# Patient Record
Sex: FEMALE | Race: WHITE | NOT HISPANIC OR LATINO | Employment: OTHER | URBAN - METROPOLITAN AREA
[De-identification: names, ages, dates, MRNs, and addresses within clinical notes are randomized per-mention and may not be internally consistent; named-entity substitution may affect disease eponyms.]

---

## 2017-01-04 ENCOUNTER — HOSPITAL ENCOUNTER (OUTPATIENT)
Dept: RADIOLOGY | Facility: MEDICAL CENTER | Age: 77
End: 2017-01-04
Attending: FAMILY MEDICINE
Payer: MEDICARE

## 2017-01-04 DIAGNOSIS — R06.2 EXPIRATORY WHEEZING: ICD-10-CM

## 2017-01-04 PROCEDURE — 71020 DX-CHEST-2 VIEWS: CPT

## 2017-01-12 ENCOUNTER — PATIENT MESSAGE (OUTPATIENT)
Dept: MEDICAL GROUP | Facility: CLINIC | Age: 77
End: 2017-01-12

## 2017-01-12 DIAGNOSIS — R06.02 EXERTIONAL SHORTNESS OF BREATH: ICD-10-CM

## 2017-02-05 ENCOUNTER — PATIENT MESSAGE (OUTPATIENT)
Dept: MEDICAL GROUP | Facility: CLINIC | Age: 77
End: 2017-02-05

## 2017-02-05 DIAGNOSIS — K21.00 GASTROESOPHAGEAL REFLUX DISEASE WITH ESOPHAGITIS: ICD-10-CM

## 2017-02-05 RX ORDER — OMEPRAZOLE 20 MG/1
20 CAPSULE, DELAYED RELEASE ORAL
Qty: 90 CAP | Refills: 3 | Status: SHIPPED | OUTPATIENT
Start: 2017-02-05 | End: 2018-12-10 | Stop reason: SDUPTHER

## 2017-02-06 ENCOUNTER — PATIENT MESSAGE (OUTPATIENT)
Dept: MEDICAL GROUP | Facility: CLINIC | Age: 77
End: 2017-02-06

## 2017-02-06 RX ORDER — FLUOXETINE HYDROCHLORIDE 20 MG/1
CAPSULE ORAL
Qty: 30 CAP | Refills: 6 | Status: SHIPPED | OUTPATIENT
Start: 2017-02-06 | End: 2017-03-24 | Stop reason: SDUPTHER

## 2017-02-06 NOTE — TELEPHONE ENCOUNTER
From: Maddy Hodge  To: Shirlene Quinones M.D.  Sent: 2/5/2017 9:46 AM PST  Subject: Prescription Question    Hi,  I am out of refills for omeprezole 20mg.  Please send a new prescription to [facility], [address].  Thank you, Maddy Hodge

## 2017-03-06 ENCOUNTER — PATIENT MESSAGE (OUTPATIENT)
Dept: MEDICAL GROUP | Facility: CLINIC | Age: 77
End: 2017-03-06

## 2017-03-06 DIAGNOSIS — R10.84 GENERALIZED ABDOMINAL PAIN: ICD-10-CM

## 2017-03-06 DIAGNOSIS — R14.0 ABDOMINAL BLOATING: ICD-10-CM

## 2017-03-10 ENCOUNTER — HOSPITAL ENCOUNTER (OUTPATIENT)
Dept: LAB | Facility: MEDICAL CENTER | Age: 77
End: 2017-03-10
Attending: FAMILY MEDICINE
Payer: MEDICARE

## 2017-03-10 DIAGNOSIS — R10.84 GENERALIZED ABDOMINAL PAIN: ICD-10-CM

## 2017-03-10 DIAGNOSIS — R14.0 ABDOMINAL BLOATING: ICD-10-CM

## 2017-03-10 LAB
ALBUMIN SERPL BCP-MCNC: 4 G/DL (ref 3.2–4.9)
ALBUMIN/GLOB SERPL: 1.5 G/DL
ALP SERPL-CCNC: 100 U/L (ref 30–99)
ALT SERPL-CCNC: 25 U/L (ref 2–50)
ANION GAP SERPL CALC-SCNC: 6 MMOL/L (ref 0–11.9)
AST SERPL-CCNC: 24 U/L (ref 12–45)
BASOPHILS # BLD AUTO: 0.05 K/UL (ref 0–0.12)
BASOPHILS NFR BLD AUTO: 0.9 % (ref 0–1.8)
BILIRUB SERPL-MCNC: 0.5 MG/DL (ref 0.1–1.5)
BUN SERPL-MCNC: 12 MG/DL (ref 8–22)
CALCIUM SERPL-MCNC: 9.1 MG/DL (ref 8.5–10.5)
CHLORIDE SERPL-SCNC: 98 MMOL/L (ref 96–112)
CO2 SERPL-SCNC: 27 MMOL/L (ref 20–33)
CREAT SERPL-MCNC: 0.86 MG/DL (ref 0.5–1.4)
EOSINOPHIL # BLD: 0.2 K/UL (ref 0–0.51)
EOSINOPHIL NFR BLD AUTO: 3.7 % (ref 0–6.9)
ERYTHROCYTE [DISTWIDTH] IN BLOOD BY AUTOMATED COUNT: 46.4 FL (ref 35.9–50)
GLOBULIN SER CALC-MCNC: 2.7 G/DL (ref 1.9–3.5)
GLUCOSE SERPL-MCNC: 98 MG/DL (ref 65–99)
HCT VFR BLD AUTO: 38.4 % (ref 37–47)
HGB BLD-MCNC: 12.7 G/DL (ref 12–16)
IMM GRANULOCYTES # BLD AUTO: 0.01 K/UL (ref 0–0.11)
IMM GRANULOCYTES NFR BLD AUTO: 0.2 % (ref 0–0.9)
LYMPHOCYTES # BLD: 1.38 K/UL (ref 1–4.8)
LYMPHOCYTES NFR BLD AUTO: 25.5 % (ref 22–41)
MCH RBC QN AUTO: 28.6 PG (ref 27–33)
MCHC RBC AUTO-ENTMCNC: 33.1 G/DL (ref 33.6–35)
MCV RBC AUTO: 86.5 FL (ref 81.4–97.8)
MONOCYTES # BLD: 0.51 K/UL (ref 0–0.85)
MONOCYTES NFR BLD AUTO: 9.4 % (ref 0–13.4)
NEUTROPHILS # BLD: 3.27 K/UL (ref 2–7.15)
NEUTROPHILS NFR BLD AUTO: 60.3 % (ref 44–72)
NRBC # BLD AUTO: 0 K/UL
NRBC BLD-RTO: 0 /100 WBC
PLATELET # BLD AUTO: 288 K/UL (ref 164–446)
PMV BLD AUTO: 10.5 FL (ref 9–12.9)
POTASSIUM SERPL-SCNC: 4.4 MMOL/L (ref 3.6–5.5)
PROT SERPL-MCNC: 6.7 G/DL (ref 6–8.2)
RBC # BLD AUTO: 4.44 M/UL (ref 4.2–5.4)
SODIUM SERPL-SCNC: 131 MMOL/L (ref 135–145)
WBC # BLD AUTO: 5.4 K/UL (ref 4.8–10.8)

## 2017-03-10 PROCEDURE — 83516 IMMUNOASSAY NONANTIBODY: CPT

## 2017-03-10 PROCEDURE — 85025 COMPLETE CBC W/AUTO DIFF WBC: CPT

## 2017-03-10 PROCEDURE — 36415 COLL VENOUS BLD VENIPUNCTURE: CPT

## 2017-03-10 PROCEDURE — 80053 COMPREHEN METABOLIC PANEL: CPT

## 2017-03-13 LAB — TTG IGA SER IA-ACNC: 0 U/ML (ref 0–3)

## 2017-03-20 ENCOUNTER — TELEPHONE (OUTPATIENT)
Dept: PULMONOLOGY | Facility: HOSPICE | Age: 77
End: 2017-03-20

## 2017-03-20 DIAGNOSIS — R06.00 DYSPNEA, UNSPECIFIED TYPE: ICD-10-CM

## 2017-03-24 ENCOUNTER — OFFICE VISIT (OUTPATIENT)
Dept: MEDICAL GROUP | Facility: CLINIC | Age: 77
End: 2017-03-24
Payer: MEDICARE

## 2017-03-24 VITALS
TEMPERATURE: 97.2 F | SYSTOLIC BLOOD PRESSURE: 126 MMHG | BODY MASS INDEX: 33.57 KG/M2 | HEIGHT: 60 IN | DIASTOLIC BLOOD PRESSURE: 80 MMHG | HEART RATE: 71 BPM | OXYGEN SATURATION: 94 % | WEIGHT: 171 LBS

## 2017-03-24 DIAGNOSIS — I10 ESSENTIAL HYPERTENSION: ICD-10-CM

## 2017-03-24 DIAGNOSIS — E66.9 OBESITY (BMI 30.0-34.9): ICD-10-CM

## 2017-03-24 DIAGNOSIS — F43.22 ADJUSTMENT DISORDER WITH ANXIETY: ICD-10-CM

## 2017-03-24 DIAGNOSIS — E03.4 IDIOPATHIC ATROPHIC HYPOTHYROIDISM: ICD-10-CM

## 2017-03-24 DIAGNOSIS — K21.9 GASTROESOPHAGEAL REFLUX DISEASE WITHOUT ESOPHAGITIS: ICD-10-CM

## 2017-03-24 DIAGNOSIS — R14.1 ABDOMINAL GAS PAIN: ICD-10-CM

## 2017-03-24 PROCEDURE — 4040F PNEUMOC VAC/ADMIN/RCVD: CPT | Performed by: FAMILY MEDICINE

## 2017-03-24 PROCEDURE — 1036F TOBACCO NON-USER: CPT | Performed by: FAMILY MEDICINE

## 2017-03-24 PROCEDURE — G8432 DEP SCR NOT DOC, RNG: HCPCS | Performed by: FAMILY MEDICINE

## 2017-03-24 PROCEDURE — 1101F PT FALLS ASSESS-DOCD LE1/YR: CPT | Performed by: FAMILY MEDICINE

## 2017-03-24 PROCEDURE — 99214 OFFICE O/P EST MOD 30 MIN: CPT | Performed by: FAMILY MEDICINE

## 2017-03-24 PROCEDURE — G8419 CALC BMI OUT NRM PARAM NOF/U: HCPCS | Performed by: FAMILY MEDICINE

## 2017-03-24 PROCEDURE — G8482 FLU IMMUNIZE ORDER/ADMIN: HCPCS | Performed by: FAMILY MEDICINE

## 2017-03-24 RX ORDER — LEVOTHYROXINE SODIUM 0.05 MG/1
50 TABLET ORAL
Qty: 90 TAB | Refills: 3 | Status: SHIPPED | OUTPATIENT
Start: 2017-03-24 | End: 2018-04-07 | Stop reason: SDUPTHER

## 2017-03-24 RX ORDER — DIAZEPAM 5 MG/1
5 TABLET ORAL EVERY 6 HOURS PRN
Qty: 90 TAB | Refills: 2 | Status: SHIPPED | OUTPATIENT
Start: 2017-03-24 | End: 2018-02-21 | Stop reason: SDUPTHER

## 2017-03-24 RX ORDER — FLUOXETINE HYDROCHLORIDE 20 MG/1
20 CAPSULE ORAL DAILY
Qty: 90 CAP | Refills: 3 | Status: SHIPPED | OUTPATIENT
Start: 2017-03-24 | End: 2017-10-26

## 2017-03-24 NOTE — PROGRESS NOTES
CC: Hypertension, reflux, hypothyroidism, bloating and gas, obesity, anxiety    HPI:   Maddy presents today with the following.    1. Essential hypertension  HTN - Chronic condition stable. Currently taking all meds as directed.   She is taking baby aspirin daily.   She is monitoring BP at home. She has excellent results of 120s to 130s over 70s.  Denies symptoms low BP: light-headed, tunnel-vision, unusual fatigue.   Denies symptoms high BP:pounding headache, visual changes, palpitations, flushed face.   Denies medicine side effects: unusual fatigue, slow heartbeat, foot/leg swelling, cough.    2. Gastroesophageal reflux disease without esophagitis  She feels the current medication regimen is controlling the gastroesophageal reflux symptoms well. Denies dysphagia, reflux symptoms, acidity, abdominal pain or visible blood or mucus in the stool. Denies vomiting or hematemesis. Denies burping or abdominal bloating. Patient avoids nonsteroidal anti-inflammatory drugs. Avoids heavy meals or eating within 2 hours of bedtime.    3. Idiopathic atrophic hypothyroidism  Patient reports good energy level on the medication. Patient denies insomnia, tremor or change in appetite.  Patient is taking the medication on an empty stomach in the morning and waiting at least 30 minutes before eating.  Last TSH in November 2016 was at target.    4. Abdominal gas pain  She has been increasing Metamucil but is getting increased gas and cramping. She feels she is not having adequate evacuation either. Discussed using MiraLAX instead. She has been considering this as she believes her GI doctor recommended this in the past. Denies blood or mucus in the stool.    5. Obesity (BMI 30.0-34.9)  Discussed her weight gain. She has stopped hiking as she is no longer steady enough. She is getting increasing shortness of breath and is very frustrated by this situation. She has a pulmonary test scheduled for next Friday.    6. Adjustment disorder  with anxiety  She does have chronic anxiety. She denies increased other than worry over her shortness of breath and relative inability to exercise. The diazepam continues to be helpful. She takes one maybe 2 in a day. Typically she takes half a tablet. Denies increased depression or rebound anxiety or insomnia.      Patient Active Problem List    Diagnosis Date Noted   • CKD (chronic kidney disease) stage 3, GFR 30-59 ml/min 05/23/2016     Priority: Medium   • Esophageal dysphagia 05/19/2016     Priority: Medium   • Idiopathic atrophic hypothyroidism      Priority: Medium   • GERD (gastroesophageal reflux disease) 09/20/2011     Priority: Medium   • Essential hypertension 07/06/2009     Priority: Medium   • Major depressive disorder, recurrent episode, mild (CMS-HCC) 05/02/2016     Priority: Low   • Neuropathy (CMS-HCC) 10/17/2013     Priority: Low   • Family history of polycystic kidney disease      Priority: Low   • Facet arthritis of lumbar region (CMS-HCC) 03/26/2012     Priority: Low   • History of vertebral fracture 03/26/2012     Priority: Low   • History of gastritis 03/26/2012     Priority: Low   • Spinal stenosis, lumbar region, without neurogenic claudication      Priority: Low   • Menopausal symptoms 09/13/2016   • Spinal cord stimulator status 09/06/2016   • Essential tremor 09/06/2016   • Hyponatremia 04/06/2016   • Postmenopausal osteoporosis    • Arachnoiditis        Current Outpatient Prescriptions   Medication Sig Dispense Refill   • diazepam (VALIUM) 5 MG Tab Take 1 Tab by mouth every 6 hours as needed for Anxiety or Sleep. 90 Tab 2   • fluoxetine (PROZAC) 20 MG Cap Take 1 Cap by mouth every day. 90 Cap 3   • levothyroxine (SYNTHROID) 50 MCG Tab Take 1 Tab by mouth Every morning on an empty stomach. 90 Tab 3   • omeprazole (PRILOSEC) 20 MG delayed-release capsule Take 1 Cap by mouth every day. 90 Cap 3   • duloxetine (CYMBALTA) 60 MG Cap DR Particles delayed-release capsule Take 1 Cap by mouth  every day. 90 Cap 2   • estradiol (ESTRACE) 0.5 MG tablet TAKE ONE TABLET BY MOUTH ONCE DAILY 90 Tab 3   • furosemide (LASIX) 20 MG Tab TAKE ONE TABLET BY MOUTH ONCE DAILY WITH POTASSIUM 30 Tab 3   • KLOR-CON M20 20 MEQ Tab CR TAKE ONE TABLET BY MOUTH ONCE DAILY. TAKE  WITH  LASIX 30 Tab 3   • valsartan (DIOVAN) 160 MG Tab Take 1 Tab by mouth every day. 90 Tab 3   • VENTOLIN  (90 BASE) MCG/ACT Aero Soln inhalation aerosol Inhale 2 Puffs by mouth every 6 hours as needed for Shortness of Breath. 1 Inhaler 5   • aspirin EC (ECOTRIN) 81 MG Tablet Delayed Response Take 81 mg by mouth every day.     • Diclofenac Sodium (VOLTAREN) 1 % GEL Apply  to skin as directed.     • pregabalin (LYRICA) 100 MG CAPS Take 100 mg by mouth 4 times a day.     • Oxymorphone HCl ER (OPANA ER) 20 MG T12A Take 1 Tab by mouth 2 Times a Day.     • oxycodone-acetaminophen (PERCOCET) 7.5-325 MG per tablet Take 1-2 Tabs by mouth every four hours as needed (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 She is seen 2/14/14, do not fill until 4/11/14 180 Each 0   • docusate sodium (COLACE) 250 MG capsule Take 250 mg by mouth every day.     • Probiotic Product ([employer]) CAPS Take 1 Cap by mouth 2 Times a Day.     • Cholecalciferol (VITAMIN D) 1000 UNIT CAPS Take 1,000 Units by mouth every day.     • MAGNESIUM 100 MG PO CAPS Take  by mouth.       No current facility-administered medications for this visit.         Allergies as of 03/24/2017 - Lukas as Reviewed 03/24/2017   Allergen Reaction Noted   • Pcn [penicillins]  07/06/2009   • Sulfa drugs  07/06/2009   • Tape  05/12/2016   • Zoloft  05/23/2016        ROS: As per HPI.    /80 mmHg  Pulse 71  Temp(Src) 36.2 °C (97.2 °F)  Ht 1.524 m (5')  Wt 77.565 kg (171 lb)  BMI 33.40 kg/m2  SpO2 94%    Physical Exam:  Gen:         Alert and oriented, No apparent distress. Voice is weak.  HEENT:   EOMI, PERRLA.   Oropharynx is well hydrated with normal soft  palate motion. No exudate or ulcerations noted.   Neck:       Full range of motion, mild  posterior cervical spasm. No JVD or carotid bruits appreciated. No cervical adenopathy appreciated. No thyromegaly or neck masses appreciated.  No retractions appreciated.  Lungs:      Clear to auscultation A&P with good air movement.   Heart:       Regular rate and rhythm normal S1 and S2 without murmur appreciated.  Strong and symmetric radial and DP pulses.  Abd:          Soft, bowel sounds positive, moderately tender. Marked bloating noted. No hepatosplenomegaly or mass appreciated. No pulsatile mass appreciated.  Ext:           Extremities show symmetric and full range of motion with normal strength. No cyanosis or clubbing appreciated. No peripheral edema appreciated.  Neuro:      Gait is normal. Patient is lucid, fluent and appropriate. No significant tremor appreciated.  Skin:         Shows no rashes, pigmented lesions or ulcerations.        Assessment and Plan.   76 y.o. female with the following issues.    1. Essential hypertension  She continues to be well controlled on valsartan. It is unlikely that the valsartan could be causing the shortness of breath over the pulmonary fibrosis. She will continue her current regimen.    2. Gastroesophageal reflux disease without esophagitis  She is stable on the current regimen. She will continue the omeprazole.    3. Idiopathic atrophic hypothyroidism  She is stable on her current regimen. This is rewritten for 90 days as she requests.  - levothyroxine (SYNTHROID) 50 MCG Tab; Take 1 Tab by mouth Every morning on an empty stomach.  Dispense: 90 Tab; Refill: 3    4. Abdominal gas pain  Her abdominal exam does not show any masses but there is definite bloating. Her ovaries were left in when she had her hysterectomy. Ultrasound is discussed. Discussed strategies for improved diet and weight loss  - US-ABDOMEN COMPLETE SURVEY; Future    5. Obesity (BMI 30.0-34.9)  Discussed  strategies for improved diet and weight loss. She cannot exercise at this time so a lot will depend on the results of the pulmonary testing.  - Patient identified as having weight management issue.  Appropriate orders and counseling given.    6. Adjustment disorder with anxiety  The diazepam continues to be helpful and well tolerated and is renewed. She uses this somewhat sparingly, typically one or maybe 2 per day. No rebound insomnia or rebound anxiety has been noted. Denies depression.  - diazepam (VALIUM) 5 MG Tab; Take 1 Tab by mouth every 6 hours as needed for Anxiety or Sleep.  Dispense: 90 Tab; Refill: 2

## 2017-03-31 ENCOUNTER — NON-PROVIDER VISIT (OUTPATIENT)
Dept: PULMONOLOGY | Facility: HOSPICE | Age: 77
End: 2017-03-31
Payer: MEDICARE

## 2017-03-31 ENCOUNTER — OFFICE VISIT (OUTPATIENT)
Dept: PULMONOLOGY | Facility: HOSPICE | Age: 77
End: 2017-03-31
Payer: MEDICARE

## 2017-03-31 VITALS
WEIGHT: 170 LBS | HEIGHT: 60 IN | SYSTOLIC BLOOD PRESSURE: 126 MMHG | HEART RATE: 68 BPM | DIASTOLIC BLOOD PRESSURE: 80 MMHG | BODY MASS INDEX: 33.38 KG/M2 | TEMPERATURE: 97.9 F | OXYGEN SATURATION: 95 % | RESPIRATION RATE: 14 BRPM

## 2017-03-31 DIAGNOSIS — J45.909 UNCOMPLICATED ASTHMA, UNSPECIFIED ASTHMA SEVERITY: ICD-10-CM

## 2017-03-31 DIAGNOSIS — R06.00 DYSPNEA, UNSPECIFIED TYPE: ICD-10-CM

## 2017-03-31 DIAGNOSIS — I27.20 PULMONARY HTN (HCC): ICD-10-CM

## 2017-03-31 PROCEDURE — 1101F PT FALLS ASSESS-DOCD LE1/YR: CPT | Performed by: INTERNAL MEDICINE

## 2017-03-31 PROCEDURE — 4040F PNEUMOC VAC/ADMIN/RCVD: CPT | Performed by: INTERNAL MEDICINE

## 2017-03-31 PROCEDURE — 99204 OFFICE O/P NEW MOD 45 MIN: CPT | Performed by: INTERNAL MEDICINE

## 2017-03-31 PROCEDURE — G8419 CALC BMI OUT NRM PARAM NOF/U: HCPCS | Performed by: INTERNAL MEDICINE

## 2017-03-31 PROCEDURE — 94729 DIFFUSING CAPACITY: CPT | Performed by: INTERNAL MEDICINE

## 2017-03-31 PROCEDURE — G8432 DEP SCR NOT DOC, RNG: HCPCS | Performed by: INTERNAL MEDICINE

## 2017-03-31 PROCEDURE — 1036F TOBACCO NON-USER: CPT | Performed by: INTERNAL MEDICINE

## 2017-03-31 PROCEDURE — 94060 EVALUATION OF WHEEZING: CPT | Performed by: INTERNAL MEDICINE

## 2017-03-31 PROCEDURE — 94726 PLETHYSMOGRAPHY LUNG VOLUMES: CPT | Performed by: INTERNAL MEDICINE

## 2017-03-31 PROCEDURE — G8482 FLU IMMUNIZE ORDER/ADMIN: HCPCS | Performed by: INTERNAL MEDICINE

## 2017-03-31 RX ORDER — BUDESONIDE AND FORMOTEROL FUMARATE DIHYDRATE 80; 4.5 UG/1; UG/1
2 AEROSOL RESPIRATORY (INHALATION) 2 TIMES DAILY
Qty: 1 INHALER | Refills: 0 | Status: SHIPPED | OUTPATIENT
Start: 2017-03-31 | End: 2017-07-12 | Stop reason: SDUPTHER

## 2017-03-31 ASSESSMENT — PULMONARY FUNCTION TESTS
FVC: 1.89
FEV1: 1.28
FVC_PERCENT_PREDICTED: 80
FEV1/FVC_PERCENT_CHANGE: 200
FVC_PREDICTED: 2.36
FEV1/FVC_PERCENT_PREDICTED: 74
FEV1_PERCENT_PREDICTED: 77
FEV1/FVC: 69.74
FEV1/FVC_PERCENT_PREDICTED: 94
FEV1/FVC_PERCENT_PREDICTED: 90
FVC: 1.95
FEV1/FVC: 68
FVC_PERCENT_PREDICTED: 82
FEV1: 1.36
FEV1_PREDICTED: 1.75
FEV1_PERCENT_CHANGE: 6
FEV1_PERCENT_PREDICTED: 72
FEV1_PERCENT_CHANGE: 3

## 2017-03-31 NOTE — PROCEDURES
Technician: Kim Reyes, RRT/CPFT  Technician Comments:  Good patient effort & cooperation.  The results of this test meet the ATS/ERS standards for acceptability and repeatability.  Test was performed on the "[employer] Body Plethysmograph- Elite DX system.  Predicted equations for Spirometry are NHanes, DLCO- [facility].  The DLCO was uncorrected for Hgb.  A bronchodilator of Ventolin HFA- 2puffs via spacer were administered.  SPIROMETRY:  1. FVC was 1.89 L, 80 % of predicted  2. FEV1 was 1.28 L, 72 % of predicted   3. FEV1/FVC ratio was 70 %  4. There was no significant response to bronchodilators   5. Flow volume loop scooped consistent with airway obstruction    LUNG VOLUMES:  1. TLC was 89 % of predicted   2. RV was  100 % of predicted     DIFFUSION CAPACITY:  1.Defusion capacity was  79 % of predicted       IMPRESSION:  The patient has low FVC and FEV1 with borderline low ratio. The patient also has low normal total lung capacity of 89% with low normal diffusion capacity of 83%. These test findings don't meet the Gold criteria for COPD and the physician criteria for restrictive ventilatory defect. 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 Clinical correlation is required.

## 2017-03-31 NOTE — MR AVS SNAPSHOT
"        Maddy Ewign Naveen   3/31/2017 9:30 AM   Appointment   MRN: [id number]    Department:  [facility]   Dept Phone:  [phone]    Description:  Female : 1940   Provider:  PFT-RMLizzie           Allergies as of 3/31/2017     Allergen Noted Reactions    Pcn [Penicillins] 2009       Sulfa Drugs 2009       Tape 2016       Zoloft 2016       Worse depression      Vital Signs     Smoking Status                   Never Smoker            Basic Information     Date Of Birth Sex Race Ethnicity Preferred Language    1940 Female White Non- English      Your appointments     Mar 31, 2017 10:40 AM   New Patient Pulmonary with Negar Estrella M.D.   [facility] (--)    [address]  [address]  [place] [address]   [phone]            2017 10:00 AM   US ABDOMEN FASTING (30 MINUTES) with [facility] US 1   [facility] - ULTRASOUND - [facility] ([facility])    [facility]  [address]  [place] [id number] [phone]           NPO 8 hours.  For  Abdomen, NPO 2 hours, schedule Abdomen Complete and include \" Abdomen\" in Appt Notes.              Problem List              ICD-10-CM Priority Class Noted - Resolved    Essential hypertension I10 Medium  2009 - Present    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   Unknown - Present    Spinal stenosis, lumbar region, without neurogenic claudication M48.06 Low  Unknown - Present    Postmenopausal osteoporosis M81.0   Unknown - Present    GERD (gastroesophageal reflux disease) K21.9 Medium  2011 - Present    Facet arthritis of lumbar region (CMS-HCC) M46.96 Low  3/26/2012 - Present    History of vertebral fracture Z87.81 Low  3/26/2012 - Present    History of gastritis Z87.19 Low  3/26/2012 - Present    Family history of polycystic kidney disease Z82.71 Low  Unknown - Present    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 Medium  Unknown - Present    Neuropathy " (CMS-HCC) G62.9 Low  10/17/2013 - Present    Hyponatremia E87.1   4/6/2016 - Present    Major depressive disorder, recurrent episode, mild (CMS-HCC) F33.0 Low  5/2/2016 - Present    Esophageal dysphagia R13.14 Medium  5/19/2016 - Present    CKD (chronic kidney disease) stage 3, GFR 30-59 ml/min N18.3 Medium  5/23/2016 - Present    Spinal cord stimulator status Z96.89   9/6/2016 - Present    Essential tremor G25.0   9/6/2016 - Present    Menopausal symptoms N95.1   9/13/2016 - Present      Health Maintenance        Date Due Completion Dates    IMM PNEUMOCOCCAL 65+ (ADULT) LOW/MEDIUM RISK SERIES (2 of 2 - PPSV23) 9/1/2020 (Originally 10/6/2016) 10/6/2015    MAMMOGRAM 12/22/2017 12/22/2015    BONE DENSITY 8/29/2018 8/29/2013, 10/19/2010, 2/13/2007    COLONOSCOPY 5/27/2019 5/27/2009 (Prv Comp)    Override on 5/27/2009: Previously completed    IMM DTaP/Tdap/Td Vaccine (2 - Td) 9/21/2023 9/21/2013            Current Immunizations     13-VALENT PCV PREVNAR 10/6/2015    Influenza TIV (IM) 11/20/2013, 10/23/2012, 10/15/2011    Influenza Vaccine Adult HD 10/6/2015, 9/23/2014    Influenza Vaccine Pediatric 9/1/2010, 10/8/2009    Influenza Vaccine Quad Inj (Pf) 10/4/2016    Pneumococcal Vaccine (UF)Historical Data 9/23/2010    SHINGLES VACCINE 3/2/2010    Tdap Vaccine 9/21/2013      Below and/or attached are the medications your provider expects you to take. Review all of your home medications and newly ordered medications with your provider and/or pharmacist. Follow medication instructions as directed by your provider and/or pharmacist. Please keep your medication list with you and share with your provider. Update the information when medications are discontinued, doses are changed, or new medications (including over-the-counter products) are added; and carry medication information at all times in the event of emergency situations     Allergies:  PCN - (reactions not documented)     SULFA DRUGS - (reactions not documented)      TAPE - (reactions not documented)     ZOLOFT - (reactions not documented)               Medications  Valid as of: March 31, 2017 - 10:38 AM    Generic Name Brand Name Tablet Size Instructions for use    Albuterol Sulfate (Aero Soln) VENTOLIN  (90 BASE) MCG/ACT Inhale 2 Puffs by mouth every 6 hours as needed for Shortness of Breath.        Aspirin (Tablet Delayed Response) ECOTRIN 81 MG Take 81 mg by mouth every day.        Cholecalciferol (Cap) Vitamin D 1000 UNIT Take 1,000 Units by mouth every day.        DiazePAM (Tab) VALIUM 5 MG Take 1 Tab by mouth every 6 hours as needed for Anxiety or Sleep.        Diclofenac Sodium (Gel) Diclofenac Sodium 1 % Apply  to skin as directed.        Docusate Sodium (Cap) COLACE 250 MG Take 250 mg by mouth every day.        DULoxetine HCl (Cap DR Particles) CYMBALTA 60 MG Take 1 Cap by mouth every day.        Estradiol (Tab) ESTRACE 0.5 MG TAKE ONE TABLET BY MOUTH ONCE DAILY        FLUoxetine HCl (Cap) PROZAC 20 MG Take 1 Cap by mouth every day.        Furosemide (Tab) LASIX 20 MG TAKE ONE TABLET BY MOUTH ONCE DAILY WITH POTASSIUM        Levothyroxine Sodium (Tab) SYNTHROID 50 MCG Take 1 Tab by mouth Every morning on an empty stomach.        Magnesium (Cap) Magnesium 100 MG Take  by mouth.        Omeprazole (CAPSULE DELAYED RELEASE) PRILOSEC 20 MG Take 1 Cap by mouth every day.        Oxycodone-Acetaminophen (Tab) PERCOCET 7.5-325 MG Take 1-2 Tabs by mouth every four hours as needed (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 She is seen 2/14/14, do not fill until 4/11/14        OxyMORphone HCl (Tablet Extended Release 12 hour Abuse-Deterrent) OxyMORphone HCl ER 20 MG Take 1 Tab by mouth 2 Times a Day.        Potassium Chloride Sona CR (Tab CR) KLOR-CON M20 20 MEQ TAKE ONE TABLET BY MOUTH ONCE DAILY. TAKE  WITH  LASIX        Pregabalin (Cap) LYRICA 100 MG Take 100 mg by mouth 4 times a day.        Probiotic Product (Cap) XunLight  Take  1 Cap by mouth 2 Times a Day.        Valsartan (Tab) DIOVAN 160 MG Take 1 Tab by mouth every day.        .                 Medicines prescribed today were sent to:     [id number] - [place], [place] [address]    [address] [place] [address]    Phone: [phone] Fax: [phone]    Open 24 Hours?: No    [facility] - [place], [place] - [address] AT PORTAL TO REGISTERED [facility]    [address] [facility] [address]    Phone: [phone] Fax: [phone]    Open 24 Hours?: No      Medication refill instructions:       If your prescription bottle indicates you have medication refills left, it is not necessary to call your provider’s office. Please contact your pharmacy and they will refill your medication.    If your prescription bottle indicates you do not have any refills left, you may request refills at any time through one of the following ways: The online [employer] system (except Urgent Care), by calling your provider’s office, or by asking your pharmacy to contact your provider’s office with a refill request. Medication refills are processed only during regular business hours and may not be available until the next business day. Your provider may request additional information or to have a follow-up visit with you prior to refilling your medication.   *Please Note: Medication refills are assigned a new Rx number when refilled electronically. Your pharmacy may indicate that no refills were authorized even though a new prescription for the same medication is available at the pharmacy. Please request the medicine by name with the pharmacy before contacting your provider for a refill.           [employer] Access Code: Activation code not generated  Current [employer] Status: Active

## 2017-03-31 NOTE — PROGRESS NOTES
Chief Complaint:  Chief Complaint   Patient presents with   • New Patient     Shortness of breath       HPI:   The patient is a 76 y.o. female came today until her clinic physician was referred from primary care office for shortness of breath. The patient has not been very active lately due to back pain, and spinal stenosis. However since 6 months ago the patient noticed worsening shortness of breath with minimal activities. She is able to walk on level ground but she gets winded very quickly if she walks up hill. She used to hike before but for the last 6 months he has been not able to do it partially because of her shortness of breath. She also has some coughing and wheezing on and off. The patient is lifelong smoker. She worked as a  before with no history of industrial exposure. She has peripheral edema, and her lower extremities especially in the end of the day. She was prescribed Lasix by her primary care physician but she is not taking it. She does not have orthopnea or nocturnal paroxysmal dyspnea. She does not snore, she doesn't smoke she has episodes of sleep apnea when she sleeps.     she had pulmonary function test today which showed mild obstructive defect with no response to bronchodilators. Echocardiogram done recently showed normal systolic function with estimated right ventricular systolic pressure of 45 mmHg.     The patient told me she gets dizzy when she stands up. Orthostatic blood pressure was done in our clinic her blood pressure dropped from 118/72 when laying down to 98/62 when she stood up, she takes Diovan 160 mg daily.      ROS:   Constitutional: Denies fevers, chills, night sweats  Eyes: Denies vision loss, pain, drainage, double vision  Ears, Nose, Throat: Denies earache, difficulty hearing, tinnitus, nasal congestion, hoarseness  Cardiovascular: Denies chest pain, tightness, palpitations, orthopnea or edema  Respiratory: Please see history of present illness  Sleep: Please see  "history of present illness  GI: Denies heartburn, dysphagia, nausea, abdominal pain, diarrhea or constipation  : Denies frequent urination, hematuria, discharge or painful urination  Musculoskeletal: Denies back pain, painful joints, sore muscles  Neurological: Denies weakness or headaches  Skin: No rashes  All other ROS were negative except what mentioned in the HPI     Past Medical History:  Past Medical History   Diagnosis Date   • Spinal stenosis, lumbar region, without neurogenic claudication    • Neuropathy (CMS-HCC)    • Chronic constipation    • Erosive gastritis 5/09     antral   • Lumbar vertebral fracture (CMS-HCC) 5/2011   • Family history of polycystic kidney disease    • Allergy      seasonal   • Anxiety      due to loss of . 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 Albrecht, attaches to plastic, prulent   • Breath shortness    • Anesthesia      \"hard to wake up\"   • Bowel habit changes      constipation   • Depression      and anxiety   • Renal disorder    • Pain 5/12/16     back and legs    • Back pain    • Bronchitis    • Chickenpox    • Bengali measles    • Influenza    • Mumps    • Tonsillitis                Social History:  Social History     Social History   • Marital Status:      Spouse Name: N/A   • Number of Children: N/A   • Years of Education: N/A     Occupational History   • Not on file.     Social History Main Topics   • Smoking status: Never Smoker    • Smokeless tobacco: Never Used   • Alcohol Use: 0.0 oz/week     0 Standard drinks or equivalent per week   • Drug Use: No   • Sexual Activity: No     Other Topics Concern   • " Stress Concern No      cancer     Social History Narrative       Occupational History   .  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 90 Tab 2   • fluoxetine (PROZAC) 20 MG Cap Take 1 Cap by mouth every day. 90 Cap 3   • levothyroxine (SYNTHROID) 50 MCG Tab Take 1 Tab by mouth Every morning on an empty stomach. 90 Tab 3   • omeprazole (PRILOSEC) 20 MG delayed-release capsule Take 1 Cap by mouth every day. 90 Cap 3   • duloxetine (CYMBALTA) 60 MG Cap DR Particles delayed-release capsule Take 1 Cap by mouth every day. 90 Cap 2   • estradiol (ESTRACE) 0.5 MG tablet TAKE ONE TABLET BY MOUTH ONCE DAILY 90 Tab 3   • furosemide (LASIX) 20 MG Tab TAKE ONE TABLET BY MOUTH ONCE DAILY WITH POTASSIUM 30 Tab 3   • KLOR-CON M20 20 MEQ Tab CR TAKE ONE TABLET BY MOUTH ONCE DAILY. TAKE  WITH  LASIX 30 Tab 3   • valsartan (DIOVAN) 160 MG Tab Take 1 Tab by mouth every day. 90 Tab 3   • VENTOLIN  (90 BASE) MCG/ACT Aero Soln inhalation aerosol Inhale 2 Puffs by mouth every 6 hours as needed for Shortness of Breath. 1 Inhaler 5   • aspirin EC (ECOTRIN) 81 MG Tablet Delayed Response Take 81 mg by mouth every day.     • Diclofenac Sodium (VOLTAREN) 1 % GEL Apply  to skin as directed.     • pregabalin (LYRICA) 100 MG CAPS Take 100 mg by mouth 4 times a day.     • Oxymorphone HCl ER (OPANA ER) 20 MG T12A Take 1 Tab by mouth 2 Times a Day.     • oxycodone-acetaminophen (PERCOCET) 7.5-325 MG per tablet Take  1-2 Tabs by mouth every four hours as needed (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 She is seen 2/14/14, do not fill until 4/11/14 180 Each 0   • docusate sodium (COLACE) 250 MG capsule Take 250 mg by mouth every day.     • Probiotic Product (MakieLab) CAPS Take 1 Cap by mouth 2 Times a Day.     • Cholecalciferol (VITAMIN D) 1000 UNIT CAPS Take 1,000 Units by mouth every day.     • MAGNESIUM 100 MG PO CAPS Take  by mouth.       No current facility-administered medications on file prior to visit.       Allergies:   Pcn; Sulfa drugs; Tape; and Zoloft        Filed Vitals:    03/31/17 1035   Height: 1.524 m (5')   Weight: 77.111 kg (170 lb)   Weight % change since last entry.: 0 %   BP: 126/80   Pulse: 68   BMI (Calculated): 33.2   Resp: 14   Temp: 36.6 °C (97.9 °F)           Physical Exam:  Appearance: Well-nourished, well-developed, in no acute distress  HEENT: Normocephalic, atraumatic, white sclera, PERRLA, oropharynx clear  Neck: No adenopathy or masses  Respiratory: no intercostal retractions or accessory muscle use  Lungs auscultation: Clear to auscultation bilaterally  Cardiovascular: Regular rate rhythm. No murmurs, rubs or gallops.  No LE edema  Abdomen: soft, nondistended  Gait: Normal  Digits: No clubbing, cyanosis  Motor: No focal deficits  Orientation: Oriented to time, person and place      DATA:    Labs:  Lab Results   Component Value Date/Time    WBC 5.4 03/10/2017 07:57 AM    RBC 4.44 03/10/2017 07:57 AM    HEMOGLOBIN 12.7 03/10/2017 07:57 AM    HEMATOCRIT 38.4 03/10/2017 07:57 AM    MCV 86.5 03/10/2017 07:57 AM    MCH 28.6 03/10/2017 07:57 AM    MCHC 33.1* 03/10/2017 07:57 AM    MPV 10.5 03/10/2017 07:57 AM    NEUTROPHILS-POLYS 60.30 03/10/2017 07:57 AM    LYMPHOCYTES 25.50 03/10/2017 07:57 AM    MONOCYTES 9.40 03/10/2017 07:57 AM    EOSINOPHILS 3.70 03/10/2017 07:57 AM    BASOPHILS 0.90 03/10/2017 07:57 AM      Lab Results   Component Value Date/Time     SODIUM 131* 03/10/2017 07:57 AM    POTASSIUM 4.4 03/10/2017 07:57 AM    CHLORIDE 98 03/10/2017 07:57 AM    CO2 27 03/10/2017 07:57 AM    GLUCOSE 98 03/10/2017 07:57 AM    BUN 12 03/10/2017 07:57 AM    CREATININE 0.86 03/10/2017 07:57 AM    BUN-CREATININE RATIO 20 02/11/2016 11:19 AM    GLOM FILT RATE, EST >59 04/15/2010 09:37 AM        Imaging:    ECHOCARDIOGRAM:      PULMONARY FUNCTION TEST:  SPIROMETRY:  1. FVC was 1.89 L, 80%  of predicted  2. FEV1 was 1.22 L, 72 % of predicted   3. FEV1/FVC ratio was 70 %  4. There was no significant response to bronchodilators   5. Flow volume loop scoped up consistent with obstruction    LUNG VOLUMES:  1. TLC was 87 % of predicted   2. RV was  96 % of predicted     DIFFUSION CAPACITY:  1.Defusion capacity was  83 % of predicted       IMPRESSION:  The patient has mild obstructive defect with no significant response to bronchodilators. The patient also has low normal total lung capacity 87% this is suggestive of possible mild restriction accompanying her obstructive defect. Clinical correlation is required.      Diagnosis:  1. Pulmonary HTN (CMS-HCC)  POLYSOMNOGRAPHY TITRATION    BASIC METABOLIC PANEL    AMB PULMONARY STRESS TESTING (6 MIN WALK)   2. Uncomplicated asthma, unspecified asthma severity  budesonide-formoterol (SYMBICORT) 80-4.5 MCG/ACT Aerosol        Assessment and Plan   1. Shortness of breath   especially on exertion probably multifactorial including mild pulmonary hypertension, obstructive ventilatory defect (asthma versus COPD versus both)  The patient's symptoms improve on albuterol inhaler.    1. Pulmonary HTN (CMS-HCC)  Probably non group one. Will check sleep study to rule out sleep apnea.  The patient has peripheral edema. She was prescribed Lasix by her primary care physician she is not using it on regular basis.  I asked the patient to start using Lasix with potassium supplements which was also prescribed by his primary care physician. We will check basic  metabolic panel in a week. The patient has some dizziness with mild orthostatic drop in his blood pressure done in our clinic today. I advised the patient to quit using Lasix and check with her PCP if this dizziness gets worse.      2. Uncomplicated asthma, unspecified asthma severity  The patient has obstructive ventilatory defect on her pulmonary function test. She is lifelong nonsmoker, however she has history of secondhand smoking. Symptoms improved with albuterol.  We'll start trial of Symbicort 80/4.5.                    Return in about 4 weeks (around 4/28/2017).        This note was created using voice recognition software. I apologize for any overlooked obvious grammar or  vocabulary mistake

## 2017-03-31 NOTE — MR AVS SNAPSHOT
"Maddy Ewing Naveen   3/31/2017 10:40 AM   Office Visit   MRN: [id number]    Department:  [facility]   Dept Phone:  [phone]    Description:  Female : 1940   Provider:  Negar Estrella M.D.           Reason for Visit     New Patient Shortness of breath      Allergies as of 3/31/2017     Allergen Noted Reactions    Pcn [Penicillins] 2009       Sulfa Drugs 2009       Tape 2016       Zoloft 2016       Worse depression      You were diagnosed with     Pulmonary HTN (CMS-HCC)   [514663]       Uncomplicated asthma, unspecified asthma severity   [6125769]         Vital Signs     Blood Pressure Pulse Temperature Respirations Height Weight    126/80 mmHg 68 36.6 °C (97.9 °F) 14 1.524 m (5') 77.111 kg (170 lb)    Body Mass Index Oxygen Saturation Smoking Status             33.20 kg/m2 95% Never Smoker          Basic Information     Date Of Birth Sex Race Ethnicity Preferred Language    1940 Female White Non- English      Your appointments     2017 10:00 AM   US ABDOMEN FASTING (30 MINUTES) with [facility] US 1   [facility] IMAGING - ULTRASOUND - [facility] ([facility])    [facility]  [address]  [place] [address] [phone]           NPO 8 hours.  For  Abdomen, NPO 2 hours, schedule Abdomen Complete and include \" Abdomen\" in Appt Notes.              Problem List              ICD-10-CM Priority Class Noted - Resolved    Essential hypertension I10 Medium  2009 - Present    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   Unknown - Present    Spinal stenosis, lumbar region, without neurogenic claudication M48.06 Low  Unknown - Present    Postmenopausal osteoporosis M81.0   Unknown - Present    GERD (gastroesophageal reflux disease) K21.9 Medium  2011 - Present    Facet arthritis of lumbar region (CMS-HCC) M46.96 Low  3/26/2012 - Present    History of vertebral fracture Z87.81 Low  3/26/2012 - Present    History of gastritis " Z87.19 Low  3/26/2012 - Present    Family history of polycystic kidney disease Z82.71 Low  Unknown - Present    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 Medium  Unknown - Present    Neuropathy (CMS-HCC) G62.9 Low  10/17/2013 - Present    Hyponatremia E87.1   4/6/2016 - Present    Major depressive disorder, recurrent episode, mild (CMS-[facility]) F33.0 Low  5/2/2016 - Present    Esophageal dysphagia R13.14 Medium  5/19/2016 - Present    CKD (chronic kidney disease) stage 3, GFR 30-59 ml/min N18.3 Medium  5/23/2016 - Present    Spinal cord stimulator status Z96.89   9/6/2016 - Present    Essential tremor G25.0   9/6/2016 - Present    Menopausal symptoms N95.1   9/13/2016 - Present      Health Maintenance        Date Due Completion Dates    IMM PNEUMOCOCCAL 65+ (ADULT) LOW/MEDIUM RISK SERIES (2 of 2 - PPSV23) 9/1/2020 (Originally 10/6/2016) 10/6/2015    MAMMOGRAM 12/22/2017 12/22/2015    BONE DENSITY 8/29/2018 8/29/2013, 10/19/2010, 2/13/2007    COLONOSCOPY 5/27/2019 5/27/2009 (Prv Comp)    Override on 5/27/2009: Previously completed    IMM DTaP/Tdap/Td Vaccine (2 - Td) 9/21/2023 9/21/2013            Current Immunizations     13-VALENT PCV PREVNAR 10/6/2015    Influenza TIV (IM) 11/20/2013, 10/23/2012, 10/15/2011    Influenza Vaccine Adult HD 10/6/2015, 9/23/2014    Influenza Vaccine Pediatric 9/1/2010, 10/8/2009    Influenza Vaccine Quad Inj (Pf) 10/4/2016    Pneumococcal Vaccine (UF)Historical Data 9/23/2010    SHINGLES VACCINE 3/2/2010    Tdap Vaccine 9/21/2013      Below and/or attached are the medications your provider expects you to take. Review all of your home medications and newly ordered medications with your provider and/or pharmacist. Follow medication instructions as directed by your provider and/or pharmacist. Please keep your medication list with you and share with your provider. Update the information when medications are discontinued, doses are changed, or new medications (including over-the-counter  products) are added; and carry medication information at all times in the event of emergency situations     Allergies:  PCN - (reactions not documented)     SULFA DRUGS - (reactions not documented)     TAPE - (reactions not documented)     ZOLOFT - (reactions not documented)               Medications  Valid as of: March 31, 2017 - 11:36 AM    Generic Name Brand Name Tablet Size Instructions for use    Albuterol Sulfate (Aero Soln) VENTOLIN  (90 BASE) MCG/ACT Inhale 2 Puffs by mouth every 6 hours as needed for Shortness of Breath.        Aspirin (Tablet Delayed Response) ECOTRIN 81 MG Take 81 mg by mouth every day.        Budesonide-Formoterol Fumarate (Aerosol) SYMBICORT 80-4.5 MCG/ACT Inhale 2 Puffs by mouth 2 Times a Day. Use spacer. Rinse mouth after each use.        Cholecalciferol (Cap) Vitamin D 1000 UNIT Take 1,000 Units by mouth every day.        DiazePAM (Tab) VALIUM 5 MG Take 1 Tab by mouth every 6 hours as needed for Anxiety or Sleep.        Diclofenac Sodium (Gel) Diclofenac Sodium 1 % Apply  to skin as directed.        Docusate Sodium (Cap) COLACE 250 MG Take 250 mg by mouth every day.        DULoxetine HCl (Cap DR Particles) CYMBALTA 60 MG Take 1 Cap by mouth every day.        Estradiol (Tab) ESTRACE 0.5 MG TAKE ONE TABLET BY MOUTH ONCE DAILY        FLUoxetine HCl (Cap) PROZAC 20 MG Take 1 Cap by mouth every day.        Furosemide (Tab) LASIX 20 MG TAKE ONE TABLET BY MOUTH ONCE DAILY WITH POTASSIUM        Levothyroxine Sodium (Tab) SYNTHROID 50 MCG Take 1 Tab by mouth Every morning on an empty stomach.        Magnesium (Cap) Magnesium 100 MG Take  by mouth.        Omeprazole (CAPSULE DELAYED RELEASE) PRILOSEC 20 MG Take 1 Cap by mouth every day.        Oxycodone-Acetaminophen (Tab) PERCOCET 7.5-325 MG Take 1-2 Tabs by mouth every four hours as needed (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 She is seen 2/14/14, do not fill until 4/11/14        OxyMORphone HCl (Tablet  Extended Release 12 hour Abuse-Deterrent) OxyMORphone HCl ER 20 MG Take 1 Tab by mouth 2 Times a Day.        OxyMORphone HCl (Tablet Extended Release 12 hour Abuse-Deterrent) OxyMORphone HCl ER 20 MG Take  by mouth.        Potassium Chloride Sona CR (Tab CR) KLOR-CON M20 20 MEQ TAKE ONE TABLET BY MOUTH ONCE DAILY. TAKE  WITH  LASIX        Pregabalin (Cap) LYRICA 100 MG Take 100 mg by mouth 4 times a day.        Probiotic Product (Cap) World Blender  Take 1 Cap by mouth 2 Times a Day.        Valsartan (Tab) DIOVAN 160 MG Take 1 Tab by mouth every day.        .                 Medicines prescribed today were sent to:     [facility] - [place], [place] - [address]    [address] [place] [address]    Phone: [phone] Fax: [phone]    Open 24 Hours?: No    [facility] - [place], [place] - [address] AT [address] TO [facility] [address]    [address] [facility] [address]    Phone: [phone] Fax: [phone]    Open 24 Hours?: No      Medication refill instructions:       If your prescription bottle indicates you have medication refills left, it is not necessary to call your provider’s office. Please contact your pharmacy and they will refill your medication.    If your prescription bottle indicates you do not have any refills left, you may request refills at any time through one of the following ways: The online [employer] system (except Urgent Care), by calling your provider’s office, or by asking your pharmacy to contact your provider’s office with a refill request. Medication refills are processed only during regular business hours and may not be available until the next business day. Your provider may request additional information or to have a follow-up visit with you prior to refilling your medication.   *Please Note: Medication refills are assigned a new Rx number when refilled electronically. Your pharmacy may indicate that no refills were  authorized even though a new prescription for the same medication is available at the pharmacy. Please request the medicine by name with the pharmacy before contacting your provider for a refill.        Your To Do List     Future Labs/Procedures Complete By Expires    AMB PULMONARY STRESS TESTING (6 MIN WALK)  As directed 3/31/2018    BASIC METABOLIC PANEL  As directed 3/31/2018    POLYSOMNOGRAPHY TITRATION  As directed 3/31/2018         MyChart Access Code: Activation code not generated  Current MyChart Status: Active

## 2017-04-06 ENCOUNTER — PATIENT MESSAGE (OUTPATIENT)
Dept: MEDICAL GROUP | Facility: CLINIC | Age: 77
End: 2017-04-06

## 2017-04-06 DIAGNOSIS — Z96.89 SPINAL CORD STIMULATOR STATUS: ICD-10-CM

## 2017-04-06 DIAGNOSIS — L98.492 SKIN ULCER, WITH FAT LAYER EXPOSED (HCC): ICD-10-CM

## 2017-04-06 DIAGNOSIS — T85.733A INFECTION OF SPINAL CORD STIMULATOR, INITIAL ENCOUNTER (HCC): ICD-10-CM

## 2017-04-06 NOTE — TELEPHONE ENCOUNTER
I think it is very important that you see Dr. Albrecht. While it might be a pressure sore you are quite active and that does not fully make sense to me. I will make a referral to Dr. Albrecht.

## 2017-04-11 ENCOUNTER — NON-PROVIDER VISIT (OUTPATIENT)
Dept: MEDICAL GROUP | Facility: CLINIC | Age: 77
End: 2017-04-11
Payer: MEDICARE

## 2017-04-11 ENCOUNTER — HOSPITAL ENCOUNTER (OUTPATIENT)
Facility: MEDICAL CENTER | Age: 77
End: 2017-04-11
Attending: FAMILY MEDICINE
Payer: MEDICARE

## 2017-04-11 ENCOUNTER — HOSPITAL ENCOUNTER (OUTPATIENT)
Dept: LAB | Facility: MEDICAL CENTER | Age: 77
End: 2017-04-11
Attending: INTERNAL MEDICINE
Payer: MEDICARE

## 2017-04-11 DIAGNOSIS — L98.412 NON-PRESSURE CHRONIC ULCER OF BUTTOCK WITH FAT LAYER EXPOSED (HCC): ICD-10-CM

## 2017-04-11 DIAGNOSIS — Z96.89 SPINAL CORD STIMULATOR STATUS: ICD-10-CM

## 2017-04-11 DIAGNOSIS — I27.20 PULMONARY HTN (HCC): ICD-10-CM

## 2017-04-11 LAB
ANION GAP SERPL CALC-SCNC: 6 MMOL/L (ref 0–11.9)
BUN SERPL-MCNC: 14 MG/DL (ref 8–22)
CALCIUM SERPL-MCNC: 9.4 MG/DL (ref 8.5–10.5)
CHLORIDE SERPL-SCNC: 97 MMOL/L (ref 96–112)
CO2 SERPL-SCNC: 25 MMOL/L (ref 20–33)
CREAT SERPL-MCNC: 0.87 MG/DL (ref 0.5–1.4)
GFR SERPL CREATININE-BSD FRML MDRD: >60 ML/MIN/1.73 M 2
GLUCOSE SERPL-MCNC: 89 MG/DL (ref 65–99)
GRAM STN SPEC: NORMAL
POTASSIUM SERPL-SCNC: 4.1 MMOL/L (ref 3.6–5.5)
SIGNIFICANT IND 70042: NORMAL
SITE SITE: NORMAL
SODIUM SERPL-SCNC: 128 MMOL/L (ref 135–145)
SOURCE SOURCE: NORMAL

## 2017-04-11 PROCEDURE — 80048 BASIC METABOLIC PNL TOTAL CA: CPT

## 2017-04-11 PROCEDURE — 36415 COLL VENOUS BLD VENIPUNCTURE: CPT

## 2017-04-11 NOTE — MR AVS SNAPSHOT
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  For  Abdomen, NPO 2 hours, schedule Abdomen Complete and include \" Abdomen\" in Appt Notes.            2017  8:00 PM   Sleep Study with SLEEP TECH   [facility] Sleep Medicine (--)    [address]  [address]  [place] [id number] [phone]            2017 10:00 AM   Pulmonary Function Test with PFT-RM2   [facility] Pulmonary Medicine (--)    [address]  [address]  [place] [id number] [phone]            2017 10:40 AM   Established Patient Pul with A Rotation   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   Unknown - Present    Spinal stenosis, lumbar region, without neurogenic claudication M48.06 Low "  Unknown - Present    Postmenopausal osteoporosis M81.0   Unknown - Present    GERD (gastroesophageal reflux disease) K21.9 Medium  9/20/2011 - Present    Facet arthritis of lumbar region (CMS-HCC) M46.96 Low  3/26/2012 - Present    History of vertebral fracture Z87.81 Low  3/26/2012 - Present    History of gastritis Z87.19 Low  3/26/2012 - Present    Family history of polycystic kidney disease Z82.71 Low  Unknown - Present    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 Medium  Unknown - Present    Neuropathy (CMS-HCC) G62.9 Low  10/17/2013 - Present    Hyponatremia E87.1   4/6/2016 - Present    Major depressive disorder, recurrent episode, mild (CMS-HCC) F33.0 Low  5/2/2016 - Present    Esophageal dysphagia R13.14 Medium  5/19/2016 - Present    CKD (chronic kidney disease) stage 3, GFR 30-59 ml/min N18.3 Medium  5/23/2016 - Present    Spinal cord stimulator status Z96.89   9/6/2016 - Present    Essential tremor G25.0   9/6/2016 - Present    Menopausal symptoms N95.1   9/13/2016 - Present      Health Maintenance        Date Due Completion Dates    IMM PNEUMOCOCCAL 65+ (ADULT) LOW/MEDIUM RISK SERIES (2 of 2 - PPSV23) 9/1/2020 (Originally 10/6/2016) 10/6/2015    MAMMOGRAM 12/22/2017 12/22/2015    BONE DENSITY 8/29/2018 8/29/2013, 10/19/2010, 2/13/2007    COLONOSCOPY 5/27/2019 5/27/2009 (Prv Comp)    Override on 5/27/2009: Previously completed    IMM DTaP/Tdap/Td Vaccine (2 - Td) 9/21/2023 9/21/2013            Current Immunizations     13-VALENT PCV PREVNAR 10/6/2015    Influenza TIV (IM) 11/20/2013, 10/23/2012, 10/15/2011    Influenza Vaccine Adult HD 10/6/2015, 9/23/2014    Influenza Vaccine Pediatric 9/1/2010, 10/8/2009    Influenza Vaccine Quad Inj (Pf) 10/4/2016    Pneumococcal Vaccine (UF)Historical Data 9/23/2010    SHINGLES VACCINE 3/2/2010    Tdap Vaccine 9/21/2013      Below and/or attached are the medications your provider expects you to take. Review all of your home medications and newly ordered medications  with your provider and/or pharmacist. Follow medication instructions as directed by your provider and/or pharmacist. Please keep your medication list with you and share with your provider. Update the information when medications are discontinued, doses are changed, or new medications (including over-the-counter products) are added; and carry medication information at all times in the event of emergency situations     Allergies:  PCN - (reactions not documented)     SULFA DRUGS - (reactions not documented)     TAPE - (reactions not documented)     ZOLOFT - (reactions not documented)               Medications  Valid as of: April 11, 2017 -  2:45 PM    Generic Name Brand Name Tablet Size Instructions for use    Albuterol Sulfate (Aero Soln) VENTOLIN  (90 BASE) MCG/ACT Inhale 2 Puffs by mouth every 6 hours as needed for Shortness of Breath.        Aspirin (Tablet Delayed Response) ECOTRIN 81 MG Take 81 mg by mouth every day.        Budesonide-Formoterol Fumarate (Aerosol) SYMBICORT 80-4.5 MCG/ACT Inhale 2 Puffs by mouth 2 Times a Day. Use spacer. Rinse mouth after each use.        Cholecalciferol (Cap) Vitamin D 1000 UNIT Take 1,000 Units by mouth every day.        DiazePAM (Tab) VALIUM 5 MG Take 1 Tab by mouth every 6 hours as needed for Anxiety or Sleep.        Diclofenac Sodium (Gel) Diclofenac Sodium 1 % Apply  to skin as directed.        Docusate Sodium (Cap) COLACE 250 MG Take 250 mg by mouth every day.        DULoxetine HCl (Cap DR Particles) CYMBALTA 60 MG Take 1 Cap by mouth every day.        Estradiol (Tab) ESTRACE 0.5 MG TAKE ONE TABLET BY MOUTH ONCE DAILY        FLUoxetine HCl (Cap) PROZAC 20 MG Take 1 Cap by mouth every day.        Furosemide (Tab) LASIX 20 MG TAKE ONE TABLET BY MOUTH ONCE DAILY WITH POTASSIUM        Levothyroxine Sodium (Tab) SYNTHROID 50 MCG Take 1 Tab by mouth Every morning on an empty stomach.        Magnesium (Cap) Magnesium 100 MG Take  by mouth.        Omeprazole (CAPSULE  DELAYED RELEASE) PRILOSEC 20 MG Take 1 Cap by mouth every day.        Oxycodone-Acetaminophen (Tab) PERCOCET 7.5-325 MG Take 1-2 Tabs by mouth every four hours as needed (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 She is seen 2/14/14, do not fill until 4/11/14        OxyMORphone HCl (Tablet Extended Release 12 hour Abuse-Deterrent) OxyMORphone HCl ER 20 MG Take 1 Tab by mouth 2 Times a Day.        OxyMORphone HCl (Tablet Extended Release 12 hour Abuse-Deterrent) OxyMORphone HCl ER 20 MG Take  by mouth.        Potassium Chloride Sona CR (Tab CR) KLOR-CON M20 20 MEQ TAKE ONE TABLET BY MOUTH ONCE DAILY. TAKE  WITH  LASIX        Pregabalin (Cap) LYRICA 100 MG Take 100 mg by mouth 4 times a day.        Probiotic Product (Cap) [employer]  Take 1 Cap by mouth 2 Times a Day.        Valsartan (Tab) DIOVAN 160 MG Take 1 Tab by mouth every day.        .                 Medicines prescribed today were sent to:     [facility] [address] [place] [address], [place] - [address]    [address] [facility] [address]    Phone: [phone] Fax: [phone]    Open 24 Hours?: No    [facility] - [place], [place] - [address] AT [address] TO [facility]    [address] [facility] [address]    Phone: [phone] Fax: [phone]    Open 24 Hours?: No      Medication refill instructions:       If your prescription bottle indicates you have medication refills left, it is not necessary to call your provider’s office. Please contact your pharmacy and they will refill your medication.    If your prescription bottle indicates you do not have any refills left, you may request refills at any time through one of the following ways: The online [employer] system (except Urgent Care), by calling your provider’s office, or by asking your pharmacy to contact your provider’s office with a refill request. Medication refills are processed only during regular business hours and  may not be available until the next business day. Your provider may request additional information or to have a follow-up visit with you prior to refilling your medication.   *Please Note: Medication refills are assigned a new Rx number when refilled electronically. Your pharmacy may indicate that no refills were authorized even though a new prescription for the same medication is available at the pharmacy. Please request the medicine by name with the pharmacy before contacting your provider for a refill.        Your To Do List     Future Labs/Procedures Complete By Expires    CULTURE WOUND W/ GRAM STAIN  As directed 4/11/2018         MyChart Access Code: Activation code not generated  Current i2we Status: Active

## 2017-04-11 NOTE — PROGRESS NOTES
This is Dr. Remington Quinones; history; infected spinal stimulator pocket over a year ago. Please see notes from infectious disease. This was treated with long-term antibiotics and did finally resolve. She has had new ulceration and drainage develop from the left buttock area directly over the spinal stimulator battery these past 2 months. Patient is mobile throughout the home and walks on a daily basis. She makes an effort to exercise regularly. Her maximum time in bed is 8 hours daily.   Examination; Ulcerated region left buttock over spinal stimulator battery by palpation.  Thick white drainage present, ring of erythema.  Culture of drainage taken and sent.  Assessment:   Unlikely to be bedsore or decubitus ulcer as patient is multiple throughout the home and walks a great deal. She is in bed nightly the regular amount of time.  She does not lie down during the day. Plan:  She has a referral to Dr. Albrecht, infectious disease.  I obtained a culture from the buttock of the drainage material myself today and that is sent for culture and Gram stain.

## 2017-04-11 NOTE — NON-PROVIDER
Maddy Hodge is a 76 y.o. female here for a non-provider visit for wound culture    If abnormal was an in office provider notified today (if so, indicate provider)? Yes  Routed to PCP? Yes    Pt came into office to collect a wound culture

## 2017-04-13 DIAGNOSIS — L89.323: ICD-10-CM

## 2017-04-13 LAB
BACTERIA WND AEROBE CULT: NORMAL
GRAM STN SPEC: NORMAL
SIGNIFICANT IND 70042: NORMAL
SITE SITE: NORMAL
SOURCE SOURCE: NORMAL

## 2017-04-19 ENCOUNTER — PATIENT MESSAGE (OUTPATIENT)
Dept: MEDICAL GROUP | Facility: CLINIC | Age: 77
End: 2017-04-19

## 2017-04-19 ENCOUNTER — NON-PROVIDER VISIT (OUTPATIENT)
Dept: WOUND CARE | Facility: MEDICAL CENTER | Age: 77
End: 2017-04-19
Attending: FAMILY MEDICINE
Payer: MEDICARE

## 2017-04-19 ENCOUNTER — HOSPITAL ENCOUNTER (OUTPATIENT)
Facility: MEDICAL CENTER | Age: 77
End: 2017-04-19
Attending: NURSE PRACTITIONER
Payer: MEDICARE

## 2017-04-19 DIAGNOSIS — L08.9 WOUND INFECTION: ICD-10-CM

## 2017-04-19 DIAGNOSIS — T14.8XXA WOUND INFECTION: ICD-10-CM

## 2017-04-19 LAB
GRAM STN SPEC: NORMAL
SIGNIFICANT IND 70042: NORMAL
SITE SITE: NORMAL
SOURCE SOURCE: NORMAL

## 2017-04-19 PROCEDURE — 302804 HCHG HYDROFIBER SILVER 6X6

## 2017-04-19 PROCEDURE — A6402 STERILE GAUZE <= 16 SQ IN: HCPCS

## 2017-04-19 PROCEDURE — A6212 FOAM DRG <=16 SQ IN W/BORDER: HCPCS

## 2017-04-19 PROCEDURE — 303972 HCHG HYPAFIX RET DRST 18SQ PC"

## 2017-04-19 PROCEDURE — G8990 OTHER PT/OT CURRENT STATUS: HCPCS | Mod: CH

## 2017-04-19 PROCEDURE — 97597 DBRDMT OPN WND 1ST 20 CM/<: CPT

## 2017-04-19 PROCEDURE — 97161 PT EVAL LOW COMPLEX 20 MIN: CPT

## 2017-04-19 PROCEDURE — G8991 OTHER PT/OT GOAL STATUS: HCPCS | Mod: CH

## 2017-04-19 NOTE — CERTIFICATION
"Advanced [facility]  [facility]  [address]  [address]  [place] [address]  [phone] Fax: [phone]      Initial Evaluation  For Certification Period:4/19/17  To 5/19/17  visit  G-code  C-code  kx modifier     #1  4/19/17  3490/4871 ch no           Referring Physician: Stacie Garber MD  Primary Physician:        Consulting Physicians:     MORENA Burks    Wound(s): L buttock pressure ulcer  Start of Care: 4/19/17       Subjective:        HPI:     Pt is 75 y/o female who developed a pressure sore over area of spinal stimulator on 3/27/17. Pt has had issues with this stimulator since placement back in early 2015 when she developed an infection and was treated with abx for one year per Dr. Albrecht.  Pt has seen pain MD regarding removal of stimulator but wanted to try conservative care to preserve stimulator.  Pt sees this MD or his PA-C monthly and was last seen 3 weeks ago and they are aware of pressure sore in this area.     Pain:       Wincing with probing only     Past Medical History:  Past Medical History   Diagnosis Date   • Spinal stenosis, lumbar region, without neurogenic claudication    • Neuropathy (CMS-HCC)    • Chronic constipation    • Erosive gastritis 5/09     antral   • Lumbar vertebral fracture (CMS-HCC) 5/2011   • Family history of polycystic kidney disease    • Allergy      seasonal   • Anxiety      due to loss of . 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 Albrecht, attaches to plastic, prulent   • Breath shortness    • Anesthesia      \"hard to wake up\"   • Bowel habit changes      constipation   • Depression  "     and anxiety   • Renal disorder    • Pain 5/12/16     back and legs    • Back pain    • Bronchitis    • Chickenpox    • Guinean measles    • Influenza    • Mumps    • Tonsillitis      Current Medications:  Current outpatient prescriptions:   •  OxyMORphone HCl ER (OPANA ER) 20 MG Tablet Extended Release 12 hour Abuse-Deterrent, Take  by mouth., Disp: , Rfl:   •  budesonide-formoterol (SYMBICORT) 80-4.5 MCG/ACT Aerosol, Inhale 2 Puffs by mouth 2 Times a Day. Use spacer. Rinse mouth after each use., Disp: 1 Inhaler, Rfl: 0  •  diazepam (VALIUM) 5 MG Tab, Take 1 Tab by mouth every 6 hours as needed for Anxiety or Sleep., Disp: 90 Tab, Rfl: 2  •  fluoxetine (PROZAC) 20 MG Cap, Take 1 Cap by mouth every day., Disp: 90 Cap, Rfl: 3  •  levothyroxine (SYNTHROID) 50 MCG Tab, Take 1 Tab by mouth Every morning on an empty stomach., Disp: 90 Tab, Rfl: 3  •  omeprazole (PRILOSEC) 20 MG delayed-release capsule, Take 1 Cap by mouth every day., Disp: 90 Cap, Rfl: 3  •  duloxetine (CYMBALTA) 60 MG Cap DR Particles delayed-release capsule, Take 1 Cap by mouth every day., Disp: 90 Cap, Rfl: 2  •  estradiol (ESTRACE) 0.5 MG tablet, TAKE ONE TABLET BY MOUTH ONCE DAILY, Disp: 90 Tab, Rfl: 3  •  furosemide (LASIX) 20 MG Tab, TAKE ONE TABLET BY MOUTH ONCE DAILY WITH POTASSIUM, Disp: 30 Tab, Rfl: 3  •  KLOR-CON M20 20 MEQ Tab CR, TAKE ONE TABLET BY MOUTH ONCE DAILY. TAKE  WITH  LASIX, Disp: 30 Tab, Rfl: 3  •  valsartan (DIOVAN) 160 MG Tab, Take 1 Tab by mouth every day., Disp: 90 Tab, Rfl: 3  •  VENTOLIN  (90 BASE) MCG/ACT Aero Soln inhalation aerosol, Inhale 2 Puffs by mouth every 6 hours as needed for Shortness of Breath., Disp: 1 Inhaler, Rfl: 5  •  aspirin EC (ECOTRIN) 81 MG Tablet Delayed Response, Take 81 mg by mouth every day., Disp: , Rfl:   •  Diclofenac Sodium (VOLTAREN) 1 % GEL, Apply  to skin as directed., Disp: , Rfl:   •  pregabalin (LYRICA) 100 MG CAPS, Take 100 mg by mouth 4 times a day., Disp: , Rfl:   •   Oxymorphone HCl ER (OPANA ER) 20 MG T12A, Take 1 Tab by mouth 2 Times a Day., Disp: , Rfl:   •  oxycodone-acetaminophen (PERCOCET) 7.5-325 MG per tablet, Take 1-2 Tabs by mouth every four hours as needed (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 She is seen 2/14/14, do not fill until 4/11/14, Disp: 180 Each, Rfl: 0  •  docusate sodium (COLACE) 250 MG capsule, Take 250 mg by mouth every day., Disp: , Rfl:   •  Probiotic Product (Ai2 UK) CAPS, Take 1 Cap by mouth 2 Times a Day., Disp: , Rfl:   •  Cholecalciferol (VITAMIN D) 1000 UNIT CAPS, Take 1,000 Units by mouth every day., Disp: , Rfl:   •  MAGNESIUM 100 MG PO CAPS, Take  by mouth., Disp: , Rfl:   Allergies:   Allergies   Allergen Reactions   • Pcn [Penicillins]    • Sulfa Drugs    • Tape    • Zoloft      Worse depression     Past Surgical History:   Past Surgical History   Procedure Laterality Date   • Lumbar laminectomy diskectomy     • Hysterectomy, total abdominal  1976   • Colonoscopy  2000,5/27/09     normal   • Egd with asp/bx  5/27/09     erosive gastritis   • Appendectomy  1976   • Spinal cord stimulator  9/2/14   • Cataract extraction with iol Bilateral    • Gastroscopy with balloon dilatation N/A 5/19/2016     Procedure: GASTROSCOPY WITH DILATATION;  Surgeon: Tony Monae M.D.;  Location: SURGERY [facility];  Service:    • Hysterectomy laparoscopy     • Tonsillectomy       Social History:    Social History     Social History   • Marital Status:      Spouse Name: N/A   • Number of Children: N/A   • Years of Education: N/A     Occupational History   • Not on file.     Social History Main Topics   • Smoking status: Never Smoker    • Smokeless tobacco: Never Used   • Alcohol Use: 0.0 oz/week     0 Standard drinks or equivalent per week   • Drug Use: No   • Sexual Activity: No     Other Topics Concern   • Stress Concern No      cancer     Social History Narrative           Objective:       Tests and Measures:4/19/17:  C&S taken from deep within tract    Orthotic, protective, supportive devices: none    Fall Risk Assessment (rogelio all that apply with an X): Pt is not a fall risk              X 65 years or older                Fall within the last 2 years, uses   Ambulatory devices   Loss of protective sensation in feet,    Use of prostethic/orthotic, years               Presence of lower extremity/foot/toe amputation              Taking medication that increases risk (per facility policy)       Wound Characteristics                                                    Location:  Left lateral buttock wound- stage 3 Initial Evaluation  Date: 4/19/17   Tissue Type and %: Open portal with yellow borders   Periwound: Purple ring surrounding portal   Drainage: Mod to heavy dark yellow/tan serous fluid   Exposed structures Stimulator visible and palpable    Wound Edges:   frayed   Odor: absent   S&S of Infection:   Large pocket with heavy drainage opened   Edema: u/a   Sensation: intact               Measurements: Initial Evaluation  Date:4/19/17   Length (cm) 1.3   Width (cm) 0.7   Depth (cm) 1.3   Area (cm2) 0.91cm2   Tract/undermine 11:00=5cm.   9:00=1.7cm        Procedures:     Debridement :  Sharp with scalpel to remove biofilm which exposed opening of portal- no bleeding, expressed extensive dark serous fluid.     Cleansed with:      NS irrigation  , Qtip                                                                   Periwound protected with: moisture barrier, skin prep   Primary dressing:aquacel ag strip packing.    Secondary Dressing:superfoam   Other: hypafix     Patient Education: 4/19/17:  Educated on redressing and issue of stimulator being exposed.  Would like to have dressing changed 2-3 x per week but they can change exterior dressing. Pt needs to follow up with pain MD regarding stimulator and possible removal , also discuss with ID MD      Professional Collaboration: 4/19/17:  Chery BERG,   In to view and discuss issue regarding stimulator.        Assessment:      Wound etiology: pressure, implant, questionable deeper infecion     Wound Progress:  Worsening with stimulator exposed. Pt need to    Rationale for Treatment:aquacel ag strip for antimicrobial and longer wear time, biocompatible    Patient tolerance/compliance: son and pt     Complicating factors:stimulator exposed and large cavern surrounding    Need for ongoing Advanced Wound Care services: sharp debridement, pt education, dressing selection      Plan:      Treatment Plan and Recommendations:  Diagnosis/ICD10: L89.323 Left buttock pressure ulcer stage 3    Procedures/CPT: sharp debridement, non selective     Frequency: 3x/week       Treatment Goals: STG 2 Weeks  LTG 4 Weeks   Granulation Tissue: % 100%   Decrease Necrotic Tissue to: % zero   Wound Phase:      Decrease Size by: % %   Periwound:      Decrease tracts/undermining by: % 10%   Pt to be addressed by pain MD regarding stimulator           At the time of each visit a thorough assessment of the patient is completed to assure the  appropriateness of our plan of care.  The dressings or modalities may need to be adapted   from the original plan to address any significant changes in the wound environment.          Clinician Signature:_______________________________Date__________________      Physician Signature:______________________________Date:__________________

## 2017-04-21 ENCOUNTER — NON-PROVIDER VISIT (OUTPATIENT)
Dept: WOUND CARE | Facility: MEDICAL CENTER | Age: 77
End: 2017-04-21
Attending: FAMILY MEDICINE
Payer: MEDICARE

## 2017-04-21 ENCOUNTER — HOSPITAL ENCOUNTER (OUTPATIENT)
Dept: RADIOLOGY | Facility: MEDICAL CENTER | Age: 77
End: 2017-04-21
Attending: FAMILY MEDICINE
Payer: MEDICARE

## 2017-04-21 ENCOUNTER — TELEPHONE (OUTPATIENT)
Dept: MEDICAL GROUP | Facility: CLINIC | Age: 77
End: 2017-04-21

## 2017-04-21 DIAGNOSIS — R33.9 URINARY RETENTION: ICD-10-CM

## 2017-04-21 DIAGNOSIS — R14.1 ABDOMINAL GAS PAIN: ICD-10-CM

## 2017-04-21 DIAGNOSIS — N32.89 BLADDER DISTENSION: ICD-10-CM

## 2017-04-21 PROCEDURE — A6402 STERILE GAUZE <= 16 SQ IN: HCPCS

## 2017-04-21 PROCEDURE — 97602 WOUND(S) CARE NON-SELECTIVE: CPT

## 2017-04-21 PROCEDURE — 76700 US EXAM ABDOM COMPLETE: CPT

## 2017-04-21 PROCEDURE — A6213 FOAM DRG >16<=48 SQ IN W/BDR: HCPCS

## 2017-04-21 PROCEDURE — 97597 DBRDMT OPN WND 1ST 20 CM/<: CPT

## 2017-04-21 NOTE — WOUND TEAM
"Advanced Wound Care  [facility]  [address]  [address]  [place] [address]  [phone] Fax: [phone]      Encounter Note  For Certification Period:4/19/17  To 5/19/17  visit  G-code  C-code  kx modifier     #2 4/21/17  1090/4704 ch no           Referring Physician: Stacie Garber MD  Primary Physician:        Consulting Physicians:     MORENA Burks    Wound(s): L buttock pressure ulcer  Start of Care: 4/19/17       Subjective:        HPI:     Pt is 77 y/o female who developed a pressure sore over area of spinal stimulator on 3/27/17. Pt has had issues with this stimulator since placement back in early 2015 when she developed an infection and was treated with abx for one year per Dr. Albrecht.  Pt has seen pain MD regarding removal of stimulator but wanted to try conservative care to preserve stimulator.  Pt sees this MD or his PA-C monthly and was last seen 3 weeks ago and they are aware of pressure sore in this area.     Pain:       Very sensitive to wound contact     Past Medical History:  Past Medical History   Diagnosis Date   • Spinal stenosis, lumbar region, without neurogenic claudication    • Neuropathy (CMS-HCC)    • Chronic constipation    • Erosive gastritis 5/09     antral   • Lumbar vertebral fracture (CMS-HCC) 5/2011   • Family history of polycystic kidney disease    • Allergy      seasonal   • Anxiety      due to loss of . 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 Albrecht, attaches to plastic, prulent   • Breath shortness    • Anesthesia      \"hard to wake up\"   • Bowel habit changes      constipation   • Depression  "     and anxiety   • Renal disorder    • Pain 5/12/16     back and legs    • Back pain    • Bronchitis    • Chickenpox    • Somal[place] measles    • Influenza    • Mumps    • Tonsillitis      Current Medications:  Current outpatient prescriptions:   •  OxyMORphone HCl ER (OPANA ER) 20 MG Tablet Extended Release 12 hour Abuse-Deterrent, Take  by mouth., Disp: , Rfl:   •  budesonide-formoterol (SYMBICORT) 80-4.5 MCG/ACT Aerosol, Inhale 2 Puffs by mouth 2 Times a Day. Use spacer. Rinse mouth after each use., Disp: 1 Inhaler, Rfl: 0  •  diazepam (VALIUM) 5 MG Tab, Take 1 Tab by mouth every 6 hours as needed for Anxiety or Sleep., Disp: 90 Tab, Rfl: 2  •  fluoxetine (PROZAC) 20 MG Cap, Take 1 Cap by mouth every day., Disp: 90 Cap, Rfl: 3  •  levothyroxine (SYNTHROID) 50 MCG Tab, Take 1 Tab by mouth Every morning on an empty stomach., Disp: 90 Tab, Rfl: 3  •  omeprazole (PRILOSEC) 20 MG delayed-release capsule, Take 1 Cap by mouth every day., Disp: 90 Cap, Rfl: 3  •  duloxetine (CYMBALTA) 60 MG Cap DR Particles delayed-release capsule, Take 1 Cap by mouth every day., Disp: 90 Cap, Rfl: 2  •  estradiol (ESTRACE) 0.5 MG tablet, TAKE ONE TABLET BY MOUTH ONCE DAILY, Disp: 90 Tab, Rfl: 3  •  furosemide (LASIX) 20 MG Tab, TAKE ONE TABLET BY MOUTH ONCE DAILY WITH POTASSIUM, Disp: 30 Tab, Rfl: 3  •  KLOR-CON M20 20 MEQ Tab CR, TAKE ONE TABLET BY MOUTH ONCE DAILY. TAKE  WITH  LASIX, Disp: 30 Tab, Rfl: 3  •  valsartan (DIOVAN) 160 MG Tab, Take 1 Tab by mouth every day., Disp: 90 Tab, Rfl: 3  •  VENTOLIN  (90 BASE) MCG/ACT Aero Soln inhalation aerosol, Inhale 2 Puffs by mouth every 6 hours as needed for Shortness of Breath., Disp: 1 Inhaler, Rfl: 5  •  aspirin EC (ECOTRIN) 81 MG Tablet Delayed Response, Take 81 mg by mouth every day., Disp: , Rfl:   •  Diclofenac Sodium (VOLTAREN) 1 % GEL, Apply  to skin as directed., Disp: , Rfl:   •  pregabalin (LYRICA) 100 MG CAPS, Take 100 mg by mouth 4 times a day., Disp: , Rfl:   •   Oxymorphone HCl ER (OPANA ER) 20 MG T12A, Take 1 Tab by mouth 2 Times a Day., Disp: , Rfl:   •  oxycodone-acetaminophen (PERCOCET) 7.5-325 MG per tablet, Take 1-2 Tabs by mouth every four hours as needed (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 She is seen 2/14/14, do not fill until 4/11/14, Disp: 180 Each, Rfl: 0  •  docusate sodium (COLACE) 250 MG capsule, Take 250 mg by mouth every day., Disp: , Rfl:   •  Probiotic Product ([employer]) CAPS, Take 1 Cap by mouth 2 Times a Day., Disp: , Rfl:   •  Cholecalciferol (VITAMIN D) 1000 UNIT CAPS, Take 1,000 Units by mouth every day., Disp: , Rfl:   •  MAGNESIUM 100 MG PO CAPS, Take  by mouth., Disp: , Rfl:   Allergies:   Allergies   Allergen Reactions   • Pcn [Penicillins]    • Sulfa Drugs    • Tape    • Zoloft      Worse depression     Past Surgical History:   Past Surgical History   Procedure Laterality Date   • Lumbar laminectomy diskectomy     • Hysterectomy, total abdominal  1976   • Colonoscopy  2000,5/27/09     normal   • Egd with asp/bx  5/27/09     erosive gastritis   • Appendectomy  1976   • Spinal cord stimulator  9/2/14   • Cataract extraction with iol Bilateral    • Gastroscopy with balloon dilatation N/A 5/19/2016     Procedure: GASTROSCOPY WITH DILATATION;  Surgeon: Tony Monae M.D.;  Location: SURGERY [facility];  Service:    • Hysterectomy laparoscopy     • Tonsillectomy       Social History:    Social History     Social History   • Marital Status:      Spouse Name: N/A   • Number of Children: N/A   • Years of Education: N/A     Occupational History   • Not on file.     Social History Main Topics   • Smoking status: Never Smoker    • Smokeless tobacco: Never Used   • Alcohol Use: 0.0 oz/week     0 Standard drinks or equivalent per week   • Drug Use: No   • Sexual Activity: No     Other Topics Concern   • Stress Concern No      cancer     Social History Narrative           Objective:       Tests and Measures:4/19/17:  C&S taken from deep within tract 4/21/17:  (-) but not final    Orthotic, protective, supportive devices: none    Fall Risk Assessment (rogelio all that apply with an X): Pt is not a fall risk              X 65 years or older                Fall within the last 2 years, uses   Ambulatory devices   Loss of protective sensation in feet,    Use of prostethic/orthotic, years               Presence of lower extremity/foot/toe amputation            x  Taking medication that increases risk (per facility policy)       Wound Characteristics                                                    Location:  Left lateral buttock wound- stage 3 Initial Evaluation  Date: 4/19/17 4/21/17   Tissue Type and %: Open portal with yellow borders Open portal with yellow borders   Periwound: Purple ring surrounding portal Dark purple ring approx 1cm from wound edges   Drainage: Mod to heavy dark yellow/tan serous fluid Mod to heavy dark yellow/tan serous fluid   Exposed structures Stimulator visible and palpable  Stimulator visible and palpable    Wound Edges:   frayed open   Odor: absent none   S&S of Infection:   Large pocket with heavy drainage opened Exposed hardware   Edema: u/a none   Sensation: intact Intact: Sensitive to contact               Measurements: Initial Evaluation  Date:4/19/17   Length (cm) 1.3   Width (cm) 0.7   Depth (cm) 1.3   Area (cm2) 0.91cm2   Tract/undermine 11:00=5cm.   9:00=1.7cm        Procedures:     Debridement :  Sharp with scalpel to remove biofilm which exposed opening of portal- no bleeding, expressed extensive dark serous fluid.     Cleansed with:      NS irrigation  , Qtip                                                                   Periwound protected with: moisture barrier, skin prep   Primary dressing:aquacel ag strip packing.    Secondary Dressing:superfoam   Other: hypafix     Patient Education: 4/19/17:  Educated on redressing and issue of stimulator being  exposed.  Would like to have dressing changed 2-3 x per week but they can change exterior dressing. Pt needs to follow up with pain MD regarding stimulator and possible removal , also discuss with ID MD      Professional Collaboration: 4/19/17:  Kavya Guerra to view and discuss issue regarding stimulator.        Assessment:      Wound etiology: pressure, implant, questionable deeper infecion     Wound Progress: Stimulator exposed. Pt to consider removal    Rationale for Treatment:aquacel ag strip for antimicrobial and longer wear time, biocompatible    Patient tolerance/compliance: son and pt     Complicating factors:stimulator exposed and large cavern surrounding    Need for ongoing Advanced Wound Care services: sharp debridement, pt education, dressing selection      Plan:      Treatment Plan and Recommendations:  Diagnosis/ICD10: L89.323 Left buttock pressure ulcer stage 3    Procedures/CPT: sharp debridement, non selective     Frequency: 3x/week       Treatment Goals: STG 2 Weeks  LTG 4 Weeks   Granulation Tissue: % 100%   Decrease Necrotic Tissue to: % zero   Wound Phase:      Decrease Size by: % %   Periwound:      Decrease tracts/undermining by: % 10%   Pt to be addressed by pain MD regarding stimulator           At the time of each visit a thorough assessment of the patient is completed to assure the  appropriateness of our plan of care.  The dressings or modalities may need to be adapted   from the original plan to address any significant changes in the wound environment.          Clinician Signature:_______________________________Date__________________      Physician Signature:______________________________Date:__________________

## 2017-04-22 NOTE — TELEPHONE ENCOUNTER
Severe urinary retention and bladder enlargement, urgent urology referral placed.  Discussed with patient and her son.

## 2017-04-24 ENCOUNTER — PATIENT MESSAGE (OUTPATIENT)
Dept: MEDICAL GROUP | Facility: CLINIC | Age: 77
End: 2017-04-24

## 2017-04-24 ENCOUNTER — TELEPHONE (OUTPATIENT)
Dept: INTERNAL MEDICINE | Facility: MEDICAL CENTER | Age: 77
End: 2017-04-24

## 2017-04-24 ENCOUNTER — NON-PROVIDER VISIT (OUTPATIENT)
Dept: WOUND CARE | Facility: MEDICAL CENTER | Age: 77
End: 2017-04-24
Attending: FAMILY MEDICINE
Payer: MEDICARE

## 2017-04-24 PROCEDURE — 97602 WOUND(S) CARE NON-SELECTIVE: CPT

## 2017-04-24 PROCEDURE — A6212 FOAM DRG <=16 SQ IN W/BORDER: HCPCS

## 2017-04-24 PROCEDURE — 97597 DBRDMT OPN WND 1ST 20 CM/<: CPT

## 2017-04-24 PROCEDURE — 303972 HCHG HYPAFIX RET DRST 18SQ PC"

## 2017-04-24 NOTE — TELEPHONE ENCOUNTER
1. Caller Name: [facility]                      Call Back Number: [phone]    2. Message: Louise called and l/m. Pt saw Dr Albrecht in the past for this. Pt has a Spinal Stimulator-had a problem in the past. Now has a pressure ulcer over it.  Now has a Mirza(?) has opened up with the stimulator being exposed. They cultured it and it was all negative. But wants to let Dr Albrecht know and if he wants to f/u with pt.        3. Patient approves office to leave a detailed voicemail/MyChart message: N\A    Spoke with Dr Albrecht. Per Dr Ablrecht okay for pt to f/u.     Called Louise and l/m what Dr Albrecht had said.

## 2017-04-24 NOTE — WOUND TEAM
"Advanced Wound Care  [facility]  [address]  [address]  [place] [address]  [phone] Fax: [phone]      Encounter Note  For Certification Period:4/19/17  To 5/19/17  visit  G-code  C-code  kx modifier     #2 4/21/17  2490/3688 ch no           Referring Physician: Stacie Garber MD  Primary Physician:        Consulting Physicians:     MORENA Burks    Wound(s): L buttock pressure ulcer  Start of Care: 4/19/17       Subjective:        HPI:     Pt is 77 y/o female who developed a pressure sore over area of spinal stimulator on 3/27/17. Pt has had issues with this stimulator since placement back in early 2015 when she developed an infection and was treated with abx for one year per Dr. Albrecht.  Pt has seen pain MD regarding removal of stimulator but wanted to try conservative care to preserve stimulator.  Pt sees this MD or his PA-C monthly and was last seen 3 weeks ago and they are aware of pressure sore in this area.     Pain:       Very sensitive to wound contact     Past Medical History:  Past Medical History   Diagnosis Date   • Spinal stenosis, lumbar region, without neurogenic claudication    • Neuropathy (CMS-HCC)    • Chronic constipation    • Erosive gastritis 5/09     antral   • Lumbar vertebral fracture (CMS-HCC) 5/2011   • Family history of polycystic kidney disease    • Allergy      seasonal   • Anxiety      due to loss of . 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 Albrecht, attaches to plastic, prulent   • Breath shortness    • Anesthesia      \"hard to wake up\"   • Bowel habit changes      constipation   • Depression  "     and anxiety   • Renal disorder    • Pain 5/12/16     back and legs    • Back pain    • Bronchitis    • Chickenpox    • [place] measles    • Influenza    • Mumps    • Tonsillitis      Current Medications:  Current outpatient prescriptions:   •  OxyMORphone HCl ER (OPANA ER) 20 MG Tablet Extended Release 12 hour Abuse-Deterrent, Take  by mouth., Disp: , Rfl:   •  budesonide-formoterol (SYMBICORT) 80-4.5 MCG/ACT Aerosol, Inhale 2 Puffs by mouth 2 Times a Day. Use spacer. Rinse mouth after each use., Disp: 1 Inhaler, Rfl: 0  •  diazepam (VALIUM) 5 MG Tab, Take 1 Tab by mouth every 6 hours as needed for Anxiety or Sleep., Disp: 90 Tab, Rfl: 2  •  fluoxetine (PROZAC) 20 MG Cap, Take 1 Cap by mouth every day., Disp: 90 Cap, Rfl: 3  •  levothyroxine (SYNTHROID) 50 MCG Tab, Take 1 Tab by mouth Every morning on an empty stomach., Disp: 90 Tab, Rfl: 3  •  omeprazole (PRILOSEC) 20 MG delayed-release capsule, Take 1 Cap by mouth every day., Disp: 90 Cap, Rfl: 3  •  duloxetine (CYMBALTA) 60 MG Cap DR Particles delayed-release capsule, Take 1 Cap by mouth every day., Disp: 90 Cap, Rfl: 2  •  estradiol (ESTRACE) 0.5 MG tablet, TAKE ONE TABLET BY MOUTH ONCE DAILY, Disp: 90 Tab, Rfl: 3  •  valsartan (DIOVAN) 160 MG Tab, Take 1 Tab by mouth every day., Disp: 90 Tab, Rfl: 3  •  VENTOLIN  (90 BASE) MCG/ACT Aero Soln inhalation aerosol, Inhale 2 Puffs by mouth every 6 hours as needed for Shortness of Breath., Disp: 1 Inhaler, Rfl: 5  •  aspirin EC (ECOTRIN) 81 MG Tablet Delayed Response, Take 81 mg by mouth every day., Disp: , Rfl:   •  Diclofenac Sodium (VOLTAREN) 1 % GEL, Apply  to skin as directed., Disp: , Rfl:   •  pregabalin (LYRICA) 100 MG CAPS, Take 100 mg by mouth 4 times a day., Disp: , Rfl:   •  Oxymorphone HCl ER (OPANA ER) 20 MG T12A, Take 1 Tab by mouth 2 Times a Day., Disp: , Rfl:   •  oxycodone-acetaminophen (PERCOCET) 7.5-325 MG per tablet, Take 1-2 Tabs by mouth every four hours as needed (for back pain due to  disc disease and arachnoiditis, max 6 per day). She is seen 2/14/14, do not fill until 4/11/14, Disp: 180 Each, Rfl: 0  •  docusate sodium (COLACE) 250 MG capsule, Take 250 mg by mouth every day., Disp: , Rfl:   •  Probiotic Product ([employer]) CAPS, Take 1 Cap by mouth 2 Times a Day., Disp: , Rfl:   •  Cholecalciferol (VITAMIN D) 1000 UNIT CAPS, Take 1,000 Units by mouth every day., Disp: , Rfl:   •  MAGNESIUM 100 MG PO CAPS, Take  by mouth., Disp: , Rfl:   Allergies:   Allergies   Allergen Reactions   • Pcn [Penicillins]    • Sulfa Drugs    • Tape    • Zoloft      Worse depression     Past Surgical History:   Past Surgical History   Procedure Laterality Date   • Lumbar laminectomy diskectomy     • Hysterectomy, total abdominal  1976   • Colonoscopy  2000,5/27/09     normal   • Egd with asp/bx  5/27/09     erosive gastritis   • Appendectomy  1976   • Spinal cord stimulator  9/2/14   • Cataract extraction with iol Bilateral    • Gastroscopy with balloon dilatation N/A 5/19/2016     Procedure: GASTROSCOPY WITH DILATATION;  Surgeon: Tony Monae M.D.;  Location: SURGERY [facility];  Service:    • Hysterectomy laparoscopy     • Tonsillectomy       Social History:    Social History     Social History   • Marital Status:      Spouse Name: N/A   • Number of Children: N/A   • Years of Education: N/A     Occupational History   • Not on file.     Social History Main Topics   • Smoking status: Never Smoker    • Smokeless tobacco: Never Used   • Alcohol Use: 0.0 oz/week     0 Standard drinks or equivalent per week   • Drug Use: No   • Sexual Activity: No     Other Topics Concern   • Stress Concern No      cancer     Social History Narrative           Objective:      Tests and Measures:4/19/17:  C&S taken from deep within tract - negative 4-24-17    Orthotic, protective, supportive devices: none    Fall Risk Assessment (rogelio all that apply with an X): Pt is not a fall risk               X 65 years or older                Fall within the last 2 years, uses   Ambulatory devices   Loss of protective sensation in feet,    Use of prostethic/orthotic, years               Presence of lower extremity/foot/toe amputation            x  Taking medication that increases risk (per facility policy)       Wound Characteristics                                                    Location:  Left lateral buttock wound- stage 3 Initial Evaluation  Date: 4/19/17 4/24/17   Tissue Type and %: Open portal with yellow borders Open portal with yellow borders   Periwound: Purple ring surrounding portal Dark purple ring approx 1cm from wound edges   Drainage: Mod to heavy dark yellow/tan serous fluid Mod to heavy dark yellow/tan serous fluid   Exposed structures Stimulator visible and palpable  Stimulator visible and palpable    Wound Edges:   frayed open   Odor: absent none   S&S of Infection:   Large pocket with heavy drainage opened Exposed hardware   Edema: u/a none   Sensation: intact Intact: Sensitive to contact               Measurements: Initial Evaluation  Date:4/19/17   Length (cm) 1.3   Width (cm) 0.7   Depth (cm) 1.3   Area (cm2) 0.91cm2   Tract/undermine 11:00=5cm.   9:00=1.7cm        Procedures:     Debridement :  Sharp with scalpel to remove biofilm which exposed opening of portal- no bleeding, expressed extensive dark serous fluid.     Cleansed with:      NS irrigation  , Qtip                                                                   Periwound protected with: moisture barrier, skin prep   Primary dressing:aquacel ag strip packing.    Secondary Dressing:superfoam   Other: hypafix     Patient Education: 4/24/17: please have Laura from Dr. Nuñez's office visually inspect wound when pt see's PA-C next Monday.  Continue with current POC. Informed pt that a message would be left with Dr. Albrecht regarding new wound/stimulator issue.     Professional Collaboration:4/24/17:  Called   Trena's office and left message informing of new occurrence with stimulator and negative culture      Assessment:      Wound etiology: pressure, implant, questionable deeper infecion     Wound Progress: Stimulator exposed. Pt to consider removal    Rationale for Treatment:aquacel ag strip for antimicrobial and longer wear time, biocompatible    Patient tolerance/compliance: son and pt active in care and would just like to do whatever is best to expedite healing    Complicating factors:stimulator exposed and large cavern surrounding    Need for ongoing Advanced Wound Care services: sharp debridement, pt education, dressing selection      Plan:      Treatment Plan and Recommendations:  Diagnosis/ICD10: L89.323 Left buttock pressure ulcer stage 3    Procedures/CPT: sharp debridement, non selective     Frequency: 3x/week       Treatment Goals: STG 2 Weeks  LTG 4 Weeks   Granulation Tissue: % 100%   Decrease Necrotic Tissue to: % zero   Wound Phase:      Decrease Size by: % %   Periwound:      Decrease tracts/undermining by: % 10%   Pt to be addressed by pain MD regarding stimulator           At the time of each visit a thorough assessment of the patient is completed to assure the  appropriateness of our plan of care.  The dressings or modalities may need to be adapted   from the original plan to address any significant changes in the wound environment.          Clinician Signature:_______________________________Date__________________      Physician Signature:______________________________Date:__________________

## 2017-04-25 ENCOUNTER — PATIENT MESSAGE (OUTPATIENT)
Dept: MEDICAL GROUP | Facility: CLINIC | Age: 77
End: 2017-04-25

## 2017-04-25 NOTE — TELEPHONE ENCOUNTER
From: Maddy Hodge  To: Shirlene Quinones M.D.  Sent: 4/25/2017 1:03 PM PDT  Subject: RE:letter for jury duty    Can Johnathan pick it up at the ?  Thank you, Maddy  ----- Message -----  From: Shirlene Quinones M.D.  Sent: 4/25/2017 12:15 PM PDT  To: Maddy Hodge  Subject: letter for jury duty    There is no longer a standard exemption for jury duty in [facility]. This is at the discretion of the  and the letter needs to be addressed to him/her, though not necessarily by name. I have written a letter. I anticipate that it will be a deferment, I am asking for a year.

## 2017-04-27 ENCOUNTER — NON-PROVIDER VISIT (OUTPATIENT)
Dept: WOUND CARE | Facility: MEDICAL CENTER | Age: 77
End: 2017-04-27
Attending: FAMILY MEDICINE
Payer: MEDICARE

## 2017-04-27 PROCEDURE — A6213 FOAM DRG >16<=48 SQ IN W/BDR: HCPCS

## 2017-04-27 PROCEDURE — 303972 HCHG HYPAFIX RET DRST 18SQ PC"

## 2017-04-27 PROCEDURE — A6402 STERILE GAUZE <= 16 SQ IN: HCPCS

## 2017-04-27 PROCEDURE — 97602 WOUND(S) CARE NON-SELECTIVE: CPT

## 2017-04-28 NOTE — TELEPHONE ENCOUNTER
Called and spoke with pt. Offered an appt on Monday 05/01/17. Pt is unable to come that day. Scheduled pt for 05/08/17 at 11am.

## 2017-04-29 ENCOUNTER — NON-PROVIDER VISIT (OUTPATIENT)
Dept: WOUND CARE | Facility: MEDICAL CENTER | Age: 77
End: 2017-04-29
Attending: FAMILY MEDICINE
Payer: MEDICARE

## 2017-04-29 PROCEDURE — 97602 WOUND(S) CARE NON-SELECTIVE: CPT

## 2017-04-29 PROCEDURE — 99212 OFFICE O/P EST SF 10 MIN: CPT

## 2017-04-29 PROCEDURE — A6212 FOAM DRG <=16 SQ IN W/BORDER: HCPCS

## 2017-04-29 NOTE — WOUND TEAM
"Advanced Wound Care  [facility]  [address]  [address]  [place] [address]  [phone] Fax: [phone]      Encounter Note  For Certification Period:4/19/17  To 5/19/17  visit  G-code  C-code  kx modifier     #2 4/21/17  [id number] ch no           Referring Physician: Stacie Garber MD  Primary Physician:        Consulting Physicians:     MORENA Burks    Wound(s): L buttock pressure ulcer  Start of Care: 4/19/17       Subjective:        HPI:     Pt is 77 y/o female who developed a pressure sore over area of spinal stimulator on 3/27/17. Pt has had issues with this stimulator since placement back in early 2015 when she developed an infection and was treated with abx for one year per Dr. Albrecht.  Pt has seen pain MD regarding removal of stimulator but wanted to try conservative care to preserve stimulator.  Pt sees this MD or his PA-C monthly and was last seen 3 weeks ago and they are aware of pressure sore in this area.     Pain:  Denies today    Past Medical History:  Past Medical History   Diagnosis Date   • Spinal stenosis, lumbar region, without neurogenic claudication    • Neuropathy (CMS-HCC)    • Chronic constipation    • Erosive gastritis 5/09     antral   • Lumbar vertebral fracture (CMS-HCC) 5/2011   • Family history of polycystic kidney disease    • Allergy      seasonal   • Anxiety      due to loss of . 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 Albrecht, attaches to plastic, prulent   • Breath shortness    • Anesthesia      \"hard to wake up\"   • Bowel habit changes      constipation   • Depression      and anxiety   • " Renal disorder    • Pain 5/12/16     back and legs    • Back pain    • Bronchitis    • Chickenpox    • [place] measles    • Influenza    • Mumps    • Tonsillitis      Current Medications: no changes per pt    Allergies:   PCN, Tape, Zoloft, Sulfa    Past Surgical History:   Past Surgical History   Procedure Laterality Date   • Lumbar laminectomy diskectomy     • Hysterectomy, total abdominal  1976   • Colonoscopy  2000,5/27/09     normal   • Egd with asp/bx  5/27/09     erosive gastritis   • Appendectomy  1976   • Spinal cord stimulator  9/2/14   • Cataract extraction with iol Bilateral    • Gastroscopy with balloon dilatation N/A 5/19/2016     Procedure: GASTROSCOPY WITH DILATATION;  Surgeon: Tony Monae M.D.;  Location: SURGERY [facility];  Service:    • Hysterectomy laparoscopy     • Tonsillectomy         Objective:      Tests and Measures:4/19/17:  C&S taken from deep within tract - negative 4-24-17    Orthotic, protective, supportive devices: none    Fall Risk Assessment (rogelio all that apply with an X): Pt is not a fall risk              X 65 years or older                Fall within the last 2 years, uses   Ambulatory devices   Loss of protective sensation in feet,    Use of prostethic/orthotic, years               Presence of lower extremity/foot/toe amputation            x  Taking medication that increases risk (per facility policy)       Wound Characteristics                                                    Location:  Left lateral buttock wound- stage 3 Initial Evaluation  Date: 4/19/17 Encounter note  Date: 4/29/17   Tissue Type and %: Open portal with yellow borders Open portal with yellow borders   Periwound: Purple ring surrounding portal Dark red ring approx 1cm from wound edges   Drainage: Mod to heavy dark yellow/tan serous fluid Mod yellow/tan serous fluid   Exposed structures Stimulator visible and palpable  Stimulator visible and palpable    Wound Edges:   frayed open   Odor: absent  none   S&S of Infection:   Large pocket with heavy drainage opened Exposed hardware, depth   Edema: u/a none   Sensation: intact Intact: Sensitive to contact               Measurements: Initial Evaluation  Date:4/19/17 Encounter note  Date: 04/27/2017   Length (cm) 1.3 0.8   Width (cm) 0.7 1.2   Depth (cm) 1.3 0.9   Area (cm2) 0.91cm2 0.96 cm2   Tract/undermine 11:00=5cm.   9:00=1.7cm UM from 3-9, 0.7 cm        Procedures:  1 piece aquacell removed by tech, no remaining strips visualized in wound    Debridement :  Non selective with gauze and cotton tip applicator   Cleansed with:  NS to wound, no rinse foam cleanser to buttock                                                                   Periwound protected with: skin prep, moisture barrier   Primary dressing: aquacel ag strip packing, 1 piece with long tail left outside of wound.    Secondary Dressing: non adhesive superfoam   Other: hypafix     Patient Education: Pt is to see Laura from Dr. Nuñez's office on Monday May 1st, and have her visually inspect wound when pt see's PA-C. Omar FOX is contacting PA-C to inform them of pt's status regarding stimulator.  Discussed POC, wound care rationale, dressing selection and to return to [facility] twice weekly for appts. Pt instructed to keep dressing CDI and to return to ER for any s/s infection, n/v, fever or chills. Pt verbalized understanding to all.        Professional Collaboration: Faxed progress note with wound measurements including depth of tract to Dr. Gonzalez's office at patient's request.       Assessment:      Wound etiology: pressure, implant, questionable deeper infecion     Wound Progress: Stimulator exposed. Wound slightly larger per measurements    Rationale for Treatment: AqAg to manage bioburden, absorb exudate, and maintain moist wound environment without laterally wicking exudate therefore reducing meghann-wound maceration    Patient tolerance/compliance: son and pt active in care and  would just like to do whatever is best to expedite healing    Complicating factors:stimulator exposed and large cavern surrounding    Need for ongoing Advanced W[facility] Care services: sharp debridement, pt education, dressing selection      Plan:      Treatment Plan and Recommendations:  Diagnosis/ICD10: L89.323 Left buttock pressure ulcer stage 3    Procedures/CPT: sharp debridement, non selective     Frequency: 3x/week       Treatment Goals: STG 2 Weeks  LTG 4 Weeks   Granulation Tissue: % 100%   Decrease Necrotic Tissue to: % zero   Wound Phase:      Decrease Size by: % %   Periwound:      Decrease tracts/undermining by: % 10%   Pt to be addressed by pain MD regarding stimulator           At the time of each visit a thorough assessment of the patient is completed to assure the  appropriateness of our plan of care.  The dressings or modalities may need to be adapted   from the original plan to address any significant changes in the wound environment.          Clinician Signature:_______________________________Date__________________      Physician Signature:______________________________Date:__________________

## 2017-05-03 ENCOUNTER — NON-PROVIDER VISIT (OUTPATIENT)
Dept: WOUND CARE | Facility: MEDICAL CENTER | Age: 77
End: 2017-05-03
Attending: FAMILY MEDICINE
Payer: MEDICARE

## 2017-05-03 PROCEDURE — G8991 OTHER PT/OT GOAL STATUS: HCPCS | Mod: CH

## 2017-05-03 PROCEDURE — A6212 FOAM DRG <=16 SQ IN W/BORDER: HCPCS

## 2017-05-03 PROCEDURE — A6402 STERILE GAUZE <= 16 SQ IN: HCPCS

## 2017-05-03 PROCEDURE — 97164 PT RE-EVAL EST PLAN CARE: CPT

## 2017-05-03 PROCEDURE — G8990 OTHER PT/OT CURRENT STATUS: HCPCS | Mod: CH

## 2017-05-03 NOTE — CERTIFICATION
Advanced Wound Care  [facility]  [address]  [address]  [place] [address]  [phone] Fax: [phone]      Re-Evaluation  For Certification Period:5/3/17  To 6/3/17  visit  G-code  C-code  kx modifier     #3 5/3/17  [id number] ch no           Referring Physician: Stacie Garber MD  Primary Physician:        Consulting Physicians:     MORENA Burks, Amandeep Gonzalez MD, Dr. Albrecht    Wound(s): L buttock pressure ulcer  Start of Care: 4/19/17  Post-op stimulator removal re-eval 5/3/17       Subjective:        HPI:     Pt is 75 y/o female who developed a pressure sore over area of spinal stimulator on 3/27/17. Pt has had issues with this stimulator since placement back in early 2015 when she developed an infection and was treated with abx for one year per Dr. Albrecht.  Pt has seen pain MD regarding removal of stimulator but wanted to try conservative care to preserve stimulator.  Pt sees this MD or his PA-C monthly and was last seen 3 weeks ago and they are aware of pressure sore in this area.     5/3/17:  Pt returns [facility] following removal spinal stimulator 5/2/17 with primary closure by Dr. Gonzalez.  Vancomycin applied to wound prior to closure. New orders received 5/3/17    Pain: tender to wound area contact    Past Medical History:  Past Medical History   Diagnosis Date   • Spinal stenosis, lumbar region, without neurogenic claudication    • Neuropathy (CMS-[facility])    • Chronic constipation    • Erosive gastritis 5/09     antral   • Lumbar vertebral fracture (CMS-HCC) 5/2011   • Family history of polycystic kidney disease    • Allergy      seasonal   • Anxiety      due to loss of . 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 Albrecht, attaches to plastic, prulent   • Breath shortness    • Anesthesia      \"hard to wake up\"   • Bowel habit changes      constipation   • Depression      and anxiety   • Renal disorder    • Pain 5/12/16     back and legs    • Back pain    • Bronchitis    • Chickenpox    • [place] measles    • Influenza    • Mumps    • Tonsillitis      Current Medications:  Current outpatient prescriptions:   •  OxyMORphone HCl ER (OPANA ER) 20 MG Tablet Extended Release 12 hour Abuse-Deterrent, Take  by mouth., Disp: , Rfl:   •  budesonide-formoterol (SYMBICORT) 80-4.5 MCG/ACT Aerosol, Inhale 2 Puffs by mouth 2 Times a Day. Use spacer. Rinse mouth after each use., Disp: 1 Inhaler, Rfl: 0  •  diazepam (VALIUM) 5 MG Tab, Take 1 Tab by mouth every 6 hours as needed for Anxiety or Sleep., Disp: 90 Tab, Rfl: 2  •  fluoxetine (PROZAC) 20 MG Cap, Take 1 Cap by mouth every day., Disp: 90 Cap, Rfl: 3  •  levothyroxine (SYNTHROID) 50 MCG Tab, Take 1 Tab by mouth Every morning on an empty stomach., Disp: 90 Tab, Rfl: 3  •  omeprazole (PRILOSEC) 20 MG delayed-release capsule, Take 1 Cap by mouth every day., Disp: 90 Cap, Rfl: 3  •  duloxetine (CYMBALTA) 60 MG Cap DR Particles delayed-release capsule, Take 1 Cap by mouth every day., Disp: 90 Cap, Rfl: 2  •  estradiol (ESTRACE) 0.5 MG tablet, TAKE ONE TABLET BY MOUTH ONCE DAILY, Disp: 90 Tab, Rfl: 3  •  valsartan (DIOVAN) 160 MG Tab, Take 1 Tab by mouth every day., Disp: 90 Tab, Rfl: 3  •  VENTOLIN  (90 BASE) MCG/ACT Aero Soln inhalation aerosol, Inhale 2 Puffs by mouth every 6 hours as needed for Shortness of Breath., Disp: 1 Inhaler, Rfl: 5  •  aspirin EC (ECOTRIN) 81 MG Tablet Delayed Response, Take 81 mg by mouth every day., Disp: , Rfl:   •  Diclofenac Sodium (VOLTAREN) 1 % GEL, Apply  to skin as directed., Disp: , Rfl:   •  pregabalin (LYRICA) 100 MG CAPS, Take 100 mg by mouth 4 times " a day., Disp: , Rfl:   •  Oxymorphone HCl ER (OPANA ER) 20 MG T12A, Take 1 Tab by mouth 2 Times a Day., Disp: , Rfl:   •  oxycodone-acetaminophen (PERCOCET) 7.5-325 MG per tablet, Take 1-2 Tabs by mouth every four hours as needed (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 She is seen 2/14/14, do not fill until 4/11/14, Disp: 180 Each, Rfl: 0  •  docusate sodium (COLACE) 250 MG capsule, Take 250 mg by mouth every day., Disp: , Rfl:   •  Probiotic Product ([employer]) CAPS, Take 1 Cap by mouth 2 Times a Day., Disp: , Rfl:   •  Cholecalciferol (VITAMIN D) 1000 UNIT CAPS, Take 1,000 Units by mouth every day., Disp: , Rfl:   •  MAGNESIUM 100 MG PO CAPS, Take  by mouth., Disp: , Rfl:       Allergies:   PCN, Tape, Zoloft, Sulfa    Past Surgical History:   Past Surgical History   Procedure Laterality Date   • Lumbar laminectomy diskectomy     • Hysterectomy, total abdominal  1976   • Colonoscopy  2000,5/27/09     normal   • Egd with asp/bx  5/27/09     erosive gastritis   • Appendectomy  1976   • Spinal cord stimulator  9/2/14   • Cataract extraction with iol Bilateral    • Gastroscopy with balloon dilatation N/A 5/19/2016     Procedure: GASTROSCOPY WITH DILATATION;  Surgeon: Tony Monae M.D.;  Location[facility]ERY [facility];  Service:    • Hysterectomy laparoscopy     • Tonsillectomy         Objective:      Tests and Measures:4/19/17:  C&S taken from deep within tract - negative 4-24-17    Orthotic, protective, supportive devices: none  Fall Risk Assessment (rogelio all that apply with an X):5/3/17 (+) fall risk   65 years or older     Fall within the last 2 years, uses   Ambulatory devices  Loss of protective sensation in feet,   Use of prostethic/orthotic, years    Presence of lower extremity/foot/toe amputation   Taking medication that increases risk (per facility policy)    Interventions Recommended (if any of the above are selected):   Use of Assistive  Device:________________________   Supervision with ambulation:  Caregiver   xAssistance with ambulation:  Caregiver   xHome safety education:  Educational material provided           Wound Characteristics                                                    Location:  Left lateral buttock  Initial Evaluation  Date: 4/19/17 Re-eval  Date: 5/3/17   Tissue Type and %: Open portal with yellow borders Approximated incision   Periwound: Purple ring surrounding portal intact   Drainage: Mod to heavy dark yellow/tan serous fluid none   Exposed structures Stimulator visible and palpable  Staples    Wound Edges:   frayed approximated   Odor: absent none   S&S of Infection:   Large pocket with heavy drainage opened none   Edema: u/a none   Sensation: intact Intact               Measurements: Initial Evaluation  Date:4/19/17 Re-eval  Date: 05/3/2017   Length (cm) 1.3 approximated   Width (cm) 0.7 5.5   Depth (cm) 1.3 ua   Area (cm2) 0.91cm2    Tract/undermine 11:00=5cm.   9:00=1.7cm         Procedures:     Debridement :  None   Cleansed with:  NS                                                                 Periwound protected with: benzoin   Primary dressing: aquacel ag strip placed to cover incision.    Secondary Dressing: silicone adhesive foam   Other: hypafix     Patient Education: Pt is to see Laura from Dr. Nuñez's office on Monday May 1st, and have her visually inspect wound when pt see's [facility]. Omar FOX is contacting [facility] to inform them of pt's status regarding stimulator.  Discussed POC, wound care rationale, dressing selection and to return to [facility] twice weekly for appts. Pt instructed to keep dressing CDI and to return to ER for any s/s infection, n/v, fever or chills. Pt verbalized understanding to all.        Professional Collaboration: New order for wound care received    Assessment:      Wound etiology: pressure, implant, questionable deeper infecion     Wound Progress: stimulator removed  surgically -incision approximated; sutures intact    Rationale for Treatment: AqAg to manage bioburden, absorb exudate, and maintain moist wound environment without laterally wicking exudate therefore reducing meghann-wound maceration    Patient tolerance/compliance: son and pt active in care and would just like to do whatever is best to expedite healing    Complicating factors:stimulator exposure(removed 5/2/17)    Need for ongoing Advanced Wound Care services: sharp debridement, pt education, dressing selection      Plan:      Treatment Plan and Recommendations:  Diagnosis/ICD10: L89.323 Left buttock pressure ulcer stage 3  5/3/17: Post-op incision    Procedures/CPT: sharp debridement, non selective     Frequency: 2x/week       Treatment Goals: STG 2 Weeks  LTG 4 Weeks   Granulation Tissue: Free from s/s infection resolution   Decrease Necrotic Tissue to:     Wound Phase:      Decrease Size by:  %   Periwound:      Decrease tracts/undermining by:  10%             At the time of each visit a thorough assessment of the patient is completed to assure the  appropriateness of our plan of care.  The dressings or modalities may need to be adapted   from the original plan to address any significant changes in the wound environment.          Clinician Signature:_____Gabriela Aguilar PMariuszT.__________Date__5/3/17__________    Physician Signature:______________________________Date:__________________

## 2017-05-06 ENCOUNTER — NON-PROVIDER VISIT (OUTPATIENT)
Dept: WOUND CARE | Facility: MEDICAL CENTER | Age: 77
End: 2017-05-06
Attending: FAMILY MEDICINE
Payer: MEDICARE

## 2017-05-06 PROCEDURE — A6212 FOAM DRG <=16 SQ IN W/BORDER: HCPCS

## 2017-05-06 PROCEDURE — A6402 STERILE GAUZE <= 16 SQ IN: HCPCS

## 2017-05-06 PROCEDURE — 99212 OFFICE O/P EST SF 10 MIN: CPT

## 2017-05-06 NOTE — WOUND TEAM
Advanced Wound Care  [facility]  [address]  [address]  [place] [address]  [phone] Fax: [phone]      Encounter  For Certification Period:5/3/17  To 6/3/17  visit  G-code  C-code  kx modifier     #3 5/3/17  8990/2165 ch no           Referring Physician: Stacie Garber MD  Primary Physician:        Consulting Physicians:     MORENA Burks, Amandeep Gonzalez MD, Dr. Albrecht    Wound(s): L buttock pressure ulcer  Start of Care: 4/19/17  Post-op stimulator removal re-eval 5/3/17       Subjective:        HPI:     Pt is 75 y/o female who developed a pressure sore over area of spinal stimulator on 3/27/17. Pt has had issues with this stimulator since placement back in early 2015 when she developed an infection and was treated with abx for one year per Dr. Albrecht.  Pt has seen pain MD regarding removal of stimulator but wanted to try conservative care to preserve stimulator.  Pt sees this MD or his PA-C monthly and was last seen 3 weeks ago and they are aware of pressure sore in this area.     5/3/17:  Pt returns to [facility] following removal spinal stimulator 5/2/17 with primary closure by Dr. Gonzalez.  Vancomycin applied to wound prior to closure. New orders received 5/3/17    Pain: tender to wound area contact    Past Medical History:  Past Medical History   Diagnosis Date   • Spinal stenosis, lumbar region, without neurogenic claudication    • Neuropathy (CMS-[facility])    • Chronic constipation    • Erosive gastritis 5/09     antral   • Lumbar vertebral fracture (CMS-[facility]) 5/2011   • Family history of polycystic kidney disease    • Allergy      seasonal   • Anxiety      due to loss of . 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 Albrecht, attaches to plastic, prulent   • Breath shortness    • Anesthesia      \"hard to wake up\"   • Bowel habit changes      constipation   • Depression      and anxiety   • Renal disorder    • Pain 5/12/16     back and legs    • Back pain    • Bronchitis    • Chickenpox    • Alban[place] measles    • Influenza    • Mumps    • Tonsillitis      Current Medications:  Current outpatient prescriptions:   •  OxyMORphone HCl ER (OPANA ER) 20 MG Tablet Extended Release 12 hour Abuse-Deterrent, Take  by mouth., Disp: , Rfl:   •  budesonide-formoterol (SYMBICORT) 80-4.5 MCG/ACT Aerosol, Inhale 2 Puffs by mouth 2 Times a Day. Use spacer. Rinse mouth after each use., Disp: 1 Inhaler, Rfl: 0  •  diazepam (VALIUM) 5 MG Tab, Take 1 Tab by mouth every 6 hours as needed for Anxiety or Sleep., Disp: 90 Tab, Rfl: 2  •  fluoxetine (PROZAC) 20 MG Cap, Take 1 Cap by mouth every day., Disp: 90 Cap, Rfl: 3  •  levothyroxine (SYNTHROID) 50 MCG Tab, Take 1 Tab by mouth Every morning on an empty stomach., Disp: 90 Tab, Rfl: 3  •  omeprazole (PRILOSEC) 20 MG delayed-release capsule, Take 1 Cap by mouth every day., Disp: 90 Cap, Rfl: 3  •  duloxetine (CYMBALTA) 60 MG Cap DR Particles delayed-release capsule, Take 1 Cap by mouth every day., Disp: 90 Cap, Rfl: 2  •  estradiol (ESTRACE) 0.5 MG tablet, TAKE ONE TABLET BY MOUTH ONCE DAILY, Disp: 90 Tab, Rfl: 3  •  valsartan (DIOVAN) 160 MG Tab, Take 1 Tab by mouth every day., Disp: 90 Tab, Rfl: 3  •  VENTOLIN  (90 BASE) MCG/ACT Aero Soln inhalation aerosol, Inhale 2 Puffs by mouth every 6 hours as needed for Shortness of Breath., Disp: 1 Inhaler, Rfl: 5  •  aspirin EC (ECOTRIN) 81 MG Tablet Delayed Response, Take 81 mg by mouth every day., Disp: , Rfl:   •  Diclofenac Sodium (VOLTAREN) 1 % GEL, Apply  to skin as directed., Disp: , Rfl:   •  pregabalin (LYRICA) 100 MG CAPS, Take 100 mg by mouth 4 times a " day., Disp: , Rfl:   •  Oxymorphone HCl ER (OPANA ER) 20 MG T12A, Take 1 Tab by mouth 2 Times a Day., Disp: , Rfl:   •  oxycodone-acetaminophen (PERCOCET) 7.5-325 MG per tablet, Take 1-2 Tabs by mouth every four hours as needed (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 She is seen 2/14/14, do not fill until 4/11/14, Disp: 180 Each, Rfl: 0  •  docusate sodium (COLACE) 250 MG capsule, Take 250 mg by mouth every day., Disp: , Rfl:   •  Probiotic Product (6renyou.com) CAPS, Take 1 Cap by mouth 2 Times a Day., Disp: , Rfl:   •  Cholecalciferol (VITAMIN D) 1000 UNIT CAPS, Take 1,000 Units by mouth every day., Disp: , Rfl:   •  MAGNESIUM 100 MG PO CAPS, Take  by mouth., Disp: , Rfl:       Allergies:   PCN, Tape, Zoloft, Sulfa    Past Surgical History:   Past Surgical History   Procedure Laterality Date   • Lumbar laminectomy diskectomy     • Hysterectomy, total abdominal  1976   • Colonoscopy  2000,5/27/09     normal   • Egd with asp/bx  5/27/09     erosive gastritis   • Appendectomy  1976   • Spinal cord stimulator  9/2/14   • Cataract extraction with iol Bilateral    • Gastroscopy with balloon dilatation N/A 5/19/2016     Procedure: GASTROSCOPY WITH DILATATION;  Surgeon: Tony Monae M.D.;  Location: SURGERY [facility];  Service:    • Hysterectomy laparoscopy     • Tonsillectomy         Objective:      Tests and Measures:4/19/17:  C&S taken from deep within tract - negative 4-24-17    Orthotic, protective, supportive devices: none  Fall Risk Assessment (rogelio all that apply with an X):5/3/17 (+) fall risk   65 years or older     Fall within the last 2 years, uses   Ambulatory devices  Loss of protective sensation in feet,   Use of prostethic/orthotic, years    Presence of lower extremity/foot/toe amputation   Taking medication that increases risk (per facility policy)    Interventions Recommended (if any of the above are selected):   Use of Assistive  Device:________________________   Supervision with ambulation:  Caregiver   xAssistance with ambulation:  Caregiver   xHome safety education:  Educational material provided           Wound Characteristics                                                    Location:  Left lateral buttock  Initial Evaluation  Date: 4/19/17 Re-eval  Date: 5/3/17 Encounter  5/6/17   Tissue Type and %: Open portal with yellow borders Approximated incision Approximated incision   Periwound: Purple ring surrounding portal intact intact   Drainage: Mod to heavy dark yellow/tan serous fluid none none   Exposed structures Stimulator visible and palpable  Staples  staples   Wound Edges:   frayed approximated approximated   Odor: absent none none   S&S of Infection:   Large pocket with heavy drainage opened none none   Edema: u/a none none   Sensation: intact Intact intact               Measurements: Initial Evaluation  Date:4/19/17 Re-eval  Date: 05/3/2017   Length (cm) 1.3 approximated   Width (cm) 0.7 5.5   Depth (cm) 1.3 ua   Area (cm2) 0.91cm2    Tract/undermine 11:00=5cm.   9:00=1.7cm         Procedures:     Debridement :  None   Cleansed with:  NS                                                                 Periwound protected with:No-sting skin prep   Primary dressing: aquacel ag strip placed to cover incision.    Secondary Dressing: silicone adhesive foam   Other: hypafix     Patient Education:Pt. Verbalized signs and symptoms of infection, including erythema, induration, increased pain, increased drainage and fever and to go to ER if any of these occur.       Pt is to see Laura from Dr. Nuñez's office on Monday May 1st, and have her visually inspect wound when pt see's [facility]. Omar FOX is contacting [facility] to inform them of pt's status regarding stimulator.  Discussed POC, wound care rationale, dressing selection and to return to [facility] twice weekly for appts. Pt instructed to keep dressing CDI and to return to ER for any  s/s infection, n/v, fever or chills. Pt verbalized understanding to all.        Professional Collaboration: None today   Assessment:      Wound etiology: pressure, implant, questionable deeper infecion     Wound Progress: Stimulator exposed. Wound slightly larger per measurements    Rationale for Treatment: AqAg to manage bioburden, absorb exudate, and maintain moist wound environment without laterally wicking exudate therefore reducing meghann-wound maceration    Patient tolerance/compliance: son and pt active in care and would just like to do whatever is best to expedite healing    Complicating factors:stimulator exposed and large cavern surrounding    Need for ongoing [facility] services: sharp debridement, pt education, dressing selection      Plan:      Treatment Plan and Recommendations:  Diagnosis/ICD10: L89.323 Left buttock pressure ulcer stage 3  5/3/17: Post-op incision    Procedures/CPT: sharp debridement, non selective     Frequency: 2x/week       Treatment Goals: STG 2 Weeks  LTG 4 Weeks   Granulation Tissue: Free from s/s infection resolution   Decrease Necrotic Tissue to:     Wound Phase:      Decrease Size by:  %   Periwound:      Decrease tracts/undermining by:  10%             At the time of each visit a thorough assessment of the patient is completed to assure the  appropriateness of our plan of care.  The dressings or modalities may need to be adapted   from the original plan to address any significant changes in the wound environment.          Clinician Signature:_____Rosa Whitfield R.N.__________Date__5/3/17__________    Physician Signature:______________________________Date:__________________

## 2017-05-08 ENCOUNTER — OFFICE VISIT (OUTPATIENT)
Dept: INTERNAL MEDICINE | Facility: MEDICAL CENTER | Age: 77
End: 2017-05-08
Payer: MEDICARE

## 2017-05-08 VITALS
SYSTOLIC BLOOD PRESSURE: 138 MMHG | OXYGEN SATURATION: 95 % | BODY MASS INDEX: 33.61 KG/M2 | HEIGHT: 60 IN | TEMPERATURE: 97.5 F | HEART RATE: 77 BPM | WEIGHT: 171.2 LBS | DIASTOLIC BLOOD PRESSURE: 82 MMHG

## 2017-05-08 DIAGNOSIS — L89.323 DECUBITUS ULCER OF LEFT BUTTOCK, STAGE 3 (HCC): ICD-10-CM

## 2017-05-08 PROCEDURE — 4040F PNEUMOC VAC/ADMIN/RCVD: CPT | Performed by: INTERNAL MEDICINE

## 2017-05-08 PROCEDURE — 99212 OFFICE O/P EST SF 10 MIN: CPT | Performed by: INTERNAL MEDICINE

## 2017-05-08 PROCEDURE — 1101F PT FALLS ASSESS-DOCD LE1/YR: CPT | Performed by: INTERNAL MEDICINE

## 2017-05-08 PROCEDURE — G8419 CALC BMI OUT NRM PARAM NOF/U: HCPCS | Performed by: INTERNAL MEDICINE

## 2017-05-08 PROCEDURE — 1036F TOBACCO NON-USER: CPT | Performed by: INTERNAL MEDICINE

## 2017-05-08 PROCEDURE — G8432 DEP SCR NOT DOC, RNG: HCPCS | Performed by: INTERNAL MEDICINE

## 2017-05-08 RX ORDER — FUROSEMIDE 20 MG/1
20 TABLET ORAL 2 TIMES DAILY
COMMUNITY
End: 2017-06-21

## 2017-05-08 RX ORDER — POTASSIUM CHLORIDE 20 MEQ/1
20 TABLET, EXTENDED RELEASE ORAL 2 TIMES DAILY
COMMUNITY
End: 2017-06-21

## 2017-05-08 ASSESSMENT — ENCOUNTER SYMPTOMS
CONSTITUTIONAL NEGATIVE: 1
FEVER: 0
ABDOMINAL PAIN: 1
BACK PAIN: 1
HEMOPTYSIS: 0
COUGH: 0

## 2017-05-08 NOTE — PROGRESS NOTES
Subjective:      Maddy Hodge is a 76 y.o. female who presents for evaluation of possible battery pocket infection/   I previously saw her  In 2015 with a non-healing neurostimulator pocket wound, did respond and finally close over  After nearly a year of rifampin/doxy followed by doxycycline. Did well till about 5 weeks ago, developed a pressure ulcer  Under the pocket- became red and necrotic, eventually required removal of the battery. Cultures of the drainage were negative- no evidence the site   Was infected. Now has staples over the site- had some drainage and wanted me to look at it.   No feverr, chills, etc  Has had a Garnett just placed as developed urinary retention. Will be getting an antibiotic from her urologist once culture back          Other  Associated symptoms include abdominal pain. Pertinent negatives include no coughing, fever or rash.       Review of Systems   Constitutional: Negative.  Negative for fever.   Respiratory: Negative for cough and hemoptysis.    Gastrointestinal: Positive for abdominal pain.        The pain was from her distended bladder, now resolved    Musculoskeletal: Positive for back pain.   Skin: Negative for rash.          Objective:     /82 mmHg  Pulse 77  Temp(Src) 36.4 °C (97.5 °F)  Ht 1.524 m (5')  Wt 77.656 kg (171 lb 3.2 oz)  BMI 33.44 kg/m2  SpO2 95%     Physical Exam   Constitutional: She appears well-developed and well-nourished.   Abdominal: Soft.   Genitourinary:   Has Garnett    Musculoskeletal:   The left buttock is the site of the removed battery- it is completely dry and no redness, nothing can be expressed., staples are present looks good, not infected    Vitals reviewed.              Assessment/Plan:     Buttock pocket site- no evidence for infection, likely had a pressure ulcer that caused this skin breakdown. Wound is clean and dry.  Will not Rx any antibiotics, if she considers  Re-implantation can use this site again, unless scarring  makes the procedure more difficult.  Will f/u prn

## 2017-05-08 NOTE — MR AVS SNAPSHOT
Maddy Ewing Naveen   2017 11:00 AM   Office Visit   MRN: [id number]    Department:  [facility]   Dept Phone:  [phone]    Description:  Female : 1940   Provider:  Kobi Albrecht M.D.           Reason for Visit     Other Pressure ulcers buttock       Allergies as of 2017     Allergen Noted Reactions    Pcn [Penicillins] 2009       Sulfa Drugs 2009       Tape 2016       Zoloft 2016       Worse depression      Vital Signs     Blood Pressure Pulse Temperature Height Weight Body Mass Index    138/82 mmHg 77 36.4 °C (97.5 °F) 1.524 m (5') 77.656 kg (171 lb 3.2 oz) 33.44 kg/m2    Oxygen Saturation Smoking Status                95% Never Smoker           Basic Information     Date Of Birth Sex Race Ethnicity Preferred Language    1940 Female White Non- English      Your appointments     May 10, 2017  9:00 AM   Wound 30 Minute Procedure with Dottie Goode R.N.   [facility] ([address] [place] [address])    [address]  [place] [address]   [phone]            May 13, 2017  9:30 AM   Wound 30 Minute Procedure with Rosa Whitfield R.N.   [facility] ([address] [place] [address])    [address]  [place] [address]   [phone]            May 15, 2017  9:30 AM   Wound 30 Minute Procedure with Rosa Whitfield R.N.   [facility] ([address] [place] [address])    [address]  [place] [address]   [phone]            May 17, 2017  9:30 AM   Wound 30 Minute Procedure with NNAMDI Arnett, Louise Guallpa, P.TMariusz   [facility] ([address] [place] [address])    [address]  [place] [address] [phone]            May 19, 2017  9:30 AM   Wound 30 Minute Procedure with Rachel Neil R.N.   [facility] ([address] [place] [address])    [address]  [place] [address]   [phone]            2017  8:00 PM   Sleep Study with Panorama9   [facility] (--)    [address]  [facility] [place] [address]      [phone]            Jun 22, 2017 10:00 AM   Pulmonary Function Test with PFT-RM2   [facility] (--)    [address]  [address]  [place] [id number] [phone]            Jun 22, 2017 10:40 AM   Established Patient Pul with A Rotation   [facility] (--)    [address]  [address]  [place] [id number] [phone]              Problem List              ICD-10-CM Priority Class Noted - Resolved    Essential hypertension I10 Medium  7/6/2009 - Present    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   Unknown - Present    Spinal stenosis, lumbar region, without neurogenic claudication M48.06 Low  Unknown - Present    Postmenopausal osteoporosis M81.0   Unknown - Present    GERD (gastroesophageal reflux disease) K21.9 Medium  9/20/2011 - Present    Facet arthritis of lumbar region M46.96 Low  3/26/2012 - Present    History of vertebral fracture Z87.81 Low  3/26/2012 - Present    History of gastritis Z87.19 Low  3/26/2012 - Present    Family history of polycystic kidney disease Z82.71 Low  Unknown - Present    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 Medium  Unknown - Present    Neuropathy (CMS-[facility]) G62.9 Low  10/17/2013 - Present    Hyponatremia E87.1   4/6/2016 - Present    Major depressive disorder, recurrent episode, mild (CMS-[facility]) F33.0 Low  5/2/2016 - Present    Esophageal dysphagia R13.14 Medium  5/19/2016 - Present    CKD (chronic kidney disease) stage 3, GFR 30-59 ml/min N18.3 Medium  5/23/2016 - Present    Spinal cord stimulator status Z96.89   9/6/2016 - Present    Essential tremor G25.0   9/6/2016 - Present    Menopausal symptoms N95.1   9/13/2016 - Present      Health Maintenance        Date Due Completion Dates    IMM PNEUMOCOCCAL 65+ (ADULT) LOW/MEDIUM RISK SERIES (2 of 2 - PPSV23) 9/1/2020 (Originally 10/6/2016) 10/6/2015    MAMMOGRAM 12/22/2017 12/22/2015    BONE DENSITY 8/29/2018 8/29/2013, 10/19/2010, 2/13/2007    COLONOSCOPY 5/27/2019 5/27/2009 (Prv Comp)    Override  on 5/27/2009: Previously completed    IMM DTaP/Tdap/Td Vaccine (2 - Td) 9/21/2023 9/21/2013            Current Immunizations     13-VALENT PCV PREVNAR 10/6/2015    Influenza TIV (IM) 11/20/2013, 10/23/2012, 10/15/2011    Influenza Vaccine Adult HD 10/6/2015, 9/23/2014    Influenza Vaccine Pediatric 9/1/2010, 10/8/2009    Influenza Vaccine Quad Inj (Pf) 10/4/2016    Pneumococcal Vaccine (UF)Historical Data 9/23/2010    SHINGLES VACCINE 3/2/2010    Tdap Vaccine 9/21/2013      Below and/or attached are the medications your provider expects you to take. Review all of your home medications and newly ordered medications with your provider and/or pharmacist. Follow medication instructions as directed by your provider and/or pharmacist. Please keep your medication list with you and share with your provider. Update the information when medications are discontinued, doses are changed, or new medications (including over-the-counter products) are added; and carry medication information at all times in the event of emergency situations     Allergies:  PCN - (reactions not documented)     SULFA DRUGS - (reactions not documented)     TAPE - (reactions not documented)     ZOLOFT - (reactions not documented)               Medications  Valid as of: May 08, 2017 - 11:27 AM    Generic Name Brand Name Tablet Size Instructions for use    Albuterol Sulfate (Aero Soln) VENTOLIN  (90 BASE) MCG/ACT Inhale 2 Puffs by mouth every 6 hours as needed for Shortness of Breath.        Aspirin (Tablet Delayed Response) ECOTRIN 81 MG Take 81 mg by mouth every day.        Budesonide-Formoterol Fumarate (Aerosol) SYMBICORT 80-4.5 MCG/ACT Inhale 2 Puffs by mouth 2 Times a Day. Use spacer. Rinse mouth after each use.        Cholecalciferol (Cap) Vitamin D 1000 UNIT Take 1,000 Units by mouth every day.        DiazePAM (Tab) VALIUM 5 MG Take 1 Tab by mouth every 6 hours as needed for Anxiety or Sleep.        Diclofenac Sodium (Gel) Diclofenac Sodium  1 % Apply  to skin as directed.        Docusate Sodium (Cap) COLACE 250 MG Take 250 mg by mouth every day.        DULoxetine HCl (Cap DR Particles) CYMBALTA 60 MG Take 1 Cap by mouth every day.        Estradiol (Tab) ESTRACE 0.5 MG TAKE ONE TABLET BY MOUTH ONCE DAILY        FLUoxetine HCl (Cap) PROZAC 20 MG Take 1 Cap by mouth every day.        Furosemide (Tab) LASIX 20 MG Take 20 mg by mouth 2 times a day.        Levothyroxine Sodium (Tab) SYNTHROID 50 MCG Take 1 Tab by mouth Every morning on an empty stomach.        Magnesium (Cap) Magnesium 100 MG Take  by mouth.        Omeprazole (CAPSULE DELAYED RELEASE) PRILOSEC 20 MG Take 1 Cap by mouth every day.        Oxycodone-Acetaminophen (Tab) PERCOCET 7.5-325 MG Take 1-2 Tabs by mouth every four hours as needed (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 She is seen 2/14/14, do not fill until 4/11/14        OxyMORphone HCl (Tablet Extended Release 12 hour Abuse-Deterrent) OxyMORphone HCl ER 20 MG Take 1 Tab by mouth 2 Times a Day.        OxyMORphone HCl (Tablet Extended Release 12 hour Abuse-Deterrent) OxyMORphone HCl ER 20 MG Take  by mouth.        Potassium Chloride Sona CR (Tab CR) Kdur 20 MEQ Take 20 mEq by mouth 2 times a day.        Pregabalin (Cap) LYRICA 100 MG Take 100 mg by mouth 4 times a day.        Probiotic Product (Cap) MOON Wearables  Take 1 Cap by mouth 2 Times a Day.        Valsartan (Tab) DIOVAN 160 MG Take 1 Tab by mouth every day.        .                 Medicines prescribed today were sent to:     [facility] - [place] - [address]    [address] [place] [address]    Phone: [phone] Fax: [phone]    Open 24 Hours?: No    [facility] [place] - [address] AT PORTAL TO REGISTERED [facility] S[address]    [address] [facility] [address]    Phone: [phone] Fax: [phone]    Open 24 Hours?: No      Medication refill instructions:       If  your prescription bottle indicates you have medication refills left, it is not necessary to call your provider’s office. Please contact your pharmacy and they will refill your medication.    If your prescription bottle indicates you do not have any refills left, you may request refills at any time through one of the following ways: The online [employer] system (except Urgent Care), by calling your provider’s office, or by asking your pharmacy to contact your provider’s office with a refill request. Medication refills are processed only during regular business hours and may not be available until the next business day. Your provider may request additional information or to have a follow-up visit with you prior to refilling your medication.   *Please Note: Medication refills are assigned a new Rx number when refilled electronically. Your pharmacy may indicate that no refills were authorized even though a new prescription for the same medication is available at the pharmacy. Please request the medicine by name with the pharmacy before contacting your provider for a refill.           [employer] Access Code: Activation code not generated  Current [employer] Status: Active

## 2017-05-10 ENCOUNTER — APPOINTMENT (OUTPATIENT)
Dept: WOUND CARE | Facility: MEDICAL CENTER | Age: 77
End: 2017-05-10
Attending: FAMILY MEDICINE
Payer: MEDICARE

## 2017-05-13 ENCOUNTER — NON-PROVIDER VISIT (OUTPATIENT)
Dept: WOUND CARE | Facility: MEDICAL CENTER | Age: 77
End: 2017-05-13
Attending: FAMILY MEDICINE
Payer: MEDICARE

## 2017-05-13 PROCEDURE — A6402 STERILE GAUZE <= 16 SQ IN: HCPCS

## 2017-05-13 PROCEDURE — 97597 DBRDMT OPN WND 1ST 20 CM/<: CPT

## 2017-05-13 PROCEDURE — A6212 FOAM DRG <=16 SQ IN W/BORDER: HCPCS

## 2017-05-13 NOTE — WOUND TEAM
Advanced Wound Care  [facility]  [address]  [address]  [place] [address]  [phone] Fax: [phone]      Encounter  For Certification Period:5/3/17  To 6/3/17  visit  G-code  C-code  kx modifier     #4 5/13/17  [id number] ch no           Referring Physician: Stacie Garber MD  Primary Physician:        Consulting Physicians:     MORENA Burks, Amandeep Gonzalez MD, Dr. Albrecht    Wound(s): L buttock pressure ulcer  Start of Care: 4/19/17  Post-op stimulator removal re-eval 5/3/17       Subjective:        HPI:     Pt is 77 y/o female who developed a pressure sore over area of spinal stimulator on 3/27/17. Pt has had issues with this stimulator since placement back in early 2015 when she developed an infection and was treated with abx for one year per Dr. Albrecht.  Pt has seen pain MD regarding removal of stimulator but wanted to try conservative care to preserve stimulator.  Pt sees this MD or his PA-C monthly and was last seen 3 weeks ago and they are aware of pressure sore in this area.     5/3/17:  Pt returns to [facility] following removal spinal stimulator 5/2/17 with primary closure by Dr. Gonzalez.  Vancomycin applied to wound prior to closure. New orders received 5/3/17    Pain: tender to wound area contact    Current Medications:no new/changed meds    Allergies:   PCN, Tape, Zoloft, Sulfa    Past Surgical History:       Objective:      Tests and Measures:4/19/17:  C&S taken from deep within tract - negative 4-24-17    Orthotic, protective, supportive devices: none  Fall Risk Assessment (rgoelio all that apply with an X):5/3/17 (+) fall risk     Wound Characteristics                                                    Location:  Left lateral buttock  Initial Evaluation  Date: 4/19/17 Re-eval  Date: 5/3/17 Encounter  5/13/17   Tissue Type and %: Open portal with yellow borders Approximated incision Approximated incision   Periwound: Purple ring surrounding portal intact intact    Drainage: Mod to heavy dark yellow/tan serous fluid none none   Exposed structures Stimulator visible and palpable  Staples  staples   Wound Edges:   frayed approximated approximated   Odor: absent none none   S&S of Infection:   Large pocket with heavy drainage opened none none   Edema: u/a none none   Sensation: intact Intact intact               Measurements: Initial Evaluation  Date:4/19/17 Re-eval  Date: 05/3/2017 Encounter 5/13/17   Length (cm) 1.3 approximated approximated   Width (cm) 0.7 5.5 5.5   Depth (cm) 1.3 ua ua   Area (cm2) 0.91cm2     Tract/undermine 11:00=5cm.   9:00=1.7cm          Procedures:     Debridement :  Selective using forceps to remove 1cm2 dried sanguinous exudate   Cleansed with:  NS                                                                 Periwound protected with:No-sting skin prep   Primary dressing: aquacel ag strip placed to cover incision.    Secondary Dressing: silicone adhesive foam   Other: tegaderm for waterproofing     Patient Education:  Pt is to see  Dr. Nuñez Monday May 15 for staple removal. Pt to return to [facility] for wound assessment 5/17/17. Pt instructed to keep dressing CDI and to return to ER for any s/s infection, n/v, fever or chills. Pt/son verbalized understanding to all.    Professional Collaboration: None today   Assessment:      Wound etiology: pressure, implant, questionable deeper infecion     Wound Progress: Incision approximated though draining; staples intact    Rationale for Treatment: AqAg to manage bioburden, absorb exudate, and maintain moist wound environment without laterally wicking exudate therefore reducing meghann-wound maceration    Patient tolerance/compliance: son and pt active in care and would just like to do whatever is best to expedite healing    Complicating factors:stimulator exposure(removed 5/2/17)    Need for ongoing Advanced Wound Care services: sharp debridement, pt education, dressing selection      Plan:      Treatment Plan  and Recommendations:  Diagnosis/ICD10: L89.323 Left buttock pressure ulcer stage 3  5/3/17: Post-op incision    Procedures/CPT: sharp debridement, non selective     Frequency: 2x/week       Treatment Goals: STG 2 Weeks  LTG 4 Weeks   Granulation Tissue: Free from s/s infection resolution   Decrease Necrotic Tissue to:     Wound Phase:      Decrease Size by:  %   Periwound:      Decrease tracts/undermining by:  10%             At the time of each visit a thorough assessment of the patient is completed to assure the  appropriateness of our plan of care.  The dressings or modalities may need to be adapted   from the original plan to address any significant changes in the wound environment.

## 2017-05-15 ENCOUNTER — APPOINTMENT (OUTPATIENT)
Dept: WOUND CARE | Facility: MEDICAL CENTER | Age: 77
End: 2017-05-15
Attending: FAMILY MEDICINE
Payer: MEDICARE

## 2017-05-17 ENCOUNTER — OFFICE VISIT (OUTPATIENT)
Dept: WOUND CARE | Facility: MEDICAL CENTER | Age: 77
End: 2017-05-17
Attending: FAMILY MEDICINE
Payer: MEDICARE

## 2017-05-17 ENCOUNTER — PATIENT MESSAGE (OUTPATIENT)
Dept: MEDICAL GROUP | Facility: CLINIC | Age: 77
End: 2017-05-17

## 2017-05-17 DIAGNOSIS — I10 ESSENTIAL HYPERTENSION: ICD-10-CM

## 2017-05-17 DIAGNOSIS — M48.061 SPINAL STENOSIS, LUMBAR REGION, WITHOUT NEUROGENIC CLAUDICATION: ICD-10-CM

## 2017-05-17 DIAGNOSIS — Z96.89 SPINAL CORD STIMULATOR STATUS: ICD-10-CM

## 2017-05-17 DIAGNOSIS — S21.202S: Primary | ICD-10-CM

## 2017-05-17 PROBLEM — S21.209A OPEN WOUND OF BACK: Status: ACTIVE | Noted: 2017-05-17

## 2017-05-17 PROCEDURE — 99212 OFFICE O/P EST SF 10 MIN: CPT | Performed by: NURSE PRACTITIONER

## 2017-05-17 PROCEDURE — G8419 CALC BMI OUT NRM PARAM NOF/U: HCPCS | Performed by: NURSE PRACTITIONER

## 2017-05-17 PROCEDURE — 1036F TOBACCO NON-USER: CPT | Performed by: NURSE PRACTITIONER

## 2017-05-17 PROCEDURE — G8432 DEP SCR NOT DOC, RNG: HCPCS | Performed by: NURSE PRACTITIONER

## 2017-05-17 PROCEDURE — A6212 FOAM DRG <=16 SQ IN W/BORDER: HCPCS

## 2017-05-17 PROCEDURE — 4040F PNEUMOC VAC/ADMIN/RCVD: CPT | Performed by: NURSE PRACTITIONER

## 2017-05-17 PROCEDURE — A6402 STERILE GAUZE <= 16 SQ IN: HCPCS

## 2017-05-17 PROCEDURE — 97602 WOUND(S) CARE NON-SELECTIVE: CPT

## 2017-05-17 PROCEDURE — 1101F PT FALLS ASSESS-DOCD LE1/YR: CPT | Performed by: NURSE PRACTITIONER

## 2017-05-17 RX ORDER — VALSARTAN 160 MG/1
160 TABLET ORAL DAILY
Qty: 90 TAB | Refills: 3 | Status: SHIPPED | OUTPATIENT
Start: 2017-05-17 | End: 2017-06-21

## 2017-05-17 RX ORDER — VALSARTAN 160 MG/1
TABLET ORAL
Refills: 0 | OUTPATIENT
Start: 2017-05-17

## 2017-05-17 NOTE — TELEPHONE ENCOUNTER
Was the patient seen in the last year in this department? Yes     Does patient have an active prescription for medications requested? Yes     Received Request Via: Patient

## 2017-05-17 NOTE — WOUND TEAM
Advanced Wound Care  [facility]  [address]  [address]  [place] [address]  [phone] Fax: [phone]      Encounter  For Certification Period:5/3/17  To 6/3/17  visit  G-code  C-code  kx modifier     #5 5/17/17  [id number] ch no           Referring Physician: Stacie Garber MD  Primary Physician:        Consulting Physicians:     MORENA Burks, Amandeep Gonzalez MD, Dr. Albrecht    Wound(s): L buttock pressure ulcer  Start of Care: 4/19/17  Post-op stimulator removal re-eval 5/3/17       Subjective:        HPI:     Pt is 77 y/o female who developed a pressure sore over area of spinal stimulator on 3/27/17. Pt has had issues with this stimulator since placement back in early 2015 when she developed an infection and was treated with abx for one year per Dr. Albrecht.  Pt has seen pain MD regarding removal of stimulator but wanted to try conservative care to preserve stimulator.  Pt sees this MD or his PA-C monthly and was last seen 3 weeks ago and they are aware of pressure sore in this area.     5/3/17:  Pt returns to [facility] following removal spinal stimulator 5/2/17 with primary closure by Dr. Gonzalez.  Vancomycin applied to wound prior to closure. New orders received 5/3/17    Pain: tender to wound area contact    Current Medications:no new/changed meds    Allergies:   PCN, Tape, Zoloft, Sulfa    Past Surgical History:       Objective:      Tests and Measures:4/19/17:  C&S taken from deep within tract - negative 4-24-17    Orthotic, protective, supportive devices: none  Fall Risk Assessment (rogelio all that apply with an X):5/3/17 (+) fall risk     Wound Characteristics                                                    Location:  Left lateral buttock  Initial Evaluation  Date: 4/19/17 Re-eval  Date: 5/3/17 Encounter  5/17/17   Tissue Type and %: Open portal with yellow borders Approximated incision Approximated incision   Periwound: Purple ring surrounding portal intact intact    Drainage: Mod to heavy dark yellow/tan serous fluid none Min ss   Exposed structures Stimulator visible and palpable  Staples  Steri strips   Wound Edges:   frayed approximated approximated   Odor: absent none none   S&S of Infection:   Large pocket with heavy drainage opened none none   Edema: u/a none none   Sensation: intact Intact intact               Measurements: Initial Evaluation  Date:4/19/17 Re-eval  Date: 05/3/2017 Encounter 5/17/17   Length (cm) 1.3 approximated approximated   Width (cm) 0.7 5.5 5.5   Depth (cm) 1.3 ua ua   Area (cm2) 0.91cm2     Tract/undermine 11:00=5cm.   9:00=1.7cm          Procedures:     Debridement :  Non selective with dsd to bluntly remove dried drainage while keeping steri strips intact   Cleansed with:  NS                                                                 Periwound protected with:No-sting skin prep   Primary dressing: none   Secondary Dressing: silicone adhesive foam   Other:     Patient Education:  Pt has ad foam which is semipermeable and breathable.  She can leave area open to air if drainage ceases.  A replaceable ad foam provided. If she uses this as well she can then use dsd and tape to cover. Pt to be aware of increased drainage, induration , redness, pain.  We will see weekly for 2 weeks to determine that cavity remains closed. Pt just to return for skin check.      Professional Collaboration: Chery BERG in to review medical status, agrees with POC  Assessment:      Wound etiology: pressure, implant, questionable deeper infecion     Wound Progress: Incision approximated though draining; steri strips-- drainage decreasing.     Rationale for Treatment: ad foam for drainage management to be left SIERRA if no longer draining  Patient tolerance/compliance: son and pt active in care and would just like to do whatever is best to expedite healing    Complicating factors:stimulator exposure(removed 5/2/17)-- pocket sutured closed, optimistic pocket does not  reopen    Need for ongoing Advanced Wound Care services: sharp debridement, pt education, dressing selection      Plan:      Treatment Plan and Recommendations:  Diagnosis/ICD10: L89.323 Left buttock pressure ulcer stage 3  5/3/17: Post-op incision    Procedures/CPT: sharp debridement, non selective     Frequency: 2x/week       Treatment Goals: STG 2 Weeks  LTG 4 Weeks   Granulation Tissue: Free from s/s infection resolution   Decrease Necrotic Tissue to:     Wound Phase:      Decrease Size by:  %   Periwound:      Decrease tracts/undermining by:  10%             At the time of each visit a thorough assessment of the patient is completed to assure the  appropriateness of our plan of care.  The dressings or modalities may need to be adapted   from the original plan to address any significant changes in the wound environment.

## 2017-05-17 NOTE — PROGRESS NOTES
Patient seen in collaboration with wound care clinician,Louise Delgadillo, PT    75 y/o female who developed a pressure ulcer over a spinal stimulator to her left lower back on 3/27/17. Pt has had issues with this stimulator since placement back in early 2015 when she developed an infection and was treated with abx for one year by Dr. Albrecht. Pt was seen by her pain MD regarding removal of stimulator but wanted to try conservative care to preserve stimulator.  She was referred to the wound clinic for treatment of the wound.   Unfortunately the wound worsened and the stimulator was removed on 5/2/17, with primary closure by Dr. Gonzalez.   There are currently no plans to place another stimulator.  She is managing her pain with 2 Opana, 20mg per day, and percocet 7.5/325 4 times per day.  He incision is well approximated, steristrips are in place.  There is a small amount of ss drainage from the lateral aspect of the incision.    Wound care completed by Louise

## 2017-05-17 NOTE — TELEPHONE ENCOUNTER
----- Message from Your Healthcare Team sent at 5/17/2017 12:12 PM PDT -----  Regarding: Prescription Question  Contact: [phone] hi I'm not sure if you were notified but my stimulator was removed on May 2 by Dr. Gonzalez. Wound care is overseeing the wound healing process. So far so good.     Please send [facility] a new prescription for a refill of Valsartan 160mg.    Thank you, Maddy Hodge

## 2017-05-17 NOTE — TELEPHONE ENCOUNTER
----- Message from Your Healthcare Team sent at 5/17/2017 12:12 PM PDT -----  Regarding: Prescription Question  Contact: [phone] hi I'm not sure if you were notified but my stimulator was removed on May 2 by Dr. Gonzalez. Wound care is overseeing the wound healing process. So far so good.     Please send [facility] a new prescription for a refill of Valsartan 160mg.    Thank you, Maddy Hodge

## 2017-05-24 ENCOUNTER — NON-PROVIDER VISIT (OUTPATIENT)
Dept: PULMONOLOGY | Facility: HOSPICE | Age: 77
End: 2017-05-24
Attending: INTERNAL MEDICINE
Payer: MEDICARE

## 2017-05-24 ENCOUNTER — NON-PROVIDER VISIT (OUTPATIENT)
Dept: WOUND CARE | Facility: MEDICAL CENTER | Age: 77
End: 2017-05-24
Attending: FAMILY MEDICINE
Payer: MEDICARE

## 2017-05-24 VITALS — SYSTOLIC BLOOD PRESSURE: 130 MMHG | BODY MASS INDEX: 32.22 KG/M2 | WEIGHT: 165 LBS | DIASTOLIC BLOOD PRESSURE: 62 MMHG

## 2017-05-24 DIAGNOSIS — I27.20 PULMONARY HTN (HCC): ICD-10-CM

## 2017-05-24 PROCEDURE — 99212 OFFICE O/P EST SF 10 MIN: CPT

## 2017-05-24 PROCEDURE — A6212 FOAM DRG <=16 SQ IN W/BORDER: HCPCS

## 2017-05-24 PROCEDURE — 94620 AMB PULMONARY STRESS TESTING (6 MIN WALK): CPT | Performed by: INTERNAL MEDICINE

## 2017-05-24 ASSESSMENT — 6 MINUTE WALK TEST (6MWT)
PERCEIVED BREATHLESSNESS AT 2 MIN: 2
SAO2 AT 4 MIN: 93
HEART RATE AT 2 MIN: 105
HEART RATE AT 6 MIN: 102
BLOOD PRESSURE: LEFT ARM
SAO2 AT 2 MIN: 92
HEART RATE AT 5 MIN: 100
AMBULATES WITH O2: WITHOUT O2
SAO2 AT 6 MIN: 92
HEART RATE AT 1 MIN: 97
PERCEIVED FATIGUE AT 1 MIN: 0
BLOOD PRESSURE AT 1 MIN: 138/66
PERCEIVED BREATHLESSNESS AT 4 MIN: 3
PERCEIVED FATIGUE AT 6 MIN: 2
SAO2 AT 2 MIN: 95
PERCEIVED BREATHLESSNESS AT 1 MIN: 2
SAO2 AT 1 MIN: 93
NUMBER OF RESTS: 1
PERCEIVED FATIGUE AT 3 MIN: 1
PERCEIVED BREATHLESSNESS AT 6 MIN: 3
PERCEIVED BREATHLESSNESS AT 2 MIN: 1
BLOOD PRESSURE AT 2 MIN: 130/66
SAO2 AT 3 MIN: 91
SITTING BLOOD PRESSURE: 130/62
PERCEIVED FATIGUE AT 4 MIN: 2
O2 SAT PERCENT ROOM AIR: 94
HEART RATE AT 4 MIN: 96
SAO2 AT 1 MIN: 94
PERCEIVED FATIGUE AT 5 MIN: 2
SAO2 AT 5 MIN: 94
HEART RATE AT 3 MIN: 107
PERCEIVED BREATHLESSNESS AT 3 MIN: 3
PERCEIVED FATIGUE AT 2 MIN: 0
PERCEIVED BREATHLESSNESS AT 1 MIN: 0
PERCEIVED BREATHLESSNESS AT 5 MIN: 3
PERCEIVED FATIGUE AT 2 MIN: 0
PERCEIVED FATIGUE AT 1 MIN: 0
HEART RATE: 77
HEART RATE AT 2 MIN: 85
PERCENT OF NORMAL WALKED: 49
HEART RATE AT 1 MIN: 91

## 2017-05-24 NOTE — MR AVS SNAPSHOT
Maddy Ewing Naveen   2017 1:00 PM   Appointment   MRN: [id number]    Department:  [facility]   Dept Phone:  [phone]    Description:  Female : 1940   Provider:  ERNA           Allergies as of 2017     Allergen Noted Reactions    Pcn [Penicillins] 2009       Sulfa Drugs 2009       Tape 2016       Zoloft 2016       Worse depression      You were diagnosed with     Pulmonary HTN (CMS-[facility])   [095998]         Vital Signs     Smoking Status                   Never Smoker            Basic Information     Date Of Birth Sex Race Ethnicity Preferred Language    1940 Female White Non- English      Your appointments     May 31, 2017 10:00 AM   Wound 30 Minute Procedure with Paulina Adler R.N.   [facility] [place]    [address]  [place] [id number] [phone]            2017  8:00 PM   Sleep Study with SLEEP TECH   [facility] (--)    [address]  [address]  [place] [id number] [phone]            2017  2:00 PM   Wound 30 Minute Procedure with Gabriela Aguilar P.T.   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   Unknown - Present    Spinal stenosis, lumbar region, without neurogenic claudication M48.06 Low  Unknown - Present    Postmenopausal osteoporosis M81.0   Unknown - Present    GERD (gastroesophageal reflux disease) K21.9 Medium  2011 - Present    Facet arthritis of lumbar region M46.96 Low  3/26/2012 - Present    History of vertebral fracture Z87.81 Low  3/26/2012 - Present    History of gastritis Z87.19 Low  3/26/2012 - Present    Family history of polycystic kidney disease Z82.71 Low  Unknown - Present    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 Medium  Unknown - Present    Neuropathy (CMS-HCC) G62.9 Low  10/17/2013 - Present    Hyponatremia E87.1   4/6/2016 - Present    Major depressive disorder, recurrent episode, mild (CMS-HCC) F33.0 Low  5/2/2016 - Present    Esophageal dysphagia R13.14 Medium  5/19/2016 - Present    CKD (chronic kidney disease) stage 3, GFR 30-59 ml/min N18.3 Medium  5/23/2016 - Present    Spinal cord stimulator status Z96.89   9/6/2016 - Present    Essential tremor G25.0   9/6/2016 - Present    Menopausal symptoms N95.1   9/13/2016 - Present    Open wound of back S21.209A   5/17/2017 - Present      Health Maintenance        Date Due Completion Dates    IMM PNEUMOCOCCAL 65+ (ADULT) LOW/MEDIUM RISK SERIES (2 of 2 - PPSV23) 9/1/2020 (Originally 10/6/2016) 10/6/2015    MAMMOGRAM 12/22/2017 12/22/2015    BONE DENSITY 8/29/2018 8/29/2013, 10/19/2010, 2/13/2007    COLONOSCOPY 5/27/2019 5/27/2009 (Prv Comp)    Override on 5/27/2009: Previously completed    IMM DTaP/Tdap/Td Vaccine (2 - Td) 9/21/2023 9/21/2013            Current Immunizations     13-VALENT PCV PREVNAR 10/6/2015    Influenza TIV (IM) 11/20/2013, 10/23/2012, 10/15/2011    Influenza Vaccine Adult HD 10/6/2015, 9/23/2014    Influenza Vaccine Pediatric 9/1/2010, 10/8/2009    Influenza Vaccine Quad Inj (Pf) 10/4/2016    Pneumococcal Vaccine (UF)Historical Data 9/23/2010    SHINGLES VACCINE 3/2/2010    Tdap Vaccine 9/21/2013      Below and/or attached are the medications your provider expects you to take. Review all of your home medications and newly ordered medications with your provider and/or pharmacist. Follow medication instructions as directed by your provider and/or pharmacist. Please keep your medication list with you and share with your provider. Update the information when medications are discontinued,  doses are changed, or new medications (including over-the-counter products) are added; and carry medication information at all times in the event of emergency situations     Allergies:  PCN - (reactions not documented)     SULFA DRUGS - (reactions not documented)     TAPE - (reactions not documented)     ZOLOFT - (reactions not documented)               Medications  Valid as of: May 24, 2017 -  2:00 PM    Generic Name Brand Name Tablet Size Instructions for use    Albuterol Sulfate (Aero Soln) VENTOLIN  (90 BASE) MCG/ACT Inhale 2 Puffs by mouth every 6 hours as needed for Shortness of Breath.        Aspirin (Tablet Delayed Response) ECOTRIN 81 MG Take 81 mg by mouth every day.        Budesonide-Formoterol Fumarate (Aerosol) SYMBICORT 80-4.5 MCG/ACT Inhale 2 Puffs by mouth 2 Times a Day. Use spacer. Rinse mouth after each use.        Cholecalciferol (Cap) Vitamin D 1000 UNIT Take 1,000 Units by mouth every day.        DiazePAM (Tab) VALIUM 5 MG Take 1 Tab by mouth every 6 hours as needed for Anxiety or Sleep.        Diclofenac Sodium (Gel) Diclofenac Sodium 1 % Apply  to skin as directed.        Docusate Sodium (Cap) COLACE 250 MG Take 250 mg by mouth every day.        DULoxetine HCl (Cap DR Particles) CYMBALTA 60 MG Take 1 Cap by mouth every day.        Estradiol (Tab) ESTRACE 0.5 MG TAKE ONE TABLET BY MOUTH ONCE DAILY        FLUoxetine HCl (Cap) PROZAC 20 MG Take 1 Cap by mouth every day.        Furosemide (Tab) LASIX 20 MG Take 20 mg by mouth 2 times a day.        Levothyroxine Sodium (Tab) SYNTHROID 50 MCG Take 1 Tab by mouth Every morning on an empty stomach.        Magnesium (Cap) Magnesium 100 MG Take  by mouth.        Omeprazole (CAPSULE DELAYED RELEASE) PRILOSEC 20 MG Take 1 Cap by mouth every day.        Oxycodone-Acetaminophen (Tab) PERCOCET 7.5-325 MG Take 1-2 Tabs by mouth every four hours as needed (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 She is seen 2/14/14, do not  fill until 4/11/14        OxyMORphone HCl (Tablet Extended Release 12 hour Abuse-Deterrent) OxyMORphone HCl ER 20 MG Take 1 Tab by mouth 2 Times a Day.        OxyMORphone HCl (Tablet Extended Release 12 hour Abuse-Deterrent) OxyMORphone HCl ER 20 MG Take  by mouth.        OxyMORphone HCl (Tablet Extended Release 12 hour Abuse-Deterrent) OxyMORphone HCl ER 20 MG Take  by mouth.        Potassium Chloride Sona CR (Tab CR) Kdur 20 MEQ Take 20 mEq by mouth 2 times a day.        Pregabalin (Cap) LYRICA 100 MG Take 100 mg by mouth 4 times a day.        Probiotic Product (Cap) UXFLIP  Take 1 Cap by mouth 2 Times a Day.        Valsartan (Tab) DIOVAN 160 MG Take 1 Tab by mouth every day.        .                 Medicines prescribed today were sent to:     [id number] - [place], [place] - [address]    [address] [place] [address]    Phone: [phone] Fax: [phone]    Open 24 Hours?: No    [facility] - [place], [place] - [address] AT PORTAL TO [facility] S[facility]    [address] [facility] [address]    Phone: [phone] Fax: [phone]    Open 24 Hours?: No      Medication refill instructions:       If your prescription bottle indicates you have medication refills left, it is not necessary to call your provider’s office. Please contact your pharmacy and they will refill your medication.    If your prescription bottle indicates you do not have any refills left, you may request refills at any time through one of the following ways: The online [employer] system (except Urgent Care), by calling your provider’s office, or by asking your pharmacy to contact your provider’s office with a refill request. Medication refills are processed only during regular business hours and may not be available until the next business day. Your provider may request additional information or to have a follow-up visit with you prior to refilling your medication.      *Please Note: Medication refills are assigned a new Rx number when refilled electronically. Your pharmacy may indicate that no refills were authorized even though a new prescription for the same medication is available at the pharmacy. Please request the medicine by name with the pharmacy before contacting your provider for a refill.           [employer] Access Code: Activation code not generated  Current Kii Status: Active

## 2017-05-24 NOTE — WOUND TEAM
Advanced Wound Care  [facility]  [address]  [address]  [place] [address]  [phone] Fax: [phone]      Encounter  For Certification Period:5/3/17  To 6/3/17  visit  G-code  C-code  kx modifier     #5 5/17/17  [id number] ch no           Referring Physician: Stacie Garber MD  Primary Physician:        Consulting Physicians:     MORENA Burks, Amandeep Gonzalez MD, Dr. Albrecht    Wound(s): L buttock pressure ulcer  Start of Care: 4/19/17  Post-op stimulator removal re-eval 5/3/17       Subjective:        HPI:     Pt is 77 y/o female who developed a pressure sore over area of spinal stimulator on 3/27/17. Pt has had issues with this stimulator since placement back in early 2015 when she developed an infection and was treated with abx for one year per Dr. Albrecht.  Pt has seen pain MD regarding removal of stimulator but wanted to try conservative care to preserve stimulator.  Pt sees this MD or his PA-C monthly and was last seen 3 weeks ago and they are aware of pressure sore in this area.     5/3/17:  Pt returns to [facility] following removal spinal stimulator 5/2/17 with primary closure by Dr. Gonzalez.  Vancomycin applied to wound prior to closure. New orders received 5/3/17    Pain: denies    Current Medications:no new/changed meds    Allergies:   PCN, Tape, Zoloft, Sulfa    Past Surgical History:       Objective:      Tests and Measures:4/19/17:  C&S taken from deep within tract - negative 4-24-17    Orthotic, protective, supportive devices: none  Fall Risk Assessment (rogelio all that apply with an X):5/3/17 (+) fall risk     Wound Characteristics                                                    Location:  Left lateral buttock  Initial Evaluation  Date: 4/19/17 Re-eval  Date: 5/3/17 Encounter  5/24/17   Tissue Type and %: Open portal with yellow borders Approximated incision Approximated incision   Periwound: Purple ring surrounding portal intact intact   Drainage: Mod to heavy dark  yellow/tan serous fluid none Scant serous   Exposed structures Stimulator visible and palpable  Staples  none   Wound Edges:   frayed approximated approximated   Odor: absent none none   S&S of Infection:   Large pocket with heavy drainage opened none none   Edema: u/a none none   Sensation: intact Intact intact               Measurements: Initial Evaluation  Date:4/19/17 Re-eval  Date: 05/3/2017 Encounter  5/24/17   Length (cm) 1.3 approximated approximated   Width (cm) 0.7 5.5 3.5   Depth (cm) 1.3 ua ua   Area (cm2) 0.91cm2     Tract/undermine 11:00=5cm.   9:00=1.7cm          Procedures:     Debridement :  none   Cleansed with:  NS                                                                 Periwound protected with:  none   Primary dressing: none   Secondary Dressing: silicone adhesive foam   Other:     Patient Education:  Pt has ad foam which is semipermeable and breathable.  She can leave area open to air if drainage ceases.  A replaceable ad foam provided. If she uses this as well she can then use dsd and tape to cover. Pt to be aware of increased drainage, induration , redness, pain.  We will see weekly for 2 weeks to determine that cavity remains closed. Pt just to return for skin check.      Professional Collaboration: none   Assessment:      Wound etiology: pressure, implant, questionable deeper infecion     Wound Progress: Incision approximated though draining scant    Rationale for Treatment: ad foam for drainage management to be left SIERRA if no longer draining    Patient tolerance/compliance: son and pt active in care and would just like to do whatever is best to expedite healing    Complicating factors:stimulator exposure(removed 5/2/17)-- pocket sutured closed, optimistic pocket does not reopen    Need for ongoing Advanced Wound Care services: sharp debridement, pt education, dressing selection      Plan:      Treatment Plan and Recommendations:  Diagnosis/ICD10: L89.323 Left buttock pressure ulcer  stage 3  5/3/17: Post-op incision    Procedures/CPT: sharp debridement, non selective     Frequency: once every two weeks      Treatment Goals: STG 2 Weeks  LTG 4 Weeks   Granulation Tissue: Free from s/s infection resolution   Decrease Necrotic Tissue to:     Wound Phase:      Decrease Size by:  %   Periwound:      Decrease tracts/undermining by:  10%             At the time of each visit a thorough assessment of the patient is completed to assure the  appropriateness of our plan of care.  The dressings or modalities may need to be adapted   from the original plan to address any significant changes in the wound environment.

## 2017-05-31 ENCOUNTER — NON-PROVIDER VISIT (OUTPATIENT)
Dept: WOUND CARE | Facility: MEDICAL CENTER | Age: 77
End: 2017-05-31
Attending: FAMILY MEDICINE
Payer: MEDICARE

## 2017-05-31 PROCEDURE — 97602 WOUND(S) CARE NON-SELECTIVE: CPT

## 2017-05-31 PROCEDURE — A6402 STERILE GAUZE <= 16 SQ IN: HCPCS

## 2017-05-31 PROCEDURE — A6212 FOAM DRG <=16 SQ IN W/BORDER: HCPCS

## 2017-05-31 NOTE — CERTIFICATION
Advanced Wound Care  [facility]  [address]  [address]  [place] [address]  [phone] Fax: [phone]    30 Day Summary  For Certification Period: 5/3/17 - 6/3/17  visit  G-code  C-code  kx modifier     #5 5/17/17  [id number] ch no           Referring Physician: Stacie Garber MD  Primary Physician:        Consulting Physicians: MORENA Burks, Amandeep Gonzalez MD, Dr. Albrecht    Wound(s): L buttock pressure ulcer  Start of Care: 4/19/17  Post-op stimulator removal re-eval 5/3/17       Subjective:        HPI: Pt is 77 y/o female who developed a pressure sore over area of spinal stimulator on 3/27/17. Pt has had issues with this stimulator since placement back in early 2015 when she developed an infection and was treated with abx for one year per Dr. Albrecht.  Pt has seen pain MD regarding removal of stimulator but wanted to try conservative care to preserve stimulator. Pt sees this MD or his PA-C monthly and was last seen 3 weeks ago and they are aware of pressure sore in this area.     5/3/17:  Pt returns to [facility] following removal spinal stimulator 5/2/17 with primary closure by Dr. Gonzalez. Vancomycin applied to wound prior to closure. New orders received 5/3/17    Pain: Patient denies pain today    Current Medications: no new/changed meds    Allergies:   PCN, Tape, Zoloft, Sulfa    Past Surgical History:       Objective:      Tests and Measures:4/19/17:  C&S taken from deep within tract - negative 4-24-17    Orthotic, protective, supportive devices: none  Fall Risk Assessment (rogelio all that apply with an X): 5/3/17 (+) fall risk     Wound Characteristics                                                    Location:  Left lateral buttock  Initial Evaluation  Date: 4/19/17 Encounter Date:  5/31/17 30 Day Summary:  5/31/17   Tissue Type and %: Open portal with yellow borders Approximated incision Approximated incision   Periwound: Purple ring surrounding portal intact Intact    Drainage: Mod to heavy dark yellow/tan serous fluid Scant serous Minimal ss   Exposed structures Stimulator visible and palpable  None None   Wound Edges:   frayed Approximated Approximated   Odor: absent None None   S&S of Infection:   Large pocket with heavy drainage opened None None   Edema: U/a None None   Sensation: intact intact Intact               Measurements:  Left lateral buttock Initial Evaluation  Date:4/19/17 Re-eval  Date: 05/3/2017 30 Day Summary:  5/31/17   Length (cm) 1.3 approximated Approximated   Width (cm) 0.7 5.5 3.0   Depth (cm) 1.3 ua juvenal   Area (cm2) 0.91cm2     Tract/undermine 11:00=5cm.   9:00=1.7cm          Procedures:     Debridement: Non selective with NS and gauze to remove dried drainage from wound   Cleansed with:  NS                                                                 Periwound protected with: Skin prep   Primary dressing: none   Secondary Dressing: silicone adhesive foam   Other:     Patient Education: Pt has ad foam which is semipermeable and breathable.  She can leave area open to air if drainage ceases. A replaceable ad foam provided. Pt to be aware of increased drainage, induration , redness, pain.  We will have patient return in two weeks for skin check. Patient and son agree.     Professional Collaboration: 30 day certification summary sent to referring provider via EPIC.    Assessment:      Wound etiology: pressure, implant, questionable deeper infecion     Wound Progress: Incision approximated though draining scant    Rationale for Treatment: ad foam for drainage management to be left SIERRA if no longer draining    Patient tolerance/compliance: son and pt active in care and would just like to do whatever is best to expedite healing    Complicating factors:stimulator exposure(removed 5/2/17)-- pocket sutured closed, optimistic pocket does not reopen    Need for ongoing Advanced Wound Care services: sharp debridement, pt education, dressing selection      Plan:       Treatment Plan and Recommendations:  Diagnosis/ICD10: L89.323 Left buttock pressure ulcer stage 3  5/3/17: Post-op incision    Procedures/CPT: sharp debridement, non selective     Frequency: once every two weeks      Treatment Goals: STG 2 Weeks  LTG 4 Weeks   Granulation Tissue: Free from s/s infection resolution   Decrease Necrotic Tissue to:     Wound Phase:      Decrease Size by:  %   Periwound:      Decrease tracts/undermining by:  10%             At the time of each visit a thorough assessment of the patient is completed to assure the  appropriateness of our plan of care.  The dressings or modalities may need to be adapted   from the original plan to address any significant changes in the wound environment.

## 2017-06-06 ENCOUNTER — APPOINTMENT (OUTPATIENT)
Dept: SLEEP MEDICINE | Facility: MEDICAL CENTER | Age: 77
End: 2017-06-06
Payer: MEDICARE

## 2017-06-06 ENCOUNTER — TELEPHONE (OUTPATIENT)
Dept: SLEEP MEDICINE | Facility: MEDICAL CENTER | Age: 77
End: 2017-06-06

## 2017-06-06 DIAGNOSIS — G47.33 OSA (OBSTRUCTIVE SLEEP APNEA): Primary | ICD-10-CM

## 2017-06-06 NOTE — TELEPHONE ENCOUNTER
Patient is scheduled for an in-lab sleep study tonight, however Dr. Estrella ordered a titration study.  We need an order for a diagnostic study.  Please sign order.

## 2017-06-08 ENCOUNTER — APPOINTMENT (OUTPATIENT)
Dept: WOUND CARE | Facility: MEDICAL CENTER | Age: 77
End: 2017-06-08
Attending: FAMILY MEDICINE
Payer: MEDICARE

## 2017-06-13 ENCOUNTER — SLEEP STUDY (OUTPATIENT)
Dept: SLEEP MEDICINE | Facility: MEDICAL CENTER | Age: 77
End: 2017-06-13
Attending: NURSE PRACTITIONER
Payer: MEDICARE

## 2017-06-13 DIAGNOSIS — G47.33 OSA (OBSTRUCTIVE SLEEP APNEA): ICD-10-CM

## 2017-06-13 PROCEDURE — 95810 POLYSOM 6/> YRS 4/> PARAM: CPT | Performed by: INTERNAL MEDICINE

## 2017-06-14 ENCOUNTER — NON-PROVIDER VISIT (OUTPATIENT)
Dept: WOUND CARE | Facility: MEDICAL CENTER | Age: 77
End: 2017-06-14
Attending: FAMILY MEDICINE
Payer: MEDICARE

## 2017-06-14 PROCEDURE — 97597 DBRDMT OPN WND 1ST 20 CM/<: CPT

## 2017-06-14 PROCEDURE — A6212 FOAM DRG <=16 SQ IN W/BORDER: HCPCS

## 2017-06-14 PROCEDURE — 302804 HCHG HYDROFIBER SILVER 6X6

## 2017-06-14 PROCEDURE — A6402 STERILE GAUZE <= 16 SQ IN: HCPCS

## 2017-06-14 NOTE — WOUND TEAM
Advanced Wound Care  [facility]  [address]  [address]  [place] [address]  [phone] Fax: [phone]    Encounter Note  For Certification Period: 5/31/2017-6/31/2017   visit  G-code  C-code  kx modifier     #5 5/17/17  [id number] ch no           Referring Physician: Stacie Garber MD  Primary Physician:        Consulting Physicians: MORENA Burks, Amandeep Gonzalez MD, Dr. Albrecht    Wound(s): L buttock pressure ulcer  Start of Care: 4/19/17  Post-op stimulator removal re-eval 5/3/17       Subjective:        HPI: Pt is 77 y/o female who developed a pressure sore over area of spinal stimulator on 3/27/17. Pt has had issues with this stimulator since placement back in early 2015 when she developed an infection and was treated with abx for one year per Dr. Albrecht.  Pt has seen pain MD regarding removal of stimulator but wanted to try conservative care to preserve stimulator.    5/3/17:  Pt returns to [facility] following removal spinal stimulator 5/2/17 with primary closure by Dr. Gonzalez. Vancomycin applied to wound prior to closure. New orders received 5/3/17    6/14/17: Patient returns to wound clinic for follow up and possible dc. Patients wound has reopened with undermining and exposed stimulator wires. Per patients son the stimulator was removed in May but the wires were left in place. Requesting patient be seen by provider at next visit.     Pain: Patient denies pain today    Current Medications: no new/changed meds    Allergies:   PCN, Tape, Zoloft, Sulfa    Past Surgical History:       Objective:      Tests and Measures:4/19/17:  C&S taken from deep within tract - negative 4-24-17    Orthotic, protective, supportive devices: none  Fall Risk Assessment (rogelio all that apply with an X): 5/3/17 (+) fall risk     Wound Characteristics                                                    Location:  Left lateral buttock  Initial Evaluation  Date: 4/19/17 30 Day Summary:  5/31/17 6/14/2017    Tissue Type and %: Open portal with yellow borders Approximated incision Open, 100% pink of that visible.    Periwound: Purple ring surrounding portal intact Intact   Drainage: Mod to heavy dark yellow/tan serous fluid Scant serous Minimal ss   Exposed structures Stimulator visible and palpable  None STIMULATOR WIRE   Wound Edges:   frayed Approximated Approximated   Odor: absent None None   S&S of Infection:   Large pocket with heavy drainage opened None None   Edema: U/a None None   Sensation: intact intact Intact               Measurements:  Left lateral buttock Initial Evaluation  Date:4/19/17 Re-eval  Date: 05/3/2017 30 Day Summary:  5/31/17 6/14/2017   Length (cm) 1.3 approximated Approximated 0.5   Width (cm) 0.7 5.5 3.0 1.0   Depth (cm) 1.3 ua juvenal 0.3   Area (cm2) 0.91cm2      Tract/undermine 11:00=5cm.   9:00=1.7cm   0.7 Circumferentialy        Procedures:     Debridement: Selective debridement preformed to remove 1.5cm 2 and nonviable tissue from wound opening.     Cleansed with:  NS                                                                 Periwound protected with: Skin prep   Primary dressing:Aquacel ag strip   Secondary Dressing: silicone adhesive foam   Other:     Patient Education: Review ss of infection. Patient verbalized understanding and denies and of these symptoms. Pt will be seen by Omar Funez at next visit for evaluation of wound secondary to decline and exposed stimulator wire.       Assessment:      Wound etiology: pressure, implant, questionable deeper infecion     Wound Progress: Ryan longer approximated, reopened with exposed stimulator wire.     Rationale for Treatment: Aquacel ag to wick drainage from undermined are and promote moist wound healing. ad foam for drainage management to be left SIERRA if no longer draining    Patient tolerance/compliance: son and pt active in care and would just like to do whatever is best to expedite healing    Complicating factors:stimulator  exposure(removed 5/2/17)-- pocket sutured closed, optimistic pocket does not reopen    Need for ongoing Advanced Wound Care services: sharp debridement, pt education, dressing selection      Plan:      Treatment Plan and Recommendations:  Diagnosis/ICD10: L89.323 Left buttock pressure ulcer stage 3  5/3/17: Post-op incision    Procedures/CPT: sharp debridement, non selective     Frequency: once every two weeks      Treatment Goals: STG 2 Weeks  LTG 4 Weeks   Granulation Tissue: Free from s/s infection resolution   Decrease Necrotic Tissue to:     Wound Phase:      Decrease Size by:  %   Periwound:      Decrease tracts/undermining by:  10%             At the time of each visit a thorough assessment of the patient is completed to assure the  appropriateness of our plan of care.  The dressings or modalities may need to be adapted   from the original plan to address any significant changes in the wound environment.

## 2017-06-14 NOTE — PROCEDURES
Clinical Comments:  The patient underwent a comprehensive polysomnogram using the standard montage for measurement of parameters of sleep, respiratory events, movement abnormalities, heart rate and rhythm. A microphone was used to monitor snoring.      INTERPRETATION:  The total recording time was 481.7 minutes with a sleep period of 444.1 minutes and the total sleep time was 373.0 minutes with a sleep efficiency of 77.4%.  The sleep latency was 37.6 minutes, and REM latency was N/A minutes.  The patient experienced 94 arousals in total, for an arousal index of 15.1    RESPIRATORY: The patient had 15 apneas in total.  Of these, 1 were obstructive apneas, and 14 were central apneas.  This resulted in an apnea index (AI) of 2.4.  The patient had 19 hypopneas, for a hypopnea index of 3.1.  The overall AHI was 5.5, while the AHI during Stage R sleep was N/A.  AHI while supine was 5.5.    OXIMETRY: Oxygen saturation monitoring showed a mean SpO2 of 88.0%, with a minimum oxygen saturation of 81.0%.  Oxygen saturations were less than or = 89% for 308.6 minutes of sleep time.    CARDIAC: The highest heart rate during the recording was 321.0 beats per minute.  The average heart rate during sleep was 73.6 bpm.    LIMB MOVEMENTS: There were a total of 3 PLMs during sleep, of which 0 were PLMs arousals.  This resulted in a PLMS index of 0.5.    Sleep efficiency was reduced at 77%. Sleep architecture showed a complete lack of stage III and REM sleep. The sleep latency was prolonged at 38 minutes. No periodic limb movements were appreciated. Occasional PVCs were noted.    Upon falling asleep no significant snoring was noted however obstructive hypopneas were noted, associated with desaturation to a charlene of 81% SPO2. Overall the patient spent 92% of the night below SPO2 of 90%. There were central apneas noted for 41% of the time.    Split-night criteria was not met.    Impression:  Mild obstructive sleep apnea syndrome,  associated with persistent hypoxemia.    Recommendations:  Treatment options include airway pressurization (CPAP versus AutoPap), a dental appliance or UPPP. Clinical correlation required.  Cardiopulmonary workup to exclude comorbidities which may be contributing to hypoxia.  Weight loss, avoidance of alcohol, sedatives, and muscle relaxants, additionally advised.

## 2017-06-20 ENCOUNTER — OFFICE VISIT (OUTPATIENT)
Dept: WOUND CARE | Facility: MEDICAL CENTER | Age: 77
End: 2017-06-20
Attending: FAMILY MEDICINE
Payer: MEDICARE

## 2017-06-20 ENCOUNTER — PATIENT MESSAGE (OUTPATIENT)
Dept: MEDICAL GROUP | Facility: CLINIC | Age: 77
End: 2017-06-20

## 2017-06-20 ENCOUNTER — HOSPITAL ENCOUNTER (OUTPATIENT)
Facility: MEDICAL CENTER | Age: 77
DRG: 689 | End: 2017-06-20
Attending: NURSE PRACTITIONER
Payer: MEDICARE

## 2017-06-20 DIAGNOSIS — M48.061 SPINAL STENOSIS, LUMBAR REGION, WITHOUT NEUROGENIC CLAUDICATION: ICD-10-CM

## 2017-06-20 DIAGNOSIS — Z96.89 SPINAL CORD STIMULATOR STATUS: ICD-10-CM

## 2017-06-20 DIAGNOSIS — S31.829D OPEN WOUND OF BUTTOCK, LEFT, SUBSEQUENT ENCOUNTER: ICD-10-CM

## 2017-06-20 PROBLEM — S31.809A OPEN WOUND OF BUTTOCK: Status: ACTIVE | Noted: 2017-06-20

## 2017-06-20 LAB
GRAM STN SPEC: NORMAL
SIGNIFICANT IND 70042: NORMAL
SITE SITE: NORMAL
SOURCE SOURCE: NORMAL

## 2017-06-20 PROCEDURE — 97602 WOUND(S) CARE NON-SELECTIVE: CPT | Performed by: NURSE PRACTITIONER

## 2017-06-20 PROCEDURE — 99214 OFFICE O/P EST MOD 30 MIN: CPT | Performed by: NURSE PRACTITIONER

## 2017-06-20 RX ORDER — BETHANECHOL CHLORIDE 25 MG/1
25 TABLET ORAL DAILY
COMMUNITY
Start: 2017-06-16 | End: 2017-09-15

## 2017-06-20 RX ORDER — NITROFURANTOIN 25; 75 MG/1; MG/1
100 CAPSULE ORAL 2 TIMES DAILY
COMMUNITY
End: 2017-06-30

## 2017-06-20 ASSESSMENT — ENCOUNTER SYMPTOMS
NAUSEA: 0
NERVOUS/ANXIOUS: 0
FALLS: 0
CLAUDICATION: 0
CONSTIPATION: 0
VOMITING: 0
DIARRHEA: 0
WEIGHT LOSS: 0
COUGH: 0
MYALGIAS: 1
FEVER: 0
CHILLS: 0
DEPRESSION: 0
SHORTNESS OF BREATH: 0

## 2017-06-20 NOTE — PROGRESS NOTES
Advanced Wound Care  [facility]  [address]  [address]  [place] [address]  [phone] Fax: [phone]    Encounter Note              Referring Physician: Stacie Garber MD  Primary Physician:        Consulting Physicians:  Amandeep Gonzalez MD, Dr. Albrecht    Wound(s): L buttock pressure ulcer  Start of Care: 4/19/17  Post-op stimulator removal re-eval 5/3/17          Subjective:      Maddy Hodge is a 76 y.o. female who presents with No chief complaint on file.            HPI  75 y/o female who developed a pressure ulcer over a spinal stimulator to her left lateral buttock on 3/27/17. Pt has had issues with this stimulator since placement back in early 2015 when she developed an infection and was treated with abx for one year by Dr. Albrecht. Pt was seen by her pain MD regarding removal of stimulator but wanted to try conservative care to preserve stimulator.  She was referred to the wound clinic for treatment of the wound.   Unfortunately the wound worsened and the stimulator was removed on 5/2/17, with primary closure by Dr. Gonzalez.  She returned to the clinic for treatment of the open wound on 5/3 and has been seen 1-2 times per week.   There are currently no plans to place another stimulator.  She is managing her pain with 2 Opana, 20mg per day, and percocet 7.5/325 3-4 times per day, Lyrica 3 times per day.       Patient is being seen by me today because her wound has deteriorated.  There are wires visible in the wound bed, and she has a 3.8 cm tract from the wound.   She is going to see her pain specialist again next Tuesday, 6/27.    Past Medical History   Diagnosis Date   • Spinal stenosis, lumbar region, without neurogenic claudication    • Neuropathy (CMS-HCC)    • Chronic constipation    • Erosive gastritis 5/09     antral   • Lumbar vertebral fracture (CMS-HCC) 5/2011   • Family history of polycystic kidney disease    • Allergy      seasonal   • Anxiety      due to  "loss of . 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 Albrecht, attaches to plastic, prulent   • Breath shortness    • Anesthesia      \"hard to wake up\"   • Bowel habit changes      constipation   • Depression      and anxiety   • Renal disorder    • Pain 5/12/16     back and legs    • Back pain    • Bronchitis    • Chickenpox    • French measles    • Influenza    • Mumps    • Tonsillitis      Past Surgical History   Procedure Laterality Date   • Lumbar laminectomy diskectomy     • Hysterectomy, total abdominal  1976   • Colonoscopy  2000,5/27/09     normal   • Egd with asp/bx  5/27/09     erosive gastritis   • Appendectomy  1976   • Spinal cord stimulator  9/2/14   • Cataract extraction with iol Bilateral    • Gastroscopy with balloon dilatation N/A 5/19/2016     Procedure: GASTROSCOPY WITH DILATATION;  Surgeon: Tony Monae M.D.;  Location: SURGERY [facility];  Service:    • Hysterectomy laparoscopy     • Tonsillectomy       Current outpatient prescriptions:   •  valsartan (DIOVAN) 160 MG Tab, Take 1 Tab by mouth every day., Disp: 90 Tab, Rfl: 3  •  furosemide (LASIX) 20 MG Tab, Take 20 mg by mouth 2 times a day., Disp: , Rfl:   •  budesonide-formoterol (SYMBICORT) 80-4.5 MCG/ACT Aerosol, Inhale 2 Puffs by mouth 2 Times a Day. Use spacer. Rinse mouth after each use., Disp: 1 Inhaler, Rfl: 0  •  diazepam (VALIUM) 5 MG Tab, Take 1 Tab by mouth every 6 hours as needed for Anxiety or Sleep., Disp: 90 Tab, Rfl: 2  •  fluoxetine (PROZAC) 20 MG Cap, Take 1 Cap by mouth every day., Disp: 90 Cap, Rfl: 3  •  levothyroxine (SYNTHROID) 50 MCG Tab, Take 1 Tab by " mouth Every morning on an empty stomach., Disp: 90 Tab, Rfl: 3  •  omeprazole (PRILOSEC) 20 MG delayed-release capsule, Take 1 Cap by mouth every day., Disp: 90 Cap, Rfl: 3  •  duloxetine (CYMBALTA) 60 MG Cap DR Particles delayed-release capsule, Take 1 Cap by mouth every day., Disp: 90 Cap, Rfl: 2  •  estradiol (ESTRACE) 0.5 MG tablet, TAKE ONE TABLET BY MOUTH ONCE DAILY, Disp: 90 Tab, Rfl: 3  •  VENTOLIN  (90 BASE) MCG/ACT Aero Soln inhalation aerosol, Inhale 2 Puffs by mouth every 6 hours as needed for Shortness of Breath., Disp: 1 Inhaler, Rfl: 5  •  aspirin EC (ECOTRIN) 81 MG Tablet Delayed Response, Take 81 mg by mouth every day., Disp: , Rfl:   •  Diclofenac Sodium (VOLTAREN) 1 % GEL, Apply  to skin as directed., Disp: , Rfl:   •  pregabalin (LYRICA) 100 MG CAPS, Take 100 mg by mouth 4 times a day., Disp: , Rfl:   •  Oxymorphone HCl ER (OPANA ER) 20 MG T12A, Take 1 Tab by mouth 2 Times a Day., Disp: , Rfl:   •  oxycodone-acetaminophen (PERCOCET) 7.5-325 MG per tablet, Take 1-2 Tabs by mouth every four hours as needed (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 She is seen 2/14/14, do not fill until 4/11/14, Disp: 180 Each, Rfl: 0  •  Probiotic Product (H-care) CAPS, Take 1 Cap by mouth 2 Times a Day., Disp: , Rfl:   •  Cholecalciferol (VITAMIN D) 1000 UNIT CAPS, Take 1,000 Units by mouth every day., Disp: , Rfl:   •  MAGNESIUM 100 MG PO CAPS, Take  by mouth., Disp: , Rfl:   •  OxyMORphone HCl ER (OPANA ER) 20 MG Tablet Extended Release 12 hour Abuse-Deterrent, Take  by mouth., Disp: , Rfl:   •  potassium chloride SA (KLOR-CON M20) 20 MEQ Tab CR, Take 20 mEq by mouth 2 times a day., Disp: , Rfl:   •  OxyMORphone HCl ER (OPANA ER) 20 MG Tablet Extended Release 12 hour Abuse-Deterrent, Take  by mouth., Disp: , Rfl:   •  docusate sodium (COLACE) 250 MG capsule, Take 250 mg by mouth every day., Disp: , Rfl:    Allergies   Allergen Reactions   • Pcn [Penicillins]    •  "Sulfa Drugs    • Tape    • Zoloft      Worse depression     Social History     Social History   • Marital Status:      Spouse Name: N/A   • Number of Children: N/A   • Years of Education: N/A     Occupational History   • Not on file.     Social History Main Topics   • Smoking status: Never Smoker    • Smokeless tobacco: Never Used   • Alcohol Use: No   • Drug Use: No   • Sexual Activity: No     Other Topics Concern   • Stress Concern No      cancer     Social History Narrative       Review of Systems   Constitutional: Positive for malaise/fatigue. Negative for fever, chills and weight loss.        \"always tired\"   Respiratory: Negative for cough and shortness of breath.    Cardiovascular: Negative for chest pain, claudication and leg swelling.   Gastrointestinal: Negative for nausea, vomiting, diarrhea and constipation.   Genitourinary: Negative for dysuria.   Musculoskeletal: Positive for myalgias and joint pain. Negative for falls.   Skin: Negative for itching and rash.   Psychiatric/Behavioral: Negative for depression. The patient is not nervous/anxious.           Objective:         Physical Exam   Constitutional: She is oriented to person, place, and time. She appears well-developed.   overweight   HENT:   Head: Normocephalic.   Pulmonary/Chest: Effort normal.   Musculoskeletal: She exhibits no edema or tenderness.   Slow, careful gait  She holds onto her  for support when ambulating   Neurological: She is alert and oriented to person, place, and time.   Skin: Skin is warm.        Psychiatric: She has a normal mood and affect.           Wound Characteristics                                                     Location:  Left lateral buttock   6/20/2017    Tissue Type and %:  visible tissue is100% pink.     Periwound:  Intact, scar tissue   Drainage:  Moderate tan    Exposed structures  stimulator wires x 2    Wound Edges:    regular   Odor:  None    S&S of Infection:    Tan drainage "   Edema:  None    Sensation:  Intact                 Measurements:  Left lateral buttock  6/14/2017    Length (cm)  0.9   Width (cm)  0.5   Depth (cm)  0.3    Area (cm2)      Tract/undermine  Tract @ 11:00, 3.8 cm deep           Procedures:                 Debridement: blunt debridement of wound and tract using saline and cotton tipped applicator     Wound culture collected              Cleansed with:  NS                                                                             Periwound protected with: Skin prep              Primary dressing:Aquacel ag strip              Secondary Dressing: silicone adhesive foam              Other       Assessment/Plan:           ICD-10-CM   1. Open wound of buttock, left, subsequent encounter- s/p removal of nerve stimulator on 5/3.  Open wound increasing in size, with wound tract, and wires visible in the wound bed.  Wound culture sent.  Silver hydrofiber to manage exudate and bioburden.  Pt to return to clinic 2x/week for evaluation and treatment.  Will inform pain specialist of wound characteristics. S31.829D   2. Spinal cord stimulator status- removed d/t chronic open wound Z96.89   3. Spinal stenosis, lumbar region, without neurogenic claudication- Pain management by pain specialist M48.06

## 2017-06-21 ENCOUNTER — HOSPITAL ENCOUNTER (INPATIENT)
Facility: MEDICAL CENTER | Age: 77
LOS: 3 days | DRG: 689 | End: 2017-06-24
Attending: EMERGENCY MEDICINE | Admitting: HOSPITALIST
Payer: MEDICARE

## 2017-06-21 ENCOUNTER — RESOLUTE PROFESSIONAL BILLING HOSPITAL PROF FEE (OUTPATIENT)
Dept: HOSPITALIST | Facility: MEDICAL CENTER | Age: 77
End: 2017-06-21
Payer: MEDICARE

## 2017-06-21 ENCOUNTER — APPOINTMENT (OUTPATIENT)
Dept: RADIOLOGY | Facility: MEDICAL CENTER | Age: 77
DRG: 689 | End: 2017-06-21
Attending: EMERGENCY MEDICINE
Payer: MEDICARE

## 2017-06-21 DIAGNOSIS — N30.01 ACUTE CYSTITIS WITH HEMATURIA: ICD-10-CM

## 2017-06-21 PROBLEM — N39.0 UTI (URINARY TRACT INFECTION): Status: ACTIVE | Noted: 2017-06-21

## 2017-06-21 LAB
ALBUMIN SERPL BCP-MCNC: 3.8 G/DL (ref 3.2–4.9)
ALBUMIN/GLOB SERPL: 1.3 G/DL
ALP SERPL-CCNC: 107 U/L (ref 30–99)
ALT SERPL-CCNC: 50 U/L (ref 2–50)
ANION GAP SERPL CALC-SCNC: 10 MMOL/L (ref 0–11.9)
APPEARANCE UR: CLEAR
AST SERPL-CCNC: 57 U/L (ref 12–45)
BACTERIA #/AREA URNS HPF: ABNORMAL /HPF
BASOPHILS # BLD AUTO: 0.9 % (ref 0–1.8)
BASOPHILS # BLD: 0.07 K/UL (ref 0–0.12)
BILIRUB SERPL-MCNC: 0.6 MG/DL (ref 0.1–1.5)
BILIRUB UR QL STRIP.AUTO: NEGATIVE
BLOOD CULTURE HOLD CXBCH: NORMAL
BNP SERPL-MCNC: 29 PG/ML (ref 0–100)
BUN SERPL-MCNC: 16 MG/DL (ref 8–22)
CALCIUM SERPL-MCNC: 9.3 MG/DL (ref 8.5–10.5)
CHLORIDE SERPL-SCNC: 103 MMOL/L (ref 96–112)
CO2 SERPL-SCNC: 24 MMOL/L (ref 20–33)
COLOR UR: ABNORMAL
CREAT SERPL-MCNC: 0.92 MG/DL (ref 0.5–1.4)
EKG IMPRESSION: NORMAL
EOSINOPHIL # BLD AUTO: 0.12 K/UL (ref 0–0.51)
EOSINOPHIL NFR BLD: 1.6 % (ref 0–6.9)
ERYTHROCYTE [DISTWIDTH] IN BLOOD BY AUTOMATED COUNT: 42.5 FL (ref 35.9–50)
GFR SERPL CREATININE-BSD FRML MDRD: 59 ML/MIN/1.73 M 2
GLOBULIN SER CALC-MCNC: 3 G/DL (ref 1.9–3.5)
GLUCOSE SERPL-MCNC: 107 MG/DL (ref 65–99)
GLUCOSE UR STRIP.AUTO-MCNC: NEGATIVE MG/DL
HCT VFR BLD AUTO: 35.8 % (ref 37–47)
HGB BLD-MCNC: 11.9 G/DL (ref 12–16)
IMM GRANULOCYTES # BLD AUTO: 0.03 K/UL (ref 0–0.11)
IMM GRANULOCYTES NFR BLD AUTO: 0.4 % (ref 0–0.9)
KETONES UR STRIP.AUTO-MCNC: ABNORMAL MG/DL
LACTATE BLD-SCNC: 1.1 MMOL/L (ref 0.5–2)
LEUKOCYTE ESTERASE UR QL STRIP.AUTO: ABNORMAL
LYMPHOCYTES # BLD AUTO: 1.12 K/UL (ref 1–4.8)
LYMPHOCYTES NFR BLD: 14.5 % (ref 22–41)
MAGNESIUM SERPL-MCNC: 2 MG/DL (ref 1.5–2.5)
MCH RBC QN AUTO: 27.9 PG (ref 27–33)
MCHC RBC AUTO-ENTMCNC: 33.2 G/DL (ref 33.6–35)
MCV RBC AUTO: 83.8 FL (ref 81.4–97.8)
MICRO URNS: ABNORMAL
MONOCYTES # BLD AUTO: 0.52 K/UL (ref 0–0.85)
MONOCYTES NFR BLD AUTO: 6.7 % (ref 0–13.4)
NEUTROPHILS # BLD AUTO: 5.88 K/UL (ref 2–7.15)
NEUTROPHILS NFR BLD: 75.9 % (ref 44–72)
NITRITE UR QL STRIP.AUTO: NEGATIVE
NRBC # BLD AUTO: 0 K/UL
NRBC BLD AUTO-RTO: 0 /100 WBC
PH UR STRIP.AUTO: 7 [PH]
PLATELET # BLD AUTO: 303 K/UL (ref 164–446)
PMV BLD AUTO: 9.7 FL (ref 9–12.9)
POTASSIUM SERPL-SCNC: 4 MMOL/L (ref 3.6–5.5)
PROT SERPL-MCNC: 6.8 G/DL (ref 6–8.2)
PROT UR QL STRIP: NEGATIVE MG/DL
RBC # BLD AUTO: 4.27 M/UL (ref 4.2–5.4)
RBC UR QL AUTO: ABNORMAL
SODIUM SERPL-SCNC: 137 MMOL/L (ref 135–145)
SP GR UR STRIP.AUTO: 1.01
TROPONIN I SERPL-MCNC: <0.01 NG/ML (ref 0–0.04)
TSH SERPL DL<=0.005 MIU/L-ACNC: 0.7 UIU/ML (ref 0.3–3.7)
WBC # BLD AUTO: 7.7 K/UL (ref 4.8–10.8)
WBC #/AREA URNS HPF: ABNORMAL /HPF

## 2017-06-21 PROCEDURE — 36415 COLL VENOUS BLD VENIPUNCTURE: CPT

## 2017-06-21 PROCEDURE — 87186 SC STD MICRODIL/AGAR DIL: CPT

## 2017-06-21 PROCEDURE — 96365 THER/PROPH/DIAG IV INF INIT: CPT

## 2017-06-21 PROCEDURE — 700105 HCHG RX REV CODE 258: Performed by: HOSPITALIST

## 2017-06-21 PROCEDURE — 87077 CULTURE AEROBIC IDENTIFY: CPT

## 2017-06-21 PROCEDURE — 99223 1ST HOSP IP/OBS HIGH 75: CPT | Mod: AI | Performed by: HOSPITALIST

## 2017-06-21 PROCEDURE — 83880 ASSAY OF NATRIURETIC PEPTIDE: CPT

## 2017-06-21 PROCEDURE — 700111 HCHG RX REV CODE 636 W/ 250 OVERRIDE (IP): Performed by: EMERGENCY MEDICINE

## 2017-06-21 PROCEDURE — 83735 ASSAY OF MAGNESIUM: CPT

## 2017-06-21 PROCEDURE — 99285 EMERGENCY DEPT VISIT HI MDM: CPT

## 2017-06-21 PROCEDURE — A9270 NON-COVERED ITEM OR SERVICE: HCPCS | Performed by: HOSPITALIST

## 2017-06-21 PROCEDURE — 93970 EXTREMITY STUDY: CPT

## 2017-06-21 PROCEDURE — 93005 ELECTROCARDIOGRAM TRACING: CPT | Performed by: EMERGENCY MEDICINE

## 2017-06-21 PROCEDURE — 83605 ASSAY OF LACTIC ACID: CPT

## 2017-06-21 PROCEDURE — 700117 HCHG RX CONTRAST REV CODE 255: Performed by: EMERGENCY MEDICINE

## 2017-06-21 PROCEDURE — 700102 HCHG RX REV CODE 250 W/ 637 OVERRIDE(OP): Performed by: HOSPITALIST

## 2017-06-21 PROCEDURE — 70450 CT HEAD/BRAIN W/O DYE: CPT

## 2017-06-21 PROCEDURE — 700111 HCHG RX REV CODE 636 W/ 250 OVERRIDE (IP): Performed by: PHARMACIST

## 2017-06-21 PROCEDURE — 93970 EXTREMITY STUDY: CPT | Mod: 26 | Performed by: SURGERY

## 2017-06-21 PROCEDURE — 700105 HCHG RX REV CODE 258: Performed by: PHARMACIST

## 2017-06-21 PROCEDURE — 51702 INSERT TEMP BLADDER CATH: CPT

## 2017-06-21 PROCEDURE — 74177 CT ABD & PELVIS W/CONTRAST: CPT

## 2017-06-21 PROCEDURE — 84443 ASSAY THYROID STIM HORMONE: CPT

## 2017-06-21 PROCEDURE — 80053 COMPREHEN METABOLIC PANEL: CPT

## 2017-06-21 PROCEDURE — 71010 DX-CHEST-PORTABLE (1 VIEW): CPT

## 2017-06-21 PROCEDURE — 700111 HCHG RX REV CODE 636 W/ 250 OVERRIDE (IP): Performed by: HOSPITALIST

## 2017-06-21 PROCEDURE — 81001 URINALYSIS AUTO W/SCOPE: CPT

## 2017-06-21 PROCEDURE — 85025 COMPLETE CBC W/AUTO DIFF WBC: CPT

## 2017-06-21 PROCEDURE — 303105 HCHG CATHETER EXTRA

## 2017-06-21 PROCEDURE — 84484 ASSAY OF TROPONIN QUANT: CPT

## 2017-06-21 PROCEDURE — 87040 BLOOD CULTURE FOR BACTERIA: CPT | Mod: 91

## 2017-06-21 PROCEDURE — 87086 URINE CULTURE/COLONY COUNT: CPT

## 2017-06-21 PROCEDURE — 770006 HCHG ROOM/CARE - MED/SURG/GYN SEMI*

## 2017-06-21 RX ORDER — AMOXICILLIN 250 MG
2 CAPSULE ORAL 2 TIMES DAILY
Status: DISCONTINUED | OUTPATIENT
Start: 2017-06-21 | End: 2017-06-24 | Stop reason: HOSPADM

## 2017-06-21 RX ORDER — FLUOXETINE HYDROCHLORIDE 20 MG/1
20 CAPSULE ORAL DAILY
Status: DISCONTINUED | OUTPATIENT
Start: 2017-06-22 | End: 2017-06-24 | Stop reason: HOSPADM

## 2017-06-21 RX ORDER — ALBUTEROL SULFATE 90 UG/1
2 AEROSOL, METERED RESPIRATORY (INHALATION)
Status: DISCONTINUED | OUTPATIENT
Start: 2017-06-21 | End: 2017-06-24 | Stop reason: HOSPADM

## 2017-06-21 RX ORDER — OMEPRAZOLE 20 MG/1
20 CAPSULE, DELAYED RELEASE ORAL
Status: DISCONTINUED | OUTPATIENT
Start: 2017-06-22 | End: 2017-06-24 | Stop reason: HOSPADM

## 2017-06-21 RX ORDER — BISACODYL 10 MG
10 SUPPOSITORY, RECTAL RECTAL
Status: DISCONTINUED | OUTPATIENT
Start: 2017-06-21 | End: 2017-06-24 | Stop reason: HOSPADM

## 2017-06-21 RX ORDER — ESTRADIOL 1 MG/1
0.5 TABLET ORAL DAILY
Status: DISCONTINUED | OUTPATIENT
Start: 2017-06-22 | End: 2017-06-24 | Stop reason: HOSPADM

## 2017-06-21 RX ORDER — ACETAMINOPHEN 325 MG/1
650 TABLET ORAL EVERY 6 HOURS PRN
Status: DISCONTINUED | OUTPATIENT
Start: 2017-06-21 | End: 2017-06-24 | Stop reason: HOSPADM

## 2017-06-21 RX ORDER — SODIUM CHLORIDE 9 MG/ML
INJECTION, SOLUTION INTRAVENOUS CONTINUOUS
Status: DISCONTINUED | OUTPATIENT
Start: 2017-06-21 | End: 2017-06-24 | Stop reason: HOSPADM

## 2017-06-21 RX ORDER — OXYCODONE AND ACETAMINOPHEN 7.5; 325 MG/1; MG/1
1-2 TABLET ORAL EVERY 4 HOURS PRN
Status: DISCONTINUED | OUTPATIENT
Start: 2017-06-21 | End: 2017-06-24 | Stop reason: HOSPADM

## 2017-06-21 RX ORDER — VALSARTAN 160 MG/1
160 TABLET ORAL
COMMUNITY
End: 2018-02-21 | Stop reason: SDUPTHER

## 2017-06-21 RX ORDER — VALSARTAN 80 MG/1
160 TABLET ORAL
Status: DISCONTINUED | OUTPATIENT
Start: 2017-06-22 | End: 2017-06-24 | Stop reason: HOSPADM

## 2017-06-21 RX ORDER — POLYETHYLENE GLYCOL 3350 17 G/17G
1 POWDER, FOR SOLUTION ORAL
Status: DISCONTINUED | OUTPATIENT
Start: 2017-06-21 | End: 2017-06-24 | Stop reason: HOSPADM

## 2017-06-21 RX ORDER — HEPARIN SODIUM 5000 [USP'U]/ML
5000 INJECTION, SOLUTION INTRAVENOUS; SUBCUTANEOUS EVERY 8 HOURS
Status: DISCONTINUED | OUTPATIENT
Start: 2017-06-21 | End: 2017-06-24 | Stop reason: HOSPADM

## 2017-06-21 RX ORDER — LEVOTHYROXINE SODIUM 0.05 MG/1
50 TABLET ORAL
Status: DISCONTINUED | OUTPATIENT
Start: 2017-06-22 | End: 2017-06-24 | Stop reason: HOSPADM

## 2017-06-21 RX ORDER — LABETALOL HYDROCHLORIDE 5 MG/ML
10 INJECTION, SOLUTION INTRAVENOUS EVERY 4 HOURS PRN
Status: DISCONTINUED | OUTPATIENT
Start: 2017-06-21 | End: 2017-06-24 | Stop reason: HOSPADM

## 2017-06-21 RX ORDER — DOCUSATE SODIUM 100 MG/1
200 CAPSULE, LIQUID FILLED ORAL DAILY
Status: DISCONTINUED | OUTPATIENT
Start: 2017-06-22 | End: 2017-06-24 | Stop reason: HOSPADM

## 2017-06-21 RX ORDER — FUROSEMIDE 20 MG/1
20 TABLET ORAL DAILY
COMMUNITY
End: 2018-08-15 | Stop reason: SDUPTHER

## 2017-06-21 RX ORDER — CEFTRIAXONE 2 G/1
2 INJECTION, POWDER, FOR SOLUTION INTRAMUSCULAR; INTRAVENOUS ONCE
Status: COMPLETED | OUTPATIENT
Start: 2017-06-21 | End: 2017-06-21

## 2017-06-21 RX ORDER — BUDESONIDE AND FORMOTEROL FUMARATE DIHYDRATE 80; 4.5 UG/1; UG/1
2 AEROSOL RESPIRATORY (INHALATION)
Status: DISCONTINUED | OUTPATIENT
Start: 2017-06-21 | End: 2017-06-24 | Stop reason: HOSPADM

## 2017-06-21 RX ORDER — DULOXETIN HYDROCHLORIDE 30 MG/1
60 CAPSULE, DELAYED RELEASE ORAL DAILY
Status: DISCONTINUED | OUTPATIENT
Start: 2017-06-22 | End: 2017-06-24 | Stop reason: HOSPADM

## 2017-06-21 RX ORDER — POTASSIUM CHLORIDE 1.5 G/1.58G
20 POWDER, FOR SOLUTION ORAL DAILY
COMMUNITY
End: 2018-08-15

## 2017-06-21 RX ORDER — L.RHAMNOSUS/B.ANIMALIS(LACTIS) 3B CELL
1 CAPSULE ORAL 2 TIMES DAILY
Status: DISCONTINUED | OUTPATIENT
Start: 2017-06-21 | End: 2017-06-21

## 2017-06-21 RX ORDER — BETHANECHOL CHLORIDE 25 MG/1
12.5 TABLET ORAL DAILY
Status: DISCONTINUED | OUTPATIENT
Start: 2017-06-22 | End: 2017-06-24 | Stop reason: HOSPADM

## 2017-06-21 RX ORDER — PREGABALIN 100 MG/1
100 CAPSULE ORAL 4 TIMES DAILY
Status: DISCONTINUED | OUTPATIENT
Start: 2017-06-21 | End: 2017-06-22

## 2017-06-21 RX ORDER — ZOLPIDEM TARTRATE 5 MG/1
5 TABLET ORAL
Status: DISCONTINUED | OUTPATIENT
Start: 2017-06-21 | End: 2017-06-21

## 2017-06-21 RX ORDER — DIAZEPAM 5 MG/1
5 TABLET ORAL EVERY 6 HOURS PRN
Status: DISCONTINUED | OUTPATIENT
Start: 2017-06-21 | End: 2017-06-24 | Stop reason: HOSPADM

## 2017-06-21 RX ORDER — CEFTRIAXONE 1 G/1
1 INJECTION, POWDER, FOR SOLUTION INTRAMUSCULAR; INTRAVENOUS
Status: DISCONTINUED | OUTPATIENT
Start: 2017-06-22 | End: 2017-06-22

## 2017-06-21 RX ADMIN — VANCOMYCIN HYDROCHLORIDE 1900 MG: 100 INJECTION, POWDER, LYOPHILIZED, FOR SOLUTION INTRAVENOUS at 20:30

## 2017-06-21 RX ADMIN — HEPARIN SODIUM 5000 UNITS: 5000 INJECTION, SOLUTION INTRAVENOUS; SUBCUTANEOUS at 22:31

## 2017-06-21 RX ADMIN — OXYCODONE AND ACETAMINOPHEN 1 TABLET: 7.5; 325 TABLET ORAL at 17:49

## 2017-06-21 RX ADMIN — CEFTRIAXONE SODIUM 2 G: 2 INJECTION, POWDER, FOR SOLUTION INTRAMUSCULAR; INTRAVENOUS at 14:47

## 2017-06-21 RX ADMIN — IOHEXOL 100 ML: 350 INJECTION, SOLUTION INTRAVENOUS at 13:17

## 2017-06-21 RX ADMIN — SODIUM CHLORIDE: 9 INJECTION, SOLUTION INTRAVENOUS at 20:29

## 2017-06-21 RX ADMIN — PREGABALIN 100 MG: 100 CAPSULE ORAL at 19:26

## 2017-06-21 ASSESSMENT — PAIN SCALES - GENERAL: PAINLEVEL_OUTOF10: 0

## 2017-06-21 ASSESSMENT — LIFESTYLE VARIABLES
EVER_SMOKED: NEVER
ALCOHOL_USE: NO

## 2017-06-21 ASSESSMENT — PATIENT HEALTH QUESTIONNAIRE - PHQ9
SUM OF ALL RESPONSES TO PHQ QUESTIONS 1-9: 0
1. LITTLE INTEREST OR PLEASURE IN DOING THINGS: NOT AT ALL
2. FEELING DOWN, DEPRESSED, IRRITABLE, OR HOPELESS: NOT AT ALL
SUM OF ALL RESPONSES TO PHQ9 QUESTIONS 1 AND 2: 0

## 2017-06-21 NOTE — IP AVS SNAPSHOT
Excelimmune Access Code: Activation code not generated  Current Excelimmune Status: Active    [employer]  A secure, online tool to manage your health information     [employer]’s Excelimmune® is a secure, online tool that connects you to your personalized health information from the privacy of your home -- day or night - making it very easy for you to manage your healthcare. Once the activation process is completed, you can even access your medical information using the Excelimmune allyn, which is available for free in the Apple Allyn store or Google Play store.     Excelimmune provides the following levels of access (as shown below):   My Chart Features   [facility] Primary Care Doctor [facility]  Specialists [facility]  Urgent  Care Non-[facility]  Primary Care  Doctor   Email your healthcare team securely and privately 24/7 X X X X   Manage appointments: schedule your next appointment; view details of past/upcoming appointments X      Request prescription refills. X      View recent personal medical records, including lab and immunizations X X X X   View health record, including health history, allergies, medications X X X X   Read reports about your outpatient visits, procedures, consult and ER notes X X X X   See your discharge summary, which is a recap of your hospital and/or ER visit that includes your diagnosis, lab results, and care plan. X X       How to register for Excelimmune:  1. Go to  [email or web address].  2. Click on the Sign Up Now box, which takes you to the New Member Sign Up page. You will need to provide the following information:  a. Enter your Excelimmune Access Code exactly as it appears at the top of this page. (You will not need to use this code after you’ve completed the sign-up process. If you do not sign up before the expiration date, you must request a new code.)   b. Enter your date of birth.   c. Enter your home email address.   d. Click Submit, and follow the next screen’s instructions.  3. Create a Excelimmune ID. This will  be your Notch login ID and cannot be changed, so think of one that is secure and easy to remember.  4. Create a Notch password. You can change your password at any time.  5. Enter your Password Reset Question and Answer. This can be used at a later time if you forget your password.   6. Enter your e-mail address. This allows you to receive e-mail notifications when new information is available in Notch.  7. Click Sign Up. You can now view your health information.    For assistance activating your Notch account, call [phone]

## 2017-06-21 NOTE — IP AVS SNAPSHOT
" <p align=\"LEFT\"><IMG SRC=\"//[email or web address]\" alt=\"Image\" WIDTH=\"50%\" HEIGHT=\"200\" BORDER=\"\"></p>                   Name:Maddy Hodge  Medical Record Number:[id number]  CSN: [id number]    YOB: 1940   Age: 76 y.o.  Sex: female  HT:1.524 m (5') WT: 79.9 kg (176 lb 2.4 oz)          Admit Date: 6/21/2017     Discharge Date:   Today's Date: 6/24/2017  Attending Doctor:  Magda Morales M.D.                  Allergies:  Pcn; Sulfa drugs; Tape; and Zoloft          Your appointments     Jun 26, 2017  8:30 AM   Wound 30 Minute Procedure with Rachel Neil R.N.   [facility] [address])    [facility] [address] [place] [address] [address]  [place] [address]   [phone]            Jun 26, 2017  1:00 PM   CARE MANAGER TELEPHONE VISIT with CARE MANAGER   [address]  [place] [address]   [phone]           ***IMPORTANT**** This is a phone visit only. Do not come into the office. The Care Manager will call you at the scheduled time for Care Manager Telephone Visit.            Jun 27, 2017  9:20 AM   Established Patient with KAIN Vazquez   [address]  [place] [address]   [phone]           You will be receiving a confirmation call a few days before your appointment from our automated call confirmation system.            Jul 21, 2017  1:00 PM   Established Patient with Shirlene Quinones M.D.   [address]  [place] [address]   [phone]           You will be receiving a confirmation call a few days before your appointment from our automated call confirmation system.              Follow-up Information     1. Follow up with Shirlene Quinones M.D..    Specialty:  Family Medicine    Contact information    [address] [place] [address] [address]  [address]  [place] [address] [phone]          2. Follow up In 2 days.         Medication List      Take these Medications    "    Instructions    aspirin EC 81 MG Tbec   Commonly known as:  ECOTRIN    Take 81 mg by mouth every day.   Dose:  81 mg       bethanechol 25 MG Tabs   Commonly known as:  URECHOLINE    Take 12.5 mg by mouth every day. To increase by half a tab QD starting 6/22/17   Dose:  12.5 mg       budesonide-formoterol 80-4.5 MCG/ACT Aero   Commonly known as:  SYMBICORT    Inhale 2 Puffs by mouth 2 Times a Day. Use spacer. Rinse mouth after each use.   Dose:  2 Puff       Cyanocobalamin 2500 MCG Chew    Take 1 Tab by mouth every day.   Dose:  1 Tab       diazepam 5 MG Tabs   Commonly known as:  VALIUM    Doctor's comments:  Prescription has been cleared with Laura at Dr. Gonzalez's office.  Last seen in my office on 3/24/17   Take 1 Tab by mouth every 6 hours as needed for Anxiety or Sleep.   Dose:  5 mg       docusate sodium 250 MG capsule   Commonly known as:  COLACE    Take 250 mg by mouth every day.   Dose:  250 mg       duloxetine 60 MG Cpep delayed-release capsule   Commonly known as:  CYMBALTA    Take 1 Cap by mouth every day.   Dose:  60 mg       estradiol 0.5 MG tablet   Commonly known as:  ESTRACE    TAKE ONE TABLET BY MOUTH ONCE DAILY       fluoxetine 20 MG Caps   Commonly known as:  PROZAC    Take 1 Cap by mouth every day.   Dose:  20 mg       furosemide 20 MG Tabs   Commonly known as:  LASIX    Take 20 mg by mouth every day. As needed for edema   Dose:  20 mg       levothyroxine 50 MCG Tabs   Commonly known as:  SYNTHROID    Take 1 Tab by mouth Every morning on an empty stomach.   Dose:  50 mcg       Magnesium 100 MG Caps    Take 1 Cap by mouth every day.   Dose:  1 Cap       nitrofurantoin monohydr macro 100 MG Caps   Commonly known as:  MACROBID    Take 100 mg by mouth 2 times a day. First 7 day course, finished around 5/12/17, second 7 day course finished in the beginning on 6/2017   Dose:  100 mg       omeprazole 20 MG delayed-release capsule   Commonly known as:  PRILOSEC    Take 1 Cap by mouth every day.    Dose:  20 mg       OPANA ER 20 MG T12a   Generic drug:  OxyMORphone HCl ER    Take 1 Tab by mouth 2 Times a Day.   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 She is seen 2/14/14, do not fill until 4/11/14   Dose:  1-2 Tab       Adtrade Caps    Take 1 Cap by mouth 2 Times a Day.   Dose:  1 Cap       potassium chloride 20 MEQ Pack   Commonly known as:  KLOR-CON    Take 20 mEq by mouth every day. As needed with furosemide   Dose:  20 mEq       pregabalin 100 MG Caps   Commonly known as:  LYRICA    Take 100 mg by mouth 4 times a day.   Dose:  100 mg       valsartan 160 MG Tabs   Commonly known as:  DIOVAN    Take 160 mg by mouth every 48 hours.   Dose:  160 mg       VENTOLIN  (90 BASE) MCG/ACT Aers inhalation aerosol   Generic drug:  albuterol    Inhale 2 Puffs by mouth every 6 hours as needed for Shortness of Breath.   Dose:  2 Puff       Vitamin D 1000 UNIT Caps    Take 1,000 Units by mouth every day.   Dose:  1000 Units         Ask your Physician about these medications        Instructions    * cefdinir 300 MG Caps   What changed:  Another medication with the same name was added. Make sure you understand how and when to take each.   Commonly known as:  OMNICEF   Ask about: Which instructions should I use?    Take 300 mg by mouth 2 times a day.   Dose:  300 mg       * cefdinir 300 MG Caps   What changed:  You were already taking a medication with the same name, and this prescription was added. Make sure you understand how and when to take each.   Commonly known as:  OMNICEF   Ask about: Which instructions should I use?    Take 1 Cap by mouth 2 times a day for 4 days.   Dose:  300 mg       * Notice:  This list has 2 medication(s) that are the same as other medications prescribed for you. Read the directions carefully, and  ask your doctor or other care provider to review them with you.

## 2017-06-21 NOTE — ED NOTES
Chief Complaint   Patient presents with   • Urinary Catheter Problem     Pt one week ago had a catheter taken out and has been self cathing. Pt states with her bad back she has been struggling cathing herself. Pt's Urologist is Dr. Chand. Recently with chronic UTI's. Garnett catheter initially placed for difficulty urination.   • Open Wound     pt had a neuro stimulater removed and has an open wound where it was taken out from. Seen by wound care for an it was cultured.    • Leg Swelling     noted a week ago; worse today.    • Fatigue     progressively getting more and more tired and fatigue.      /68 mmHg  Pulse 75  Temp(Src) 36.1 °C (97 °F)  Resp 15  SpO2 94%  Pt placed back in lobby, educated on triage process, and told to inform staff of any change in condition.

## 2017-06-21 NOTE — IP AVS SNAPSHOT
6/24/2017    Maddy Hodge  [address] [place] [address]    Dear Maddy:    [facility] wants to ensure your discharge home is safe and you or your loved ones have had all of your questions answered regarding your care after you leave the hospital.    Below is a list of resources and contact information should you have any questions regarding your hospital stay, follow-up instructions, or active medical symptoms.    Questions or Concerns Regarding… Contact   Medical Questions Related to Your Discharge  (7 days a week, 8am-5pm) Contact a Nurse Care Coordinator   [phone]   Medical Questions Not Related to Your Discharge  (24 hours a day / 7 days a week)  Contact the Nurse Health Line   [phone]    Medications or Discharge Instructions Refer to your discharge packet   or contact your [facility] Primary Care Provider   [phone]   Follow-up Appointment(s) Schedule your appointment via Zenoss   or contact Scheduling [phone]   Billing Review your statement via Zenoss  or contact Billing [phone]   Medical Records Review your records via Zenoss   or contact Medical Records [phone]     You may receive a telephone call within two days of discharge. This call is to make certain you understand your discharge instructions and have the opportunity to have any questions answered. You can also easily access your medical information, test results and upcoming appointments via the Zenoss free online health management tool. You can learn more and sign up at [employer]/Zenoss. For assistance setting up your Zenoss account, please call [phone].    Once again, we want to ensure your discharge home is safe and that you have a clear understanding of any next steps in your care. If you have any questions or concerns, please do not hesitate to contact us, we are here for you. Thank you for choosing [facility] for your healthcare needs.    Sincerely,    Your [facility] Healthcare Team

## 2017-06-21 NOTE — TELEPHONE ENCOUNTER
From: Maddy oHdge  To: Shirlene Quinones M.D.  Sent: 6/20/2017 6:12 PM PDT  Subject: RE:bethanechol    We saw wound care today. It is draining into the bandage. The wound has reopened and you can see the wires that went to the stimulator. They say it doesn't look infected but they took a sample for a culture. The results should be in two to three days. What are your thoughts on a replacement for opana.  We see Dr. Gonzalez's PA on the 27th.  Thank you, Sarah  ----- Message -----  From: Shirlene Quinones M.D.  Sent: 6/20/2017 5:37 PM PDT  To: Maddy Hodge  Subject: bethanechol    That medication is unlikely to be causing sedation. I am worried that her depression may be getting worse with this very frustrating problem. I am also worried that the reason that this problem is occurring maybe in her spine. However, the Opana may very well be a culprit. Urinary retention is listed as a side effect. Also, drowsiness and weakness and increased depression are also listed as side effects of opana. I would really love to see her off that drug if at all possible.

## 2017-06-21 NOTE — IP AVS SNAPSHOT
Home Care Instructions                                                                                                                  Name:Maddy Hodge  Medical Record Number:[id number]  CSN: [id number]    YOB: 1940   Age: 76 y.o.  Sex: female  HT:1.524 m (5') WT: 79.9 kg (176 lb 2.4 oz)          Admit Date: 6/21/2017     Discharge Date:   Today's Date: 6/24/2017  Attending Doctor:  Magda Morales M.D.                  Allergies:  Pcn; Sulfa drugs; Tape; and Zoloft            Discharge Instructions       Discharge Instructions    Discharged to home by car with relative. Discharged via wheelchair, hospital escort: Yes.  Special equipment needed: Walker    Be sure to schedule a follow-up appointment with your primary care doctor or any specialists as instructed.     Discharge Plan:   Diet Plan: Discussed  Activity Level: Discussed  Confirmed Follow up Appointment: Patient to Call and Schedule Appointment  Confirmed Symptoms Management: Discussed  Medication Reconciliation Updated: Yes  Influenza Vaccine Indication:  (Not indicated)    I understand that a diet low in cholesterol, fat, and sodium is recommended for good health. Unless I have been given specific instructions below for another diet, I accept this instruction as my diet prescription.   Other diet: Regular    Special Instructions: Instructions for chronic mendez catheter care    · Is patient discharged on Warfarin / Coumadin?   No     · Is patient Post Blood Transfusion?  No    Depression / Suicide Risk    As you are discharged from this [facility] facility, it is important to learn how to keep safe from harming yourself.    Recognize the warning signs:  · Abrupt changes in personality, positive or negative- including increase in energy   · Giving away possessions  · Change in eating patterns- significant weight changes-  positive or negative  · Change in sleeping patterns- unable to sleep or sleeping all the time   · Unwillingness or  inability to communicate  · Depression  · Unusual sadness, discouragement and loneliness  · Talk of wanting to die  · Neglect of personal appearance   · Rebelliousness- reckless behavior  · Withdrawal from people/activities they love  · Confusion- inability to concentrate     If you or a loved one observes any of these behaviors or has concerns about self-harm, here's what you can do:  · Talk about it- your feelings and reasons for harming yourself  · Remove any means that you might use to hurt yourself (examples: pills, rope, extension cords, firearm)  · Get professional help from the community (Mental Health, Substance Abuse, psychological counseling)  · Do not be alone:Call your Safe Contact- someone whom you trust who will be there for you.  · Call your local CRISIS HOTLINE 312-6097 or 164-270-6328  · Call your local Children's Mobile Crisis Response Team [place] (089) 672-5032 or www.[employer]iMetrics  · Call the toll free National Suicide Prevention Hotlines   · National Suicide Prevention Lifeline 005-295-KCQZ (4475)  · [place] Line Network 800-SUICIDE (118-2192)        Your appointments     Jun 26, 2017  8:30 AM   Wound 30 Minute Procedure with Rachel Neil R.N.   [facility]    [address] [place] [address] [address]  [facility] [id number] [phone]            Jun 26, 2017  1:00 PM   CARE MANAGER TELEPHONE VISIT with CARE MANAGER   [address] [address] [phone]           ***IMPORTANT**** This is a phone visit only. Do not come into the office. The Care Manager will call you at the scheduled time for Care Manager Telephone Visit.            Jun 27, 2017  9:20 AM   Established Patient with KAIN Vazquez   [address] [id number] [phone]           You will be receiving a confirmation call a few days before your appointment from our automated call  confirmation system.            Jul 21, 2017  1:00 PM   Established Patient with Shirlene Quinones M.D.   [facility] ([facility])    [address] [facility] [address]  [place] [id number] [phone]           You will be receiving a confirmation call a few days before your appointment from our automated call confirmation system.              Follow-up Information     1. Follow up with Shirlene Quinones M.D..    Specialty:  Family Medicine    Contact information    [address] [place] [address] [address]  [address]  [place] [id number] [phone]          2. Follow up In 2 days.         Discharge Medication Instructions:    Below are the medications your physician expects you to take upon discharge:    Review all your home medications and newly ordered medications with your doctor and/or pharmacist. Follow medication instructions as directed by your doctor and/or pharmacist.    Please keep your medication list with you and share with your physician.               Medication List      CONTINUE taking these medications        Instructions    Morning Afternoon Evening Bedtime    aspirin EC 81 MG Tbec   Last time this was given:  81 mg on 6/24/2017  9:41 AM   Commonly known as:  ECOTRIN        Take 81 mg by mouth every day.   Dose:  81 mg                        bethanechol 25 MG Tabs   Last time this was given:  12.5 mg on 6/23/2017  9:16 AM   Commonly known as:  URECHOLINE        Take 12.5 mg by mouth every day. To increase by half a tab QD starting 6/22/17   Dose:  12.5 mg                        budesonide-formoterol 80-4.5 MCG/ACT Aero   Last time this was given:  2 Puffs on 6/23/2017  8:59 PM   Commonly known as:  SYMBICORT        Inhale 2 Puffs by mouth 2 Times a Day. Use spacer. Rinse mouth after each use.   Dose:  2 Puff                        Cyanocobalamin 2500 MCG Chew        Take 1 Tab by mouth every day.   Dose:  1 Tab                        diazepam 5 MG Tabs   Last time this was given:  5 mg on 6/23/2017  8:57 PM   Commonly known  as:  VALIUM        Doctor's comments:  Prescription has been cleared with Laura at Dr. Gonzalez's office.  Last seen in my office on 3/24/17   Take 1 Tab by mouth every 6 hours as needed for Anxiety or Sleep.   Dose:  5 mg                        docusate sodium 250 MG capsule   Last time this was given:  200 mg on 6/23/2017  9:12 AM   Commonly known as:  COLACE        Take 250 mg by mouth every day.   Dose:  250 mg                        duloxetine 60 MG Cpep delayed-release capsule   Last time this was given:  60 mg on 6/24/2017  9:43 AM   Commonly known as:  CYMBALTA        Take 1 Cap by mouth every day.   Dose:  60 mg                        estradiol 0.5 MG tablet   Last time this was given:  0.5 mg on 6/24/2017  9:42 AM   Commonly known as:  ESTRACE        TAKE ONE TABLET BY MOUTH ONCE DAILY                        fluoxetine 20 MG Caps   Last time this was given:  20 mg on 6/24/2017  9:46 AM   Commonly known as:  PROZAC        Take 1 Cap by mouth every day.   Dose:  20 mg                        furosemide 20 MG Tabs   Commonly known as:  LASIX        Take 20 mg by mouth every day. As needed for edema   Dose:  20 mg                        levothyroxine 50 MCG Tabs   Last time this was given:  50 mcg on 6/24/2017  6:01 AM   Commonly known as:  SYNTHROID        Take 1 Tab by mouth Every morning on an empty stomach.   Dose:  50 mcg                        Magnesium 100 MG Caps        Take 1 Cap by mouth every day.   Dose:  1 Cap                        nitrofurantoin monohydr macro 100 MG Caps   Commonly known as:  MACROBID        Take 100 mg by mouth 2 times a day. First 7 day course, finished around 5/12/17, second 7 day course finished in the beginning on 6/2017   Dose:  100 mg                        omeprazole 20 MG delayed-release capsule   Last time this was given:  20 mg on 6/24/2017  9:42 AM   Commonly known as:  PRILOSEC        Take 1 Cap by mouth every day.   Dose:  20 mg                        OPANA ER  20 MG T12a   Generic drug:  OxyMORphone HCl ER        Take 1 Tab by mouth 2 Times a Day.   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 She is seen 2/14/14, do not fill until 4/11/14   Dose:  1-2 Tab                        The Roundtable Caps        Take 1 Cap by mouth 2 Times a Day.   Dose:  1 Cap                        potassium chloride 20 MEQ Pack   Commonly known as:  KLOR-CON        Take 20 mEq by mouth every day. As needed with furosemide   Dose:  20 mEq                        pregabalin 100 MG Caps   Last time this was given:  100 mg on 6/24/2017  9:42 AM   Commonly known as:  LYRICA        Take 100 mg by mouth 4 times a day.   Dose:  100 mg                        valsartan 160 MG Tabs   Last time this was given:  160 mg on 6/24/2017  9:42 AM   Commonly known as:  DIOVAN        Take 160 mg by mouth every 48 hours.   Dose:  160 mg                        VENTOLIN  (90 BASE) MCG/ACT Aers inhalation aerosol   Generic drug:  albuterol        Inhale 2 Puffs by mouth every 6 hours as needed for Shortness of Breath.   Dose:  2 Puff                        Vitamin D 1000 UNIT Caps        Take 1,000 Units by mouth every day.   Dose:  1000 Units                          ASK your doctor about these medications        Instructions    Morning Afternoon Evening Bedtime    * cefdinir 300 MG Caps   What changed:  Another medication with the same name was added. Make sure you understand how and when to take each.   Commonly known as:  OMNICEF   Ask about: Which instructions should I use?        Take 300 mg by mouth 2 times a day.   Dose:  300 mg                        * cefdinir 300 MG Caps   What changed:  You were already taking a medication with the same name,  and this prescription was added. Make sure you understand how and when to take each.   Commonly known as:  OMNICEF   Ask about: Which instructions should I use?        Take 1 Cap by mouth 2 times a day for 4 days.   Dose:  300 mg                        * Notice:  This list has 2 medication(s) that are the same as other medications prescribed for you. Read the directions carefully, and ask your doctor or other care provider to review them with you.         Where to Get Your Medications      These medications were sent to [facility] [place], [place] - [address] [facility] [address]  [address] [facility] [place] [address]     Phone:  [phone]    - cefdinir 300 MG Caps            Orders for after discharge     REFERRAL TO HOME HEALTH    Complete by:  As directed    Home health will create and establish a plan of care             Instructions           Diet / Nutrition:    Follow any diet instructions given to you by your doctor or the dietician, including how much salt (sodium) you are allowed each day.    If you are overweight, talk to your doctor about a weight reduction plan.    Activity:    Remain physically active following your doctor's instructions about exercise and activity.    Rest often.     Any time you become even a little tired or short of breath, SIT DOWN and rest.    Worsening Symptoms:    Report any of the following signs and symptoms to the doctor's office immediately:    *Pain of jaw, arm, or neck  *Chest pain not relieved by medication                               *Dizziness or loss of consciousness  *Difficulty breathing even when at rest   *More tired than usual                                       *Bleeding drainage or swelling of surgical site  *Swelling of feet, ankles, legs or stomach                 *Fever (>100ºF)  *Pink or blood tinged sputum  *Weight gain (3lbs/day or 5lbs /week)           *Shock from internal defibrillator (if applicable)  *Palpitations or irregular heartbeats                 *Cool and/or numb extremities    Stroke Awareness    Common Risk Factors for Stroke include:    Age  Atrial Fibrillation  Carotid Artery Stenosis  Diabetes Mellitus  Excessive alcohol consumption  High blood pressure  Overweight   Physical inactivity  Smoking    Warning signs and symptoms of a stroke include:    *Sudden numbness or weakness of the face, arm or leg (especially on one side of the body).  *Sudden confusion, trouble speaking or understanding.  *Sudden trouble seeing in one or both eyes.  *Sudden trouble walking, dizziness, loss of balance or coordination.Sudden severe headache with no known cause.    It is very important to get treatment quickly when a stroke occurs. If you experience any of the above warning signs, call 911 immediately.                   Disclaimer         Quit Smoking / Tobacco Use:    I understand the use of any tobacco products increases my chance of suffering from future heart disease or stroke and could cause other illnesses which may shorten my life. Quitting the use of tobacco products is the single most important thing I can do to improve my health. For further information on smoking / tobacco cessation call a Toll Free Quit Line at 1-353.726.8975 (*National [employer] [place]) or 1-181.801.5692 (American Lung Association) or you can access the web based program at www.lungExpenseBot.org.    [place] Tobacco Users Help Line:  (658) 328-6911       Toll Free: 1-245.347.6355  Quit Tobacco Program [facility] (946)217-8742    Crisis Hotline:    [place] Crisis Hotline:  3-200-JSVLDMM or 1-203.903.2221    [place] Crisis Hotline:    1-691.507.4255 or 616-682-8484    Discharge Survey:   Thank you for choosing [facility]. We hope we did everything we could to make your hospital stay a pleasant one. You may be receiving a phone survey and we would appreciate your time and participation in answering the questions. Your input is very valuable to us in our efforts to  improve our service to our patients and their families.        My signature on this form indicates that:    1. I have reviewed and understand the above information.  2. My questions regarding this information have been answered to my satisfaction.  3. I have formulated a plan with my discharge nurse to obtain my prescribed medications for home.                  Disclaimer         __________________________________                     __________       ________                       Patient Signature                                                 Date                    Time

## 2017-06-21 NOTE — ED NOTES
Med rec complete per pt and family at bedside with medication list  List copied and returned to family  Allergies reviewed  Pt finished her first 7 day course of Macrobid around 5/12/17, and a second  7 day course around the beginning of June. Unsure of exact dates. Both   Courses were prescribed for a bladder infection

## 2017-06-21 NOTE — ED PROVIDER NOTES
"ED Provider Note    CHIEF COMPLAINT  Weakness    HPI  Maddy Hodge is a 76 y.o. female who presents to the ER with generalized weakness. She has multiple complaints. Patient has had a chronic indwelling Garnett catheter for chronic urinary retention. It was removed about a week ago and the patient has been self catheterizing. Over this time interval she been feeling weaker and weaker. She is having a hard time eating catheterizing herself now. She is having a hard time ambulating or walking around. She feels extreme fatigue. She's had nausea. No vomiting. She has not had a fever that she is aware of. Additionally she recently had a nerve stimulator removed. She's had drainage from this and an infection. It is being cared for at wound care. The wires from the stimulator was left in place, but the unit was removed. She has had bilateral leg swelling which seems to be worse today. It slightly worse on the right than on the left. No skin rash.    REVIEW OF SYSTEMS  As per HPI, otherwise a 10 point review of systems is negative    PAST MEDICAL HISTORY  Past Medical History   Diagnosis Date   • Spinal stenosis, lumbar region, without neurogenic claudication    • Neuropathy (CMS-[facility])    • Chronic constipation    • Erosive gastritis 5/09     antral   • Lumbar vertebral fracture (CMS-HCC) 5/2011   • Family history of polycystic kidney disease    • Allergy      seasonal   • Anxiety      due to loss of . 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 Albrecht, attaches to plastic, prulent   • Breath shortness    • Anesthesia      \"hard to wake up\"   • Bowel habit changes      " constipation   • Depression      and anxiety   • Renal disorder    • Pain 5/12/16     back and legs    • Back pain    • Bronchitis    • Chickenpox    • Guamanian measles    • Influenza    • Mumps    • Tonsillitis        SOCIAL HISTORY  Social History   Substance Use Topics   • Smoking status: Never Smoker    • Smokeless tobacco: Never Used   • Alcohol Use: No       SURGICAL HISTORY  Past Surgical History   Procedure Laterality Date   • Lumbar laminectomy diskectomy     • Hysterectomy, total abdominal  1976   • Colonoscopy  2000,5/27/09     normal   • Egd with asp/bx  5/27/09     erosive gastritis   • Appendectomy  1976   • Spinal cord stimulator  9/2/14   • Cataract extraction with iol Bilateral    • Gastroscopy with balloon dilatation N/A 5/19/2016     Procedure: GASTROSCOPY WITH DILATATION;  Surgeon: Tony Monae M.D.;  Location: SURGERY [facility];  Service:    • Hysterectomy laparoscopy     • Tonsillectomy         CURRENT MEDICATIONS  Home Medications     Reviewed by Nela Ward (Pharmacy Tech) on 06/21/17 at 1441  Med List Status: Complete    Medication Last Dose Status    aspirin EC (ECOTRIN) 81 MG Tablet Delayed Response 6/20/2017 Active    bethanechol (URECHOLINE) 25 MG Tab 6/21/2017 Active    budesonide-formoterol (SYMBICORT) 80-4.5 MCG/ACT Aerosol 6/20/2017 Active    Cholecalciferol (VITAMIN D) 1000 UNIT CAPS 6/20/2017 Active    Cyanocobalamin 2500 MCG Chew Tab 6/20/2017 Active    diazepam (VALIUM) 5 MG Tab >2 days Active    docusate sodium (COLACE) 250 MG capsule 6/20/2017 Active    duloxetine (CYMBALTA) 60 MG Cap DR Particles delayed-release capsule 6/20/2017 Active    estradiol (ESTRACE) 0.5 MG tablet 6/20/2017 Active    fluoxetine (PROZAC) 20 MG Cap 6/20/2017 Active    levothyroxine (SYNTHROID) 50 MCG Tab 6/20/2017 Active    Magnesium 100 MG Cap 6/20/2017 Active    nitrofurantoin monohydr macro (MACROBID) 100 MG Cap >2 weeks Active    omeprazole (PRILOSEC) 20 MG delayed-release  capsule 6/21/2017 Active    oxycodone-acetaminophen (PERCOCET) 7.5-325 MG per tablet 6/21/2017 Active    Oxymorphone HCl ER (OPANA ER) 20 MG T12A 6/20/2017 Active    pregabalin (LYRICA) 100 MG CAPS 6/20/2017 Active    Probiotic Product (itzbig) CAPS 6/20/2017 Active    valsartan (DIOVAN) 160 MG Tab 6/20/2017 Active    VENTOLIN  (90 BASE) MCG/ACT Aero Soln inhalation aerosol >2 days Active                ALLERGIES  Allergies   Allergen Reactions   • Pcn [Penicillins]    • Sulfa Drugs    • Tape    • Zoloft Unspecified     Worse depression       PHYSICAL EXAM  VITAL SIGNS: /68 mmHg  Pulse 72  Temp(Src) 36.1 °C (97 °F)  Resp 15  Ht 1.524 m (5')  Wt 74.844 kg (165 lb)  BMI 32.22 kg/m2  SpO2 96%   Constitutional: Awake and alert, pale, fatigued, slow to answer questions elderly female HENT:  Atraumatic, Normocephalic.Oropharynx moist mucus membranes, Nose normal inspection.   Eyes: Normal inspection  Neck: Supple  Cardiovascular: Normal heart rate, Normal rhythm.  Symmetric peripheral pulses.   Thorax & Lungs: No respiratory distress, No wheezing, No rales, No rhonchi, No chest tenderness.   Abdomen: Bowel sounds normal, soft, non-distended, nontender, no mass  Skin: Wound left buttock region draining purulent material packed with gauze  Back: No tenderness, No CVA tenderness.   Extremities: Lower extremity edema worse on the right than the left. No Homans or cords  Neurologic: Alert answer questions. Fatigue. No focal findings Psychiatric: Anxious appearing    RADIOLOGY/PROCEDURES  LE VENOUS DUPLEX (Specify in Comments Left, Right Or Bilateral)   Preliminary Result      CT-ABDOMEN-PELVIS WITH   Final Result         1. Diffuse thickening of the urinary bladder wall, infection or inflammation. Garnett catheter in place.      2. Small amount of gas in the previous cavity of the spinal stimulator battery pack in the subcutaneous tissue left lower posterior back.      3. No bowel wall  thickening or bowel dilatation.      CT-HEAD W/O   Final Result         1. No acute intracranial findings. No evidence of acute intracranial hemorrhage or mass lesion.      2. Cerebral atrophy. White matter lucencies most consistent with chronic small vessel ischemic change (versus demyelination or gliosis).               DX-CHEST-PORTABLE (1 VIEW)   Final Result         1. No acute cardiopulmonary abnormalities are identified.         Imaging is interpreted by radiologist    Labs:  Results for orders placed or performed during the hospital encounter of 06/21/17   LACTIC ACID   Result Value Ref Range    Lactic Acid 1.1 0.5 - 2.0 mmol/L   CBC WITH DIFFERENTIAL   Result Value Ref Range    WBC 7.7 4.8 - 10.8 K/uL    RBC 4.27 4.20 - 5.40 M/uL    Hemoglobin 11.9 (L) 12.0 - 16.0 g/dL    Hematocrit 35.8 (L) 37.0 - 47.0 %    MCV 83.8 81.4 - 97.8 fL    MCH 27.9 27.0 - 33.0 pg    MCHC 33.2 (L) 33.6 - 35.0 g/dL    RDW 42.5 35.9 - 50.0 fL    Platelet Count 303 164 - 446 K/uL    MPV 9.7 9.0 - 12.9 fL    Neutrophils-Polys 75.90 (H) 44.00 - 72.00 %    Lymphocytes 14.50 (L) 22.00 - 41.00 %    Monocytes 6.70 0.00 - 13.40 %    Eosinophils 1.60 0.00 - 6.90 %    Basophils 0.90 0.00 - 1.80 %    Immature Granulocytes 0.40 0.00 - 0.90 %    Nucleated RBC 0.00 /100 WBC    Neutrophils (Absolute) 5.88 2.00 - 7.15 K/uL    Lymphs (Absolute) 1.12 1.00 - 4.80 K/uL    Monos (Absolute) 0.52 0.00 - 0.85 K/uL    Eos (Absolute) 0.12 0.00 - 0.51 K/uL    Baso (Absolute) 0.07 0.00 - 0.12 K/uL    Immature Granulocytes (abs) 0.03 0.00 - 0.11 K/uL    NRBC (Absolute) 0.00 K/uL   COMP METABOLIC PANEL   Result Value Ref Range    Sodium 137 135 - 145 mmol/L    Potassium 4.0 3.6 - 5.5 mmol/L    Chloride 103 96 - 112 mmol/L    Co2 24 20 - 33 mmol/L    Anion Gap 10.0 0.0 - 11.9    Glucose 107 (H) 65 - 99 mg/dL    Bun 16 8 - 22 mg/dL    Creatinine 0.92 0.50 - 1.40 mg/dL    Calcium 9.3 8.5 - 10.5 mg/dL    AST(SGOT) 57 (H) 12 - 45 U/L    ALT(SGPT) 50 2 - 50 U/L     Alkaline Phosphatase 107 (H) 30 - 99 U/L    Total Bilirubin 0.6 0.1 - 1.5 mg/dL    Albumin 3.8 3.2 - 4.9 g/dL    Total Protein 6.8 6.0 - 8.2 g/dL    Globulin 3.0 1.9 - 3.5 g/dL    A-G Ratio 1.3 g/dL   URINALYSIS   Result Value Ref Range    Micro Urine Req Microscopic     Color Lt. Yellow     Character Clear     Specific Gravity 1.009 <1.035    Ph 7.0 5.0-8.0    Glucose Negative Negative mg/dL    Ketones Trace (A) Negative mg/dL    Protein Negative Negative mg/dL    Bilirubin Negative Negative    Nitrite Negative Negative    Leukocyte Esterase Large (A) Negative    Occult Blood Trace (A) Negative   BTYPE NATRIURETIC PEPTIDE   Result Value Ref Range    B Natriuretic Peptide 29 0 - 100 pg/mL   TROPONIN   Result Value Ref Range    Troponin I <0.01 0.00 - 0.04 ng/mL   ESTIMATED GFR   Result Value Ref Range    GFR If African American >60 >60 mL/min/1.73 m 2    GFR If Non African American 59 (A) >60 mL/min/1.73 m 2   URINE MICROSCOPIC (W/UA)   Result Value Ref Range    -150 (A) /hpf    Bacteria Few (A) None /hpf   BLOOD CULTURE,HOLD   Result Value Ref Range    Blood Culture Hold Collected    EKG   Result Value Ref Range    Report       [facility] Emergency Dept.    Test Date:  2017  Pt Name:    ROBERTO ARMSTRONG              Department: ER  MRN:        [id number]                      Room:       [facility]  Gender:     F                            Technician: [id number]  :        [id number]                   Requested By:ABIEL CABRERA  Order #:    [id number]                    Reading MD: ABIEL CABRERA MD    Measurements  Intervals                                Axis  Rate:       86                           P:          58  ME:         220                          QRS:        6  QRSD:       82                           T:          54  QT:         388  QTc:        464    Interpretive Statements  SINUS RHYTHM  FIRST DEGREE AV BLOCK  Compared to ECG 2016 12:29:32  No significant  changes    Electronically Signed On 6- 16:13:59 PDT by ABIEL CABRERA MD               COURSE & MEDICAL DECISION MAKING  Patient presents with generalized weakness and fatigue. She has appears to be an infected nerve stimulator site over the left buttock. She also has been having urinary symptoms. Difficulty with self catheterization. Her vital signs are acceptable. She was evaluated extensively. She has identified a urinary tract infection. She does not have evidence of sepsis. She is given Rocephin intravenously. Given her generalized weakness associated with this she will be admitted to the hospital. Dr. Olivo was consulted for admission.    FINAL IMPRESSION  1. Urinary tract infection  2. Generalized weakness      This dictation was created using voice recognition software. The accuracy of the dictation is limited to the abilities of the software.  The nursing notes were reviewed and certain aspects of this information were incorporated into this note.      Electronically signed by: Abiel Cabrera, 6/21/2017 4:10 PM

## 2017-06-22 ENCOUNTER — APPOINTMENT (OUTPATIENT)
Dept: PULMONOLOGY | Facility: HOSPICE | Age: 77
End: 2017-06-22
Payer: MEDICARE

## 2017-06-22 PROBLEM — J44.9 COPD (CHRONIC OBSTRUCTIVE PULMONARY DISEASE) (HCC): Status: ACTIVE | Noted: 2017-06-22

## 2017-06-22 PROBLEM — G93.40 ENCEPHALOPATHY ACUTE: Status: ACTIVE | Noted: 2017-06-22

## 2017-06-22 PROBLEM — E03.9 HYPOTHYROIDISM: Status: ACTIVE | Noted: 2017-06-22

## 2017-06-22 LAB
ANION GAP SERPL CALC-SCNC: 9 MMOL/L (ref 0–11.9)
BACTERIA WND AEROBE CULT: NORMAL
BUN SERPL-MCNC: 13 MG/DL (ref 8–22)
CALCIUM SERPL-MCNC: 9.3 MG/DL (ref 8.5–10.5)
CHLORIDE SERPL-SCNC: 105 MMOL/L (ref 96–112)
CO2 SERPL-SCNC: 23 MMOL/L (ref 20–33)
CREAT SERPL-MCNC: 0.9 MG/DL (ref 0.5–1.4)
ERYTHROCYTE [DISTWIDTH] IN BLOOD BY AUTOMATED COUNT: 43.3 FL (ref 35.9–50)
GFR SERPL CREATININE-BSD FRML MDRD: >60 ML/MIN/1.73 M 2
GLUCOSE SERPL-MCNC: 108 MG/DL (ref 65–99)
GRAM STN SPEC: NORMAL
HCT VFR BLD AUTO: 40.3 % (ref 37–47)
HGB BLD-MCNC: 13.4 G/DL (ref 12–16)
MCH RBC QN AUTO: 27.9 PG (ref 27–33)
MCHC RBC AUTO-ENTMCNC: 33.3 G/DL (ref 33.6–35)
MCV RBC AUTO: 84 FL (ref 81.4–97.8)
PLATELET # BLD AUTO: 303 K/UL (ref 164–446)
PMV BLD AUTO: 10.5 FL (ref 9–12.9)
POTASSIUM SERPL-SCNC: 3.8 MMOL/L (ref 3.6–5.5)
RBC # BLD AUTO: 4.8 M/UL (ref 4.2–5.4)
SIGNIFICANT IND 70042: NORMAL
SITE SITE: NORMAL
SODIUM SERPL-SCNC: 137 MMOL/L (ref 135–145)
SOURCE SOURCE: NORMAL
WBC # BLD AUTO: 7.8 K/UL (ref 4.8–10.8)

## 2017-06-22 PROCEDURE — 700111 HCHG RX REV CODE 636 W/ 250 OVERRIDE (IP)

## 2017-06-22 PROCEDURE — 97161 PT EVAL LOW COMPLEX 20 MIN: CPT

## 2017-06-22 PROCEDURE — 85027 COMPLETE CBC AUTOMATED: CPT

## 2017-06-22 PROCEDURE — G8979 MOBILITY GOAL STATUS: HCPCS | Mod: CI

## 2017-06-22 PROCEDURE — G8988 SELF CARE GOAL STATUS: HCPCS | Mod: CI

## 2017-06-22 PROCEDURE — 700111 HCHG RX REV CODE 636 W/ 250 OVERRIDE (IP): Performed by: HOSPITALIST

## 2017-06-22 PROCEDURE — 770006 HCHG ROOM/CARE - MED/SURG/GYN SEMI*

## 2017-06-22 PROCEDURE — 700105 HCHG RX REV CODE 258: Performed by: HOSPITALIST

## 2017-06-22 PROCEDURE — G8978 MOBILITY CURRENT STATUS: HCPCS | Mod: CJ

## 2017-06-22 PROCEDURE — G8991 OTHER PT/OT GOAL STATUS: HCPCS | Mod: CH

## 2017-06-22 PROCEDURE — 700105 HCHG RX REV CODE 258

## 2017-06-22 PROCEDURE — 97166 OT EVAL MOD COMPLEX 45 MIN: CPT

## 2017-06-22 PROCEDURE — 99232 SBSQ HOSP IP/OBS MODERATE 35: CPT | Performed by: INTERNAL MEDICINE

## 2017-06-22 PROCEDURE — 97162 PT EVAL MOD COMPLEX 30 MIN: CPT

## 2017-06-22 PROCEDURE — A9270 NON-COVERED ITEM OR SERVICE: HCPCS | Performed by: HOSPITALIST

## 2017-06-22 PROCEDURE — 80048 BASIC METABOLIC PNL TOTAL CA: CPT

## 2017-06-22 PROCEDURE — 36415 COLL VENOUS BLD VENIPUNCTURE: CPT

## 2017-06-22 PROCEDURE — G8987 SELF CARE CURRENT STATUS: HCPCS | Mod: CJ

## 2017-06-22 PROCEDURE — 700102 HCHG RX REV CODE 250 W/ 637 OVERRIDE(OP): Performed by: HOSPITALIST

## 2017-06-22 PROCEDURE — G8990 OTHER PT/OT CURRENT STATUS: HCPCS | Mod: CH

## 2017-06-22 RX ORDER — PREGABALIN 100 MG/1
100 CAPSULE ORAL 3 TIMES DAILY
Status: DISCONTINUED | OUTPATIENT
Start: 2017-06-22 | End: 2017-06-24 | Stop reason: HOSPADM

## 2017-06-22 RX ADMIN — PREGABALIN 100 MG: 100 CAPSULE ORAL at 08:02

## 2017-06-22 RX ADMIN — OXYCODONE AND ACETAMINOPHEN 1 TABLET: 7.5; 325 TABLET ORAL at 07:57

## 2017-06-22 RX ADMIN — VITAMIN D, TAB 1000IU (100/BT) 1000 UNITS: 25 TAB at 08:02

## 2017-06-22 RX ADMIN — LEVOTHYROXINE SODIUM 50 MCG: 50 TABLET ORAL at 05:33

## 2017-06-22 RX ADMIN — MAGNESIUM GLUCONATE 500 MG ORAL TABLET 400 MG: 500 TABLET ORAL at 08:01

## 2017-06-22 RX ADMIN — VALSARTAN 160 MG: 80 TABLET ORAL at 08:02

## 2017-06-22 RX ADMIN — OMEPRAZOLE 20 MG: 20 CAPSULE, DELAYED RELEASE ORAL at 00:00

## 2017-06-22 RX ADMIN — HEPARIN SODIUM 5000 UNITS: 5000 INJECTION, SOLUTION INTRAVENOUS; SUBCUTANEOUS at 14:18

## 2017-06-22 RX ADMIN — DULOXETINE HYDROCHLORIDE 60 MG: 30 CAPSULE, DELAYED RELEASE ORAL at 08:01

## 2017-06-22 RX ADMIN — CEFTRIAXONE SODIUM 1 G: 1 INJECTION, POWDER, FOR SOLUTION INTRAMUSCULAR; INTRAVENOUS at 08:42

## 2017-06-22 RX ADMIN — OXYCODONE AND ACETAMINOPHEN 1 TABLET: 7.5; 325 TABLET ORAL at 20:22

## 2017-06-22 RX ADMIN — PREGABALIN 100 MG: 100 CAPSULE ORAL at 14:17

## 2017-06-22 RX ADMIN — HEPARIN SODIUM 5000 UNITS: 5000 INJECTION, SOLUTION INTRAVENOUS; SUBCUTANEOUS at 20:22

## 2017-06-22 RX ADMIN — HEPARIN SODIUM 5000 UNITS: 5000 INJECTION, SOLUTION INTRAVENOUS; SUBCUTANEOUS at 05:32

## 2017-06-22 RX ADMIN — SODIUM CHLORIDE: 9 INJECTION, SOLUTION INTRAVENOUS at 14:24

## 2017-06-22 RX ADMIN — FLUOXETINE HYDROCHLORIDE 20 MG: 20 CAPSULE ORAL at 08:03

## 2017-06-22 RX ADMIN — ASPIRIN 81 MG: 81 TABLET ORAL at 08:02

## 2017-06-22 RX ADMIN — ESTRADIOL 0.5 MG: 1 TABLET ORAL at 08:02

## 2017-06-22 RX ADMIN — BETHANECHOL CHLORIDE 12.5 MG: 25 TABLET ORAL at 08:03

## 2017-06-22 RX ADMIN — VANCOMYCIN HYDROCHLORIDE 1000 MG: 100 INJECTION, POWDER, LYOPHILIZED, FOR SOLUTION INTRAVENOUS at 20:23

## 2017-06-22 RX ADMIN — PREGABALIN 100 MG: 100 CAPSULE ORAL at 20:22

## 2017-06-22 RX ADMIN — OXYCODONE AND ACETAMINOPHEN 1 TABLET: 7.5; 325 TABLET ORAL at 01:10

## 2017-06-22 ASSESSMENT — COGNITIVE AND FUNCTIONAL STATUS - GENERAL
MOBILITY SCORE: 17
TOILETING: A LITTLE
PERSONAL GROOMING: A LITTLE
DRESSING REGULAR LOWER BODY CLOTHING: A LITTLE
TURNING FROM BACK TO SIDE WHILE IN FLAT BAD: A LITTLE
CLIMB 3 TO 5 STEPS WITH RAILING: A LOT
WALKING IN HOSPITAL ROOM: A LITTLE
MOVING FROM LYING ON BACK TO SITTING ON SIDE OF FLAT BED: A LITTLE
SUGGESTED CMS G CODE MODIFIER MOBILITY: CK
HELP NEEDED FOR BATHING: A LITTLE
SUGGESTED CMS G CODE MODIFIER DAILY ACTIVITY: CK
MOVING TO AND FROM BED TO CHAIR: A LITTLE
DAILY ACTIVITIY SCORE: 19
DRESSING REGULAR UPPER BODY CLOTHING: A LITTLE
STANDING UP FROM CHAIR USING ARMS: A LITTLE

## 2017-06-22 ASSESSMENT — ENCOUNTER SYMPTOMS
NAUSEA: 0
VOMITING: 0
WEAKNESS: 1
FEVER: 0
DEPRESSION: 0
SHORTNESS OF BREATH: 0
COUGH: 0
CHILLS: 0
HEADACHES: 0
BLOOD IN STOOL: 0
ABDOMINAL PAIN: 0

## 2017-06-22 ASSESSMENT — GAIT ASSESSMENTS
ASSISTIVE DEVICE: FRONT WHEEL WALKER
DEVIATION: BRADYKINETIC;SHUFFLED GAIT
GAIT LEVEL OF ASSIST: STAND BY ASSIST
DISTANCE (FEET): 400

## 2017-06-22 ASSESSMENT — PAIN SCALES - GENERAL
PAINLEVEL_OUTOF10: 0
PAINLEVEL_OUTOF10: 3
PAINLEVEL_OUTOF10: 7
PAINLEVEL_OUTOF10: 5

## 2017-06-22 ASSESSMENT — ACTIVITIES OF DAILY LIVING (ADL): TOILETING: INDEPENDENT

## 2017-06-22 NOTE — CARE PLAN
Problem: Safety  Goal: Will remain free from injury  Outcome: PROGRESSING AS EXPECTED  Educated pt about reporting if tempeture increases or feeling fatigued.    Problem: Pain Management  Goal: Pain level will decrease to patient’s comfort goal  Outcome: PROGRESSING AS EXPECTED  Pt educated about pain management if needed. Pt denies any pain at this time.

## 2017-06-22 NOTE — THERAPY
"Occupational Therapy Evaluation completed.   Functional Status:  CG with ADLs and SBA with mobility with FWW.  Plan of Care: Will benefit from Occupational Therapy 3 times per week  Discharge Recommendations:  Equipment: No Equipment Needed. Post-acute therapy Discharge to home with outpatient or home health for additional skilled therapy services.    Patient presents with UTI with recent history of self catherization and fall. Patient and dtr confirm I with ADLs and mobility with no AE PTA. Son is home all day and assists with IADLs. Patient, upon eval, presents with decreased cognition from reported baseline, decreased endurance, speed of tasks, and poor confidence in abilities necessitating CG with ADLs and SBA with mobility with FWW. Patient would benefit from skilled OT in this setting prior to DC home to max gains and promote safe DC. Dtr confirm will be with patient and son upon DC to assist with safe DC. x3 week    See \"Rehab Therapy-Acute\" Patient Summary Report for complete documentation.    "

## 2017-06-22 NOTE — PROGRESS NOTES
Wound with infection where spinal stimulator was removed with wires at base and drain sticking out, left flank, at time of admit.

## 2017-06-22 NOTE — PROGRESS NOTES
Pharmacy Kinetics 76 y.o. female on vancomycin day # 2 2017    Currently on Vancomycin 1000 mg iv q24hr (2100)    Indication for Treatment: UTI, buttock wound    Pertinent history per medical record: Admitted on 2017 for generalized weakness after self catheterization for a week.  Chronic mendez catheter removed one week ago.  Also recently with nerve stimulator removed with drainage and infection being cared for by wound care.  The wires from the stimulator were left in place and are exposed at the site of wound.  Wound care sent for cultures  with gram positive cocci growing.     Other antibiotics: rocephin 1 g IV q24h    Allergies: Pcn; Sulfa drugs; Tape; and Zoloft     List concerns for renal function: BMI 32, advanced age, contrast     Pertinent cultures to date:   17: urine culture - in process  17: wound (buttock) - few WBCs, few gram positive cocci.    Recent Labs      17   1152  17   0207   WBC  7.7  7.8   NEUTSPOLYS  75.90*   --      Recent Labs      17   1152  17   0207   BUN  16  13   CREATININE  0.92  0.90   ALBUMIN  3.8   --      Intake/Output Summary (Last 24 hours) at 17 0953  Last data filed at 17 0500   Gross per 24 hour   Intake      0 ml   Output   1600 ml   Net  -1600 ml      Blood pressure 143/78, pulse 82, temperature 36.7 °C (98.1 °F), resp. rate 14, height 1.524 m (5'), weight 74.844 kg (165 lb), SpO2 96 %, not currently breastfeeding. Temp (24hrs), Av.3 °C (97.3 °F), Min:36.1 °C (97 °F), Max:36.7 °C (98.1 °F)      A/P   1. Vancomycin dose change: not indicated  2. Next vancomycin level:  @   3. Goal trough: 12-16 mcg/ml  4. Comments: minimal concern for accumulation.  Will continue current dosing at 1000 mg (13 mg/kg) q24h with a level prior to 4th total dose.  Pharmacy will monitor and adjust dosing if needed.      Gertrudis Gamez, PharmD

## 2017-06-22 NOTE — WOUND TEAM
"[facility]  Inpatient Services  Initial Wound & Skin Care Evaluation    Admission Date:   6/21/17        HPI, PMH, SH: Reviewed  Unit where seen by Wound Team:  S [id number]    WOUND CONSULT RELATED TO:  left buttock wound    SUBJECTIVE:  \"I go to the outpatient wound clinic to get this changed.\"    Self Report / Pain Level: 3-4/10 with cleansing and packing     OBJECTIVE:  Pt supine in bed, dressing in place from last wound clinic appointment on 6/20/17     WOUND TYPE, LOCATION, CHARACTERISTICS (Pressure ulcers: location, stage, POA or date identified)    Wound Type/Location:  Post surgical wound, left buttock     Periwound: pink, scarred     Drainage:  Min-mod tan     Tissue Type and %:   100% pink/red - what is visible    Wound Edges:  open    Odor:  none     Exposed structure(s):  None visible    S&S of Infection:  none    Measurements: taken 6/22/17    Length:    0.9cm   Width:     0.9cm   Depth:    0.5cm   Tracts/undermining:   3.0cm @ 11:00      INTERVENTIONS BY WOUND TEAM:  Previous dressing removed, cleansed wound with NS flush and patted dry.  Measured site.  Placed silver strip packing into wound bed followed by a silicone ad foam over the top.  Dressing maintenance orders written for NRSG to follow.     Dressing types:  Silver strip packing, ad foam     Interdisciplinary Collaboration: RN, Pt    EVALUATION:  Pt presents with a post surgical wound to the left buttock following removal of a pain pump device.  Pt is seen regularly at the [facility] for dressing changes.  Wound appears to have improved when comparing to previous photos in chart.  Continue with silver strip packing to address the tract @ 11:00 and for topical antimicrobial effect.  Pt to resume at [facility] once discharged from acute setting.       Factors affecting wound healing:  Chronicity, scar tissue, tract     Goals:  Wound to decrease in size by 1%/week     NURSING PLAN OF CARE: (X)  Dressing changes: See Dressing Maintenance orders: " X  Skin care: See Skin Care orders:   Rectal tube care: See Rectal Tube Care orders:   Other orders:    RSKIN: CURRENT (X) ORDERED (O)  Q shift Kaveh:  X  Q shift pressure point assessments:  X  Atmosair  X      NBA      Bariatric NBA      Bariatric foam        Heel float boots       Heels floated on pillows    X  Barrier wipes      Barrier Cream      Barrier paste      Sacral silicone dressing      Silicone O2 tubing      Anchorfast      Trach with Optifoam split foam       Waffle cushion      Rectal tube or BMS      Antifungal tx    Turn q 2 hours X - Pt independent with bed mobility   Up to chair  X  Ambulate X  PT/OT     Dietician      PO  X   TF   TPN     PVN    NPO   # days   Other       WOUND TEAM PLAN OF CARE (X):   NPWT change 3 x week:        Dressing changes by wound team:       Follow up as needed: X      Other (explain):    Anticipated discharge plans (X):  SNF:           Home Care:           Outpatient Wound Center:  X - Pt is established at [facility]           Self Care:            Other:

## 2017-06-22 NOTE — H&P
DATE OF ADMISSION:  06/21/2017    CHIEF COMPLAINT:  Generalized weakness, tiredness, altered mental status.    PRIMARY CARE PHYSICIAN:  Shirlene Quinones MD    ADMITTED TO:  [facility] hospitalist, Dr. South.    DIAGNOSES:  1.  Acute cystitis without hematuria.  2.  Left flank wound secondary to recent nerve stimulator removal.    HISTORY OF CURRENT ILLNESS:  The patient is a 76-year-old female who since the   beginning of May has been having problems urinating.  They put initially in a   Garnett catheter with a leg bag.  The patient continued this for about a month   and then they tried to switch her out to self catheterizations with bladder   training.  The patient over the past week has been doing the bladder training.    She has been getting up 3-4 times a night, but she has been unsuccessful and   she is getting into a vicious [place] cervical where she is becoming more and   more tired, sleeping less and she has gotten to the point where the son has   noticed that she is at this point severely tired.  She has lost all strength.    She has also lost the ability to concentrate and she is at this point with   her balance is off and she has falling.  Today she actually fell and landed on   the right side of her forehead and there is a large bruise there at this   point.  The patient does come in and is evaluated.  She has a urinary tract   infection.  She will be admitted to the medical floor for management of the   urinary tract infection as this is most likely self inoculated with a self   catheterization.    PAST MEDICAL HISTORY:  Includes spinal stenosis, peripheral neuropathy,   chronic constipation, erosive gastritis, lumbar vertebral fractures, anxiety,   degenerative joint disease, basal cell cancer, cataracts, gastroesophageal   reflux disease, hypertension, osteoporosis, hypothyroidism, history of staph   infection with MSSA, history of depression, history of renal insufficiency,   history of chickenpox, [place]  measles, influenza, mumps, and tonsillitis.    PAST SURGICAL HISTORY:  Includes lumbar laminectomy, lumbar diskectomy,   hysterectomy, colonoscopy, EGD with biopsies, appendectomy, spinal cord   stimulator implantation, cataracts bilaterally, spinal cord stimulator   removal, tonsillectomy, hysterectomy.    ALLERGIES:  SHE IS ALLERGIC TO PENICILLIN, SULFA, TAPE, ZOLOFT.    MEDICATIONS:  At the time of admission include aspirin 81 mg p.o. daily,   Urecholine 25 mg p.o. daily, Symbicort 80/4.5 1-2 puffs twice daily, vitamin D   1000 units p.o. daily, vitamin B12 2500 units p.o. daily, Valium 5 mg every 6   hours p.r.n. for anxiety, sodium docusate 250 mg daily, Cymbalta 60 mg daily,   Estrace 0.5 mg daily, Prozac 20 mg p.o. daily, Synthroid 50 mcg p.o. daily,   magnesium 100 mg p.o. daily, Macrobid recently started 1 tablet twice daily,   omeprazole 20 mg daily, Percocet 7.5/325 one to two tablets every 4 hours   p.r.n. for pain, oxymorphone hydrochloride 20 mg every 12 hours, Lyrica 100 mg   daily, probiotic 1 tablet daily, valsartan 160 mg daily, Ventolin 2 puffs   inhaled every 6 hours p.r.n. for shortness of breath.    SOCIAL HISTORY:  Never smoked.  No drugs.  No alcohol.  She has an advanced   directive.  She wishes to be DNR code status.  I have discussed this with her   and her son in detail.    FAMILY HISTORY:  Mother  in her 70s.  She was a smoker and had COPD.  Her   father  in his 70s of Parkinson's disease.    REVIEW OF SYSTEMS:  She complains of generalized fatigue, loss of strength,   loss of balance, inability to concentrate.  Continue self catheterization, but   unsuccessfully.  She has to get up 3-4 times per night.  She developed a   headache.  She is also at this point with leg swelling bilaterally and a right   frontal hematoma that is painful.  All other review of systems were reviewed   and are negative.    PHYSICAL EXAMINATION:  VITAL SIGNS:  Temperature is 36.1 with a pulse 75,  respirations 15, blood   pressure 154/68.  She is 74 kilograms in weight, 152 cm tall.  GENERAL:  She is alert, awake, oriented x3, pleasant, cooperative female,   appears her stated age.  HEENT:  Head is with trauma from a recent fall.  She has a bruise on the right   frontal area right around the temple.  Eyes follow in normal range of gaze.    Pupils equal, round, reactive bilaterally.  Nose is midline without discharge.    Ears bilaterally intact without discharge.  Oral cavity, moist mucous   membranes.  No apparent focus infection in the oral cavity.  NECK:  Soft and supple.  No JVD, carotid bruit, thyromegaly or lymphadenopathy   appreciated.  CHEST WALL:  Moves equal with inspiration and expiration.  No paradoxical   motion.  No reproducible chest wall tenderness.  HEART:  S1, S2.  Subtle systolic murmur, 2/6 in intensity, best appreciated at   the left sternal border.  LUNGS:  Diminished breath sounds bilaterally at the bases with atelectasis.    No rales or rhonchi detected.  ABDOMEN:  Soft.  Bowel sounds present in all 4 quadrants.  No hepatomegaly,   splenomegaly, rebound, no tenderness elicited.  GENITAL:  Exam deferred.  RECTAL:  Deferred.  EXTREMITIES:  Upper and lower extremities, positive pulses.  There is +2 edema   of both lower extremities.  At this point, good muscle strength, good muscle   tone.  Positive deep tendon reflexes.  NEUROLOGIC:  Cranial nerves II-XII grossly within normal limits without any   focal deficits appreciated.  SKIN:  At this point, she does have an ulcerative bruise where the spinal cord   stimulator was removed on the left flank.  Pictures attached in the chart.    The patient does have at this point a bruise on the right temple.    LABORATORY EVALUATION:  WBC count 7.7, hemoglobin 11.9, hematocrit 35.8,   platelets are 303.  Sodium is 137, potassium 4, chloride 103, CO2 is 24, BUN   16, creatinine 0.92, calcium 9.3, glucose 107.  Liver function tests, AST is   57,  alkaline phosphatase 107.  GFR is 59.  At this point, INR is pending.    Troponin less than 0.01.  Magnesium is 2.  Urinalysis shows a urine with   ketones are trace, large leukocyte esterase with wbc's 100-150, bacteria   present.    IMAGING STUDIES:  CT of the abdomen and pelvis was done, which shows at this   point diffuse thickening of the urinary bladder indicative of inflammation.    Small amount of gas in the spinal cord stimulator area subcutaneously.  The   patient also had a CT of the head because of the fall, which shows no acute   intracranial findings and then she has had a portable chest x-ray, which I   reviewed myself, which shows no acute cardiopulmonary process identified.    IMPRESSION AND PLAN:  1.  Urinary tract infection secondary to self catheterization.  The patient   will be admitted for IV antibiotics with Rocephin.  She will continue at this   point with blood cultures, urine cultures.  She will continue with pain   management.  We will follow cultures, which at this point will be back within   the next 48 hours.  2.  For her wound with spinal cord stimulator removal, she will be on   vancomycin and Rocephin.  We will have wound care look at it, and at this   point, she will get daily dressings.  3.  For chronic obstructive pulmonary disease history, she is in no   exacerbation, she will continue at this point with Symbicort, RT protocol,   nebulizers as well as Ventolin.  4.  For her anxiety, continue with Valium.  5.  For her urinary incontinence, continue with Urecholine.  6.  For her Vitamin D deficiency, continue with vitamin D supplementation.  7.  For her depression, continue with Cymbalta and Prozac.  8.  For hormone replacement, continue with Estrace.  9.  For hypothyroidism, check a TSH.  Continue with Synthroid.  10.  For her hypomagnesemia, check magnesium level and continue with magnesium   supplementation.  11.  For gastroesophageal reflux disease, continue with  omeprazole.  12.  For peripheral neuropathy, continue with Lyrica.  13.  For hypertension, continue with Diovan.     The patient will be full admission to the medical floor.  She will require   more than 2 midnights of inpatient stay due to the fact that she will need   antibiotics, urine cultures and then potential placement.       ____________________________________     MD HUSSAIN MARTINEZ / MARK    DD:  06/21/2017 17:05:17  DT:  06/21/2017 18:29:23    D#:  [id number]  Job#:  [id number]    cc: DEANDRE SUE MD

## 2017-06-22 NOTE — PROGRESS NOTES
2 RN. Skin assessment complete. Skin intact with exception of wound on left low back. Wound infection where spinal stimulator wires were left in. Dressing over wound, picture in chart.

## 2017-06-22 NOTE — CARE PLAN
Problem: Safety  Goal: Will remain free from injury  Outcome: PROGRESSING AS EXPECTED  Bed alarm in use. Bed low and locked. Call light in reach    Problem: Infection  Goal: Will remain free from infection  Outcome: PROGRESSING AS EXPECTED  Administer antibiotics as ordered. Monitor labs. Monitor urine output. Monitor VS

## 2017-06-22 NOTE — PROGRESS NOTES
Received pt. To room S612/2. Pleasant, alert and oriented. Son at bedside. Oriented to room and plan of care.

## 2017-06-22 NOTE — DISCHARGE PLANNING
Care Transition Team Assessment  IHD met with patient at bedside . Patient lives in a 1 story home with her son . Her son and daughter are her support system.Patient uses a walker and cane , but she does not use O2 or any other services . Patient use [facility] on [facility] . Son is planning to come and  patient once she gets discharge. Patient is not sure what other services  she is going to need after the discharge from the  hospital.    Information Source  Orientation : Oriented x 4  Information Given By: Patient  Informant's Name: Maddy  Who is responsible for making decisions for patient? : Patient    Readmission Evaluation  Is this a readmission?: No    Elopement Risk  Legal Hold: No  Ambulatory or Self Mobile in Wheelchair: Yes  Disoriented: No  Psychiatric Symptoms: None  History of Wandering: No  Elopement this Admit: No  Vocalizing Wanting to Leave: No  Displays Behaviors, Body Language Wanting to Leave: No-Not at Risk for Elopement  Elopement Risk: Not at Risk for Elopement    Interdisciplinary Discharge Planning  Does Admitting Nurse Feel This Could be a Complex Discharge?: No  Primary Care Physician: Shirlene Mitchell  Lives with - Patient's Self Care Capacity: Adult Children  Support Systems: Children, Family Member(s)  Housing / Facility: 1 Story House  Do You Take your Prescribed Medications Regularly: Yes  Able to Return to Previous ADL's: Yes  Mobility Issues: Yes  Prior Services: None  Patient Expects to be Discharged to:: home  Assistance Needed: No  Durable Medical Equipment: Walker, Other - Specify (cane)    Discharge Preparedness  What is your plan after discharge?: Uncertain - pending medical team collaboration  What are your discharge supports?: Child  Prior Functional Level: Ambulatory, Independent with Activities of Daily Living, Independent with Medication Management, Uses Cane, Uses Walker  Difficulity with ADLs: Walking  Difficulty with ADLs Comment: patiet uses  a cane and walker  Difficulity with IADLs: Driving    Functional Assesment  Prior Functional Level: Ambulatory, Independent with Activities of Daily Living, Independent with Medication Management, Uses Cane, Uses Walker    Finances  Financial Barriers to Discharge: No  Prescription Coverage: Yes ([facility] [place])    Vision / Hearing Impairment  Vision Impairment : No  Hearing Impairment : Yes  Hearing Impairment: Both Ears, Hearing Device Not Available    Values / Beliefs / Concerns  Values / Beliefs Concerns : No         Domestic Abuse  Have you ever been the victim of abuse or violence?: No  Physical Abuse or Sexual Abuse: No  Verbal Abuse or Emotional Abuse: No  Possible Abuse Reported to:: Not Applicable    Psychological Assessment  History of Substance Abuse: None  History of Psychiatric Problems: No    Discharge Risks or Barriers  Discharge risks or barriers?: No    Anticipated Discharge Information  Anticipated discharge disposition: Discharge needs currently unknown  Discharge Address: [address] [place] [address]  Sunil ALEXANDER [id number]  Discharge Contact Phone Number: [phone]

## 2017-06-22 NOTE — THERAPY
"Physical Therapy Evaluation completed.   Bed Mobility:  Supine to Sit: Supervised  Transfers: Sit to Stand: Supervised  Gait: Level Of Assist: Stand by Assist with Front-Wheel Walker       Plan of Care: Will benefit from Physical Therapy 3 times per week  Discharge Recommendations: Equipment: No Equipment Needed. Post-acute therapy Discharge to home with outpatient or home health for additional skilled therapy services.    See \"Rehab Therapy-Acute\" Patient Summary Report for complete documentation.   Ms. Hodge presents with decreased functional mobility compared to baseline which apparently is due to her recent UTI; her daughter states that even 3 days ago, she Ms. Hodge was walking much faster and with decreased assistance. There will be 24-7 caregiving at home for any assistance she needs and as such I do not feel she needs post-acute rehab before returning home. She would benefit from acute skilled physical therapy while in house to improve lower extremity strength, gait kinematics, and balance in order to decrease her fall risk. She would also benefit from home health services or outpatient physical therapy to further improve upon these impairments.   "

## 2017-06-22 NOTE — PROGRESS NOTES
[facility] Progress Note    Date of Service: 2017    Chief Complaint  76 y.o. female admitted 2017 with acute encephalopathy secondary to UTI and wound around spinal cord site removal    Interval Problem Update  Pt AAOx3 this am, pt states she feels better than when she presented to the hospital. Pt denies any fever/chills, abdominal pain. She complains of urgency, denies hematuria.     Consultants/Specialty  None    Disposition  Likely home with HH and wound care once medically improved.         Review of Systems   Constitutional: Positive for malaise/fatigue. Negative for fever and chills.   Respiratory: Negative for cough and shortness of breath.    Cardiovascular: Negative for chest pain and leg swelling.   Gastrointestinal: Negative for nausea, vomiting, abdominal pain, blood in stool and melena.   Genitourinary: Positive for urgency. Negative for frequency and hematuria.   Neurological: Positive for weakness. Negative for headaches.   Psychiatric/Behavioral: Negative for depression.      Physical Exam  Laboratory/Imaging   Hemodynamics  Temp (24hrs), Av.3 °C (97.3 °F), Min:36.1 °C (97 °F), Max:36.7 °C (98.1 °F)   Temperature: 36.7 °C (98.1 °F)  Pulse  Av.3  Min: 60  Max: 82 Heart Rate (Monitored): 78  Blood Pressure : 143/78 mmHg, NIBP: (!) 169/51 mmHg      Respiratory      Respiration: 14, Pulse Oximetry: 96 %             Fluids    Intake/Output Summary (Last 24 hours) at 17 1019  Last data filed at 17 0500   Gross per 24 hour   Intake      0 ml   Output   1600 ml   Net  -1600 ml       Nutrition  Orders Placed This Encounter   Procedures   • Diet Order     Standing Status: Standing      Number of Occurrences: 1      Standing Expiration Date:      Order Specific Question:  Diet:     Answer:  Regular [1]     Physical Exam   Constitutional: She is oriented to person, place, and time. She appears well-developed and well-nourished. No distress.   HENT:   Head: Normocephalic  and atraumatic.   Eyes: Conjunctivae and EOM are normal.   Neck: Normal range of motion. Neck supple.   Cardiovascular: Normal rate, regular rhythm and normal heart sounds.  Exam reveals no gallop and no friction rub.    No murmur heard.  Pulmonary/Chest: Effort normal and breath sounds normal. No respiratory distress. She has no wheezes. She has no rales.   Abdominal: Soft. Bowel sounds are normal. She exhibits no distension. There is no tenderness. There is no rebound.   Musculoskeletal: She exhibits edema (B/L LE ). She exhibits no tenderness.   Neurological: She is alert and oriented to person, place, and time.   Skin: Skin is warm and dry. She is not diaphoretic.   Dressing in place above left buttocks, packing noted. C/D/I.    Psychiatric: She has a normal mood and affect. Her behavior is normal. Thought content normal.   Nursing note and vitals reviewed.      Recent Labs      06/21/17   1152  06/22/17   0207   WBC  7.7  7.8   RBC  4.27  4.80   HEMOGLOBIN  11.9*  13.4   HEMATOCRIT  35.8*  40.3   MCV  83.8  84.0   MCH  27.9  27.9   MCHC  33.2*  33.3*   RDW  42.5  43.3   PLATELETCT  303  303   MPV  9.7  10.5     Recent Labs      06/21/17   1152  06/22/17   0207   SODIUM  137  137   POTASSIUM  4.0  3.8   CHLORIDE  103  105   CO2  24  23   GLUCOSE  107*  108*   BUN  16  13   CREATININE  0.92  0.90   CALCIUM  9.3  9.3         Recent Labs      06/21/17   1152   BNPBTYPENAT  29              Assessment/Plan     Essential hypertension (present on admission)  Assessment & Plan  - cont diovan     Spinal cord stimulator status (present on admission)  Assessment & Plan  - s/p removal  - wound care following     UTI (urinary tract infection) (present on admission)  Assessment & Plan  - pt on ceftriaxone  - urine cx pending     Encephalopathy acute (present on admission)  Assessment & Plan  - 2/2 to UTI     Hypothyroidism (present on admission)  Assessment & Plan  - cont synthroid     COPD (chronic obstructive pulmonary  disease) (CMS-HCC) (present on admission)  Assessment & Plan  - no acute exac  - cont symbicort, albuterol inhaler PRN, RT protocol    Neuropathy (CMS-HCC) (present on admission)  Assessment & Plan  - cont lyrica     Major depressive disorder, recurrent episode, mild (CMS-HCC) (present on admission)  Assessment & Plan  - cont prozac and cymbalta       Labs reviewed and Medications reviewed  Garnett catheter: Urinary Tract Retention or Urinary Tract Obstruction      DVT Prophylaxis: Heparin    Ulcer prophylaxis: Yes  Antibiotics: Treating active infection/contamination beyond 24 hours perioperative coverage

## 2017-06-22 NOTE — PROGRESS NOTES
Assumed care of pt at 0745. A/Ox4, discussed plan of care. Pt ambulating with 1 person assist with walker. Pt states son coming today to visit. Wound clinic changed pt wound this am. All needs met at this time. Bed in lowest position, treaded socks on, personal belongings and call light within reach, instructed to call for any assistance.

## 2017-06-22 NOTE — PROGRESS NOTES
Pharmacy Kinetics 76 y.o. female on vancomycin day # 14 2017    Currently on Vancomycin 1900 mg IV once, then 1000 mg iv q24hr (1900)    Indication for Treatment: Buttock wound/UTI    Pertinent history per medical record: Admitted on 2017 for generalized weakness after self catheterization for a week. Chronic mendez catheter removed one week ago. Also recently with nerve stimulator removed with drainage and infection being cared for by wound care. The wires from the stimulator were left in place and are exposed at the site of wound. Wound care sent for cultures  with gram positive cocci growing.     Other antibiotics: Rocephin 1 g IV q24 hours    .Results for ROBERTO ARMSTRONG (MRN [id number]) as of 2017 18:17   2017 11:45   Color Lt. Yellow   Character Clear   Specific Gravity 1.009   Ph 7.0   Glucose Negative   Ketones Trace (A)   Bilirubin Negative   Occult Blood Trace (A)   Protein Negative   Nitrite Negative   Leukocyte Esterase Large (A)   Micro Urine Req Microscopic   -150 (A)       Allergies: Pcn; Sulfa drugs; Tape; and Zoloft     List concerns for renal function: 77 yo, SCr 0.92    Pertinent cultures to date:   17 wound left buttock - gram positive cocci    Picture of wound on 17, now with increased drainage    Recent Labs      17   1152   WBC  7.7   NEUTSPOLYS  75.90*     Recent Labs      17   1152   BUN  16   CREATININE  0.92   ALBUMIN  3.8     No results for input(s): VANCOTROUGH, VANCOPEAK, VANCORANDOM in the last 72 hours.  Intake/Output Summary (Last 24 hours) at 17 1814  Last data filed at 17 1752   Gross per 24 hour   Intake      0 ml   Output   1000 ml   Net  -1000 ml      Blood pressure 154/68, pulse 82, temperature 36.1 °C (97 °F), resp. rate 16, height 1.524 m (5'), weight 74.844 kg (165 lb), SpO2 96 %. Temp (24hrs), Av.1 °C (97 °F), Min:36.1 °C (97 °F), Max:36.1 °C (97 °F)      A/P   1. Vancomycin dose change: Will start  vancomycin 1900 mg IV once, then 1000 mg IV q24 hours at 2100.  2. Next vancomycin level: 6/23/17 at 2030  3. Goal trough: 12-16 mcg/mL  4. Comments: F/u on finalized cultures and wound appearance. Monitor renal function.     Jaylin Street, PharmD, CGP, BCPS

## 2017-06-23 LAB
BACTERIA UR CULT: ABNORMAL
BACTERIA UR CULT: ABNORMAL
SIGNIFICANT IND 70042: ABNORMAL
SITE SITE: ABNORMAL
SOURCE SOURCE: ABNORMAL

## 2017-06-23 PROCEDURE — 700112 HCHG RX REV CODE 229: Performed by: HOSPITALIST

## 2017-06-23 PROCEDURE — 700102 HCHG RX REV CODE 250 W/ 637 OVERRIDE(OP): Performed by: HOSPITALIST

## 2017-06-23 PROCEDURE — A9270 NON-COVERED ITEM OR SERVICE: HCPCS | Performed by: HOSPITALIST

## 2017-06-23 PROCEDURE — 700111 HCHG RX REV CODE 636 W/ 250 OVERRIDE (IP): Performed by: HOSPITALIST

## 2017-06-23 PROCEDURE — 770006 HCHG ROOM/CARE - MED/SURG/GYN SEMI*

## 2017-06-23 PROCEDURE — 99232 SBSQ HOSP IP/OBS MODERATE 35: CPT | Performed by: INTERNAL MEDICINE

## 2017-06-23 PROCEDURE — 700105 HCHG RX REV CODE 258: Performed by: HOSPITALIST

## 2017-06-23 RX ADMIN — VITAMIN D, TAB 1000IU (100/BT) 1000 UNITS: 25 TAB at 09:12

## 2017-06-23 RX ADMIN — BETHANECHOL CHLORIDE 12.5 MG: 25 TABLET ORAL at 09:16

## 2017-06-23 RX ADMIN — PREGABALIN 100 MG: 100 CAPSULE ORAL at 09:14

## 2017-06-23 RX ADMIN — OXYCODONE AND ACETAMINOPHEN 1 TABLET: 7.5; 325 TABLET ORAL at 03:37

## 2017-06-23 RX ADMIN — BUDESONIDE AND FORMOTEROL FUMARATE DIHYDRATE 2 PUFF: 80; 4.5 AEROSOL RESPIRATORY (INHALATION) at 09:18

## 2017-06-23 RX ADMIN — HEPARIN SODIUM 5000 UNITS: 5000 INJECTION, SOLUTION INTRAVENOUS; SUBCUTANEOUS at 05:53

## 2017-06-23 RX ADMIN — OXYCODONE AND ACETAMINOPHEN 1 TABLET: 7.5; 325 TABLET ORAL at 18:43

## 2017-06-23 RX ADMIN — STANDARDIZED SENNA CONCENTRATE AND DOCUSATE SODIUM 2 TABLET: 8.6; 5 TABLET, FILM COATED ORAL at 20:57

## 2017-06-23 RX ADMIN — OXYCODONE AND ACETAMINOPHEN 1 TABLET: 7.5; 325 TABLET ORAL at 09:12

## 2017-06-23 RX ADMIN — OMEPRAZOLE 20 MG: 20 CAPSULE, DELAYED RELEASE ORAL at 09:12

## 2017-06-23 RX ADMIN — HEPARIN SODIUM 5000 UNITS: 5000 INJECTION, SOLUTION INTRAVENOUS; SUBCUTANEOUS at 14:17

## 2017-06-23 RX ADMIN — ESTRADIOL 0.5 MG: 1 TABLET ORAL at 09:13

## 2017-06-23 RX ADMIN — ASPIRIN 81 MG: 81 TABLET ORAL at 09:13

## 2017-06-23 RX ADMIN — DULOXETINE HYDROCHLORIDE 60 MG: 30 CAPSULE, DELAYED RELEASE ORAL at 09:12

## 2017-06-23 RX ADMIN — HEPARIN SODIUM 5000 UNITS: 5000 INJECTION, SOLUTION INTRAVENOUS; SUBCUTANEOUS at 21:02

## 2017-06-23 RX ADMIN — CEFTRIAXONE SODIUM 1 G: 1 INJECTION, POWDER, FOR SOLUTION INTRAMUSCULAR; INTRAVENOUS at 09:11

## 2017-06-23 RX ADMIN — OXYCODONE AND ACETAMINOPHEN 1 TABLET: 7.5; 325 TABLET ORAL at 14:18

## 2017-06-23 RX ADMIN — DOCUSATE SODIUM 200 MG: 100 CAPSULE ORAL at 09:12

## 2017-06-23 RX ADMIN — PREGABALIN 100 MG: 100 CAPSULE ORAL at 20:58

## 2017-06-23 RX ADMIN — PREGABALIN 100 MG: 100 CAPSULE ORAL at 14:18

## 2017-06-23 RX ADMIN — FLUOXETINE HYDROCHLORIDE 20 MG: 20 CAPSULE ORAL at 09:14

## 2017-06-23 RX ADMIN — DIAZEPAM 5 MG: 5 TABLET ORAL at 20:57

## 2017-06-23 RX ADMIN — SODIUM CHLORIDE: 9 INJECTION, SOLUTION INTRAVENOUS at 06:06

## 2017-06-23 RX ADMIN — LEVOTHYROXINE SODIUM 50 MCG: 50 TABLET ORAL at 05:54

## 2017-06-23 RX ADMIN — BUDESONIDE AND FORMOTEROL FUMARATE DIHYDRATE 2 PUFF: 80; 4.5 AEROSOL RESPIRATORY (INHALATION) at 20:59

## 2017-06-23 RX ADMIN — MAGNESIUM GLUCONATE 500 MG ORAL TABLET 400 MG: 500 TABLET ORAL at 09:13

## 2017-06-23 ASSESSMENT — ENCOUNTER SYMPTOMS
ABDOMINAL PAIN: 0
DEPRESSION: 0
HEADACHES: 0
VOMITING: 0
BLOOD IN STOOL: 0
WEAKNESS: 1
NAUSEA: 0
COUGH: 0
CHILLS: 0
SHORTNESS OF BREATH: 0
FEVER: 0

## 2017-06-23 ASSESSMENT — PAIN SCALES - GENERAL
PAINLEVEL_OUTOF10: 3
PAINLEVEL_OUTOF10: 6
PAINLEVEL_OUTOF10: 3
PAINLEVEL_OUTOF10: 8
PAINLEVEL_OUTOF10: 3
PAINLEVEL_OUTOF10: 3
PAINLEVEL_OUTOF10: 6
PAINLEVEL_OUTOF10: 6
PAINLEVEL_OUTOF10: 3
PAINLEVEL_OUTOF10: 3

## 2017-06-23 NOTE — CARE PLAN
Problem: Discharge Barriers/Planning  Goal: Patient’s continuum of care needs will be met  Outcome: PROGRESSING AS EXPECTED  Pt currently in care of wound nurse. Out pt wound called to reschedule apt.     Problem: Pain Management  Goal: Pain level will decrease to patient’s comfort goal  Outcome: PROGRESSING AS EXPECTED  Pt stated pain, RN and pt discussed pain management plan.

## 2017-06-23 NOTE — CARE PLAN
Problem: Infection  Goal: Will remain free from infection  Outcome: PROGRESSING AS EXPECTED  Scheduled IV abx given per MAR. Provided mendez cath care by CNA    Problem: Pain Management  Goal: Pain level will decrease to patient’s comfort goal  Outcome: PROGRESSING AS EXPECTED  Pain assessment hourly rounding, prn pain med given per MAR

## 2017-06-23 NOTE — FACE TO FACE
Face to Face Supporting Documentation - Home Health    The encounter with this patient was in whole or in part the primary reason for home health admission.    Date of encounter:   Patient:                    MRN:                       YOB: 2019  Maddy Hodge  [id number]  1940     Home health to see patient for:  Skilled Nursing care for assessment, interventions & education, Wound Care, Physical Therapy evaluation and treatment and Occupational therapy evaluation and treatment        Skilled nursing interventions to include:  Comment: Medication Management     Homebound status evidenced by:  Need the aid of supportive devices such as crutches, canes, wheelchairs or walkers. Leaving home requires a considerable and taxing effort. There is a normal inability to leave the home.    Community Physician to provide follow up care: Shirlene Quinones M.D.     Optional Interventions? No      I certify the face to face encounter for this home health care referral meets the CMS requirements and the encounter/clinical assessment with the patient was, in whole, or in part, for the medical condition(s) listed above, which is the primary reason for home health care. Based on my clinical findings: the service(s) are medically necessary, support the need for home health care, and the homebound criteria are met.  I certify that this patient has had a face to face encounter by myself.  Magda Morales M.D. - NPI: [id number]

## 2017-06-23 NOTE — PROGRESS NOTES
Assumed care of pt at shift change, discussed POC. Pt A&Ox4. Pain at buttock and leg rated 7/10, pain medication given per MaR, denied numbness or tingling. IV in place. Pt pleasant and cooperative. Bed alarm in place, 1 assist, treaded socks on, call light within reach, bed in low position.

## 2017-06-23 NOTE — PROGRESS NOTES
[facility] Hospitalist Progress Note    Date of Service: 2017    Chief Complaint  76 y.o. female admitted 2017 with acute encephalopathy secondary to UTI and wound around spinal cord site removal    Interval Problem Update  Pt AAOx3 this am, pt states she feels well. She is sitting in a chair, doing [place] puzzle. Pt denies any acute complaints today. She is eager to go home, she understands that we have to wait urine cx results before discharge.     Consultants/Specialty  None    Disposition  Likely home with HH and wound care once medically improved.         Review of Systems   Constitutional: Positive for malaise/fatigue. Negative for fever and chills.   Respiratory: Negative for cough and shortness of breath.    Cardiovascular: Negative for chest pain and leg swelling.   Gastrointestinal: Negative for nausea, vomiting, abdominal pain, blood in stool and melena.   Genitourinary: Positive for urgency. Negative for frequency and hematuria.   Neurological: Positive for weakness. Negative for headaches.   Psychiatric/Behavioral: Negative for depression.      Physical Exam  Laboratory/Imaging   Hemodynamics  Temp (24hrs), Av.6 °C (97.8 °F), Min:36.3 °C (97.4 °F), Max:36.7 °C (98.1 °F)   Temperature: 36.7 °C (98 °F)  Pulse  Av.2  Min: 60  Max: 82    Blood Pressure : 155/76 mmHg      Respiratory      Respiration: 18, Pulse Oximetry: 95 %             Fluids    Intake/Output Summary (Last 24 hours) at 17 1029  Last data filed at 17 0904   Gross per 24 hour   Intake   2610 ml   Output   1400 ml   Net   1210 ml       Nutrition  Orders Placed This Encounter   Procedures   • Diet Order     Standing Status: Standing      Number of Occurrences: 1      Standing Expiration Date:      Order Specific Question:  Diet:     Answer:  Regular [1]     Physical Exam   Constitutional: She is oriented to person, place, and time. She appears well-developed and well-nourished. No distress.   HENT:   Head:  Normocephalic and atraumatic.   Eyes: Conjunctivae and EOM are normal.   Neck: Normal range of motion. Neck supple.   Cardiovascular: Normal rate, regular rhythm and normal heart sounds.  Exam reveals no gallop and no friction rub.    No murmur heard.  Pulmonary/Chest: Effort normal and breath sounds normal. No respiratory distress. She has no wheezes. She has no rales.   Abdominal: Soft. Bowel sounds are normal. She exhibits no distension. There is no tenderness. There is no rebound.   Musculoskeletal: She exhibits edema (B/L LE ). She exhibits no tenderness.   Neurological: She is alert and oriented to person, place, and time.   Skin: Skin is warm and dry. She is not diaphoretic.   Dressing in place above left buttocks, packing noted. C/D/I.    Psychiatric: She has a normal mood and affect. Her behavior is normal. Thought content normal.   Nursing note and vitals reviewed.      Recent Labs      06/21/17   1152  06/22/17   0207   WBC  7.7  7.8   RBC  4.27  4.80   HEMOGLOBIN  11.9*  13.4   HEMATOCRIT  35.8*  40.3   MCV  83.8  84.0   MCH  27.9  27.9   MCHC  33.2*  33.3*   RDW  42.5  43.3   PLATELETCT  303  303   MPV  9.7  10.5     Recent Labs      06/21/17   1152  06/22/17   0207   SODIUM  137  137   POTASSIUM  4.0  3.8   CHLORIDE  103  105   CO2  24  23   GLUCOSE  107*  108*   BUN  16  13   CREATININE  0.92  0.90   CALCIUM  9.3  9.3         Recent Labs      06/21/17   1152   BNPBTYPENAT  29              Assessment/Plan     Essential hypertension (present on admission)  Assessment & Plan  - cont diovan     Spinal cord stimulator status (present on admission)  Assessment & Plan  - s/p removal  - wound care following     UTI (urinary tract infection) (present on admission)  Assessment & Plan  - pt on ceftriaxone  - urine cx + gram neg rods, ID and sensitivities pending     Encephalopathy acute (present on admission)  Assessment & Plan  - 2/2 to UTI     Hypothyroidism (present on admission)  Assessment & Plan  - cont  synthroid     COPD (chronic obstructive pulmonary disease) (CMS-HCC) (present on admission)  Assessment & Plan  - no acute exac  - cont symbicort, albuterol inhaler PRN, RT protocol    Neuropathy (CMS-HCC) (present on admission)  Assessment & Plan  - cont lyrica     Major depressive disorder, recurrent episode, mild (CMS-HCC) (present on admission)  Assessment & Plan  - cont prozac and cymbalta       Labs reviewed and Medications reviewed  Garnett catheter: Urinary Tract Retention or Urinary Tract Obstruction      DVT Prophylaxis: Heparin    Ulcer prophylaxis: Yes  Antibiotics: Treating active infection/contamination beyond 24 hours perioperative coverage

## 2017-06-24 ENCOUNTER — APPOINTMENT (OUTPATIENT)
Dept: WOUND CARE | Facility: MEDICAL CENTER | Age: 77
End: 2017-06-24
Attending: FAMILY MEDICINE
Payer: MEDICARE

## 2017-06-24 VITALS
WEIGHT: 176.15 LBS | BODY MASS INDEX: 34.58 KG/M2 | HEIGHT: 60 IN | RESPIRATION RATE: 18 BRPM | OXYGEN SATURATION: 95 % | HEART RATE: 79 BPM | SYSTOLIC BLOOD PRESSURE: 170 MMHG | TEMPERATURE: 98.2 F | DIASTOLIC BLOOD PRESSURE: 77 MMHG

## 2017-06-24 PROBLEM — N39.0 UTI (URINARY TRACT INFECTION): Status: RESOLVED | Noted: 2017-06-21 | Resolved: 2017-06-24

## 2017-06-24 PROBLEM — G93.40 ENCEPHALOPATHY ACUTE: Status: RESOLVED | Noted: 2017-06-22 | Resolved: 2017-06-24

## 2017-06-24 PROCEDURE — 700105 HCHG RX REV CODE 258: Performed by: HOSPITALIST

## 2017-06-24 PROCEDURE — A9270 NON-COVERED ITEM OR SERVICE: HCPCS | Performed by: HOSPITALIST

## 2017-06-24 PROCEDURE — 700102 HCHG RX REV CODE 250 W/ 637 OVERRIDE(OP): Performed by: HOSPITALIST

## 2017-06-24 PROCEDURE — 99239 HOSP IP/OBS DSCHRG MGMT >30: CPT | Performed by: HOSPITALIST

## 2017-06-24 PROCEDURE — 700111 HCHG RX REV CODE 636 W/ 250 OVERRIDE (IP): Performed by: HOSPITALIST

## 2017-06-24 RX ORDER — CEFDINIR 300 MG/1
300 CAPSULE ORAL 2 TIMES DAILY
COMMUNITY
End: 2017-06-28

## 2017-06-24 RX ORDER — CEFDINIR 300 MG/1
300 CAPSULE ORAL 2 TIMES DAILY
Qty: 8 CAP | Refills: 0 | Status: SHIPPED | OUTPATIENT
Start: 2017-06-24 | End: 2017-06-28

## 2017-06-24 RX ADMIN — HEPARIN SODIUM 5000 UNITS: 5000 INJECTION, SOLUTION INTRAVENOUS; SUBCUTANEOUS at 06:01

## 2017-06-24 RX ADMIN — PREGABALIN 100 MG: 100 CAPSULE ORAL at 09:42

## 2017-06-24 RX ADMIN — VITAMIN D, TAB 1000IU (100/BT) 1000 UNITS: 25 TAB at 09:41

## 2017-06-24 RX ADMIN — MAGNESIUM GLUCONATE 500 MG ORAL TABLET 400 MG: 500 TABLET ORAL at 09:42

## 2017-06-24 RX ADMIN — FLUOXETINE HYDROCHLORIDE 20 MG: 20 CAPSULE ORAL at 09:46

## 2017-06-24 RX ADMIN — OXYCODONE AND ACETAMINOPHEN 1 TABLET: 7.5; 325 TABLET ORAL at 08:16

## 2017-06-24 RX ADMIN — ASPIRIN 81 MG: 81 TABLET ORAL at 09:41

## 2017-06-24 RX ADMIN — OMEPRAZOLE 20 MG: 20 CAPSULE, DELAYED RELEASE ORAL at 09:42

## 2017-06-24 RX ADMIN — DULOXETINE HYDROCHLORIDE 60 MG: 30 CAPSULE, DELAYED RELEASE ORAL at 09:43

## 2017-06-24 RX ADMIN — ESTRADIOL 0.5 MG: 1 TABLET ORAL at 09:42

## 2017-06-24 RX ADMIN — VALSARTAN 160 MG: 80 TABLET ORAL at 09:42

## 2017-06-24 RX ADMIN — LEVOTHYROXINE SODIUM 50 MCG: 50 TABLET ORAL at 06:01

## 2017-06-24 RX ADMIN — OXYCODONE AND ACETAMINOPHEN 1 TABLET: 7.5; 325 TABLET ORAL at 03:31

## 2017-06-24 RX ADMIN — SODIUM CHLORIDE: 9 INJECTION, SOLUTION INTRAVENOUS at 06:34

## 2017-06-24 RX ADMIN — CEFTRIAXONE SODIUM 1 G: 1 INJECTION, POWDER, FOR SOLUTION INTRAMUSCULAR; INTRAVENOUS at 09:44

## 2017-06-24 ASSESSMENT — PAIN SCALES - GENERAL
PAINLEVEL_OUTOF10: 7
PAINLEVEL_OUTOF10: 0

## 2017-06-24 ASSESSMENT — LIFESTYLE VARIABLES: EVER_SMOKED: NEVER

## 2017-06-24 NOTE — DISCHARGE INSTRUCTIONS
Discharge Instructions    Discharged to home by car with relative. Discharged via wheelchair, hospital escort: Yes.  Special equipment needed: Walker    Be sure to schedule a follow-up appointment with your primary care doctor or any specialists as instructed.     Discharge Plan:   Diet Plan: Discussed  Activity Level: Discussed  Confirmed Follow up Appointment: Patient to Call and Schedule Appointment  Confirmed Symptoms Management: Discussed  Medication Reconciliation Updated: Yes  Influenza Vaccine Indication:  (Not indicated)    I understand that a diet low in cholesterol, fat, and sodium is recommended for good health. Unless I have been given specific instructions below for another diet, I accept this instruction as my diet prescription.   Other diet: Regular    Special Instructions: Instructions for chronic mendez catheter care    · Is patient discharged on Warfarin / Coumadin?   No     · Is patient Post Blood Transfusion?  No    Depression / Suicide Risk    As you are discharged from this [facility] facility, it is important to learn how to keep safe from harming yourself.    Recognize the warning signs:  · Abrupt changes in personality, positive or negative- including increase in energy   · Giving away possessions  · Change in eating patterns- significant weight changes-  positive or negative  · Change in sleeping patterns- unable to sleep or sleeping all the time   · Unwillingness or inability to communicate  · Depression  · Unusual sadness, discouragement and loneliness  · Talk of wanting to die  · Neglect of personal appearance   · Rebelliousness- reckless behavior  · Withdrawal from people/activities they love  · Confusion- inability to concentrate     If you or a loved one observes any of these behaviors or has concerns about self-harm, here's what you can do:  · Talk about it- your feelings and reasons for harming yourself  · Remove any means that you might use to hurt yourself (examples: pills,  rope, extension cords, firearm)  · Get professional help from the community (Mental Health, Substance Abuse, psychological counseling)  · Do not be alone:Call your Safe Contact- someone whom you trust who will be there for you.  · Call your local CRISIS HOTLINE 423-2008 or 760-241-5226  · Call your local Children's Mobile Crisis Response Team [place] (704) 989-0628 or www.[facility]  · Call the toll free National Suicide Prevention Hotlines   · National Suicide Prevention Lifeline 741-689-BPJS (1914)  · National Hope Line Network 800-SUICIDE (626-6177)

## 2017-06-24 NOTE — CARE PLAN
Problem: Safety  Goal: Will remain free from injury  Outcome: PROGRESSING AS EXPECTED  Pt remains injury free this shift. Bed in lowest position. Call light within reach. Room free of clutter.

## 2017-06-24 NOTE — PROGRESS NOTES
Checked on pt - pt resting with eyes closed. No s/s of pain or distress. Will continue to monitor.

## 2017-06-24 NOTE — DISCHARGE SUMMARY
CHIEF COMPLAINT ON ADMISSION  Chief Complaint   Patient presents with   • Urinary Catheter Problem     Pt one week ago had a catheter taken out and has been self cathing. Pt states with her bad back she has been struggling cathing herself. Pt's Urologist is Dr. Chand. Recently with chronic UTI's. Garnett catheter initially placed for difficulty urination.   • Open Wound     pt had a neuro stimulater removed and has an open wound where it was taken out from. Seen by wound care for an it was cultured.    • Leg Swelling     noted a week ago; worse today.    • Fatigue     progressively getting more and more tired and fatigue.        CODE STATUS  DNR    HPI & HOSPITAL COURSE  Please see H&P dictated by Dr. South. This is a 76 y.o. female here with acute encephalopathy secondary to E.Coli urinary tract infection. Patient was treated with IV ceftriaxone and patient's urine culture was positive for E. Coli and was discharged home on Omnicef for total of 4 days to complete her antibiotic course. Her blood cultures are negative to date. Patient's encephalopathy resolved and she was AAOX3 at time of discharge.  Patient had a wound around her spinal cord stimulator removal site. Patient was provided wound care during her hospital stay. Patient was evaluated by physical and occupational therapy and she is discharged home with home health service including physical/ occupational therapy, wound care.     Therefore, she is discharged in good and stable condition with close outpatient follow-up.    SPECIFIC OUTPATIENT FOLLOW-UP  PCP in 1-2 weeks     DISCHARGE PROBLEM LIST  Active Problems:    Essential hypertension POA: Yes    Neuropathy (CMS-[facility]) POA: Yes    Major depressive disorder, recurrent episode, mild (CMS-HCC) POA: Yes    Spinal cord stimulator status POA: Yes    Hypothyroidism POA: Yes    COPD (chronic obstructive pulmonary disease) (CMS-[facility]) POA: Yes  Resolved Problems:    UTI (urinary tract infection) POA: Yes     Encephalopathy acute POA: Yes      FOLLOW UP  Future Appointments  Date Time Provider Department Center   6/26/2017 8:30 AM Rachel Neil R.N. [facility] [address]   6/26/2017 1:00 PM CARE MANAGER [facility]   6/27/2017 9:20 AM KAIN Vazquez [facility]   7/21/2017 1:00 PM Shirlene Quinones M.D. [facility]         MEDICATIONS ON DISCHARGE   Maddy Hodge   Home Medication Instructions ANGELA:[id number]    Printed on:06/24/17 1054   Medication Information                      aspirin EC (ECOTRIN) 81 MG Tablet Delayed Response  Take 81 mg by mouth every day.             bethanechol (URECHOLINE) 25 MG Tab  Take 12.5 mg by mouth every day. To increase by half a tab QD starting 6/22/17             budesonide-formoterol (SYMBICORT) 80-4.5 MCG/ACT Aerosol  Inhale 2 Puffs by mouth 2 Times a Day. Use spacer. Rinse mouth after each use.             cefdinir (OMNICEF) 300 MG Cap  Take 1 Cap by mouth 2 times a day for 4 days.             Cholecalciferol (VITAMIN D) 1000 UNIT CAPS  Take 1,000 Units by mouth every day.             Cyanocobalamin 2500 MCG Chew Tab  Take 1 Tab by mouth every day.             diazepam (VALIUM) 5 MG Tab  Take 1 Tab by mouth every 6 hours as needed for Anxiety or Sleep.             docusate sodium (COLACE) 250 MG capsule  Take 250 mg by mouth every day.             duloxetine (CYMBALTA) 60 MG Cap DR Particles delayed-release capsule  Take 1 Cap by mouth every day.             estradiol (ESTRACE) 0.5 MG tablet  TAKE ONE TABLET BY MOUTH ONCE DAILY             fluoxetine (PROZAC) 20 MG Cap  Take 1 Cap by mouth every day.             furosemide (LASIX) 20 MG Tab  Take 20 mg by mouth every day. As needed for edema             levothyroxine (SYNTHROID) 50 MCG Tab  Take 1 Tab by mouth Every morning on an empty stomach.             Magnesium 100 MG Cap  Take 1 Cap by mouth every day.             nitrofurantoin monohydr macro (MACROBID) 100 MG Cap  Take 100 mg by mouth 2 times a day. First 7 day  course, finished around 5/12/17, second 7 day course finished in the beginning on 6/2017             omeprazole (PRILOSEC) 20 MG delayed-release capsule  Take 1 Cap by mouth every day.             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 She is seen 2/14/14, do not fill until 4/11/14             Oxymorphone HCl ER (OPANA ER) 20 MG T12A  Take 1 Tab by mouth 2 Times a Day.             potassium chloride (KLOR-CON) 20 MEQ Pack  Take 20 mEq by mouth every day. As needed with furosemide             pregabalin (LYRICA) 100 MG CAPS  Take 100 mg by mouth 4 times a day.             Probiotic Product (Qwaya) CAPS  Take 1 Cap by mouth 2 Times a Day.             valsartan (DIOVAN) 160 MG Tab  Take 160 mg by mouth every 48 hours.             VENTOLIN  (90 BASE) MCG/ACT Aero Soln inhalation aerosol  Inhale 2 Puffs by mouth every 6 hours as needed for Shortness of Breath.                 DIET  Orders Placed This Encounter   Procedures   • Diet Order     Standing Status: Standing      Number of Occurrences: 1      Standing Expiration Date:      Order Specific Question:  Diet:     Answer:  Regular [1]       ACTIVITY  As tolerated.        CONSULTATIONS  None    PROCEDURES  None     LABORATORY  Lab Results   Component Value Date/Time    SODIUM 137 06/22/2017 02:07 AM    POTASSIUM 3.8 06/22/2017 02:07 AM    CHLORIDE 105 06/22/2017 02:07 AM    CO2 23 06/22/2017 02:07 AM    GLUCOSE 108* 06/22/2017 02:07 AM    BUN 13 06/22/2017 02:07 AM    CREATININE 0.90 06/22/2017 02:07 AM    GLOM FILT RATE, EST >59 04/15/2010 09:37 AM        Lab Results   Component Value Date/Time    WBC 7.8 06/22/2017 02:07 AM    HEMOGLOBIN 13.4 06/22/2017 02:07 AM    HEMATOCRIT 40.3 06/22/2017 02:07 AM    PLATELET COUNT 303 06/22/2017 02:07 AM        Total time of the discharge process exceeds 38 minutes

## 2017-06-24 NOTE — PROGRESS NOTES
Pt resting with eyes closed. No s/s of pain or distress. Bed in lowest position. Call light within reach. Room free of clutter. Will continue to monitor and care for pt.

## 2017-06-24 NOTE — PROGRESS NOTES
Pt AAOx4, ate 100% of breakfast. Medicated for pain in back and leg. Bowel movement at 0700. Last dose of IV antibiotic give at 0900. Garnett emptied prior to discharge 700 mL. Discharge instructions give to pt and family members at bedside. Prescription called in to pharmacy

## 2017-06-25 ENCOUNTER — PATIENT OUTREACH (OUTPATIENT)
Dept: HEALTH INFORMATION MANAGEMENT | Facility: OTHER | Age: 77
End: 2017-06-25

## 2017-06-26 ENCOUNTER — NON-PROVIDER VISIT (OUTPATIENT)
Dept: WOUND CARE | Facility: MEDICAL CENTER | Age: 77
End: 2017-06-26
Attending: FAMILY MEDICINE
Payer: MEDICARE

## 2017-06-26 ENCOUNTER — PATIENT OUTREACH (OUTPATIENT)
Dept: HEALTH INFORMATION MANAGEMENT | Facility: OTHER | Age: 77
End: 2017-06-26

## 2017-06-26 LAB
BACTERIA BLD CULT: NORMAL
BACTERIA BLD CULT: NORMAL
SIGNIFICANT IND 70042: NORMAL
SIGNIFICANT IND 70042: NORMAL
SITE SITE: NORMAL
SITE SITE: NORMAL
SOURCE SOURCE: NORMAL
SOURCE SOURCE: NORMAL

## 2017-06-26 PROCEDURE — A6402 STERILE GAUZE <= 16 SQ IN: HCPCS

## 2017-06-26 PROCEDURE — A6212 FOAM DRG <=16 SQ IN W/BORDER: HCPCS

## 2017-06-26 PROCEDURE — 97602 WOUND(S) CARE NON-SELECTIVE: CPT

## 2017-06-26 NOTE — WOUND TEAM
Advanced Wound Care  [facility]  [address]  [address]  [place] [address]  [phone] Fax: [phone]    Encounter Note  For Certification Period: 5/31/2017-6/31/2017   visit  G-code  C-code  kx modifier     #6 6/26/17  8990/3202 ch no           Referring Physician: Stacie Garber MD  Primary Physician:        Consulting Physicians: MORENA Burks, Amandeep Gonzalez MD, Dr. Albrecht    Wound(s): L buttock pressure ulcer  Start of Care: 4/19/17  Post-op stimulator removal re-eval 5/3/17       Subjective:        HPI: Pt is 77 y/o female who developed a pressure sore over area of spinal stimulator on 3/27/17. Pt has had issues with this stimulator since placement back in early 2015 when she developed an infection and was treated with abx for one year per Dr. Albrecht.  Pt has seen pain MD regarding removal of stimulator but wanted to try conservative care to preserve stimulator.    5/3/17:  Pt returns to [facility] following removal spinal stimulator 5/2/17 with primary closure by Dr. Gonzalez. Vancomycin applied to wound prior to closure. New orders received 5/3/17    6/14/17: Patient returns to wound clinic for follow up and possible dc. Patients wound has reopened with undermining and exposed stimulator wires. Per patients son the stimulator was removed in May but the wires were left in place. Requesting patient be seen by provider at next visit.     6/21/17 pt admitted to the hospital with a UTI - e.coli with acute encephalopathy.  Pt received iv abx and was d/bill 6/24/17 on omnicef for 4 days.  Pt received wound care while in the hospital.  Strip packing was used.    Pain: Patient denies pain today    Current Medications: omnicef    Allergies:   PCN, Tape, Zoloft, Sulfa    Past Surgical History:       Objective:      Tests and Measures:6/26/17 culture taken 6/20/17 and was negative    Orthotic, protective, supportive devices: none  Fall Risk Assessment (rogelio all that apply with an X):  5/3/17 (+) fall risk     Wound Characteristics                                                    Location:  Left lateral buttock  Initial Evaluation  Date: 4/19/17 30 Day Summary:  5/31/17 6/26/2017   Tissue Type and %: Open portal with yellow borders Approximated incision  100% red   Periwound: Purple ring surrounding portal intact Intact   Drainage: Mod to heavy dark yellow/tan serous fluid Scant serous Minimal ss   Exposed structures Stimulator visible and palpable  None STIMULATOR WIRE   Wound Edges:   frayed Approximated flat   Odor: absent None None   S&S of Infection:   Large pocket with heavy drainage opened None Exposed hardware   Edema: U/a None None   Sensation: intact intact Intact               Measurements:  Left lateral buttock Initial Evaluation  Date:4/19/17 Re-eval  Date: 05/3/2017 30 Day Summary:  5/31/17 6/26/2017   Length (cm) 1.3 approximated Approximated 1.0   Width (cm) 0.7 5.5 3.0 0.8   Depth (cm) 1.3 ua juvenal 0.3   Area (cm2) 0.91cm2      Tract/undermine 11:00=5cm.   9:00=1.7cm   3.8 at 12        Procedures:     Debridement: non-Selective with gauze     Cleansed with:  NS                                                                 Periwound protected with: Skin prep   Primary dressing:Aquacel ag strip   Secondary Dressing: silicone adhesive foam   Other:     Patient Education:     Collaboration: 6/26/17 pt has appt with Dr Gonzalez tomorrow regarding removal of the stimulator leads.  Called and left message with MA regarding current appearance of wound    Assessment:      Wound etiology: pressure, implant, questionable deeper infecion     Wound Progress: Pt with a dehiscence of an approximated site on the left buttock.  Depth is less then his evaluation date on 4/19/17 but more then the last measurement taken with last hospital admission.     Rationale for Treatment: Aquacel ag to wick drainage from undermined are and promote moist wound healing. ad foam for drainage management to be  left SIERRA if no longer draining    Patient tolerance/compliance: son and pt active in care and would just like to do whatever is best to expedite healing    Complicating factors:stimulator exposure(removed 5/2/17)-- pocket sutured closed, optimistic pocket does not reopen    Need for ongoing Advanced Wound Care services: sharp debridement, pt education, dressing selection      Plan:      Treatment Plan and Recommendations:  Diagnosis/ICD10: L89.323 Left buttock pressure ulcer stage 3  5/3/17: Post-op incision    Procedures/CPT: sharp debridement, non selective     Frequency: once every two weeks      Treatment Goals: STG 2 Weeks  LTG 4 Weeks   Granulation Tissue: Free from s/s infection resolution   Decrease Necrotic Tissue to:     Wound Phase:      Decrease Size by:  %   Periwound:      Decrease tracts/undermining by:  10%             At the time of each visit a thorough assessment of the patient is completed to assure the  appropriateness of our plan of care.  The dressings or modalities may need to be adapted   from the original plan to address any significant changes in the wound environment.

## 2017-06-28 ENCOUNTER — OFFICE VISIT (OUTPATIENT)
Dept: MEDICAL GROUP | Facility: CLINIC | Age: 77
End: 2017-06-28
Payer: MEDICARE

## 2017-06-28 VITALS
WEIGHT: 172 LBS | TEMPERATURE: 97.8 F | OXYGEN SATURATION: 95 % | SYSTOLIC BLOOD PRESSURE: 136 MMHG | HEIGHT: 60 IN | BODY MASS INDEX: 33.77 KG/M2 | DIASTOLIC BLOOD PRESSURE: 78 MMHG | RESPIRATION RATE: 16 BRPM | HEART RATE: 70 BPM

## 2017-06-28 DIAGNOSIS — S31.829D OPEN WOUND OF BUTTOCK, LEFT, SUBSEQUENT ENCOUNTER: ICD-10-CM

## 2017-06-28 DIAGNOSIS — Z96.89 SPINAL CORD STIMULATOR STATUS: ICD-10-CM

## 2017-06-28 DIAGNOSIS — Z09 HOSPITAL DISCHARGE FOLLOW-UP: ICD-10-CM

## 2017-06-28 DIAGNOSIS — N39.0 RECURRENT UTI: ICD-10-CM

## 2017-06-28 PROCEDURE — 99496 TRANSJ CARE MGMT HIGH F2F 7D: CPT | Performed by: NURSE PRACTITIONER

## 2017-06-28 RX ORDER — TETRAHYDROZOLINE HCL 0.05 %
1 DROPS OPHTHALMIC (EYE) 4 TIMES DAILY PRN
COMMUNITY
End: 2017-06-30

## 2017-06-28 ASSESSMENT — ENCOUNTER SYMPTOMS
HEADACHES: 0
ABDOMINAL PAIN: 0
FALLS: 0
HEARTBURN: 0
WHEEZING: 0
WEAKNESS: 0
FEVER: 0
VOMITING: 0
COUGH: 0
NERVOUS/ANXIOUS: 0
DIZZINESS: 1
DEPRESSION: 0
BLOOD IN STOOL: 0
EYE PAIN: 0
BLURRED VISION: 0
CHILLS: 0
SPUTUM PRODUCTION: 0
SHORTNESS OF BREATH: 0
MYALGIAS: 0
DIARRHEA: 0
FOCAL WEAKNESS: 0
CONSTIPATION: 0
NAUSEA: 0
PALPITATIONS: 0

## 2017-06-28 NOTE — ASSESSMENT & PLAN NOTE
Hx of spinal cord stimulator status, device was removed on 5/2. Scheduled to remove wire by Dr. Gonzalez on Monday morning. Wound care clinic appointment as scheduled, next visit tomorrow. Pain management by oral pain medication only at this time.

## 2017-06-28 NOTE — PROGRESS NOTES
Subjective:     Maddy Hodge is a 76 y.o. female who presents for Hospital Follow-up.  Chart reviewed. Discharge summary available for review: Yes   Date of discharge 6/24/2017.  48- hour post discharge RN call completed on 6/26/2017 and documented in the medical record by Yohannes Ferguson.    HPI: Recently hospitalized for acute encephalopathy secondary to E.coli UTI. Underlying spinal cord wound from spinal cord stimulator removal. Hospitalized from 6/21 to 6/24.     Since returning home, patient reports feeling better. No question regarding hospitalization. No new symptoms after discharge. The patient does feel a little bit weakness but is back to the baseline before having UTI per son, has no difficulty taking care of self at home at this time. Son is the primary caretaker and declined home health since pt has been feeling better. HH was discussed in the hospital but they did not get contacted. They do not feel they need it at this time.   Patient reports taking medications as instructed, has finished omnicef this am.   Future PCP appointment: 7/21    Open wound of buttock  Hx of spinal cord stimulator status, device was removed on 5/2. Scheduled to remove wire by Dr. Gonzalez on Monday morning. Wound care clinic appointment as scheduled, next visit tomorrow. Pain management by oral pain medication only at this time.     Recurrent UTI  6/21 CT Diffuse thickening of the urinary bladder wall, infection or inflammation. Mendez catheter in place.  Pt has been seeing urologist for bladder wall thickening and also recurrent UTI, probably 3 times per year. She was on macrobid and has mendez for urine retention which was removed about a week ago. After mendez removal, she has been doing self-cath for a week and found out having recurrent UTI. Mendez was placed back in the hospital. Pt has finished omnicef and she is going to see Urologist tomorrow.     Hx of Spinal cord stimulator status  Device removed on  5/2/2017. Now pain managed by oral medication: ipana 20 mg bid, percocet 7.5/325 mg 1-2 tab q4hprn, lyrica.       Patient Active Problem List    Diagnosis Date Noted   • CKD (chronic kidney disease) stage 3, GFR 30-59 ml/min 05/23/2016     Priority: Medium   • Esophageal dysphagia 05/19/2016     Priority: Medium   • Idiopathic atrophic hypothyroidism      Priority: Medium   • GERD (gastroesophageal reflux disease) 09/20/2011     Priority: Medium   • Essential hypertension 07/06/2009     Priority: Medium   • Major depressive disorder, recurrent episode, mild (CMS-HCC) 05/02/2016     Priority: Low   • Neuropathy (CMS-HCC) 10/17/2013     Priority: Low   • Family history of polycystic kidney disease      Priority: Low   • Facet arthritis of lumbar region 03/26/2012     Priority: Low   • History of vertebral fracture 03/26/2012     Priority: Low   • History of gastritis 03/26/2012     Priority: Low   • Spinal stenosis, lumbar region, without neurogenic claudication      Priority: Low   • Recurrent UTI 06/28/2017   • Hypothyroidism 06/22/2017   • COPD (chronic obstructive pulmonary disease) (CMS-HCC) 06/22/2017   • Open wound of buttock 06/20/2017   • Open wound of back 05/17/2017   • Menopausal symptoms 09/13/2016   • Hx of Spinal cord stimulator status 09/06/2016   • Essential tremor 09/06/2016   • Postmenopausal osteoporosis    • Arachnoiditis          Allergies:   Pcn; Sulfa drugs; Tape; and Zoloft    Social History:  Social History   Substance Use Topics   • Smoking status: Never Smoker    • Smokeless tobacco: Never Used   • Alcohol Use: No        ROS:  Review of Systems   Constitutional: Negative for fever, chills and malaise/fatigue.   HENT: Negative for hearing loss and tinnitus.    Eyes: Negative for blurred vision and pain.   Respiratory: Negative for cough, sputum production, shortness of breath and wheezing.    Cardiovascular: Negative for chest pain, palpitations and leg swelling.   Gastrointestinal: Negative  for heartburn, nausea, vomiting, abdominal pain, diarrhea, constipation ( last BM this morning, soft, form) and blood in stool.   Genitourinary:        Garnett in place   Musculoskeletal: Negative for myalgias and falls.   Skin: Negative for rash.   Neurological: Positive for dizziness (when getting up). Negative for focal weakness, weakness and headaches.   Psychiatric/Behavioral: Negative for depression. The patient is not nervous/anxious.         Objective:     Blood pressure 136/78, pulse 70, temperature 36.6 °C (97.8 °F), resp. rate 16, height 1.524 m (5'), weight 78.019 kg (172 lb), SpO2 95 %.     Physical Exam:  Physical Exam   Constitutional: She is oriented to person, place, and time and well-developed, well-nourished, and in no distress.   HENT:   Head: Normocephalic and atraumatic.   Eyes: Conjunctivae are normal.   Neck: Neck supple. No JVD present. No thyromegaly present.   Cardiovascular: Normal rate and regular rhythm.    No murmur heard.  Pulmonary/Chest: Effort normal and breath sounds normal. No respiratory distress. She has no wheezes.   Abdominal: Soft. Bowel sounds are normal. She exhibits no distension. There is tenderness (mild to pelvic, improving after discharge per pt).   Musculoskeletal: Normal range of motion. She exhibits no edema.   Neurological: She is alert and oriented to person, place, and time.   Gait slow but steady. Pt would feel dizzy while getting up but she has been dealing with that by slowing down when changing position.    Skin: Skin is warm. No erythema.   Left buttock wound covered.    Nursing note and vitals reviewed.        Assessment and Plan:     1. Hospital discharge follow-up  Hospitalization and results reviewed with patient. High risk conditions requiring teaching or care coordination were identified and addressed.The patient demonstrate understanding of admission and underlying conditions. The patient understands discharge instructions and when to seek medical  attention. Medications reviewed including instructions regarding high risk medications, dosing and side effects.    The patient is able to safely adhere to ADL/IADL, treatment and medication regimen, self-manage of high-risk conditions? Yes   The patient requires physical therapy/home health/DME referral? No family declined and pt is doing well today.  The patient requires referral to care coordination/behavioral health/social work?  No   Patient requires referral for pharmacy consult? No   Advance directive/POLST on file?  Yes   Required counseled on advance directive?  No     2. Recurrent UTI  - completed the course of omnicef. Seeing urologist tomorrow. Garnett in place, urine clear and no sediment. No fever or chills. No new symptoms. Still having mild tenderness to pelvic but it is getting better.   - lab and test results printed and provided to pt per request to bring to urologist tomorrow.     3. Open wound of buttock, left, subsequent encounter  - scheduled wire removal next Monday by Dr. Gonzalez. Device was removed on 5/2.   - wound care as scheduled. Next visit tomorrow.       Medication Reconciliation  Medication list at end of encounter:   Current Outpatient Prescriptions   Medication Sig Dispense Refill   • tetrahydrozoline (VISINE) 0.05 % Solution Place 1 Drop in both eyes 4 times a day as needed.     • valsartan (DIOVAN) 160 MG Tab Take 160 mg by mouth every 48 hours.     • Magnesium 100 MG Cap Take 1 Cap by mouth every day.     • Cyanocobalamin 2500 MCG Chew Tab Take 1 Tab by mouth every day.     • furosemide (LASIX) 20 MG Tab Take 20 mg by mouth every day. As needed for edema     • potassium chloride (KLOR-CON) 20 MEQ Pack Take 20 mEq by mouth every day. As needed with furosemide     • bethanechol (URECHOLINE) 25 MG Tab Take 12.5 mg by mouth every day. To increase by half a tab QD starting 6/22/17     • budesonide-formoterol (SYMBICORT) 80-4.5 MCG/ACT Aerosol Inhale 2 Puffs by mouth 2 Times a Day.  Use spacer. Rinse mouth after each use. 1 Inhaler 0   • diazepam (VALIUM) 5 MG Tab Take 1 Tab by mouth every 6 hours as needed for Anxiety or Sleep. 90 Tab 2   • fluoxetine (PROZAC) 20 MG Cap Take 1 Cap by mouth every day. 90 Cap 3   • levothyroxine (SYNTHROID) 50 MCG Tab Take 1 Tab by mouth Every morning on an empty stomach. 90 Tab 3   • omeprazole (PRILOSEC) 20 MG delayed-release capsule Take 1 Cap by mouth every day. 90 Cap 3   • duloxetine (CYMBALTA) 60 MG Cap DR Particles delayed-release capsule Take 1 Cap by mouth every day. 90 Cap 2   • estradiol (ESTRACE) 0.5 MG tablet TAKE ONE TABLET BY MOUTH ONCE DAILY 90 Tab 3   • VENTOLIN  (90 BASE) MCG/ACT Aero Soln inhalation aerosol Inhale 2 Puffs by mouth every 6 hours as needed for Shortness of Breath. 1 Inhaler 5   • aspirin EC (ECOTRIN) 81 MG Tablet Delayed Response Take 81 mg by mouth every day.     • pregabalin (LYRICA) 100 MG CAPS Take 100 mg by mouth 4 times a day.     • Oxymorphone HCl ER (OPANA ER) 20 MG T12A Take 1 Tab by mouth 2 Times a Day.     • oxycodone-acetaminophen (PERCOCET) 7.5-325 MG per tablet Take 1-2 Tabs by mouth every four hours as needed (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 She is seen 2/14/14, do not fill until 4/11/14 180 Each 0   • docusate sodium (COLACE) 250 MG capsule Take 250 mg by mouth every day.     • Probiotic Product (DTT) CAPS Take 1 Cap by mouth 2 Times a Day.     • Cholecalciferol (VITAMIN D) 1000 UNIT CAPS Take 1,000 Units by mouth every day.     • nitrofurantoin monohydr macro (MACROBID) 100 MG Cap Take 100 mg by mouth 2 times a day. First 7 day course, finished around 5/12/17, second 7 day course finished in the beginning on 6/2017       No current facility-administered medications for this visit.       Primary care follow-up:  New health conditions identified during hospitalization? Yes   Labs/pathology/imaging requires future PCP follow-up?  No   Changes to  medications during hospitalization or today? No     Recommended followup: No Follow-up on file. with Shirlene Quinones M.D.   [facility] Appointments       [facility]    6/29/2017 11:00 AM Gabriela Aguilar P.T. [facility] [address] [place] [address]    7/6/2017 11:00 AM Mohini Reaves R.N. [facility] [address] [place] [address]    7/11/2017 9:30 AM Paulina Adler R.N. W[facility] [address]    7/14/2017 9:30 AM [facility] [address] [place] [address]    7/18/2017 9:30 AM Paulina Adler R.N. [facility] [address] [place] [address]    7/21/2017 1:00 PM Shirlene Quinones M.D. [facility]    7/22/2017 11:00 AM Gabriela Aguilar P.T. [facility] [address]    7/25/2017 11:00 AM Paulina Adler R.N. Wound [facility] [address]    7/28/2017 11:00 AM Gabriela [facility]ilar P[facility] [address] [place] [address]          Patient Instruction  Patient offered educational material on discharge diagnosis and management of symptoms/red flags. Patient instructed to keep follow-up appointments and to bring written questions and and actual medications to each office visit. Patient instructed to call PCP/specialist with any problems/questions/concerns. Patient verbalizes understanding and has no further questions at this time.    Face-to-face transitional care management services with high complexity medical decision making.

## 2017-06-28 NOTE — ASSESSMENT & PLAN NOTE
Device removed on 5/2/2017. Now pain managed by oral medication: ipana 20 mg bid, percocet 7.5/325 mg 1-2 tab q4hprn, lyrica.

## 2017-06-28 NOTE — MR AVS SNAPSHOT
Maddy Ewing Naveen   2017 10:20 AM   Office Visit   MRN: [id number]    Department:  [facility]   Dept Phone:  [phone]    Description:  Female : 1940   Provider:  KAIN Vazquez           Reason for Visit     Hospital Follow-up           Allergies as of 2017     Allergen Noted Reactions    Pcn [Penicillins] 2017       Sulfa Drugs 2017       Tape 2017       Macrobid [Nitrofurantoin] 2017   Rash    rash    Zoloft 2017   Unspecified    Worse depression      You were diagnosed with     Hospital discharge follow-up   [730306]       Open wound of buttock, left, subsequent encounter   [394384]         Vital Signs     Blood Pressure Pulse Temperature Respirations Height Weight    136/78 mmHg 70 36.6 °C (97.8 °F) 16 1.524 m (5') 78.019 kg (172 lb)    Body Mass Index Oxygen Saturation Smoking Status             33.59 kg/m2 95% Never Smoker          Basic Information     Date Of Birth Sex Race Ethnicity Preferred Language    1940 Female White Non- English      Your appointments     2017 11:00 AM   Wound 30 Minute Procedure with Gabriela Aguilar P.T.   [facility] Center ([address] [place] [address])    [address]  [place] [address]   [phone]            2017 11:00 AM   Wound 30 Minute Procedure with Mohini Reaves R.N.   [facility] ([address] [place] [address])    [address]  [place] [address]   [phone]            2017  9:30 AM   Wound 30 Minute Procedure with Paulina Adler R.N.   [facility] ([address] [place] [address])    [address]  [place] [address]   [phone]            2017  9:30 AM   Wound 30 Minute Procedure with Gabriela Aguilar P.T.   [facility] ([address] [place] [address])    [address]  [place] [address]   [phone]            2017  9:30 AM   Wound 30 Minute Procedure with Paulina Adler R.N.   [facility] ([address] [place] [address])    [address]  [facility]  [address]   [phone]            Jul 21, 2017  1:00 PM   Established Patient with Shirlene Quinones M.D.   [facility] (Formerly named [facility])    [address] [facility] [address]  [place] [address]   [phone]           You will be receiving a confirmation call a few days before your appointment from our automated call confirmation system.            Jul 22, 2017 11:00 AM   Wound 30 Minute Procedure with Gabriela Aguilar P.T.   [facility] ([address] [place] [address])    [address]  [place] [address]   [phone]            Jul 25, 2017 11:00 AM   Wound 30 Minute Procedure with Paulina Adler R.N.   [facility] Center ([address])    [address]  [place] [address]   [phone]            Jul 28, 2017 11:00 AM   Wound 30 Minute Procedure with Gabriela Aguilar P.T.   [facility] ([address] [place] [address])    [address]  [place] [address]   [phone]              Problem List              ICD-10-CM Priority Class Noted - Resolved    Essential hypertension I10 Medium  7/6/2009 - Present    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   Unknown - Present    Spinal stenosis, lumbar region, without neurogenic claudication M48.06 Low  Unknown - Present    Postmenopausal osteoporosis M81.0   Unknown - Present    GERD (gastroesophageal reflux disease) K21.9 Medium  9/20/2011 - Present    Facet arthritis of lumbar region M46.96 Low  3/26/2012 - Present    History of vertebral fracture Z87.81 Low  3/26/2012 - Present    History of gastritis Z87.19 Low  3/26/2012 - Present    Family history of polycystic kidney disease Z82.71 Low  Unknown - Present    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 Medium  Unknown - Present    Neuropathy (CMS-[facility]) G62.9 Low  10/17/2013 - Present    Hyponatremia E87.1   4/6/2016 - Present    Major depressive disorder, recurrent episode, mild (CMS-[facility]) F33.0 Low  5/2/2016 - Present    Esophageal dysphagia R13.14 Medium  5/19/2016 - Present    CKD (chronic kidney disease) stage 3, GFR 30-59 ml/min N18.3 Medium   5/23/2016 - Present    Hx of Spinal cord stimulator status Z96.89   9/6/2016 - Present    Essential tremor G25.0   9/6/2016 - Present    Menopausal symptoms N95.1   9/13/2016 - Present    Open wound of back S21.209A   5/17/2017 - Present    Open wound of buttock S31.809A   6/20/2017 - Present    Hypothyroidism E03.9   6/22/2017 - Present    COPD (chronic obstructive pulmonary disease) (CMS-[facility]) J44.9   6/22/2017 - Present      Health Maintenance        Date Due Completion Dates    IMM PNEUMOCOCCAL 65+ (ADULT) LOW/MEDIUM RISK SERIES (2 of 2 - PPSV23) 9/1/2020 (Originally 10/6/2016) 10/6/2015    MAMMOGRAM 12/22/2017 12/22/2015    BONE DENSITY 8/29/2018 8/29/2013, 10/19/2010, 2/13/2007    COLONOSCOPY 5/27/2019 5/27/2009 (Prv Comp)    Override on 5/27/2009: Previously completed    IMM DTaP/Tdap/Td Vaccine (2 - Td) 9/21/2023 9/21/2013            Current Immunizations     13-VALENT PCV PREVNAR 10/6/2015    Influenza TIV (IM) 11/20/2013, 10/23/2012, 10/15/2011    Influenza Vaccine Adult HD 10/6/2015, 9/23/2014    Influenza Vaccine Pediatric 9/1/2010, 10/8/2009    Influenza Vaccine Quad Inj (Pf) 10/4/2016    Pneumococcal Vaccine (UF)Historical Data 9/23/2010    SHINGLES VACCINE 3/2/2010    Tdap Vaccine 9/21/2013      Below and/or attached are the medications your provider expects you to take. Review all of your home medications and newly ordered medications with your provider and/or pharmacist. Follow medication instructions as directed by your provider and/or pharmacist. Please keep your medication list with you and share with your provider. Update the information when medications are discontinued, doses are changed, or new medications (including over-the-counter products) are added; and carry medication information at all times in the event of emergency situations     Allergies:  PCN - (reactions not documented)     SULFA DRUGS - (reactions not documented)     TAPE - (reactions not documented)     MACROBID - Rash      ZOLOFT - Unspecified               Medications  Valid as of: June 28, 2017 - 11:24 AM    Generic Name Brand Name Tablet Size Instructions for use    Albuterol Sulfate (Aero Soln) VENTOLIN  (90 BASE) MCG/ACT Inhale 2 Puffs by mouth every 6 hours as needed for Shortness of Breath.        Aspirin (Tablet Delayed Response) ECOTRIN 81 MG Take 81 mg by mouth every day.        Bethanechol Chloride (Tab) URECHOLINE 25 MG Take 12.5 mg by mouth every day. To increase by half a tab QD starting 6/22/17        Budesonide-Formoterol Fumarate (Aerosol) SYMBICORT 80-4.5 MCG/ACT Inhale 2 Puffs by mouth 2 Times a Day. Use spacer. Rinse mouth after each use.        Cholecalciferol (Cap) Vitamin D 1000 UNIT Take 1,000 Units by mouth every day.        Cyanocobalamin (Chew Tab) Cyanocobalamin 2500 MCG Take 1 Tab by mouth every day.        DiazePAM (Tab) VALIUM 5 MG Take 1 Tab by mouth every 6 hours as needed for Anxiety or Sleep.        Docusate Sodium (Cap) COLACE 250 MG Take 250 mg by mouth every day.        DULoxetine HCl (Cap DR Particles) CYMBALTA 60 MG Take 1 Cap by mouth every day.        Estradiol (Tab) ESTRACE 0.5 MG TAKE ONE TABLET BY MOUTH ONCE DAILY        FLUoxetine HCl (Cap) PROZAC 20 MG Take 1 Cap by mouth every day.        Furosemide (Tab) LASIX 20 MG Take 20 mg by mouth every day. As needed for edema        Levothyroxine Sodium (Tab) SYNTHROID 50 MCG Take 1 Tab by mouth Every morning on an empty stomach.        Magnesium (Cap) Magnesium 100 MG Take 1 Cap by mouth every day.        Nitrofurantoin Monohyd Macro (Cap) MACROBID 100 MG Take 100 mg by mouth 2 times a day. First 7 day course, finished around 5/12/17, second 7 day course finished in the beginning on 6/2017        Omeprazole (CAPSULE DELAYED RELEASE) PRILOSEC 20 MG Take 1 Cap by mouth every day.        Oxycodone-Acetaminophen (Tab) PERCOCET 7.5-325 MG Take 1-2 Tabs by mouth every four hours as needed (for back pain due to disc disease and arachnoiditis,  max 6 per day). She is seen 2/14/14, do not fill until 4/11/14        OxyMORphone HCl (Tablet Extended Release 12 hour Abuse-Deterrent) OxyMORphone HCl ER 20 MG Take 1 Tab by mouth 2 Times a Day.        Potassium Chloride (Pack) KLOR-CON 20 MEQ Take 20 mEq by mouth every day. As needed with furosemide        Pregabalin (Cap) LYRICA 100 MG Take 100 mg by mouth 4 times a day.        Probiotic Product (Cap) Ozsale  Take 1 Cap by mouth 2 Times a Day.        Tetrahydrozoline HCl (Solution) VISINE 0.05 % Place 1 Drop in both eyes 4 times a day as needed.        Valsartan (Tab) DIOVAN 160 MG Take 160 mg by mouth every 48 hours.        .                 Medicines prescribed today were sent to:     [id number] - [place], [place] - [address]    [address] [place] [address]    Phone: [phone] Fax: [phone]    Open 24 Hours?: No    [facility] - [place], [place] - [address] AT PORTAL TO REGISTERED [facility]    [address] [facility] [address]    Phone: [phone] Fax: [phone]    Open 24 Hours?: No      Medication refill instructions:       If your prescription bottle indicates you have medication refills left, it is not necessary to call your provider’s office. Please contact your pharmacy and they will refill your medication.    If your prescription bottle indicates you do not have any refills left, you may request refills at any time through one of the following ways: The online [employer] system (except Urgent Care), by calling your provider’s office, or by asking your pharmacy to contact your provider’s office with a refill request. Medication refills are processed only during regular business hours and may not be available until the next business day. Your provider may request additional information or to have a follow-up visit with you prior to refilling your medication.   *Please Note: Medication refills are assigned a new Rx number  when refilled electronically. Your pharmacy may indicate that no refills were authorized even though a new prescription for the same medication is available at the pharmacy. Please request the medicine by name with the pharmacy before contacting your provider for a refill.        Instructions    If you need further assistance, or have any questions; concerns or lingering symptoms before seeing your Primary Care Provider or specialist.     Do not hesitate to contact us.     Please contact us at the Post-Hospital Follow Up Program at [phone].   Our offices hours are Monday-Friday 8 am-5 pm.                [employer] Access Code: Activation code not generated  Current [employer] Status: Active

## 2017-06-28 NOTE — PATIENT INSTRUCTIONS
If you need further assistance, or have any questions; concerns or lingering symptoms before seeing your Primary Care Provider or specialist.     Do not hesitate to contact us.     Please contact us at the Post-Hospital Follow Up Program at [phone].   Our offices hours are Monday-Friday 8 am-5 pm.

## 2017-06-28 NOTE — ASSESSMENT & PLAN NOTE
6/21 CT Diffuse thickening of the urinary bladder wall, infection or inflammation. Mendez catheter in place.  Pt has been seeing urologist for bladder wall thickening and also recurrent UTI, probably 3 times per year. She was on macrobid and has mendez for urine retention which was removed about a week ago. After mendez removal, she has been doing self-cath for a week and found out having recurrent UTI. Mendez was placed back in the hospital. Pt has finished omnicef and she is going to see Urologist tomorrow.

## 2017-06-28 NOTE — PROGRESS NOTES
POST DISCHARGE CALL MADE BY Yohannes Ferguson  Discharge Date:6/24/2017   Date of Outreach Call: 6/26/2017  3:39 PM  Now that you're home, how are you doing? Fair  Do you have questions about your medications? No    Did you fill your medications? Yes    Do you have a follow-up appointment scheduled?Yes    Discharging Department: [place] [id number]    Number of Attempts: 1  Current or previous attempts completed within two business days of discharge? Yes  Provided education regarding treatment plan, medication, self-management, ADLs? No  Comment:Pt states she understands her d/c instructions  and medications and has no questions.  Has patient completed Advance Directive? If yes, advise them to bring to appointment. Yes  Comment:adv dir on file and scanned into Epic record  Care Manager phone number provided? Yes  Comment:Etta at [phone]  Is there anything else I can help you with? No

## 2017-06-29 ENCOUNTER — NON-PROVIDER VISIT (OUTPATIENT)
Dept: WOUND CARE | Facility: MEDICAL CENTER | Age: 77
End: 2017-06-29
Attending: FAMILY MEDICINE
Payer: MEDICARE

## 2017-06-29 PROCEDURE — 97602 WOUND(S) CARE NON-SELECTIVE: CPT

## 2017-06-29 PROCEDURE — A6402 STERILE GAUZE <= 16 SQ IN: HCPCS

## 2017-06-29 PROCEDURE — 302803 HCHG HYDROFIBER 4X4

## 2017-06-29 PROCEDURE — A6212 FOAM DRG <=16 SQ IN W/BORDER: HCPCS

## 2017-06-29 PROCEDURE — 302804 HCHG HYDROFIBER SILVER 6X6

## 2017-06-29 NOTE — WOUND TEAM
"Advanced Wound Care  [facility]  [address]  [address]  [place] [address]  [phone] Fax: [phone]    Encounter Note  For Certification Period: 5/31/2017-6/31/2017   visit  G-code  C-code  kx modifier     #7 6/29/17  [id number] ch no           Referring Physician: Stacie Garber MD  Primary Physician:        Consulting Physicians: MORENA Burks, Amandeep Gonzalez MD, Dr. Albrecht    Wound(s): L buttock pressure ulcer  Start of Care: 4/19/17  Post-op stimulator removal re-eval 5/3/17       Subjective: Pt \"scheduled for surgery to remove all the leads on Monday\"       HPI: Pt is 77 y/o female who developed a pressure sore over area of spinal stimulator on 3/27/17. Pt has had issues with this stimulator since placement back in early 2015 when she developed an infection and was treated with abx for one year per Dr. Albrecht.  Pt has seen pain MD regarding removal of stimulator but wanted to try conservative care to preserve stimulator.    5/3/17:  Pt returns to [facility] following removal spinal stimulator 5/2/17 with primary closure by Dr. Gonzalez. Vancomycin applied to wound prior to closure. New orders received 5/3/17    6/14/17: Patient returns to wound clinic for follow up and possible dc. Patients wound has reopened with undermining and exposed stimulator wires. Per patients son the stimulator was removed in May but the wires were left in place. Requesting patient be seen by provider at next visit.     6/21/17 pt admitted to the hospital with a UTI - e.coli with acute encephalopathy.  Pt received iv abx and was d/bill 6/24/17 on omnicef for 4 days.  Pt received wound care while in the hospital.  Strip packing was used.    Pain: Patient denies pain today    Current Medications:   Current outpatient prescriptions:   •  tetrahydrozoline (VISINE) 0.05 % Solution, Place 1 Drop in both eyes 4 times a day as needed., Disp: , Rfl:   •  valsartan (DIOVAN) 160 MG Tab, Take 160 mg by mouth every " 48 hours., Disp: , Rfl:   •  Magnesium 100 MG Cap, Take 1 Cap by mouth every day., Disp: , Rfl:   •  Cyanocobalamin 2500 MCG Chew Tab, Take 1 Tab by mouth every day., Disp: , Rfl:   •  furosemide (LASIX) 20 MG Tab, Take 20 mg by mouth every day. As needed for edema, Disp: , Rfl:   •  potassium chloride (KLOR-CON) 20 MEQ Pack, Take 20 mEq by mouth every day. As needed with furosemide, Disp: , Rfl:   •  nitrofurantoin monohydr macro (MACROBID) 100 MG Cap, Take 100 mg by mouth 2 times a day. First 7 day course, finished around 5/12/17, second 7 day course finished in the beginning on 6/2017, Disp: , Rfl:   •  bethanechol (URECHOLINE) 25 MG Tab, Take 12.5 mg by mouth every day. To increase by half a tab QD starting 6/22/17, Disp: , Rfl:   •  budesonide-formoterol (SYMBICORT) 80-4.5 MCG/ACT Aerosol, Inhale 2 Puffs by mouth 2 Times a Day. Use spacer. Rinse mouth after each use., Disp: 1 Inhaler, Rfl: 0  •  diazepam (VALIUM) 5 MG Tab, Take 1 Tab by mouth every 6 hours as needed for Anxiety or Sleep., Disp: 90 Tab, Rfl: 2  •  fluoxetine (PROZAC) 20 MG Cap, Take 1 Cap by mouth every day., Disp: 90 Cap, Rfl: 3  •  levothyroxine (SYNTHROID) 50 MCG Tab, Take 1 Tab by mouth Every morning on an empty stomach., Disp: 90 Tab, Rfl: 3  •  omeprazole (PRILOSEC) 20 MG delayed-release capsule, Take 1 Cap by mouth every day., Disp: 90 Cap, Rfl: 3  •  duloxetine (CYMBALTA) 60 MG Cap DR Particles delayed-release capsule, Take 1 Cap by mouth every day., Disp: 90 Cap, Rfl: 2  •  estradiol (ESTRACE) 0.5 MG tablet, TAKE ONE TABLET BY MOUTH ONCE DAILY, Disp: 90 Tab, Rfl: 3  •  VENTOLIN  (90 BASE) MCG/ACT Aero Soln inhalation aerosol, Inhale 2 Puffs by mouth every 6 hours as needed for Shortness of Breath., Disp: 1 Inhaler, Rfl: 5  •  aspirin EC (ECOTRIN) 81 MG Tablet Delayed Response, Take 81 mg by mouth every day., Disp: , Rfl:   •  pregabalin (LYRICA) 100 MG CAPS, Take 100 mg by mouth 4 times a day., Disp: , Rfl:   •  Oxymorphone HCl  ER (OPANA ER) 20 MG T12A, Take 1 Tab by mouth 2 Times a Day., Disp: , Rfl:   •  oxycodone-acetaminophen (PERCOCET) 7.5-325 MG per tablet, Take 1-2 Tabs by mouth every four hours as needed (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 She is seen 2/14/14, do not fill until 4/11/14, Disp: 180 Each, Rfl: 0  •  docusate sodium (COLACE) 250 MG capsule, Take 250 mg by mouth every day., Disp: , Rfl:   •  Probiotic Product ([employer]) CAPS, Take 1 Cap by mouth 2 Times a Day., Disp: , Rfl:   •  Cholecalciferol (VITAMIN D) 1000 UNIT CAPS, Take 1,000 Units by mouth every day., Disp: , Rfl:     Allergies:   PCN, Tape, Zoloft, Sulfa    Past Surgical History:   Past Surgical History   Procedure Laterality Date   • Lumbar laminectomy diskectomy     • Hysterectomy, total abdominal  1976   • Colonoscopy  2000,5/27/09     normal   • Egd with asp/bx  5/27/09     erosive gastritis   • Appendectomy  1976   • Spinal cord stimulator  9/2/14   • Cataract extraction with iol Bilateral    • Gastroscopy with balloon dilatation N/A 5/19/2016     Procedure: GASTROSCOPY WITH DILATATION;  Surgeon: Tony Monae M.D.;  Location: SURGERY [facility];  Service:    • Hysterectomy laparoscopy     • Tonsillectomy             Objective:      Tests and Measures:6/26/17 culture taken 6/20/17 and was negative    Orthotic, protective, supportive devices: none    Fall Risk Assessment (rogelio all that apply with an X): 6/29/17 (+) fall risk     Wound Characteristics                                                    Location:  Left lateral buttock  Initial Evaluation  Date: 4/19/17 6/29/2017   Tissue Type and %: Open portal with yellow borders  75%exposed wires; 25%pink   Periwound: Purple ring surrounding portal Intact   Drainage: Mod to heavy dark yellow/tan serous fluid Heavy tan   Exposed structures Stimulator visible and palpable  STIMULATOR WIRES/LEADS   Wound Edges:   frayed Open, detached   Odor: absent  None   S&S of Infection:   Large pocket with heavy drainage opened Exposed hardware, exudate, evolving wound   Edema: U/a None   Sensation: intact Intact               Measurements:  Left lateral buttock Initial Evaluation  Date:4/19/17 Re-eval  Date: 05/3/2017 Encounter 6/29/2017   Length (cm) 1.3 approximated 1.0   Width (cm) 0.7 5.5 1.5   Depth (cm) 1.3 ua 0.3   Area (cm2) 0.91cm2     Tract/undermine 11:00=5cm.   9:00=1.7cm  3.5 at 10:00-2:00        Procedures:     Debridement: non-Selective    Cleansed with:  NS                                                                 Periwound protected with: Skin prep   Primary dressing:Aquacel ag strip, hydrofiber topper for additional exudate management   Secondary Dressing: silicone adhesive foam   Other:     Patient Education: wound progress, dressing maintenance    Professional Collaboration:Pt seen by Dr Gonzalez's PA. Determined surgery to remove all components of stimulator indicated; to be completed 7/3/17 @ [facility] . Pt to resume wound care ost-op to monitor wound  Assessment:      Wound etiology: pressure, implant, questionable deeper infecion     Wound Progress: evolving    Rationale for Treatment:AqAg to manage bioburden, absorb exudate, and maintain moist wound environment without laterally wicking exudate therefore reducing meghann-wound maceration    Patient tolerance/compliance: son and pt active in care and would just like to do whatever is best to expedite healing    Complicating factors:stimulator leads exposed; infection    Need for ongoing Advanced Wound Care services: Pt requires continued skilled wound care for selective/non-selective debridement prn, dressing management and application, patient education, and clinical observation       Plan:      Treatment Plan and Recommendations:  Diagnosis/ICD10: L89.323 Left buttock pressure ulcer stage 3  5/3/17: Post-op incision    Procedures/CPT: sharp debridement, non selective     Frequency: once every two  weeks      Treatment Goals: STG 2 Weeks  LTG 4 Weeks   Granulation Tissue: Free from s/s infection resolution   Decrease Necrotic Tissue to:     Wound Phase:      Decrease Size by:  %   Periwound:      Decrease tracts/undermining by:  10%             At the time of each visit a thorough assessment of the patient is completed to assure the  appropriateness of our plan of care.  The dressings or modalities may need to be adapted   from the original plan to address any significant changes in the wound environment.

## 2017-06-30 ENCOUNTER — APPOINTMENT (OUTPATIENT)
Dept: ADMISSIONS | Facility: MEDICAL CENTER | Age: 77
End: 2017-06-30
Attending: PHYSICAL MEDICINE & REHABILITATION
Payer: MEDICARE

## 2017-06-30 RX ORDER — CEFDINIR 300 MG/1
300 CAPSULE ORAL 2 TIMES DAILY
COMMUNITY
End: 2017-07-21

## 2017-06-30 RX ORDER — DULOXETIN HYDROCHLORIDE 60 MG/1
60 CAPSULE, DELAYED RELEASE ORAL EVERY EVENING
COMMUNITY
End: 2017-10-26

## 2017-07-01 ENCOUNTER — NON-PROVIDER VISIT (OUTPATIENT)
Dept: WOUND CARE | Facility: MEDICAL CENTER | Age: 77
End: 2017-07-01
Attending: FAMILY MEDICINE
Payer: MEDICARE

## 2017-07-01 PROCEDURE — A6212 FOAM DRG <=16 SQ IN W/BORDER: HCPCS

## 2017-07-01 PROCEDURE — G8991 OTHER PT/OT GOAL STATUS: HCPCS | Mod: CH

## 2017-07-01 PROCEDURE — 97602 WOUND(S) CARE NON-SELECTIVE: CPT

## 2017-07-01 PROCEDURE — A6402 STERILE GAUZE <= 16 SQ IN: HCPCS

## 2017-07-01 PROCEDURE — G8990 OTHER PT/OT CURRENT STATUS: HCPCS | Mod: CH

## 2017-07-01 PROCEDURE — G8992 OTHER PT/OT  D/C STATUS: HCPCS | Mod: CH

## 2017-07-01 NOTE — CERTIFICATION
"Advanced Wound Care  [facility]  [address]  [address]  [place] [address]  [phone] Fax: [phone]    Discharge Encounter Note  For Certification Period: 5/31/2017-6/31/2017   visit  G-code  C-code  kx modifier     #8 7/1/17  1790/2140 ch no           Referring Physician: Stacie Garber MD  Primary Physician:        Consulting Physicians: MORENA Burks, Amandeep Gonzalez MD, Dr. Albrecht    Wound(s): L buttock pressure ulcer  Start of Care: 4/19/17  Post-op stimulator removal re-eval 5/3/17       Subjective: Pt \"scheduled for surgery to remove all the leads on Monday\"       HPI: Pt is 77 y/o female who developed a pressure sore over area of spinal stimulator on 3/27/17. Pt has had issues with this stimulator since placement back in early 2015 when she developed an infection and was treated with abx for one year per Dr. Albrecht.  Pt has seen pain MD regarding removal of stimulator but wanted to try conservative care to preserve stimulator.    5/3/17:  Pt returns to [facility] following removal spinal stimulator 5/2/17 with primary closure by Dr. Gonzalez. Vancomycin applied to wound prior to closure. New orders received 5/3/17    6/14/17: Patient returns to wound clinic for follow up and possible dc. Patients wound has reopened with undermining and exposed stimulator wires. Per patients son the stimulator was removed in May but the wires were left in place. Requesting patient be seen by provider at next visit.     6/21/17 pt admitted to the hospital with a UTI - e.coli with acute encephalopathy.  Pt received iv abx and was d/bill 6/24/17 on omnicef for 4 days.  Pt received wound care while in the hospital.  Strip packing was used.    Pain: Patient denies pain today    Current Medications:   Current outpatient prescriptions:   •  cefdinir (OMNICEF) 300 MG Cap, Take 300 mg by mouth 2 times a day., Disp: , Rfl:   •  duloxetine (CYMBALTA) 60 MG Cap DR Particles delayed-release capsule, Take 60 " mg by mouth every evening., Disp: , Rfl:   •  valsartan (DIOVAN) 160 MG Tab, Take 160 mg by mouth every 48 hours., Disp: , Rfl:   •  Magnesium 100 MG Cap, Take 1 Cap by mouth every day., Disp: , Rfl:   •  Cyanocobalamin 2500 MCG Chew Tab, Take 1 Tab by mouth every day., Disp: , Rfl:   •  furosemide (LASIX) 20 MG Tab, Take 20 mg by mouth every day. As needed for edema, Disp: , Rfl:   •  potassium chloride (KLOR-CON) 20 MEQ Pack, Take 20 mEq by mouth every day. As needed with furosemide, Disp: , Rfl:   •  bethanechol (URECHOLINE) 25 MG Tab, Take 25 mg by mouth every day. To increase by half a tab QD starting 6/22/17, Disp: , Rfl:   •  budesonide-formoterol (SYMBICORT) 80-4.5 MCG/ACT Aerosol, Inhale 2 Puffs by mouth 2 Times a Day. Use spacer. Rinse mouth after each use., Disp: 1 Inhaler, Rfl: 0  •  diazepam (VALIUM) 5 MG Tab, Take 1 Tab by mouth every 6 hours as needed for Anxiety or Sleep., Disp: 90 Tab, Rfl: 2  •  fluoxetine (PROZAC) 20 MG Cap, Take 1 Cap by mouth every day., Disp: 90 Cap, Rfl: 3  •  levothyroxine (SYNTHROID) 50 MCG Tab, Take 1 Tab by mouth Every morning on an empty stomach., Disp: 90 Tab, Rfl: 3  •  omeprazole (PRILOSEC) 20 MG delayed-release capsule, Take 1 Cap by mouth every day., Disp: 90 Cap, Rfl: 3  •  estradiol (ESTRACE) 0.5 MG tablet, TAKE ONE TABLET BY MOUTH ONCE DAILY, Disp: 90 Tab, Rfl: 3  •  VENTOLIN  (90 BASE) MCG/ACT Aero Soln inhalation aerosol, Inhale 2 Puffs by mouth every 6 hours as needed for Shortness of Breath., Disp: 1 Inhaler, Rfl: 5  •  aspirin EC (ECOTRIN) 81 MG Tablet Delayed Response, Take 81 mg by mouth every day., Disp: , Rfl:   •  pregabalin (LYRICA) 100 MG CAPS, Take 100 mg by mouth 4 times a day., Disp: , Rfl:   •  Oxymorphone HCl ER (OPANA ER) 20 MG T12A, Take 1 Tab by mouth 2 Times a Day., Disp: , Rfl:   •  oxycodone-acetaminophen (PERCOCET) 7.5-325 MG per tablet, Take 1-2 Tabs by mouth every four hours as needed (for back pain due to disc disease and  arachnoiditis, max 6 per day). She is seen 2/14/14, do not fill until 4/11/14, Disp: 180 Each, Rfl: 0  •  docusate sodium (COLACE) 250 MG capsule, Take 250 mg by mouth every day., Disp: , Rfl:   •  Probiotic Product ([employer]Volume) CAPS, Take 1 Cap by mouth 2 Times a Day., Disp: , Rfl:   •  Cholecalciferol (VITAMIN D) 1000 UNIT CAPS, Take 1,000 Units by mouth every day., Disp: , Rfl:     Allergies:   PCN, Tape, Zoloft, Sulfa    Past Surgical History:   Past Surgical History   Procedure Laterality Date   • Lumbar laminectomy diskectomy     • Hysterectomy, total abdominal  1976   • Colonoscopy  2000,5/27/09     normal   • Egd with asp/bx  5/27/09     erosive gastritis   • Appendectomy  1976   • Spinal cord stimulator  9/2/14   • Cataract extraction with iol Bilateral    • Gastroscopy with balloon dilatation N/A 5/19/2016     Procedure: GASTROSCOPY WITH DILATATION;  Surgeon: Tony Monae M.D.;  Location: SURGERY [facility];  Service:    • Hysterectomy laparoscopy     • Tonsillectomy             Objective:      Tests and Measures:6/26/17 culture taken 6/20/17 and was negative    Orthotic, protective, supportive devices: none    Fall Risk Assessment (rogelio all that apply with an X): 6/29/17 (+) fall risk     Wound Characteristics                                                    Location:  Left lateral buttock  Initial Evaluation  Date: 4/19/17 7/1/2017   Tissue Type and %: Open portal with yellow borders  75%exposed wires; 25%pink   Periwound: Purple ring surrounding portal Intact   Drainage: Mod to heavy dark yellow/tan serous fluid Mod Heavy perry   Exposed structures Stimulator visible and palpable  STIMULATOR WIRES/LEADS   Wound Edges:   frayed Open, detached   Odor: absent None   S&S of Infection:   Large pocket with heavy drainage opened Exposed hardware, exudate, evolving wound   Edema: U/a None   Sensation: intact Intact               Measurements:  Left lateral buttock Initial  Evaluation  Date:4/19/17 Re-eval  Date: 05/3/2017 Encounter 6/29/2017   Length (cm) 1.3 approximated 1.0   Width (cm) 0.7 5.5 1.5   Depth (cm) 1.3 ua 0.3   Area (cm2) 0.91cm2     Tract/undermine 11:00=5cm.   9:00=1.7cm  3.5 at 10:00-2:00        Procedures:     Debridement: non-Selective    Cleansed with:  NS                                                                 Periwound protected with: Skin prep   Primary dressing:Aquacel ag strip, hydrofiber topper for additional exudate management   Secondary Dressing: silicone adhesive foam   Other:     Patient Education: wound progress, dressing maintenance    Professional Collaboration:Pt seen by Dr Gonzalez's PA. Determined surgery to remove all components of stimulator indicated; to be completed 7/3/17 @ [facility] . Pt to resume wound care ost-op to monitor wound  Assessment:      Wound etiology: pressure, implant, questionable deeper infecion     Wound Progress: evolving    Rationale for Treatment:AqAg to manage bioburden, absorb exudate, and maintain moist wound environment without laterally wicking exudate therefore reducing meghann-wound maceration    Patient tolerance/compliance: son and pt active in care and would just like to do whatever is best to expedite healing    Complicating factors:stimulator leads exposed; infection    Need for ongoing Advanced Wound Care services: Pt requires continued skilled wound care for selective/non-selective debridement prn, dressing management and application, patient education, and clinical observation       Plan:      Treatment Plan and Recommendations:  Diagnosis/ICD10: L89.323 Left buttock pressure ulcer stage 3  5/3/17: Post-op incision    Procedures/CPT: sharp debridement, non selective     Frequency: DC for planned surg 7/3/17; resume care post-op as MD orders    Treatment Goals: STG 2 Weeks  LTG 4 Weeks   Granulation Tissue: Free from s/s infection resolution   Decrease Necrotic Tissue to:     Wound Phase:      Decrease  Size by:  %   Periwound:      Decrease tracts/undermining by:  10%           Gabriela Aguilar [email or web address].

## 2017-07-03 ENCOUNTER — PATIENT OUTREACH (OUTPATIENT)
Dept: HEALTH INFORMATION MANAGEMENT | Facility: OTHER | Age: 77
End: 2017-07-03

## 2017-07-03 ENCOUNTER — APPOINTMENT (OUTPATIENT)
Dept: RADIOLOGY | Facility: MEDICAL CENTER | Age: 77
End: 2017-07-03
Attending: PHYSICAL MEDICINE & REHABILITATION
Payer: MEDICARE

## 2017-07-03 ENCOUNTER — HOSPITAL ENCOUNTER (OUTPATIENT)
Facility: MEDICAL CENTER | Age: 77
End: 2017-07-03
Attending: PHYSICAL MEDICINE & REHABILITATION | Admitting: PHYSICAL MEDICINE & REHABILITATION
Payer: MEDICARE

## 2017-07-03 VITALS
OXYGEN SATURATION: 91 % | WEIGHT: 166.45 LBS | HEIGHT: 60 IN | TEMPERATURE: 97.1 F | HEART RATE: 66 BPM | RESPIRATION RATE: 12 BRPM | BODY MASS INDEX: 32.68 KG/M2

## 2017-07-03 PROBLEM — M96.1 POSTLAMINECTOMY SYNDROME, UNSPECIFIED REGION: Status: ACTIVE | Noted: 2017-07-03

## 2017-07-03 PROCEDURE — 160048 HCHG OR STATISTICAL LEVEL 1-5: Performed by: PHYSICAL MEDICINE & REHABILITATION

## 2017-07-03 PROCEDURE — A9270 NON-COVERED ITEM OR SERVICE: HCPCS

## 2017-07-03 PROCEDURE — 502240 HCHG MISC OR SUPPLY RC 0272: Performed by: PHYSICAL MEDICINE & REHABILITATION

## 2017-07-03 PROCEDURE — 160025 RECOVERY II MINUTES (STATS): Performed by: PHYSICAL MEDICINE & REHABILITATION

## 2017-07-03 PROCEDURE — 160029 HCHG SURGERY MINUTES - 1ST 30 MINS LEVEL 4: Performed by: PHYSICAL MEDICINE & REHABILITATION

## 2017-07-03 PROCEDURE — A6222 GAUZE <=16 IN NO W/SAL W/O B: HCPCS | Performed by: PHYSICAL MEDICINE & REHABILITATION

## 2017-07-03 PROCEDURE — 160047 HCHG PACU  - EA ADDL 30 MINS PHASE II: Performed by: PHYSICAL MEDICINE & REHABILITATION

## 2017-07-03 PROCEDURE — 501445 HCHG STAPLER, SKIN DISP: Performed by: PHYSICAL MEDICINE & REHABILITATION

## 2017-07-03 PROCEDURE — 700101 HCHG RX REV CODE 250

## 2017-07-03 PROCEDURE — 501838 HCHG SUTURE GENERAL: Performed by: PHYSICAL MEDICINE & REHABILITATION

## 2017-07-03 PROCEDURE — 160046 HCHG PACU - 1ST 60 MINS PHASE II: Performed by: PHYSICAL MEDICINE & REHABILITATION

## 2017-07-03 PROCEDURE — 700111 HCHG RX REV CODE 636 W/ 250 OVERRIDE (IP)

## 2017-07-03 PROCEDURE — 160002 HCHG RECOVERY MINUTES (STAT): Performed by: PHYSICAL MEDICINE & REHABILITATION

## 2017-07-03 PROCEDURE — A6402 STERILE GAUZE <= 16 SQ IN: HCPCS | Performed by: PHYSICAL MEDICINE & REHABILITATION

## 2017-07-03 PROCEDURE — 160009 HCHG ANES TIME/MIN: Performed by: PHYSICAL MEDICINE & REHABILITATION

## 2017-07-03 PROCEDURE — 700102 HCHG RX REV CODE 250 W/ 637 OVERRIDE(OP)

## 2017-07-03 PROCEDURE — 160035 HCHG PACU - 1ST 60 MINS PHASE I: Performed by: PHYSICAL MEDICINE & REHABILITATION

## 2017-07-03 PROCEDURE — 160041 HCHG SURGERY MINUTES - EA ADDL 1 MIN LEVEL 4: Performed by: PHYSICAL MEDICINE & REHABILITATION

## 2017-07-03 PROCEDURE — 500864 HCHG NEEDLE, SPINAL 18G: Performed by: PHYSICAL MEDICINE & REHABILITATION

## 2017-07-03 RX ORDER — BUPIVACAINE HYDROCHLORIDE AND EPINEPHRINE 5; 5 MG/ML; UG/ML
INJECTION, SOLUTION EPIDURAL; INTRACAUDAL; PERINEURAL
Status: DISCONTINUED | OUTPATIENT
Start: 2017-07-03 | End: 2017-07-03 | Stop reason: HOSPADM

## 2017-07-03 RX ORDER — LIDOCAINE HYDROCHLORIDE 10 MG/ML
INJECTION, SOLUTION INFILTRATION; PERINEURAL
Status: DISCONTINUED | OUTPATIENT
Start: 2017-07-03 | End: 2017-07-03 | Stop reason: HOSPADM

## 2017-07-03 RX ORDER — VANCOMYCIN HYDROCHLORIDE 500 MG/10ML
INJECTION, POWDER, LYOPHILIZED, FOR SOLUTION INTRAVENOUS
Status: DISCONTINUED | OUTPATIENT
Start: 2017-07-03 | End: 2017-07-03 | Stop reason: HOSPADM

## 2017-07-03 RX ORDER — LIDOCAINE HYDROCHLORIDE 10 MG/ML
INJECTION, SOLUTION INFILTRATION; PERINEURAL
Status: COMPLETED
Start: 2017-07-03 | End: 2017-07-03

## 2017-07-03 RX ORDER — OXYCODONE HYDROCHLORIDE AND ACETAMINOPHEN 5; 325 MG/1; MG/1
TABLET ORAL
Status: COMPLETED
Start: 2017-07-03 | End: 2017-07-03

## 2017-07-03 RX ORDER — SODIUM CHLORIDE, SODIUM LACTATE, POTASSIUM CHLORIDE, CALCIUM CHLORIDE 600; 310; 30; 20 MG/100ML; MG/100ML; MG/100ML; MG/100ML
INJECTION, SOLUTION INTRAVENOUS CONTINUOUS
Status: DISCONTINUED | OUTPATIENT
Start: 2017-07-03 | End: 2017-07-03 | Stop reason: HOSPADM

## 2017-07-03 RX ADMIN — LIDOCAINE HYDROCHLORIDE: 10 INJECTION, SOLUTION INFILTRATION; PERINEURAL at 08:15

## 2017-07-03 RX ADMIN — SODIUM CHLORIDE, SODIUM LACTATE, POTASSIUM CHLORIDE, CALCIUM CHLORIDE: 600; 310; 30; 20 INJECTION, SOLUTION INTRAVENOUS at 08:15

## 2017-07-03 RX ADMIN — OXYCODONE AND ACETAMINOPHEN 2 TABLET: 5; 325 TABLET ORAL at 10:00

## 2017-07-03 ASSESSMENT — PAIN SCALES - GENERAL
PAINLEVEL_OUTOF10: 6
PAINLEVEL_OUTOF10: 5
PAINLEVEL_OUTOF10: 5
PAINLEVEL_OUTOF10: 6
PAINLEVEL_OUTOF10: 5
PAINLEVEL_OUTOF10: 6
PAINLEVEL_OUTOF10: 5
PAINLEVEL_OUTOF10: 0

## 2017-07-03 NOTE — IP AVS SNAPSHOT
Home Care Instructions                                                                                                                Name:Maddy Hodge  Medical Record Number:[id number]  CSN: [id number]    YOB: 1940   Age: 76 y.o.  Sex: female  HT:1.524 m (5') WT: 75.5 kg (166 lb 7.2 oz)          Admit Date: 7/3/2017     Discharge Date:   Today's Date: 7/3/2017  Attending Doctor:  Amandeep Gonzalez D.O.                  Allergies:  Pcn; Sulfa drugs; Tape; Macrobid; and Zoloft                Discharge Instructions         ACTIVITY: Rest and take it easy for the first 24 hours.  A responsible adult is recommended to remain with you during that time.  It is normal to feel sleepy.  We encourage you to not do anything that requires balance, judgment or coordination.    MILD FLU-LIKE SYMPTOMS ARE NORMAL. YOU MAY EXPERIENCE GENERALIZED MUSCLE ACHES, THROAT IRRITATION, HEADACHE AND/OR SOME NAUSEA.    FOR 24 HOURS DO NOT:  Drive, operate machinery or run household appliances.  Drink beer or alcoholic beverages.   Make important decisions or sign legal documents.    SPECIAL INSTRUCTIONS:     Okay to use ice over incision.  Okay to changes bandages twice a day.   Okay to shower 72 hours after procedure, DO NOT SOAK INCISION SITE in pools, hot tubes, or baths until the staples are removed.  Activity as tolerated.  Regular diet.  Call [phone] for any questions/concerns.  Follow up as scheduled.      DIET: To avoid nausea, slowly advance diet as tolerated, avoiding spicy or greasy foods for the first day.  Add more substantial food to your diet according to your physician's instructions. INCREASE FLUIDS AND FIBER TO AVOID CONSTIPATION.    SURGICAL DRESSING/BATHING: Okay to remove your dressings in 72 hours (thursday 7/6) and shower. Then change your bandage and/or change your bandage as needed. Do not take baths, use hot tubs or go swimming until cleared by your MD.    FOLLOW-UP APPOINTMENT:  A follow-up  appointment should be arranged with your doctor in 1-2 weeks; call to schedule or confirm.    You should CALL YOUR PHYSICIAN if you develop:  Fever greater than 101 degrees F.  Pain not relieved by medication, or persistent nausea or vomiting.  Excessive bleeding (blood soaking through dressing) or unexpected drainage from the wound.  Extreme redness or swelling around the incision site, drainage of pus or foul smelling drainage.  Inability to urinate or empty your bladder within 8 hours.  Problems with breathing or chest pain.    You should call 911 if you develop problems with breathing or chest pain.  If you are unable to contact your doctor or surgical center, you should go to the nearest emergency room or urgent care center.  Physician's telephone #: [phone]    If any questions arise, call your doctor.  If your doctor is not available, please feel free to call the Surgical Center at [phone].  The Center is open Monday through Friday from 7AM to 7PM.  You can also call the [employer] HOTLINE open 24 hours/day, 7 days/week and speak to a nurse at [phone], or toll free at [phone].    A registered nurse may call you a few days after your surgery to see how you are doing after your procedure.    MEDICATIONS: Resume taking daily medication.  Take prescribed pain medication with food.  If no medication is prescribed, you may take non-aspirin pain medication if needed.  PAIN MEDICATION CAN BE VERY CONSTIPATING.  Take a stool softener or laxative such as senokot, pericolace, or milk of magnesia if needed.    Prescriptions already at home.  Last pain medication given at 10:00 am .    If your physician has prescribed pain medication that includes Acetaminophen (Tylenol), do not take additional Acetaminophen (Tylenol) while taking the prescribed medication.    Depression / Suicide Risk    As you are discharged from this [facility] facility, it is important to learn how to keep safe from harming  yourself.    Recognize the warning signs:  · Abrupt changes in personality, positive or negative- including increase in energy   · Giving away possessions  · Change in eating patterns- significant weight changes-  positive or negative  · Change in sleeping patterns- unable to sleep or sleeping all the time   · Unwillingness or inability to communicate  · Depression  · Unusual sadness, discouragement and loneliness  · Talk of wanting to die  · Neglect of personal appearance   · Rebelliousness- reckless behavior  · Withdrawal from people/activities they love  · Confusion- inability to concentrate     If you or a loved one observes any of these behaviors or has concerns about self-harm, here's what you can do:  · Talk about it- your feelings and reasons for harming yourself  · Remove any means that you might use to hurt yourself (examples: pills, rope, extension cords, firearm)  · Get professional help from the community (Mental Health, Substance Abuse, psychological counseling)  · Do not be alone:Call your Safe Contact- someone whom you trust who will be there for you.  · Call your local CRISIS HOTLINE 141-0861 or 043-926-3972  · Call your local Children's Mobile Crisis Response Team [place] (731) 265-8497 or www.[employer]  · Call the toll free National Suicide Prevention Hotlines   · National Suicide Prevention Lifeline 127-599-RLBI (5592)  · National Hope Line Network 800-SUICIDE (306-7841)       Medication List      ASK your doctor about these medications        Instructions    Morning Afternoon Evening Bedtime    aspirin EC 81 MG Tbec   Commonly known as:  ECOTRIN        Take 81 mg by mouth every day.   Dose:  81 mg                        bethanechol 25 MG Tabs   Commonly known as:  URECHOLINE        Take 25 mg by mouth every day. To increase by half a tab QD starting 6/22/17   Dose:  25 mg                        budesonide-formoterol 80-4.5 MCG/ACT Aero   Commonly known as:  SYMBICORT        Inhale 2  Puffs by mouth 2 Times a Day. Use spacer. Rinse mouth after each use.   Dose:  2 Puff                        cefdinir 300 MG Caps   Commonly known as:  OMNICEF        Take 300 mg by mouth 2 times a day.   Dose:  300 mg                        Cyanocobalamin 2500 MCG Chew        Take 1 Tab by mouth every day.   Dose:  1 Tab                        diazepam 5 MG Tabs   Commonly known as:  VALIUM        Doctor's comments:  Prescription has been cleared with Laura at Dr. Gonzalez's office.  Last seen in my office on 3/24/17   Take 1 Tab by mouth every 6 hours as needed for Anxiety or Sleep.   Dose:  5 mg                        docusate sodium 250 MG capsule   Commonly known as:  COLACE        Take 250 mg by mouth every day.   Dose:  250 mg                        duloxetine 60 MG Cpep delayed-release capsule   Commonly known as:  CYMBALTA        Take 60 mg by mouth every evening.   Dose:  60 mg                        estradiol 0.5 MG tablet   Commonly known as:  ESTRACE        TAKE ONE TABLET BY MOUTH ONCE DAILY                        fluoxetine 20 MG Caps   Commonly known as:  PROZAC        Take 1 Cap by mouth every day.   Dose:  20 mg                        furosemide 20 MG Tabs   Commonly known as:  LASIX        Take 20 mg by mouth every day. As needed for edema   Dose:  20 mg                        levothyroxine 50 MCG Tabs   Commonly known as:  SYNTHROID        Take 1 Tab by mouth Every morning on an empty stomach.   Dose:  50 mcg                        Magnesium 100 MG Caps        Take 1 Cap by mouth every day.   Dose:  1 Cap                        omeprazole 20 MG delayed-release capsule   Commonly known as:  PRILOSEC        Take 1 Cap by mouth every day.   Dose:  20 mg                        OPANA ER 20 MG T12a   Generic drug:  OxyMORphone HCl ER        Take 1 Tab by mouth 2 Times a Day.   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 She is seen 2/14/14, do not fill until 4/11/14   Dose:  1-2 Tab                        TAN [employer] Caps        Take 1 Cap by mouth 2 Times a Day.   Dose:  1 Cap                        potassium chloride 20 MEQ Pack   Commonly known as:  KLOR-CON        Take 20 mEq by mouth every day. As needed with furosemide   Dose:  20 mEq                        pregabalin 100 MG Caps   Commonly known as:  LYRICA        Take 100 mg by mouth 4 times a day.   Dose:  100 mg                        valsartan 160 MG Tabs   Commonly known as:  DIOVAN        Take 160 mg by mouth every 48 hours.   Dose:  160 mg                        VENTOLIN  (90 BASE) MCG/ACT Aers inhalation aerosol   Generic drug:  albuterol        Inhale 2 Puffs by mouth every 6 hours as needed for Shortness of Breath.   Dose:  2 Puff                        Vitamin D 1000 UNIT Caps        Take 1,000 Units by mouth every day.   Dose:  1000 Units                                Medication Information     Above and/or attached are the medications your physician expects you to take upon discharge. Review all of your home medications and newly ordered medications with your doctor and/or pharmacist. Follow medication instructions as directed by your doctor and/or pharmacist. Please keep your medication list with you and share with your physician. Update the information when medications are discontinued, doses are changed, or new medications (including over-the-counter products) are added; and carry medication information at all times in the event of emergency situations.        Resources     Quit Smoking / Tobacco Use:    I understand the use of any tobacco products increases my chance of suffering from future heart disease or stroke and could cause other illnesses which may shorten my life. Quitting the use of tobacco products is the single most  important thing I can do to improve my health. For further information on smoking / tobacco cessation call a Toll Free Quit Line at 1-584.875.4182 (*[employer]) or 1-124.616.1716 (American Lung Association) or you can access the web based program at www.lungusa.org.    [place] Tobacco Users Help Line:  (929) 912-9762       Toll Free: 1-156.518.9023  Quit Tobacco Program [facility] (185)311-2137    Crisis Hotline:    [place] Crisis Hotline:  9-617-JZYJRSI or 1-479.929.5173    [place] Crisis Hotline:    1-928.201.8128 or 111-816-6643    Discharge Survey:   Thank you for choosing [facility]. We hope we did everything we could to make your hospital stay a pleasant one. You may be receiving a survey and we would appreciate your time and participation in answering the questions. Your input is very valuable to us in our efforts to improve our service to our patients and their families.            Signatures     My signature on this form indicates that:    1. I acknowledge receipt and understanding of these Home Care Instruction.  2. My questions regarding this information have been answered to my satisfaction.  3. I have formulated a plan with my discharge nurse to obtain my prescribed medications for home.    __________________________________      __________________________________                   Patient Signature                                 Guardian/Responsible Adult Signature      __________________________________                 __________       ________                       Nurse Signature                                               Date                 Time

## 2017-07-03 NOTE — OR NURSING
Pt A+Ox4, VSS, pain tolerable, denies nausea, report called to discharge, and pt transported via gurney to discharge.

## 2017-07-03 NOTE — DISCHARGE INSTRUCTIONS
ACTIVITY: Rest and take it easy for the first 24 hours.  A responsible adult is recommended to remain with you during that time.  It is normal to feel sleepy.  We encourage you to not do anything that requires balance, judgment or coordination.    MILD FLU-LIKE SYMPTOMS ARE NORMAL. YOU MAY EXPERIENCE GENERALIZED MUSCLE ACHES, THROAT IRRITATION, HEADACHE AND/OR SOME NAUSEA.    FOR 24 HOURS DO NOT:  Drive, operate machinery or run household appliances.  Drink beer or alcoholic beverages.   Make important decisions or sign legal documents.    SPECIAL INSTRUCTIONS:     Okay to use ice over incision.  Okay to changes bandages twice a day.   Okay to shower 72 hours after procedure, DO NOT SOAK INCISION SITE in pools, hot tubes, or baths until the staples are removed.  Activity as tolerated.  Regular diet.  Call [phone] for any questions/concerns.  Follow up as scheduled.      DIET: To avoid nausea, slowly advance diet as tolerated, avoiding spicy or greasy foods for the first day.  Add more substantial food to your diet according to your physician's instructions. INCREASE FLUIDS AND FIBER TO AVOID CONSTIPATION.    SURGICAL DRESSING/BATHING: Okay to remove your dressings in 72 hours (thursday 7/6) and shower. Then change your bandage and/or change your bandage as needed. Do not take baths, use hot tubs or go swimming until cleared by your MD.    FOLLOW-UP APPOINTMENT:  A follow-up appointment should be arranged with your doctor in 1-2 weeks; call to schedule or confirm.    You should CALL YOUR PHYSICIAN if you develop:  Fever greater than 101 degrees F.  Pain not relieved by medication, or persistent nausea or vomiting.  Excessive bleeding (blood soaking through dressing) or unexpected drainage from the wound.  Extreme redness or swelling around the incision site, drainage of pus or foul smelling drainage.  Inability to urinate or empty your bladder within 8 hours.  Problems with breathing or chest pain.    You should  call [phone] if you develop problems with breathing or chest pain.  If you are unable to contact your doctor or surgical center, you should go to the nearest emergency room or urgent care center.  Physician's telephone #: [phone]    If any questions arise, call your doctor.  If your doctor is not available, please feel free to call the Surgical Center at [phone].  The Center is open Monday through Friday from 7AM to 7PM.  You can also call the HEALTH HOTLINE open 24 hours/day, 7 days/week and speak to a nurse at [phone], or toll free at [phone].    A registered nurse may call you a few days after your surgery to see how you are doing after your procedure.    MEDICATIONS: Resume taking daily medication.  Take prescribed pain medication with food.  If no medication is prescribed, you may take non-aspirin pain medication if needed.  PAIN MEDICATION CAN BE VERY CONSTIPATING.  Take a stool softener or laxative such as senokot, pericolace, or milk of magnesia if needed.    Prescriptions already at home.  Last pain medication given at 10:00 am .    If your physician has prescribed pain medication that includes Acetaminophen (Tylenol), do not take additional Acetaminophen (Tylenol) while taking the prescribed medication.    Depression / Suicide Risk    As you are discharged from this [facility] facility, it is important to learn how to keep safe from harming yourself.    Recognize the warning signs:  · Abrupt changes in personality, positive or negative- including increase in energy   · Giving away possessions  · Change in eating patterns- significant weight changes-  positive or negative  · Change in sleeping patterns- unable to sleep or sleeping all the time   · Unwillingness or inability to communicate  · Depression  · Unusual sadness, discouragement and loneliness  · Talk of wanting to die  · Neglect of personal appearance   · Rebelliousness- reckless behavior  · Withdrawal from people/activities  they love  · Confusion- inability to concentrate     If you or a loved one observes any of these behaviors or has concerns about self-harm, here's what you can do:  · Talk about it- your feelings and reasons for harming yourself  · Remove any means that you might use to hurt yourself (examples: pills, rope, extension cords, firearm)  · Get professional help from the community (Mental Health, Substance Abuse, psychological counseling)  · Do not be alone:Call your Safe Contact- someone whom you trust who will be there for you.  · Call your local CRISIS HOTLINE 888-1892 or 685-459-5942  · Call your local Children's Mobile Crisis Response Team [place] (072) 058-7711 or www.[employer]  · Call the toll free National Suicide Prevention Hotlines   · National Suicide Prevention Lifeline 940-811-ESZL (7614)  · National Hope Line Network 800-SUICIDE (174-7180)

## 2017-07-03 NOTE — PROGRESS NOTES
Outcome: Left Message    WebIZ Checked & Epic Updated:  yes    HealthConnect Verified: no    Attempt # 1st

## 2017-07-03 NOTE — IP AVS SNAPSHOT
7/3/2017    Maddy Hodge  [address] [place] [address]    Dear Maddy:    [facility] wants to ensure your discharge home is safe and you or your loved ones have had all of your questions answered regarding your care after you leave the hospital.    Below is a list of resources and contact information should you have any questions regarding your hospital stay, follow-up instructions, or active medical symptoms.    Questions or Concerns Regarding… Contact   Medical Questions Related to Your Discharge  (7 days a week, 8am-5pm) Contact a Nurse Care Coordinator   [phone]   Medical Questions Not Related to Your Discharge  (24 hours a day / 7 days a week)  Contact the Nurse Health Line   [phone]    Medications or Discharge Instructions Refer to your discharge packet   or contact your [facility] Primary Care Provider   [phone]   Follow-up Appointment(s) Schedule your appointment via [employer]   or contact Scheduling [phone]   Billing Review your statement via [employer]  or contact Billing [phone]   Medical Records Review your records via [employer]   or contact Medical Records [phone]     You may receive a telephone call within two days of discharge. This call is to make certain you understand your discharge instructions and have the opportunity to have any questions answered. You can also easily access your medical information, test results and upcoming appointments via the [employer] free online health management tool. You can learn more and sign up at [employer]. For assistance setting up your [employer] account, please call [phone].    Once again, we want to ensure your discharge home is safe and that you have a clear understanding of any next steps in your care. If you have any questions or concerns, please do not hesitate to contact us, we are here for you. Thank you for choosing [facility] for your healthcare needs.    Sincerely,    Your [facility] Healthcare Team

## 2017-07-03 NOTE — OP REPORT
Surgeon: Amandeep Gonzalez DO    Assistants:  None    Preoperative diagnosis:  Chronic pain syndrome    Post operative diagnosis: Chronic pain syndrome    Type of Sedation:  Local anesthesia and conscious sedation    Anesthesiologist:  Dr. Serrano    Site of Service:  [facility]    Procedure:  1.  Explantation of Spinal Cord Stimulator Leads      Method of Surgery:  After discussing risks, benefits, and alternatives to the procedure, the patient expressed understanding and wished to proceed.  An IV was started in the pre-op area and IV fluid administration was continued throughout the procedure.  The patient was brought into the fluoroscopy suite and placed in the prone position.  Procedural pause was conducted to verify: correct patient identity, procedure to be performed and as applicable, correct side and site, correct patient position, and availability of implants, special equipment or special instruments.  Intravenous sedation appropriate to the procedure was administered by the physician/nurse and is accurately reflected in the nurse's notes. Blood pressure, heart rate, pulse oximetry, and cardiac monitoring were monitored throughout the procedure. The patient's vital signs remained stable throughout the procedure.  EKG monitoring revealed normal sinus rhythm.  A dose of Ancef was administered intravenously prior to starting the procedure.     The skin was sterilely prepped and draped in the usual fashion using betadine times three in the region that the procedure was to be performed.      Explantation of Spinal Cord Stimulator Leads    The thoracic and lumbar spine were surveyed under fluoroscopy to visualize the spinal cord stimulator leads.  The skin and subcutaneous tissues overlying the previous midline surgical incision was anesthetized with 1% Lidocaine without epinephrine using a 25G 1.5 inch needle and a 25G 3.5 inch spinal needle for deeper tissues.  Then the 25 gauge 3.5  inch spinal needle was used to inject 0.5% bupivicaine with epinephrine for further local anesthetic control and hemostasis.  A 10-blade scalpel was used to make a 5 cm midline incision overlying the previous surgical incision.  Blunt dissection was carried out to expose the spinal cord stimulator leads and anchors.  After the anchors were released from the interlaminar ligaments, the leads were withdrawn from the posterior epidural space under direct fluoroscopic visualization.  After all hardware was removed, the midline incision was copiously irrigated with bacitracin solution.  A sponge count was performed and all sponges were accounted for.  2-0 Vicryl was used to close the subcutaneous tissues of the midline incision with interrupted sutures.  The skin was closed using a skin stapler.       The patient tolerated the procedure well and there were no apparent complications.  After an appropriate amount of observation, the patient was dismissed from the clinic in good condition under their own power.    Complications:  None    Disposition:   1.  The patient was discharged to the recovery area in good condition.  2.  Discharge instructions were provided and explained.  3.  Patient was instructed to call with any questions or problems.  4.  Apply ice to the procedure site and keep clean and dry for 24 hours.  5.  Medications were reviewed for efficacy and appropriateness.  6.  Continue current medications.  7.  Return in 1 to 2 weeks for follow up.

## 2017-07-05 ENCOUNTER — PATIENT MESSAGE (OUTPATIENT)
Dept: MEDICAL GROUP | Facility: CLINIC | Age: 77
End: 2017-07-05

## 2017-07-05 ENCOUNTER — TELEPHONE (OUTPATIENT)
Dept: MEDICAL GROUP | Facility: CLINIC | Age: 77
End: 2017-07-05

## 2017-07-05 DIAGNOSIS — M79.605 LUMBAR PAIN WITH RADIATION DOWN BOTH LEGS: Primary | ICD-10-CM

## 2017-07-05 DIAGNOSIS — M79.604 LUMBAR PAIN WITH RADIATION DOWN BOTH LEGS: Primary | ICD-10-CM

## 2017-07-05 DIAGNOSIS — M54.50 LUMBAR PAIN WITH RADIATION DOWN BOTH LEGS: Primary | ICD-10-CM

## 2017-07-05 DIAGNOSIS — T85.733S INFECTION OF SPINAL CORD STIMULATOR, SEQUELA: ICD-10-CM

## 2017-07-05 NOTE — TELEPHONE ENCOUNTER
Frank Quinones,    I spoke to Maddy's son, Johnathan, today who said that she had the leads removed from her spinal stimulator on 7/3/17 and has been very weak since. She fell on 7/4/17 and Johnathan said that she is having a difficult time getting around even with a walker. Currently they are helping her transfer and are using a wheelchair. Johnathan is requesting information on physical therapy due to her rapid decline in strength and inability to get around independently. Maddy has an appointment to see you on 7/21/17 as an established long appointment.    Thank you,  Luis PAUL,  Health  Medical Assistant  [facility]

## 2017-07-05 NOTE — PROGRESS NOTES
Outcome: Requested a Call Back -- see notes below    WebIZ Checked & Epic Updated:  yes    HealthConnect Verified: no    Attempt # 2nd        I spoke to patient's son, Johnathan, today who said that Maddy had a procedure done on 7/3/17 to remove wire leads from her spinal stimulator. He said that she has since gotten very weak and they are assisting her in transferring from bed to wheelchair and other daily mobility needs. Johnathan reported that she fell on 7/4/17. Due to the rapid onset of weakness Johnathan is requesting to speak to Dr. Quinones about a referral for physical therapy for her to re-gain strength. Message sent to PCP with above information.    Johnathan requested that we call back after 7/21/17 (PCP visit) to schedule her Annual Wellness Visit.

## 2017-07-06 ENCOUNTER — APPOINTMENT (OUTPATIENT)
Dept: WOUND CARE | Facility: MEDICAL CENTER | Age: 77
End: 2017-07-06
Attending: FAMILY MEDICINE
Payer: MEDICARE

## 2017-07-06 ENCOUNTER — TELEPHONE (OUTPATIENT)
Dept: MEDICAL GROUP | Facility: CLINIC | Age: 77
End: 2017-07-06

## 2017-07-06 DIAGNOSIS — R29.898 LEG WEAKNESS, BILATERAL: ICD-10-CM

## 2017-07-06 NOTE — TELEPHONE ENCOUNTER
VOICEMAIL  1. Caller Name: Johnathan calling for pt                      Call Back Number: [phone] (home)     2. Message: Pt is a Dr Quinones pt but they know Dr Quinones is on vacation and wanted to see if they could get in to see Ameena since they have seen her a couple times before. They would like referral to PT to evaluate & possibly treat leg weakness as it is difficult for Johnathan to move pt to and from chair-to-bed etc. Offered office visit with Dr Sawyer or Shannan but Johnathan declined. They will discuss leg weakness with Dr Quinones later this month and in the mean time, see PT.    3. Patient approves office to leave a detailed voicemail/MyChart message: yes

## 2017-07-07 ENCOUNTER — PATIENT MESSAGE (OUTPATIENT)
Dept: MEDICAL GROUP | Facility: CLINIC | Age: 77
End: 2017-07-07

## 2017-07-07 ENCOUNTER — APPOINTMENT (OUTPATIENT)
Dept: WOUND CARE | Facility: MEDICAL CENTER | Age: 77
End: 2017-07-07
Attending: FAMILY MEDICINE
Payer: MEDICARE

## 2017-07-07 ENCOUNTER — HOSPITAL ENCOUNTER (OUTPATIENT)
Dept: RADIOLOGY | Facility: MEDICAL CENTER | Age: 77
End: 2017-07-07
Attending: FAMILY MEDICINE
Payer: MEDICARE

## 2017-07-07 DIAGNOSIS — G03.9 ARACHNOIDITIS: ICD-10-CM

## 2017-07-07 DIAGNOSIS — M96.1 POSTLAMINECTOMY SYNDROME, UNSPECIFIED REGION: ICD-10-CM

## 2017-07-07 DIAGNOSIS — M54.50 LUMBAR PAIN WITH RADIATION DOWN BOTH LEGS: ICD-10-CM

## 2017-07-07 DIAGNOSIS — T85.733S INFECTION OF SPINAL CORD STIMULATOR, SEQUELA: ICD-10-CM

## 2017-07-07 DIAGNOSIS — M79.605 LUMBAR PAIN WITH RADIATION DOWN BOTH LEGS: ICD-10-CM

## 2017-07-07 DIAGNOSIS — M48.062 SPINAL STENOSIS OF LUMBAR REGION WITH NEUROGENIC CLAUDICATION: ICD-10-CM

## 2017-07-07 DIAGNOSIS — M79.604 LUMBAR PAIN WITH RADIATION DOWN BOTH LEGS: ICD-10-CM

## 2017-07-07 DIAGNOSIS — G62.9 NEUROPATHY: ICD-10-CM

## 2017-07-07 DIAGNOSIS — M47.816 FACET ARTHRITIS OF LUMBAR REGION: ICD-10-CM

## 2017-07-07 PROCEDURE — 72100 X-RAY EXAM L-S SPINE 2/3 VWS: CPT

## 2017-07-11 ENCOUNTER — APPOINTMENT (OUTPATIENT)
Dept: WOUND CARE | Facility: MEDICAL CENTER | Age: 77
End: 2017-07-11
Attending: FAMILY MEDICINE
Payer: MEDICARE

## 2017-07-14 ENCOUNTER — APPOINTMENT (OUTPATIENT)
Dept: WOUND CARE | Facility: MEDICAL CENTER | Age: 77
End: 2017-07-14
Attending: FAMILY MEDICINE
Payer: MEDICARE

## 2017-07-14 ENCOUNTER — TELEPHONE (OUTPATIENT)
Dept: MEDICAL GROUP | Facility: CLINIC | Age: 77
End: 2017-07-14

## 2017-07-14 DIAGNOSIS — T81.89XA DRAINING POSTOPERATIVE WOUND, INITIAL ENCOUNTER: ICD-10-CM

## 2017-07-14 NOTE — TELEPHONE ENCOUNTER
1. Caller Name: jack                      Call Back Number: [phone]     2. Message: patient called and states she had a recent surgery done. Pt had a spinal cord stimulator and lead removal and is having drainage on the site. Per pt this started today and she called  who has not contacted her yet. Pt would like to know what you advise, thank you.    3. Patient approves office to leave a detailed voicemail/MyChart message: yes

## 2017-07-14 NOTE — TELEPHONE ENCOUNTER
She is having persistent drainage from the pocket where the stimulator and leads were taken out. She had been quite dry postoperatively but this just began again and is actually soaking through the bandage in the clothing. She is having about a silver dollar size amount of drainage every hour and a half. She and her son deny any foul smell. They deny any visible redness or swelling. The leakage is translucent yellow red. They have not observed any clots. She denies pain in the area. I have placed a wound care referral. They have not received a call back from the surgeon who they called at 9:30 this morning.

## 2017-07-15 NOTE — WOUND TEAM
Contacted by pt's son; surgery completed 7/3/17. No post-op referral to [facility] until pt seen by MD for suture removal 7/17/17. Son reports that wound began draining excessive ss fluid  Today; changing dressing q2hr.  He called MD, but did not receive a call back.  Still has no [facility] referral. Instructed him to take pt to ED, reviewed s/s infection. Verbalized understanding, will monitor wound through the noght and go to ED unless improved in am

## 2017-07-18 ENCOUNTER — APPOINTMENT (OUTPATIENT)
Dept: WOUND CARE | Facility: MEDICAL CENTER | Age: 77
End: 2017-07-18
Attending: FAMILY MEDICINE
Payer: MEDICARE

## 2017-07-21 ENCOUNTER — OFFICE VISIT (OUTPATIENT)
Dept: MEDICAL GROUP | Facility: CLINIC | Age: 77
End: 2017-07-21
Payer: MEDICARE

## 2017-07-21 VITALS
BODY MASS INDEX: 32.79 KG/M2 | SYSTOLIC BLOOD PRESSURE: 132 MMHG | DIASTOLIC BLOOD PRESSURE: 80 MMHG | HEART RATE: 64 BPM | WEIGHT: 167 LBS | TEMPERATURE: 97.9 F | HEIGHT: 60 IN | OXYGEN SATURATION: 95 % | RESPIRATION RATE: 16 BRPM

## 2017-07-21 DIAGNOSIS — N39.0 RECURRENT UTI: ICD-10-CM

## 2017-07-21 DIAGNOSIS — J98.4 MIXED RESTRICTIVE AND OBSTRUCTIVE LUNG DISEASE (HCC): ICD-10-CM

## 2017-07-21 DIAGNOSIS — L24.A9 DRAINAGE FROM WOUND: ICD-10-CM

## 2017-07-21 DIAGNOSIS — F33.0 MAJOR DEPRESSIVE DISORDER, RECURRENT EPISODE, MILD (HCC): ICD-10-CM

## 2017-07-21 DIAGNOSIS — J43.9 MIXED RESTRICTIVE AND OBSTRUCTIVE LUNG DISEASE (HCC): ICD-10-CM

## 2017-07-21 DIAGNOSIS — M47.816 FACET ARTHRITIS OF LUMBAR REGION: ICD-10-CM

## 2017-07-21 DIAGNOSIS — F40.10 SHY BLADDER SYNDROME: ICD-10-CM

## 2017-07-21 DIAGNOSIS — R33.9 URINARY RETENTION: ICD-10-CM

## 2017-07-21 PROCEDURE — 99214 OFFICE O/P EST MOD 30 MIN: CPT | Performed by: FAMILY MEDICINE

## 2017-07-21 RX ORDER — OXYCODONE 18 MG/1
18 CAPSULE, EXTENDED RELEASE ORAL 2 TIMES DAILY
COMMUNITY
Start: 2017-07-05 | End: 2023-09-20

## 2017-07-21 NOTE — MR AVS SNAPSHOT
Maddy Ewing Naveen   2017 1:00 PM   Office Visit   MRN: [id number]    Department:  [facility]   Dept Phone:  [phone]    Description:  Female : 1940   Provider:  Shirlene Quinones M.D.           Reason for Visit     Hospital Follow-up           Allergies as of 2017     Allergen Noted Reactions    Pcn [Penicillins] 2017       Sulfa Drugs 2017       Tape 2017       Macrobid [Nitrofurantoin] 2017   Rash    rash    Zoloft 2017   Unspecified    Worse depression      You were diagnosed with     Drainage from wound   [664240]       Urinary retention   [385915]       Shy bladder syndrome   [733375]       Recurrent UTI   [610524]         Vital Signs     Blood Pressure Pulse Temperature Respirations Height Weight    132/80 mmHg 64 36.6 °C (97.9 °F) 16 1.524 m (5') 75.751 kg (167 lb)    Body Mass Index Oxygen Saturation Smoking Status             32.62 kg/m2 95% Never Smoker          Basic Information     Date Of Birth Sex Race Ethnicity Preferred Language    1940 Female White Non- English      Your appointments     2017 12:40 PM   Established Patient Pul with A Rotation   [facility] (--)    [address] [place] [address] [address]  [facility] [id number] [phone]              Problem List              ICD-10-CM Priority Class Noted - Resolved    Essential hypertension I10 Medium  2009 - Present    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   Unknown - Present    Spinal stenosis of lumbar region with neurogenic claudication M48.06 Low  Unknown - Present    Postmenopausal osteoporosis M81.0   Unknown - Present    GERD (gastroesophageal reflux disease) K21.9 Medium  2011 - Present    Facet arthritis of lumbar region M46.96 Low  3/26/2012 - Present    History of vertebral fracture Z87.81 Low  3/26/2012 - Present    History of gastritis Z87.19 Low  3/26/2012 - Present    Family history of polycystic kidney disease Z82.71 Low  Unknown - Present       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 Medium  Unknown - Present    Neuropathy (CMS-HCC) G62.9 Low  10/17/2013 - Present    Major depressive disorder, recurrent episode, mild (CMS-HCC) F33.0 Low  5/2/2016 - Present    Esophageal dysphagia R13.14 Medium  5/19/2016 - Present    CKD (chronic kidney disease) stage 3, GFR 30-59 ml/min N18.3 Medium  5/23/2016 - Present    Hx of Spinal cord stimulator status Z96.89   9/6/2016 - Present    Essential tremor G25.0   9/6/2016 - Present    Menopausal symptoms N95.1   9/13/2016 - Present    Open wound of back S21.209A   5/17/2017 - Present    Open wound of buttock S31.809A   6/20/2017 - Present    Hypothyroidism E03.9   6/22/2017 - Present    COPD (chronic obstructive pulmonary disease) (CMS-HCC) J44.9   6/22/2017 - Present    Recurrent UTI N39.0   6/28/2017 - Present    Postlaminectomy syndrome, unspecified region M96.1   7/3/2017 - Present      Health Maintenance        Date Due Completion Dates    IMM PNEUMOCOCCAL 65+ (ADULT) LOW/MEDIUM RISK SERIES (2 of 2 - PPSV23) 9/1/2020 (Originally 10/6/2016) 10/6/2015    IMM INFLUENZA (1) 9/1/2017 10/4/2016, 10/6/2015, 9/23/2014, 11/20/2013, 10/23/2012, 10/15/2011, 9/1/2010, 10/8/2009    MAMMOGRAM 12/22/2017 12/22/2015    BONE DENSITY 8/29/2018 8/29/2013, 10/19/2010, 2/13/2007    COLONOSCOPY 5/27/2019 5/27/2009 (Prv Comp)    Override on 5/27/2009: Previously completed    IMM DTaP/Tdap/Td Vaccine (2 - Td) 9/21/2023 9/21/2013            Current Immunizations     13-VALENT PCV PREVNAR 10/6/2015    Influenza TIV (IM) 11/20/2013, 10/23/2012, 10/15/2011    Influenza Vaccine Adult HD 10/6/2015, 9/23/2014    Influenza Vaccine Pediatric 9/1/2010, 10/8/2009    Influenza Vaccine Quad Inj (Pf) 10/4/2016    Pneumococcal Vaccine (UF)Historical Data 9/23/2010    SHINGLES VACCINE 3/2/2010    Tdap Vaccine 9/21/2013      Below and/or attached are the medications your provider expects you to take. Review all of your home medications and newly ordered  medications with your provider and/or pharmacist. Follow medication instructions as directed by your provider and/or pharmacist. Please keep your medication list with you and share with your provider. Update the information when medications are discontinued, doses are changed, or new medications (including over-the-counter products) are added; and carry medication information at all times in the event of emergency situations     Allergies:  PCN - (reactions not documented)     SULFA DRUGS - (reactions not documented)     TAPE - (reactions not documented)     MACROBID - Rash     ZOLOFT - Unspecified               Medications  Valid as of: July 21, 2017 -  2:53 PM    Generic Name Brand Name Tablet Size Instructions for use    Albuterol Sulfate (Aero Soln) VENTOLIN  (90 BASE) MCG/ACT Inhale 2 Puffs by mouth every 6 hours as needed for Shortness of Breath.        Aspirin (Tablet Delayed Response) ECOTRIN 81 MG Take 81 mg by mouth every day.        Bethanechol Chloride (Tab) URECHOLINE 25 MG Take 25 mg by mouth every day. To increase by half a tab QD starting 6/22/17        Budesonide-Formoterol Fumarate (Aerosol) SYMBICORT 80-4.5 MCG/ACT INHALE TWO PUFFS BY MOUTH TWICE DAILY RINSE  MOUTH  AFTER  EACH  USE*        Cholecalciferol (Cap) Vitamin D 1000 UNIT Take 1,000 Units by mouth every day.        Cyanocobalamin (Chew Tab) Cyanocobalamin 2500 MCG Take 1 Tab by mouth every day.        DiazePAM (Tab) VALIUM 5 MG Take 1 Tab by mouth every 6 hours as needed for Anxiety or Sleep.        Docusate Sodium (Cap) COLACE 250 MG Take 250 mg by mouth every day.        DULoxetine HCl (Cap DR Particles) CYMBALTA 60 MG Take 60 mg by mouth every evening.        Estradiol (Tab) ESTRACE 0.5 MG TAKE ONE TABLET BY MOUTH ONCE DAILY        FLUoxetine HCl (Cap) PROZAC 20 MG Take 1 Cap by mouth every day.        Furosemide (Tab) LASIX 20 MG Take 20 mg by mouth every day. As needed for edema        Levothyroxine Sodium (Tab) SYNTHROID 50  MCG Take 1 Tab by mouth Every morning on an empty stomach.        Magnesium (Cap) Magnesium 100 MG Take 1 Cap by mouth every day.        Omeprazole (CAPSULE DELAYED RELEASE) PRILOSEC 20 MG Take 1 Cap by mouth every day.        Oxycodone (Capsule Extended Release 12 hour Abuse-Deterrent) XTAMPZA ER 18 MG         Oxycodone-Acetaminophen (Tab) PERCOCET 7.5-325 MG Take 1-2 Tabs by mouth every four hours as needed (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 She is seen 2/14/14, do not fill until 4/11/14        Potassium Chloride (Pack) KLOR-CON 20 MEQ Take 20 mEq by mouth every day. As needed with furosemide        Pregabalin (Cap) LYRICA 100 MG Take 100 mg by mouth 4 times a day.        Probiotic Product (Cap) SHERPA assistant  Take 1 Cap by mouth 2 Times a Day.        Valsartan (Tab) DIOVAN 160 MG Take 160 mg by mouth every 48 hours.        .                 Medicines prescribed today were sent to:     [facility] [address] [place] [address], [place] - [address] [facility]    [address] [facility] [address]    Phone: [phone] Fax: [phone]    Open 24 Hours?: No    [facility] - [place], [place] - [address] AT PORTAL TO [facility] S[address]    [address] [facility] [address]    Phone: [phone] Fax: [phone]    Open 24 Hours?: No      Medication refill instructions:       If your prescription bottle indicates you have medication refills left, it is not necessary to call your provider’s office. Please contact your pharmacy and they will refill your medication.    If your prescription bottle indicates you do not have any refills left, you may request refills at any time through one of the following ways: The online [employer] system (except Urgent Care), by calling your provider’s office, or by asking your pharmacy to contact your provider’s office with a refill request. Medication refills are processed only during regular business hours and may not  be available until the next business day. Your provider may request additional information or to have a follow-up visit with you prior to refilling your medication.   *Please Note: Medication refills are assigned a new Rx number when refilled electronically. Your pharmacy may indicate that no refills were authorized even though a new prescription for the same medication is available at the pharmacy. Please request the medicine by name with the pharmacy before contacting your provider for a refill.           [facility] Access Code: Activation code not generated  Current [employer]Âº Status: Active

## 2017-07-21 NOTE — PROGRESS NOTES
CC: Hospital follow-up    HPI:   Maddy presents today with the following. She was admitted with weakness and instability and mental status changes. She was found to have a UTI. She responded very well to treatment. She and her son feels she is walking better and is stronger than she has been in several months. They attribute this to both treatment of the urinary tract infection and removal of the spinal stimulator and the wires which may also have been a source certainly of inflammation.    1. Drainage from wound  The wound drainage has greatly reduced. They state the upper wound is now fully closed and does not appear to be draining and the lower wound is basically just a pinpoint of serous drainage. They have followed up with wound care and feel this is resolving. She feels much less nervous and better overall with the spinal stimulator removed.    2. Urinary retention/shy bladder syndrome  This is been a recurring problem both with recurring urinary tract infections and difficulty voiding. She has great difficulty in particular voiding on command. She does many work around's for this. Denies dysuria, visible hematuria or flank pain. She has further evaluations with urology pending including cystoscopy.    3. Recurrent UTI  Had an episode with an E coli UTI.  This resulted in weakness and disorientation and was extremely debilitating.    4. Major depressive disorder, recurrent episode, mild (CMS-HCC)  She feels she states mentally much better, less anxious and less depressed since the stimulator was out in her medication was changed. She is actually on the lower dose of narcotic than she was. She states her pain control is about the same and might be slightly better. She is working with physical therapy and feels doing that is also helping her depression. She denies morbid thoughts, recurrent persistent negative thoughts, suicidal ideation or plans of self-harm.    5. 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 It is actually relatively mild overall. She continues to follow with pulmonology on an every 4-6 month basis but her lung problems seem to be relatively stable at this point.    6. Facet arthritis of lumbar region  This is a multiple levels and adds to her chronic back pain which she is quite mixed with a component of arachnoiditis. She has chronic severe pain and is seeing pain management for this.      Patient Active Problem List    Diagnosis Date Noted   • CKD (chronic kidney disease) stage 3, GFR 30-59 ml/min 05/23/2016     Priority: Medium   • Esophageal dysphagia 05/19/2016     Priority: Medium   • Idiopathic atrophic hypothyroidism      Priority: Medium   • GERD (gastroesophageal reflux disease) 09/20/2011     Priority: Medium   • Essential hypertension 07/06/2009     Priority: Medium   • Major depressive disorder, recurrent episode, mild (CMS-HCC) 05/02/2016     Priority: Low   • Neuropathy (CMS-HCC) 10/17/2013     Priority: Low   • Family history of polycystic kidney disease      Priority: Low   • Facet arthritis of lumbar region 03/26/2012     Priority: Low   • History of vertebral fracture 03/26/2012     Priority: Low   • History of gastritis 03/26/2012     Priority: Low   • Spinal stenosis of lumbar region with neurogenic claudication      Priority: Low   • Postlaminectomy syndrome, unspecified region 07/03/2017   • Recurrent UTI 06/28/2017   • Hypothyroidism 06/22/2017   • COPD (chronic obstructive pulmonary disease) (CMS-HCC) 06/22/2017   • Open wound of buttock 06/20/2017   • Open wound of back 05/17/2017   • Menopausal symptoms 09/13/2016   • Hx of Spinal cord stimulator status 09/06/2016   • Essential tremor 09/06/2016   • Postmenopausal osteoporosis    • Arachnoiditis        Current Outpatient Prescriptions   Medication Sig Dispense Refill   • XTAMPZA ER 18 MG Capsule Extended Release 12 hour Abuse-Deterrent      •  SYMBICORT 80-4.5 MCG/ACT Aerosol INHALE TWO PUFFS BY MOUTH TWICE DAILY RINSE  MOUTH  AFTER  EACH  USE* 1 Inhaler 5   • duloxetine (CYMBALTA) 60 MG Cap DR Particles delayed-release capsule Take 60 mg by mouth every evening.     • valsartan (DIOVAN) 160 MG Tab Take 160 mg by mouth every 48 hours.     • Magnesium 100 MG Cap Take 1 Cap by mouth every day.     • Cyanocobalamin 2500 MCG Chew Tab Take 1 Tab by mouth every day.     • furosemide (LASIX) 20 MG Tab Take 20 mg by mouth every day. As needed for edema     • potassium chloride (KLOR-CON) 20 MEQ Pack Take 20 mEq by mouth every day. As needed with furosemide     • bethanechol (URECHOLINE) 25 MG Tab Take 25 mg by mouth every day. To increase by half a tab QD starting 6/22/17     • diazepam (VALIUM) 5 MG Tab Take 1 Tab by mouth every 6 hours as needed for Anxiety or Sleep. 90 Tab 2   • fluoxetine (PROZAC) 20 MG Cap Take 1 Cap by mouth every day. 90 Cap 3   • levothyroxine (SYNTHROID) 50 MCG Tab Take 1 Tab by mouth Every morning on an empty stomach. 90 Tab 3   • omeprazole (PRILOSEC) 20 MG delayed-release capsule Take 1 Cap by mouth every day. 90 Cap 3   • estradiol (ESTRACE) 0.5 MG tablet TAKE ONE TABLET BY MOUTH ONCE DAILY 90 Tab 3   • VENTOLIN  (90 BASE) MCG/ACT Aero Soln inhalation aerosol Inhale 2 Puffs by mouth every 6 hours as needed for Shortness of Breath. 1 Inhaler 5   • aspirin EC (ECOTRIN) 81 MG Tablet Delayed Response Take 81 mg by mouth every day.     • pregabalin (LYRICA) 100 MG CAPS Take 100 mg by mouth 4 times a day.     • oxycodone-acetaminophen (PERCOCET) 7.5-325 MG per tablet Take 1-2 Tabs by mouth every four hours as needed (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 She is seen 2/14/14, do not fill until 4/11/14 180 Each 0   • docusate sodium (COLACE) 250 MG capsule Take 250 mg by mouth every day.     • Probiotic Product (Startcapps) CAPS Take 1 Cap by mouth 2 Times a Day.     • Cholecalciferol (VITAMIN  D) 1000 UNIT CAPS Take 1,000 Units by mouth every day.       No current facility-administered medications for this visit.         Allergies as of 07/21/2017 - Lukas as Reviewed 07/21/2017   Allergen Reaction Noted   • Pcn [penicillins]  06/21/2017   • Sulfa drugs  06/21/2017   • Tape  06/21/2017   • Macrobid [nitrofurantoin] Rash 06/28/2017   • Zoloft Unspecified 06/21/2017        ROS: As per HPI.    /80 mmHg  Pulse 64  Temp(Src) 36.6 °C (97.9 °F)  Resp 16  Ht 1.524 m (5')  Wt 75.751 kg (167 lb)  BMI 32.62 kg/m2  SpO2 95%    Physical Exam:  Gen:         Alert and oriented, No apparent distress.  Lucid and fluent.  Neck:       Full ROM.  No cervical adenopathy.   Lungs:     Clear to auscultation bilaterally, good air movement  CV:          Regular rate and rhythm. No murmurs, rubs or gallops.               Ext:          No clubbing, cyanosis, no peripheral edema.    Hospital notes, lab results and imaging have been discussed and reviewed with patient and her son.       Assessment and Plan.   77 y.o. female with the following issues.    1. Drainage from wound  This is greatly improving. She continues to follow with wound care and will do so until this is resolved. There is an expectation that this will resolve within the next month or so. She continues daily dressing changes as instructed.    2. Urinary retention/shy bladder syndrome  She continues her urology follow-up    3. Recurrent UTI  She is predisposed to this due to her urinary retention. She is working with urology on this problem. A cystoscopy is scheduled in the next week or 2 to further clarify the situation.    4. Major depressive disorder, recurrent episode, mild (CMS-HCC)  This has been quite stable and improved since the stimulator was removed, the urinary tract infection was treated and she is working with physical therapy.    5. Mixed restrictive and obstructive lung disease (CMS-HCC)  Pulmonology found this on testing last March. It is  not severe in another itself but combined with her other problems has probably been a factor in her exertional shortness of breath. She is working with physical therapy to try to improve her lung function and her strength.    6. Facet arthritis of lumbar region  This is part of her complex back picture for which she follows with pain management.  Unfortunately, facet injections have not been helpful.

## 2017-07-22 ENCOUNTER — APPOINTMENT (OUTPATIENT)
Dept: WOUND CARE | Facility: MEDICAL CENTER | Age: 77
End: 2017-07-22
Attending: FAMILY MEDICINE
Payer: MEDICARE

## 2017-07-22 PROBLEM — S21.209A OPEN WOUND OF BACK: Status: RESOLVED | Noted: 2017-05-17 | Resolved: 2017-07-22

## 2017-07-22 PROBLEM — J98.4 MIXED RESTRICTIVE AND OBSTRUCTIVE LUNG DISEASE (HCC): Status: ACTIVE | Noted: 2017-06-22

## 2017-07-22 PROBLEM — J43.9 MIXED RESTRICTIVE AND OBSTRUCTIVE LUNG DISEASE (HCC): Status: ACTIVE | Noted: 2017-06-22

## 2017-07-25 ENCOUNTER — APPOINTMENT (OUTPATIENT)
Dept: WOUND CARE | Facility: MEDICAL CENTER | Age: 77
End: 2017-07-25
Attending: FAMILY MEDICINE
Payer: MEDICARE

## 2017-07-27 ENCOUNTER — PATIENT OUTREACH (OUTPATIENT)
Dept: HEALTH INFORMATION MANAGEMENT | Facility: OTHER | Age: 77
End: 2017-07-27

## 2017-07-27 NOTE — PROGRESS NOTES
Patient Maddy Hodge discharged from [facility] on 6/24/17 with a LACE+ score of 74. The patient was instructed to follow up with her primary physician Dr. Shirlene Quinones in two days. The patient declined [facility] advocate's assistance with scheduling her a follow-up appointment. The patient did not see any other providers outside of discharge orders. On 7/14/17, the patient let me know she was having an increase of drainage from her wound site. [facility] advocate  contacted the medical assistant for her PCP to give the patient clinical advice. The patient had no barriers in picking up her medications. The patient discharged with a referral to [facility], as well as a referral for wound care. The patient began physical therapy on 7/17/17 and completed another visit on 7/21/17. The patient has a future appointment on 7/24 and no end date of therapy has been scheduled yet. The patient completed wound care visits on 6/29 and 7/1 and she has no future visits scheduled. The patient did not discharge with any new orders for durable medical equipment or referrals for home health services. The patient had no issues with transportation and was able to go to and from all of her medical appointments.   - Follow-up appointment with Shannan FOX scheduled 6/27/17 - Cancelled by Patient   - Follow-up appointment with Shannan FOX scheduled 6/28/17 - Kept   - Primary care follow-up with Dr. Shirlene Quinones scheduled 7/21/17 - Kept

## 2017-07-28 ENCOUNTER — APPOINTMENT (OUTPATIENT)
Dept: WOUND CARE | Facility: MEDICAL CENTER | Age: 77
End: 2017-07-28
Attending: FAMILY MEDICINE
Payer: MEDICARE

## 2017-07-28 NOTE — PROGRESS NOTES
Attempt #:4    WebIZ Checked & Epic Updated: yes  HealthConnect Verified: no  Verify PCP: yes    Communication Preference Obtained: yes     Review Care Team: yes    Annual Wellness Visit Scheduling  1. Scheduling Status:Scheduled     Care Gap Scheduling (Attempt to Schedule EACH Overdue Care Gap!)     Health Maintenance Due   Topic Date Due   • Annual Wellness Visit  scheduled         [employer] Activation: already active  [employer] Allyn: no  Virtual Visits: no  Opt In to Text Messages: no

## 2017-08-04 ENCOUNTER — PATIENT MESSAGE (OUTPATIENT)
Dept: MEDICAL GROUP | Facility: CLINIC | Age: 77
End: 2017-08-04

## 2017-08-14 ENCOUNTER — OFFICE VISIT (OUTPATIENT)
Dept: PULMONOLOGY | Facility: HOSPICE | Age: 77
End: 2017-08-14
Payer: MEDICARE

## 2017-08-14 VITALS
DIASTOLIC BLOOD PRESSURE: 72 MMHG | RESPIRATION RATE: 16 BRPM | SYSTOLIC BLOOD PRESSURE: 120 MMHG | OXYGEN SATURATION: 94 % | HEART RATE: 83 BPM | BODY MASS INDEX: 32.79 KG/M2 | HEIGHT: 60 IN | TEMPERATURE: 97 F | WEIGHT: 167 LBS

## 2017-08-14 DIAGNOSIS — J44.9 COPD, MILD (HCC): ICD-10-CM

## 2017-08-14 DIAGNOSIS — G47.33 OBSTRUCTIVE SLEEP APNEA HYPOPNEA, MILD: ICD-10-CM

## 2017-08-14 DIAGNOSIS — I27.20 PULMONARY HTN (HCC): ICD-10-CM

## 2017-08-14 DIAGNOSIS — R25.1 TREMOR: ICD-10-CM

## 2017-08-14 PROCEDURE — 99214 OFFICE O/P EST MOD 30 MIN: CPT | Performed by: INTERNAL MEDICINE

## 2017-08-14 NOTE — MR AVS SNAPSHOT
Maddy Ewing Naveen   2017 11:00 AM   Office Visit   MRN: [id number]    Department:  [facility]   Dept Phone:  [phone]    Description:  Female : 1940   Provider:  Arcenio Petersen M.D.           Reason for Visit     Results SS, PFT      Allergies as of 2017     Allergen Noted Reactions    Pcn [Penicillins] 2017       Sulfa Drugs 2017       Tape 2017       Macrobid [Nitrofurantoin] 2017   Rash    rash    Zoloft 2017   Unspecified    Worse depression      You were diagnosed with     Pulmonary HTN (CMS-[facility])   [414332]       Obstructive sleep apnea hypopnea, mild   [4003998]       Tremor   [037391]       COPD, mild (CMS-[facility])   [182693]         Vital Signs     Blood Pressure Pulse Temperature Respirations Height Weight    120/72 mmHg 83 36.1 °C (97 °F) 16 1.524 m (5') 75.751 kg (167 lb)    Body Mass Index Oxygen Saturation Smoking Status             32.62 kg/m2 94% Never Smoker          Basic Information     Date Of Birth Sex Race Ethnicity Preferred Language    1940 Female White Non- English      Your appointments     Sep 15, 2017 10:20 AM   ANNUAL EXAM PREVENTATIVE with MANUEL TaylorPMariuszRJUSTUS   [address]  [place] [id number] [phone]            Nov 10, 2017 11:20 AM   Established Patient Pul with KAIN Shukla   [facility] (--)    [address]  [address]  [place] [id number] [phone]              Problem List              ICD-10-CM Priority Class Noted - Resolved    Essential hypertension I10 Medium  2009 - Present    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   Unknown - Present    Spinal stenosis of lumbar region with neurogenic claudication M48.06 Low  Unknown - Present    Postmenopausal osteoporosis M81.0   Unknown - Present    GERD (gastroesophageal reflux disease) K21.9 Medium  2011 - Present    Facet arthritis of lumbar region M46.96 Low   3/26/2012 - Present    History of vertebral fracture Z87.81 Low  3/26/2012 - Present    History of gastritis Z87.19 Low  3/26/2012 - Present    Family history of polycystic kidney disease Z82.71 Low  Unknown - Present    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 Medium  Unknown - Present    Neuropathy (CMS-HCC) G62.9 Low  10/17/2013 - Present    Major depressive disorder, recurrent episode, mild (CMS-HCC) F33.0 Low  5/2/2016 - Present    Esophageal dysphagia R13.14 Medium  5/19/2016 - Present    CKD (chronic kidney disease) stage 3, GFR 30-59 ml/min N18.3 Medium  5/23/2016 - Present    Hx of Spinal cord stimulator status Z96.89   9/6/2016 - Present    Essential tremor G25.0   9/6/2016 - Present    Menopausal symptoms N95.1   9/13/2016 - Present    Open wound of buttock S31.809A   6/20/2017 - Present    Hypothyroidism E03.9   6/22/2017 - Present    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, 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   6/22/2017 - Present    Recurrent UTI N39.0   6/28/2017 - Present    Postlaminectomy syndrome, unspecified region M96.1   7/3/2017 - Present      Health Maintenance        Date Due Completion Dates    IMM PNEUMOCOCCAL 65+ (ADULT) LOW/MEDIUM RISK SERIES (2 of 2 - PPSV23) 9/1/2020 (Originally 10/6/2016) 10/6/2015    IMM INFLUENZA (1) 9/1/2017 10/4/2016, 10/6/2015, 9/23/2014, 11/20/2013, 10/23/2012, 10/15/2011, 9/1/2010, 10/8/2009    MAMMOGRAM 12/22/2017 12/22/2015    BONE DENSITY 8/29/2018 8/29/2013, 10/19/2010, 2/13/2007    COLONOSCOPY 5/27/2019 5/27/2009 (Prv Comp)    Override on 5/27/2009: Previously completed    IMM DTaP/Tdap/Td Vaccine (2 - Td) 9/21/2023 9/21/2013            Current Immunizations     13-VALENT PCV PREVNAR 10/6/2015    Influenza TIV (IM) 11/20/2013, 10/23/2012, 10/15/2011    Influenza Vaccine Adult HD 10/6/2015, 9/23/2014    Influenza Vaccine Pediatric 9/1/2010, 10/8/2009    Influenza Vaccine Quad Inj (Pf) 10/4/2016    Pneumococcal Vaccine (UF)Historical Data 9/23/2010    SHINGLES  VACCINE 3/2/2010    Tdap Vaccine 9/21/2013      Below and/or attached are the medications your provider expects you to take. Review all of your home medications and newly ordered medications with your provider and/or pharmacist. Follow medication instructions as directed by your provider and/or pharmacist. Please keep your medication list with you and share with your provider. Update the information when medications are discontinued, doses are changed, or new medications (including over-the-counter products) are added; and carry medication information at all times in the event of emergency situations     Allergies:  PCN - (reactions not documented)     SULFA DRUGS - (reactions not documented)     TAPE - (reactions not documented)     MACROBID - Rash     ZOLOFT - Unspecified               Medications  Valid as of: August 14, 2017 - 11:50 AM    Generic Name Brand Name Tablet Size Instructions for use    Albuterol Sulfate (Aero Soln) VENTOLIN  (90 BASE) MCG/ACT Inhale 2 Puffs by mouth every 6 hours as needed for Shortness of Breath.        Aspirin (Tablet Delayed Response) ECOTRIN 81 MG Take 81 mg by mouth every day.        Bethanechol Chloride (Tab) URECHOLINE 25 MG Take 25 mg by mouth every day. To increase by half a tab QD starting 6/22/17        Budesonide-Formoterol Fumarate (Aerosol) SYMBICORT 80-4.5 MCG/ACT INHALE TWO PUFFS BY MOUTH TWICE DAILY RINSE  MOUTH  AFTER  EACH  USE*        Cholecalciferol (Cap) Vitamin D 1000 UNIT Take 1,000 Units by mouth every day.        Cyanocobalamin (Chew Tab) Cyanocobalamin 2500 MCG Take 1 Tab by mouth every day.        DiazePAM (Tab) VALIUM 5 MG Take 1 Tab by mouth every 6 hours as needed for Anxiety or Sleep.        Docusate Sodium (Cap) COLACE 250 MG Take 250 mg by mouth every day.        DULoxetine HCl (Cap DR Particles) CYMBALTA 60 MG Take 60 mg by mouth every evening.        Estradiol (Tab) ESTRACE 0.5 MG TAKE ONE TABLET BY MOUTH ONCE DAILY        FLUoxetine HCl  (Cap) PROZAC 20 MG Take 1 Cap by mouth every day.        Furosemide (Tab) LASIX 20 MG Take 20 mg by mouth every day. As needed for edema        Levothyroxine Sodium (Tab) SYNTHROID 50 MCG Take 1 Tab by mouth Every morning on an empty stomach.        Magnesium (Cap) Magnesium 100 MG Take 1 Cap by mouth every day.        Omeprazole (CAPSULE DELAYED RELEASE) PRILOSEC 20 MG Take 1 Cap by mouth every day.        Oxycodone (Capsule Extended Release 12 hour Abuse-Deterrent) XTAMPZA ER 18 MG         Oxycodone-Acetaminophen (Tab) PERCOCET 7.5-325 MG Take 1-2 Tabs by mouth every four hours as needed (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 She is seen 2/14/14, do not fill until 4/11/14        Potassium Chloride (Pack) KLOR-CON 20 MEQ Take 20 mEq by mouth every day. As needed with furosemide        Pregabalin (Cap) LYRICA 100 MG Take 100 mg by mouth 4 times a day.        Probiotic Product (Cap) ManyWho  Take 1 Cap by mouth 2 Times a Day.        Valsartan (Tab) DIOVAN 160 MG Take 160 mg by mouth every 48 hours.        .                 Medicines prescribed today were sent to:     [facility] [place] - [address]    [address] [place] [address]    Phone: [phone] Fax: [phone]    Open 24 Hours?: No    [facility] - [place], [place] - [address] AT PORTAL TO REGISTERED [facility] SITES    [address] [facility] [address]    Phone: [phone] Fax: [phone]    Open 24 Hours?: No      Medication refill instructions:       If your prescription bottle indicates you have medication refills left, it is not necessary to call your provider’s office. Please contact your pharmacy and they will refill your medication.    If your prescription bottle indicates you do not have any refills left, you may request refills at any time through one of the following ways: The online [employer] system (except Urgent Care), by calling your  provider’s office, or by asking your pharmacy to contact your provider’s office with a refill request. Medication refills are processed only during regular business hours and may not be available until the next business day. Your provider may request additional information or to have a follow-up visit with you prior to refilling your medication.   *Please Note: Medication refills are assigned a new Rx number when refilled electronically. Your pharmacy may indicate that no refills were authorized even though a new prescription for the same medication is available at the pharmacy. Please request the medicine by name with the pharmacy before contacting your provider for a refill.        Your To Do List     Future Labs/Procedures Complete By Expires    CT-CTA CHEST PULMONARY ARTERY W/ RECONS  As directed 8/14/2018    ECHOCARDIOGRAM COMP W/O CONT  As directed 8/14/2018    Comments:    Dyspnea on exertion, eval for pulmonary hypertension      Referral     A referral request has been sent to our patient care coordination department. Please allow 3-5 business days for us to process this request and contact you either by phone or mail. If you do not hear from us by the 5th business day, please call us at [phone].           [employer] Access Code: Activation code not generated  Current [employer] Status: Active

## 2017-08-14 NOTE — PROGRESS NOTES
Maddy Hodge is a 77 y.o. female here for COPD and obstructive sleep apnea..    History of Present Illness:    The patient is a 77-year-old female who has mild COPD. She has mild pulmonary hypertension. She underwent a sleep study which showed an apnea hypopnea index of 5.5 per hour and a total respiratory disturbance index of 17 per hour. The low oxygen saturation was 87%. She had a previous echo which showed a right ventricular systolic pressure of 45 mmHg. A previous echo in 2012 showed a right ventricular systolic pressure of 35 mmHg. She is trying to adopt a low salt diet. She takes Lasix as needed. Previous pulmonary function testing showed mild airflow obstruction. She is using his Symbicort inhaler. She does have weakness and neuropathy and she is having a tremor. She has a family history of Parkinson's disease.    Constitutional:  Negative for fever, chills, sweats, and fatigue.  Eyes:  Negative for eye pain and visual changes.  HENT:  Negative for tinnitus and hoarse voice.  Cardiovascular:  Negative for chest pain, leg swelling, syncope and orthopnea.  Respiratory:  See HPI for pertinent negatives  Sleep:  Negative for somnolence, loud snoring, sleep disturbance due to breathing, insomnia.  Gastrointestinal:  Negative for dysphagia, nausea and abdominal pain.  Heme/lymph:  Denies easy bruising, blood clots.  Musculoskeletal:  Negative for arthralgias, sore muscles and back pain.  Skin:  Negative for rash and color change.  Neurological:  Negative for headaches, lightheadedness and weakness.  Psychiatric:  Denies depression.    Current Outpatient Prescriptions   Medication Sig Dispense Refill   • XTAMPZA ER 18 MG Capsule Extended Release 12 hour Abuse-Deterrent      • SYMBICORT 80-4.5 MCG/ACT Aerosol INHALE TWO PUFFS BY MOUTH TWICE DAILY RINSE  MOUTH  AFTER  EACH  USE* 1 Inhaler 5   • duloxetine (CYMBALTA) 60 MG Cap DR Particles delayed-release capsule Take 60 mg by mouth every evening.     •  valsartan (DIOVAN) 160 MG Tab Take 160 mg by mouth every 48 hours.     • Magnesium 100 MG Cap Take 1 Cap by mouth every day.     • Cyanocobalamin 2500 MCG Chew Tab Take 1 Tab by mouth every day.     • furosemide (LASIX) 20 MG Tab Take 20 mg by mouth every day. As needed for edema     • potassium chloride (KLOR-CON) 20 MEQ Pack Take 20 mEq by mouth every day. As needed with furosemide     • bethanechol (URECHOLINE) 25 MG Tab Take 25 mg by mouth every day. To increase by half a tab QD starting 6/22/17     • diazepam (VALIUM) 5 MG Tab Take 1 Tab by mouth every 6 hours as needed for Anxiety or Sleep. 90 Tab 2   • fluoxetine (PROZAC) 20 MG Cap Take 1 Cap by mouth every day. 90 Cap 3   • levothyroxine (SYNTHROID) 50 MCG Tab Take 1 Tab by mouth Every morning on an empty stomach. 90 Tab 3   • omeprazole (PRILOSEC) 20 MG delayed-release capsule Take 1 Cap by mouth every day. 90 Cap 3   • estradiol (ESTRACE) 0.5 MG tablet TAKE ONE TABLET BY MOUTH ONCE DAILY 90 Tab 3   • VENTOLIN  (90 BASE) MCG/ACT Aero Soln inhalation aerosol Inhale 2 Puffs by mouth every 6 hours as needed for Shortness of Breath. 1 Inhaler 5   • aspirin EC (ECOTRIN) 81 MG Tablet Delayed Response Take 81 mg by mouth every day.     • pregabalin (LYRICA) 100 MG CAPS Take 100 mg by mouth 4 times a day.     • oxycodone-acetaminophen (PERCOCET) 7.5-325 MG per tablet Take 1-2 Tabs by mouth every four hours as needed (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 She is seen 2/14/14, do not fill until 4/11/14 180 Each 0   • docusate sodium (COLACE) 250 MG capsule Take 250 mg by mouth every day.     • Probiotic Product ([employer]) CAPS Take 1 Cap by mouth 2 Times a Day.     • Cholecalciferol (VITAMIN D) 1000 UNIT CAPS Take 1,000 Units by mouth every day.       No current facility-administered medications for this visit.       Social History   Substance Use Topics   • Smoking status: Never Smoker    • Smokeless tobacco: Never  "Used   • Alcohol Use: No       Past Medical History   Diagnosis Date   • Spinal stenosis, lumbar region, without neurogenic claudication    • Neuropathy (CMS-[facility])    • Chronic constipation    • Erosive gastritis 5/09     antral   • Lumbar vertebral fracture (CMS-HCC) 5/2011   • Family history of polycystic kidney disease    • Allergy      seasonal   • Anxiety      due to loss of . 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 Albrecht, attaches to plastic, prulent   • Breath shortness    • Anesthesia      \"hard to wake up\"   • Bowel habit changes      constipation   • Depression      and anxiety   • Renal disorder    • Pain 5/12/16     back and legs    • Back pain    • Bronchitis    • Chickenpox    • [place] measles    • Influenza    • Mumps    • Tonsillitis    • Heart burn    • Asthma      Inhaler use daily.   • Urinary bladder disorder 6/30/2016     Currently has a catheter.   • Sleep apnea      Recently diagnosed with sleep apnea waiting to see pulmonary Dr.       Past Surgical History   Procedure Laterality Date   • Lumbar laminectomy diskectomy     • Hysterectomy, total abdominal  1976   • Colonoscopy  2000,5/27/09     normal   • Egd with asp/bx  5/27/09     erosive gastritis   • Appendectomy  1976   • Spinal cord stimulator  9/2/14   • Cataract extraction with iol Bilateral    • Gastroscopy with balloon dilatation N/A 5/19/2016     Procedure: GASTROSCOPY WITH DILATATION;  Surgeon: Tony Monae M.D.;  Location: SURGERY [facility];  Service:    • Hysterectomy laparoscopy     • Tonsillectomy     • Spinal cord stimulator N/A 7/3/2017     Procedure: SPINAL " CORD STIMULATOR FOR LEAD REMOVAL;  Surgeon: Amandeep Gonzalez D.O.;  Location: SURGERY [facility];  Service:        Allergies:  Pcn; Sulfa drugs; Tape; Macrobid; and Zoloft    Family History   Problem Relation Age of Onset   • Genitourinary () Brother    • Lung Disease Mother      COPD   • Heart Disease Mother    • Genetic Father      Parkinsons/ from complications   • Hypertension Father    • Cancer Maternal Aunt      Breast cancer   • Stroke Paternal Grandmother    • Cancer Maternal Aunt      Breast cancer       Physical Examination    Filed Vitals:    17 1104   Height: 1.524 m (5')   Weight: 75.751 kg (167 lb)   Weight % change since last entry.: 0 %   BP: 120/72   Pulse: 83   BMI (Calculated): 32.62   Resp: 16   Temp: 36.1 °C (97 °F)   O2 sat % room air: 94 %       Physical Exam:  Constitutional:  Well developed and well nourished.  Head:  Normocephalic and atraumatic.  Nose:  Nose normal.  Mouth/Throat:  Oropharynx is clear and moist, no lesions.    Neck:  Normal range of motion.  Supple.  No JVD.  Cardiovascular:  Normal rate, regular rhythm, normal heart sounds. No edema  Pulmonary/Chest: No wheezing, rales or rhonchi.  Respiratory effort non labored  Musculoskeletal.  No muscular atrophy.  Lymphadenopathy:  No cervical or supraclavicular adenopathy  Neurological:  Alert and oriented.  Cranial nerves intact.  No focal deficits  Skin:  No rashes or ulcers.  Psyciatric:  Normal mood and affect.    Assessment and Plan:  1. Pulmonary HTN (CMS-HCC)  The patient has evidence of some mild pulmonary hypertension. To complete her workup I would like to do a CT scan of the chest PE protocol. I suspect that the pulmonary hypertension is related to a combination of sleep apnea and COPD. however she only has mild apnea with mild desaturations and she only has mild COPD therefore alternative diagnosis is possible. For now we will check a CT scan of the chest. Would recommend clinical and  echocardiogram follow-up. If the pulmonary hypertension continues to slowly rise then further evaluation may be required such as a right heart catheterization.  - CT-CTA CHEST PULMONARY ARTERY W/ RECONS; Future  - ECHOCARDIOGRAM COMP W/O CONT; Future    2. Obstructive sleep apnea hypopnea, mild  The patient does complain of fatigue and she has pulmonary hypertension. I have recommended a trial of CPAP therapy. She is willing to give it a try. I prescribed an AutoPap between 5 and 15 cm of water. We'll plan to see her back within 3 months to assess her progress with CPAP therapy.  - DME CPAP    3. Tremor  On the patient has a tremor associated with weakness and neuropathy. She is a family history is of Parkinson's disease. The patient would like to have a neurology referral for further evaluation.  - REFERRAL TO OTHER    4. COPD, mild (CMS-HCC)  The patient will continue with the Symbicort 80/4.5 twice a day and if it exacerbates her tremor than we can switch to a long-acting muscarinic agonist or simply try Atrovent.      Followup Return in about 3 months (around 11/14/2017) for follow up with the pulmonary physician, follow up visit with DOMINIQUE.

## 2017-08-21 ENCOUNTER — TELEPHONE (OUTPATIENT)
Dept: PULMONOLOGY | Facility: HOSPICE | Age: 77
End: 2017-08-21

## 2017-08-21 DIAGNOSIS — I27.20 PULMONARY HTN (HCC): ICD-10-CM

## 2017-08-21 NOTE — TELEPHONE ENCOUNTER
Marilyn in Imaging called requesting an order for creatinine blood work before the pt's CT scan.    Last OV: 8/14/17  Next OV: 11/10/17  Pulmonary HTN

## 2017-08-29 ENCOUNTER — HOSPITAL ENCOUNTER (OUTPATIENT)
Dept: LAB | Facility: MEDICAL CENTER | Age: 77
End: 2017-08-29
Attending: NURSE PRACTITIONER
Payer: MEDICARE

## 2017-08-29 DIAGNOSIS — I27.20 PULMONARY HTN (HCC): ICD-10-CM

## 2017-08-29 LAB
CREAT SERPL-MCNC: 0.83 MG/DL (ref 0.5–1.4)
GFR SERPL CREATININE-BSD FRML MDRD: >60 ML/MIN/1.73 M 2

## 2017-08-29 PROCEDURE — 82565 ASSAY OF CREATININE: CPT

## 2017-08-29 PROCEDURE — 36415 COLL VENOUS BLD VENIPUNCTURE: CPT

## 2017-09-05 ENCOUNTER — HOSPITAL ENCOUNTER (OUTPATIENT)
Dept: RADIOLOGY | Facility: MEDICAL CENTER | Age: 77
End: 2017-09-05
Attending: INTERNAL MEDICINE
Payer: MEDICARE

## 2017-09-05 ENCOUNTER — HOSPITAL ENCOUNTER (OUTPATIENT)
Dept: CARDIOLOGY | Facility: MEDICAL CENTER | Age: 77
End: 2017-09-05
Attending: INTERNAL MEDICINE
Payer: MEDICARE

## 2017-09-05 DIAGNOSIS — I27.20 PULMONARY HTN (HCC): ICD-10-CM

## 2017-09-05 LAB
LV EJECT FRACT  99904: 65
LV EJECT FRACT MOD 2C 99903: 55.44
LV EJECT FRACT MOD 4C 99902: 64.38
LV EJECT FRACT MOD BP 99901: 64.36

## 2017-09-05 PROCEDURE — 93306 TTE W/DOPPLER COMPLETE: CPT

## 2017-09-05 PROCEDURE — 700117 HCHG RX CONTRAST REV CODE 255: Performed by: INTERNAL MEDICINE

## 2017-09-05 PROCEDURE — 71275 CT ANGIOGRAPHY CHEST: CPT

## 2017-09-05 RX ADMIN — IOHEXOL 100 ML: 350 INJECTION, SOLUTION INTRAVENOUS at 11:45

## 2017-09-12 ENCOUNTER — PATIENT MESSAGE (OUTPATIENT)
Dept: MEDICAL GROUP | Facility: CLINIC | Age: 77
End: 2017-09-12

## 2017-09-12 DIAGNOSIS — I31.39 PERICARDIAL EFFUSION: ICD-10-CM

## 2017-09-13 NOTE — TELEPHONE ENCOUNTER
This was the message from the patient; I am having a lot of fatigue. I don't have any energy to do anything that I want to do. I just got over another bladder infection last week. Urology says the second culture is clear. Do you want to see me or do you want to order any labwork, blood etc? I had an echocardiogram and a cat scan of my chest for pulmonaryI am really struggling and don't know why.    This is my reply;  The CT itself was basically not remarkable. Think good news needed did not have blood clots in the lungs. However, while there were no clots and the emphysema changes are stable what I did see was a small effusion around the heart. Now these are generally considered to be normal but I don't think there is anything normal about that. This can be seen in lupus and rheumatoid.  Despite all the lab testing that you have done and I do not see any tests for these. Therefore I have placed these orders. You do not need to be fasting.

## 2017-09-14 PROBLEM — E03.9 HYPOTHYROIDISM: Status: RESOLVED | Noted: 2017-06-22 | Resolved: 2017-09-14

## 2017-09-14 NOTE — ASSESSMENT & PLAN NOTE
Chronic condition managed with current medical regimen  8/29/2017   GFR If African American >60 >60 mL/min/1.73 m 2 Final   GFR If Non African American >60 >60 mL/min/1.73 m 2 Final     Bun 13 8 - 22 mg/dL Final   Creatinine 0.90 0.50 - 1.40 mg/dL Final    Continue with current meds  Followed by Shirlene Quinones M.D..

## 2017-09-14 NOTE — ASSESSMENT & PLAN NOTE
Chronic condition managed with current medical regimen  Stable per review   Continue with current meds  Followed by pulm

## 2017-09-14 NOTE — ASSESSMENT & PLAN NOTE
Chronic condition managed with current medical regimen  Stable per review   Continue with surveillance  Followed by Shirlene Quinones M.D..

## 2017-09-14 NOTE — ASSESSMENT & PLAN NOTE
Per pt since mendez inserted has had freq utis  Mendez in place due to urinary retention for the past 2 wks  Followed by urology

## 2017-09-14 NOTE — ASSESSMENT & PLAN NOTE
Chronic condition managed with current medical regimen  Next DEXA 2018   Continue with current OTC meds  Followed by Shirlene Quinones M.D..

## 2017-09-14 NOTE — ASSESSMENT & PLAN NOTE
Chronic condition managed with current medical regimen  Stable per review   Continue with current meds  Followed by specialty

## 2017-09-14 NOTE — ASSESSMENT & PLAN NOTE
Chronic condition managed with current medical regimen  Stable per review   Continue with current meds  Followed by Shirlene Quinones M.D..

## 2017-09-14 NOTE — ASSESSMENT & PLAN NOTE
Per pt and son has some swallowing difficulties with certain foods which is now avoided  No intervention needed at this time  Followed by GI

## 2017-09-14 NOTE — ASSESSMENT & PLAN NOTE
"GFR, BUN/Creat WNL  6/21/2017   abd/pelvis  \"The kidneys enhance symmetrically. Prominent right extrarenal pelvis. There are subcentimeter low attenuating lesions in the bilateral kidney that are too small to characterize but are likely cysts.\"  Followed by Shirlene Quinones M.D..   "

## 2017-09-14 NOTE — ASSESSMENT & PLAN NOTE
Chronic condition managed with current medical regimen  Stable per review   Continue with current meds  Followed by speciality

## 2017-09-15 ENCOUNTER — OFFICE VISIT (OUTPATIENT)
Dept: MEDICAL GROUP | Facility: CLINIC | Age: 77
End: 2017-09-15
Payer: MEDICARE

## 2017-09-15 ENCOUNTER — HOSPITAL ENCOUNTER (OUTPATIENT)
Dept: LAB | Facility: MEDICAL CENTER | Age: 77
End: 2017-09-15
Attending: FAMILY MEDICINE
Payer: MEDICARE

## 2017-09-15 VITALS
HEART RATE: 80 BPM | WEIGHT: 168 LBS | DIASTOLIC BLOOD PRESSURE: 74 MMHG | TEMPERATURE: 97.7 F | SYSTOLIC BLOOD PRESSURE: 130 MMHG | HEIGHT: 60 IN | BODY MASS INDEX: 32.98 KG/M2 | OXYGEN SATURATION: 93 %

## 2017-09-15 DIAGNOSIS — I10 ESSENTIAL HYPERTENSION: ICD-10-CM

## 2017-09-15 DIAGNOSIS — N95.1 MENOPAUSAL SYMPTOMS: ICD-10-CM

## 2017-09-15 DIAGNOSIS — F33.0 MAJOR DEPRESSIVE DISORDER, RECURRENT EPISODE, MILD (HCC): ICD-10-CM

## 2017-09-15 DIAGNOSIS — R13.19 ESOPHAGEAL DYSPHAGIA: ICD-10-CM

## 2017-09-15 DIAGNOSIS — M96.1 POSTLAMINECTOMY SYNDROME, UNSPECIFIED REGION: ICD-10-CM

## 2017-09-15 DIAGNOSIS — Z82.71 FAMILY HISTORY OF POLYCYSTIC KIDNEY DISEASE: ICD-10-CM

## 2017-09-15 DIAGNOSIS — N39.0 RECURRENT UTI: ICD-10-CM

## 2017-09-15 DIAGNOSIS — G03.9 ARACHNOIDITIS: ICD-10-CM

## 2017-09-15 DIAGNOSIS — Z87.19 HISTORY OF GASTRITIS: ICD-10-CM

## 2017-09-15 DIAGNOSIS — J98.4 MIXED RESTRICTIVE AND OBSTRUCTIVE LUNG DISEASE (HCC): ICD-10-CM

## 2017-09-15 DIAGNOSIS — Z87.81 HISTORY OF VERTEBRAL FRACTURE: ICD-10-CM

## 2017-09-15 DIAGNOSIS — N18.30 CKD (CHRONIC KIDNEY DISEASE) STAGE 3, GFR 30-59 ML/MIN (HCC): ICD-10-CM

## 2017-09-15 DIAGNOSIS — M48.062 SPINAL STENOSIS OF LUMBAR REGION WITH NEUROGENIC CLAUDICATION: ICD-10-CM

## 2017-09-15 DIAGNOSIS — G62.9 NEUROPATHY: ICD-10-CM

## 2017-09-15 DIAGNOSIS — Z96.89 SPINAL CORD STIMULATOR STATUS: ICD-10-CM

## 2017-09-15 DIAGNOSIS — I31.39 PERICARDIAL EFFUSION: ICD-10-CM

## 2017-09-15 DIAGNOSIS — K21.9 GASTROESOPHAGEAL REFLUX DISEASE WITHOUT ESOPHAGITIS: ICD-10-CM

## 2017-09-15 DIAGNOSIS — M81.0 POSTMENOPAUSAL OSTEOPOROSIS: ICD-10-CM

## 2017-09-15 DIAGNOSIS — Z00.00 MEDICARE ANNUAL WELLNESS VISIT, SUBSEQUENT: Primary | ICD-10-CM

## 2017-09-15 DIAGNOSIS — S31.829D OPEN WOUND OF BUTTOCK, LEFT, SUBSEQUENT ENCOUNTER: ICD-10-CM

## 2017-09-15 DIAGNOSIS — J43.9 MIXED RESTRICTIVE AND OBSTRUCTIVE LUNG DISEASE (HCC): ICD-10-CM

## 2017-09-15 DIAGNOSIS — M47.816 FACET ARTHRITIS OF LUMBAR REGION: ICD-10-CM

## 2017-09-15 DIAGNOSIS — G25.0 ESSENTIAL TREMOR: ICD-10-CM

## 2017-09-15 DIAGNOSIS — E03.4 IDIOPATHIC ATROPHIC HYPOTHYROIDISM: ICD-10-CM

## 2017-09-15 PROBLEM — S31.809A OPEN WOUND OF BUTTOCK: Status: RESOLVED | Noted: 2017-06-20 | Resolved: 2017-09-15

## 2017-09-15 LAB
ERYTHROCYTE [SEDIMENTATION RATE] IN BLOOD BY WESTERGREN METHOD: 9 MM/HOUR (ref 0–30)
RHEUMATOID FACT SER IA-ACNC: <10 IU/ML (ref 0–14)

## 2017-09-15 PROCEDURE — 86225 DNA ANTIBODY NATIVE: CPT

## 2017-09-15 PROCEDURE — 86038 ANTINUCLEAR ANTIBODIES: CPT

## 2017-09-15 PROCEDURE — 86431 RHEUMATOID FACTOR QUANT: CPT

## 2017-09-15 PROCEDURE — 86235 NUCLEAR ANTIGEN ANTIBODY: CPT | Mod: 91

## 2017-09-15 PROCEDURE — 36415 COLL VENOUS BLD VENIPUNCTURE: CPT

## 2017-09-15 PROCEDURE — 86200 CCP ANTIBODY: CPT

## 2017-09-15 PROCEDURE — 85652 RBC SED RATE AUTOMATED: CPT

## 2017-09-15 PROCEDURE — G0439 PPPS, SUBSEQ VISIT: HCPCS | Performed by: NURSE PRACTITIONER

## 2017-09-15 RX ORDER — POLYETHYLENE GLYCOL 3350 17 G/17G
17 POWDER, FOR SOLUTION ORAL DAILY
COMMUNITY
End: 2019-04-10

## 2017-09-15 ASSESSMENT — PATIENT HEALTH QUESTIONNAIRE - PHQ9
CLINICAL INTERPRETATION OF PHQ2 SCORE: 3
SUM OF ALL RESPONSES TO PHQ QUESTIONS 1-9: 7
5. POOR APPETITE OR OVEREATING: 0 - NOT AT ALL

## 2017-09-15 NOTE — PATIENT INSTRUCTIONS
Continue with care through Shirlene Quinones M.D..  Next Medicare Annual Wellness Visit is due in one year.    Continue care with specialists you are seeing for your chronic problems.    Attached is some preventative information for you to read.          Fall Prevention and Home Safety  Falls cause injuries and can affect all age groups. It is possible to prevent falls.   HOW TO PREVENT FALLS  · Wear shoes with rubber soles that do not have an opening for your toes.   · Keep the inside and outside of your house well lit.   · Use night lights throughout your home.   · Remove clutter from floors.   · Clean up floor spills.   · Remove throw rugs or fasten them to the floor with carpet tape.   · Do not place electrical cords across pathways.   · Put grab bars by your tub, shower, and toilet. Do not use towel bars as grab bars.   · Put handrails on both sides of the stairway. Fix loose handrails.   · Do not climb on stools or stepladders, if possible.   · Do not wax your floors.   · Repair uneven or unsafe sidewalks, walkways, or stairs.   · Keep items you use a lot within reach.   · Be aware of pets.   · Keep emergency numbers next to the telephone.   · Put smoke detectors in your home and near bedrooms.   Ask your doctor what other things you can do to prevent falls.  Document Released: 10/14/2010 Document Revised: 06/18/2013 Document Reviewed: 03/19/2013  ExitCare® Patient Information ©2013 [employer].    Exercise to Stay Healthy      Exercise helps you become and stay healthy.  EXERCISE IDEAS AND TIPS  Choose exercises that:  · You enjoy.   · Fit into your day.   You do not need to exercise really hard to be healthy. You can do exercises at a slow or medium level and stay healthy. You can:  · Stretch before and after working out.   · Try yoga, Pilates, or silvio chi.   · Lift weights.   · Walk fast, swim, jog, run, climb stairs, bicycle, dance, or rollerskate.   · Take aerobic classes.   Exercises that burn about 150  calories:  · Running 1 ½ miles in 15 minutes.   · Playing volleyball for 45 to 60 minutes.   · Washing and waxing a car for 45 to 60 minutes.   · Playing touch football for 45 minutes.   · Walking 1 ¾ miles in 35 minutes.   · Pushing a stroller 1 ½ miles in 30 minutes.   · Playing basketball for 30 minutes.   · Raking leaves for 30 minutes.   · Bicycling 5 miles in 30 minutes.   · Walking 2 miles in 30 minutes.   · Dancing for 30 minutes.   · Shoveling snow for 15 minutes.   · Swimming laps for 20 minutes.   · Walking up stairs for 15 minutes.   · Bicycling 4 miles in 15 minutes.   · Gardening for 30 to 45 minutes.   · Jumping rope for 15 minutes.   · Washing windows or floors for 45 to 60 minutes.     One suggestion is to start walking for 10 minutes after each meal.  This will help with digestion and help you to metabolize your meal.       For Weight Loss -   Recommend portion sizes with more fruits/vegetables/high fiber foods.  Stay away from white bread/pastas/white rice/white potatoes.  Increase Fluid intake to 6-8 glasses of water daily.   Eliminate soda's (diet and regular) from your fluid intake.      Document Released: 01/20/2012 Document Revised: 03/11/2013 Document Reviewed: 01/20/2012  ExitCare® Patient Information ©2013 [employer], 5 examples.    Fat and Cholesterol Control Diet  Cholesterol is a wax-like substance. It comes from your liver and is found in certain foods. There is good (HDL) and bad (LDL) cholesterol. Too much cholesterol in your blood can affect your heart. Certain foods can lower or raise your cholesterol. Eat foods that are low in cholesterol.  Saturated and trans fats are bad fats found in foods that will raise your cholesterol. Do not eat foods that are high in saturated and trans fats.  FOODS HIGHER IN SATURATED AND TRANS FATS  · Dairy products, such as whole milk, eggs, cheese, cream, and butter.   · Fatty meats, such as hot dogs, sausage, and salami.   · Fried foods.   · Trans fats which  are found in margarine and pre-made cookies, crackers, and baked goods.   · Tropical oils, such as coconut and palm oils.   Read package labels at the store. Do not buy products that use saturated or trans fats or hydrogenated oils. Find foods labeled:  · Low-fat.   · Low-saturated fat.   · Trans-fat-free.   · Low-cholesterol.   FOODS LOWER IN CHOLESTEROL  ·  Fruit.   · Vegetables.   · Beans, peas, and lentils.   · Fish.   · Lean meat, such as chicken (without skin) or ground turkey.   · Grains, such as barley, rice, couscous, bulgur wheat, and pasta.   · Heart-healthy tub margarine.   PREPARING YOUR FOOD  · Broil, bake, steam, or roast foods. Do not davis food.   · Use non-stick cooking sprays.   · Use lemon or herbs to flavor food instead of using butter or stick margarine.   · Use nonfat yogurt, salsa, or low-fat dressings for salads.   Document Released: 06/18/2013 Document Reviewed: 06/18/2013  ExitCare® Patient Information ©2013 [employer].    Recommend annual flu vaccine.  Next due in Fall, 2017.  If you decide not to have the flu vaccine, recommend good handwashing and staying out of crowds during flu season.

## 2017-09-15 NOTE — PROGRESS NOTES
CC:   Medicare Annual Wellness Visit    HPI:  Maddy is a 77 y.o. here for Medicare Annual Wellness Visit    Patient Active Problem List    Diagnosis Date Noted   • CKD (chronic kidney disease) stage 3, GFR 30-59 ml/min 05/23/2016     Priority: Medium   • Esophageal dysphagia 05/19/2016     Priority: Medium   • Idiopathic atrophic hypothyroidism      Priority: Medium   • GERD (gastroesophageal reflux disease) 09/20/2011     Priority: Medium   • Essential hypertension 07/06/2009     Priority: Medium   • Major depressive disorder, recurrent episode, mild (CMS-HCC) 05/02/2016     Priority: Low   • Neuropathy (CMS-HCC) 10/17/2013     Priority: Low   • Family history of polycystic kidney disease      Priority: Low   • Facet arthritis of lumbar region 03/26/2012     Priority: Low   • History of vertebral fracture 03/26/2012     Priority: Low   • Spinal stenosis of lumbar region with neurogenic claudication      Priority: Low   • Postlaminectomy syndrome, unspecified region 07/03/2017   • Recurrent UTI 06/28/2017   • 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     • polyethylene glycol/lytes (MIRALAX) Pack Take 17 g by mouth every day.     • XTAMPZA ER 18 MG Capsule Extended Release 12 hour Abuse-Deterrent      • SYMBICORT 80-4.5 MCG/ACT Aerosol INHALE TWO PUFFS BY MOUTH TWICE DAILY RINSE  MOUTH  AFTER  EACH  USE* 1 Inhaler 5   • duloxetine (CYMBALTA) 60 MG Cap DR Particles delayed-release capsule Take 60 mg by mouth every evening.     • valsartan (DIOVAN) 160 MG Tab Take 160 mg by mouth every 48 hours.     • Cyanocobalamin 2500 MCG Chew Tab Take 1 Tab by mouth every day.     • furosemide (LASIX) 20 MG Tab Take 20 mg by mouth every day. As  needed for edema     • potassium chloride (KLOR-CON) 20 MEQ Pack Take 20 mEq by mouth every day. As needed with furosemide     • diazepam (VALIUM) 5 MG Tab Take 1 Tab by mouth every 6 hours as needed for Anxiety or Sleep. 90 Tab 2   • fluoxetine (PROZAC) 20 MG Cap Take 1 Cap by mouth every day. 90 Cap 3   • levothyroxine (SYNTHROID) 50 MCG Tab Take 1 Tab by mouth Every morning on an empty stomach. 90 Tab 3   • omeprazole (PRILOSEC) 20 MG delayed-release capsule Take 1 Cap by mouth every day. 90 Cap 3   • estradiol (ESTRACE) 0.5 MG tablet TAKE ONE TABLET BY MOUTH ONCE DAILY 90 Tab 3   • VENTOLIN  (90 BASE) MCG/ACT Aero Soln inhalation aerosol Inhale 2 Puffs by mouth every 6 hours as needed for Shortness of Breath. 1 Inhaler 5   • aspirin EC (ECOTRIN) 81 MG Tablet Delayed Response Take 81 mg by mouth every day.     • pregabalin (LYRICA) 100 MG CAPS Take 100 mg by mouth 4 times a day.     • oxycodone-acetaminophen (PERCOCET) 7.5-325 MG per tablet Take 1-2 Tabs by mouth every four hours as needed (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 She is seen 2/14/14, do not fill until 4/11/14 180 Each 0   • docusate sodium (COLACE) 250 MG capsule Take 250 mg by mouth every day.     • Probiotic Product (Essential Medical) CAPS Take 1 Cap by mouth 2 Times a Day.     • Cholecalciferol (VITAMIN D) 1000 UNIT CAPS Take 1,000 Units by mouth every day.       No current facility-administered medications for this visit.       Current supplements: no  Chronic narcotic pain medicines: no  Allergies: Pcn [penicillins]; Sulfa drugs; Tape; Macrobid [nitrofurantoin]; and Zoloft  Exercise: yes as cesar  Current social contact/activities: Has support of family and friends      Screening:  Depression Screening    Little interest or pleasure in doing things?  1 - several days  Feeling down, depressed , or hopeless? 2 - more than half the days  Trouble falling or staying asleep, or sleeping too much?  0 - not at  all  Feeling tired or having little energy?  2 - more than half the days  Poor appetite or overeating?  0 - not at all  Feeling bad about yourself - or that you are a failure or have let yourself or your family down? 0 - not at all  Trouble concentrating on things, such as reading the newspaper or watching television? 0 - not at all  Moving or speaking so slowly that other people could have noticed.  Or the opposite - being so fidgety or restless that you have been moving around a lot more than usual?  1 - several days  Thoughts that you would be better off dead, or of hurting yourself?  1 - several days  Patient Health Questionnaire Score: 7    If depressive symptoms identified deferred to follow up visit unless specifically addressed in assessment and plan.    Interpretation of PHQ-9 Total Score   Score Severity   1-4 No Depression   5-9 Mild Depression   10-14 Moderate Depression   15-19 Moderately Severe Depression   20-27 Severe Depression      Screening for Cognitive Impairment    Three Minute Recall (apple, watch, fernandez)  2/3    Draw clock face with all 12 numbers set to the hand to show 10 minutes past 11 o'clock  1 5  Cognitive concerns identified deferred for follow up unless specifically addressed in assessment and plan.    Fall Risk Assessment    Has the patient had two or more falls in the last year or any fall with injury in the last year?  Yes    Safety Assessment    Throw rugs on floor.  Yes  Handrails on all stairs.  Yes  Good lighting in all hallways.  Yes  Difficulty hearing.  No  Patient counseled about all safety risks that were identified.    Functional Assessment ADLs    Are there any barriers preventing you from cooking for yourself or meeting nutritional needs?  No.    Are there any barriers preventing you from driving safely or obtaining transportation?  Yes. Son provides all transportation for the patient.   Are there any barriers preventing you from using a telephone or calling for help?   No.    Are there any barriers preventing you from shopping?  Yes. Son does all of the grocery shopping for the patient.   Are there any barriers preventing you from taking care of your own finances?  Yes. Son helps manage finances.   Are there any barriers preventing you from managing your medications?  Yes. Son helps manage medications.   Are currently engaging any exercise or physical activity?  Yes.       Health Maintenance Summary                Annual Wellness Visit Overdue 8/26/2016      Done 8/26/2015 Visit Dx: Encounter for Medicare annual wellness exam    IMM INFLUENZA Overdue 9/1/2017      Done 10/4/2016 Imm Admin: Influenza Vaccine Quad Inj (Pf)     Patient has more history with this topic...    IMM PNEUMOCOCCAL 65+ (ADULT) LOW/MEDIUM RISK SERIES Postponed 9/1/2020 Originally 10/6/2016. Physician Recommended     Done 10/6/2015 Imm Admin: Pneumococcal Conjugate Vaccine (Prevnar/PCV-13)    MAMMOGRAM Next Due 12/22/2017      Done 12/22/2015 [id number]    PFT SCREENING-FEV1 AND FEV/FVC RATIO / SPIROMETRY SHOULD BE PERFORMED ANNUALLY Next Due 4/14/2018      Done 4/14/2017 AMB PULMONARY FUNCTION TEST/LAB    BONE DENSITY Next Due 8/29/2018      Done 8/29/2013 DS-BONE DENSITY STUDY (DEXA)     Patient has more history with this topic...    COLONOSCOPY Next Due 5/27/2019      Previously completed 5/27/2009     IMM DTaP/Tdap/Td Vaccine Next Due 9/21/2023      Done 9/21/2013 Imm Admin: Tdap Vaccine          Patient Care Team:  Shirlene Quinones M.D. as PCP - General  Tony Monae M.D. as Consulting Physician (Gastroenterology)  Domingo Michelle M.D. as Consulting Physician (Dermatology)  Amandeep Gonzalez D.O. as Consulting Physician (Phys Med and Rehab)  Corinne L Cooper, O.D. as Consulting Physician (Optometry)  Yvon Edwards M.D. as Consulting Physician (Urology)        Social History   Substance Use Topics   • Smoking status: Never Smoker   • Smokeless tobacco: Never Used   •  Alcohol use No       Family History   Problem Relation Age of Onset   • Genitourinary () Brother    • Lung Disease Mother      COPD   • Heart Disease Mother    • Genetic Father      Parkinsons/ from complications   • Hypertension Father    • Cancer Maternal Aunt      Breast cancer   • Stroke Paternal Grandmother    • Cancer Maternal Aunt      Breast cancer     She  has a past medical history of Allergy; Anesthesia; Anxiety; Arachnoiditis; Arthritis; Asthma; Back pain; Basal cell carcinoma; Bowel habit changes; Breath shortness; Bronchitis; CATARACT; Chickenpox; Chronic constipation; 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 She also has no past medical history of Addisons disease (CMS-[facility]); Adrenal disorder (CMS-HCC); Anemia; Blood transfusion; Clotting disorder (CMS-HCC); COPD; Cushings syndrome (CMS-HCC); Diabetic neuropathy (CMS-[facility]); Goiter; HIV (human immunodeficiency virus infection); Hyperlipidemia; IBD (inflammatory bowel disease); Migraine; Parathyroid disorder (CMS-HCC); Pituitary disease (CMS-HCC); Sickle cell disease (CMS-HCC); Substance abuse; Ulcer (CMS-HCC); or Urinary tract infection, site not specified.   Past Surgical History:   Procedure Laterality Date   • SPINAL CORD STIMULATOR N/A 7/3/2017    Procedure: SPINAL CORD STIMULATOR FOR LEAD REMOVAL;  Surgeon: Amandeep Gonzalez D.O.;  Location: SURGERY [facility];  Service:    • GASTROSCOPY WITH BALLOON DILATATION N/A  5/19/2016    Procedure: GASTROSCOPY WITH DILATATION;  Surgeon: Tony Monae M.D.;  Location: SURGERY [facility];  Service:    • SPINAL CORD STIMULATOR  9/2/14   • EGD WITH ASP/BX  5/27/09    erosive gastritis   • HYSTERECTOMY, TOTAL ABDOMINAL  1976   • APPENDECTOMY  1976   • CATARACT EXTRACTION WITH IOL Bilateral    • COLONOSCOPY  2000,5/27/09    normal   • HYSTERECTOMY LAPAROSCOPY     • LUMBAR LAMINECTOMY DISKECTOMY     • TONSILLECTOMY         ROS:    Ostomy or other tubes or amputations: no  Chronic oxygen use no  Last eye exam 7/2017  : Denies incontinence.   Gait: Uses no assistive device   Hearing excellent.    Dentition good    Exam: Blood pressure 130/74, pulse 80, temperature 36.5 °C (97.7 °F), height 1.524 m (5'), weight 76.2 kg (168 lb), SpO2 93 %. Body mass index is 32.81 kg/m².  Alert, oriented in no acute distress.  Eye contact is good, speech goal directed, affect calm      Assessment and Plan. The following treatment and monitoring plan is recommended for all problems as listed below:   Essential hypertension  Chronic condition managed with current medical regimen  Stable per review   Continue with current meds  Followed by Shirlene Quinones M.D..        GERD (gastroesophageal reflux disease)  Chronic condition managed with current medical regimen  Stable per review   Continue with current meds  Followed by Shirlene Quinones M.D..        Idiopathic atrophic hypothyroidism  Chronic condition managed with current medical regimen  Stable per review   Continue with current meds  Followed by Shirlene Quinones M.D..        Esophageal dysphagia  Per pt and son has some swallowing difficulties with certain foods which is now avoided  No intervention needed at this time  Followed by GI    CKD (chronic kidney disease) stage 3, GFR 30-59 ml/min  Chronic condition managed with current medical regimen  8/29/2017   GFR If African American >60 >60 mL/min/1.73 m 2 Final   GFR If Non   ">60 >60 mL/min/1.73 m 2 Final     Bun 13 8 - 22 mg/dL Final   Creatinine 0.90 0.50 - 1.40 mg/dL Final    Continue with current meds  Followed by Shirlene Quinones M.D..        Arachnoiditis  Chronic condition managed with current medical regimen  Stable per review   Continue with current meds  Followed by speciality       Postmenopausal osteoporosis  Chronic condition managed with current medical regimen  Next DEXA 2018   Continue with current OTC meds  Followed by Shirlene Quinones M.D..        Hx of Spinal cord stimulator status  Historical data    Essential tremor  Chronic condition managed with current medical regimen  Stable per review   Continue with surveillance  Followed by Shirlene Quinones M.D..        Menopausal symptoms  Chronic condition managed with current medical regimen  Stable per review   Continue with current meds  Followed by Shirlene Quinones M.D..        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)  Chronic condition managed with current medical regimen  Stable per review   Continue with current meds  Followed by pulm     Recurrent UTI  Per pt since mendez inserted has had freq utis  Mendez in place due to urinary retention for the past 2 wks  Followed by urology    Postlaminectomy syndrome, unspecified region  Chronic condition managed with current medical regimen  Stable per review   Continue with current meds  Followed by specialty      History of vertebral fracture  Chronic condition managed with current medical regimen  Stable per review   Continue with current meds  Followed by specialty       History of gastritis  resolved    Family history of polycystic kidney disease  GFR, BUN/Creat WNL  6/21/2017   abd/pelvis  \"The kidneys enhance symmetrically. Prominent right extrarenal pelvis. There are subcentimeter low attenuating lesions in the bilateral kidney that are too small to characterize but are likely " "cysts.\"  Followed by Shirlene Quinones M.D..     Neuropathy (CMS-HCC)  Chronic condition managed with current medical regimen  Stable per review   Continue with current meds  Followed by Shirlene Quinones M.D..        Major depressive disorder, recurrent episode, mild (CMS-HCC)  Chronic condition managed with current medical regimen  Stable per review   Continue with current meds  Followed by Shirlene Quinones M.D..        Spinal stenosis of lumbar region with neurogenic claudication  Chronic condition managed with current medical regimen  Stable per review   Continue with current meds  Followed by specialty        Facet arthritis of lumbar region (CMS-HCC)  Chronic condition managed with current medical regimen  Stable per review   Continue with current meds  Followed by specialty          Health Care Screening recommendations reviewed with patient today: per Patient Instructions  DPA/Advanced directive: .Power of  for healthcare on file    Next office visit for recheck of chronic medical conditions is due with Shirlene Quinones M.D. in 6 months    Pt states prefers to speak with Shirlene Quinones M.D before taking flu shot        "

## 2017-09-17 LAB — CCP IGG SERPL-ACNC: 8 UNITS (ref 0–19)

## 2017-09-18 LAB — NUCLEAR IGG SER QL IA: NORMAL

## 2017-09-25 DIAGNOSIS — N95.1 MENOPAUSAL SYMPTOMS: ICD-10-CM

## 2017-09-26 RX ORDER — ESTRADIOL 0.5 MG/1
TABLET ORAL
Qty: 90 TAB | Refills: 3 | Status: SHIPPED | OUTPATIENT
Start: 2017-09-26 | End: 2018-10-01 | Stop reason: SDUPTHER

## 2017-10-25 DIAGNOSIS — M48.061 SPINAL STENOSIS OF LUMBAR REGION: ICD-10-CM

## 2017-10-26 RX ORDER — DULOXETIN HYDROCHLORIDE 60 MG/1
CAPSULE, DELAYED RELEASE ORAL
Qty: 90 CAP | Refills: 2 | Status: SHIPPED | OUTPATIENT
Start: 2017-10-26 | End: 2018-01-30 | Stop reason: SDUPTHER

## 2017-10-31 ENCOUNTER — PATIENT MESSAGE (OUTPATIENT)
Dept: MEDICAL GROUP | Facility: CLINIC | Age: 77
End: 2017-10-31

## 2017-10-31 DIAGNOSIS — F33.0 MAJOR DEPRESSIVE DISORDER, RECURRENT EPISODE, MILD (HCC): ICD-10-CM

## 2017-10-31 DIAGNOSIS — F43.21 SITUATIONAL DEPRESSION: ICD-10-CM

## 2017-10-31 RX ORDER — NALOXEGOL OXALATE 25 MG/1
TABLET, FILM COATED ORAL
COMMUNITY
Start: 2017-10-16 | End: 2017-12-21

## 2017-10-31 RX ORDER — IMIPRAMINE HYDROCHLORIDE 10 MG/1
10 TABLET, FILM COATED ORAL EVERY EVENING
Qty: 30 TAB | Refills: 2 | Status: SHIPPED | OUTPATIENT
Start: 2017-10-31 | End: 2018-01-23 | Stop reason: SDUPTHER

## 2017-11-10 ENCOUNTER — OFFICE VISIT (OUTPATIENT)
Dept: PULMONOLOGY | Facility: HOSPICE | Age: 77
End: 2017-11-10
Payer: MEDICARE

## 2017-11-10 VITALS
RESPIRATION RATE: 16 BRPM | TEMPERATURE: 97.2 F | HEIGHT: 60 IN | OXYGEN SATURATION: 94 % | BODY MASS INDEX: 33.18 KG/M2 | SYSTOLIC BLOOD PRESSURE: 120 MMHG | WEIGHT: 169 LBS | HEART RATE: 75 BPM | DIASTOLIC BLOOD PRESSURE: 82 MMHG

## 2017-11-10 DIAGNOSIS — J44.9 CHRONIC OBSTRUCTIVE PULMONARY DISEASE, UNSPECIFIED COPD TYPE (HCC): ICD-10-CM

## 2017-11-10 DIAGNOSIS — G47.33 OSA (OBSTRUCTIVE SLEEP APNEA): ICD-10-CM

## 2017-11-10 PROCEDURE — 99214 OFFICE O/P EST MOD 30 MIN: CPT | Performed by: NURSE PRACTITIONER

## 2017-11-10 NOTE — PATIENT INSTRUCTIONS
1) Continue autoCPAP at [id number]  2) Clean mask and supplies weekly and change them as insurance allows  3) Stop Symbicort and start using Incruse   4) Vaccines: Up to date with Prevnar 13, Pneumovax 23, flu  5) Return in about 2 months (around 1/10/2018) for Compliance, review of symptoms, if not sooner, follow up with DOMINIQUE Sims.   6) Increase activity - reviewed deconditioning

## 2017-11-10 NOTE — PROGRESS NOTES
CC:  Here for f/u sleep and pulmonary issues as listed below    HPI:   Maddy presents today for follow up for COPD with hx of pulmonary HTN.  PFTs from 3/2017 indicate a Fev1 of 1.28L or 72% predicted with a mild bronchodilator response, Fev1/FVC ratio of 67, DLCO 83%. For further workup of pulmonary HTN an echo was completed without significant change since 2016 study noting estimated EF of 65%, RVSP of 50mmHg. CTA study completed 9/2017 was negative for PE. A 6 minute walk completed in 2017 did not show desaturation with walking but did show increase of heart rate from 77 at rest up to 100 with exertion. Patient reports she is quite sedentary due to dyspnea with exertion. BMI is 33.  Last office visit she was placed on Symbicort with improvement of shortness of breath but developed hoarseness. She complains of cough due to irritants producing clear to yellow sputum production. She denies wheeze, hemoptysis, chest pain or chest tightness, fever or chills, unintentional weight loss, nasal congestion, acid reflux.    PSG from 6/2017 indicated an AHI of 5.5 and low oxygenation of 81%.  Currently she is being treated with autoCPAP @ [id number].  Compliance download from the dates 10/11/2017 - 11/9/2017 indicates she is wearing the device 100% for an avg of 7 hours and 54 minutes per night with a reduced AHI of 26.3. Average pressure is 8.3 with a high of 12. She does tolerate pressure and mask well.  She wakes up refreshed and is less tired throughout the day. She denies morning H/A and sleep better overall. She will continue to clean supplies weekly and change them as insurance allows.       Patient Active Problem List    Diagnosis Date Noted   • CKD (chronic kidney disease) stage 3, GFR 30-59 ml/min 05/23/2016     Priority: Medium   • Esophageal dysphagia 05/19/2016     Priority: Medium   • Idiopathic atrophic hypothyroidism      Priority: Medium   • GERD (gastroesophageal reflux disease) 09/20/2011     Priority:  "Medium   • Essential hypertension 07/06/2009     Priority: Medium   • Major depressive disorder, recurrent episode, mild (CMS-HCC) 05/02/2016     Priority: Low   • Neuropathy (CMS-HCC) 10/17/2013     Priority: Low   • Family history of polycystic kidney disease      Priority: Low   • Facet arthritis of lumbar region (CMS-HCC) 03/26/2012     Priority: Low   • History of vertebral fracture 03/26/2012     Priority: Low   • Spinal stenosis of lumbar region with neurogenic claudication      Priority: Low   • Chronic obstructive pulmonary disease (CMS-HCC) 11/10/2017   • ALISSA (obstructive sleep apnea) 11/10/2017   • Postlaminectomy syndrome, unspecified region 07/03/2017   • Recurrent UTI 06/28/2017   • 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    • Arthritis     facet arthritis of lumbar region   • Asthma     Inhaler use daily.   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 Albrecht, attaches to plastic, prulent   • Depression     and anxiety   • Encounter for long-term (current) use of other medications    • Erosive gastritis 5/09    antral   • Family history of polycystic kidney disease    • GERD (gastroesophageal reflux disease)    • [place] citizen measles    • Heart burn    • Hypertension    • Hypothyroidism    • Influenza    • Lumbar vertebral fracture (CMS-HCC) 5/2011   • Mumps    • Muscle disorder     Arachnoiditis   • Neuropathy (CMS-HCC)    • OSTEOPOROSIS  "   • Pain 16    back and legs    • Renal disorder    • Sleep apnea     Recently diagnosed with sleep apnea waiting to see pulmonary Dr.   • Spinal stenosis, lumbar region, without neurogenic claudication    • Tonsillitis    • Urinary bladder disorder 2016    Currently has a catheter.       Past Surgical History:   Procedure Laterality Date   • SPINAL CORD STIMULATOR N/A 7/3/2017    Procedure: SPINAL CORD STIMULATOR FOR LEAD REMOVAL;  Surgeon: Amandeep Gonzalez D.O.;  Location: SURGERY [facility];  Service:    • GASTROSCOPY WITH BALLOON DILATATION N/A 2016    Procedure: GASTROSCOPY WITH DILATATION;  Surgeon: Tony Monae M.D.;  Location: SURGERY [facility];  Service:    • SPINAL CORD STIMULATOR  14   • EGD WITH ASP/BX  09    erosive gastritis   • HYSTERECTOMY, TOTAL ABDOMINAL     • APPENDECTOMY     • CATARACT EXTRACTION WITH IOL Bilateral    • COLONOSCOPY  ,09    normal   • HYSTERECTOMY LAPAROSCOPY     • LUMBAR LAMINECTOMY DISKECTOMY     • TONSILLECTOMY         Family History   Problem Relation Age of Onset   • Genitourinary () Brother    • Lung Disease Mother      COPD   • Heart Disease Mother    • Genetic Father      Parkinsons/ from complications   • Hypertension Father    • Cancer Maternal Aunt      Breast cancer   • Stroke Paternal Grandmother    • Cancer Maternal Aunt      Breast cancer       Social History     Social History   • Marital status:      Spouse name: N/A   • Number of children: N/A   • Years of education: N/A     Occupational History   • Not on file.     Social History Main Topics   • Smoking status: Never Smoker   • Smokeless tobacco: Never Used   • Alcohol use No   • Drug use: No   • Sexual activity: No     Other Topics Concern   • Stress Concern No      cancer     Social History Narrative   • No narrative on file       Current Outpatient Prescriptions   Medication Sig Dispense Refill   • Umeclidinium Bromide  (INCRUSE ELLIPTA) 62.5 MCG/INH AEROSOL POWDER, BREATH ACTIVATED Inhale 1 Puff by mouth every day. 1 Each 11   • MOVANTIK 25 MG Tab      • imipramine (TOFRANIL) 10 MG Tab Take 1 Tab by mouth every evening. 30 Tab 2   • duloxetine (CYMBALTA) 60 MG Cap DR Particles delayed-release capsule TAKE ONE CAPSULE BY MOUTH ONCE DAILY 90 Cap 2   • estradiol (ESTRACE) 0.5 MG tablet TAKE ONE TABLET BY MOUTH ONCE DAILY 90 Tab 3   • Multiple Vitamins-Minerals (VISIVITES/LUTEIN) Tab Take  by mouth.     • polyethylene glycol/lytes (MIRALAX) Pack Take 17 g by mouth every day.     • XTAMPZA ER 18 MG Capsule Extended Release 12 hour Abuse-Deterrent      • valsartan (DIOVAN) 160 MG Tab Take 160 mg by mouth every 48 hours.     • Cyanocobalamin 2500 MCG Chew Tab Take 1 Tab by mouth every day.     • furosemide (LASIX) 20 MG Tab Take 20 mg by mouth every day. As needed for edema     • potassium chloride (KLOR-CON) 20 MEQ Pack Take 20 mEq by mouth every day. As needed with furosemide     • diazepam (VALIUM) 5 MG Tab Take 1 Tab by mouth every 6 hours as needed for Anxiety or Sleep. 90 Tab 2   • levothyroxine (SYNTHROID) 50 MCG Tab Take 1 Tab by mouth Every morning on an empty stomach. 90 Tab 3   • omeprazole (PRILOSEC) 20 MG delayed-release capsule Take 1 Cap by mouth every day. 90 Cap 3   • VENTOLIN  (90 BASE) MCG/ACT Aero Soln inhalation aerosol Inhale 2 Puffs by mouth every 6 hours as needed for Shortness of Breath. 1 Inhaler 5   • aspirin EC (ECOTRIN) 81 MG Tablet Delayed Response Take 81 mg by mouth every day.     • pregabalin (LYRICA) 100 MG CAPS Take 100 mg by mouth 4 times a day.     • oxycodone-acetaminophen (PERCOCET) 7.5-325 MG per tablet Take 1-2 Tabs by mouth every four hours as needed (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 She is seen 2/14/14, do not fill until 4/11/14 180 Each 0   • docusate sodium (COLACE) 250 MG capsule Take 250 mg by mouth every day.     • Probiotic Product (TAN COLON  HEALTH) CAPS Take 1 Cap by mouth 2 Times a Day.     • Cholecalciferol (VITAMIN D) 1000 UNIT CAPS Take 1,000 Units by mouth every day.       No current facility-administered medications for this visit.           Allergies: Pcn [penicillins]; Sulfa drugs; Tape; Macrobid [nitrofurantoin]; and Zoloft      ROS   Gen: Denies fever, chills, unintentional weight loss, fatigue, night sweats  E/N/T: Denies ear pain, nasal congestion  Resp:Denies Dyspnea, wheezing, cough, sputum production, hemoptysis  CV: Denies chest pain, chest tightness, palpitations, BLE edema  Sleep:Denies morning headache, insomnia, daytime somnolence, snoring, gasping for air, apnea  Neuro: Denies frequent headaches, weakness, dizziness  GI: Denies N/V, acid reflux/heartburn  See HPI.  All other systems reviewed and negative      Vital signs for this encounter:  Vitals:    11/10/17 1101   Height: 1.524 m (5')   Weight: 76.7 kg (169 lb)   Weight % change since last entry.: 0 %   BP: 120/82   Pulse: 75   BMI (Calculated): 33.01   Resp: 16   Temp: 36.2 °C (97.2 °F)   O2 sat % room air: 94 %                 Physical Exam:   Appearance: well developed, well nourished, no acute distress.  Eyes: PERRL, EOM intact, sclere white, conjunctiva moist.  Ears: no lesions or deformities.  Hearing: grossly intact.  Nose: no lesions or deformities.  Dentition: good dentition.  Oropharynx: tongue normal, posterior pharynx without erythema or exudate.  Neck: supple, trachea midline, no masses.  Respiratory effort: no intercostal retractions or use of accessory muscles.  Lung auscultation: Bilateral diminished   Heart auscultation: no murmur, rub, or gallop.   Extremities: no cyanosis or edema.  Abdomen: soft, non-tender, no masses.  Gait and station: grossly normal   Digits and Nails: no clubbing, cyanosis, petechiae, or nodes.  Cranial nerves: grossly normal.  Motor: no focal deficits observed.  Skin: no rashes, lesions, or ulcers noted.  Orientation: oriented to time,  place, and person.  Mood and affect: mood and affect appropriate, normal interaction with examiner.    Assessment   1. Chronic obstructive pulmonary disease, unspecified COPD type (CMS-HCC)     2. ALISSA (obstructive sleep apnea)         Patient was seen for 30 minutes, more than 50% of time spent in face to face review, counseling, and arranging future evaluation and follow up of medical conditions and care.     PLAN:   Patient Instructions   1) Continue autoCPAP at [id number]  2) Clean mask and supplies weekly and change them as insurance allows  3) Stop Symbicort and start using Incruse   4) Vaccines: Up to date with Prevnar 13, Pneumovax 23, flu  5) Return in about 2 months (around 1/10/2018) for Compliance, review of symptoms, if not sooner, follow up with DOMINIQUE Sims.   6) Increase activity

## 2017-12-21 ENCOUNTER — OFFICE VISIT (OUTPATIENT)
Dept: NEUROLOGY | Facility: MEDICAL CENTER | Age: 77
End: 2017-12-21
Payer: MEDICARE

## 2017-12-21 VITALS
HEART RATE: 84 BPM | OXYGEN SATURATION: 92 % | BODY MASS INDEX: 33.79 KG/M2 | DIASTOLIC BLOOD PRESSURE: 74 MMHG | SYSTOLIC BLOOD PRESSURE: 118 MMHG | HEIGHT: 60 IN | TEMPERATURE: 97.8 F | WEIGHT: 172.1 LBS | RESPIRATION RATE: 16 BRPM

## 2017-12-21 DIAGNOSIS — G25.0 ESSENTIAL TREMOR: ICD-10-CM

## 2017-12-21 DIAGNOSIS — G03.9 ARACHNOIDITIS: ICD-10-CM

## 2017-12-21 DIAGNOSIS — G62.9 NEUROPATHY: ICD-10-CM

## 2017-12-21 DIAGNOSIS — E03.8 OTHER SPECIFIED HYPOTHYROIDISM: ICD-10-CM

## 2017-12-21 PROCEDURE — 99204 OFFICE O/P NEW MOD 45 MIN: CPT | Performed by: PSYCHIATRY & NEUROLOGY

## 2017-12-21 NOTE — PROGRESS NOTES
"NEUROLOGY NOTE    Referring Physician  Remington Quinones M.D.      CHIEF COMPLAINT:  Tremor for 2 + years affecting both hands  Was referred to us by Dr Quinones  Chief Complaint   Patient presents with   • Establish Care     TIA       PRESENT ILLNESS:   Tremor for 2 + years affecting both hands  Was referred to us by Dr Quinones    The patient has depression, catheter to the bladder, basal cell carcinoma, arachnoidisit over the back-- after back surgery  Neuropathy    The patient also noticed that her cognition is not as good as she used to be    Tremor improved somewhat with stop of fluoxetine        RED FLAGS for reversible dementia  Headache X  Fluctuation course X  Tremor V  Rapid Progressive onset ( within 2 years) V  MRI hippocampus not atrophy ?        PAST MEDICAL HISTORY:  Past Medical History:   Diagnosis Date   • Allergy     seasonal   • Anesthesia     \"hard to wake up\"   • Anxiety     due to loss of . 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    • Arthritis     facet arthritis of lumbar region   • Asthma     Inhaler use daily.   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 Albrecht, attaches to plastic, prulent   • Depression     and anxiety   • Encounter for long-term (current) use of other medications    • Erosive gastritis 5/09    antral   • Family history of polycystic kidney disease    • GERD (gastroesophageal reflux disease)    • [place] measles    • Heart burn    • Hypertension    • Hypothyroidism    • Influenza    • Lumbar vertebral fracture (CMS-HCC) 5/2011   • Mumps    • Muscle disorder     Arachnoiditis   • Neuropathy (CMS-[facility])    • OSTEOPOROSIS    • Pain 5/12/16    back and legs    • Renal disorder    • Sleep apnea     Recently diagnosed with sleep apnea " waiting to see pulmonary Dr.   • Spinal stenosis, lumbar region, without neurogenic claudication    • Tonsillitis    • Urinary bladder disorder 2016    Currently has a catheter.       PAST SURGICAL HISTORY:  Past Surgical History:   Procedure Laterality Date   • SPINAL CORD STIMULATOR N/A 7/3/2017    Procedure: SPINAL CORD STIMULATOR FOR LEAD REMOVAL;  Surgeon: Amandeep Gonzalez D.O.;  Location: SURGERY [facility];  Service:    • GASTROSCOPY WITH BALLOON DILATATION N/A 2016    Procedure: GASTROSCOPY WITH DILATATION;  Surgeon: Tony Monae M.D.;  Location: SURGERY [facility];  Service:    • SPINAL CORD STIMULATOR  14   • EGD WITH ASP/BX  09    erosive gastritis   • HYSTERECTOMY, TOTAL ABDOMINAL     • APPENDECTOMY     • CATARACT EXTRACTION WITH IOL Bilateral    • COLONOSCOPY  ,09    normal   • HYSTERECTOMY LAPAROSCOPY     • LUMBAR LAMINECTOMY DISKECTOMY     • TONSILLECTOMY         FAMILY HISTORY:  Family History   Problem Relation Age of Onset   • Genitourinary () Brother    • Lung Disease Mother      COPD   • Heart Disease Mother    • Genetic Father      Parkinsons/ from complications   • Hypertension Father    • Cancer Maternal Aunt      Breast cancer   • Stroke Paternal Grandmother    • Cancer Maternal Aunt      Breast cancer       SOCIAL HISTORY:  Social History     Social History   • Marital status:      Spouse name: N/A   • Number of children: N/A   • Years of education: N/A     Occupational History   • Not on file.     Social History Main Topics   • Smoking status: Never Smoker   • Smokeless tobacco: Never Used   • Alcohol use No   • Drug use: No   • Sexual activity: No     Other Topics Concern   • Stress Concern No      cancer     Social History Narrative   • No narrative on file     ALLERGIES:  Allergies   Allergen Reactions   • Pcn [Penicillins]    • Sulfa Drugs    • Tape    • Macrobid [Nitrofurantoin] Rash     rash   • Zoloft  Unspecified     Worse depression     TOBHX  History   Smoking Status   • Never Smoker   Smokeless Tobacco   • Never Used     ALCHX  History   Alcohol Use No     DRUGHX  History   Drug Use No           MEDICATIONS:  Current Outpatient Prescriptions   Medication   • Umeclidinium Bromide (INCRUSE ELLIPTA) 62.5 MCG/INH AEROSOL POWDER, BREATH ACTIVATED   • MOVANTIK 25 MG Tab   • imipramine (TOFRANIL) 10 MG Tab   • duloxetine (CYMBALTA) 60 MG Cap DR Particles delayed-release capsule   • estradiol (ESTRACE) 0.5 MG tablet   • Multiple Vitamins-Minerals (VISIVITES/LUTEIN) Tab   • polyethylene glycol/lytes (MIRALAX) Pack   • XTAMPZA ER 18 MG Capsule Extended Release 12 hour Abuse-Deterrent   • valsartan (DIOVAN) 160 MG Tab   • Cyanocobalamin 2500 MCG Chew Tab   • furosemide (LASIX) 20 MG Tab   • potassium chloride (KLOR-CON) 20 MEQ Pack   • diazepam (VALIUM) 5 MG Tab   • levothyroxine (SYNTHROID) 50 MCG Tab   • omeprazole (PRILOSEC) 20 MG delayed-release capsule   • VENTOLIN  (90 BASE) MCG/ACT Aero Soln inhalation aerosol   • aspirin EC (ECOTRIN) 81 MG Tablet Delayed Response   • pregabalin (LYRICA) 100 MG CAPS   • oxycodone-acetaminophen (PERCOCET) 7.5-325 MG per tablet   • docusate sodium (COLACE) 250 MG capsule   • Probiotic Product ([employer]) CAPS   • Cholecalciferol (VITAMIN D) 1000 UNIT CAPS     No current facility-administered medications for this visit.        REVIEW OF SYSTEM:    Constitutional: Denies fevers, Denies weight changes   Eyes: Denies changes in vision, no eye pain   Ears/Nose/Throat/Mouth: Denies nasal congestion or sore throat   Cardiovascular: Denies chest pain or palpitations   Respiratory: Denies SOB.   Gastrointestinal/Hepatic: Denies abdominal pain, nausea, vomiting, diarrhea, constipation or GI bleeding   Genitourinary: back pain   Musculoskeletal/Rheum: Denies joint pain and swelling   Skin/Breast: Denies rash, denies breast lumps or discharge   Neurological:  tremor  Psychiatric: denies mood disorder   Endocrine: denies hx of diabetes or thyroid dysfunction   Heme/Oncology/Lymph Nodes: Denies enlarged lymph nodes, denies brusing or known bleeding disorder   Allergic/Immunologic: Denies hx of allergies         PHYSICAL AND NEUROLOGICAL EXMAINATIONS:  VITAL SIGNS: /74   Pulse 84   Temp 36.6 °C (97.8 °F)   Resp 16   Ht 1.524 m (5')   Wt 78.1 kg (172 lb 1.6 oz)   SpO2 92%   BMI 33.61 kg/m²   CURRENT WEIGHT:   BMI: Body mass index is 33.61 kg/m².  PREVIOUS WEIGHTS:  Wt Readings from Last 25 Encounters:   12/21/17 78.1 kg (172 lb 1.6 oz)   11/10/17 76.7 kg (169 lb)   09/15/17 76.2 kg (168 lb)   08/14/17 75.8 kg (167 lb)   07/21/17 75.8 kg (167 lb)   07/03/17 75.5 kg (166 lb 7.2 oz)   06/28/17 78 kg (172 lb)   06/24/17 79.9 kg (176 lb 2.4 oz)   05/24/17 74.8 kg (165 lb)   05/08/17 77.7 kg (171 lb 3.2 oz)   03/31/17 77.1 kg (170 lb)   03/24/17 77.6 kg (171 lb)   11/16/16 76.7 kg (169 lb)   09/06/16 76.7 kg (169 lb)   05/23/16 74.8 kg (165 lb)   05/12/16 77 kg (169 lb 12.1 oz)   04/05/16 76.2 kg (168 lb)   02/02/16 75.7 kg (166 lb 14.4 oz)   08/26/15 76.7 kg (169 lb)   08/13/15 77.6 kg (171 lb)   02/17/15 79.4 kg (175 lb)   11/05/14 78.7 kg (173 lb 9.6 oz)   05/15/14 77.1 kg (170 lb)   02/14/14 75.8 kg (167 lb)   01/02/14 72.6 kg (160 lb)       General appearance of patient: WDWN(+) NAD(+)    EYES  o Fundus : Papilledem(-) Exudates(-) Hemorrhage(-)  Nervous System  Orientation to time, place and person(+)  Memory normal(-)  Language: aphasia(-)  Knowledge: past(+) Current(+)  Attention(+)  Cranial Nerves  • Nerve 2: intact  • Nerve 3,4,6: intact  • Nerve 5 : intact  • Nerve 7: intact  • Nerve 8: intact  • Nerve 9 & 10: intact  • Nerve 11: intact  • Nerve 12: intact  Muscle Power and muscle tone: symmetric, normal in upper and lower  Sensory System: Pin sensation intact(+)  Reflexes: symmetric throughout  Cerebellar Function FNP normal   Gait : Steady(+) TandemGait  steady(+)  Heart and Vascular  Peripheral Vasucular system : Edema (-) Swelling(-)  RHB, Breathing sound clear  abdomen bowel sound normoactive  Extremities freely moveable  Joints no contracture       NEUROIMAGING:      Resting tremor - more kinetic tremor  Rigidity not prominent  Bradykinesia both sides  Postural reflex : not in big trouble YET      IMPRESSION:    1. Tremor-- Essential tremor is likely for the past 2 years. Cognitive Decline  2. Hx of hypothyroidism, depression, Acid Reflux, back surgery, arachnoiditis, HTN, bladder retention with cathter, Dry mouth , Sleep Apnea    PLAN/RECOMMENDATIONS:    At present, the patient's tremor do not meet the criteria of parkinson disease yet ( do have bradykinesia)  The tremor -- probably essential tremor actually improved with change of anti-depression medication ( Fluoxetine)    Please notify us if tremor or cognition or balance worsen  Advise exercise muscle and exercise brain  Management of sleep apnea  Will offer some blood tests    SIGNATURE:  Miller Dobbs    CC:  Remington Quinones M.D.

## 2017-12-21 NOTE — PATIENT INSTRUCTIONS
Resting tremor - more kinetic tremor  Rigidity not prominent  Bradykinesia both sides  Postural reflex : not in big trouble YET      IMPRESSION:    1. Tremor-- Essential tremor is likely for the past 2 years. Cognitive Decline  2. Hx of hypothyroidism, depression, Acid Reflux, back surgery, arachnoiditis, HTN, bladder retention with cathter, Dry mouth , Sleep Apnea    PLAN/RECOMMENDATIONS:    At present, the patient's tremor do not meet the criteria of parkinson disease yet ( do have bradykinesia)  The tremor -- probably essential tremor actually improved with change of anti-depression medication ( Fluoxetine)    Please notify us if tremor or cognition or balance worsen  Advise exercise muscle and exercise brain  Management of sleep apnea  Will offer some blood tests    SIGNATURE:  Miller Dobbs    CC:  Remington Quinones M.D.

## 2017-12-22 ENCOUNTER — HOSPITAL ENCOUNTER (OUTPATIENT)
Dept: LAB | Facility: MEDICAL CENTER | Age: 77
End: 2017-12-22
Attending: PSYCHIATRY & NEUROLOGY
Payer: MEDICARE

## 2017-12-22 ENCOUNTER — APPOINTMENT (OUTPATIENT)
Dept: RADIOLOGY | Facility: MEDICAL CENTER | Age: 77
End: 2017-12-22
Attending: EMERGENCY MEDICINE
Payer: MEDICARE

## 2017-12-22 ENCOUNTER — HOSPITAL ENCOUNTER (EMERGENCY)
Facility: MEDICAL CENTER | Age: 77
End: 2017-12-22
Attending: EMERGENCY MEDICINE
Payer: MEDICARE

## 2017-12-22 VITALS
WEIGHT: 172.18 LBS | HEART RATE: 69 BPM | DIASTOLIC BLOOD PRESSURE: 67 MMHG | BODY MASS INDEX: 33.8 KG/M2 | HEIGHT: 60 IN | SYSTOLIC BLOOD PRESSURE: 149 MMHG | RESPIRATION RATE: 18 BRPM | OXYGEN SATURATION: 97 % | TEMPERATURE: 97.8 F

## 2017-12-22 DIAGNOSIS — R55 NEAR SYNCOPE: ICD-10-CM

## 2017-12-22 DIAGNOSIS — N30.00 ACUTE CYSTITIS WITHOUT HEMATURIA: ICD-10-CM

## 2017-12-22 DIAGNOSIS — G25.0 ESSENTIAL TREMOR: ICD-10-CM

## 2017-12-22 DIAGNOSIS — G03.9 ARACHNOIDITIS: ICD-10-CM

## 2017-12-22 DIAGNOSIS — G62.9 NEUROPATHY: ICD-10-CM

## 2017-12-22 DIAGNOSIS — E03.8 OTHER SPECIFIED HYPOTHYROIDISM: ICD-10-CM

## 2017-12-22 LAB
25(OH)D3 SERPL-MCNC: 31 NG/ML (ref 30–100)
ALBUMIN SERPL BCP-MCNC: 4.3 G/DL (ref 3.2–4.9)
ALBUMIN/GLOB SERPL: 1.7 G/DL
ALP SERPL-CCNC: 67 U/L (ref 30–99)
ALT SERPL-CCNC: 24 U/L (ref 2–50)
ANION GAP SERPL CALC-SCNC: 9 MMOL/L (ref 0–11.9)
APPEARANCE UR: ABNORMAL
APTT PPP: 30.2 SEC (ref 24.7–36)
AST SERPL-CCNC: 22 U/L (ref 12–45)
BACTERIA #/AREA URNS HPF: ABNORMAL /HPF
BASOPHILS # BLD AUTO: 0.8 % (ref 0–1.8)
BASOPHILS # BLD: 0.05 K/UL (ref 0–0.12)
BILIRUB SERPL-MCNC: 0.5 MG/DL (ref 0.1–1.5)
BILIRUB UR QL STRIP.AUTO: NEGATIVE
BNP SERPL-MCNC: 22 PG/ML (ref 0–100)
BUN SERPL-MCNC: 19 MG/DL (ref 8–22)
CALCIUM SERPL-MCNC: 9.1 MG/DL (ref 8.4–10.2)
CHLORIDE SERPL-SCNC: 103 MMOL/L (ref 96–112)
CO2 SERPL-SCNC: 24 MMOL/L (ref 20–33)
COLOR UR: YELLOW
CREAT SERPL-MCNC: 0.85 MG/DL (ref 0.5–1.4)
CRP SERPL HS-MCNC: 0.53 MG/DL (ref 0–0.75)
CULTURE IF INDICATED INDCX: YES UA CULTURE
EKG IMPRESSION: NORMAL
EOSINOPHIL # BLD AUTO: 0.1 K/UL (ref 0–0.51)
EOSINOPHIL NFR BLD: 1.5 % (ref 0–6.9)
EPI CELLS #/AREA URNS HPF: ABNORMAL /HPF
ERYTHROCYTE [DISTWIDTH] IN BLOOD BY AUTOMATED COUNT: 46.3 FL (ref 35.9–50)
ERYTHROCYTE [SEDIMENTATION RATE] IN BLOOD BY WESTERGREN METHOD: 8 MM/HOUR (ref 0–30)
GFR SERPL CREATININE-BSD FRML MDRD: >60 ML/MIN/1.73 M 2
GLOBULIN SER CALC-MCNC: 2.6 G/DL (ref 1.9–3.5)
GLUCOSE SERPL-MCNC: 119 MG/DL (ref 65–99)
GLUCOSE UR STRIP.AUTO-MCNC: NEGATIVE MG/DL
HCT VFR BLD AUTO: 37.7 % (ref 37–47)
HGB BLD-MCNC: 13.1 G/DL (ref 12–16)
IMM GRANULOCYTES # BLD AUTO: 0.01 K/UL (ref 0–0.11)
IMM GRANULOCYTES NFR BLD AUTO: 0.2 % (ref 0–0.9)
INR PPP: 1.04 (ref 0.87–1.13)
KETONES UR STRIP.AUTO-MCNC: NEGATIVE MG/DL
LEUKOCYTE ESTERASE UR QL STRIP.AUTO: ABNORMAL
LIPASE SERPL-CCNC: 27 U/L (ref 7–58)
LYMPHOCYTES # BLD AUTO: 1.75 K/UL (ref 1–4.8)
LYMPHOCYTES NFR BLD: 26.3 % (ref 22–41)
MCH RBC QN AUTO: 29.2 PG (ref 27–33)
MCHC RBC AUTO-ENTMCNC: 34.7 G/DL (ref 33.6–35)
MCV RBC AUTO: 84.2 FL (ref 81.4–97.8)
MICRO URNS: ABNORMAL
MONOCYTES # BLD AUTO: 0.55 K/UL (ref 0–0.85)
MONOCYTES NFR BLD AUTO: 8.3 % (ref 0–13.4)
MUCOUS THREADS #/AREA URNS HPF: ABNORMAL /HPF
NEUTROPHILS # BLD AUTO: 4.19 K/UL (ref 2–7.15)
NEUTROPHILS NFR BLD: 62.9 % (ref 44–72)
NITRITE UR QL STRIP.AUTO: POSITIVE
NRBC # BLD AUTO: 0 K/UL
NRBC BLD-RTO: 0 /100 WBC
PH UR STRIP.AUTO: 7 [PH]
PLATELET # BLD AUTO: 216 K/UL (ref 164–446)
PMV BLD AUTO: 10 FL (ref 9–12.9)
POTASSIUM SERPL-SCNC: 4.2 MMOL/L (ref 3.6–5.5)
PROT SERPL-MCNC: 6.9 G/DL (ref 6–8.2)
PROT UR QL STRIP: NEGATIVE MG/DL
PROTHROMBIN TIME: 13.2 SEC (ref 12–14.6)
RBC # BLD AUTO: 4.48 M/UL (ref 4.2–5.4)
RBC # URNS HPF: ABNORMAL /HPF
RBC UR QL AUTO: ABNORMAL
SODIUM SERPL-SCNC: 136 MMOL/L (ref 135–145)
SP GR UR STRIP.AUTO: 1.01
THYROPEROXIDASE AB SERPL-ACNC: 119.4 IU/ML (ref 0–9)
TROPONIN I SERPL-MCNC: <0.02 NG/ML (ref 0–0.04)
VIT B12 SERPL-MCNC: >1500 PG/ML (ref 211–911)
WBC # BLD AUTO: 6.7 K/UL (ref 4.8–10.8)
WBC #/AREA URNS HPF: ABNORMAL /HPF

## 2017-12-22 PROCEDURE — 85610 PROTHROMBIN TIME: CPT

## 2017-12-22 PROCEDURE — 86800 THYROGLOBULIN ANTIBODY: CPT

## 2017-12-22 PROCEDURE — 85730 THROMBOPLASTIN TIME PARTIAL: CPT

## 2017-12-22 PROCEDURE — 81001 URINALYSIS AUTO W/SCOPE: CPT

## 2017-12-22 PROCEDURE — 71010 DX-CHEST-PORTABLE (1 VIEW): CPT

## 2017-12-22 PROCEDURE — 87077 CULTURE AEROBIC IDENTIFY: CPT

## 2017-12-22 PROCEDURE — 86235 NUCLEAR ANTIGEN ANTIBODY: CPT | Mod: 91

## 2017-12-22 PROCEDURE — 82607 VITAMIN B-12: CPT

## 2017-12-22 PROCEDURE — 87186 SC STD MICRODIL/AGAR DIL: CPT

## 2017-12-22 PROCEDURE — 83880 ASSAY OF NATRIURETIC PEPTIDE: CPT

## 2017-12-22 PROCEDURE — 86376 MICROSOMAL ANTIBODY EACH: CPT

## 2017-12-22 PROCEDURE — 87086 URINE CULTURE/COLONY COUNT: CPT

## 2017-12-22 PROCEDURE — 83690 ASSAY OF LIPASE: CPT

## 2017-12-22 PROCEDURE — 86225 DNA ANTIBODY NATIVE: CPT

## 2017-12-22 PROCEDURE — A9270 NON-COVERED ITEM OR SERVICE: HCPCS | Performed by: EMERGENCY MEDICINE

## 2017-12-22 PROCEDURE — 82306 VITAMIN D 25 HYDROXY: CPT | Mod: GA

## 2017-12-22 PROCEDURE — 85025 COMPLETE CBC W/AUTO DIFF WBC: CPT

## 2017-12-22 PROCEDURE — 86140 C-REACTIVE PROTEIN: CPT

## 2017-12-22 PROCEDURE — 85652 RBC SED RATE AUTOMATED: CPT

## 2017-12-22 PROCEDURE — 36415 COLL VENOUS BLD VENIPUNCTURE: CPT

## 2017-12-22 PROCEDURE — 93005 ELECTROCARDIOGRAM TRACING: CPT | Performed by: EMERGENCY MEDICINE

## 2017-12-22 PROCEDURE — 80053 COMPREHEN METABOLIC PANEL: CPT

## 2017-12-22 PROCEDURE — 700102 HCHG RX REV CODE 250 W/ 637 OVERRIDE(OP): Performed by: EMERGENCY MEDICINE

## 2017-12-22 PROCEDURE — 86038 ANTINUCLEAR ANTIBODIES: CPT

## 2017-12-22 PROCEDURE — 99284 EMERGENCY DEPT VISIT MOD MDM: CPT

## 2017-12-22 PROCEDURE — 84484 ASSAY OF TROPONIN QUANT: CPT

## 2017-12-22 PROCEDURE — 84207 ASSAY OF VITAMIN B-6: CPT

## 2017-12-22 RX ORDER — CEFDINIR 300 MG/1
300 CAPSULE ORAL 2 TIMES DAILY
Qty: 14 CAP | Refills: 0 | Status: SHIPPED | OUTPATIENT
Start: 2017-12-22 | End: 2017-12-29

## 2017-12-22 RX ORDER — CEFDINIR 300 MG/1
300 CAPSULE ORAL EVERY 12 HOURS
Status: DISCONTINUED | OUTPATIENT
Start: 2017-12-22 | End: 2017-12-23 | Stop reason: HOSPADM

## 2017-12-22 RX ADMIN — CEFDINIR 300 MG: 300 CAPSULE ORAL at 22:16

## 2017-12-22 ASSESSMENT — PAIN SCALES - GENERAL: PAINLEVEL_OUTOF10: 0

## 2017-12-22 NOTE — LETTER
12/26/2017               Maddy Hodge  [address]  [facility] [address]        Dear Maddy (MR#[id number])    This letter is sent in regards to your, recent visit to the [facility] Emergency Department on 12/22/2017.  During the visit, tests were performed to assist the physician in a medical diagnosis.  A review of those tests requires that we notify you of the following:    Your urine culture was POSITIVE for a bacteria called Escherichia coli. The antibiotic prescribed for you (cefdinir) should be active to treat this bacteria. IT IS IMPORTANT THAT YOU CONTINUE TAKING YOUR ANTIBIOTIC UNTIL IT IS FINISHED.      Please feel free to contact me at the number below if you have any questions or concerns. Thank you for your cooperation in the matter.    Sincerely,  ED Culture Follow-Up Staff  Jaylin Street, PharmD    [facility], Emergency Department  [address] [place] [id number]  [phone] (ED Culture Line)  [phone]

## 2017-12-23 NOTE — ED NOTES
Chief Complaint   Patient presents with   • Near Syncopal     this evening, was able to lower herself to the ground.   • Nausea     /67   Pulse 60   Temp 36.6 °C (97.8 °F)   Resp 18   Ht 1.524 m (5')   Wt 78.1 kg (172 lb 2.9 oz)   SpO2 100%   BMI 33.63 kg/m²

## 2017-12-23 NOTE — ED NOTES
Prescriptions given. Discharge instructions provided.  Pt verbalized the understanding of discharge instructions to follow up with Urology, PCP and to return to ER if condition worsens.  Patient out of ED via wheelchair.

## 2017-12-23 NOTE — ED NOTES
Unsuccessful IV and lab draw with multiple attempts, including, Charge RN and lab. ERP informed. Urine specimen collected and sent to lab.

## 2017-12-23 NOTE — DISCHARGE INSTRUCTIONS
Near-Syncope  Near-syncope (commonly known as near fainting) is sudden weakness, dizziness, or feeling like you might pass out. This can happen when getting up or while standing for a long time. It is caused by a sudden decrease in blood flow to the brain, which can occur for various reasons. Most of the reasons are not serious.   HOME CARE  Watch your condition for any changes.  · Have someone stay with you until you feel stable.  · If you feel like you are going to pass out:  ¨ Lie down right away.  ¨ Prop your feet up if you can.  ¨ Breathe deeply and steadily.  ¨ Move only when the feeling has gone away. Most of the time, this feeling lasts only a few minutes. You may feel tired for several hours.  · Drink enough fluids to keep your pee (urine) clear or pale yellow.  · If you are taking blood pressure or heart medicine, stand up slowly.  · Follow up with your doctor as told.  GET HELP RIGHT AWAY IF:   · You have a severe headache.  · You have unusual pain in the chest, belly (abdomen), or back.  · You have bleeding from the mouth or butt (rectum), or you have black or tarry poop (stool).  · You feel your heart beat differently than normal, or you have a very fast pulse.  · You pass out, or you twitch and shake when you pass out.  · You pass out when sitting or lying down.  · You feel confused.  · You have trouble walking.  · You are weak.  · You have vision problems.  MAKE SURE YOU:   · Understand these instructions.  · Will watch your condition.  · Will get help right away if you are not doing well or get worse.     This information is not intended to replace advice given to you by your health care provider. Make sure you discuss any questions you have with your health care provider.     Document Released: 06/05/2009 Document Revised: 01/08/2016 Document Reviewed: 05/23/2014  [employer] Interactive Patient Education ©2016 [employer]      Urinary Tract Infection  Urinary tract infections (UTIs) can develop  anywhere along your urinary tract. Your urinary tract is your body's drainage system for removing wastes and extra water. Your urinary tract includes two kidneys, two ureters, a bladder, and a urethra. Your kidneys are a pair of bean-shaped organs. Each kidney is about the size of your fist. They are located below your ribs, one on each side of your spine.  CAUSES  Infections are caused by microbes, which are microscopic organisms, including fungi, viruses, and bacteria. These organisms are so small that they can only be seen through a microscope. Bacteria are the microbes that most commonly cause UTIs.  SYMPTOMS   Symptoms of UTIs may vary by age and gender of the patient and by the location of the infection. Symptoms in young women typically include a frequent and intense urge to urinate and a painful, burning feeling in the bladder or urethra during urination. Older women and men are more likely to be tired, shaky, and weak and have muscle aches and abdominal pain. A fever may mean the infection is in your kidneys. Other symptoms of a kidney infection include pain in your back or sides below the ribs, nausea, and vomiting.  DIAGNOSIS  To diagnose a UTI, your caregiver will ask you about your symptoms. Your caregiver also will ask to provide a urine sample. The urine sample will be tested for bacteria and white blood cells. White blood cells are made by your body to help fight infection.  TREATMENT   Typically, UTIs can be treated with medication. Because most UTIs are caused by a bacterial infection, they usually can be treated with the use of antibiotics. The choice of antibiotic and length of treatment depend on your symptoms and the type of bacteria causing your infection.  HOME CARE INSTRUCTIONS  · If you were prescribed antibiotics, take them exactly as your caregiver instructs you. Finish the medication even if you feel better after you have only taken some of the medication.  · Drink enough water and  fluids to keep your urine clear or pale yellow.  · Avoid caffeine, tea, and carbonated beverages. They tend to irritate your bladder.  · Empty your bladder often. Avoid holding urine for long periods of time.  · Empty your bladder before and after sexual intercourse.  · After a bowel movement, women should cleanse from front to back. Use each tissue only once.  SEEK MEDICAL CARE IF:   · You have back pain.  · You develop a fever.  · Your symptoms do not begin to resolve within 3 days.  SEEK IMMEDIATE MEDICAL CARE IF:   · You have severe back pain or lower abdominal pain.  · You develop chills.  · You have nausea or vomiting.  · You have continued burning or discomfort with urination.  MAKE SURE YOU:   · Understand these instructions.  · Will watch your condition.  · Will get help right away if you are not doing well or get worse.     This information is not intended to replace advice given to you by your health care provider. Make sure you discuss any questions you have with your health care provider.     Document Released: 09/27/2006 Document Revised: 01/08/2016 Document Reviewed: 01/25/2013  [employer] Interactive Patient Education ©2016 [employer]

## 2017-12-23 NOTE — ED PROVIDER NOTES
"ED Provider Note    CHIEF COMPLAINT  Chief Complaint   Patient presents with   • Near Syncopal     this evening, was able to lower herself to the ground.   • Nausea        HPI  Maddy Hodge is a 77 y.o. female who presentsTo the ED with complaints of near syncope. The patient states she was at home and she is about ready to drain her bladder. She has a catheter in place with. She acutely felt hot and then she almost passed out. The patient states she started feeling very lightheaded and started sliding down the wall and was able to really organizer so. The patient really became very nauseated. Because of these symptoms, an ambulance was called. Upon arrival to amateur start to feel better, but she was brought to the ED for evaluation. Upon arrival here, she just feels very weak but denies any chest pain, shortness of breath, fevers, chills, nausea, vomiting, or any other current symptoms.    REVIEW OF SYSTEMS  See HPI for further details. All other systems are negative.     PAST MEDICAL HISTORY  Past Medical History:   Diagnosis Date   • Allergy     seasonal   • Anesthesia     \"hard to wake up\"   • Anxiety     due to loss of . 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    • Arthritis     facet arthritis of lumbar region   • Asthma     Inhaler use daily.   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 Albrecht, attaches to plastic, prulent   • Depression     and anxiety   • Encounter for long-term (current) use of other medications    • Erosive gastritis 5/09    antral   • Family history of polycystic kidney disease    • GERD (gastroesophageal reflux disease)    • Japanese measles    • Heart burn    • Hypertension    • Hypothyroidism    • Influenza    • Lumbar " vertebral fracture (CMS-HCC) 2011   • Mumps    • Muscle disorder     Arachnoiditis   • Neuropathy (CMS-HCC)    • OSTEOPOROSIS    • Pain 16    back and legs    • Renal disorder    • Sleep apnea     Recently diagnosed with sleep apnea waiting to see pulmonary Dr.   • Spinal stenosis, lumbar region, without neurogenic claudication    • Tonsillitis    • Urinary bladder disorder 2016    Currently has a catheter.       FAMILY HISTORY  Family History   Problem Relation Age of Onset   • Genitourinary () Brother    • Lung Disease Mother      COPD   • Heart Disease Mother    • Genetic Father      Parkinsons/ from complications   • Hypertension Father    • Cancer Maternal Aunt      Breast cancer   • Stroke Paternal Grandmother    • Cancer Maternal Aunt      Breast cancer     Patient's family history has been discussed and is been found to be noncontributory to his present illness  SOCIAL HISTORY  Social History     Social History   • Marital status:      Spouse name: N/A   • Number of children: N/A   • Years of education: N/A     Social History Main Topics   • Smoking status: Never Smoker   • Smokeless tobacco: Never Used   • Alcohol use No   • Drug use: No   • Sexual activity: No     Other Topics Concern   • Stress Concern No      cancer     Social History Narrative   • No narrative on file      Remington Quinones M.D.      SURGICAL HISTORY  Past Surgical History:   Procedure Laterality Date   • SPINAL CORD STIMULATOR N/A 7/3/2017    Procedure: SPINAL CORD STIMULATOR FOR LEAD REMOVAL;  Surgeon: Amandeep Gonzalez D.O.;  Location: SURGERY [facility];  Service:    • GASTROSCOPY WITH BALLOON DILATATION N/A 2016    Procedure: GASTROSCOPY WITH DILATATION;  Surgeon: Tony Monae M.D.;  Location: SURGERY [facility];  Service:    • SPINAL CORD STIMULATOR  14   • EGD WITH ASP/BX  09    erosive gastritis   • HYSTERECTOMY, TOTAL ABDOMINAL     • APPENDECTOMY     •  CATARACT EXTRACTION WITH IOL Bilateral    • COLONOSCOPY  2000,5/27/09    normal   • HYSTERECTOMY LAPAROSCOPY     • LUMBAR LAMINECTOMY DISKECTOMY     • TONSILLECTOMY         CURRENT MEDICATIONS  Home Medications    **Home medications have not yet been reviewed for this encounter**         ALLERGIES  Allergies   Allergen Reactions   • Pcn [Penicillins]    • Sulfa Drugs    • Tape    • Macrobid [Nitrofurantoin] Rash     rash   • Zoloft Unspecified     Worse depression       PHYSICAL EXAM  VITAL SIGNS: /67   Pulse 64   Temp 36.6 °C (97.8 °F)   Resp 18   Ht 1.524 m (5')   Wt 78.1 kg (172 lb 2.9 oz)   SpO2 98%   BMI 33.63 kg/m²    Pulse Oximetry was obtained. It showed a reading of Pulse Oximetry: 100 %.  I interpreted this asNon-hypoxic.     Constitutional: Elderly appearance, but otherwise Well developed, Well nourished, No acute distress, Non-toxic appearance.   HENT: Normocephalic, Atraumatic, Bilateral external ears normal, bilateral tympanic membranes normal, Oropharynx moist, No oral exudates, Nose normal.   Eyes: Pupils are equal round and react to light, extraocular motions are intact, conjunctiva is normal, there are no signs of exudate.   Neck: Supple, no cervical lymphadenopathy, no meningeal signs..   Lymphatic: No lymphadenopathy noted.   Cardiovascular: Regular rate and rhythm without murmurs gallops or rubs.   Thorax & Lungs: Lungs are clear to auscultation bilaterally, there are no wheezes no rales. Chest wall is nontender.  Abdomen: Soft, nontender nondistended. Bowel sounds are present. Patient does have a Garnett catheter in place currently. Prescription  Skin: Warm, Dry, No erythema,   Back: No tenderness, No CVA tenderness.   Musculoskeletal: Good range of motion in all major joints. No tenderness to palpation or major deformities noted. Intact distal pulses, no clubbing, no cyanosis, no edema,   Neurologic: Alert & oriented x 3, Normal motor function, Normal sensory function, No focal  deficits noted.   Psychiatric: Affect normal, Judgment normal, Mood normal.     EKG  Interpreted below by myself    RADIOLOGY/PROCEDURES  DX-CHEST-PORTABLE (1 VIEW)   Final Result      1.  There is no acute cardiopulmonary process.            Results for orders placed or performed during the hospital encounter of 12/22/17   CBC with Differential   Result Value Ref Range    WBC 6.7 4.8 - 10.8 K/uL    RBC 4.48 4.20 - 5.40 M/uL    Hemoglobin 13.1 12.0 - 16.0 g/dL    Hematocrit 37.7 37.0 - 47.0 %    MCV 84.2 81.4 - 97.8 fL    MCH 29.2 27.0 - 33.0 pg    MCHC 34.7 33.6 - 35.0 g/dL    RDW 46.3 35.9 - 50.0 fL    Platelet Count 216 164 - 446 K/uL    MPV 10.0 9.0 - 12.9 fL    Neutrophils-Polys 62.90 44.00 - 72.00 %    Lymphocytes 26.30 22.00 - 41.00 %    Monocytes 8.30 0.00 - 13.40 %    Eosinophils 1.50 0.00 - 6.90 %    Basophils 0.80 0.00 - 1.80 %    Immature Granulocytes 0.20 0.00 - 0.90 %    Nucleated RBC 0.00 /100 WBC    Neutrophils (Absolute) 4.19 2.00 - 7.15 K/uL    Lymphs (Absolute) 1.75 1.00 - 4.80 K/uL    Monos (Absolute) 0.55 0.00 - 0.85 K/uL    Eos (Absolute) 0.10 0.00 - 0.51 K/uL    Baso (Absolute) 0.05 0.00 - 0.12 K/uL    Immature Granulocytes (abs) 0.01 0.00 - 0.11 K/uL    NRBC (Absolute) 0.00 K/uL   Complete Metabolic Panel (CMP)   Result Value Ref Range    Sodium 136 135 - 145 mmol/L    Potassium 4.2 3.6 - 5.5 mmol/L    Chloride 103 96 - 112 mmol/L    Co2 24 20 - 33 mmol/L    Anion Gap 9.0 0.0 - 11.9    Glucose 119 (H) 65 - 99 mg/dL    Bun 19 8 - 22 mg/dL    Creatinine 0.85 0.50 - 1.40 mg/dL    Calcium 9.1 8.4 - 10.2 mg/dL    AST(SGOT) 22 12 - 45 U/L    ALT(SGPT) 24 2 - 50 U/L    Alkaline Phosphatase 67 30 - 99 U/L    Total Bilirubin 0.5 0.1 - 1.5 mg/dL    Albumin 4.3 3.2 - 4.9 g/dL    Total Protein 6.9 6.0 - 8.2 g/dL    Globulin 2.6 1.9 - 3.5 g/dL    A-G Ratio 1.7 g/dL   Btype Natriuretic Peptide (BNP)   Result Value Ref Range    B Natriuretic Peptide 22 0 - 100 pg/mL   Prothrombin Time (PT/INR)   Result  Value Ref Range    PT 13.2 12.0 - 14.6 sec    INR 1.04 0.87 - 1.13   APTT   Result Value Ref Range    APTT 30.2 24.7 - 36.0 sec   Lipase   Result Value Ref Range    Lipase 27 7 - 58 U/L   Troponin STAT   Result Value Ref Range    Troponin I <0.02 0.00 - 0.04 ng/mL   URINALYSIS,CULTURE IF INDICATED   Result Value Ref Range    Color Yellow     Character Hazy (A)     Specific Gravity 1.010 <1.035    Ph 7.0 5.0 - 8.0    Glucose Negative Negative mg/dL    Ketones Negative Negative mg/dL    Protein Negative Negative mg/dL    Bilirubin Negative Negative    Nitrite Positive (A) Negative    Leukocyte Esterase Large (A) Negative    Occult Blood Trace (A) Negative    Micro Urine Req Microscopic     Culture Indicated Yes UA Culture   URINE MICROSCOPIC (W/UA)   Result Value Ref Range    WBC 20-50 (A) /hpf    RBC 2-5 (A) /hpf    Bacteria Moderate (A) None /hpf    Epithelial Cells Rare Few /hpf    Mucous Threads Few /hpf   ESTIMATED GFR   Result Value Ref Range    GFR If African American >60 >60 mL/min/1.73 m 2    GFR If Non African American >60 >60 mL/min/1.73 m 2   EKG (ER)   Result Value Ref Range    Report       [facility]    Test Date:  2017  Pt Name:    ROBERTO ARMSTRONG              Department: [facility]  MRN:        [id number]                      [facility]   Gender:     Female                       Technician: [id number]  :        [id number]                   Requested By:TIRSO BAGLEY  Order #:    [id number]                    Reading MD: TIRSO BAGLEY MD    Measurements  Intervals                                Axis  Rate:       62                           P:          51  RI:         212                          QRS:        30  QRSD:       92                           T:          42  QT:         412  QTc:        419    Interpretive Statements  SINUS RHYTHM  BORDERLINE AV CONDUCTION DELAY  Compared to ECG 2017 12:24:39  First degree AV block no longer present  no  acute changes  Electronically Signed On 12- 22:10:59 PST by TIRSO BAGLEY MD           COURSE & MEDICAL DECISION MAKING  Pertinent Labs & Imaging studies reviewed. (See chart for details)  Patient presents to ED for evaluation. Clinically the patient appears well. Blood pressure is normal. His attempted multiple times to get an IV and the patient was unable to do so. We were able to get urine, as well as blood. During the whole course. The patient has actually improved, feels completely normal. EKG is unchanged. Laboratory studies are non-concerning. Patient does have a chronic Garnett catheter in place with the urinalysis as above. It does appear that it is infected. Patient will follow-up with a urologist next week to have the catheter replaced. We will start the patient on antibiotics and recommend follow-up as above. The patient should return if any symptoms worsen. At this point, I feel the patient had a near syncopal event, most likely vasovagal, but she is currently very stable and I do not feel the patient will not require admission to the hospital.    FINAL IMPRESSION  1. Near syncope    2. Acute cystitis without hematuria         The patient will return for new or worsening symptoms and is stable at the time of discharge.    The patient is referred to a primary physician for blood pressure management, diabetic screening, and for all other preventative health concerns.    DISPOSITION:  Patient will be discharged home in stable condition.    FOLLOW UP:  Remington Quinones M.D.  [address]  [address]  [facility] [id number] [phone]    Schedule an appointment as soon as possible for a visit in 1 week  For re-check, Return if any symptoms worsen    Yvon Edwards M.D.  [address]  [address]  [facility] [address]  [phone]    Schedule an appointment as soon as possible for a visit  next week to check on the urine culture      OUTPATIENT MEDICATIONS:  New Prescriptions    CEFDINIR (OMNICEF) 300 MG  CAP    Take 1 Cap by mouth 2 times a day for 7 days.           Electronically signed by: Melo Feng, 12/22/2017 7:00 PM

## 2017-12-25 LAB
BACTERIA UR CULT: ABNORMAL
BACTERIA UR CULT: ABNORMAL
NUCLEAR IGG SER QL IA: NORMAL
SIGNIFICANT IND 70042: ABNORMAL
SITE SITE: ABNORMAL
SOURCE SOURCE: ABNORMAL
THYROGLOB AB SERPL-ACNC: 2.9 IU/ML (ref 0–4)

## 2017-12-26 LAB
ENA SS-B IGG SER IA-ACNC: 0 AU/ML (ref 0–40)
SSA52 R0ENA AB IGG Q0420: 15 AU/ML (ref 0–40)
SSA60 R0ENA AB IGG Q0419: 40 AU/ML (ref 0–40)

## 2017-12-26 NOTE — ED NOTES
ED Positive Culture Follow-up/Notification Note:    Date: 12/26/17     Patient seen in the ED on 12/22/2017 for near syncope while drainig her bladder..   1. Near syncope    2. Acute cystitis without hematuria       Discharge Medication List as of 12/22/2017 10:10 PM      START taking these medications    Details   cefdinir (OMNICEF) 300 MG Cap Take 1 Cap by mouth 2 times a day for 7 days., Disp-14 Cap, R-0, Print Rx Paper             Allergies: Pcn [penicillins]; Sulfa drugs; Tape; Macrobid [nitrofurantoin]; and Zoloft     Final cultures:   Results     Procedure Component Value Units Date/Time    URINE CULTURE(NEW) [[id number]]  (Abnormal)  (Susceptibility) Collected:  12/22/17 2050    Order Status:  Completed Specimen:  Urine Updated:  12/25/17 1011     Significant Indicator POS (POS)     Source UR     Site --     Urine Culture -- (A)     Urine Culture -- (A)     Escherichia coli  >100,000 cfu/mL      Narrative:       TEST Urine Culture WAS CANCELLED, 12/23/17 00:33 HIS# [id number]    Culture & Susceptibility     ESCHERICHIA COLI     Antibiotic Sensitivity Microscan Unit Status    Ampicillin Resistant >16 mcg/mL Final    Cefepime Sensitive <=8 mcg/mL Final    Cefotaxime Sensitive <=2 mcg/mL Final    Cefotetan Sensitive <=16 mcg/mL Final    Ceftazidime Sensitive 4 mcg/mL Final    Ceftriaxone Sensitive <=8 mcg/mL Final    Cefuroxime Intermediate 16 mcg/mL Final    Cephalothin Resistant >16 mcg/mL Final    Ciprofloxacin Sensitive <=1 mcg/mL Final    Gentamicin Sensitive <=4 mcg/mL Final    Levofloxacin Sensitive <=2 mcg/mL Final    Nitrofurantoin Sensitive <=32 mcg/mL Final    Pip/Tazobactam Sensitive <=16 mcg/mL Final    Piperacillin Sensitive <=16 mcg/mL Final    Tigecycline Sensitive <=2 mcg/mL Final    Tobramycin Sensitive <=4 mcg/mL Final    Trimeth/Sulfa Sensitive <=2/38 mcg/mL Final                       URINALYSIS,CULTURE IF INDICATED [[id number]]  (Abnormal) Collected:  12/22/17 2050    Order Status:  Completed  Specimen:  Urine Updated:  12/22/17 2109     Color Yellow     Character Hazy (A)     Specific Gravity 1.010     Ph 7.0     Glucose Negative mg/dL      Ketones Negative mg/dL      Protein Negative mg/dL      Bilirubin Negative     Nitrite Positive (A)     Leukocyte Esterase Large (A)     Occult Blood Trace (A)     Micro Urine Req Microscopic     Culture Indicated Yes UA Culture     URINE CULTURE(NEW) [[id number]]     Order Status:  Canceled           Plan:   Appropriate antibiotic therapy prescribed. No changes required based upon culture result.  Sent letter to patient to notify of positive culture result and encourage compliance with prescribed antibiotics.     Jaylin Street

## 2017-12-29 LAB — VIT B6 SERPL-MCNC: 51.3 NMOL/L (ref 20–125)

## 2018-01-08 ENCOUNTER — PATIENT MESSAGE (OUTPATIENT)
Dept: MEDICAL GROUP | Facility: CLINIC | Age: 78
End: 2018-01-08

## 2018-01-08 DIAGNOSIS — M25.562 CHRONIC PAIN OF LEFT KNEE: ICD-10-CM

## 2018-01-08 DIAGNOSIS — G89.29 CHRONIC PAIN OF LEFT KNEE: ICD-10-CM

## 2018-01-22 ENCOUNTER — PATIENT MESSAGE (OUTPATIENT)
Dept: MEDICAL GROUP | Facility: CLINIC | Age: 78
End: 2018-01-22

## 2018-01-22 DIAGNOSIS — F33.0 MAJOR DEPRESSIVE DISORDER, RECURRENT EPISODE, MILD (HCC): ICD-10-CM

## 2018-01-22 DIAGNOSIS — F43.21 SITUATIONAL DEPRESSION: ICD-10-CM

## 2018-01-23 RX ORDER — IMIPRAMINE HYDROCHLORIDE 10 MG/1
10 TABLET, FILM COATED ORAL EVERY EVENING
Qty: 90 TAB | Refills: 2 | Status: SHIPPED | OUTPATIENT
Start: 2018-01-23 | End: 2018-02-21 | Stop reason: SDUPTHER

## 2018-01-29 ENCOUNTER — OFFICE VISIT (OUTPATIENT)
Dept: PULMONOLOGY | Facility: HOSPICE | Age: 78
End: 2018-01-29
Payer: MEDICARE

## 2018-01-29 VITALS
RESPIRATION RATE: 16 BRPM | HEIGHT: 60 IN | SYSTOLIC BLOOD PRESSURE: 126 MMHG | HEART RATE: 79 BPM | DIASTOLIC BLOOD PRESSURE: 84 MMHG | WEIGHT: 169 LBS | OXYGEN SATURATION: 96 % | TEMPERATURE: 97.3 F | BODY MASS INDEX: 33.18 KG/M2

## 2018-01-29 DIAGNOSIS — G47.33 OSA (OBSTRUCTIVE SLEEP APNEA): ICD-10-CM

## 2018-01-29 DIAGNOSIS — J44.9 CHRONIC OBSTRUCTIVE PULMONARY DISEASE, UNSPECIFIED COPD TYPE (HCC): ICD-10-CM

## 2018-01-29 PROCEDURE — 99214 OFFICE O/P EST MOD 30 MIN: CPT | Performed by: NURSE PRACTITIONER

## 2018-01-29 RX ORDER — LUBIPROSTONE 24 UG/1
48 CAPSULE, GELATIN COATED ORAL
COMMUNITY
Start: 2018-01-14

## 2018-01-29 NOTE — PROGRESS NOTES
CC:  Here for f/u sleep and pulmonary issues as listed below    HPI:   Maddy presents today for follow up for COPD with hx of pulmonary HTN.  PFTs from 3/2017 indicate a Fev1 of 1.28L or 72% predicted with a mild bronchodilator response, Fev1/FVC ratio of 67, DLCO 83%. For further workup of pulmonary HTN an echo was completed without significant change since 2016 study noting estimated EF of 65%, RVSP of 50mmHg. CTA study completed 9/2017 was negative for PE. A 6 minute walk completed in 2017 did not show desaturation with walking but did show increase of heart rate from 77 at rest up to 100 with exertion. Last OV she admitted she was quite sedentary. Since last office visit she has increased her activity level and started bicycling and noticed a decrease in her dyspnea with exertion. BMI is 33. She was referred to neurology for tremors. Medications have been switched and tremors have lessened drastically. She also feels she is more lucid.    Last office visit she started Incruse due to side effect of hoarseness with Symbicort. Her hoarseness continues to wax and wane. She does have a history of choking with dry food. She was offered referral to ENT for further evaluation but she declines at this time. She reports her cough has resolved. She denies wheeze, hemoptysis, chest pain or chest tightness, fever or chills, unintentional weight loss, nasal congestion, acid reflux.     PSG from 6/2017 indicated an AHI of 5.5 and low oxygenation of 81%.  Currently she is being treated with autoCPAP @ [id number].  Compliance download from the dates 12/30/2017 - 1/28/2018 indicates she is wearing the device 100% for an avg of 7 hours and 40 minutes per night with a reduced AHI of 24.7 Average pressure is 8.4 with a high of 13.3. She does tolerate pressure and mask well.  She wakes up refreshed and is less tired throughout the day. She denies morning H/A and sleep better overall. She will continue to clean supplies weekly and  "change them as insurance allows.         Patient Active Problem List    Diagnosis Date Noted   • CKD (chronic kidney disease) stage 3, GFR 30-59 ml/min 05/23/2016     Priority: Medium   • Esophageal dysphagia 05/19/2016     Priority: Medium   • Idiopathic atrophic hypothyroidism      Priority: Medium   • GERD (gastroesophageal reflux disease) 09/20/2011     Priority: Medium   • Essential hypertension 07/06/2009     Priority: Medium   • Major depressive disorder, recurrent episode, mild (CMS-HCC) 05/02/2016     Priority: Low   • Neuropathy (CMS-HCC) 10/17/2013     Priority: Low   • Family history of polycystic kidney disease      Priority: Low   • Facet arthritis of lumbar region (CMS-HCC) 03/26/2012     Priority: Low   • History of vertebral fracture 03/26/2012     Priority: Low   • Spinal stenosis of lumbar region with neurogenic claudication      Priority: Low   • Chronic obstructive pulmonary disease (CMS-HCC) 11/10/2017   • ALISSA (obstructive sleep apnea) 11/10/2017   • Postlaminectomy syndrome, unspecified region 07/03/2017   • Recurrent UTI 06/28/2017   • Other specified hypothyroidism 06/22/2017   • 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    • Arthritis     facet arthritis of lumbar region   • Asthma     Inhaler use daily.   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 Albrecht, attaches to plastic, prulent   • Depression     and anxiety   • " Encounter for long-term (current) use of other medications    • Erosive gastritis     antral   • Family history of polycystic kidney disease    • GERD (gastroesophageal reflux disease)    • [place] measles    • Heart burn    • Hypertension    • Hypothyroidism    • Influenza    • Lumbar vertebral fracture (CMS-HCC) 2011   • Mumps    • Muscle disorder     Arachnoiditis   • Neuropathy (CMS-HCC)    • OSTEOPOROSIS    • Pain 16    back and legs    • Renal disorder    • Sleep apnea     Recently diagnosed with sleep apnea waiting to see pulmonary Dr.   • Spinal stenosis, lumbar region, without neurogenic claudication    • Tonsillitis    • Urinary bladder disorder 2016    Currently has a catheter.       Past Surgical History:   Procedure Laterality Date   • SPINAL CORD STIMULATOR N/A 7/3/2017    Procedure: SPINAL CORD STIMULATOR FOR LEAD REMOVAL;  Surgeon: Amandeep Gonzalez D.O.;  Location: SURGERY [facility];  Service:    • GASTROSCOPY WITH BALLOON DILATATION N/A 2016    Procedure: GASTROSCOPY WITH DILATATION;  Surgeon: Tony Monae M.D.;  Location: SURGERY [facility];  Service:    • SPINAL CORD STIMULATOR  14   • EGD WITH ASP/BX  09    erosive gastritis   • HYSTERECTOMY, TOTAL ABDOMINAL     • APPENDECTOMY     • CATARACT EXTRACTION WITH IOL Bilateral    • COLONOSCOPY  ,09    normal   • HYSTERECTOMY LAPAROSCOPY     • LUMBAR LAMINECTOMY DISKECTOMY     • TONSILLECTOMY         Family History   Problem Relation Age of Onset   • Genitourinary () Brother    • Lung Disease Mother      COPD   • Heart Disease Mother    • Genetic Father      Parkinsons/ from complications   • Hypertension Father    • Cancer Maternal Aunt      Breast cancer   • Stroke Paternal Grandmother    • Cancer Maternal Aunt      Breast cancer       Social History     Social History   • Marital status:      Spouse name: N/A   • Number of children: N/A   • Years of education: N/A      Occupational History   • Not on file.     Social History Main Topics   • Smoking status: Never Smoker   • Smokeless tobacco: Never Used   • Alcohol use No   • Drug use: No   • Sexual activity: No     Other Topics Concern   • Stress Concern No      cancer     Social History Narrative   • No narrative on file       Current Outpatient Prescriptions   Medication Sig Dispense Refill   • Diclofenac Sodium 1 % Gel      • AMITIZA 24 MCG capsule Take 24 mcg by mouth every morning with breakfast.     • imipramine (TOFRANIL) 10 MG Tab Take 1 Tab by mouth every evening. 90 Tab 2   • Umeclidinium Bromide (INCRUSE ELLIPTA) 62.5 MCG/INH AEROSOL POWDER, BREATH ACTIVATED Inhale 1 Puff by mouth every day. 1 Each 11   • duloxetine (CYMBALTA) 60 MG Cap DR Particles delayed-release capsule TAKE ONE CAPSULE BY MOUTH ONCE DAILY 90 Cap 2   • estradiol (ESTRACE) 0.5 MG tablet TAKE ONE TABLET BY MOUTH ONCE DAILY 90 Tab 3   • Multiple Vitamins-Minerals (VISIVITES/LUTEIN) Tab Take  by mouth.     • polyethylene glycol/lytes (MIRALAX) Pack Take 17 g by mouth every day.     • XTAMPZA ER 18 MG Capsule Extended Release 12 hour Abuse-Deterrent Take 18 mg by mouth 2 Times a Day.     • valsartan (DIOVAN) 160 MG Tab Take 160 mg by mouth every 48 hours.     • levothyroxine (SYNTHROID) 50 MCG Tab Take 1 Tab by mouth Every morning on an empty stomach. 90 Tab 3   • omeprazole (PRILOSEC) 20 MG delayed-release capsule Take 1 Cap by mouth every day. 90 Cap 3   • aspirin EC (ECOTRIN) 81 MG Tablet Delayed Response Take 81 mg by mouth every day.     • pregabalin (LYRICA) 100 MG CAPS Take 100 mg by mouth 4 times a day.     • oxycodone-acetaminophen (PERCOCET) 7.5-325 MG per tablet Take 1-2 Tabs by mouth every four hours as needed (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 She is seen 2/14/14, do not fill until 4/11/14 180 Each 0   • docusate sodium (COLACE) 250 MG capsule Take 250 mg by mouth every day.     • Probiotic  Product (Tag & See) CAPS Take 1 Cap by mouth 2 Times a Day.     • Cyanocobalamin 2500 MCG Chew Tab Take 1 Tab by mouth every day.     • furosemide (LASIX) 20 MG Tab Take 20 mg by mouth every day. As needed for edema     • potassium chloride (KLOR-CON) 20 MEQ Pack Take 20 mEq by mouth every day. As needed with furosemide     • diazepam (VALIUM) 5 MG Tab Take 1 Tab by mouth every 6 hours as needed for Anxiety or Sleep. 90 Tab 2   • VENTOLIN  (90 BASE) MCG/ACT Aero Soln inhalation aerosol Inhale 2 Puffs by mouth every 6 hours as needed for Shortness of Breath. 1 Inhaler 5     No current facility-administered medications for this visit.           Allergies: Pcn [penicillins]; Sulfa drugs; Tape; Macrobid [nitrofurantoin]; and Zoloft      ROS   Gen: Denies fever, chills, unintentional weight loss, fatigue, night sweats  E/N/T: Denies ear pain, nasal congestion  Resp:Denies Dyspnea, wheezing, cough, sputum production, hemoptysis  CV: Denies chest pain, chest tightness, palpitations, BLE edema  Sleep:Denies morning headache, insomnia, daytime somnolence, snoring, gasping for air, apnea  Neuro: Denies frequent headaches, weakness, dizziness  GI: Denies N/V, acid reflux/heartburn  See HPI.  All other systems reviewed and negative      Vital signs for this encounter:  Vitals:    01/29/18 0911   Height: 1.524 m (5')   Weight: 76.7 kg (169 lb)   Weight % change since last entry.: 0 %   BP: 126/84   Pulse: 79   BMI (Calculated): 33.01   Resp: 16   Temp: 36.3 °C (97.3 °F)   O2 sat % room air: 96 %                 Physical Exam:   Appearance: well developed, well nourished, no acute distress.  Eyes: PERRL, EOM intact, sclere white, conjunctiva moist.  Ears: no lesions or deformities.  Hearing: grossly intact.  Nose: no lesions or deformities.  Dentition: good dentition.  Oropharynx: tongue normal, posterior pharynx without erythema or exudate.  Neck: supple, trachea midline, no masses.  Respiratory effort: no  intercostal retractions or use of accessory muscles.  Lung auscultation: Bilateral diminished   Heart auscultation: no murmur, rub, or gallop.   Extremities: no cyanosis or edema.  Abdomen: soft, non-tender, no masses.  Gait and station: grossly normal   Digits and Nails: no clubbing, cyanosis, petechiae, or nodes.  Cranial nerves: grossly normal.  Motor: no focal deficits observed.  Skin: no rashes, lesions, or ulcers noted.  Orientation: oriented to time, place, and person.  Mood and affect: mood and affect appropriate, normal interaction with examiner.    Assessment   1. ALISSA (obstructive sleep apnea)  POLYSOMNOGRAPHY TITRATION   2. Chronic obstructive pulmonary disease, unspecified COPD type (CMS-HCC)         Patient was seen for 35 minutes, more than 50% of time spent in face to face review, counseling, and arranging future evaluation and follow up of medical conditions and care. Surprisingly patient is doing quite well on CPAP therapy although her AHI continues to be elevated. She does have a history of chronic pain and takes 2 different pain medications on a daily basis. At this time it is imperative she complete titration study for probable central apnea and start on ASV    PLAN:   Patient Instructions   1) Continue autoCPAP at [id number]  2) Clean mask and supplies weekly and change them as insurance allows  3) Continue using Incruse   4) Vaccines: Up to date with Prevnar 13, Pneumovax 23, flu  5) Continue weekly exercise   6) Titration study for high suspicion of central apnea - bring own sleeping aid  7) Return in about 2 months (around 3/29/2018) for Compliance, sleep study results, review of symptoms, if not sooner, follow up with DOMINIQUE Sims, Pulmonary clinic only appt.

## 2018-01-29 NOTE — PATIENT INSTRUCTIONS
1) Continue autoCPAP at [id number]  2) Clean mask and supplies weekly and change them as insurance allows  3) Stop Symbicort and start using Incruse   4) Vaccines: Up to date with Prevnar 13, Pneumovax 23, flu  5) Continue weekly exercise   6) Titration study for high suspicion of central apnea - bring own sleeping aid  7) Return in about 2 months (around 3/29/2018) for Compliance, sleep study results, review of symptoms, if not sooner, follow up with DOMINIQUE Sims, Pulmonary clinic only appt.

## 2018-01-30 DIAGNOSIS — M48.061 SPINAL STENOSIS OF LUMBAR REGION, UNSPECIFIED WHETHER NEUROGENIC CLAUDICATION PRESENT: ICD-10-CM

## 2018-01-30 RX ORDER — DULOXETIN HYDROCHLORIDE 60 MG/1
CAPSULE, DELAYED RELEASE ORAL
Qty: 90 CAP | Refills: 2 | Status: SHIPPED | OUTPATIENT
Start: 2018-01-30 | End: 2018-10-17 | Stop reason: SDUPTHER

## 2018-02-02 ENCOUNTER — PATIENT OUTREACH (OUTPATIENT)
Dept: HEALTH INFORMATION MANAGEMENT | Facility: OTHER | Age: 78
End: 2018-02-02

## 2018-02-02 NOTE — PROGRESS NOTES
1. Attempt #: 1 - DID NOT SPEAK TO PATIENT    2. [employer] Verified: no    3. Verify PCP: yes    4. Care Team Updated:       •   DME Company (gait device, O2, CPAP, etc.): N\A       •   Other Specialists (eye doctor, derm, GYN, cardiology, endo, etc): N\A    5.  Reviewed/Updated the following with patient:       •   Communication Preference Obtained? YES       •   Preferred Pharmacy? NO       •   Preferred Lab? YES       •   Family History (document living status of immediate family members and if + hx of cancer, diabetes, hypertension, hyperlipidemia, heart attack, stroke) YES. Was Abstract Encounter opened and chart updated? YES    6. [employer] Activation: already active    7. [employer] [place]: no    8. Annual Wellness Visit Scheduling  Scheduling Status:Scheduled      9. Care Gap Scheduling (Attempt to Schedule EACH Overdue Care Gap!)     Health Maintenance Due   Topic Date Due   • MAMMOGRAM  12/22/2017        Scheduled patient for Annual Wellness Visit    10. Patient was advised: “This is a free wellness visit. The provider will screen for medical conditions to help you stay healthy. If you have other concerns to address you may be asked to discuss these at a separate visit or there may be an additional fee.”     11. Patient was informed to arrive 15 min prior to their scheduled appointment and bring in their medication bottles.

## 2018-02-21 ENCOUNTER — OFFICE VISIT (OUTPATIENT)
Dept: MEDICAL GROUP | Facility: CLINIC | Age: 78
End: 2018-02-21
Payer: MEDICARE

## 2018-02-21 VITALS
HEART RATE: 66 BPM | DIASTOLIC BLOOD PRESSURE: 68 MMHG | SYSTOLIC BLOOD PRESSURE: 120 MMHG | HEIGHT: 60 IN | RESPIRATION RATE: 12 BRPM | BODY MASS INDEX: 33.96 KG/M2 | TEMPERATURE: 97.9 F | OXYGEN SATURATION: 94 % | WEIGHT: 173 LBS

## 2018-02-21 DIAGNOSIS — I10 ESSENTIAL HYPERTENSION: ICD-10-CM

## 2018-02-21 DIAGNOSIS — M48.062 SPINAL STENOSIS OF LUMBAR REGION WITH NEUROGENIC CLAUDICATION: ICD-10-CM

## 2018-02-21 DIAGNOSIS — Z00.00 MEDICARE ANNUAL WELLNESS VISIT, SUBSEQUENT: ICD-10-CM

## 2018-02-21 DIAGNOSIS — J98.4 MIXED RESTRICTIVE AND OBSTRUCTIVE LUNG DISEASE (HCC): ICD-10-CM

## 2018-02-21 DIAGNOSIS — F33.0 MAJOR DEPRESSIVE DISORDER, RECURRENT EPISODE, MILD (HCC): ICD-10-CM

## 2018-02-21 DIAGNOSIS — Z91.81 AT RISK FOR FALLING: ICD-10-CM

## 2018-02-21 DIAGNOSIS — K21.00 GASTROESOPHAGEAL REFLUX DISEASE WITH ESOPHAGITIS: ICD-10-CM

## 2018-02-21 DIAGNOSIS — G62.9 NEUROPATHY: ICD-10-CM

## 2018-02-21 DIAGNOSIS — M47.816 FACET ARTHRITIS OF LUMBAR REGION: ICD-10-CM

## 2018-02-21 DIAGNOSIS — E03.8 HYPOTHYROIDISM DUE TO HASHIMOTO'S THYROIDITIS: ICD-10-CM

## 2018-02-21 DIAGNOSIS — E66.9 OBESITY, CLASS I, BMI 30-34.9: ICD-10-CM

## 2018-02-21 DIAGNOSIS — G47.33 OSA (OBSTRUCTIVE SLEEP APNEA): ICD-10-CM

## 2018-02-21 DIAGNOSIS — N18.30 CKD (CHRONIC KIDNEY DISEASE) STAGE 3, GFR 30-59 ML/MIN (HCC): ICD-10-CM

## 2018-02-21 DIAGNOSIS — I27.20 PULMONARY HTN (HCC): ICD-10-CM

## 2018-02-21 DIAGNOSIS — J43.9 MIXED RESTRICTIVE AND OBSTRUCTIVE LUNG DISEASE (HCC): ICD-10-CM

## 2018-02-21 DIAGNOSIS — J44.89 COPD WITH ASTHMA: ICD-10-CM

## 2018-02-21 DIAGNOSIS — F43.22 ADJUSTMENT DISORDER WITH ANXIETY: ICD-10-CM

## 2018-02-21 DIAGNOSIS — E06.3 HYPOTHYROIDISM DUE TO HASHIMOTO'S THYROIDITIS: ICD-10-CM

## 2018-02-21 DIAGNOSIS — G03.9 ARACHNOIDITIS: ICD-10-CM

## 2018-02-21 DIAGNOSIS — R13.19 ESOPHAGEAL DYSPHAGIA: ICD-10-CM

## 2018-02-21 PROCEDURE — 99214 OFFICE O/P EST MOD 30 MIN: CPT | Performed by: FAMILY MEDICINE

## 2018-02-21 RX ORDER — VALSARTAN 80 MG/1
80 TABLET ORAL DAILY
Qty: 90 TAB | Refills: 3 | Status: SHIPPED | OUTPATIENT
Start: 2018-02-21 | End: 2018-08-06 | Stop reason: RX

## 2018-02-21 RX ORDER — IMIPRAMINE HYDROCHLORIDE 10 MG/1
20 TABLET, FILM COATED ORAL EVERY EVENING
Qty: 180 TAB | Refills: 2 | Status: SHIPPED | OUTPATIENT
Start: 2018-02-21 | End: 2019-03-14 | Stop reason: SDUPTHER

## 2018-02-21 RX ORDER — DIAZEPAM 5 MG/1
5 TABLET ORAL EVERY 6 HOURS PRN
Qty: 30 TAB | Refills: 2 | Status: SHIPPED | OUTPATIENT
Start: 2018-02-21 | End: 2018-08-15 | Stop reason: SDUPTHER

## 2018-02-21 RX ORDER — PREGABALIN 150 MG/1
150 CAPSULE ORAL 3 TIMES DAILY
COMMUNITY
End: 2020-08-07

## 2018-02-21 ASSESSMENT — ACTIVITIES OF DAILY LIVING (ADL): BATHING_REQUIRES_ASSISTANCE: 0

## 2018-02-21 ASSESSMENT — PATIENT HEALTH QUESTIONNAIRE - PHQ9: CLINICAL INTERPRETATION OF PHQ2 SCORE: 0

## 2018-02-21 NOTE — PROGRESS NOTES
Chief Complaint   Patient presents with   • Annual Exam         HPI:  Maddy is a 77 y.o. here for Medicare Annual Wellness Visit    Her son lives with her and is a great help to her. He is her primary caregiver.    Patient Active Problem List    Diagnosis Date Noted   • CKD (chronic kidney disease) stage 3, GFR 30-59 ml/min 05/23/2016     Priority: Medium   • Esophageal dysphagia 05/19/2016     Priority: Medium   • Hypothyroidism due to Hashimoto's thyroiditis      Priority: Medium   • GERD (gastroesophageal reflux disease) 09/20/2011     Priority: Medium   • Essential hypertension 07/06/2009     Priority: Medium   • Major depressive disorder, recurrent episode, mild (CMS-HCC) 05/02/2016     Priority: Low   • Neuropathy (CMS-HCC) 10/17/2013     Priority: Low   • Family history of polycystic kidney disease      Priority: Low   • Facet arthritis of lumbar region (CMS-HCC) 03/26/2012     Priority: Low   • History of vertebral fracture 03/26/2012     Priority: Low   • Spinal stenosis of lumbar region with neurogenic claudication      Priority: Low   • Pulmonary HTN 02/21/2018   • Obesity, Class I, BMI 30-34.9    • COPD with asthma (CMS-HCC) 11/10/2017   • ALISSA (obstructive sleep apnea) 11/10/2017   • Postlaminectomy syndrome, unspecified region 07/03/2017   • Recurrent UTI 06/28/2017   • 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     • valsartan (DIOVAN) 80 MG Tab Take 1 Tab by mouth every day. 90 Tab 3   • imipramine (TOFRANIL) 10 MG Tab Take 2 Tabs by mouth every evening. 180 Tab 2   • diazePAM (VALIUM) 5 MG Tab Take 1 Tab by mouth every 6 hours as needed for Anxiety or Sleep for up to 90 days. 30 Tab 2   • duloxetine (CYMBALTA) 60 MG  Cap DR Particles delayed-release capsule TAKE ONE CAPSULE BY MOUTH ONCE DAILY 90 Cap 2   • Diclofenac Sodium 1 % Gel      • AMITIZA 24 MCG capsule Take 24 mcg by mouth every morning with breakfast.     • Umeclidinium Bromide (INCRUSE ELLIPTA) 62.5 MCG/INH AEROSOL POWDER, BREATH ACTIVATED Inhale 1 Puff by mouth every day. 1 Each 11   • estradiol (ESTRACE) 0.5 MG tablet TAKE ONE TABLET BY MOUTH ONCE DAILY 90 Tab 3   • Multiple Vitamins-Minerals (VISIVITES/LUTEIN) Tab Take  by mouth.     • XTAMPZA ER 18 MG Capsule Extended Release 12 hour Abuse-Deterrent Take 18 mg by mouth 2 Times a Day.     • Cyanocobalamin 2500 MCG Chew Tab Take 1 Tab by mouth every day.     • levothyroxine (SYNTHROID) 50 MCG Tab Take 1 Tab by mouth Every morning on an empty stomach. 90 Tab 3   • omeprazole (PRILOSEC) 20 MG delayed-release capsule Take 1 Cap by mouth every day. 90 Cap 3   • aspirin EC (ECOTRIN) 81 MG Tablet Delayed Response Take 81 mg by mouth every day.     • oxycodone-acetaminophen (PERCOCET) 7.5-325 MG per tablet Take 1-2 Tabs by mouth every four hours as needed (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 She is seen 2/14/14, do not fill until 4/11/14 180 Each 0   • docusate sodium (COLACE) 250 MG capsule Take 250 mg by mouth every day.     • polyethylene glycol/lytes (MIRALAX) Pack Take 17 g by mouth every day.     • furosemide (LASIX) 20 MG Tab Take 20 mg by mouth every day. As needed for edema     • potassium chloride (KLOR-CON) 20 MEQ Pack Take 20 mEq by mouth every day. As needed with furosemide     • VENTOLIN  (90 BASE) MCG/ACT Aero Soln inhalation aerosol Inhale 2 Puffs by mouth every 6 hours as needed for Shortness of Breath. 1 Inhaler 5   • Probiotic Product (LearnSprout) CAPS Take 1 Cap by mouth 2 Times a Day.       No current facility-administered medications for this visit.         Patient is taking medications as noted in medication list.  Current supplements as per  medication list.     Allergies: Pcn [penicillins]; Sulfa drugs; Tape; Macrobid [nitrofurantoin]; and Zoloft    Current social contact/activities: lunch with friend once a week, talks on the phone, with neighbors interacts with her children.    Is patient current with immunizations? Yes.    She  reports that she has never smoked. She has never used smokeless tobacco. She reports that she does not drink alcohol or use drugs.  Counseling given: Yes        DPA/Advanced directive: Patient has Advanced Directive on file.     ROS:    Gait: Uses a walker, wheelchair, cane   Ostomy: no   Other tubes: yes, urinary catheter   Amputations: no   Chronic oxygen use no   Last eye exam 2017   Wears hearing aids: yes   : Denies any urinary leakage during the last 6 months  Denies chest pain, pressure, palpitations or exertional SOB    Screening:    COPD    1.  Is patient under the care of a pulmonologist? Yes, CareTeam was updated.  2.  Has patient ever completed a PFT or spirometry? Yes, on 2017.  3.  Is patient on oxygen or CPAP? Yes, CareTeam was updated.  4.  Has patient ever had instruction on inhaler technique by health care professional? Yes  5.  Is patient interested in a referral to respiratory therapy for more information on COPD, inhaler technique, and/or information on establishing an action plan?  No, patient is NOT interested.      Depression Screening    Little interest or pleasure in doing things?  0 - not at all  Feeling down, depressed, or hopeless? 0 - not at all  Patient Health Questionnaire Score: 0    If depressive symptoms identified deferred to follow up visit unless specifically addressed in assessment and plan.    Interpretation of PHQ-9 Total Score   Score Severity   1-4 No Depression   5-9 Mild Depression   10-14 Moderate Depression   15-19 Moderately Severe Depression   20-27 Severe Depression    Screening for Cognitive Impairment    Three Minute Recall (apple, watch, fernandez)  3/3    Draw clock face  with all 12 numbers set to the hand to show 10 minutes past 11 o'clock  1 5/5  If cognitive concerns identified, deferred for follow up unless specifically addressed in assessment and plan.    Fall Risk Assessment    Has the patient had two or more falls in the last year or any fall with injury in the last year?  Yes  If fall risk identified, deferred for follow up unless specifically addressed in assessment and plan.    Safety Assessment    Throw rugs on floor.  Yes  Handrails on all stairs.  Yes  Good lighting in all hallways.  Yes  Difficulty hearing.  Yes  Patient counseled about all safety risks that were identified.    Functional Assessment ADLs    Are there any barriers preventing you from cooking for yourself or meeting nutritional needs?  No.    Are there any barriers preventing you from driving safely or obtaining transportation?  No.    Are there any barriers preventing you from using a telephone or calling for help?  No.    Are there any barriers preventing you from shopping?  No.    Are there any barriers preventing you from taking care of your own finances?  No.    Are there any barriers preventing you from managing your medications?  Yes. Son takes care of meds  Are there any barriers preventing you from showering/bathing yourself?  No.    Are you currently engaging any exercise or physical activity?  Yes.       Health Maintenance Summary                MAMMOGRAM Overdue 12/22/2017      Done 12/22/2015 [id number]    IMM PNEUMOCOCCAL 65+ (ADULT) LOW/MEDIUM RISK SERIES Postponed 9/1/2020 Originally 10/6/2016. Physician Recommended     Done 10/6/2015 Imm Admin: Pneumococcal Conjugate Vaccine (Prevnar/PCV-13)    PFT SCREENING-FEV1 AND FEV/FVC RATIO / SPIROMETRY SHOULD BE PERFORMED ANNUALLY Next Due 4/14/2018      Done 4/14/2017 AMB PULMONARY FUNCTION TEST/LAB    BONE DENSITY Next Due 8/29/2018      Done 8/29/2013 DS-BONE DENSITY STUDY (DEXA)     Patient has more history with this topic...     Annual Wellness Visit Next Due 2018      Done 9/15/2017 Visit Dx: Medicare annual wellness visit, subsequent     Patient has more history with this topic...    COLONOSCOPY Next Due 2019      Previously completed 2009     IMM DTaP/Tdap/Td Vaccine Next Due 2023      Done 2013 Imm Admin: Tdap Vaccine          Patient Care Team:  Remington Quinones M.D. as PCP - General  Tony Monae M.D. as Consulting Physician (Gastroenterology)  Domingo Michelle M.D. as Consulting Physician (Dermatology)  Amandeep Gonzalez D.O. as Consulting Physician (Phys Med and Rehab)  Corinne L Cooper, O.D. as Consulting Physician (Optometry)  Yvon Edwards M.D. as Consulting Physician (Urology)  [employer] as Respiratory (DME Supplier)  KAIN Shukla as Consulting Physician (Pulmonary Medicine)    Social History   Substance Use Topics   • Smoking status: Never Smoker   • Smokeless tobacco: Never Used   • Alcohol use No     Family History   Problem Relation Age of Onset   • Genitourinary () Brother    • Lung Disease Mother      COPD   • Heart Disease Mother    • Genetic Father      Parkinsons/ from complications   • Hypertension Father    • Cancer Maternal Aunt      Breast cancer   • Stroke Paternal Grandmother    • Cancer Maternal Aunt      Breast cancer     She  has a past medical history of Allergy; Anesthesia; Anxiety; Arachnoiditis; Arthritis; Asthma; Back pain; Basal cell carcinoma; Bowel habit changes; CATARACT; Chickenpox; Chronic constipation; Coagulase-negative staphylococcal infection; Depression; Encounter for long-term (current) use of other medications; Erosive gastritis (); Family history of polycystic kidney disease; GERD (gastroesophageal reflux disease); [place]ic measles; Hypertension; Hypothyroidism; Influenza; Lumbar vertebral fracture (CMS-HCC) (2011); Mumps; Muscle disorder; Neuropathy (CMS-[facility]); Obesity, Class I, BMI 30-34.9; OSTEOPOROSIS; Pain (16);  Renal disorder; Sleep apnea; Spinal stenosis, lumbar region, without neurogenic claudication; Tonsillitis; and Urinary bladder disorder (6/30/2016).   Past Surgical History:   Procedure Laterality Date   • SPINAL CORD STIMULATOR N/A 7/3/2017    Procedure: SPINAL CORD STIMULATOR FOR LEAD REMOVAL;  Surgeon: Amandeep Gonzalez D.O.;  Location: SURGERY [facility];  Service:    • GASTROSCOPY WITH BALLOON DILATATION N/A 5/19/2016    Procedure: GASTROSCOPY WITH DILATATION;  Surgeon: Tony Monae M.D.;  Location: SURGERY [facility];  Service:    • SPINAL CORD STIMULATOR  9/2/14   • EGD WITH ASP/BX  5/27/09    erosive gastritis   • HYSTERECTOMY, TOTAL ABDOMINAL  1976   • APPENDECTOMY  1976   • CATARACT EXTRACTION WITH IOL Bilateral    • COLONOSCOPY  2000,5/27/09    normal   • HYSTERECTOMY LAPAROSCOPY     • LUMBAR LAMINECTOMY DISKECTOMY     • TONSILLECTOMY             Exam:     Blood pressure 120/68, pulse 66, temperature 36.6 °C (97.9 °F), resp. rate 12, height 1.524 m (5'), weight 78.5 kg (173 lb), SpO2 94 %. Body mass index is 33.79 kg/m².    Hearing good.    Dentition good  Alert, oriented in no acute distress.  Eye contact is good, speech goal directed, affect calm  HEENT:   EOMI, PERRLA.   Oropharynx is well hydrated with normal soft palate motion. No exudate or ulcerations noted. Ear canals are patent, normal cerumen, TMs are pearly grey and quiet in appearance.  Neck:       Full range of motion, moderate posterior cervical spasm. No JVD or carotid bruits appreciated. No cervical adenopathy appreciated. No thyromegaly or neck masses appreciated.  No retractions appreciated.  Lungs:     Clear to auscultation A&P with good air movement.   Heart:      Regular rate and rhythm normal S1 and S2 without murmur appreciated.  Strong and symmetric radial and DP pulses.  Abd:        Soft, bowel sounds positive, nontender. No bloating noted. No hepatosplenomegaly or mass appreciated. No pulsatile mass  appreciated.  Ext:         Extremities show symmetric and full range of motion with normal strength. No cyanosis or clubbing appreciated. No peripheral edema appreciated.  Neuro:    Gait is normal. Patient is lucid, fluent and appropriate. No significant tremor appreciated.  Skin:       Exhibit no rashes, pigmented lesions or ulcerations.      Assessment and Plan. The following treatment and monitoring plan is recommended:    1. Medicare annual wellness visit, subsequent  She has multiple medical problems, generally stable.    2. Facet arthritis of lumbar region (CMS-HCC)  She does have pain from her facet arthritis. For this she uses diclofenac gel which has been moderately helpful. Stable.    3. Major depressive disorder, recurrent episode, mild (CMS-HCC)  imipramine (TOFRANIL) 10 MG Tab  Here for: depression, overall stable chronic problem.    Current symptoms include: insomnia, difficulty concentrating,  depressed mood, insomnia and fatigue  Since last OV, symptoms are better, definitely improved  She is taking medicine daily as directed. Side effects: Dry mouth.  Mood currently does affect:  relationships, social activities that she and her son agree to a much lesser extent than before.  Denies agoraphobia, panic attacks, SI/HI. (Denies wishing to be dead, thinking about death, intent to commit suicide, previous suicide attempt)     Review Of Systems  Taking supplements to help mood or symptoms: no  Using alcohol or other substances to help mood or symptoms: no  No polydipsia, polyuria, temperature intolerance, bowel changes  No visual or auditory hallucinations. Denies symptoms of ayse: grandiosity, euphoria, need for little or no sleep, rapid pressured speech, spending sprees, reckless or risky behavior, hypersexual behavior.   Pertinent  ROS findings as above. All other systems reviewed and are negative.       4. Pulmonary HTN     5. Obesity, Class I, BMI 30-34.9  Patient identified as having weight  management issue.  Appropriate orders and counseling given.   6. At risk for falling  Patient identified as fall risk.  Appropriate orders and counseling given.   7. Essential hypertension  valsartan (DIOVAN) 80 MG Tab HTN - Chronic condition stable. Currently taking all meds as directed.   She is taking baby aspirin daily.   She is monitoring BP at home. Monitors at least once weekly, generally 120s over 60s to 70s.  Denies symptoms low BP: light-headed, tunnel-vision, unusual fatigue.   Denies symptoms high BP:pounding headache, visual changes, palpitations, flushed face.   Denies medicine side effects: unusual fatigue, slow heartbeat, foot/leg swelling, cough.     8. Gastroesophageal reflux disease with esophagitis  She feels the current medication regimen omeprazole is controlling the gastroesophageal reflux symptoms well. Denies dysphagia, reflux symptoms, acidity, abdominal pain or visible blood or mucus in the stool. Denies vomiting or hematemesis. Denies burping or abdominal bloating. Patient avoids nonsteroidal anti-inflammatory drugs. Avoids heavy meals or eating within 2 hours of bedtime.     9. Esophageal dysphagia  She follows with GI, Dr. Mnoae. She does have a history of dysphagia with past dilation of Schatzki's ring. Denies current serious dysphagia, clinically stable.    10. Hypothyroidism due to Hashimoto's thyroiditis  She has been stable on her current dose. TSH last June was in target range. Denies increased insomnia or tremor from the medication. Clinically stable    11. CKD (chronic kidney disease) stage 3, GFR 30-59 ml/min  Patient has long-standing chronic kidney disease, stage 3 Patient states has been keeping well hydrated and has been trying to walk daily. The patient avoids non-steroidal anti-inflammatory medications.  Patient is due for recheck in 6 months, was stable in December. Stable overall.     12. Neuropathy (CMS-HCC)  This is a severe chronic problem for her as part of the  arachnoiditis. This is treated with Lyrica as well as duloxetine. She feels that is currently under good control, clinically stable    13. Arachnoiditis/pain management  This is a source of severe chronic pain. She follows with pain management issue. This is currently stable.    14. Mixed restrictive and obstructive lung disease (CMS-[facility])  She follows with pulmonology for this problem. She has been stable.    15. COPD with asthma (CMS-HCC)  She continues to follow with pulmonology for this problem and is stable.    16. ALISSA (obstructive sleep apnea)  She continues to follow with pulmonology and is stable. She has 100% device compliance.    17. Adjustment disorder with anxiety  diazePAM (VALIUM) 5 MG Tab  this is improved with the change in her medication regimen from pain management. She is much more stable overall. She has been able to use less diazepam. Denies rebound anxiety or depression from the diazepam. Stable.          Services suggested: No services needed at this time  Health Care Screening recommendations as per orders if indicated.  Referrals offered: PT/OT/Nutrition counseling/Behavioral Health/Smoking cessation as per orders if indicated.    Discussion today about general wellness and lifestyle habits:  discussed  · Prevent falls and reduce trip hazards; Cautioned about securing or removing rugs.  · Have a working fire alarm and carbon monoxide detector;  yes  · Engage in regular physical activity and social activities       Follow-up: No Follow-up on file.

## 2018-02-22 ENCOUNTER — APPOINTMENT (RX ONLY)
Dept: URBAN - METROPOLITAN AREA CLINIC 4 | Facility: CLINIC | Age: 78
Setting detail: DERMATOLOGY
End: 2018-02-22

## 2018-02-22 DIAGNOSIS — L90.5 SCAR CONDITIONS AND FIBROSIS OF SKIN: ICD-10-CM

## 2018-02-22 DIAGNOSIS — D18.0 HEMANGIOMA: ICD-10-CM

## 2018-02-22 DIAGNOSIS — L81.4 OTHER MELANIN HYPERPIGMENTATION: ICD-10-CM

## 2018-02-22 DIAGNOSIS — L57.0 ACTINIC KERATOSIS: ICD-10-CM

## 2018-02-22 DIAGNOSIS — L82.1 OTHER SEBORRHEIC KERATOSIS: ICD-10-CM

## 2018-02-22 DIAGNOSIS — D22 MELANOCYTIC NEVI: ICD-10-CM

## 2018-02-22 PROBLEM — D18.01 HEMANGIOMA OF SKIN AND SUBCUTANEOUS TISSUE: Status: ACTIVE | Noted: 2018-02-22

## 2018-02-22 PROBLEM — D48.5 NEOPLASM OF UNCERTAIN BEHAVIOR OF SKIN: Status: ACTIVE | Noted: 2018-02-22

## 2018-02-22 PROBLEM — D22.5 MELANOCYTIC NEVI OF TRUNK: Status: ACTIVE | Noted: 2018-02-22

## 2018-02-22 PROCEDURE — 17000 DESTRUCT PREMALG LESION: CPT

## 2018-02-22 PROCEDURE — ? DIAGNOSIS COMMENT

## 2018-02-22 PROCEDURE — 11100: CPT | Mod: 59

## 2018-02-22 PROCEDURE — ? LIQUID NITROGEN

## 2018-02-22 PROCEDURE — ? BIOPSY BY SHAVE METHOD

## 2018-02-22 PROCEDURE — 99213 OFFICE O/P EST LOW 20 MIN: CPT | Mod: 25

## 2018-02-22 PROCEDURE — ? COUNSELING

## 2018-02-22 ASSESSMENT — LOCATION DETAILED DESCRIPTION DERM
LOCATION DETAILED: LEFT SUPERIOR LATERAL LOWER BACK
LOCATION DETAILED: LEFT BUTTOCK
LOCATION DETAILED: LEFT CENTRAL MALAR CHEEK
LOCATION DETAILED: RIGHT INFERIOR LATERAL NECK
LOCATION DETAILED: RIGHT SUPERIOR UPPER BACK
LOCATION DETAILED: RIGHT SUPERIOR LATERAL NECK
LOCATION DETAILED: RIGHT BUTTOCK
LOCATION DETAILED: LEFT DISTAL PRETIBIAL REGION
LOCATION DETAILED: SUPERIOR THORACIC SPINE
LOCATION DETAILED: RIGHT DISTAL POSTERIOR UPPER ARM
LOCATION DETAILED: RIGHT RADIAL DORSAL HAND

## 2018-02-22 ASSESSMENT — LOCATION SIMPLE DESCRIPTION DERM
LOCATION SIMPLE: LEFT PRETIBIAL REGION
LOCATION SIMPLE: RIGHT BUTTOCK
LOCATION SIMPLE: LEFT BUTTOCK
LOCATION SIMPLE: RIGHT UPPER BACK
LOCATION SIMPLE: UPPER BACK
LOCATION SIMPLE: RIGHT HAND
LOCATION SIMPLE: LEFT LOWER BACK
LOCATION SIMPLE: RIGHT POSTERIOR UPPER ARM
LOCATION SIMPLE: LEFT CHEEK
LOCATION SIMPLE: RIGHT ANTERIOR NECK

## 2018-02-22 ASSESSMENT — LOCATION ZONE DERM
LOCATION ZONE: ARM
LOCATION ZONE: HAND
LOCATION ZONE: TRUNK
LOCATION ZONE: NECK
LOCATION ZONE: FACE
LOCATION ZONE: LEG

## 2018-02-22 NOTE — PROCEDURE: BIOPSY BY SHAVE METHOD
Bill For Surgical Tray: no
Detail Level: Detailed
Wound Care: Aquaphor
Biopsy Method: Personna blade
Lab: [id number]
Destruction After The Procedure: Yes
Additional Anesthesia Volume In Cc (Will Not Render If 0): 0
Biopsy Type: H and E
Silver Nitrate Text: The wound bed was treated with silver nitrate after the biopsy was performed.
Notification Instructions: Patient will be notified of biopsy results. However, patient instructed to call the office if not contacted within 2 weeks.
Electrodesiccation Text: The wound bed was treated with electrodesiccation after the biopsy was performed.
Billing Type: Third-Party Bill
Type Of Destruction Used: Electrodesiccation
Electrodesiccation And Curettage Text: The wound bed was treated with electrodesiccation and curettage after the biopsy was performed.
Cryotherapy Text: The wound bed was treated with cryotherapy after the biopsy was performed.
Lab Facility: 
Anesthesia Volume In Cc: 1
Size Of Lesion In Cm: 0.8
Dressing: bandage
Anesthesia Type: 1% lidocaine with 1:100,000 epinephrine and a 1:12 solution of 8.4% sodium bicarbonate
Post-Care Instructions: I reviewed with the patient in detail post-care instructions. Patient is to keep the biopsy site dry overnight, and then apply bacitracin or petroleum twice daily until healed. Patient may apply hydrogen peroxide soaks to remove any crusting.
Consent: Written consent was obtained and risks were reviewed including but not limited to scarring, infection, bleeding, scabbing, incomplete removal, nerve damage and allergy to anesthesia.
Hemostasis: Aluminum Chloride

## 2018-03-12 ENCOUNTER — PATIENT MESSAGE (OUTPATIENT)
Dept: MEDICAL GROUP | Facility: CLINIC | Age: 78
End: 2018-03-12

## 2018-03-28 ENCOUNTER — SLEEP STUDY (OUTPATIENT)
Dept: SLEEP MEDICINE | Facility: MEDICAL CENTER | Age: 78
End: 2018-03-28
Attending: NURSE PRACTITIONER
Payer: MEDICARE

## 2018-03-28 DIAGNOSIS — G47.33 OSA (OBSTRUCTIVE SLEEP APNEA): ICD-10-CM

## 2018-03-28 PROCEDURE — 95811 POLYSOM 6/>YRS CPAP 4/> PARM: CPT | Performed by: FAMILY MEDICINE

## 2018-03-29 NOTE — PROCEDURES
Clinical Comments:  The patient underwent a CPAP titration using the standard montage for measurement of parameters of sleep, respiratory events, movement abnormalities, heart rate and rhythm. A microphone was used to monitor snoring.      INTERPRETATION:  The study was started at 9:07 PM.  The total recording time was 430.9 minutes with a sleep period of 380.2 minutes and the total sleep time was 255.5 minutes with a sleep efficiency of 59.3%.  The sleep latency was 50.7 minutes, and REM latency was 128.5 minutes.  The patient experienced 122 arousals in total, for an arousal index of 28.6    RESPIRATORY: The patient had 35 apneas in total.  Of these, 3 were obstructive apneas, and 32 were central apneas.  This resulted in an apnea index (AI) of 8.2.  The patient had 41 hypopneas, for a hypopnea index of 9.6.  The overall AHI was 17.8, while the AHI during Stage R sleep was 3.8.  AHI while supine was 17.8.    OXIMETRY: Oxygen saturation monitoring showed a mean SpO2 of 92.2%, with a minimum oxygen saturation of 83.0%.  Oxygen saturations were less than or = 89% for 3.1 minutes of sleep time.    CARDIAC: The highest heart rate during the recording was 92.0 beats per minute.  The average heart rate during sleep was 72.4 bpm.    LIMB MOVEMENTS: There were a total of 0 PLMs during sleep, of which 0 were PLMs arousals.  This resulted in a PLMS index of 0.0.    CPAP was tried from 6 to 9cm H2O.  BIPAP was tried from 9/4 to 12/7cm H2O.  A backup rate was also tried at 12bpm.  ASV was also tried at EPAP Max/Min:10/6, PS Max/Min:15/3cm H2O.    Technical summary: The patient underwent a CPAP/BiPAP/ ASV titration.  This was a 16 channel montage study to include a 6 channel EEG, a 2 channel EOG, and chin EMG, left and right leg EMG, a snore channel, and a CFLOW pressure transducer.   Respiratory effort was assessed with the use of a thoracic and abdominal monitor and overnight oximetry was obtained. Audio and video recordings  were reviewed. This was a fully attended study and sleep stage scoring was performed. The test was technically adequate.    General sleep summary:  During the overnight study, the sleep latency was 50 min which is prolonged. The REM latency was 116 min which is decreased. The total sleep time was 255 min and sleep efficiency was 59 % which is decreased.  Sleep stage proportions showed decreased REM and no N3 sleep with increased WASO of 124 min.  In regards to sleep quality there was a moderate degree of sleep fragmentation as shown by the arousal index of 28 an hour. The arousals were due to spontaneous arousal.    CPAP Titration:  The PAP titration was initiated with CPAP 6 cm of water and the pressure which was slowly titrated up in an attempt to eliminate sleep disordered breathing and snoring. The CPAP was titrated up to 9 cm however majority of the events were crentral apneas therefore he was titrated on BiPAP with ST between 9/4-12/7 cm. The central apneas improved on BiPAP ST 12/7 cm with 12 bpm, the over AHI was 1.6/hr  Ans O2 charlene 90%. ASV was also tried with EPAP min 6 EPAP 10 cm PS 3/15 cm.The best tolerated pressure was BiPAP ST 12/7 cm with back up rate 12 bpm at this final pressure the patient was observed in the supine position but not REM sleep stage. The apnea hypopnea index improved to 1.6 per hour and O2 charlene 90%. The average O2 stauration was 93%. She spent 14 % of sleep time below 89% O2 saturation. Snoring was resolved. There were no significant periodic limb movements.  The patient demonstrated NSR and an average heart rate of 73 beats per minute.  There was no ventricular ectopy or tachyarrhythmias. The patient utilized large Eson mask with heated humidification. The CPAP was well-tolerated and there were minimal air leaks. No supplemental oxygen was required.    Impression:  1.  Complex sleep apnea   2.  Successful BIPAP ST and ASV titration   3.  Sleep hypoxia      Recommendations:  I  recommend BiPAP ST 12/7 cm with 12 bpm with large eson mask. Consider overnight pulse ox on the recommended pressure. Recommended 30 day compliance download to assess the efficacy of the recommended pressure and compliance for further outpatient monitoring and management of CPAP therapy. In some cases alternative treatment options may prove effective in resolving sleep apnea and these options include upper airway surgery, the use of a dental orthotic or weight loss and positional therapy. Clinical correlation is required. In general patients with sleep apnea are advised to avoid alcohol and sedatives and to not operate a motor vehicle while drowsy and are at a greater risk for cardiovascular disease.

## 2018-04-07 DIAGNOSIS — E03.4 IDIOPATHIC ATROPHIC HYPOTHYROIDISM: ICD-10-CM

## 2018-04-10 RX ORDER — LEVOTHYROXINE SODIUM 0.05 MG/1
TABLET ORAL
Qty: 90 TAB | Refills: 3 | Status: SHIPPED | OUTPATIENT
Start: 2018-04-10 | End: 2019-04-04 | Stop reason: SDUPTHER

## 2018-04-11 ENCOUNTER — OFFICE VISIT (OUTPATIENT)
Dept: PULMONOLOGY | Facility: HOSPICE | Age: 78
End: 2018-04-11
Payer: MEDICARE

## 2018-04-11 VITALS
HEIGHT: 60 IN | TEMPERATURE: 97.5 F | SYSTOLIC BLOOD PRESSURE: 134 MMHG | OXYGEN SATURATION: 92 % | HEART RATE: 83 BPM | WEIGHT: 174 LBS | RESPIRATION RATE: 16 BRPM | DIASTOLIC BLOOD PRESSURE: 80 MMHG | BODY MASS INDEX: 34.16 KG/M2

## 2018-04-11 DIAGNOSIS — I27.20 PULMONARY HTN (HCC): ICD-10-CM

## 2018-04-11 DIAGNOSIS — J44.89 COPD WITH ASTHMA: ICD-10-CM

## 2018-04-11 DIAGNOSIS — G47.33 OSA (OBSTRUCTIVE SLEEP APNEA): ICD-10-CM

## 2018-04-11 PROCEDURE — 99214 OFFICE O/P EST MOD 30 MIN: CPT | Performed by: NURSE PRACTITIONER

## 2018-04-11 RX ORDER — AZITHROMYCIN 250 MG/1
TABLET, FILM COATED ORAL
Qty: 6 TAB | Refills: 0 | Status: SHIPPED | OUTPATIENT
Start: 2018-04-11 | End: 2018-06-22 | Stop reason: SDUPTHER

## 2018-04-11 RX ORDER — CIPROFLOXACIN 500 MG/1
TABLET, FILM COATED ORAL
COMMUNITY
Start: 2018-03-09 | End: 2018-04-11

## 2018-04-11 RX ORDER — METHYLPREDNISOLONE 4 MG/1
TABLET ORAL
Qty: 21 TAB | Refills: 0 | Status: SHIPPED | OUTPATIENT
Start: 2018-04-11 | End: 2018-06-22 | Stop reason: SDUPTHER

## 2018-04-11 NOTE — PROGRESS NOTES
CC:  Here for f/u sleep and pulmonary issues as listed below    HPI:   Maddy presents today for follow up for COPD with hx of pulmonary HTN and sleep study results.  PFTs from 3/2017 indicate a Fev1 of 1.28L or 72% predicted with a mild bronchodilator response, Fev1/FVC ratio of 67, DLCO 83%. For further workup of pulmonary HTN an echo was completed without significant change since 2016 study noting estimated EF of 65%, RVSP of 50mmHg. CTA study completed 9/2017 was negative for PE. A 6 minute walk completed in 2017 did not show desaturation with walking but did show increase of heart rate from 77 at rest up to 100 with exertion. Last OV she admitted she was quite sedentary. Since last office visit she has increased her activity level and started bicycling and noticed a decrease in her dyspnea with exertion. BMI is 33. She was referred to neurology for tremors. Medications have been switched and tremors have lessened drastically. She also feels she is more lucid.     She is compliant using Incruse. Symbicort caused hoarseness, which continues to wax and wane. She does have a history of choking with dry food. She was offered referral to ENT for further evaluation but she declines at this time. She has a 2-3 wk hx of increase dyspnea, cough with dark yellow mucus, nasal congestion with clear mucus, and headache. She tried Allegra a few times and has used her rescue inhaler which she finds does not help. She denies wheeze, hemoptysis, chest pain or chest tightness, fever or chills, unintentional weight loss, acid reflux.     PSG from 6/2017 indicated an AHI of 5.5 and low oxygenation of 81%.  Currently she is being treated with autoCPAP @ [id number].  Compliance download from the dates 3/12/2018 - 4/10/2018 indicates she is wearing the device 96.7% for an avg of 6 hours and 46 minutes per night with a reduced AHI of 34.6.Average pressure is 8.1 with a high of 12.5. She does tolerate pressure and mask well.  She wakes  up refreshed and is less tired throughout the day. She denies morning H/A and sleep better overall. She will continue to clean supplies weekly and change them as insurance allows.     Completed a titration study for potential ASV therapy given continued elevated AHI and use of chronic pain medication. Titration study completed 3/2018 was trialed on CPAP with continued central apneas. When changed to BIPAP her central apneas resolved and she did best on a BIPAP pressure of 12/7 with reduced AHI of 1.6, mean oxygenation of 93.2%, but did not meet REM rebound.           Patient Active Problem List    Diagnosis Date Noted   • CKD (chronic kidney disease) stage 3, GFR 30-59 ml/min 05/23/2016     Priority: Medium   • Esophageal dysphagia 05/19/2016     Priority: Medium   • Hypothyroidism due to Hashimoto's thyroiditis      Priority: Medium   • GERD (gastroesophageal reflux disease) 09/20/2011     Priority: Medium   • Essential hypertension 07/06/2009     Priority: Medium   • Major depressive disorder, recurrent episode, mild (CMS-HCC) 05/02/2016     Priority: Low   • Neuropathy (CMS-HCC) 10/17/2013     Priority: Low   • Family history of polycystic kidney disease      Priority: Low   • Facet arthritis of lumbar region (CMS-HCC) 03/26/2012     Priority: Low   • History of vertebral fracture 03/26/2012     Priority: Low   • Spinal stenosis of lumbar region with neurogenic claudication      Priority: Low   • BMI 33.0-33.9,adult 04/11/2018   • Pulmonary HTN 02/21/2018   • Obesity, Class I, BMI 30-34.9    • COPD with asthma (CMS-HCC) 11/10/2017   • ALISSA (obstructive sleep apnea) 11/10/2017   • Postlaminectomy syndrome, unspecified region 07/03/2017   • Recurrent UTI 06/28/2017   • 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    • Arthritis     facet arthritis of lumbar region   • Asthma     Inhaler use daily.   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 Albrecht, attaches to plastic, prulent   • Depression     and anxiety   • Encounter for long-term (current) use of other medications    • Erosive gastritis 5/09    antral   • Family history of polycystic kidney disease    • GERD (gastroesophageal reflux disease)    • [place] measles    • Hypertension    • Hypothyroidism    • Influenza    • Lumbar vertebral fracture (CMS-HCC) 5/2011   • Mumps    • Muscle disorder     Arachnoiditis   • Neuropathy (CMS-[facility])    • Obesity, Class I, BMI 30-34.9    • OSTEOPOROSIS    • Pain 5/12/16    back and legs    • Renal disorder    • Sleep apnea     Recently diagnosed with sleep apnea waiting to see pulmonary Dr.   • Spinal stenosis, lumbar region, without neurogenic claudication    • Tonsillitis    • Urinary bladder disorder 6/30/2016    Currently has a catheter.       Past Surgical History:   Procedure Laterality Date   • SPINAL CORD STIMULATOR N/A 7/3/2017    Procedure: SPINAL CORD STIMULATOR FOR LEAD REMOVAL;  Surgeon: Amandeep Gonzalez D.O.;  Location: SURGERY [facility];  Service:    • GASTROSCOPY WITH BALLOON DILATATION N/A 5/19/2016    Procedure: GASTROSCOPY WITH DILATATION;  Surgeon: Tony Monae M.D.;  Location: SURGERY [facility];  Service:    • SPINAL CORD STIMULATOR  9/2/14   • EGD WITH ASP/BX  5/27/09    erosive gastritis   • HYSTERECTOMY, TOTAL ABDOMINAL  1976   • APPENDECTOMY  1976   • CATARACT EXTRACTION WITH IOL Bilateral    • COLONOSCOPY  2000,5/27/09    normal   • HYSTERECTOMY LAPAROSCOPY   "   • LUMBAR LAMINECTOMY DISKECTOMY     • TONSILLECTOMY         Family History   Problem Relation Age of Onset   • Genitourinary () Brother    • Lung Disease Mother      COPD   • Heart Disease Mother    • Genetic Father      Parkinsons/ from complications   • Hypertension Father    • Cancer Maternal Aunt      Breast cancer   • Stroke Paternal Grandmother    • Cancer Maternal Aunt      Breast cancer       Social History     Social History   • Marital status:      Spouse name: N/A   • Number of children: N/A   • Years of education: N/A     Occupational History   • Not on file.     Social History Main Topics   • Smoking status: Never Smoker   • Smokeless tobacco: Never Used   • Alcohol use No   • Drug use: No   • Sexual activity: No     Other Topics Concern   • Stress Concern No      cancer     Social History Narrative   • No narrative on file       Current Outpatient Prescriptions   Medication Sig Dispense Refill   • azithromycin (ZITHROMAX) 250 MG Tab Take 2 tablets on day 1, then take 1 tablet a day for 4 days. 6 Tab 0   • MethylPREDNISolone (MEDROL DOSEPAK) 4 MG Tablet Therapy Pack Take as directed. 21 Tab 0   • levothyroxine (SYNTHROID) 50 MCG Tab TAKE ONE TABLET BY MOUTH IN THE MORNING ON AN EMPTY STOMACH 90 Tab 3   • pregabalin (LYRICA) 150 MG Cap Take 150 mg by mouth 3 times a day.     • valsartan (DIOVAN) 80 MG Tab Take 1 Tab by mouth every day. 90 Tab 3   • imipramine (TOFRANIL) 10 MG Tab Take 2 Tabs by mouth every evening. 180 Tab 2   • diazePAM (VALIUM) 5 MG Tab Take 1 Tab by mouth every 6 hours as needed for Anxiety or Sleep for up to 90 days. 30 Tab 2   • duloxetine (CYMBALTA) 60 MG Cap DR Particles delayed-release capsule TAKE ONE CAPSULE BY MOUTH ONCE DAILY (Patient taking differently: Take 60 mg by mouth every day. TAKE ONE CAPSULE BY MOUTH ONCE DAILY) 90 Cap 2   • AMITIZA 24 MCG capsule Take 24 mcg by mouth every morning with breakfast.     • Umeclidinium Bromide (INCRUSE  ELLIPTA) 62.5 MCG/INH AEROSOL POWDER, BREATH ACTIVATED Inhale 1 Puff by mouth every day. 1 Each 11   • estradiol (ESTRACE) 0.5 MG tablet TAKE ONE TABLET BY MOUTH ONCE DAILY 90 Tab 3   • XTAMPZA ER 18 MG Capsule Extended Release 12 hour Abuse-Deterrent Take 18 mg by mouth 2 Times a Day.     • Cyanocobalamin 2500 MCG Chew Tab Take 1 Tab by mouth every day.     • omeprazole (PRILOSEC) 20 MG delayed-release capsule Take 1 Cap by mouth every day. 90 Cap 3   • aspirin EC (ECOTRIN) 81 MG Tablet Delayed Response Take 81 mg by mouth every day.     • oxycodone-acetaminophen (PERCOCET) 7.5-325 MG per tablet Take 1-2 Tabs by mouth every four hours as needed (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 She is seen 2/14/14, do not fill until 4/11/14 180 Each 0   • docusate sodium (COLACE) 250 MG capsule Take 250 mg by mouth every day.     • Diclofenac Sodium 1 % Gel      • Multiple Vitamins-Minerals (VISIVITES/LUTEIN) Tab Take  by mouth.     • polyethylene glycol/lytes (MIRALAX) Pack Take 17 g by mouth every day.     • furosemide (LASIX) 20 MG Tab Take 20 mg by mouth every day. As needed for edema     • potassium chloride (KLOR-CON) 20 MEQ Pack Take 20 mEq by mouth every day. As needed with furosemide     • VENTOLIN  (90 BASE) MCG/ACT Aero Soln inhalation aerosol Inhale 2 Puffs by mouth every 6 hours as needed for Shortness of Breath. 1 Inhaler 5   • Probiotic Product ([employer]) CAPS Take 1 Cap by mouth 2 Times a Day.       No current facility-administered medications for this visit.           Allergies: Pcn [penicillins]; Sulfa drugs; Tape; Macrobid [nitrofurantoin]; and Zoloft      ROS   Gen: Denies fever, chills, unintentional weight loss, fatigue, night sweats  E/N/T: Denies ear pain,   Resp:Denies Dyspnea, wheezing,  hemoptysis  CV: Denies chest pain, chest tightness, palpitations, BLE edema  Sleep:Denies morning headache, insomnia, daytime somnolence, snoring, gasping for air,  apnea  Neuro: Denies frequent headaches, weakness, dizziness  GI: Denies N/V, acid reflux/heartburn  See HPI.  All other systems reviewed and negative      Vital signs for this encounter:  Vitals:    04/11/18 0919   Height: 1.524 m (5')   Weight: 78.9 kg (174 lb)   Weight % change since last entry.: 0 %   BP: 134/80   Pulse: 83   BMI (Calculated): 33.98   Resp: 16   Temp: 36.4 °C (97.5 °F)   O2 sat % room air: 92 %                 Physical Exam:   Appearance: well developed, well nourished, no acute distress.  Eyes: PERRL, EOM intact, sclere white, conjunctiva moist.  Ears: no lesions or deformities.  Hearing: grossly intact.  Nose: no lesions or deformities.  Dentition: good dentition.  Oropharynx: tongue normal, posterior pharynx without erythema or exudate.  Neck: supple, trachea midline, no masses.  Respiratory effort: no intercostal retractions or use of accessory muscles.  Lung auscultation: Bilateral diminished   Heart auscultation: no murmur, rub, or gallop.   Extremities: no cyanosis or edema.  Abdomen: soft, non-tender, no masses.  Gait and station: grossly normal   Digits and Nails: no clubbing, cyanosis, petechiae, or nodes.  Cranial nerves: grossly normal.  Motor: no focal deficits observed.  Skin: no rashes, lesions, or ulcers noted.  Orientation: oriented to time, place, and person.  Mood and affect: mood and affect appropriate, normal interaction with examiner.    Assessment   1. ALISSA (obstructive sleep apnea)  DME BIPAP   2. BMI 33.0-33.9,adult  OBESITY COUNSELING (No Charge): Patient identified as having weight management issue.  Appropriate orders and counseling given.   3. COPD with asthma (CMS-[facility])     4. Pulmonary HTN         Patient was seen for 25 minutes, more than 50% of time spent in face to face review, counseling, and arranging future evaluation and follow up of medical conditions and care.     PLAN:   Patient Instructions   1) Start BIPAP 12/7cmH20  2) Clean mask and supplies weekly and  change them as insurance allows  3) Continue using Incruse and rescue inhaler as needed  4) Vaccines: Up to date with Prevnar 13, Pneumovax 23, flu  5) Continue weekly exercise   6) Zpack and Medrol pack now for exacerbation   5) Return in about 2 months (around 6/11/2018) for review of symptoms, if not sooner, follow up with DOMINIQUE Sims, Compliance.

## 2018-04-11 NOTE — PATIENT INSTRUCTIONS
1) Start BIPAP 12/7cmH20  2) Clean mask and supplies weekly and change them as insurance allows  3) Continue using Incruse   4) Vaccines: Up to date with Prevnar 13, Pneumovax 23, flu  5) Continue weekly exercise   6) Zpack and Medrol pack now for exacerbation   5) Return in about 2 months (around 6/11/2018) for review of symptoms, if not sooner, follow up with DOMINIQUE Sims, Compliance.

## 2018-04-12 ENCOUNTER — APPOINTMENT (RX ONLY)
Dept: URBAN - METROPOLITAN AREA CLINIC 4 | Facility: CLINIC | Age: 78
Setting detail: DERMATOLOGY
End: 2018-04-12

## 2018-04-12 PROBLEM — D48.5 NEOPLASM OF UNCERTAIN BEHAVIOR OF SKIN: Status: ACTIVE | Noted: 2018-04-12

## 2018-04-12 PROCEDURE — ? EXCISION

## 2018-04-12 PROCEDURE — 12032 INTMD RPR S/A/T/EXT 2.6-7.5: CPT

## 2018-04-12 PROCEDURE — 11606 EXC TR-EXT MAL+MARG >4 CM: CPT

## 2018-04-12 NOTE — PROCEDURE: EXCISION
Dressing: dry sterile dressing
Excision Method: Fusiform
Perilesional Excision Additional Text (Leave Blank If You Do Not Want): The margin was drawn around the clinically apparent lesion. Incisions were then made along these lines to the appropriate tissue plane and the lesion was extirpated.
O-T Plasty Text: The defect edges were debeveled with a #15 scalpel blade.  Given the location of the defect, shape of the defect and the proximity to free margins an O-T plasty was deemed most appropriate.  Using a sterile surgical marker, an appropriate O-T plasty was drawn incorporating the defect and placing the expected incisions within the relaxed skin tension lines where possible.    The area thus outlined was incised deep to adipose tissue with a #15 scalpel blade.  The skin margins were undermined to an appropriate distance in all directions utilizing iris scissors.
V-Y Flap Text: The defect edges were debeveled with a #15 scalpel blade.  Given the location of the defect, shape of the defect and the proximity to free margins a V-Y flap was deemed most appropriate.  Using a sterile surgical marker, an appropriate advancement flap was drawn incorporating the defect and placing the expected incisions within the relaxed skin tension lines where possible.    The area thus outlined was incised deep to adipose tissue with a #15 scalpel blade.  The skin margins were undermined to an appropriate distance in all directions utilizing iris scissors.
Bilobed Transposition Flap Text: The defect edges were debeveled with a #15 scalpel blade.  Given the location of the defect and the proximity to free margins a bilobed transposition flap was deemed most appropriate.  Using a sterile surgical marker, an appropriate bilobe flap drawn around the defect.    The area thus outlined was incised deep to adipose tissue with a #15 scalpel blade.  The skin margins were undermined to an appropriate distance in all directions utilizing iris scissors.
Skin Substitute Text: The defect edges were debeveled with a #15 scalpel blade.  Given the location of the defect, shape of the defect and the proximity to free margins a skin substitute graft was deemed most appropriate.  The graft material was trimmed to fit the size of the defect. The graft was then placed in the primary defect and oriented appropriately.
Posterior Auricular Interpolation Flap Text: A decision was made to reconstruct the defect utilizing an interpolation axial flap and a staged reconstruction.  A telfa template was made of the defect.  This telfa template was then used to outline the posterior auricular interpolation flap.  The donor area for the pedicle flap was then injected with anesthesia.  The flap was excised through the skin and subcutaneous tissue down to the layer of the underlying musculature.  The pedicle flap was carefully excised within this deep plane to maintain its blood supply.  The edges of the donor site were undermined.   The donor site was closed in a primary fashion.  The pedicle was then rotated into position and sutured.  Once the tube was sutured into place, adequate blood supply was confirmed with blanching and refill.  The pedicle was then wrapped with xeroform gauze and dressed appropriately with a telfa and gauze bandage to ensure continued blood supply and protect the attached pedicle.
Complex Repair And Tissue Cultured Epidermal Autograft Text: The defect edges were debeveled with a #15 scalpel blade.  The primary defect was closed partially with a complex linear closure.  Given the location of the defect, shape of the defect and the proximity to free margins an tissue cultured epidermal autograft was deemed most appropriate to repair the remaining defect.  The graft was trimmed to fit the size of the remaining defect.  The graft was then placed in the primary defect, oriented appropriately, and sutured into place.
H Plasty Text: Given the location of the defect, shape of the defect and the proximity to free margins a H-plasty was deemed most appropriate for repair.  Using a sterile surgical marker, the appropriate advancement arms of the H-plasty were drawn incorporating the defect and placing the expected incisions within the relaxed skin tension lines where possible. The area thus outlined was incised deep to adipose tissue with a #15 scalpel blade. The skin margins were undermined to an appropriate distance in all directions utilizing iris scissors.  The opposing advancement arms were then advanced into place in opposite direction and anchored with interrupted buried subcutaneous sutures.
No Repair - Repaired With Adjacent Surgical Defect Text (Leave Blank If You Do Not Want): After the excision the defect was repaired concurrently with another surgical defect which was in close approximation.
Z Plasty Text: The lesion was extirpated to the level of the fat with a #15 scalpel blade.  Given the location of the defect, shape of the defect and the proximity to free margins a Z-plasty was deemed most appropriate for repair.  Using a sterile surgical marker, the appropriate transposition arms of the Z-plasty were drawn incorporating the defect and placing the expected incisions within the relaxed skin tension lines where possible.    The area thus outlined was incised deep to adipose tissue with a #15 scalpel blade.  The skin margins were undermined to an appropriate distance in all directions utilizing iris scissors.  The opposing transposition arms were then transposed into place in opposite direction and anchored with interrupted buried subcutaneous sutures.
Complex Repair And Modified Advancement Flap Text: The defect edges were debeveled with a #15 scalpel blade.  The primary defect was closed partially with a complex linear closure.  Given the location of the remaining defect, shape of the defect and the proximity to free margins a modified advancement flap was deemed most appropriate for complete closure of the defect.  Using a sterile surgical marker, an appropriate advancement flap was drawn incorporating the defect and placing the expected incisions within the relaxed skin tension lines where possible.    The area thus outlined was incised deep to adipose tissue with a #15 scalpel blade.  The skin margins were undermined to an appropriate distance in all directions utilizing iris scissors.
Paramedian Forehead Flap Text: A decision was made to reconstruct the defect utilizing an interpolation axial flap and a staged reconstruction.  A telfa template was made of the defect.  This telfa template was then used to outline the paramedian forehead pedicle flap.  The donor area for the pedicle flap was then injected with anesthesia.  The flap was excised through the skin and subcutaneous tissue down to the layer of the underlying musculature.  The pedicle flap was carefully excised within this deep plane to maintain its blood supply.  The edges of the donor site were undermined.   The donor site was closed in a primary fashion.  The pedicle was then rotated into position and sutured.  Once the tube was sutured into place, adequate blood supply was confirmed with blanching and refill.  The pedicle was then wrapped with xeroform gauze and dressed appropriately with a telfa and gauze bandage to ensure continued blood supply and protect the attached pedicle.
Complex Repair And Rotation Flap Text: The defect edges were debeveled with a #15 scalpel blade.  The primary defect was closed partially with a complex linear closure.  Given the location of the remaining defect, shape of the defect and the proximity to free margins a rotation flap was deemed most appropriate for complete closure of the defect.  Using a sterile surgical marker, an appropriate advancement flap was drawn incorporating the defect and placing the expected incisions within the relaxed skin tension lines where possible.    The area thus outlined was incised deep to adipose tissue with a #15 scalpel blade.  The skin margins were undermined to an appropriate distance in all directions utilizing iris scissors.
Complex Repair And V-Y Plasty Text: The defect edges were debeveled with a #15 scalpel blade.  The primary defect was closed partially with a complex linear closure.  Given the location of the remaining defect, shape of the defect and the proximity to free margins a V-Y plasty was deemed most appropriate for complete closure of the defect.  Using a sterile surgical marker, an appropriate advancement flap was drawn incorporating the defect and placing the expected incisions within the relaxed skin tension lines where possible.    The area thus outlined was incised deep to adipose tissue with a #15 scalpel blade.  The skin margins were undermined to an appropriate distance in all directions utilizing iris scissors.
Detail Level: Detailed
O-T Advancement Flap Text: The defect edges were debeveled with a #15 scalpel blade.  Given the location of the defect, shape of the defect and the proximity to free margins an O-T advancement flap was deemed most appropriate.  Using a sterile surgical marker, an appropriate advancement flap was drawn incorporating the defect and placing the expected incisions within the relaxed skin tension lines where possible.    The area thus outlined was incised deep to adipose tissue with a #15 scalpel blade.  The skin margins were undermined to an appropriate distance in all directions utilizing iris scissors.
Rhombic Flap Text: The defect edges were debeveled with a #15 scalpel blade.  Given the location of the defect and the proximity to free margins a rhombic flap was deemed most appropriate.  Using a sterile surgical marker, an appropriate rhombic flap was drawn incorporating the defect.    The area thus outlined was incised deep to adipose tissue with a #15 scalpel blade.  The skin margins were undermined to an appropriate distance in all directions utilizing iris scissors.
Primary Defect Width (In Cm): 0
Complex Repair And Epidermal Autograft Text: The defect edges were debeveled with a #15 scalpel blade.  The primary defect was closed partially with a complex linear closure.  Given the location of the defect, shape of the defect and the proximity to free margins an epidermal autograft was deemed most appropriate to repair the remaining defect.  The graft was trimmed to fit the size of the remaining defect.  The graft was then placed in the primary defect, oriented appropriately, and sutured into place.
Complex Repair And Dermal Autograft Text: The defect edges were debeveled with a #15 scalpel blade.  The primary defect was closed partially with a complex linear closure.  Given the location of the defect, shape of the defect and the proximity to free margins an dermal autograft was deemed most appropriate to repair the remaining defect.  The graft was trimmed to fit the size of the remaining defect.  The graft was then placed in the primary defect, oriented appropriately, and sutured into place.
Dermal Closure: simple interrupted
Show Accession Variable: Yes
Size Of Margin In Cm: 2.5
Cartilage Graft Text: The defect edges were debeveled with a #15 scalpel blade.  Given the location of the defect, shape of the defect, the fact the defect involved a full thickness cartilage defect a cartilage graft was deemed most appropriate.  An appropriate donor site was identified, cleansed, and anesthetized. The cartilage graft was then harvested and transferred to the recipient site, oriented appropriately and then sutured into place.  The secondary defect was then repaired using a primary closure.
Post-Care Instructions: I reviewed with the patient in detail post-care instructions. Patient is not to engage in any heavy lifting, exercise, or swimming for the next 14 days. Should the patient develop any fevers, chills, bleeding, severe pain patient will contact the office immediately.
Complex Repair And M Plasty Text: The defect edges were debeveled with a #15 scalpel blade.  The primary defect was closed partially with a complex linear closure.  Given the location of the remaining defect, shape of the defect and the proximity to free margins an M plasty was deemed most appropriate for complete closure of the defect.  Using a sterile surgical marker, an appropriate advancement flap was drawn incorporating the defect and placing the expected incisions within the relaxed skin tension lines where possible.    The area thus outlined was incised deep to adipose tissue with a #15 scalpel blade.  The skin margins were undermined to an appropriate distance in all directions utilizing iris scissors.
Transposition Flap Text: The defect edges were debeveled with a #15 scalpel blade.  Given the location of the defect and the proximity to free margins a transposition flap was deemed most appropriate.  Using a sterile surgical marker, an appropriate transposition flap was drawn incorporating the defect.    The area thus outlined was incised deep to adipose tissue with a #15 scalpel blade.  The skin margins were undermined to an appropriate distance in all directions utilizing iris scissors.
Lab Facility: 
Patient Will Remove Sutures At Home?: No
Positioning (Leave Blank If You Do Not Want): The patient was placed in a comfortable position exposing the surgical site.
Additional Anesthesia Volume In Cc: 21
Trilobed Flap Text: The defect edges were debeveled with a #15 scalpel blade.  Given the location of the defect and the proximity to free margins a trilobed flap was deemed most appropriate.  Using a sterile surgical marker, an appropriate trilobed flap drawn around the defect.    The area thus outlined was incised deep to adipose tissue with a #15 scalpel blade.  The skin margins were undermined to an appropriate distance in all directions utilizing iris scissors.
Complex Repair And Melolabial Flap Text: The defect edges were debeveled with a #15 scalpel blade.  The primary defect was closed partially with a complex linear closure.  Given the location of the remaining defect, shape of the defect and the proximity to free margins a melolabial flap was deemed most appropriate for complete closure of the defect.  Using a sterile surgical marker, an appropriate advancement flap was drawn incorporating the defect and placing the expected incisions within the relaxed skin tension lines where possible.    The area thus outlined was incised deep to adipose tissue with a #15 scalpel blade.  The skin margins were undermined to an appropriate distance in all directions utilizing iris scissors.
Lip Wedge Excision Repair Text: Given the location of the defect and the proximity to free margins a full thickness wedge repair was deemed most appropriate.  Using a sterile surgical marker, the appropriate repair was drawn incorporating the defect and placing the expected incisions perpendicular to the vermilion border.  The vermilion border was also meticulously outlined to ensure appropriate reapproximation during the repair.  The area thus outlined was incised through and through with a #15 scalpel blade.  The muscularis and dermis were reaproximated with deep sutures following hemostasis. Care was taken to realign the vermilion border before proceeding with the superficial closure.  Once the vermilion was realigned the superfical and mucosal closure was finished.
Billing Type: Third-Party Bill
Complex Repair And Double Advancement Flap Text: The defect edges were debeveled with a #15 scalpel blade.  The primary defect was closed partially with a complex linear closure.  Given the location of the remaining defect, shape of the defect and the proximity to free margins a double advancement flap was deemed most appropriate for complete closure of the defect.  Using a sterile surgical marker, an appropriate advancement flap was drawn incorporating the defect and placing the expected incisions within the relaxed skin tension lines where possible.    The area thus outlined was incised deep to adipose tissue with a #15 scalpel blade.  The skin margins were undermined to an appropriate distance in all directions utilizing iris scissors.
Complex Repair And A-T Advancement Flap Text: The defect edges were debeveled with a #15 scalpel blade.  The primary defect was closed partially with a complex linear closure.  Given the location of the remaining defect, shape of the defect and the proximity to free margins an A-T advancement flap was deemed most appropriate for complete closure of the defect.  Using a sterile surgical marker, an appropriate advancement flap was drawn incorporating the defect and placing the expected incisions within the relaxed skin tension lines where possible.    The area thus outlined was incised deep to adipose tissue with a #15 scalpel blade.  The skin margins were undermined to an appropriate distance in all directions utilizing iris scissors.
Bi-Rhombic Flap Text: The defect edges were debeveled with a #15 scalpel blade.  Given the location of the defect and the proximity to free margins a bi-rhombic flap was deemed most appropriate.  Using a sterile surgical marker, an appropriate rhombic flap was drawn incorporating the defect. The area thus outlined was incised deep to adipose tissue with a #15 scalpel blade.  The skin margins were undermined to an appropriate distance in all directions utilizing iris scissors.
Suture Removal: 14 days
Previous Accession (Optional): [id number]
Complex Repair And Dorsal Nasal Flap Text: The defect edges were debeveled with a #15 scalpel blade.  The primary defect was closed partially with a complex linear closure.  Given the location of the remaining defect, shape of the defect and the proximity to free margins a dorsal nasal flap was deemed most appropriate for complete closure of the defect.  Using a sterile surgical marker, an appropriate flap was drawn incorporating the defect and placing the expected incisions within the relaxed skin tension lines where possible.    The area thus outlined was incised deep to adipose tissue with a #15 scalpel blade.  The skin margins were undermined to an appropriate distance in all directions utilizing iris scissors.
Home Suture Removal Text: Patient was provided a home suture removal kit and will remove their sutures at home.  If they have any questions or difficulties they will call the office.
Split-Thickness Skin Graft Text: The defect edges were debeveled with a #15 scalpel blade.  Given the location of the defect, shape of the defect and the proximity to free margins a split thickness skin graft was deemed most appropriate.  Using a sterile surgical marker, the primary defect shape was transferred to the donor site. The split thickness graft was then harvested.  The skin graft was then placed in the primary defect and oriented appropriately.
Advancement-Rotation Flap Text: The defect edges were debeveled with a #15 scalpel blade.  Given the location of the defect, shape of the defect and the proximity to free margins an advancement-rotation flap was deemed most appropriate.  Using a sterile surgical marker, an appropriate flap was drawn incorporating the defect and placing the expected incisions within the relaxed skin tension lines where possible. The area thus outlined was incised deep to adipose tissue with a #15 scalpel blade.  The skin margins were undermined to an appropriate distance in all directions utilizing iris scissors.
Mucosal Advancement Flap Text: Given the location of the defect, shape of the defect and the proximity to free margins a mucosal advancement flap was deemed most appropriate. Incisions were made with a 15 blade scalpel in the appropriate fashion along the cutaneous vermilion border and the mucosal lip. The remaining actinically damaged mucosal tissue was excised.  The mucosal advancement flap was then elevated to the gingival sulcus with care taken to preserve the neurovascular structures and advanced into the primary defect. Care was taken to ensure that precise realignment of the vermilion border was achieved.
Ear Star Wedge Flap Text: The defect edges were debeveled with a #15 blade scalpel.  Given the location of the defect and the proximity to free margins (helical rim) an ear star wedge flap was deemed most appropriate.  Using a sterile surgical marker, the appropriate flap was drawn incorporating the defect and placing the expected incisions between the helical rim and antihelix where possible.  The area thus outlined was incised through and through with a #15 scalpel blade.
Bilobed Flap Text: The defect edges were debeveled with a #15 scalpel blade.  Given the location of the defect and the proximity to free margins a bilobe flap was deemed most appropriate.  Using a sterile surgical marker, an appropriate bilobe flap drawn around the defect.    The area thus outlined was incised deep to adipose tissue with a #15 scalpel blade.  The skin margins were undermined to an appropriate distance in all directions utilizing iris scissors.
S Plasty Text: Given the location and shape of the defect, and the orientation of relaxed skin tension lines, an S-plasty was deemed most appropriate for repair.  Using a sterile surgical marker, the appropriate outline of the S-plasty was drawn, incorporating the defect and placing the expected incisions within the relaxed skin tension lines where possible.  The area thus outlined was incised deep to adipose tissue with a #15 scalpel blade.  The skin margins were undermined to an appropriate distance in all directions utilizing iris scissors. The skin flaps were advanced over the defect.  The opposing margins were then approximated with interrupted buried subcutaneous sutures.
A-T Advancement Flap Text: The defect edges were debeveled with a #15 scalpel blade.  Given the location of the defect, shape of the defect and the proximity to free margins an A-T advancement flap was deemed most appropriate.  Using a sterile surgical marker, an appropriate advancement flap was drawn incorporating the defect and placing the expected incisions within the relaxed skin tension lines where possible.    The area thus outlined was incised deep to adipose tissue with a #15 scalpel blade.  The skin margins were undermined to an appropriate distance in all directions utilizing iris scissors.
Xenograft Text: The defect edges were debeveled with a #15 scalpel blade.  Given the location of the defect, shape of the defect and the proximity to free margins a xenograft was deemed most appropriate.  The graft was then trimmed to fit the size of the defect.  The graft was then placed in the primary defect and oriented appropriately.
Elliptical Excision Additional Text (Leave Blank If You Do Not Want): The margin was drawn around the clinically apparent lesion.  An elliptical shape was then drawn on the skin incorporating the lesion and margins.  Incisions were then made along these lines to the appropriate tissue plane and the lesion was extirpated.
Cheek Interpolation Flap Text: A decision was made to reconstruct the defect utilizing an interpolation axial flap and a staged reconstruction.  A telfa template was made of the defect.  This telfa template was then used to outline the Cheek Interpolation flap.  The donor area for the pedicle flap was then injected with anesthesia.  The flap was excised through the skin and subcutaneous tissue down to the layer of the underlying musculature.  The interpolation flap was carefully excised within this deep plane to maintain its blood supply.  The edges of the donor site were undermined.   The donor site was closed in a primary fashion.  The pedicle was then rotated into position and sutured.  Once the tube was sutured into place, adequate blood supply was confirmed with blanching and refill.  The pedicle was then wrapped with xeroform gauze and dressed appropriately with a telfa and gauze bandage to ensure continued blood supply and protect the attached pedicle.
Tissue Cultured Epidermal Autograft Text: The defect edges were debeveled with a #15 scalpel blade.  Given the location of the defect, shape of the defect and the proximity to free margins a tissue cultured epidermal autograft was deemed most appropriate.  The graft was then trimmed to fit the size of the defect.  The graft was then placed in the primary defect and oriented appropriately.
Ftsg Text: The defect edges were debeveled with a #15 scalpel blade.  Given the location of the defect, shape of the defect and the proximity to free margins a full thickness skin graft was deemed most appropriate.  Using a sterile surgical marker, the primary defect shape was transferred to the donor site. The area thus outlined was incised deep to adipose tissue with a #15 scalpel blade.  The harvested graft was then trimmed of adipose tissue until only dermis and epidermis was left.  The skin margins of the secondary defect were undermined to an appropriate distance in all directions utilizing iris scissors.  The secondary defect was closed with interrupted buried subcutaneous sutures.  The skin edges were then re-apposed with running  sutures.  The skin graft was then placed in the primary defect and oriented appropriately.
Hemostasis: Pressure and Electrodesiccation
Advancement Flap (Single) Text: The defect edges were debeveled with a #15 scalpel blade.  Given the location of the defect and the proximity to free margins a single advancement flap was deemed most appropriate.  Using a sterile surgical marker, an appropriate advancement flap was drawn incorporating the defect and placing the expected incisions within the relaxed skin tension lines where possible.    The area thus outlined was incised deep to adipose tissue with a #15 scalpel blade.  The skin margins were undermined to an appropriate distance in all directions utilizing iris scissors.
Crescentic Complex Repair Preamble Text (Leave Blank If You Do Not Want): Extensive wide undermining was performed.
Purse String (Simple) Text: Given the location of the defect and the characteristics of the surrounding skin a purse string simple closure was deemed most appropriate.  Undermining was performed circumferentially around the surgical defect.  A purse string suture was then placed and tightened.
Complex Repair And Double M Plasty Text: The defect edges were debeveled with a #15 scalpel blade.  The primary defect was closed partially with a complex linear closure.  Given the location of the remaining defect, shape of the defect and the proximity to free margins a double M plasty was deemed most appropriate for complete closure of the defect.  Using a sterile surgical marker, an appropriate advancement flap was drawn incorporating the defect and placing the expected incisions within the relaxed skin tension lines where possible.    The area thus outlined was incised deep to adipose tissue with a #15 scalpel blade.  The skin margins were undermined to an appropriate distance in all directions utilizing iris scissors.
Mastoid Interpolation Flap Text: A decision was made to reconstruct the defect utilizing an interpolation axial flap and a staged reconstruction.  A telfa template was made of the defect.  This telfa template was then used to outline the mastoid interpolation flap.  The donor area for the pedicle flap was then injected with anesthesia.  The flap was excised through the skin and subcutaneous tissue down to the layer of the underlying musculature.  The pedicle flap was carefully excised within this deep plane to maintain its blood supply.  The edges of the donor site were undermined.   The donor site was closed in a primary fashion.  The pedicle was then rotated into position and sutured.  Once the tube was sutured into place, adequate blood supply was confirmed with blanching and refill.  The pedicle was then wrapped with xeroform gauze and dressed appropriately with a telfa and gauze bandage to ensure continued blood supply and protect the attached pedicle.
Hatchet Flap Text: The defect edges were debeveled with a #15 scalpel blade.  Given the location of the defect, shape of the defect and the proximity to free margins a hatchet flap was deemed most appropriate.  Using a sterile surgical marker, an appropriate hatchet flap was drawn incorporating the defect and placing the expected incisions within the relaxed skin tension lines where possible.    The area thus outlined was incised deep to adipose tissue with a #15 scalpel blade.  The skin margins were undermined to an appropriate distance in all directions utilizing iris scissors.
Dorsal Nasal Flap Text: The defect edges were debeveled with a #15 scalpel blade.  Given the location of the defect and the proximity to free margins a dorsal nasal flap was deemed most appropriate.  Using a sterile surgical marker, an appropriate dorsal nasal flap was drawn around the defect.    The area thus outlined was incised deep to adipose tissue with a #15 scalpel blade.  The skin margins were undermined to an appropriate distance in all directions utilizing iris scissors.
V-Y Plasty Text: The defect edges were debeveled with a #15 scalpel blade.  Given the location of the defect, shape of the defect and the proximity to free margins an V-Y advancement flap was deemed most appropriate.  Using a sterile surgical marker, an appropriate advancement flap was drawn incorporating the defect and placing the expected incisions within the relaxed skin tension lines where possible.    The area thus outlined was incised deep to adipose tissue with a #15 scalpel blade.  The skin margins were undermined to an appropriate distance in all directions utilizing iris scissors.
Fusiform Excision Additional Text (Leave Blank If You Do Not Want): The margin was drawn around the clinically apparent lesion.  A fusiform shape was then drawn on the skin incorporating the lesion and margins.  Incisions were then made along these lines to the appropriate tissue plane and the lesion was extirpated.
Repair Type: Intermediate
Saucerization Excision Additional Text (Leave Blank If You Do Not Want): The margin was drawn around the clinically apparent lesion.  Incisions were then made along these lines, in a tangential fashion, to the appropriate tissue plane and the lesion was extirpated.
Intermediate / Complex Repair - Final Wound Length In Cm: 3.5
Island Pedicle Flap Text: The defect edges were debeveled with a #15 scalpel blade.  Given the location of the defect, shape of the defect and the proximity to free margins an island pedicle advancement flap was deemed most appropriate.  Using a sterile surgical marker, an appropriate advancement flap was drawn incorporating the defect, outlining the appropriate donor tissue and placing the expected incisions within the relaxed skin tension lines where possible.    The area thus outlined was incised deep to adipose tissue with a #15 scalpel blade.  The skin margins were undermined to an appropriate distance in all directions around the primary defect and laterally outward around the island pedicle utilizing iris scissors.  There was minimal undermining beneath the pedicle flap.
Melolabial Transposition Flap Text: The defect edges were debeveled with a #15 scalpel blade.  Given the location of the defect and the proximity to free margins a melolabial flap was deemed most appropriate.  Using a sterile surgical marker, an appropriate melolabial transposition flap was drawn incorporating the defect.    The area thus outlined was incised deep to adipose tissue with a #15 scalpel blade.  The skin margins were undermined to an appropriate distance in all directions utilizing iris scissors.
Modified Advancement Flap Text: The defect edges were debeveled with a #15 scalpel blade.  Given the location of the defect, shape of the defect and the proximity to free margins a modified advancement flap was deemed most appropriate.  Using a sterile surgical marker, an appropriate advancement flap was drawn incorporating the defect and placing the expected incisions within the relaxed skin tension lines where possible.    The area thus outlined was incised deep to adipose tissue with a #15 scalpel blade.  The skin margins were undermined to an appropriate distance in all directions utilizing iris scissors.
Consent was obtained from the patient. The risks and benefits to therapy were discussed in detail. Specifically, the risks of infection, scarring, bleeding, prolonged wound healing, incomplete removal, allergy to anesthesia, nerve injury and recurrence were addressed. Prior to the procedure, the treatment site was clearly identified and confirmed by the patient. All components of Universal Protocol/PAUSE Rule completed.
Complex Repair And Bilobe Flap Text: The defect edges were debeveled with a #15 scalpel blade.  The primary defect was closed partially with a complex linear closure.  Given the location of the remaining defect, shape of the defect and the proximity to free margins a bilobe flap was deemed most appropriate for complete closure of the defect.  Using a sterile surgical marker, an appropriate advancement flap was drawn incorporating the defect and placing the expected incisions within the relaxed skin tension lines where possible.    The area thus outlined was incised deep to adipose tissue with a #15 scalpel blade.  The skin margins were undermined to an appropriate distance in all directions utilizing iris scissors.
Muscle Hinge Flap Text: The defect edges were debeveled with a #15 scalpel blade.  Given the size, depth and location of the defect and the proximity to free margins a muscle hinge flap was deemed most appropriate.  Using a sterile surgical marker, an appropriate hinge flap was drawn incorporating the defect. The area thus outlined was incised with a #15 scalpel blade.  The skin margins were undermined to an appropriate distance in all directions utilizing iris scissors.
Curvilinear Excision Additional Text (Leave Blank If You Do Not Want): The margin was drawn around the clinically apparent lesion.  A curvilinear shape was then drawn on the skin incorporating the lesion and margins.  Incisions were then made along these lines to the appropriate tissue plane and the lesion was extirpated.
Complex Repair And Ftsg Text: The defect edges were debeveled with a #15 scalpel blade.  The primary defect was closed partially with a complex linear closure.  Given the location of the defect, shape of the defect and the proximity to free margins a full thickness skin graft was deemed most appropriate to repair the remaining defect.  The graft was trimmed to fit the size of the remaining defect.  The graft was then placed in the primary defect, oriented appropriately, and sutured into place.
Complex Repair And Transposition Flap Text: The defect edges were debeveled with a #15 scalpel blade.  The primary defect was closed partially with a complex linear closure.  Given the location of the remaining defect, shape of the defect and the proximity to free margins a transposition flap was deemed most appropriate for complete closure of the defect.  Using a sterile surgical marker, an appropriate advancement flap was drawn incorporating the defect and placing the expected incisions within the relaxed skin tension lines where possible.    The area thus outlined was incised deep to adipose tissue with a #15 scalpel blade.  The skin margins were undermined to an appropriate distance in all directions utilizing iris scissors.
Repair Performed By Another Provider Text (Leave Blank If You Do Not Want): After the tissue was excised the defect was repaired by another provider.
Rotation Flap Text: The defect edges were debeveled with a #15 scalpel blade.  Given the location of the defect, shape of the defect and the proximity to free margins a rotation flap was deemed most appropriate.  Using a sterile surgical marker, an appropriate rotation flap was drawn incorporating the defect and placing the expected incisions within the relaxed skin tension lines where possible.    The area thus outlined was incised deep to adipose tissue with a #15 scalpel blade.  The skin margins were undermined to an appropriate distance in all directions utilizing iris scissors.
Surgeon Performing The Repair (Optional): Dr. Rohan Smith MD
Excision Depth: adipose tissue
Wound Care: Petrolatum
Bilateral Helical Rim Advancement Flap Text: The defect edges were debeveled with a #15 blade scalpel.  Given the location of the defect and the proximity to free margins (helical rim) a bilateral helical rim advancement flap was deemed most appropriate.  Using a sterile surgical marker, the appropriate advancement flaps were drawn incorporating the defect and placing the expected incisions between the helical rim and antihelix where possible.  The area thus outlined was incised through and through with a #15 scalpel blade.  With a skin hook and iris scissors, the flaps were gently and sharply undermined and freed up.
Crescentic Advancement Flap Text: The defect edges were debeveled with a #15 scalpel blade.  Given the location of the defect and the proximity to free margins a crescentic advancement flap was deemed most appropriate.  Using a sterile surgical marker, the appropriate advancement flap was drawn incorporating the defect and placing the expected incisions within the relaxed skin tension lines where possible.    The area thus outlined was incised deep to adipose tissue with a #15 scalpel blade.  The skin margins were undermined to an appropriate distance in all directions utilizing iris scissors.
Island Pedicle Flap-Requiring Vessel Identification Text: The defect edges were debeveled with a #15 scalpel blade.  Given the location of the defect, shape of the defect and the proximity to free margins an island pedicle advancement flap was deemed most appropriate.  Using a sterile surgical marker, an appropriate advancement flap was drawn, based on the axial vessel mentioned above, incorporating the defect, outlining the appropriate donor tissue and placing the expected incisions within the relaxed skin tension lines where possible.    The area thus outlined was incised deep to adipose tissue with a #15 scalpel blade.  The skin margins were undermined to an appropriate distance in all directions around the primary defect and laterally outward around the island pedicle utilizing iris scissors.  There was minimal undermining beneath the pedicle flap.
Cheek-To-Nose Interpolation Flap Text: A decision was made to reconstruct the defect utilizing an interpolation axial flap and a staged reconstruction.  A telfa template was made of the defect.  This telfa template was then used to outline the Cheek-To-Nose Interpolation flap.  The donor area for the pedicle flap was then injected with anesthesia.  The flap was excised through the skin and subcutaneous tissue down to the layer of the underlying musculature.  The interpolation flap was carefully excised within this deep plane to maintain its blood supply.  The edges of the donor site were undermined.   The donor site was closed in a primary fashion.  The pedicle was then rotated into position and sutured.  Once the tube was sutured into place, adequate blood supply was confirmed with blanching and refill.  The pedicle was then wrapped with xeroform gauze and dressed appropriately with a telfa and gauze bandage to ensure continued blood supply and protect the attached pedicle.
Slit Excision Additional Text (Leave Blank If You Do Not Want): A linear line was drawn on the skin overlying the lesion. An incision was made slowly until the lesion was visualized.  Once visualized, the lesion was removed with blunt dissection.
Complex Repair And O-L Flap Text: The defect edges were debeveled with a #15 scalpel blade.  The primary defect was closed partially with a complex linear closure.  Given the location of the remaining defect, shape of the defect and the proximity to free margins an O-L flap was deemed most appropriate for complete closure of the defect.  Using a sterile surgical marker, an appropriate flap was drawn incorporating the defect and placing the expected incisions within the relaxed skin tension lines where possible.    The area thus outlined was incised deep to adipose tissue with a #15 scalpel blade.  The skin margins were undermined to an appropriate distance in all directions utilizing iris scissors.
Island Pedicle Flap With Canthal Suspension Text: The defect edges were debeveled with a #15 scalpel blade.  Given the location of the defect, shape of the defect and the proximity to free margins an island pedicle advancement flap was deemed most appropriate.  Using a sterile surgical marker, an appropriate advancement flap was drawn incorporating the defect, outlining the appropriate donor tissue and placing the expected incisions within the relaxed skin tension lines where possible. The area thus outlined was incised deep to adipose tissue with a #15 scalpel blade.  The skin margins were undermined to an appropriate distance in all directions around the primary defect and laterally outward around the island pedicle utilizing iris scissors.  There was minimal undermining beneath the pedicle flap. A suspension suture was placed in the canthal tendon to prevent tension and prevent ectropion.
Complex Repair And Skin Substitute Graft Text: The defect edges were debeveled with a #15 scalpel blade.  The primary defect was closed partially with a complex linear closure.  Given the location of the remaining defect, shape of the defect and the proximity to free margins a skin substitute graft was deemed most appropriate to repair the remaining defect.  The graft was trimmed to fit the size of the remaining defect.  The graft was then placed in the primary defect, oriented appropriately, and sutured into place.
Partial Purse String (Intermediate) Text: Given the location of the defect and the characteristics of the surrounding skin an intermediate purse string closure was deemed most appropriate.  Undermining was performed circumferentially around the surgical defect.  A purse string suture was then placed and tightened. Wound tension of the circular defect prevented complete closure of the wound.
O-Z Plasty Text: The defect edges were debeveled with a #15 scalpel blade.  Given the location of the defect, shape of the defect and the proximity to free margins an O-Z plasty (double transposition flap) was deemed most appropriate.  Using a sterile surgical marker, the appropriate transposition flaps were drawn incorporating the defect and placing the expected incisions within the relaxed skin tension lines where possible.    The area thus outlined was incised deep to adipose tissue with a #15 scalpel blade.  The skin margins were undermined to an appropriate distance in all directions utilizing iris scissors.  Hemostasis was achieved with electrocautery.  The flaps were then transposed into place, one clockwise and the other counterclockwise, and anchored with interrupted buried subcutaneous sutures.
Partial Purse String (Simple) Text: Given the location of the defect and the characteristics of the surrounding skin a simple purse string closure was deemed most appropriate.  Undermining was performed circumferentially around the surgical defect.  A purse string suture was then placed and tightened. Wound tension of the circular defect prevented complete closure of the wound.
Epidermal Closure: running cuticular
Double Island Pedicle Flap Text: The defect edges were debeveled with a #15 scalpel blade.  Given the location of the defect, shape of the defect and the proximity to free margins a double island pedicle advancement flap was deemed most appropriate.  Using a sterile surgical marker, an appropriate advancement flap was drawn incorporating the defect, outlining the appropriate donor tissue and placing the expected incisions within the relaxed skin tension lines where possible.    The area thus outlined was incised deep to adipose tissue with a #15 scalpel blade.  The skin margins were undermined to an appropriate distance in all directions around the primary defect and laterally outward around the island pedicle utilizing iris scissors.  There was minimal undermining beneath the pedicle flap.
Intermediate Repair Preamble Text (Leave Blank If You Do Not Want): Undermining was performed with blunt dissection.
Size Of Lesion In Cm: 0.9
Complex Repair And W Plasty Text: The defect edges were debeveled with a #15 scalpel blade.  The primary defect was closed partially with a complex linear closure.  Given the location of the remaining defect, shape of the defect and the proximity to free margins a W plasty was deemed most appropriate for complete closure of the defect.  Using a sterile surgical marker, an appropriate advancement flap was drawn incorporating the defect and placing the expected incisions within the relaxed skin tension lines where possible.    The area thus outlined was incised deep to adipose tissue with a #15 scalpel blade.  The skin margins were undermined to an appropriate distance in all directions utilizing iris scissors.
Anesthesia Type: 1% lidocaine with 1:100,000 epinephrine and a 1:12 solution of 8.4% sodium bicarbonate
Complex Repair And O-T Advancement Flap Text: The defect edges were debeveled with a #15 scalpel blade.  The primary defect was closed partially with a complex linear closure.  Given the location of the remaining defect, shape of the defect and the proximity to free margins an O-T advancement flap was deemed most appropriate for complete closure of the defect.  Using a sterile surgical marker, an appropriate advancement flap was drawn incorporating the defect and placing the expected incisions within the relaxed skin tension lines where possible.    The area thus outlined was incised deep to adipose tissue with a #15 scalpel blade.  The skin margins were undermined to an appropriate distance in all directions utilizing iris scissors.
Complex Repair And Split-Thickness Skin Graft Text: The defect edges were debeveled with a #15 scalpel blade.  The primary defect was closed partially with a complex linear closure.  Given the location of the defect, shape of the defect and the proximity to free margins a split thickness skin graft was deemed most appropriate to repair the remaining defect.  The graft was trimmed to fit the size of the remaining defect.  The graft was then placed in the primary defect, oriented appropriately, and sutured into place.
Excisional Biopsy Additional Text (Leave Blank If You Do Not Want): The margin was drawn around the clinically apparent lesion. An elliptical shape was then drawn on the skin incorporating the lesion and margins.  Incisions were then made along these lines to the appropriate tissue plane and the lesion was extirpated.
Epidermal Sutures: 4-0 Vicryl
Lab: [id number]
Advancement Flap (Double) Text: The defect edges were debeveled with a #15 scalpel blade.  Given the location of the defect and the proximity to free margins a double advancement flap was deemed most appropriate.  Using a sterile surgical marker, the appropriate advancement flaps were drawn incorporating the defect and placing the expected incisions within the relaxed skin tension lines where possible.    The area thus outlined was incised deep to adipose tissue with a #15 scalpel blade.  The skin margins were undermined to an appropriate distance in all directions utilizing iris scissors.
Epidermal Autograft Text: The defect edges were debeveled with a #15 scalpel blade.  Given the location of the defect, shape of the defect and the proximity to free margins an epidermal autograft was deemed most appropriate.  Using a sterile surgical marker, the primary defect shape was transferred to the donor site. The epidermal graft was then harvested.  The skin graft was then placed in the primary defect and oriented appropriately.
Star Wedge Flap Text: The defect edges were debeveled with a #15 scalpel blade.  Given the location of the defect, shape of the defect and the proximity to free margins a star wedge flap was deemed most appropriate.  Using a sterile surgical marker, an appropriate rotation flap was drawn incorporating the defect and placing the expected incisions within the relaxed skin tension lines where possible. The area thus outlined was incised deep to adipose tissue with a #15 scalpel blade.  The skin margins were undermined to an appropriate distance in all directions utilizing iris scissors.
Melolabial Interpolation Flap Text: A decision was made to reconstruct the defect utilizing an interpolation axial flap and a staged reconstruction.  A telfa template was made of the defect.  This telfa template was then used to outline the melolabial interpolation flap.  The donor area for the pedicle flap was then injected with anesthesia.  The flap was excised through the skin and subcutaneous tissue down to the layer of the underlying musculature.  The pedicle flap was carefully excised within this deep plane to maintain its blood supply.  The edges of the donor site were undermined.   The donor site was closed in a primary fashion.  The pedicle was then rotated into position and sutured.  Once the tube was sutured into place, adequate blood supply was confirmed with blanching and refill.  The pedicle was then wrapped with xeroform gauze and dressed appropriately with a telfa and gauze bandage to ensure continued blood supply and protect the attached pedicle.
W Plasty Text: The lesion was extirpated to the level of the fat with a #15 scalpel blade.  Given the location of the defect, shape of the defect and the proximity to free margins a W-plasty was deemed most appropriate for repair.  Using a sterile surgical marker, the appropriate transposition arms of the W-plasty were drawn incorporating the defect and placing the expected incisions within the relaxed skin tension lines where possible.    The area thus outlined was incised deep to adipose tissue with a #15 scalpel blade.  The skin margins were undermined to an appropriate distance in all directions utilizing iris scissors.  The opposing transposition arms were then transposed into place in opposite direction and anchored with interrupted buried subcutaneous sutures.
Complex Repair And Xenograft Text: The defect edges were debeveled with a #15 scalpel blade.  The primary defect was closed partially with a complex linear closure.  Given the location of the defect, shape of the defect and the proximity to free margins a xenograft was deemed most appropriate to repair the remaining defect.  The graft was trimmed to fit the size of the remaining defect.  The graft was then placed in the primary defect, oriented appropriately, and sutured into place.
Alar Island Pedicle Flap Text: The defect edges were debeveled with a #15 scalpel blade.  Given the location of the defect, shape of the defect and the proximity to the alar rim an island pedicle advancement flap was deemed most appropriate.  Using a sterile surgical marker, an appropriate advancement flap was drawn incorporating the defect, outlining the appropriate donor tissue and placing the expected incisions within the nasal ala running parallel to the alar rim. The area thus outlined was incised with a #15 scalpel blade.  The skin margins were undermined minimally to an appropriate distance in all directions around the primary defect and laterally outward around the island pedicle utilizing iris scissors.  There was minimal undermining beneath the pedicle flap.
Estimated Blood Loss (Cc): minimal
O-L Flap Text: The defect edges were debeveled with a #15 scalpel blade.  Given the location of the defect, shape of the defect and the proximity to free margins an O-L flap was deemed most appropriate.  Using a sterile surgical marker, an appropriate advancement flap was drawn incorporating the defect and placing the expected incisions within the relaxed skin tension lines where possible.    The area thus outlined was incised deep to adipose tissue with a #15 scalpel blade.  The skin margins were undermined to an appropriate distance in all directions utilizing iris scissors.
Path Notes (To The Dermatopathologist): Please check margins.
Complex Repair And Rhombic Flap Text: The defect edges were debeveled with a #15 scalpel blade.  The primary defect was closed partially with a complex linear closure.  Given the location of the remaining defect, shape of the defect and the proximity to free margins a rhombic flap was deemed most appropriate for complete closure of the defect.  Using a sterile surgical marker, an appropriate advancement flap was drawn incorporating the defect and placing the expected incisions within the relaxed skin tension lines where possible.    The area thus outlined was incised deep to adipose tissue with a #15 scalpel blade.  The skin margins were undermined to an appropriate distance in all directions utilizing iris scissors.
Scalpel Size: 15 blade
Deep Sutures: 3-0 Vicryl
Helical Rim Advancement Flap Text: The defect edges were debeveled with a #15 blade scalpel.  Given the location of the defect and the proximity to free margins (helical rim) a double helical rim advancement flap was deemed most appropriate.  Using a sterile surgical marker, the appropriate advancement flaps were drawn incorporating the defect and placing the expected incisions between the helical rim and antihelix where possible.  The area thus outlined was incised through and through with a #15 scalpel blade.  With a skin hook and iris scissors, the flaps were gently and sharply undermined and freed up.
Purse String (Intermediate) Text: Given the location of the defect and the characteristics of the surrounding skin a purse string intermediate closure was deemed most appropriate.  Undermining was performed circumfirentially around the surgical defect.  A purse string suture was then placed and tightened.
Pre-Excision Curettage Text (Leave Blank If You Do Not Want): Prior to drawing the surgical margin the visible lesion was removed with electrodesiccation and curettage to clearly define the lesion size.
Dermal Autograft Text: The defect edges were debeveled with a #15 scalpel blade.  Given the location of the defect, shape of the defect and the proximity to free margins a dermal autograft was deemed most appropriate.  Using a sterile surgical marker, the primary defect shape was transferred to the donor site. The area thus outlined was incised deep to adipose tissue with a #15 scalpel blade.  The harvested graft was then trimmed of adipose and epidermal tissue until only dermis was left.  The skin graft was then placed in the primary defect and oriented appropriately.
Complex Repair And Z Plasty Text: The defect edges were debeveled with a #15 scalpel blade.  The primary defect was closed partially with a complex linear closure.  Given the location of the remaining defect, shape of the defect and the proximity to free margins a Z plasty was deemed most appropriate for complete closure of the defect.  Using a sterile surgical marker, an appropriate advancement flap was drawn incorporating the defect and placing the expected incisions within the relaxed skin tension lines where possible.    The area thus outlined was incised deep to adipose tissue with a #15 scalpel blade.  The skin margins were undermined to an appropriate distance in all directions utilizing iris scissors.
Burow's Advancement Flap Text: The defect edges were debeveled with a #15 scalpel blade.  Given the location of the defect and the proximity to free margins a Burow's advancement flap was deemed most appropriate.  Using a sterile surgical marker, the appropriate advancement flap was drawn incorporating the defect and placing the expected incisions within the relaxed skin tension lines where possible.    The area thus outlined was incised deep to adipose tissue with a #15 scalpel blade.  The skin margins were undermined to an appropriate distance in all directions utilizing iris scissors.
Interpolation Flap Text: A decision was made to reconstruct the defect utilizing an interpolation axial flap and a staged reconstruction.  A telfa template was made of the defect.  This telfa template was then used to outline the interpolation flap.  The donor area for the pedicle flap was then injected with anesthesia.  The flap was excised through the skin and subcutaneous tissue down to the layer of the underlying musculature.  The interpolation flap was carefully excised within this deep plane to maintain its blood supply.  The edges of the donor site were undermined.   The donor site was closed in a primary fashion.  The pedicle was then rotated into position and sutured.  Once the tube was sutured into place, adequate blood supply was confirmed with blanching and refill.  The pedicle was then wrapped with xeroform gauze and dressed appropriately with a telfa and gauze bandage to ensure continued blood supply and protect the attached pedicle.
Spiral Flap Text: The defect edges were debeveled with a #15 scalpel blade.  Given the location of the defect, shape of the defect and the proximity to free margins a spiral flap was deemed most appropriate.  Using a sterile surgical marker, an appropriate rotation flap was drawn incorporating the defect and placing the expected incisions within the relaxed skin tension lines where possible. The area thus outlined was incised deep to adipose tissue with a #15 scalpel blade.  The skin margins were undermined to an appropriate distance in all directions utilizing iris scissors.
Anesthesia Type: 1% lidocaine with 1:200,000 epinephrine and a 1:10 solution of 8.4% sodium bicarbonate
Composite Graft Text: The defect edges were debeveled with a #15 scalpel blade.  Given the location of the defect, shape of the defect, the proximity to free margins and the fact the defect was full thickness a composite graft was deemed most appropriate.  The defect was outline and then transferred to the donor site.  A full thickness graft was then excised from the donor site. The graft was then placed in the primary defect, oriented appropriately and then sutured into place.  The secondary defect was then repaired using a primary closure.
Complex Repair And Single Advancement Flap Text: The defect edges were debeveled with a #15 scalpel blade.  The primary defect was closed partially with a complex linear closure.  Given the location of the remaining defect, shape of the defect and the proximity to free margins a single advancement flap was deemed most appropriate for complete closure of the defect.  Using a sterile surgical marker, an appropriate advancement flap was drawn incorporating the defect and placing the expected incisions within the relaxed skin tension lines where possible.    The area thus outlined was incised deep to adipose tissue with a #15 scalpel blade.  The skin margins were undermined to an appropriate distance in all directions utilizing iris scissors.
Keystone Flap Text: The defect edges were debeveled with a #15 scalpel blade.  Given the location of the defect, shape of the defect a keystone flap was deemed most appropriate.  Using a sterile surgical marker, an appropriate keystone flap was drawn incorporating the defect, outlining the appropriate donor tissue and placing the expected incisions within the relaxed skin tension lines where possible. The area thus outlined was incised deep to adipose tissue with a #15 scalpel blade.  The skin margins were undermined to an appropriate distance in all directions around the primary defect and laterally outward around the flap utilizing iris scissors.

## 2018-04-26 ENCOUNTER — APPOINTMENT (RX ONLY)
Dept: URBAN - METROPOLITAN AREA CLINIC 4 | Facility: CLINIC | Age: 78
Setting detail: DERMATOLOGY
End: 2018-04-26

## 2018-04-26 DIAGNOSIS — Z48.02 ENCOUNTER FOR REMOVAL OF SUTURES: ICD-10-CM

## 2018-04-26 PROCEDURE — ? COUNSELING

## 2018-04-26 PROCEDURE — 99212 OFFICE O/P EST SF 10 MIN: CPT

## 2018-04-26 ASSESSMENT — LOCATION SIMPLE DESCRIPTION DERM: LOCATION SIMPLE: RIGHT POSTERIOR UPPER ARM

## 2018-04-26 ASSESSMENT — LOCATION ZONE DERM: LOCATION ZONE: ARM

## 2018-04-26 ASSESSMENT — LOCATION DETAILED DESCRIPTION DERM: LOCATION DETAILED: RIGHT DISTAL POSTERIOR UPPER ARM

## 2018-06-22 ENCOUNTER — OFFICE VISIT (OUTPATIENT)
Dept: PULMONOLOGY | Facility: HOSPICE | Age: 78
End: 2018-06-22
Payer: MEDICARE

## 2018-06-22 VITALS
BODY MASS INDEX: 34.75 KG/M2 | HEART RATE: 75 BPM | RESPIRATION RATE: 16 BRPM | HEIGHT: 60 IN | SYSTOLIC BLOOD PRESSURE: 122 MMHG | DIASTOLIC BLOOD PRESSURE: 82 MMHG | OXYGEN SATURATION: 93 % | TEMPERATURE: 97.2 F | WEIGHT: 177 LBS

## 2018-06-22 DIAGNOSIS — E66.9 OBESITY, CLASS I, BMI 30-34.9: ICD-10-CM

## 2018-06-22 DIAGNOSIS — I27.20 PULMONARY HTN (HCC): ICD-10-CM

## 2018-06-22 DIAGNOSIS — G47.33 OSA (OBSTRUCTIVE SLEEP APNEA): ICD-10-CM

## 2018-06-22 DIAGNOSIS — J44.9 CHRONIC OBSTRUCTIVE PULMONARY DISEASE, UNSPECIFIED COPD TYPE (HCC): ICD-10-CM

## 2018-06-22 PROCEDURE — 99214 OFFICE O/P EST MOD 30 MIN: CPT | Performed by: NURSE PRACTITIONER

## 2018-06-22 RX ORDER — METHYLPREDNISOLONE 4 MG/1
TABLET ORAL
Qty: 21 TAB | Refills: 0 | Status: SHIPPED | OUTPATIENT
Start: 2018-06-22 | End: 2018-08-06

## 2018-06-22 RX ORDER — AZITHROMYCIN 250 MG/1
TABLET, FILM COATED ORAL
Qty: 6 TAB | Refills: 0 | Status: SHIPPED | OUTPATIENT
Start: 2018-06-22 | End: 2018-08-06

## 2018-06-22 NOTE — PATIENT INSTRUCTIONS
1) Change BIPAP to ST with back up rate of 12 - 12/7cmH20  2) Clean mask and supplies weekly and change them as insurance allows  3) Continue using Incruse and rescue inhaler as needed  4) Vaccines: Up to date with Prevnar 13, Pneumovax 23, flu  5) Continue weekly exercise   6) Zpack and Medrol pack on hand for exacerbation   7) Return in about 2 months (around 8/22/2018), or 2 m.o. for sleep and 6 m.o. for pulmonary, for follow up with DOMINIQUE Sims, pulmonary function results, Compliance, review of symptoms, if not sooner.

## 2018-06-22 NOTE — PROGRESS NOTES
CC:  Here for f/u sleep and pulmonary issues as listed below    HPI:   Maddy presents today for follow up for COPD with hx of pulmonary HTN and ALISSA.      PFTs from 3/2017 indicate a Fev1 of 1.28L or 72% predicted with a mild bronchodilator response, Fev1/FVC ratio of 67, DLCO 83%. For further workup of pulmonary HTN an echo was completed without significant change since 2016 study noting estimated EF of 65%, RVSP of 50mmHg. CTA study completed 9/2017 was negative for PE. A 6 minute walk completed in 2017 did not show desaturation with walking but did show increase of heart rate from 77 at rest up to 100 with exertion. She has been increasing her activity level with improved dyspnea. She has been bicycling and walking. BMI increased to 34.      She is compliant using Incruse and rare use of rescue. Symbicort caused hoarseness, which continues to wax and wane. She does have a history of choking with dry food. She was offered referral to ENT for further evaluation but she declines at this time. Last OV 4/2018 she was provided zpack and medrol pack for exacerbation with benefit. .She denies wheeze, hemoptysis, chest pain or chest tightness, fever or chills, unintentional weight loss, acid reflux.     PSG from 6/2017 indicated an AHI of 5.5 and low oxygenation of 81%.  She completed a titration study for potential ASV therapy given elevated AHI and use of chronic pain medication.  She was then switched from CPAP to BiPAP given her central apneas resolved with BiPAP therapy.  Currently she is being treated with BIPAP @ 12/7cmH20.      Average pressure is 8.1 with a high of 12.5. She does tolerate pressure and mask well.  She wakes up refreshed and is less tired throughout the day. She denies morning H/A and sleep better overall. She will continue to clean supplies weekly and change them as insurance allows. Compliance download from the dates 5/23/2018 - 6/21/2018 indicates she is wearing the device 100% for an avg of 7  "hours and 40 minutes per night with a reduced AHI of 51.5.  She tolerates pressure and mask well.  She feels that she sleeps better overall waking more refreshed and less tired.  Denies morning headaches.          Patient Active Problem List    Diagnosis Date Noted   • CKD (chronic kidney disease) stage 3, GFR 30-59 ml/min 05/23/2016     Priority: Medium   • Esophageal dysphagia 05/19/2016     Priority: Medium   • Hypothyroidism due to Hashimoto's thyroiditis      Priority: Medium   • GERD (gastroesophageal reflux disease) 09/20/2011     Priority: Medium   • Essential hypertension 07/06/2009     Priority: Medium   • Major depressive disorder, recurrent episode, mild (CMS-HCC) 05/02/2016     Priority: Low   • Neuropathy (CMS-HCC) 10/17/2013     Priority: Low   • Family history of polycystic kidney disease      Priority: Low   • Facet arthritis of lumbar region ([facility]) 03/26/2012     Priority: Low   • History of vertebral fracture 03/26/2012     Priority: Low   • Spinal stenosis of lumbar region with neurogenic claudication      Priority: Low   • Chronic obstructive pulmonary disease ([facility]) 06/22/2018   • BMI 34.0-34.9,adult 04/11/2018   • Pulmonary HTN ([facility]) 02/21/2018   • Obesity, Class I, BMI 30-34.9    • COPD with asthma ([facility]) 11/10/2017   • ALISSA (obstructive sleep apnea) 11/10/2017   • Postlaminectomy syndrome, unspecified region 07/03/2017   • Recurrent UTI 06/28/2017   • 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    • Arthritis     facet arthritis of lumbar region   • Asthma     Inhaler use daily.   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 Albrecht, attaches to plastic, prulent   • Depression     and anxiety   • Encounter for long-term (current) use of other medications    • Erosive gastritis     antral   • Family history of polycystic kidney disease    • GERD (gastroesophageal reflux disease)    • Kiswahili measles    • Hypertension    • Hypothyroidism    • Influenza    • Lumbar vertebral fracture (HCC) 2011   • Mumps    • Muscle disorder     Arachnoiditis   • Neuropathy (HCC)    • Obesity, Class I, BMI 30-34.9    • OSTEOPOROSIS    • Pain 16    back and legs    • Renal disorder    • Sleep apnea     Recently diagnosed with sleep apnea waiting to see pulmonary Dr.   • Spinal stenosis, lumbar region, without neurogenic claudication    • Tonsillitis    • Urinary bladder disorder 2016    Currently has a catheter.       Past Surgical History:   Procedure Laterality Date   • SPINAL CORD STIMULATOR N/A 7/3/2017    Procedure: SPINAL CORD STIMULATOR FOR LEAD REMOVAL;  Surgeon: Amandeep Gonzalez D.O.;  Location: SURGERY [facility];  Service:    • GASTROSCOPY WITH BALLOON DILATATION N/A 2016    Procedure: GASTROSCOPY WITH DILATATION;  Surgeon: Tony Monae M.D.;  Location: SURGERY [facility];  Service:    • SPINAL CORD STIMULATOR  14   • EGD WITH ASP/BX  09    erosive gastritis   • HYSTERECTOMY, TOTAL ABDOMINAL     • APPENDECTOMY     • CATARACT EXTRACTION WITH IOL Bilateral    • COLONOSCOPY  ,09    normal   • HYSTERECTOMY LAPAROSCOPY     • LUMBAR LAMINECTOMY DISKECTOMY     • TONSILLECTOMY         Family History   Problem Relation Age of Onset   • Genitourinary () Brother    • Lung Disease Mother      COPD   • Heart Disease Mother    • Genetic Father      Parkinsons/ from complications   • Hypertension Father    • Cancer Maternal Aunt       Breast cancer   • Stroke Paternal Grandmother    • Cancer Maternal Aunt      Breast cancer       Social History     Social History   • Marital status:      Spouse name: N/A   • Number of children: N/A   • Years of education: N/A     Occupational History   • Not on file.     Social History Main Topics   • Smoking status: Never Smoker   • Smokeless tobacco: Never Used   • Alcohol use No   • Drug use: No   • Sexual activity: No     Other Topics Concern   • Stress Concern No      cancer     Social History Narrative   • No narrative on file       Current Outpatient Prescriptions   Medication Sig Dispense Refill   • MethylPREDNISolone (MEDROL DOSEPAK) 4 MG Tablet Therapy Pack Take as directed. 21 Tab 0   • azithromycin (ZITHROMAX) 250 MG Tab Take 2 tablets on day 1, then take 1 tablet a day for 4 days. 6 Tab 0   • levothyroxine (SYNTHROID) 50 MCG Tab TAKE ONE TABLET BY MOUTH IN THE MORNING ON AN EMPTY STOMACH 90 Tab 3   • pregabalin (LYRICA) 150 MG Cap Take 200 mg by mouth 3 times a day.     • valsartan (DIOVAN) 80 MG Tab Take 1 Tab by mouth every day. 90 Tab 3   • imipramine (TOFRANIL) 10 MG Tab Take 2 Tabs by mouth every evening. (Patient taking differently: Take 10 mg by mouth every evening.) 180 Tab 2   • duloxetine (CYMBALTA) 60 MG Cap DR Particles delayed-release capsule TAKE ONE CAPSULE BY MOUTH ONCE DAILY (Patient taking differently: Take 60 mg by mouth every day. TAKE ONE CAPSULE BY MOUTH ONCE DAILY) 90 Cap 2   • AMITIZA 24 MCG capsule Take 24 mcg by mouth every morning with breakfast.     • Umeclidinium Bromide (INCRUSE ELLIPTA) 62.5 MCG/INH AEROSOL POWDER, BREATH ACTIVATED Inhale 1 Puff by mouth every day. 1 Each 11   • estradiol (ESTRACE) 0.5 MG tablet TAKE ONE TABLET BY MOUTH ONCE DAILY 90 Tab 3   • XTAMPZA ER 18 MG Capsule Extended Release 12 hour Abuse-Deterrent Take 18 mg by mouth 2 Times a Day.     • Cyanocobalamin 2500 MCG Chew Tab Take 1 Tab by mouth every day.     • omeprazole  (PRILOSEC) 20 MG delayed-release capsule Take 1 Cap by mouth every day. 90 Cap 3   • aspirin EC (ECOTRIN) 81 MG Tablet Delayed Response Take 81 mg by mouth every day.     • oxycodone-acetaminophen (PERCOCET) 7.5-325 MG per tablet Take 1-2 Tabs by mouth every four hours as needed (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 She is seen 2/14/14, do not fill until 4/11/14 180 Each 0   • docusate sodium (COLACE) 250 MG capsule Take 250 mg by mouth every day.     • Diclofenac Sodium 1 % Gel      • Multiple Vitamins-Minerals (VISIVITES/LUTEIN) Tab Take  by mouth.     • polyethylene glycol/lytes (MIRALAX) Pack Take 17 g by mouth every day.     • furosemide (LASIX) 20 MG Tab Take 20 mg by mouth every day. As needed for edema     • potassium chloride (KLOR-CON) 20 MEQ Pack Take 20 mEq by mouth every day. As needed with furosemide     • VENTOLIN  (90 BASE) MCG/ACT Aero Soln inhalation aerosol Inhale 2 Puffs by mouth every 6 hours as needed for Shortness of Breath. 1 Inhaler 5   • Probiotic Product ([employer]) CAPS Take 1 Cap by mouth 2 Times a Day.       No current facility-administered medications for this visit.           Allergies: Pcn [penicillins]; Sulfa drugs; Tape; Macrobid [nitrofurantoin]; and Zoloft      ROS   Gen: Denies fever, chills, unintentional weight loss, fatigue, night sweats  E/N/T: Denies ear pain, nasal congestion  Resp:Denies Dyspnea, wheezing, cough, sputum production, hemoptysis  CV: Denies chest pain, chest tightness, palpitations, BLE edema  Sleep:Denies morning headache, insomnia, daytime somnolence, snoring, gasping for air, apnea  Neuro: Denies frequent headaches, weakness, dizziness  GI: Denies N/V, acid reflux/heartburn  See HPI.  All other systems reviewed and negative      Vital signs for this encounter:  Vitals:    06/22/18 0946   Height: 1.524 m (5')   Weight: 80.3 kg (177 lb)   Weight % change since last entry.: 0 %   BP: 122/82   Pulse: 75   BMI  (Calculated): 34.57   Resp: 16   Temp: 36.2 °C (97.2 °F)   O2 sat % room air: 93 %                 Physical Exam:   Appearance: well developed, well nourished, no acute distress.  Eyes: PERRL, EOM intact, sclere white, conjunctiva moist.  Ears: no lesions or deformities.  Hearing: grossly intact.  Nose: no lesions or deformities.  Dentition: good dentition.  Oropharynx: tongue normal, posterior pharynx without erythema or exudate.  Neck: supple, trachea midline, no masses.  Respiratory effort: no intercostal retractions or use of accessory muscles.  Lung auscultation: Bilateral diminished   Heart auscultation: no murmur, rub, or gallop.   Extremities: no cyanosis or edema.  Abdomen: soft, non-tender, no masses.  Gait and station: grossly normal   Digits and Nails: no clubbing, cyanosis, petechiae, or nodes.  Cranial nerves: grossly normal.  Motor: no focal deficits observed.  Skin: no rashes, lesions, or ulcers noted.  Orientation: oriented to time, place, and person.  Mood and affect: mood and affect appropriate, normal interaction with examiner.    Assessment   1. ALISSA (obstructive sleep apnea)  DME OTHER   2. Chronic obstructive pulmonary disease, unspecified COPD type (HCC)  AMB PULMONARY FUNCTION TEST/LAB   3. BMI 34.0-34.9,adult     4. Obesity, Class I, BMI 30-34.9     5. Pulmonary HTN (HCC)         Patient was seen for 25 minutes, more than 50% of time spent in face to face review, counseling, and arranging future evaluation and follow up of medical conditions and care related to COPD and ALISSA. Patient is clinically stable and will proceed with following plan.  We will change her BiPAP to a BiPAP ST with backup rate to help overcome elevated AHI as originally recommended. Her pain medications have recently been increased, which could have some relation to elevated AHI, but will go ahead and make changes to the modality of machine first.     PLAN:   Patient Instructions   1) Change BIPAP to ST with back up rate  of 12 - 12/7cmH20  2) Clean mask and supplies weekly and change them as insurance allows  3) Continue using Incruse and rescue inhaler as needed  4) Vaccines: Up to date with Prevnar 13, Pneumovax 23, flu  5) Continue weekly exercise   6) Zpack and Medrol pack on hand for exacerbation   7) Return in about 2 months (around 8/22/2018), or 2 m.o. for sleep and 6 m.o. for pulmonary, for follow up with DOMINIQUE Sims, pulmonary function results, Compliance, review of symptoms, if not sooner.

## 2018-06-26 ENCOUNTER — TELEPHONE (OUTPATIENT)
Dept: PULMONOLOGY | Facility: HOSPICE | Age: 78
End: 2018-06-26

## 2018-06-26 DIAGNOSIS — G47.33 OBSTRUCTIVE SLEEP APNEA: ICD-10-CM

## 2018-06-26 NOTE — TELEPHONE ENCOUNTER
Patient was incorrectly ordered a BiPAP instead of a BiPAP ST.  Milvia from [facility] advised the machine dispensed could be changed to ST settings.  We were informed today that this was in fact not an option and the machine does not have ST capabilities.  I am waiting for response from [facility] of what the next step needs to be

## 2018-06-26 NOTE — TELEPHONE ENCOUNTER
Vanita has advised that as long as the SS shows patient should be on ST they are willing to switch the patients machine    Please sign updated order

## 2018-08-06 ENCOUNTER — TELEPHONE (OUTPATIENT)
Dept: MEDICAL GROUP | Facility: MEDICAL CENTER | Age: 78
End: 2018-08-06

## 2018-08-06 DIAGNOSIS — I10 ESSENTIAL HYPERTENSION: ICD-10-CM

## 2018-08-06 RX ORDER — IRBESARTAN 75 MG/1
75 TABLET ORAL DAILY
Qty: 90 TAB | Refills: 3 | Status: SHIPPED | OUTPATIENT
Start: 2018-08-06 | End: 2019-04-10

## 2018-08-15 ENCOUNTER — OFFICE VISIT (OUTPATIENT)
Dept: MEDICAL GROUP | Facility: MEDICAL CENTER | Age: 78
End: 2018-08-15
Payer: MEDICARE

## 2018-08-15 VITALS
HEIGHT: 60 IN | RESPIRATION RATE: 12 BRPM | OXYGEN SATURATION: 94 % | BODY MASS INDEX: 35.66 KG/M2 | HEART RATE: 67 BPM | TEMPERATURE: 97.3 F | DIASTOLIC BLOOD PRESSURE: 76 MMHG | WEIGHT: 181.66 LBS | SYSTOLIC BLOOD PRESSURE: 128 MMHG

## 2018-08-15 DIAGNOSIS — N18.30 CKD (CHRONIC KIDNEY DISEASE) STAGE 3, GFR 30-59 ML/MIN (HCC): ICD-10-CM

## 2018-08-15 DIAGNOSIS — R60.9 PERIPHERAL EDEMA: ICD-10-CM

## 2018-08-15 DIAGNOSIS — E78.5 DYSLIPIDEMIA, GOAL LDL BELOW 130: ICD-10-CM

## 2018-08-15 DIAGNOSIS — F43.22 ADJUSTMENT DISORDER WITH ANXIETY: ICD-10-CM

## 2018-08-15 DIAGNOSIS — K21.00 GASTROESOPHAGEAL REFLUX DISEASE WITH ESOPHAGITIS: ICD-10-CM

## 2018-08-15 DIAGNOSIS — Z23 NEED FOR SHINGLES VACCINE: ICD-10-CM

## 2018-08-15 DIAGNOSIS — E66.9 OBESITY, CLASS II, BMI 35-39.9: ICD-10-CM

## 2018-08-15 DIAGNOSIS — I10 ESSENTIAL HYPERTENSION: ICD-10-CM

## 2018-08-15 DIAGNOSIS — E06.3 HYPOTHYROIDISM DUE TO HASHIMOTO'S THYROIDITIS: ICD-10-CM

## 2018-08-15 DIAGNOSIS — E03.8 HYPOTHYROIDISM DUE TO HASHIMOTO'S THYROIDITIS: ICD-10-CM

## 2018-08-15 DIAGNOSIS — M85.89 OSTEOPENIA OF MULTIPLE SITES: ICD-10-CM

## 2018-08-15 PROBLEM — E66.812 OBESITY, CLASS II, BMI 35-39.9: Status: ACTIVE | Noted: 2018-04-11

## 2018-08-15 PROCEDURE — 99214 OFFICE O/P EST MOD 30 MIN: CPT | Performed by: FAMILY MEDICINE

## 2018-08-15 RX ORDER — FUROSEMIDE 20 MG/1
20 TABLET ORAL DAILY
Qty: 90 TAB | Refills: 3 | Status: SHIPPED | OUTPATIENT
Start: 2018-08-15 | End: 2018-12-10

## 2018-08-15 RX ORDER — DIAZEPAM 5 MG/1
5 TABLET ORAL EVERY 8 HOURS PRN
Qty: 60 TAB | Refills: 2 | Status: SHIPPED | OUTPATIENT
Start: 2018-08-15 | End: 2018-12-10 | Stop reason: SDUPTHER

## 2018-08-15 RX ORDER — POTASSIUM CHLORIDE 20 MEQ/1
20 TABLET, EXTENDED RELEASE ORAL 2 TIMES DAILY
Qty: 90 TAB | Refills: 3 | Status: SHIPPED | OUTPATIENT
Start: 2018-08-15 | End: 2018-12-10

## 2018-08-15 NOTE — PROGRESS NOTES
Chief Complaint   Patient presents with   • Edema   • Anxiety   • Obesity   • Hypertension   • Hyperlipidemia       Subjective:     HPI:   Maddy Hodge presents today with the followin. Peripheral edema  This is a long-standing problem.  She feels it has been better overall but with the heat.  Edema is beginning to be a problem again.  She had spoken to her friend who suggested hydrochlorothiazide.  I explained to the patient that unless she took this every day I did not think this would be sufficient for her edema.  She prefers to continue to take the furosemide as needed only.  Discussed that the potassium is necessary to take along with the furosemide.  Discussed strategies of dissolving the potassium and water.  These medications are renewed.  She does need follow-up lab testing and orders are discussed and placed.    2. CKD (chronic kidney disease) stage 3, GFR 30-59 ml/min  Patient has long-standing chronic kidney disease, stage 3  Patient states has been keeping well hydrated and has been trying to walk daily. The patient avoids non-steroidal anti-inflammatory medications.  Patient is due for recheck on her labs, ordered today.    3. Essential hypertension  HTN - Chronic condition stable. Currently taking all meds as directed.   She is taking baby aspirin daily.   She is monitoring BP at home.  Generally once or twice a week.  It has been consistently 120s over 70s.  Denies symptoms low BP: light-headed, tunnel-vision, unusual fatigue.   Denies symptoms high BP:pounding headache, visual changes, palpitations, flushed face.   Denies medicine side effects: unusual fatigue, slow heartbeat, foot/leg swelling, cough.  Lab orders are discussed and placed    4. Need for shingles vaccine  She will enquire at her pharmacy    5. Adjustment disorder with anxiety  She has chronic anxiety.  This is often quite debilitating.  She feels it has improved since she began the sleep apnea treatment.  She uses  diazepam for rescue when she gets anxious.  She typically takes one half tablet at a time.  Review of board of pharmacy interface shows that she can use this occasionally only.  No adverse events have been noted.  This has been discussed with her pain management specialist and has been approved as long as she stays at very low dose episodically.    6. Hypothyroidism due to Hashimoto's thyroiditis  Patient reports good energy level on the medication. Patient denies insomnia, tremor or change in appetite.  Patient is taking the medication on an empty stomach in the morning and waiting at least 30 minutes before eating.  Last TSH in 2016 was at target.  She is due for further labs, orders discussed and placed.    7. Obesity, Class II, BMI 35-39.9  Patient continues to gain weight gradually.  This is discussed with the patient.  Thyroid will be checked but I think the majority of the problem is too much food into little exercise.  This is discussed at length.    8. Dyslipidemia, goal below 130  Patient denies chest pain, chest pressure, palpitations or exertional shortness of breath. Patient is not on lipid-lowering medication, lipids have been slightly elevated only, good risk ratios. Patient is a never smoker. Patient takes 81 mg aspirin daily. Patient has no history of myocardial infarction, stroke or PVD.  The problem is clinically stable.    9. Gastroesophageal reflux disease with esophagitis  She feels the current medication regimen of omeprazole is controlling the gastroesophageal reflux symptoms well. Denies dysphagia, reflux symptoms, acidity, abdominal pain or visible blood or mucus in the stool. Denies vomiting or hematemesis. Denies burping or abdominal bloating. Patient avoids nonsteroidal anti-inflammatory drugs. Avoids heavy meals or eating within 2 hours of bedtime.    10. Osteopenia of multiple sites  Discussed that she needs to continue vitamin D supplementation.  She is also due for a bone density  test soon, order discussed and placed.  She also needs a recheck of her vitamin D level.        Patient Active Problem List    Diagnosis Date Noted   • CKD (chronic kidney disease) stage 3, GFR 30-59 ml/min 05/23/2016     Priority: Medium   • Esophageal dysphagia 05/19/2016     Priority: Medium   • Hypothyroidism due to Hashimoto's thyroiditis      Priority: Medium   • GERD (gastroesophageal reflux disease) 09/20/2011     Priority: Medium   • Essential hypertension 07/06/2009     Priority: Medium   • Major depressive disorder, recurrent episode, mild (CMS-HCC) 05/02/2016     Priority: Low   • Neuropathy (CMS-HCC) 10/17/2013     Priority: Low   • Family history of polycystic kidney disease      Priority: Low   • Facet arthritis of lumbar region ([facility]) 03/26/2012     Priority: Low   • History of vertebral fracture 03/26/2012     Priority: Low   • Spinal stenosis of lumbar region with neurogenic claudication      Priority: Low   • Dyslipidemia, goal LDL below 130 08/15/2018   • Chronic obstructive pulmonary disease ([facility]) 06/22/2018   • Obesity, Class II, BMI 35-39.9 04/11/2018   • Pulmonary HTN ([facility]) 02/21/2018   • COPD with asthma ([facility]) 11/10/2017   • ALISSA (obstructive sleep apnea) 11/10/2017   • Postlaminectomy syndrome, unspecified region 07/03/2017   • Recurrent UTI 06/28/2017   • 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 90 Tab 3   • potassium chloride SA (KDUR) 20 MEQ Tab CR Take 1 Tab by mouth 2 times a day. 90 Tab 3   • diazePAM (VALIUM) 5 MG Tab Take 1 Tab by mouth every 8 hours as needed for Anxiety for up to 90 days. 60 Tab 2   • irbesartan (AVAPRO) 75 MG tablet Take 1 Tab by mouth every day.  90 Tab 3   • levothyroxine (SYNTHROID) 50 MCG Tab TAKE ONE TABLET BY MOUTH IN THE MORNING ON AN EMPTY STOMACH 90 Tab 3   • pregabalin (LYRICA) 150 MG Cap Take 200 mg by mouth 3 times a day.     • imipramine (TOFRANIL) 10 MG Tab Take 2 Tabs by mouth every evening. (Patient taking differently: Take 10 mg by mouth every evening.) 180 Tab 2   • duloxetine (CYMBALTA) 60 MG Cap DR Particles delayed-release capsule TAKE ONE CAPSULE BY MOUTH ONCE DAILY (Patient taking differently: Take 60 mg by mouth every day. TAKE ONE CAPSULE BY MOUTH ONCE DAILY) 90 Cap 2   • AMITIZA 24 MCG capsule Take 24 mcg by mouth every morning with breakfast.     • Umeclidinium Bromide (INCRUSE ELLIPTA) 62.5 MCG/INH AEROSOL POWDER, BREATH ACTIVATED Inhale 1 Puff by mouth every day. 1 Each 11   • estradiol (ESTRACE) 0.5 MG tablet TAKE ONE TABLET BY MOUTH ONCE DAILY 90 Tab 3   • polyethylene glycol/lytes (MIRALAX) Pack Take 17 g by mouth every day.     • XTAMPZA ER 18 MG Capsule Extended Release 12 hour Abuse-Deterrent Take 18 mg by mouth 2 Times a Day.     • Cyanocobalamin 2500 MCG Chew Tab Take 1 Tab by mouth every day.     • omeprazole (PRILOSEC) 20 MG delayed-release capsule Take 1 Cap by mouth every day. 90 Cap 3   • VENTOLIN  (90 BASE) MCG/ACT Aero Soln inhalation aerosol Inhale 2 Puffs by mouth every 6 hours as needed for Shortness of Breath. 1 Inhaler 5   • aspirin EC (ECOTRIN) 81 MG Tablet Delayed Response Take 81 mg by mouth every day.     • docusate sodium (COLACE) 250 MG capsule Take 250 mg by mouth every day.     • Probiotic Product ([employer]) CAPS Take 1 Cap by mouth 2 Times a Day.       No current facility-administered medications for this visit.        Allergies   Allergen Reactions   • Pcn [Penicillins]    • Sulfa Drugs    • Tape    • Macrobid [Nitrofurantoin] Rash     rash   • Zoloft Unspecified     Worse depression       ROS: As per HPI       Objective:     Blood pressure 128/76, pulse 67, temperature 36.3 °C  (97.3 °F), resp. rate 12, height 1.524 m (5'), weight 82.4 kg (181 lb 10.5 oz), SpO2 94 %, not currently breastfeeding. Body mass index is 35.48 kg/m².    Physical Exam:  Constitutional: Well-developed and well-nourished. Not diaphoretic. No distress. Lucid and fluent.  Skin: Skin is warm and dry. No rash noted.  Head: Atraumatic without lesions.  Eyes: Conjunctivae and extraocular motions are normal. Pupils are equal, round, and reactive to light. No scleral icterus.   Ears:  External ears unremarkable. Tympanic membranes clear and intact.  Nose: Nares patent. Mucosa without edema or erythema. No discharge. No facial tenderness.  Mouth/Throat: Tongue normal. Oropharynx is clear and moist. Posterior pharynx without erythema or exudates.  Neck: Supple, trachea midline. No thyromegaly present. No cervical or supraclavicular lymphadenopathy. No JVD or carotid bruits appreciated  Cardiovascular: Regular rate and rhythm.  Normal S1, S2 without murmur appreciated.  Chest: Effort normal. Clear to auscultation throughout. No adventitious sounds.   Abdomen: Soft, non tender, and without distention. Active bowel sounds in all four quadrants. No rebound, guarding, masses or hepatosplenomegaly.  Extremities: No cyanosis, clubbing, erythema, nor edema.   Neurological: Alert and oriented x 3.  Tremor appears to be slightly worse.  Gait is mildly unsteady but actually better than it has been in the past.  Psychiatric:  Behavior, mood, and affect are appropriate.       Assessment and Plan:     78 y.o. female with the following issues:    1. Peripheral edema  furosemide (LASIX) 20 MG Tab    potassium chloride SA (KDUR) 20 MEQ Tab CR   2. CKD (chronic kidney disease) stage 3, GFR 30-59 ml/min  COMP METABOLIC PANEL    CBC WITHOUT DIFFERENTIAL   3. Essential hypertension  COMP METABOLIC PANEL    LIPID PROFILE    CBC WITHOUT DIFFERENTIAL   4. Need for shingles vaccine     5. Adjustment disorder with anxiety  diazePAM (VALIUM) 5 MG Tab    6. Hypothyroidism due to Hashimoto's thyroiditis  THYROID PEROXIDASE  (TPO) AB    TSH   7. Obesity, Class II, BMI 35-39.9     8. Dyslipidemia, goal LDL below 130  COMP METABOLIC PANEL    LIPID PROFILE   9. Gastroesophageal reflux disease with esophagitis  CBC WITHOUT DIFFERENTIAL   10. Osteopenia of multiple sites  VITAMIN D,25 HYDROXY    DS-BONE DENSITY STUDY (DEXA)         Followup: Return in about 4 months (around 12/15/2018), or if symptoms worsen or fail to improve.

## 2018-08-16 ENCOUNTER — APPOINTMENT (RX ONLY)
Dept: URBAN - METROPOLITAN AREA CLINIC 4 | Facility: CLINIC | Age: 78
Setting detail: DERMATOLOGY
End: 2018-08-16

## 2018-08-16 DIAGNOSIS — D18.0 HEMANGIOMA: ICD-10-CM

## 2018-08-16 DIAGNOSIS — L90.5 SCAR CONDITIONS AND FIBROSIS OF SKIN: ICD-10-CM

## 2018-08-16 DIAGNOSIS — L57.0 ACTINIC KERATOSIS: ICD-10-CM

## 2018-08-16 DIAGNOSIS — L81.4 OTHER MELANIN HYPERPIGMENTATION: ICD-10-CM

## 2018-08-16 DIAGNOSIS — L82.1 OTHER SEBORRHEIC KERATOSIS: ICD-10-CM

## 2018-08-16 DIAGNOSIS — D22 MELANOCYTIC NEVI: ICD-10-CM

## 2018-08-16 PROBLEM — D18.01 HEMANGIOMA OF SKIN AND SUBCUTANEOUS TISSUE: Status: ACTIVE | Noted: 2018-08-16

## 2018-08-16 PROBLEM — D22.5 MELANOCYTIC NEVI OF TRUNK: Status: ACTIVE | Noted: 2018-08-16

## 2018-08-16 PROBLEM — D48.5 NEOPLASM OF UNCERTAIN BEHAVIOR OF SKIN: Status: ACTIVE | Noted: 2018-08-16

## 2018-08-16 PROCEDURE — ? BIOPSY BY SHAVE METHOD

## 2018-08-16 PROCEDURE — 17004 DESTROY PREMAL LESIONS 15/>: CPT

## 2018-08-16 PROCEDURE — 99212 OFFICE O/P EST SF 10 MIN: CPT | Mod: 25

## 2018-08-16 PROCEDURE — 11100: CPT | Mod: 59

## 2018-08-16 PROCEDURE — ? COUNSELING

## 2018-08-16 PROCEDURE — ? LIQUID NITROGEN

## 2018-08-16 PROCEDURE — ? DIAGNOSIS COMMENT

## 2018-08-16 PROCEDURE — 11101: CPT

## 2018-08-16 ASSESSMENT — LOCATION SIMPLE DESCRIPTION DERM
LOCATION SIMPLE: RIGHT HAND
LOCATION SIMPLE: SUPERIOR FOREHEAD
LOCATION SIMPLE: RIGHT POSTERIOR UPPER ARM
LOCATION SIMPLE: UPPER BACK
LOCATION SIMPLE: RIGHT UPPER BACK
LOCATION SIMPLE: LEFT BUTTOCK
LOCATION SIMPLE: RIGHT WRIST
LOCATION SIMPLE: LEFT FOREHEAD
LOCATION SIMPLE: LEFT TEMPLE
LOCATION SIMPLE: LEFT HAND
LOCATION SIMPLE: RIGHT EAR
LOCATION SIMPLE: LEFT CHEEK
LOCATION SIMPLE: LEFT FOREARM
LOCATION SIMPLE: LEFT LOWER BACK
LOCATION SIMPLE: RIGHT BUTTOCK
LOCATION SIMPLE: RIGHT ANTERIOR NECK
LOCATION SIMPLE: RIGHT FOREARM
LOCATION SIMPLE: RIGHT CHEEK
LOCATION SIMPLE: LEFT PRETIBIAL REGION

## 2018-08-16 ASSESSMENT — LOCATION DETAILED DESCRIPTION DERM
LOCATION DETAILED: RIGHT MEDIAL MALAR CHEEK
LOCATION DETAILED: RIGHT ULNAR DORSAL HAND
LOCATION DETAILED: LEFT SUPERIOR LATERAL LOWER BACK
LOCATION DETAILED: LEFT INFERIOR TEMPLE
LOCATION DETAILED: LEFT DISTAL PRETIBIAL REGION
LOCATION DETAILED: RIGHT DISTAL POSTERIOR UPPER ARM
LOCATION DETAILED: SUPERIOR THORACIC SPINE
LOCATION DETAILED: LEFT MEDIAL MALAR CHEEK
LOCATION DETAILED: RIGHT SUPERIOR HELIX
LOCATION DETAILED: RIGHT BUTTOCK
LOCATION DETAILED: RIGHT SUPERIOR LATERAL NECK
LOCATION DETAILED: LEFT MID TEMPLE
LOCATION DETAILED: LEFT DISTAL DORSAL FOREARM
LOCATION DETAILED: LEFT PROXIMAL DORSAL FOREARM
LOCATION DETAILED: RIGHT RADIAL DORSAL HAND
LOCATION DETAILED: RIGHT DORSAL WRIST
LOCATION DETAILED: LEFT BUTTOCK
LOCATION DETAILED: SUPERIOR MID FOREHEAD
LOCATION DETAILED: RIGHT SUPERIOR UPPER BACK
LOCATION DETAILED: RIGHT SUPERIOR CRUS OF ANTIHELIX
LOCATION DETAILED: LEFT CENTRAL TEMPLE
LOCATION DETAILED: RIGHT INFERIOR LATERAL NECK
LOCATION DETAILED: RIGHT DISTAL DORSAL FOREARM
LOCATION DETAILED: RIGHT PROXIMAL DORSAL FOREARM
LOCATION DETAILED: LEFT INFERIOR FOREHEAD
LOCATION DETAILED: LEFT CENTRAL MALAR CHEEK
LOCATION DETAILED: LEFT RADIAL DORSAL HAND
LOCATION DETAILED: LEFT INFERIOR LATERAL FOREHEAD

## 2018-08-16 ASSESSMENT — LOCATION ZONE DERM
LOCATION ZONE: LEG
LOCATION ZONE: HAND
LOCATION ZONE: TRUNK
LOCATION ZONE: EAR
LOCATION ZONE: FACE
LOCATION ZONE: NECK
LOCATION ZONE: ARM

## 2018-08-16 NOTE — PROCEDURE: BIOPSY BY SHAVE METHOD
Silver Nitrate Text: The wound bed was treated with silver nitrate after the biopsy was performed.
Additional Anesthesia Volume In Cc (Will Not Render If 0): 0
Lab: [id number]
Render Post-Care Instructions In Note?: yes
Electrodesiccation Text: The wound bed was treated with electrodesiccation after the biopsy was performed.
Billing Type: Third-Party Bill
Biopsy Type: H and E
Type Of Destruction Used: Electrodesiccation
Biopsy Method: Personna blade
Electrodesiccation And Curettage Text: The wound bed was treated with electrodesiccation and curettage after the biopsy was performed.
Notification Instructions: Patient will be notified of biopsy results. However, patient instructed to call the office if not contacted within 2 weeks.
Lab Facility: 
Post-Care Instructions: I reviewed with the patient in detail post-care instructions. Patient is to keep the biopsy site dry overnight, and then apply bacitracin or petroleum twice daily until healed. Patient may apply hydrogen peroxide soaks to remove any crusting.
Wound Care: Aquaphor
Anesthesia Volume In Cc: 1
Bill For Surgical Tray: no
Cryotherapy Text: The wound bed was treated with cryotherapy after the biopsy was performed.
Hemostasis: Drysol and Electrocautery
Depth Of Biopsy: dermis
Dressing: bandage
Detail Level: Detailed
Consent: Written consent was obtained and risks were reviewed including but not limited to scarring, infection, bleeding, scabbing, incomplete removal, nerve damage and allergy to anesthesia.
Anesthesia Type: 1% lidocaine with 1:100,000 epinephrine and a 1:12 solution of 8.4% sodium bicarbonate
Size Of Lesion In Cm: 1.1

## 2018-08-17 ENCOUNTER — NON-PROVIDER VISIT (OUTPATIENT)
Dept: PULMONOLOGY | Facility: HOSPICE | Age: 78
End: 2018-08-17
Payer: MEDICARE

## 2018-08-17 ENCOUNTER — OFFICE VISIT (OUTPATIENT)
Dept: PULMONOLOGY | Facility: HOSPICE | Age: 78
End: 2018-08-17
Payer: MEDICARE

## 2018-08-17 VITALS
HEIGHT: 62 IN | RESPIRATION RATE: 16 BRPM | SYSTOLIC BLOOD PRESSURE: 112 MMHG | BODY MASS INDEX: 33.13 KG/M2 | TEMPERATURE: 97.7 F | WEIGHT: 180 LBS | DIASTOLIC BLOOD PRESSURE: 82 MMHG | HEART RATE: 77 BPM | OXYGEN SATURATION: 94 %

## 2018-08-17 DIAGNOSIS — J44.9 CHRONIC OBSTRUCTIVE PULMONARY DISEASE, UNSPECIFIED COPD TYPE (HCC): ICD-10-CM

## 2018-08-17 DIAGNOSIS — G47.33 OSA (OBSTRUCTIVE SLEEP APNEA): ICD-10-CM

## 2018-08-17 DIAGNOSIS — I27.20 PULMONARY HYPERTENSION (HCC): ICD-10-CM

## 2018-08-17 PROBLEM — E66.812 OBESITY, CLASS II, BMI 35-39.9: Status: RESOLVED | Noted: 2018-04-11 | Resolved: 2018-08-17

## 2018-08-17 PROBLEM — E66.9 OBESITY, CLASS II, BMI 35-39.9: Status: RESOLVED | Noted: 2018-04-11 | Resolved: 2018-08-17

## 2018-08-17 PROCEDURE — 99214 OFFICE O/P EST MOD 30 MIN: CPT | Mod: 25 | Performed by: NURSE PRACTITIONER

## 2018-08-17 PROCEDURE — 94060 EVALUATION OF WHEEZING: CPT | Performed by: INTERNAL MEDICINE

## 2018-08-17 PROCEDURE — G0009 ADMIN PNEUMOCOCCAL VACCINE: HCPCS | Performed by: NURSE PRACTITIONER

## 2018-08-17 PROCEDURE — 90732 PPSV23 VACC 2 YRS+ SUBQ/IM: CPT | Performed by: NURSE PRACTITIONER

## 2018-08-17 PROCEDURE — 94726 PLETHYSMOGRAPHY LUNG VOLUMES: CPT | Performed by: INTERNAL MEDICINE

## 2018-08-17 PROCEDURE — 94729 DIFFUSING CAPACITY: CPT | Performed by: INTERNAL MEDICINE

## 2018-08-17 ASSESSMENT — PULMONARY FUNCTION TESTS
FVC_PERCENT_PREDICTED: 87
FVC: 1.87
FEV1/FVC: 67
FEV1/FVC_PERCENT_PREDICTED: 99
FVC: 2.02
FEV1_LLN: 1.42
FVC_PREDICTED: 2.31
FEV1_PERCENT_PREDICTED: 81
FVC_LLN: 1.93
FEV1_PERCENT_CHANGE: 1
FEV1/FVC_PERCENT_CHANGE: 9
FEV1/FVC_PERCENT_PREDICTED: 74
FEV1_PREDICTED: 1.7
FEV1: 1.38
FEV1/FVC_PREDICTED: 74
FEV1/FVC_PERCENT_PREDICTED: 101
FEV1_PERCENT_CHANGE: -7
FEV1: 1.36
FEV1/FVC_PERCENT_PREDICTED: 92
FEV1/FVC_PERCENT_CHANGE: -14
FEV1/FVC: 73.8
FEV1/FVC: 74
FEV1_PERCENT_PREDICTED: 80
FEV1/FVC_PERCENT_PREDICTED: 91
FVC_PERCENT_PREDICTED: 80
FEV1/FVC_PERCENT_LLN: 62
FEV1/FVC: 67

## 2018-08-17 NOTE — PROCEDURES
Good patient effort & cooperation.  The results of this test meet the ATS/ERS standards for acceptability and repeatability.  Predicted equations for Spirometry are N-Sanket II, ITS for lung volumes, and [facility] for DLCO.  The DLCO was uncorrected for Hgb.  A bronchodilator of Ventolin HFA- 2puffs via spacer were administered.  DLCO was performed during dilation period.    Lung function testing was completed on August 17, 2018.  Mid flows are mildly reduced at 61% predicted.  FEV1 is low at 1.36 L, 80% predicted.  Bronchodilator response was not seen but patient utilized an inhaler the day of the study.  Mild to moderate hyperinflation is noted.  Low normal oxygen transfer is seen.  Flow volume loop confirms the obstructive pattern with good patient effort noted

## 2018-08-17 NOTE — PROGRESS NOTES
CC:  Here for f/u sleep and pulmonary issues as listed below    HPI:   Maddy presents today for follow up for COPD with hx of pulmonary HTN and ALISSA.       PFTs from 8/2018 are stable in comparison to 2017 and indicate a Fev1 of 1.36L or 80% predicted with a mild bronchodilator response, Fev1/FVC ratio of 67, DLCO 84%.  She is a never smoker. For further workup of pulmonary HTN an echo was completed without significant change since 2016 study noting estimated EF of 65%, RVSP of 50mmHg.  She admits today she has not followed by a cardiologist.  CTA study completed 9/2017 was negative for PE. A 6 minute walk completed in 2017 did not show desaturation with walking but did show increase of heart rate from 77 at rest up to 100 with exertion. She has been increasing her activity level with improved dyspnea. She has been bicycling and walking. BMI is 33.     She is compliant using Incruse and rare use of rescue. Stopped symbicort b/c caused hoarseness. She does have a history of choking with dry food. She was offered referral to ENT for further evaluation but she declines at this time.She denies wheeze, hemoptysis, chest pain or chest tightness, fever or chills, unintentional weight loss, acid reflux.  Last OV 4/2018 she was provided zpack and medrol pack for exacerbation with benefit.     PSG from 6/2017 indicated an AHI of 5.5 and low oxygenation of 81%.  She completed a titration study for potential ASV therapy given elevated AHI and use of chronic pain medication.  She was then switched from CPAP to BiPAP given her central apneas resolved with BiPAP therapy.  Currently she is being treated with BIPAP ST @ 12/7cmH20.  Compliance download from the dates 7/19/2018 - 8/17/2018 indicates she is wearing the device 97% for an avg of 6 hours and 58 minutes per night with a reduced AHI of 8.2. She does tolerate pressure and mask well.  She wakes up refreshed and is less tired throughout the day. She denies morning H/A and  "sleep better overall. She will continue to clean supplies weekly and change them as insurance allows.              Patient Active Problem List    Diagnosis Date Noted   • CKD (chronic kidney disease) stage 3, GFR 30-59 ml/min 05/23/2016     Priority: Medium   • Esophageal dysphagia 05/19/2016     Priority: Medium   • Hypothyroidism due to Hashimoto's thyroiditis      Priority: Medium   • GERD (gastroesophageal reflux disease) 09/20/2011     Priority: Medium   • Essential hypertension 07/06/2009     Priority: Medium   • Major depressive disorder, recurrent episode, mild (CMS-HCC) 05/02/2016     Priority: Low   • Neuropathy (CMS-HCC) 10/17/2013     Priority: Low   • Family history of polycystic kidney disease      Priority: Low   • Facet arthritis of lumbar region ([facility]) 03/26/2012     Priority: Low   • History of vertebral fracture 03/26/2012     Priority: Low   • Spinal stenosis of lumbar region with neurogenic claudication      Priority: Low   • BMI 33.0-33.9,adult 08/17/2018   • Dyslipidemia, goal LDL below 130 08/15/2018   • Chronic obstructive pulmonary disease ([facility]) 06/22/2018   • Pulmonary hypertension ([facility]) 02/21/2018   • COPD with asthma ([facility]) 11/10/2017   • ALISSA (obstructive sleep apnea) 11/10/2017   • Postlaminectomy syndrome, unspecified region 07/03/2017   • Recurrent UTI 06/28/2017   • 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    • Arthritis     facet arthritis of lumbar region   • Asthma     Inhaler use daily.   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 Albrecht, attaches to plastic, prulent   • Depression     and anxiety   • Encounter for long-term (current) use of other medications    • Erosive gastritis     antral   • Family history of polycystic kidney disease    • GERD (gastroesophageal reflux disease)    • Vietnamese measles    • Hypertension    • Hypothyroidism    • Influenza    • Lumbar vertebral fracture (HCC) 2011   • Mumps    • Muscle disorder     Arachnoiditis   • Neuropathy (HCC)    • Obesity, Class I, BMI 30-34.9    • OSTEOPOROSIS    • Pain 16    back and legs    • Renal disorder    • Sleep apnea     on BiPap, follows with pulmonology   • Spinal stenosis, lumbar region, without neurogenic claudication    • Tonsillitis    • Urinary bladder disorder 2016    Currently has a catheter.       Past Surgical History:   Procedure Laterality Date   • SPINAL CORD STIMULATOR N/A 7/3/2017    Procedure: SPINAL CORD STIMULATOR FOR LEAD REMOVAL;  Surgeon: Amandeep Gonzalez D.O.;  Location: SURGERY [facility];  Service:    • GASTROSCOPY WITH BALLOON DILATATION N/A 2016    Procedure: GASTROSCOPY WITH DILATATION;  Surgeon: Tony Monae M.D.;  Location: SURGERY [facility];  Service:    • SPINAL CORD STIMULATOR  14   • EGD WITH ASP/BX  09    erosive gastritis   • HYSTERECTOMY, TOTAL ABDOMINAL     • APPENDECTOMY     • CATARACT EXTRACTION WITH IOL Bilateral    • COLONOSCOPY  ,09    normal   • HYSTERECTOMY LAPAROSCOPY     • LUMBAR LAMINECTOMY DISKECTOMY     • TONSILLECTOMY         Family History   Problem Relation Age of Onset   • Genitourinary () Brother    • Lung Disease Mother         COPD   • Heart Disease Mother    • Genetic Father         Parkinsons/ from complications   • Hypertension Father    • Cancer Maternal Aunt         Breast cancer   • Stroke Paternal Grandmother    • Cancer Maternal Aunt         Breast cancer        Social History     Social History   • Marital status:      Spouse name: N/A   • Number of children: N/A   • Years of education: N/A     Occupational History   • Not on file.     Social History Main Topics   • Smoking status: Never Smoker   • Smokeless tobacco: Never Used   • Alcohol use No   • Drug use: No   • Sexual activity: No     Other Topics Concern   • Stress Concern No      cancer     Social History Narrative   • No narrative on file       Current Outpatient Prescriptions   Medication Sig Dispense Refill   • furosemide (LASIX) 20 MG Tab Take 1 Tab by mouth every day. 90 Tab 3   • potassium chloride SA (KDUR) 20 MEQ Tab CR Take 1 Tab by mouth 2 times a day. 90 Tab 3   • diazePAM (VALIUM) 5 MG Tab Take 1 Tab by mouth every 8 hours as needed for Anxiety for up to 90 days. 60 Tab 2   • irbesartan (AVAPRO) 75 MG tablet Take 1 Tab by mouth every day. 90 Tab 3   • levothyroxine (SYNTHROID) 50 MCG Tab TAKE ONE TABLET BY MOUTH IN THE MORNING ON AN EMPTY STOMACH 90 Tab 3   • pregabalin (LYRICA) 150 MG Cap Take 200 mg by mouth 3 times a day.     • imipramine (TOFRANIL) 10 MG Tab Take 2 Tabs by mouth every evening. (Patient taking differently: Take 10 mg by mouth every evening.) 180 Tab 2   • duloxetine (CYMBALTA) 60 MG Cap DR Particles delayed-release capsule TAKE ONE CAPSULE BY MOUTH ONCE DAILY (Patient taking differently: Take 60 mg by mouth every day. TAKE ONE CAPSULE BY MOUTH ONCE DAILY) 90 Cap 2   • AMITIZA 24 MCG capsule Take 24 mcg by mouth every morning with breakfast.     • Umeclidinium Bromide (INCRUSE ELLIPTA) 62.5 MCG/INH AEROSOL POWDER, BREATH ACTIVATED Inhale 1 Puff by mouth every day. 1 Each 11   • estradiol (ESTRACE) 0.5 MG tablet TAKE ONE TABLET BY MOUTH ONCE DAILY 90 Tab 3   • polyethylene glycol/lytes (MIRALAX) Pack Take 17 g by mouth every day.     • XTAMPZA ER 18 MG Capsule Extended Release 12 hour Abuse-Deterrent Take 18 mg by mouth 2 Times a Day.     • Cyanocobalamin 2500 MCG  "Chew Tab Take 1 Tab by mouth every day.     • omeprazole (PRILOSEC) 20 MG delayed-release capsule Take 1 Cap by mouth every day. 90 Cap 3   • aspirin EC (ECOTRIN) 81 MG Tablet Delayed Response Take 81 mg by mouth every day.     • docusate sodium (COLACE) 250 MG capsule Take 250 mg by mouth every day.     • VENTOLIN  (90 BASE) MCG/ACT Aero Soln inhalation aerosol Inhale 2 Puffs by mouth every 6 hours as needed for Shortness of Breath. 1 Inhaler 5   • Probiotic Product (Dasient) CAPS Take 1 Cap by mouth 2 Times a Day.       No current facility-administered medications for this visit.           Allergies: Pcn [penicillins]; Sulfa drugs; Tape; Macrobid [nitrofurantoin]; and Zoloft      ROS   Gen: Denies fever, chills, unintentional weight loss, fatigue, night sweats  E/N/T: Denies ear pain, nasal congestion  Resp:Denies Dyspnea, wheezing, cough, sputum production, hemoptysis  CV: Denies chest pain, chest tightness, palpitations, BLE edema  Sleep:Denies morning headache, insomnia, daytime somnolence, snoring, gasping for air, apnea  Neuro: Denies frequent headaches, weakness, dizziness  GI: Denies N/V, acid reflux/heartburn  See HPI.  All other systems reviewed and negative      Vital signs for this encounter:  Vitals:    08/17/18 1441   Height: 1.562 m (5' 1.5\")   Weight: 81.6 kg (180 lb)   Weight % change since last entry.: 0 %   BP: 112/82   Pulse: 77   BMI (Calculated): 33.46   Resp: 16   Temp: 36.5 °C (97.7 °F)   O2 sat % room air: 94 %                 Physical Exam:   Appearance: well developed, well nourished, no acute distress.  Eyes: PERRL, EOM intact, sclere white, conjunctiva moist.  Ears: no lesions or deformities.  Hearing: grossly intact.  Nose: no lesions or deformities.  Dentition: good dentition.  Oropharynx: tongue normal, posterior pharynx without erythema or exudate.  Neck: supple, trachea midline, no masses.  Respiratory effort: no intercostal retractions or use of accessory " muscles.  Lung auscultation: Bilateral diminished   Heart auscultation: no murmur, rub, or gallop.   Extremities: no cyanosis or edema.  Abdomen: soft, non-tender, no masses.  Gait and station: grossly normal   Digits and Nails: no clubbing, cyanosis, petechiae, or nodes.  Cranial nerves: grossly normal.  Motor: no focal deficits observed.  Skin: no rashes, lesions, or ulcers noted.  Orientation: oriented to time, place, and person.  Mood and affect: mood and affect appropriate, normal interaction with examiner.    Assessment   1. Pulmonary hypertension (HCC)  REFERRAL TO CARDIOLOGY    PNEUMOCOCCAL POLYSACCHARIDE VACCINE 23-VALENT =>3YO SQ/IM   2. Chronic obstructive pulmonary disease, unspecified COPD type (HCC)  PNEUMOCOCCAL POLYSACCHARIDE VACCINE 23-VALENT =>3YO SQ/IM   3. ALISSA (obstructive sleep apnea)     4. BMI 33.0-33.9,adult  OBESITY COUNSELING (No Charge): Patient identified as having weight management issue.  Appropriate orders and counseling given.       Patient was seen for 25 minutes, more than 50% of time spent in face to face review, counseling, and arranging future evaluation and follow up of medical conditions and care related to PFT results, COPD, exercise importance and ALISSA and compliance. Patient is clinically stable and will proceed with following plan.     PLAN:   Patient Instructions   1) Continue BIPAP ST with back up rate of 12 - 12/7cmH20  2) Clean mask and supplies weekly and change them as insurance allows  3) Continue using Incruse and rescue inhaler as needed  4) Vaccines: Up to date with Prevnar 13, Pneumovax 23 booster today, flu  5) Continue weekly exercise   6) Zpack and Medrol pack on hand for exacerbation   7) Return in about 6 months (around 2/17/2019) for Two seperate sleep and pulmonary appointment, if not sooner, review of symptoms, follow up with DOMINIQUE Sims, Compliance.   8) Cardiology referral for pulmonary hypertension

## 2018-08-17 NOTE — PATIENT INSTRUCTIONS
1) Continue BIPAP ST with back up rate of 12 - 12/7cmH20  2) Clean mask and supplies weekly and change them as insurance allows  3) Continue using Incruse and rescue inhaler as needed  4) Vaccines: Up to date with Prevnar 13, Pneumovax 23 booster today, flu  5) Continue weekly exercise   6) Zpack and Medrol pack on hand for exacerbation   7) Return in about 6 months (around 2/17/2019) for Two seperate sleep and pulmonary appointment, if not sooner, review of symptoms, follow up with DOMINIQUE Sims, Compliance.   8) Cardiology referral for pulmonary hypertension

## 2018-08-24 LAB
25(OH)D3+25(OH)D2 SERPL-MCNC: 21.2 NG/ML (ref 30–100)
ALBUMIN SERPL-MCNC: 4.4 G/DL (ref 3.5–4.7)
ALBUMIN/GLOB SERPL: 2.1 {RATIO} (ref 1.2–2.2)
ALP SERPL-CCNC: 82 IU/L (ref 39–117)
ALT SERPL-CCNC: 14 IU/L (ref 0–32)
AST SERPL-CCNC: 17 IU/L (ref 0–40)
BILIRUB SERPL-MCNC: 0.3 MG/DL (ref 0–1.2)
BUN SERPL-MCNC: 11 MG/DL (ref 8–27)
BUN/CREAT SERPL: 10 (ref 12–28)
CALCIUM SERPL-MCNC: 9.4 MG/DL (ref 8.7–10.3)
CHLORIDE SERPL-SCNC: 99 MMOL/L (ref 96–106)
CHOLEST SERPL-MCNC: 236 MG/DL (ref 100–199)
CO2 SERPL-SCNC: 22 MMOL/L (ref 20–29)
CREAT SERPL-MCNC: 1.13 MG/DL (ref 0.57–1)
ERYTHROCYTE [DISTWIDTH] IN BLOOD BY AUTOMATED COUNT: 15 % (ref 12.3–15.4)
GLOBULIN SER CALC-MCNC: 2.1 G/DL (ref 1.5–4.5)
GLUCOSE SERPL-MCNC: 98 MG/DL (ref 65–99)
HCT VFR BLD AUTO: 43.2 % (ref 34–46.6)
HDLC SERPL-MCNC: 70 MG/DL
HGB BLD-MCNC: 14.1 G/DL (ref 11.1–15.9)
IF AFRICAN AMERICAN  100797: 53 ML/MIN/1.73
IF NON AFRICAN AMER 100791: 46 ML/MIN/1.73
LABORATORY COMMENT REPORT: ABNORMAL
LDLC SERPL CALC-MCNC: 144 MG/DL (ref 0–99)
MCH RBC QN AUTO: 29.6 PG (ref 26.6–33)
MCHC RBC AUTO-ENTMCNC: 32.6 G/DL (ref 31.5–35.7)
MCV RBC AUTO: 91 FL (ref 79–97)
NRBC BLD AUTO-RTO: NORMAL %
PLATELET # BLD AUTO: 266 X10E3/UL (ref 150–379)
POTASSIUM SERPL-SCNC: 5 MMOL/L (ref 3.5–5.2)
PROT SERPL-MCNC: 6.5 G/DL (ref 6–8.5)
RBC # BLD AUTO: 4.77 X10E6/UL (ref 3.77–5.28)
SODIUM SERPL-SCNC: 134 MMOL/L (ref 134–144)
THYROPEROXIDASE AB SERPL-ACNC: 151 IU/ML (ref 0–34)
TRIGL SERPL-MCNC: 109 MG/DL (ref 0–149)
TSH SERPL DL<=0.005 MIU/L-ACNC: 2.19 UIU/ML (ref 0.45–4.5)
VLDLC SERPL CALC-MCNC: 22 MG/DL (ref 5–40)
WBC # BLD AUTO: 5 X10E3/UL (ref 3.4–10.8)

## 2018-08-28 ENCOUNTER — APPOINTMENT (RX ONLY)
Dept: URBAN - METROPOLITAN AREA CLINIC 4 | Facility: CLINIC | Age: 78
Setting detail: DERMATOLOGY
End: 2018-08-28

## 2018-08-28 PROBLEM — L57.0 ACTINIC KERATOSIS: Status: ACTIVE | Noted: 2018-08-28

## 2018-08-28 PROBLEM — I10 ESSENTIAL (PRIMARY) HYPERTENSION: Status: ACTIVE | Noted: 2018-08-28

## 2018-08-28 PROBLEM — E05.90 THYROTOXICOSIS, UNSPECIFIED WITHOUT THYROTOXIC CRISIS OR STORM: Status: ACTIVE | Noted: 2018-08-28

## 2018-08-28 PROBLEM — D04.61 CARCINOMA IN SITU OF SKIN OF RIGHT UPPER LIMB, INCLUDING SHOULDER: Status: ACTIVE | Noted: 2018-08-28

## 2018-08-28 PROBLEM — E78.5 HYPERLIPIDEMIA, UNSPECIFIED: Status: ACTIVE | Noted: 2018-08-28

## 2018-08-28 PROCEDURE — ? MEDICATION COUNSELING

## 2018-08-28 PROCEDURE — ? EXCISION

## 2018-08-28 PROCEDURE — ? PRESCRIPTION

## 2018-08-28 PROCEDURE — 12032 INTMD RPR S/A/T/EXT 2.6-7.5: CPT

## 2018-08-28 PROCEDURE — 11602 EXC TR-EXT MAL+MARG 1.1-2 CM: CPT

## 2018-08-28 RX ORDER — DOXYCYCLINE HYCLATE 100 MG/1
CAPSULE, GELATIN COATED ORAL
Qty: 14 | Refills: 0 | Status: ERX | COMMUNITY
Start: 2018-08-28

## 2018-08-28 RX ADMIN — DOXYCYCLINE HYCLATE: 100 CAPSULE, GELATIN COATED ORAL at 00:00

## 2018-08-28 NOTE — PROCEDURE: EXCISION
Render Post-Care Instructions In Note?: yes
Bill For Surgical Tray: no
Epidermal Sutures: 4-0 Prolene
Previous Accession (Optional): [id number].
Island Pedicle Flap With Canthal Suspension Text: The defect edges were debeveled with a #15 scalpel blade.  Given the location of the defect, shape of the defect and the proximity to free margins an island pedicle advancement flap was deemed most appropriate.  Using a sterile surgical marker, an appropriate advancement flap was drawn incorporating the defect, outlining the appropriate donor tissue and placing the expected incisions within the relaxed skin tension lines where possible. The area thus outlined was incised deep to adipose tissue with a #15 scalpel blade.  The skin margins were undermined to an appropriate distance in all directions around the primary defect and laterally outward around the island pedicle utilizing iris scissors.  There was minimal undermining beneath the pedicle flap. A suspension suture was placed in the canthal tendon to prevent tension and prevent ectropion.
Elliptical Excision Additional Text (Leave Blank If You Do Not Want): The margin was drawn around the clinically apparent lesion.  An elliptical shape was then drawn on the skin incorporating the lesion and margins.  Incisions were then made along these lines to the appropriate tissue plane and the lesion was extirpated.
Complex Repair And Z Plasty Text: The defect edges were debeveled with a #15 scalpel blade.  The primary defect was closed partially with a complex linear closure.  Given the location of the remaining defect, shape of the defect and the proximity to free margins a Z plasty was deemed most appropriate for complete closure of the defect.  Using a sterile surgical marker, an appropriate advancement flap was drawn incorporating the defect and placing the expected incisions within the relaxed skin tension lines where possible.    The area thus outlined was incised deep to adipose tissue with a #15 scalpel blade.  The skin margins were undermined to an appropriate distance in all directions utilizing iris scissors.
Mastoid Interpolation Flap Text: A decision was made to reconstruct the defect utilizing an interpolation axial flap and a staged reconstruction.  A telfa template was made of the defect.  This telfa template was then used to outline the mastoid interpolation flap.  The donor area for the pedicle flap was then injected with anesthesia.  The flap was excised through the skin and subcutaneous tissue down to the layer of the underlying musculature.  The pedicle flap was carefully excised within this deep plane to maintain its blood supply.  The edges of the donor site were undermined.   The donor site was closed in a primary fashion.  The pedicle was then rotated into position and sutured.  Once the tube was sutured into place, adequate blood supply was confirmed with blanching and refill.  The pedicle was then wrapped with xeroform gauze and dressed appropriately with a telfa and gauze bandage to ensure continued blood supply and protect the attached pedicle.
Modified Advancement Flap Text: The defect edges were debeveled with a #15 scalpel blade.  Given the location of the defect, shape of the defect and the proximity to free margins a modified advancement flap was deemed most appropriate.  Using a sterile surgical marker, an appropriate advancement flap was drawn incorporating the defect and placing the expected incisions within the relaxed skin tension lines where possible.    The area thus outlined was incised deep to adipose tissue with a #15 scalpel blade.  The skin margins were undermined to an appropriate distance in all directions utilizing iris scissors.
Epidermal Closure Graft Donor Site (Optional): simple interrupted
Dorsal Nasal Flap Text: The defect edges were debeveled with a #15 scalpel blade.  Given the location of the defect and the proximity to free margins a dorsal nasal flap was deemed most appropriate.  Using a sterile surgical marker, an appropriate dorsal nasal flap was drawn around the defect.    The area thus outlined was incised deep to adipose tissue with a #15 scalpel blade.  The skin margins were undermined to an appropriate distance in all directions utilizing iris scissors.
Post-Care Instructions: I reviewed with the patient in detail post-care instructions. Patient is not to engage in any heavy lifting, exercise, or swimming for the next 14 days. Should the patient develop any fevers, chills, bleeding, severe pain patient will contact the office immediately.
Ear Star Wedge Flap Text: The defect edges were debeveled with a #15 blade scalpel.  Given the location of the defect and the proximity to free margins (helical rim) an ear star wedge flap was deemed most appropriate.  Using a sterile surgical marker, the appropriate flap was drawn incorporating the defect and placing the expected incisions between the helical rim and antihelix where possible.  The area thus outlined was incised through and through with a #15 scalpel blade.
Skin Substitute Units (Will Override Primary Defect Units If Greater Than 0): 0
Excision Method: Fusiform
O-Z Plasty Text: The defect edges were debeveled with a #15 scalpel blade.  Given the location of the defect, shape of the defect and the proximity to free margins an O-Z plasty (double transposition flap) was deemed most appropriate.  Using a sterile surgical marker, the appropriate transposition flaps were drawn incorporating the defect and placing the expected incisions within the relaxed skin tension lines where possible.    The area thus outlined was incised deep to adipose tissue with a #15 scalpel blade.  The skin margins were undermined to an appropriate distance in all directions utilizing iris scissors.  Hemostasis was achieved with electrocautery.  The flaps were then transposed into place, one clockwise and the other counterclockwise, and anchored with interrupted buried subcutaneous sutures.
Lip Wedge Excision Repair Text: Given the location of the defect and the proximity to free margins a full thickness wedge repair was deemed most appropriate.  Using a sterile surgical marker, the appropriate repair was drawn incorporating the defect and placing the expected incisions perpendicular to the vermilion border.  The vermilion border was also meticulously outlined to ensure appropriate reapproximation during the repair.  The area thus outlined was incised through and through with a #15 scalpel blade.  The muscularis and dermis were reaproximated with deep sutures following hemostasis. Care was taken to realign the vermilion border before proceeding with the superficial closure.  Once the vermilion was realigned the superfical and mucosal closure was finished.
S Plasty Text: Given the location and shape of the defect, and the orientation of relaxed skin tension lines, an S-plasty was deemed most appropriate for repair.  Using a sterile surgical marker, the appropriate outline of the S-plasty was drawn, incorporating the defect and placing the expected incisions within the relaxed skin tension lines where possible.  The area thus outlined was incised deep to adipose tissue with a #15 scalpel blade.  The skin margins were undermined to an appropriate distance in all directions utilizing iris scissors. The skin flaps were advanced over the defect.  The opposing margins were then approximated with interrupted buried subcutaneous sutures.
Lazy S Complex Repair Preamble Text (Leave Blank If You Do Not Want): Extensive wide undermining was performed.
Crescentic Advancement Flap Text: The defect edges were debeveled with a #15 scalpel blade.  Given the location of the defect and the proximity to free margins a crescentic advancement flap was deemed most appropriate.  Using a sterile surgical marker, the appropriate advancement flap was drawn incorporating the defect and placing the expected incisions within the relaxed skin tension lines where possible.    The area thus outlined was incised deep to adipose tissue with a #15 scalpel blade.  The skin margins were undermined to an appropriate distance in all directions utilizing iris scissors.
O-L Flap Text: The defect edges were debeveled with a #15 scalpel blade.  Given the location of the defect, shape of the defect and the proximity to free margins an O-L flap was deemed most appropriate.  Using a sterile surgical marker, an appropriate advancement flap was drawn incorporating the defect and placing the expected incisions within the relaxed skin tension lines where possible.    The area thus outlined was incised deep to adipose tissue with a #15 scalpel blade.  The skin margins were undermined to an appropriate distance in all directions utilizing iris scissors.
A-T Advancement Flap Text: The defect edges were debeveled with a #15 scalpel blade.  Given the location of the defect, shape of the defect and the proximity to free margins an A-T advancement flap was deemed most appropriate.  Using a sterile surgical marker, an appropriate advancement flap was drawn incorporating the defect and placing the expected incisions within the relaxed skin tension lines where possible.    The area thus outlined was incised deep to adipose tissue with a #15 scalpel blade.  The skin margins were undermined to an appropriate distance in all directions utilizing iris scissors.
Complex Repair And Rotation Flap Text: The defect edges were debeveled with a #15 scalpel blade.  The primary defect was closed partially with a complex linear closure.  Given the location of the remaining defect, shape of the defect and the proximity to free margins a rotation flap was deemed most appropriate for complete closure of the defect.  Using a sterile surgical marker, an appropriate advancement flap was drawn incorporating the defect and placing the expected incisions within the relaxed skin tension lines where possible.    The area thus outlined was incised deep to adipose tissue with a #15 scalpel blade.  The skin margins were undermined to an appropriate distance in all directions utilizing iris scissors.
Cheek Interpolation Flap Text: A decision was made to reconstruct the defect utilizing an interpolation axial flap and a staged reconstruction.  A telfa template was made of the defect.  This telfa template was then used to outline the Cheek Interpolation flap.  The donor area for the pedicle flap was then injected with anesthesia.  The flap was excised through the skin and subcutaneous tissue down to the layer of the underlying musculature.  The interpolation flap was carefully excised within this deep plane to maintain its blood supply.  The edges of the donor site were undermined.   The donor site was closed in a primary fashion.  The pedicle was then rotated into position and sutured.  Once the tube was sutured into place, adequate blood supply was confirmed with blanching and refill.  The pedicle was then wrapped with xeroform gauze and dressed appropriately with a telfa and gauze bandage to ensure continued blood supply and protect the attached pedicle.
Complex Repair And O-L Flap Text: The defect edges were debeveled with a #15 scalpel blade.  The primary defect was closed partially with a complex linear closure.  Given the location of the remaining defect, shape of the defect and the proximity to free margins an O-L flap was deemed most appropriate for complete closure of the defect.  Using a sterile surgical marker, an appropriate flap was drawn incorporating the defect and placing the expected incisions within the relaxed skin tension lines where possible.    The area thus outlined was incised deep to adipose tissue with a #15 scalpel blade.  The skin margins were undermined to an appropriate distance in all directions utilizing iris scissors.
Star Wedge Flap Text: The defect edges were debeveled with a #15 scalpel blade.  Given the location of the defect, shape of the defect and the proximity to free margins a star wedge flap was deemed most appropriate.  Using a sterile surgical marker, an appropriate rotation flap was drawn incorporating the defect and placing the expected incisions within the relaxed skin tension lines where possible. The area thus outlined was incised deep to adipose tissue with a #15 scalpel blade.  The skin margins were undermined to an appropriate distance in all directions utilizing iris scissors.
Complex Repair And Dorsal Nasal Flap Text: The defect edges were debeveled with a #15 scalpel blade.  The primary defect was closed partially with a complex linear closure.  Given the location of the remaining defect, shape of the defect and the proximity to free margins a dorsal nasal flap was deemed most appropriate for complete closure of the defect.  Using a sterile surgical marker, an appropriate flap was drawn incorporating the defect and placing the expected incisions within the relaxed skin tension lines where possible.    The area thus outlined was incised deep to adipose tissue with a #15 scalpel blade.  The skin margins were undermined to an appropriate distance in all directions utilizing iris scissors.
Trilobed Flap Text: The defect edges were debeveled with a #15 scalpel blade.  Given the location of the defect and the proximity to free margins a trilobed flap was deemed most appropriate.  Using a sterile surgical marker, an appropriate trilobed flap drawn around the defect.    The area thus outlined was incised deep to adipose tissue with a #15 scalpel blade.  The skin margins were undermined to an appropriate distance in all directions utilizing iris scissors.
Excisional Biopsy Additional Text (Leave Blank If You Do Not Want): The margin was drawn around the clinically apparent lesion. An elliptical shape was then drawn on the skin incorporating the lesion and margins.  Incisions were then made along these lines to the appropriate tissue plane and the lesion was extirpated.
Crescentic Intermediate Repair Preamble Text (Leave Blank If You Do Not Want): Undermining was performed with blunt dissection.
Banner Transposition Flap Text: The defect edges were debeveled with a #15 scalpel blade.  Given the location of the defect and the proximity to free margins a Banner transposition flap was deemed most appropriate.  Using a sterile surgical marker, an appropriate flap drawn around the defect. The area thus outlined was incised deep to adipose tissue with a #15 scalpel blade.  The skin margins were undermined to an appropriate distance in all directions utilizing iris scissors.
Dermal Autograft Text: The defect edges were debeveled with a #15 scalpel blade.  Given the location of the defect, shape of the defect and the proximity to free margins a dermal autograft was deemed most appropriate.  Using a sterile surgical marker, the primary defect shape was transferred to the donor site. The area thus outlined was incised deep to adipose tissue with a #15 scalpel blade.  The harvested graft was then trimmed of adipose and epidermal tissue until only dermis was left.  The skin graft was then placed in the primary defect and oriented appropriately.
Ftsg Text: The defect edges were debeveled with a #15 scalpel blade.  Given the location of the defect, shape of the defect and the proximity to free margins a full thickness skin graft was deemed most appropriate.  Using a sterile surgical marker, the primary defect shape was transferred to the donor site. The area thus outlined was incised deep to adipose tissue with a #15 scalpel blade.  The harvested graft was then trimmed of adipose tissue until only dermis and epidermis was left.  The skin margins of the secondary defect were undermined to an appropriate distance in all directions utilizing iris scissors.  The secondary defect was closed with interrupted buried subcutaneous sutures.  The skin edges were then re-apposed with running  sutures.  The skin graft was then placed in the primary defect and oriented appropriately.
Anesthesia Type: 1% lidocaine with epinephrine and a 1:10 solution of 8.4% sodium bicarbonate
Complex Repair And W Plasty Text: The defect edges were debeveled with a #15 scalpel blade.  The primary defect was closed partially with a complex linear closure.  Given the location of the remaining defect, shape of the defect and the proximity to free margins a W plasty was deemed most appropriate for complete closure of the defect.  Using a sterile surgical marker, an appropriate advancement flap was drawn incorporating the defect and placing the expected incisions within the relaxed skin tension lines where possible.    The area thus outlined was incised deep to adipose tissue with a #15 scalpel blade.  The skin margins were undermined to an appropriate distance in all directions utilizing iris scissors.
Anesthesia Volume In Cc: 21
Dressing: dry sterile dressing
Estimated Blood Loss (Cc): minimal
Fusiform Excision Additional Text (Leave Blank If You Do Not Want): The margin was drawn around the clinically apparent lesion.  A fusiform shape was then drawn on the skin incorporating the lesion and margins.  Incisions were then made along these lines to the appropriate tissue plane and the lesion was extirpated.
Island Pedicle Flap Text: The defect edges were debeveled with a #15 scalpel blade.  Given the location of the defect, shape of the defect and the proximity to free margins an island pedicle advancement flap was deemed most appropriate.  Using a sterile surgical marker, an appropriate advancement flap was drawn incorporating the defect, outlining the appropriate donor tissue and placing the expected incisions within the relaxed skin tension lines where possible.    The area thus outlined was incised deep to adipose tissue with a #15 scalpel blade.  The skin margins were undermined to an appropriate distance in all directions around the primary defect and laterally outward around the island pedicle utilizing iris scissors.  There was minimal undermining beneath the pedicle flap.
Complex Repair And Rhombic Flap Text: The defect edges were debeveled with a #15 scalpel blade.  The primary defect was closed partially with a complex linear closure.  Given the location of the remaining defect, shape of the defect and the proximity to free margins a rhombic flap was deemed most appropriate for complete closure of the defect.  Using a sterile surgical marker, an appropriate advancement flap was drawn incorporating the defect and placing the expected incisions within the relaxed skin tension lines where possible.    The area thus outlined was incised deep to adipose tissue with a #15 scalpel blade.  The skin margins were undermined to an appropriate distance in all directions utilizing iris scissors.
Deep Sutures: 3-0 Vicryl
Complex Repair And Split-Thickness Skin Graft Text: The defect edges were debeveled with a #15 scalpel blade.  The primary defect was closed partially with a complex linear closure.  Given the location of the defect, shape of the defect and the proximity to free margins a split thickness skin graft was deemed most appropriate to repair the remaining defect.  The graft was trimmed to fit the size of the remaining defect.  The graft was then placed in the primary defect, oriented appropriately, and sutured into place.
Complex Repair And Modified Advancement Flap Text: The defect edges were debeveled with a #15 scalpel blade.  The primary defect was closed partially with a complex linear closure.  Given the location of the remaining defect, shape of the defect and the proximity to free margins a modified advancement flap was deemed most appropriate for complete closure of the defect.  Using a sterile surgical marker, an appropriate advancement flap was drawn incorporating the defect and placing the expected incisions within the relaxed skin tension lines where possible.    The area thus outlined was incised deep to adipose tissue with a #15 scalpel blade.  The skin margins were undermined to an appropriate distance in all directions utilizing iris scissors.
H Plasty Text: Given the location of the defect, shape of the defect and the proximity to free margins a H-plasty was deemed most appropriate for repair.  Using a sterile surgical marker, the appropriate advancement arms of the H-plasty were drawn incorporating the defect and placing the expected incisions within the relaxed skin tension lines where possible. The area thus outlined was incised deep to adipose tissue with a #15 scalpel blade. The skin margins were undermined to an appropriate distance in all directions utilizing iris scissors.  The opposing advancement arms were then advanced into place in opposite direction and anchored with interrupted buried subcutaneous sutures.
Complex Repair And Dermal Autograft Text: The defect edges were debeveled with a #15 scalpel blade.  The primary defect was closed partially with a complex linear closure.  Given the location of the defect, shape of the defect and the proximity to free margins an dermal autograft was deemed most appropriate to repair the remaining defect.  The graft was trimmed to fit the size of the remaining defect.  The graft was then placed in the primary defect, oriented appropriately, and sutured into place.
Saucerization Excision Additional Text (Leave Blank If You Do Not Want): The margin was drawn around the clinically apparent lesion.  Incisions were then made along these lines, in a tangential fashion, to the appropriate tissue plane and the lesion was extirpated.
O-T Plasty Text: The defect edges were debeveled with a #15 scalpel blade.  Given the location of the defect, shape of the defect and the proximity to free margins an O-T plasty was deemed most appropriate.  Using a sterile surgical marker, an appropriate O-T plasty was drawn incorporating the defect and placing the expected incisions within the relaxed skin tension lines where possible.    The area thus outlined was incised deep to adipose tissue with a #15 scalpel blade.  The skin margins were undermined to an appropriate distance in all directions utilizing iris scissors.
Repair Performed By Another Provider Text (Leave Blank If You Do Not Want): After the tissue was excised the defect was repaired by another provider.
Lab Facility: 
Complex Repair And Single Advancement Flap Text: The defect edges were debeveled with a #15 scalpel blade.  The primary defect was closed partially with a complex linear closure.  Given the location of the remaining defect, shape of the defect and the proximity to free margins a single advancement flap was deemed most appropriate for complete closure of the defect.  Using a sterile surgical marker, an appropriate advancement flap was drawn incorporating the defect and placing the expected incisions within the relaxed skin tension lines where possible.    The area thus outlined was incised deep to adipose tissue with a #15 scalpel blade.  The skin margins were undermined to an appropriate distance in all directions utilizing iris scissors.
Complex Repair And Ftsg Text: The defect edges were debeveled with a #15 scalpel blade.  The primary defect was closed partially with a complex linear closure.  Given the location of the defect, shape of the defect and the proximity to free margins a full thickness skin graft was deemed most appropriate to repair the remaining defect.  The graft was trimmed to fit the size of the remaining defect.  The graft was then placed in the primary defect, oriented appropriately, and sutured into place.
Epidermal Autograft Text: The defect edges were debeveled with a #15 scalpel blade.  Given the location of the defect, shape of the defect and the proximity to free margins an epidermal autograft was deemed most appropriate.  Using a sterile surgical marker, the primary defect shape was transferred to the donor site. The epidermal graft was then harvested.  The skin graft was then placed in the primary defect and oriented appropriately.
Muscle Hinge Flap Text: The defect edges were debeveled with a #15 scalpel blade.  Given the size, depth and location of the defect and the proximity to free margins a muscle hinge flap was deemed most appropriate.  Using a sterile surgical marker, an appropriate hinge flap was drawn incorporating the defect. The area thus outlined was incised with a #15 scalpel blade.  The skin margins were undermined to an appropriate distance in all directions utilizing iris scissors.
Bi-Rhombic Flap Text: The defect edges were debeveled with a #15 scalpel blade.  Given the location of the defect and the proximity to free margins a bi-rhombic flap was deemed most appropriate.  Using a sterile surgical marker, an appropriate rhombic flap was drawn incorporating the defect. The area thus outlined was incised deep to adipose tissue with a #15 scalpel blade.  The skin margins were undermined to an appropriate distance in all directions utilizing iris scissors.
Excision Depth: adipose tissue
Posterior Auricular Interpolation Flap Text: A decision was made to reconstruct the defect utilizing an interpolation axial flap and a staged reconstruction.  A telfa template was made of the defect.  This telfa template was then used to outline the posterior auricular interpolation flap.  The donor area for the pedicle flap was then injected with anesthesia.  The flap was excised through the skin and subcutaneous tissue down to the layer of the underlying musculature.  The pedicle flap was carefully excised within this deep plane to maintain its blood supply.  The edges of the donor site were undermined.   The donor site was closed in a primary fashion.  The pedicle was then rotated into position and sutured.  Once the tube was sutured into place, adequate blood supply was confirmed with blanching and refill.  The pedicle was then wrapped with xeroform gauze and dressed appropriately with a telfa and gauze bandage to ensure continued blood supply and protect the attached pedicle.
Complex Repair And Epidermal Autograft Text: The defect edges were debeveled with a #15 scalpel blade.  The primary defect was closed partially with a complex linear closure.  Given the location of the defect, shape of the defect and the proximity to free margins an epidermal autograft was deemed most appropriate to repair the remaining defect.  The graft was trimmed to fit the size of the remaining defect.  The graft was then placed in the primary defect, oriented appropriately, and sutured into place.
Alar Island Pedicle Flap Text: The defect edges were debeveled with a #15 scalpel blade.  Given the location of the defect, shape of the defect and the proximity to the alar rim an island pedicle advancement flap was deemed most appropriate.  Using a sterile surgical marker, an appropriate advancement flap was drawn incorporating the defect, outlining the appropriate donor tissue and placing the expected incisions within the nasal ala running parallel to the alar rim. The area thus outlined was incised with a #15 scalpel blade.  The skin margins were undermined minimally to an appropriate distance in all directions around the primary defect and laterally outward around the island pedicle utilizing iris scissors.  There was minimal undermining beneath the pedicle flap.
Complex Repair And Double M Plasty Text: The defect edges were debeveled with a #15 scalpel blade.  The primary defect was closed partially with a complex linear closure.  Given the location of the remaining defect, shape of the defect and the proximity to free margins a double M plasty was deemed most appropriate for complete closure of the defect.  Using a sterile surgical marker, an appropriate advancement flap was drawn incorporating the defect and placing the expected incisions within the relaxed skin tension lines where possible.    The area thus outlined was incised deep to adipose tissue with a #15 scalpel blade.  The skin margins were undermined to an appropriate distance in all directions utilizing iris scissors.
Repair Type: Intermediate
Size Of Margin In Cm: 0.2
Xenograft Text: The defect edges were debeveled with a #15 scalpel blade.  Given the location of the defect, shape of the defect and the proximity to free margins a xenograft was deemed most appropriate.  The graft was then trimmed to fit the size of the defect.  The graft was then placed in the primary defect and oriented appropriately.
Z Plasty Text: The lesion was extirpated to the level of the fat with a #15 scalpel blade.  Given the location of the defect, shape of the defect and the proximity to free margins a Z-plasty was deemed most appropriate for repair.  Using a sterile surgical marker, the appropriate transposition arms of the Z-plasty were drawn incorporating the defect and placing the expected incisions within the relaxed skin tension lines where possible.    The area thus outlined was incised deep to adipose tissue with a #15 scalpel blade.  The skin margins were undermined to an appropriate distance in all directions utilizing iris scissors.  The opposing transposition arms were then transposed into place in opposite direction and anchored with interrupted buried subcutaneous sutures.
W Plasty Text: The lesion was extirpated to the level of the fat with a #15 scalpel blade.  Given the location of the defect, shape of the defect and the proximity to free margins a W-plasty was deemed most appropriate for repair.  Using a sterile surgical marker, the appropriate transposition arms of the W-plasty were drawn incorporating the defect and placing the expected incisions within the relaxed skin tension lines where possible.    The area thus outlined was incised deep to adipose tissue with a #15 scalpel blade.  The skin margins were undermined to an appropriate distance in all directions utilizing iris scissors.  The opposing transposition arms were then transposed into place in opposite direction and anchored with interrupted buried subcutaneous sutures.
Burow's Advancement Flap Text: The defect edges were debeveled with a #15 scalpel blade.  Given the location of the defect and the proximity to free margins a Burow's advancement flap was deemed most appropriate.  Using a sterile surgical marker, the appropriate advancement flap was drawn incorporating the defect and placing the expected incisions within the relaxed skin tension lines where possible.    The area thus outlined was incised deep to adipose tissue with a #15 scalpel blade.  The skin margins were undermined to an appropriate distance in all directions utilizing iris scissors.
Complex Repair And Double Advancement Flap Text: The defect edges were debeveled with a #15 scalpel blade.  The primary defect was closed partially with a complex linear closure.  Given the location of the remaining defect, shape of the defect and the proximity to free margins a double advancement flap was deemed most appropriate for complete closure of the defect.  Using a sterile surgical marker, an appropriate advancement flap was drawn incorporating the defect and placing the expected incisions within the relaxed skin tension lines where possible.    The area thus outlined was incised deep to adipose tissue with a #15 scalpel blade.  The skin margins were undermined to an appropriate distance in all directions utilizing iris scissors.
Transposition Flap Text: The defect edges were debeveled with a #15 scalpel blade.  Given the location of the defect and the proximity to free margins a transposition flap was deemed most appropriate.  Using a sterile surgical marker, an appropriate transposition flap was drawn incorporating the defect.    The area thus outlined was incised deep to adipose tissue with a #15 scalpel blade.  The skin margins were undermined to an appropriate distance in all directions utilizing iris scissors.
Mucosal Advancement Flap Text: Given the location of the defect, shape of the defect and the proximity to free margins a mucosal advancement flap was deemed most appropriate. Incisions were made with a 15 blade scalpel in the appropriate fashion along the cutaneous vermilion border and the mucosal lip. The remaining actinically damaged mucosal tissue was excised.  The mucosal advancement flap was then elevated to the gingival sulcus with care taken to preserve the neurovascular structures and advanced into the primary defect. Care was taken to ensure that precise realignment of the vermilion border was achieved.
Slit Excision Additional Text (Leave Blank If You Do Not Want): A linear line was drawn on the skin overlying the lesion. An incision was made slowly until the lesion was visualized.  Once visualized, the lesion was removed with blunt dissection.
Composite Graft Text: The defect edges were debeveled with a #15 scalpel blade.  Given the location of the defect, shape of the defect, the proximity to free margins and the fact the defect was full thickness a composite graft was deemed most appropriate.  The defect was outline and then transferred to the donor site.  A full thickness graft was then excised from the donor site. The graft was then placed in the primary defect, oriented appropriately and then sutured into place.  The secondary defect was then repaired using a primary closure.
Bilateral Helical Rim Advancement Flap Text: The defect edges were debeveled with a #15 blade scalpel.  Given the location of the defect and the proximity to free margins (helical rim) a bilateral helical rim advancement flap was deemed most appropriate.  Using a sterile surgical marker, the appropriate advancement flaps were drawn incorporating the defect and placing the expected incisions between the helical rim and antihelix where possible.  The area thus outlined was incised through and through with a #15 scalpel blade.  With a skin hook and iris scissors, the flaps were gently and sharply undermined and freed up.
Complex Repair And M Plasty Text: The defect edges were debeveled with a #15 scalpel blade.  The primary defect was closed partially with a complex linear closure.  Given the location of the remaining defect, shape of the defect and the proximity to free margins an M plasty was deemed most appropriate for complete closure of the defect.  Using a sterile surgical marker, an appropriate advancement flap was drawn incorporating the defect and placing the expected incisions within the relaxed skin tension lines where possible.    The area thus outlined was incised deep to adipose tissue with a #15 scalpel blade.  The skin margins were undermined to an appropriate distance in all directions utilizing iris scissors.
Pre-Excision Curettage Text (Leave Blank If You Do Not Want): Prior to drawing the surgical margin the visible lesion was removed with electrodesiccation and curettage to clearly define the lesion size.
Curvilinear Excision Additional Text (Leave Blank If You Do Not Want): The margin was drawn around the clinically apparent lesion.  A curvilinear shape was then drawn on the skin incorporating the lesion and margins.  Incisions were then made along these lines to the appropriate tissue plane and the lesion was extirpated.
Advancement Flap (Double) Text: The defect edges were debeveled with a #15 scalpel blade.  Given the location of the defect and the proximity to free margins a double advancement flap was deemed most appropriate.  Using a sterile surgical marker, the appropriate advancement flaps were drawn incorporating the defect and placing the expected incisions within the relaxed skin tension lines where possible.    The area thus outlined was incised deep to adipose tissue with a #15 scalpel blade.  The skin margins were undermined to an appropriate distance in all directions utilizing iris scissors.
No Repair - Repaired With Adjacent Surgical Defect Text (Leave Blank If You Do Not Want): After the excision the defect was repaired concurrently with another surgical defect which was in close approximation.
Lab: [id number]
Advancement-Rotation Flap Text: The defect edges were debeveled with a #15 scalpel blade.  Given the location of the defect, shape of the defect and the proximity to free margins an advancement-rotation flap was deemed most appropriate.  Using a sterile surgical marker, an appropriate flap was drawn incorporating the defect and placing the expected incisions within the relaxed skin tension lines where possible. The area thus outlined was incised deep to adipose tissue with a #15 scalpel blade.  The skin margins were undermined to an appropriate distance in all directions utilizing iris scissors.
Complex Repair And Transposition Flap Text: The defect edges were debeveled with a #15 scalpel blade.  The primary defect was closed partially with a complex linear closure.  Given the location of the remaining defect, shape of the defect and the proximity to free margins a transposition flap was deemed most appropriate for complete closure of the defect.  Using a sterile surgical marker, an appropriate advancement flap was drawn incorporating the defect and placing the expected incisions within the relaxed skin tension lines where possible.    The area thus outlined was incised deep to adipose tissue with a #15 scalpel blade.  The skin margins were undermined to an appropriate distance in all directions utilizing iris scissors.
Double Island Pedicle Flap Text: The defect edges were debeveled with a #15 scalpel blade.  Given the location of the defect, shape of the defect and the proximity to free margins a double island pedicle advancement flap was deemed most appropriate.  Using a sterile surgical marker, an appropriate advancement flap was drawn incorporating the defect, outlining the appropriate donor tissue and placing the expected incisions within the relaxed skin tension lines where possible.    The area thus outlined was incised deep to adipose tissue with a #15 scalpel blade.  The skin margins were undermined to an appropriate distance in all directions around the primary defect and laterally outward around the island pedicle utilizing iris scissors.  There was minimal undermining beneath the pedicle flap.
Wound Care: Petrolatum
Size Of Lesion In Cm: 1.1
Complex Repair And Bilobe Flap Text: The defect edges were debeveled with a #15 scalpel blade.  The primary defect was closed partially with a complex linear closure.  Given the location of the remaining defect, shape of the defect and the proximity to free margins a bilobe flap was deemed most appropriate for complete closure of the defect.  Using a sterile surgical marker, an appropriate advancement flap was drawn incorporating the defect and placing the expected incisions within the relaxed skin tension lines where possible.    The area thus outlined was incised deep to adipose tissue with a #15 scalpel blade.  The skin margins were undermined to an appropriate distance in all directions utilizing iris scissors.
Keystone Flap Text: The defect edges were debeveled with a #15 scalpel blade.  Given the location of the defect, shape of the defect a keystone flap was deemed most appropriate.  Using a sterile surgical marker, an appropriate keystone flap was drawn incorporating the defect, outlining the appropriate donor tissue and placing the expected incisions within the relaxed skin tension lines where possible. The area thus outlined was incised deep to adipose tissue with a #15 scalpel blade.  The skin margins were undermined to an appropriate distance in all directions around the primary defect and laterally outward around the flap utilizing iris scissors.
Mercedes Flap Text: The defect edges were debeveled with a #15 scalpel blade.  Given the location of the defect, shape of the defect and the proximity to free margins a Mercedes flap was deemed most appropriate.  Using a sterile surgical marker, an appropriate advancement flap was drawn incorporating the defect and placing the expected incisions within the relaxed skin tension lines where possible. The area thus outlined was incised deep to adipose tissue with a #15 scalpel blade.  The skin margins were undermined to an appropriate distance in all directions utilizing iris scissors.
O-T Advancement Flap Text: The defect edges were debeveled with a #15 scalpel blade.  Given the location of the defect, shape of the defect and the proximity to free margins an O-T advancement flap was deemed most appropriate.  Using a sterile surgical marker, an appropriate advancement flap was drawn incorporating the defect and placing the expected incisions within the relaxed skin tension lines where possible.    The area thus outlined was incised deep to adipose tissue with a #15 scalpel blade.  The skin margins were undermined to an appropriate distance in all directions utilizing iris scissors.
Billing Type: Third-Party Bill
Helical Rim Advancement Flap Text: The defect edges were debeveled with a #15 blade scalpel.  Given the location of the defect and the proximity to free margins (helical rim) a double helical rim advancement flap was deemed most appropriate.  Using a sterile surgical marker, the appropriate advancement flaps were drawn incorporating the defect and placing the expected incisions between the helical rim and antihelix where possible.  The area thus outlined was incised through and through with a #15 scalpel blade.  With a skin hook and iris scissors, the flaps were gently and sharply undermined and freed up.
Partial Purse String (Simple) Text: Given the location of the defect and the characteristics of the surrounding skin a simple purse string closure was deemed most appropriate.  Undermining was performed circumferentially around the surgical defect.  A purse string suture was then placed and tightened. Wound tension of the circular defect prevented complete closure of the wound.
Advancement Flap (Single) Text: The defect edges were debeveled with a #15 scalpel blade.  Given the location of the defect and the proximity to free margins a single advancement flap was deemed most appropriate.  Using a sterile surgical marker, an appropriate advancement flap was drawn incorporating the defect and placing the expected incisions within the relaxed skin tension lines where possible.    The area thus outlined was incised deep to adipose tissue with a #15 scalpel blade.  The skin margins were undermined to an appropriate distance in all directions utilizing iris scissors.
Surgeon Performing The Repair (Optional): Dr. Rohan Smith MD
Rotation Flap Text: The defect edges were debeveled with a #15 scalpel blade.  Given the location of the defect, shape of the defect and the proximity to free margins a rotation flap was deemed most appropriate.  Using a sterile surgical marker, an appropriate rotation flap was drawn incorporating the defect and placing the expected incisions within the relaxed skin tension lines where possible.    The area thus outlined was incised deep to adipose tissue with a #15 scalpel blade.  The skin margins were undermined to an appropriate distance in all directions utilizing iris scissors.
Melolabial Transposition Flap Text: The defect edges were debeveled with a #15 scalpel blade.  Given the location of the defect and the proximity to free margins a melolabial flap was deemed most appropriate.  Using a sterile surgical marker, an appropriate melolabial transposition flap was drawn incorporating the defect.    The area thus outlined was incised deep to adipose tissue with a #15 scalpel blade.  The skin margins were undermined to an appropriate distance in all directions utilizing iris scissors.
Bilobed Flap Text: The defect edges were debeveled with a #15 scalpel blade.  Given the location of the defect and the proximity to free margins a bilobe flap was deemed most appropriate.  Using a sterile surgical marker, an appropriate bilobe flap drawn around the defect.    The area thus outlined was incised deep to adipose tissue with a #15 scalpel blade.  The skin margins were undermined to an appropriate distance in all directions utilizing iris scissors.
Hatchet Flap Text: The defect edges were debeveled with a #15 scalpel blade.  Given the location of the defect, shape of the defect and the proximity to free margins a hatchet flap was deemed most appropriate.  Using a sterile surgical marker, an appropriate hatchet flap was drawn incorporating the defect and placing the expected incisions within the relaxed skin tension lines where possible.    The area thus outlined was incised deep to adipose tissue with a #15 scalpel blade.  The skin margins were undermined to an appropriate distance in all directions utilizing iris scissors.
Split-Thickness Skin Graft Text: The defect edges were debeveled with a #15 scalpel blade.  Given the location of the defect, shape of the defect and the proximity to free margins a split thickness skin graft was deemed most appropriate.  Using a sterile surgical marker, the primary defect shape was transferred to the donor site. The split thickness graft was then harvested.  The skin graft was then placed in the primary defect and oriented appropriately.
Purse String (Intermediate) Text: Given the location of the defect and the characteristics of the surrounding skin a purse string intermediate closure was deemed most appropriate.  Undermining was performed circumfirentially around the surgical defect.  A purse string suture was then placed and tightened.
Home Suture Removal Text: Patient was provided a home suture removal kit and will remove their sutures at home.  If they have any questions or difficulties they will call the office.
Suture Removal: 14 days
Paramedian Forehead Flap Text: A decision was made to reconstruct the defect utilizing an interpolation axial flap and a staged reconstruction.  A telfa template was made of the defect.  This telfa template was then used to outline the paramedian forehead pedicle flap.  The donor area for the pedicle flap was then injected with anesthesia.  The flap was excised through the skin and subcutaneous tissue down to the layer of the underlying musculature.  The pedicle flap was carefully excised within this deep plane to maintain its blood supply.  The edges of the donor site were undermined.   The donor site was closed in a primary fashion.  The pedicle was then rotated into position and sutured.  Once the tube was sutured into place, adequate blood supply was confirmed with blanching and refill.  The pedicle was then wrapped with xeroform gauze and dressed appropriately with a telfa and gauze bandage to ensure continued blood supply and protect the attached pedicle.
Complex Repair And A-T Advancement Flap Text: The defect edges were debeveled with a #15 scalpel blade.  The primary defect was closed partially with a complex linear closure.  Given the location of the remaining defect, shape of the defect and the proximity to free margins an A-T advancement flap was deemed most appropriate for complete closure of the defect.  Using a sterile surgical marker, an appropriate advancement flap was drawn incorporating the defect and placing the expected incisions within the relaxed skin tension lines where possible.    The area thus outlined was incised deep to adipose tissue with a #15 scalpel blade.  The skin margins were undermined to an appropriate distance in all directions utilizing iris scissors.
Complex Repair And Melolabial Flap Text: The defect edges were debeveled with a #15 scalpel blade.  The primary defect was closed partially with a complex linear closure.  Given the location of the remaining defect, shape of the defect and the proximity to free margins a melolabial flap was deemed most appropriate for complete closure of the defect.  Using a sterile surgical marker, an appropriate advancement flap was drawn incorporating the defect and placing the expected incisions within the relaxed skin tension lines where possible.    The area thus outlined was incised deep to adipose tissue with a #15 scalpel blade.  The skin margins were undermined to an appropriate distance in all directions utilizing iris scissors.
Tissue Cultured Epidermal Autograft Text: The defect edges were debeveled with a #15 scalpel blade.  Given the location of the defect, shape of the defect and the proximity to free margins a tissue cultured epidermal autograft was deemed most appropriate.  The graft was then trimmed to fit the size of the defect.  The graft was then placed in the primary defect and oriented appropriately.
Positioning (Leave Blank If You Do Not Want): The patient was placed in a comfortable position exposing the surgical site.
Island Pedicle Flap-Requiring Vessel Identification Text: The defect edges were debeveled with a #15 scalpel blade.  Given the location of the defect, shape of the defect and the proximity to free margins an island pedicle advancement flap was deemed most appropriate.  Using a sterile surgical marker, an appropriate advancement flap was drawn, based on the axial vessel mentioned above, incorporating the defect, outlining the appropriate donor tissue and placing the expected incisions within the relaxed skin tension lines where possible.    The area thus outlined was incised deep to adipose tissue with a #15 scalpel blade.  The skin margins were undermined to an appropriate distance in all directions around the primary defect and laterally outward around the island pedicle utilizing iris scissors.  There was minimal undermining beneath the pedicle flap.
Path Notes (To The Dermatopathologist): Please check margins.
Complex Repair And Tissue Cultured Epidermal Autograft Text: The defect edges were debeveled with a #15 scalpel blade.  The primary defect was closed partially with a complex linear closure.  Given the location of the defect, shape of the defect and the proximity to free margins an tissue cultured epidermal autograft was deemed most appropriate to repair the remaining defect.  The graft was trimmed to fit the size of the remaining defect.  The graft was then placed in the primary defect, oriented appropriately, and sutured into place.
Rhombic Flap Text: The defect edges were debeveled with a #15 scalpel blade.  Given the location of the defect and the proximity to free margins a rhombic flap was deemed most appropriate.  Using a sterile surgical marker, an appropriate rhombic flap was drawn incorporating the defect.    The area thus outlined was incised deep to adipose tissue with a #15 scalpel blade.  The skin margins were undermined to an appropriate distance in all directions utilizing iris scissors.
V-Y Plasty Text: The defect edges were debeveled with a #15 scalpel blade.  Given the location of the defect, shape of the defect and the proximity to free margins an V-Y advancement flap was deemed most appropriate.  Using a sterile surgical marker, an appropriate advancement flap was drawn incorporating the defect and placing the expected incisions within the relaxed skin tension lines where possible.    The area thus outlined was incised deep to adipose tissue with a #15 scalpel blade.  The skin margins were undermined to an appropriate distance in all directions utilizing iris scissors.
Partial Purse String (Intermediate) Text: Given the location of the defect and the characteristics of the surrounding skin an intermediate purse string closure was deemed most appropriate.  Undermining was performed circumferentially around the surgical defect.  A purse string suture was then placed and tightened. Wound tension of the circular defect prevented complete closure of the wound.
Complex Repair And V-Y Plasty Text: The defect edges were debeveled with a #15 scalpel blade.  The primary defect was closed partially with a complex linear closure.  Given the location of the remaining defect, shape of the defect and the proximity to free margins a V-Y plasty was deemed most appropriate for complete closure of the defect.  Using a sterile surgical marker, an appropriate advancement flap was drawn incorporating the defect and placing the expected incisions within the relaxed skin tension lines where possible.    The area thus outlined was incised deep to adipose tissue with a #15 scalpel blade.  The skin margins were undermined to an appropriate distance in all directions utilizing iris scissors.
Bilobed Transposition Flap Text: The defect edges were debeveled with a #15 scalpel blade.  Given the location of the defect and the proximity to free margins a bilobed transposition flap was deemed most appropriate.  Using a sterile surgical marker, an appropriate bilobe flap drawn around the defect.    The area thus outlined was incised deep to adipose tissue with a #15 scalpel blade.  The skin margins were undermined to an appropriate distance in all directions utilizing iris scissors.
Intermediate / Complex Repair - Final Wound Length In Cm: 3.5
Cartilage Graft Text: The defect edges were debeveled with a #15 scalpel blade.  Given the location of the defect, shape of the defect, the fact the defect involved a full thickness cartilage defect a cartilage graft was deemed most appropriate.  An appropriate donor site was identified, cleansed, and anesthetized. The cartilage graft was then harvested and transferred to the recipient site, oriented appropriately and then sutured into place.  The secondary defect was then repaired using a primary closure.
Anesthesia Type: 1% lidocaine with 1:100,000 epinephrine and a 1:12 solution of 8.4% sodium bicarbonate
Complex Repair And Xenograft Text: The defect edges were debeveled with a #15 scalpel blade.  The primary defect was closed partially with a complex linear closure.  Given the location of the defect, shape of the defect and the proximity to free margins a xenograft was deemed most appropriate to repair the remaining defect.  The graft was trimmed to fit the size of the remaining defect.  The graft was then placed in the primary defect, oriented appropriately, and sutured into place.
Melolabial Interpolation Flap Text: A decision was made to reconstruct the defect utilizing an interpolation axial flap and a staged reconstruction.  A telfa template was made of the defect.  This telfa template was then used to outline the melolabial interpolation flap.  The donor area for the pedicle flap was then injected with anesthesia.  The flap was excised through the skin and subcutaneous tissue down to the layer of the underlying musculature.  The pedicle flap was carefully excised within this deep plane to maintain its blood supply.  The edges of the donor site were undermined.   The donor site was closed in a primary fashion.  The pedicle was then rotated into position and sutured.  Once the tube was sutured into place, adequate blood supply was confirmed with blanching and refill.  The pedicle was then wrapped with xeroform gauze and dressed appropriately with a telfa and gauze bandage to ensure continued blood supply and protect the attached pedicle.
V-Y Flap Text: The defect edges were debeveled with a #15 scalpel blade.  Given the location of the defect, shape of the defect and the proximity to free margins a V-Y flap was deemed most appropriate.  Using a sterile surgical marker, an appropriate advancement flap was drawn incorporating the defect and placing the expected incisions within the relaxed skin tension lines where possible.    The area thus outlined was incised deep to adipose tissue with a #15 scalpel blade.  The skin margins were undermined to an appropriate distance in all directions utilizing iris scissors.
Cheek-To-Nose Interpolation Flap Text: A decision was made to reconstruct the defect utilizing an interpolation axial flap and a staged reconstruction.  A telfa template was made of the defect.  This telfa template was then used to outline the Cheek-To-Nose Interpolation flap.  The donor area for the pedicle flap was then injected with anesthesia.  The flap was excised through the skin and subcutaneous tissue down to the layer of the underlying musculature.  The interpolation flap was carefully excised within this deep plane to maintain its blood supply.  The edges of the donor site were undermined.   The donor site was closed in a primary fashion.  The pedicle was then rotated into position and sutured.  Once the tube was sutured into place, adequate blood supply was confirmed with blanching and refill.  The pedicle was then wrapped with xeroform gauze and dressed appropriately with a telfa and gauze bandage to ensure continued blood supply and protect the attached pedicle.
Spiral Flap Text: The defect edges were debeveled with a #15 scalpel blade.  Given the location of the defect, shape of the defect and the proximity to free margins a spiral flap was deemed most appropriate.  Using a sterile surgical marker, an appropriate rotation flap was drawn incorporating the defect and placing the expected incisions within the relaxed skin tension lines where possible. The area thus outlined was incised deep to adipose tissue with a #15 scalpel blade.  The skin margins were undermined to an appropriate distance in all directions utilizing iris scissors.
Scalpel Size: 15 blade
Epidermal Closure: running cuticular
Detail Level: Detailed
Purse String (Simple) Text: Given the location of the defect and the characteristics of the surrounding skin a purse string simple closure was deemed most appropriate.  Undermining was performed circumferentially around the surgical defect.  A purse string suture was then placed and tightened.
Complex Repair And O-T Advancement Flap Text: The defect edges were debeveled with a #15 scalpel blade.  The primary defect was closed partially with a complex linear closure.  Given the location of the remaining defect, shape of the defect and the proximity to free margins an O-T advancement flap was deemed most appropriate for complete closure of the defect.  Using a sterile surgical marker, an appropriate advancement flap was drawn incorporating the defect and placing the expected incisions within the relaxed skin tension lines where possible.    The area thus outlined was incised deep to adipose tissue with a #15 scalpel blade.  The skin margins were undermined to an appropriate distance in all directions utilizing iris scissors.
Interpolation Flap Text: A decision was made to reconstruct the defect utilizing an interpolation axial flap and a staged reconstruction.  A telfa template was made of the defect.  This telfa template was then used to outline the interpolation flap.  The donor area for the pedicle flap was then injected with anesthesia.  The flap was excised through the skin and subcutaneous tissue down to the layer of the underlying musculature.  The interpolation flap was carefully excised within this deep plane to maintain its blood supply.  The edges of the donor site were undermined.   The donor site was closed in a primary fashion.  The pedicle was then rotated into position and sutured.  Once the tube was sutured into place, adequate blood supply was confirmed with blanching and refill.  The pedicle was then wrapped with xeroform gauze and dressed appropriately with a telfa and gauze bandage to ensure continued blood supply and protect the attached pedicle.
Hemostasis: Pressure and Electrodesiccation
Complex Repair And Skin Substitute Graft Text: The defect edges were debeveled with a #15 scalpel blade.  The primary defect was closed partially with a complex linear closure.  Given the location of the remaining defect, shape of the defect and the proximity to free margins a skin substitute graft was deemed most appropriate to repair the remaining defect.  The graft was trimmed to fit the size of the remaining defect.  The graft was then placed in the primary defect, oriented appropriately, and sutured into place.
Skin Substitute Text: The defect edges were debeveled with a #15 scalpel blade.  Given the location of the defect, shape of the defect and the proximity to free margins a skin substitute graft was deemed most appropriate.  The graft material was trimmed to fit the size of the defect. The graft was then placed in the primary defect and oriented appropriately.
Consent was obtained from the patient. The risks and benefits to therapy were discussed in detail. Specifically, the risks of infection, scarring, bleeding, prolonged wound healing, incomplete removal, allergy to anesthesia, nerve injury and recurrence were addressed. Prior to the procedure, the treatment site was clearly identified and confirmed by the patient. All components of Universal Protocol/PAUSE Rule completed.
Perilesional Excision Additional Text (Leave Blank If You Do Not Want): The margin was drawn around the clinically apparent lesion. Incisions were then made along these lines to the appropriate tissue plane and the lesion was extirpated.

## 2018-08-28 NOTE — PROCEDURE: MEDICATION COUNSELING
Fluconazole Counseling:  Patient counseled regarding adverse effects of fluconazole including but not limited to headache, diarrhea, nausea, upset stomach, liver function test abnormalities, taste disturbance, and stomach pain.  There is a rare possibility of liver failure that can occur when taking fluconazole.  The patient understands that monitoring of LFTs and kidney function test may be required, especially at baseline. The patient verbalized understanding of the proper use and possible adverse effects of fluconazole.  All of the patient's questions and concerns were addressed.
Taltz Counseling: I discussed with the patient the risks of ixekizumab including but not limited to immunosuppression, serious infections, worsening of inflammatory bowel disease and drug reactions.  The patient understands that monitoring is required including a PPD at baseline and must alert us or the primary physician if symptoms of infection or other concerning signs are noted.
Ivermectin Counseling:  Patient instructed to take medication on an empty stomach with a full glass of water.  Patient informed of potential adverse effects including but not limited to nausea, diarrhea, dizziness, itching, and swelling of the extremities or lymph nodes.  The patient verbalized understanding of the proper use and possible adverse effects of ivermectin.  All of the patient's questions and concerns were addressed.
Metronidazole Counseling:  I discussed with the patient the risks of metronidazole including but not limited to seizures, nausea/vomiting, a metallic taste in the mouth, nausea/vomiting and severe allergy.
Topical Clindamycin Counseling: Patient counseled that this medication may cause skin irritation or allergic reactions.  In the event of skin irritation, the patient was advised to reduce the amount of the drug applied or use it less frequently.   The patient verbalized understanding of the proper use and possible adverse effects of clindamycin.  All of the patient's questions and concerns were addressed.
Doxepin Counseling:  Patient advised that the medication is sedating and not to drive a car after taking this medication. Patient informed of potential adverse effects including but not limited to dry mouth, urinary retention, and blurry vision.  The patient verbalized understanding of the proper use and possible adverse effects of doxepin.  All of the patient's questions and concerns were addressed.
Minocycline Counseling: Patient advised regarding possible photosensitivity and discoloration of the teeth, skin, lips, tongue and gums.  Patient instructed to avoid sunlight, if possible.  When exposed to sunlight, patients should wear protective clothing, sunglasses, and sunscreen.  The patient was instructed to call the office immediately if the following severe adverse effects occur:  hearing changes, easy bruising/bleeding, severe headache, or vision changes.  The patient verbalized understanding of the proper use and possible adverse effects of minocycline.  All of the patient's questions and concerns were addressed.
Topical Clindamycin Pregnancy And Lactation Text: This medication is Pregnancy Category B and is considered safe during pregnancy. It is unknown if it is excreted in breast milk.
High Dose Vitamin A Counseling: Side effects reviewed, pt to contact office should one occur.
Drysol Counseling:  I discussed with the patient the risks of drysol/aluminum chloride including but not limited to skin rash, itching, irritation, burning.
Cellcept Pregnancy And Lactation Text: This medication is Pregnancy Category D and isn't considered safe during pregnancy. It is unknown if this medication is excreted in breast milk.
Benzoyl Peroxide Pregnancy And Lactation Text: This medication is Pregnancy Category C. It is unknown if benzoyl peroxide is excreted in breast milk.
Azathioprine Counseling:  I discussed with the patient the risks of azathioprine including but not limited to myelosuppression, immunosuppression, hepatotoxicity, lymphoma, and infections.  The patient understands that monitoring is required including baseline LFTs, Creatinine, possible TPMP genotyping and weekly CBCs for the first month and then every 2 weeks thereafter.  The patient verbalized understanding of the proper use and possible adverse effects of azathioprine.  All of the patient's questions and concerns were addressed.
Doxycycline Pregnancy And Lactation Text: This medication is Pregnancy Category D and not consider safe during pregnancy. It is also excreted in breast milk but is considered safe for shorter treatment courses.
Cimetidine Counseling:  I discussed with the patient the risks of Cimetidine including but not limited to gynecomastia, headache, diarrhea, nausea, drowsiness, arrhythmias, pancreatitis, skin rashes, psychosis, bone marrow suppression and kidney toxicity.
Erythromycin Counseling:  I discussed with the patient the risks of erythromycin including but not limited to GI upset, allergic reaction, drug rash, diarrhea, increase in liver enzymes, and yeast infections.
Tazorac Counseling:  Patient advised that medication is irritating and drying.  Patient may need to apply sparingly and wash off after an hour before eventually leaving it on overnight.  The patient verbalized understanding of the proper use and possible adverse effects of tazorac.  All of the patient's questions and concerns were addressed.
High Dose Vitamin A Pregnancy And Lactation Text: High dose vitamin A therapy is contraindicated during pregnancy and breast feeding.
Hydroquinone Pregnancy And Lactation Text: This medication has not been assigned a Pregnancy Risk Category but animal studies failed to show danger with the topical medication. It is unknown if the medication is excreted in breast milk.
Itraconazole Counseling:  I discussed with the patient the risks of itraconazole including but not limited to liver damage, nausea/vomiting, neuropathy, and severe allergy.  The patient understands that this medication is best absorbed when taken with acidic beverages such as non-diet cola or ginger ale.  The patient understands that monitoring is required including baseline LFTs and repeat LFTs at intervals.  The patient understands that they are to contact us or the primary physician if concerning signs are noted.
Birth Control Pills Counseling: Birth Control Pill Counseling: I discussed with the patient the potential side effects of OCPs including but not limited to increased risk of stroke, heart attack, thrombophlebitis, deep venous thrombosis, hepatic adenomas, breast changes, GI upset, headaches, and depression.  The patient verbalized understanding of the proper use and possible adverse effects of OCPs. All of the patient's questions and concerns were addressed.
Hydroxychloroquine Pregnancy And Lactation Text: This medication has been shown to cause fetal harm but it isn't assigned a Pregnancy Risk Category. There are small amounts excreted in breast milk.
Griseofulvin Counseling:  I discussed with the patient the risks of griseofulvin including but not limited to photosensitivity, cytopenia, liver damage, nausea/vomiting and severe allergy.  The patient understands that this medication is best absorbed when taken with a fatty meal (e.g., ice cream or french fries).
Dapsone Counseling: I discussed with the patient the risks of dapsone including but not limited to hemolytic anemia, agranulocytosis, rashes, methemoglobinemia, kidney failure, peripheral neuropathy, headaches, GI upset, and liver toxicity.  Patients who start dapsone require monitoring including baseline LFTs and weekly CBCs for the first month, then every month thereafter.  The patient verbalized understanding of the proper use and possible adverse effects of dapsone.  All of the patient's questions and concerns were addressed.
Colchicine Counseling:  Patient counseled regarding adverse effects including but not limited to stomach upset (nausea, vomiting, stomach pain, or diarrhea).  Patient instructed to limit alcohol consumption while taking this medication.  Colchicine may reduce blood counts especially with prolonged use.  The patient understands that monitoring of kidney function and blood counts may be required, especially at baseline. The patient verbalized understanding of the proper use and possible adverse effects of colchicine.  All of the patient's questions and concerns were addressed.
Nsaids Pregnancy And Lactation Text: These medications are considered safe up to 30 weeks gestation. It is excreted in breast milk.
5-Fu Counseling: 5-Fluorouracil Counseling:  I discussed with the patient the risks of 5-fluorouracil including but not limited to erythema, scaling, itching, weeping, crusting, and pain.
Stelara Counseling:  I discussed with the patient the risks of ustekinumab including but not limited to immunosuppression, malignancy, posterior leukoencephalopathy syndrome, and serious infections.  The patient understands that monitoring is required including a PPD at baseline and must alert us or the primary physician if symptoms of infection or other concerning signs are noted.
Dupixent Pregnancy And Lactation Text: This medication likely crosses the placenta but the risk for the fetus is uncertain. This medication is excreted in breast milk.
Topical Sulfur Applications Counseling: Topical Sulfur Counseling: Patient counseled that this medication may cause skin irritation or allergic reactions.  In the event of skin irritation, the patient was advised to reduce the amount of the drug applied or use it less frequently.   The patient verbalized understanding of the proper use and possible adverse effects of topical sulfur application.  All of the patient's questions and concerns were addressed.
Topical Retinoid counseling:  Patient advised to apply a pea-sized amount only at bedtime and wait 30 minutes after washing their face before applying.  If too drying, patient may add a non-comedogenic moisturizer. The patient verbalized understanding of the proper use and possible adverse effects of retinoids.  All of the patient's questions and concerns were addressed.
Xolair Pregnancy And Lactation Text: This medication is Pregnancy Category B and is considered safe during pregnancy. This medication is excreted in breast milk.
Spironolactone Counseling: Patient advised regarding risks of diarrhea, abdominal pain, hyperkalemia, birth defects (for female patients), liver toxicity and renal toxicity. The patient may need blood work to monitor liver and kidney function and potassium levels while on therapy. The patient verbalized understanding of the proper use and possible adverse effects of spironolactone.  All of the patient's questions and concerns were addressed.
Thalidomide Pregnancy And Lactation Text: This medication is Pregnancy Category X and is absolutely contraindicated during pregnancy. It is unknown if it is excreted in breast milk.
Zyclara Counseling:  I discussed with the patient the risks of imiquimod including but not limited to erythema, scaling, itching, weeping, crusting, and pain.  Patient understands that the inflammatory response to imiquimod is variable from person to person and was educated regarded proper titration schedule.  If flu-like symptoms develop, patient knows to discontinue the medication and contact us.
Carac Counseling:  I discussed with the patient the risks of Carac including but not limited to erythema, scaling, itching, weeping, crusting, and pain.
Glycopyrrolate Pregnancy And Lactation Text: This medication is Pregnancy Category B and is considered safe during pregnancy. It is unknown if it is excreted breast milk.
Drysol Pregnancy And Lactation Text: This medication is considered safe during pregnancy and breast feeding.
Imiquimod Pregnancy And Lactation Text: This medication is Pregnancy Category C. It is unknown if this medication is excreted in breast milk.
Quinolones Counseling:  I discussed with the patient the risks of fluoroquinolones including but not limited to GI upset, allergic reaction, drug rash, diarrhea, dizziness, photosensitivity, yeast infections, liver function test abnormalities, tendonitis/tendon rupture.
Cyclosporine Pregnancy And Lactation Text: This medication is Pregnancy Category C and it isn't know if it is safe during pregnancy. This medication is excreted in breast milk.
Cimzia Pregnancy And Lactation Text: This medication crosses the placenta but can be considered safe in certain situations. Cimzia may be excreted in breast milk.
Rifampin Pregnancy And Lactation Text: This medication is Pregnancy Category C and it isn't know if it is safe during pregnancy. It is also excreted in breast milk and should not be used if you are breast feeding.
Siliq Counseling:  I discussed with the patient the risks of Siliq including but not limited to new or worsening depression, suicidal thoughts and behavior, immunosuppression, malignancy, posterior leukoencephalopathy syndrome, and serious infections.  The patient understands that monitoring is required including a PPD at baseline and must alert us or the primary physician if symptoms of infection or other concerning signs are noted. There is also a special program designed to monitor depression which is required with Siliq.
Cosentyx Counseling:  I discussed with the patient the risks of Cosentyx including but not limited to worsening of Crohn's disease, immunosuppression, allergic reactions and infections.  The patient understands that monitoring is required including a PPD at baseline and must alert us or the primary physician if symptoms of infection or other concerning signs are noted.
Rifampin Counseling: I discussed with the patient the risks of rifampin including but not limited to liver damage, kidney damage, red-orange body fluids, nausea/vomiting and severe allergy.
Thalidomide Counseling: I discussed with the patient the risks of thalidomide including but not limited to birth defects, anxiety, weakness, chest pain, dizziness, cough and severe allergy.
Otezla Pregnancy And Lactation Text: This medication is Pregnancy Category C and it isn't known if it is safe during pregnancy. It is unknown if it is excreted in breast milk.
Cellcept Counseling:  I discussed with the patient the risks of mycophenolate mofetil including but not limited to infection/immunosuppression, GI upset, hypokalemia, hypercholesterolemia, bone marrow suppression, lymphoproliferative disorders, malignancy, GI ulceration/bleed/perforation, colitis, interstitial lung disease, kidney failure, progressive multifocal leukoencephalopathy, and birth defects.  The patient understands that monitoring is required including a baseline creatinine and regular CBC testing. In addition, patient must alert us immediately if symptoms of infection or other concerning signs are noted.
Ivermectin Pregnancy And Lactation Text: This medication is Pregnancy Category C and it isn't known if it is safe during pregnancy. It is also excreted in breast milk.
Terbinafine Pregnancy And Lactation Text: This medication is Pregnancy Category B and is considered safe during pregnancy. It is also excreted in breast milk and breast feeding isn't recommended.
Hydroxychloroquine Counseling:  I discussed with the patient that a baseline ophthalmologic exam is needed at the start of therapy and every year thereafter while on therapy. A CBC may also be warranted for monitoring.  The side effects of this medication were discussed with the patient, including but not limited to agranulocytosis, aplastic anemia, seizures, rashes, retinopathy, and liver toxicity. Patient instructed to call the office should any adverse effect occur.  The patient verbalized understanding of the proper use and possible adverse effects of Plaquenil.  All the patient's questions and concerns were addressed.
Elidel Counseling: Patient may experience a mild burning sensation during topical application. Elidel is not approved in children less than 2 years of age. There have been case reports of hematologic and skin malignancies in patients using topical calcineurin inhibitors although causality is questionable.
Ketoconazole Pregnancy And Lactation Text: This medication is Pregnancy Category C and it isn't know if it is safe during pregnancy. It is also excreted in breast milk and breast feeding isn't recommended.
Hydroxyzine Pregnancy And Lactation Text: This medication is not safe during pregnancy and should not be taken. It is also excreted in breast milk and breast feeding isn't recommended.
Glycopyrrolate Counseling:  I discussed with the patient the risks of glycopyrrolate including but not limited to skin rash, drowsiness, dry mouth, difficulty urinating, and blurred vision.
Humira Counseling:  I discussed with the patient the risks of adalimumab including but not limited to myelosuppression, immunosuppression, autoimmune hepatitis, demyelinating diseases, lymphoma, and serious infections.  The patient understands that monitoring is required including a PPD at baseline and must alert us or the primary physician if symptoms of infection or other concerning signs are noted.
Azithromycin Counseling:  I discussed with the patient the risks of azithromycin including but not limited to GI upset, allergic reaction, drug rash, diarrhea, and yeast infections.
Cimzia Counseling:  I discussed with the patient the risks of Cimzia including but not limited to immunosuppression, allergic reactions and infections.  The patient understands that monitoring is required including a PPD at baseline and must alert us or the primary physician if symptoms of infection or other concerning signs are noted.
Metronidazole Pregnancy And Lactation Text: This medication is Pregnancy Category B and considered safe during pregnancy.  It is also excreted in breast milk.
Humira Pregnancy And Lactation Text: This medication is Pregnancy Category B and is considered safe during pregnancy. It is unknown if this medication is excreted in breast milk.
Cephalexin Counseling: I counseled the patient regarding use of cephalexin as an antibiotic for prophylactic and/or therapeutic purposes. Cephalexin (commonly prescribed under brand name Keflex) is a cephalosporin antibiotic which is active against numerous classes of bacteria, including most skin bacteria. Side effects may include nausea, diarrhea, gastrointestinal upset, rash, hives, yeast infections, and in rare cases, hepatitis, kidney disease, seizures, fever, confusion, neurologic symptoms, and others. Patients with severe allergies to penicillin medications are cautioned that there is about a 10% incidence of cross-reactivity with cephalosporins. When possible, patients with penicillin allergies should use alternatives to cephalosporins for antibiotic therapy.
Gabapentin Counseling: I discussed with the patient the risks of gabapentin including but not limited to dizziness, somnolence, fatigue and ataxia.
Hydroquinone Counseling:  Patient advised that medication may result in skin irritation, lightening (hypopigmentation), dryness, and burning.  In the event of skin irritation, the patient was advised to reduce the amount of the drug applied or use it less frequently.  Rarely, spots that are treated with hydroquinone can become darker (pseudoochronosis).  Should this occur, patient instructed to stop medication and call the office. The patient verbalized understanding of the proper use and possible adverse effects of hydroquinone.  All of the patient's questions and concerns were addressed.
Eucrisa Counseling: Patient may experience a mild burning sensation during topical application. Eucrisa is not approved in children less than 2 years of age.
Carac Pregnancy And Lactation Text: This medication is Pregnancy Category X and contraindicated in pregnancy and in women who may become pregnant. It is unknown if this medication is excreted in breast milk.
Tetracycline Pregnancy And Lactation Text: This medication is Pregnancy Category D and not consider safe during pregnancy. It is also excreted in breast milk.
Colchicine Pregnancy And Lactation Text: This medication is Pregnancy Category C and isn't considered safe during pregnancy. It is excreted in breast milk.
Odomzo Counseling- I discussed with the patient the risks of Odomzo including but not limited to nausea, vomiting, diarrhea, constipation, weight loss, changes in the sense of taste, decreased appetite, muscle spasms, and hair loss.  The patient verbalized understanding of the proper use and possible adverse effects of Odomzo.  All of the patient's questions and concerns were addressed.
Prednisone Counseling:  I discussed with the patient the risks of prolonged use of prednisone including but not limited to weight gain, insomnia, osteoporosis, mood changes, diabetes, susceptibility to infection, glaucoma and high blood pressure.  In cases where prednisone use is prolonged, patients should be monitored with blood pressure checks, serum glucose levels and an eye exam.  Additionally, the patient may need to be placed on GI prophylaxis, PCP prophylaxis, and calcium and vitamin D supplementation and/or a bisphosphonate.  The patient verbalized understanding of the proper use and the possible adverse effects of prednisone.  All of the patient's questions and concerns were addressed.
Spironolactone Pregnancy And Lactation Text: This medication can cause feminization of the male fetus and should be avoided during pregnancy. The active metabolite is also found in breast milk.
Tremfya Pregnancy And Lactation Text: The risk during pregnancy and breastfeeding is uncertain with this medication.
Bexarotene Pregnancy And Lactation Text: This medication is Pregnancy Category X and should not be given to women who are pregnant or may become pregnant. This medication should not be used if you are breast feeding.
Albendazole Counseling:  I discussed with the patient the risks of albendazole including but not limited to cytopenia, kidney damage, nausea/vomiting and severe allergy.  The patient understands that this medication is being used in an off-label manner.
Itraconazole Pregnancy And Lactation Text: This medication is Pregnancy Category C and it isn't know if it is safe during pregnancy. It is also excreted in breast milk.
Protopic Pregnancy And Lactation Text: This medication is Pregnancy Category C. It is unknown if this medication is excreted in breast milk when applied topically.
Topical Sulfur Applications Pregnancy And Lactation Text: This medication is Pregnancy Category C and has an unknown safety profile during pregnancy. It is unknown if this topical medication is excreted in breast milk.
Erivedge Counseling- I discussed with the patient the risks of Erivedge including but not limited to nausea, vomiting, diarrhea, constipation, weight loss, changes in the sense of taste, decreased appetite, muscle spasms, and hair loss.  The patient verbalized understanding of the proper use and possible adverse effects of Erivedge.  All of the patient's questions and concerns were addressed.
Valtrex Pregnancy And Lactation Text: this medication is Pregnancy Category B and is considered safe during pregnancy. This medication is not directly found in breast milk but it's metabolite acyclovir is present.
Imiquimod Counseling:  I discussed with the patient the risks of imiquimod including but not limited to erythema, scaling, itching, weeping, crusting, and pain.  Patient understands that the inflammatory response to imiquimod is variable from person to person and was educated regarded proper titration schedule.  If flu-like symptoms develop, patient knows to discontinue the medication and contact us.
Erythromycin Pregnancy And Lactation Text: This medication is Pregnancy Category B and is considered safe during pregnancy. It is also excreted in breast milk.
Griseofulvin Pregnancy And Lactation Text: This medication is Pregnancy Category X and is known to cause serious birth defects. It is unknown if this medication is excreted in breast milk but breast feeding should be avoided.
Cephalexin Pregnancy And Lactation Text: This medication is Pregnancy Category B and considered safe during pregnancy.  It is also excreted in breast milk but can be used safely for shorter doses.
Dupixent Counseling: I discussed with the patient the risks of dupilumab including but not limited to eye infection and irritation, cold sores, injection site reactions, worsening of asthma, allergic reactions and increased risk of parasitic infection.  Live vaccines should be avoided while taking dupilumab. Dupilumab will also interact with certain medications such as warfarin and cyclosporine. The patient understands that monitoring is required and they must alert us or the primary physician if symptoms of infection or other concerning signs are noted.
Cyclophosphamide Pregnancy And Lactation Text: This medication is Pregnancy Category D and it isn't considered safe during pregnancy. This medication is excreted in breast milk.
Dapsone Pregnancy And Lactation Text: This medication is Pregnancy Category C and is not considered safe during pregnancy or breast feeding.
Doxepin Pregnancy And Lactation Text: This medication is Pregnancy Category C and it isn't known if it is safe during pregnancy. It is also excreted in breast milk and breast feeding isn't recommended.
Enbrel Counseling:  I discussed with the patient the risks of etanercept including but not limited to myelosuppression, immunosuppression, autoimmune hepatitis, demyelinating diseases, lymphoma, and infections.  The patient understands that monitoring is required including a PPD at baseline and must alert us or the primary physician if symptoms of infection or other concerning signs are noted.
Valtrex Counseling: I discussed with the patient the risks of valacyclovir including but not limited to kidney damage, nausea, vomiting and severe allergy.  The patient understands that if the infection seems to be worsening or is not improving, they are to call.
Rituxan Counseling:  I discussed with the patient the risks of Rituxan infusions. Side effects can include infusion reactions, severe drug rashes including mucocutaneous reactions, reactivation of latent hepatitis and other infections and rarely progressive multifocal leukoencephalopathy.  All of the patient's questions and concerns were addressed.
Sski Pregnancy And Lactation Text: This medication is Pregnancy Category D and isn't considered safe during pregnancy. It is excreted in breast milk.
Bactrim Pregnancy And Lactation Text: This medication is Pregnancy Category D and is known to cause fetal risk.  It is also excreted in breast milk.
Acitretin Counseling:  I discussed with the patient the risks of acitretin including but not limited to hair loss, dry lips/skin/eyes, liver damage, hyperlipidemia, depression/suicidal ideation, photosensitivity.  Serious rare side effects can include but are not limited to pancreatitis, pseudotumor cerebri, bony changes, clot formation/stroke/heart attack.  Patient understands that alcohol is contraindicated since it can result in liver toxicity and significantly prolong the elimination of the drug by many years.
Bexarotene Counseling:  I discussed with the patient the risks of bexarotene including but not limited to hair loss, dry lips/skin/eyes, liver abnormalities, hyperlipidemia, pancreatitis, depression/suicidal ideation, photosensitivity, drug rash/allergic reactions, hypothyroidism, anemia, leukopenia, infection, cataracts, and teratogenicity.  Patient understands that they will need regular blood tests to check lipid profile, liver function tests, white blood cell count, thyroid function tests and pregnancy test if applicable.
Tetracycline Counseling: Patient counseled regarding possible photosensitivity and increased risk for sunburn.  Patient instructed to avoid sunlight, if possible.  When exposed to sunlight, patients should wear protective clothing, sunglasses, and sunscreen.  The patient was instructed to call the office immediately if the following severe adverse effects occur:  hearing changes, easy bruising/bleeding, severe headache, or vision changes.  The patient verbalized understanding of the proper use and possible adverse effects of tetracycline.  All of the patient's questions and concerns were addressed. Patient understands to avoid pregnancy while on therapy due to potential birth defects.
Azithromycin Pregnancy And Lactation Text: This medication is considered safe during pregnancy and is also secreted in breast milk.
Clindamycin Pregnancy And Lactation Text: This medication can be used in pregnancy if certain situations. Clindamycin is also present in breast milk.
Doxycycline Counseling:  Patient counseled regarding possible photosensitivity and increased risk for sunburn.  Patient instructed to avoid sunlight, if possible.  When exposed to sunlight, patients should wear protective clothing, sunglasses, and sunscreen.  The patient was instructed to call the office immediately if the following severe adverse effects occur:  hearing changes, easy bruising/bleeding, severe headache, or vision changes.  The patient verbalized understanding of the proper use and possible adverse effects of doxycycline.  All of the patient's questions and concerns were addressed.
Minoxidil Counseling: Minoxidil is a topical medication which can increase blood flow where it is applied. It is uncertain how this medication increases hair growth. Side effects are uncommon and include stinging and allergic reactions.
Xelosminz Pregnancy And Lactation Text: This medication is Pregnancy Category D and is not considered safe during pregnancy.  The risk during breast feeding is also uncertain.
Picato Counseling:  I discussed with the patient the risks of Picato including but not limited to erythema, scaling, itching, weeping, crusting, and pain.
Infliximab Counseling:  I discussed with the patient the risks of infliximab including but not limited to myelosuppression, immunosuppression, autoimmune hepatitis, demyelinating diseases, lymphoma, and serious infections.  The patient understands that monitoring is required including a PPD at baseline and must alert us or the primary physician if symptoms of infection or other concerning signs are noted.
SSKI Counseling:  I discussed with the patient the risks of SSKI including but not limited to thyroid abnormalities, metallic taste, GI upset, fever, headache, acne, arthralgias, paraesthesias, lymphadenopathy, easy bleeding, arrhythmias, and allergic reaction.
Otezla Counseling: The side effects of Otezla were discussed with the patient, including but not limited to worsening or new depression, weight loss, diarrhea, nausea, upper respiratory tract infection, and headache. Patient instructed to call the office should any adverse effect occur.  The patient verbalized understanding of the proper use and possible adverse effects of Otezla.  All the patient's questions and concerns were addressed.
Xeljanz Counseling: I discussed with the patient the risks of Xeljanz therapy including increased risk of infection, liver issues, headache, diarrhea, or cold symptoms. Live vaccines should be avoided. They were instructed to call if they have any problems.
Simponi Counseling:  I discussed with the patient the risks of golimumab including but not limited to myelosuppression, immunosuppression, autoimmune hepatitis, demyelinating diseases, lymphoma, and serious infections.  The patient understands that monitoring is required including a PPD at baseline and must alert us or the primary physician if symptoms of infection or other concerning signs are noted.
Use Enhanced Medication Counseling?: No
Benzoyl Peroxide Counseling: Patient counseled that medicine may cause skin irritation and bleach clothing.  In the event of skin irritation, the patient was advised to reduce the amount of the drug applied or use it less frequently.   The patient verbalized understanding of the proper use and possible adverse effects of benzoyl peroxide.  All of the patient's questions and concerns were addressed.
Solaraze Counseling:  I discussed with the patient the risks of Solaraze including but not limited to erythema, scaling, itching, weeping, crusting, and pain.
Xolair Counseling:  Patient informed of potential adverse effects including but not limited to fever, muscle aches, rash and allergic reactions.  The patient verbalized understanding of the proper use and possible adverse effects of Xolair.  All of the patient's questions and concerns were addressed.
Arava Counseling:  Patient counseled regarding adverse effects of Arava including but not limited to nausea, vomiting, abnormalities in liver function tests. Patients may develop mouth sores, rash, diarrhea, and abnormalities in blood counts. The patient understands that monitoring is required including LFTs and blood counts.  There is a rare possibility of scarring of the liver and lung problems that can occur when taking methotrexate. Persistent nausea, loss of appetite, pale stools, dark urine, cough, and shortness of breath should be reported immediately. Patient advised to discontinue Arava treatment and consult with a physician prior to attempting conception. The patient will have to undergo a treatment to eliminate Arava from the body prior to conception.
Rituxan Pregnancy And Lactation Text: This medication is Pregnancy Category C and it isn't know if it is safe during pregnancy. It is unknown if this medication is excreted in breast milk but similar antibodies are known to be excreted.
Cyclophosphamide Counseling:  I discussed with the patient the risks of cyclophosphamide including but not limited to hair loss, hormonal abnormalities, decreased fertility, abdominal pain, diarrhea, nausea and vomiting, bone marrow suppression and infection. The patient understands that monitoring is required while taking this medication.
Birth Control Pills Pregnancy And Lactation Text: This medication should be avoided if pregnant and for the first 30 days post-partum.
Acitretin Pregnancy And Lactation Text: This medication is Pregnancy Category X and should not be given to women who are pregnant or may become pregnant in the future. This medication is excreted in breast milk.
Terbinafine Counseling: Patient counseling regarding adverse effects of terbinafine including but not limited to headache, diarrhea, rash, upset stomach, liver function test abnormalities, itching, taste/smell disturbance, nausea, abdominal pain, and flatulence.  There is a rare possibility of liver failure that can occur when taking terbinafine.  The patient understands that a baseline LFT and kidney function test may be required. The patient verbalized understanding of the proper use and possible adverse effects of terbinafine.  All of the patient's questions and concerns were addressed.
Solaraze Pregnancy And Lactation Text: This medication is Pregnancy Category B and is considered safe. There is some data to suggest avoiding during the third trimester. It is unknown if this medication is excreted in breast milk.
Hydroxyzine Counseling: Patient advised that the medication is sedating and not to drive a car after taking this medication.  Patient informed of potential adverse effects including but not limited to dry mouth, urinary retention, and blurry vision.  The patient verbalized understanding of the proper use and possible adverse effects of hydroxyzine.  All of the patient's questions and concerns were addressed.
Isotretinoin Pregnancy And Lactation Text: This medication is Pregnancy Category X and is considered extremely dangerous during pregnancy. It is unknown if it is excreted in breast milk.
Ketoconazole Counseling:   Patient counseled regarding improving absorption with orange juice.  Adverse effects include but are not limited to breast enlargement, headache, diarrhea, nausea, upset stomach, liver function test abnormalities, taste disturbance, and stomach pain.  There is a rare possibility of liver failure that can occur when taking ketoconazole. The patient understands that monitoring of LFTs may be required, especially at baseline. The patient verbalized understanding of the proper use and possible adverse effects of ketoconazole.  All of the patient's questions and concerns were addressed.
Oxybutynin Counseling:  I discussed with the patient the risks of oxybutynin including but not limited to skin rash, drowsiness, dry mouth, difficulty urinating, and blurred vision.
Nsaids Counseling: NSAID Counseling: I discussed with the patient that NSAIDs should be taken with food. Prolonged use of NSAIDs can result in the development of stomach ulcers.  Patient advised to stop taking NSAIDs if abdominal pain occurs.  The patient verbalized understanding of the proper use and possible adverse effects of NSAIDs.  All of the patient's questions and concerns were addressed.
Isotretinoin Counseling: Patient should get monthly blood tests, not donate blood, not drive at night if vision affected, not share medication, and not undergo elective surgery for 6 months after tx completed. Side effects reviewed, pt to contact office should one occur.
Cyclosporine Counseling:  I discussed with the patient the risks of cyclosporine including but not limited to hypertension, gingival hyperplasia,myelosuppression, immunosuppression, liver damage, kidney damage, neurotoxicity, lymphoma, and serious infections. The patient understands that monitoring is required including baseline blood pressure, CBC, CMP, lipid panel and uric acid, and then 1-2 times monthly CMP and blood pressure.
Bactrim Counseling:  I discussed with the patient the risks of sulfa antibiotics including but not limited to GI upset, allergic reaction, drug rash, diarrhea, dizziness, photosensitivity, and yeast infections.  Rarely, more serious reactions can occur including but not limited to aplastic anemia, agranulocytosis, methemoglobinemia, blood dyscrasias, liver or kidney failure, lung infiltrates or desquamative/blistering drug rashes.
Protopic Counseling: Patient may experience a mild burning sensation during topical application. Protopic is not approved in children less than 2 years of age. There have been case reports of hematologic and skin malignancies in patients using topical calcineurin inhibitors although causality is questionable.
Detail Level: Simple
Tremfya Counseling: I discussed with the patient the risks of guselkumab including but not limited to immunosuppression, serious infections, worsening of inflammatory bowel disease and drug reactions.  The patient understands that monitoring is required including a PPD at baseline and must alert us or the primary physician if symptoms of infection or other concerning signs are noted.
Clofazimine Counseling:  I discussed with the patient the risks of clofazimine including but not limited to skin and eye pigmentation, liver damage, nausea/vomiting, gastrointestinal bleeding and allergy.
Tazorac Pregnancy And Lactation Text: This medication is not safe during pregnancy. It is unknown if this medication is excreted in breast milk.
Methotrexate Pregnancy And Lactation Text: This medication is Pregnancy Category X and is known to cause fetal harm. This medication is excreted in breast milk.
Clindamycin Counseling: I counseled the patient regarding use of clindamycin as an antibiotic for prophylactic and/or therapeutic purposes. Clindamycin is active against numerous classes of bacteria, including skin bacteria. Side effects may include nausea, diarrhea, gastrointestinal upset, rash, hives, yeast infections, and in rare cases, colitis.
Methotrexate Counseling:  Patient counseled regarding adverse effects of methotrexate including but not limited to nausea, vomiting, abnormalities in liver function tests. Patients may develop mouth sores, rash, diarrhea, and abnormalities in blood counts. The patient understands that monitoring is required including LFT's and blood counts.  There is a rare possibility of scarring of the liver and lung problems that can occur when taking methotrexate. Persistent nausea, loss of appetite, pale stools, dark urine, cough, and shortness of breath should be reported immediately. Patient advised to discontinue methotrexate treatment at least three months before attempting to become pregnant.  I discussed the need for folate supplements while taking methotrexate.  These supplements can decrease side effects during methotrexate treatment. The patient verbalized understanding of the proper use and possible adverse effects of methotrexate.  All of the patient's questions and concerns were addressed.

## 2018-09-11 ENCOUNTER — HOSPITAL ENCOUNTER (OUTPATIENT)
Dept: RADIOLOGY | Facility: MEDICAL CENTER | Age: 78
End: 2018-09-11
Attending: FAMILY MEDICINE
Payer: MEDICARE

## 2018-09-11 DIAGNOSIS — M85.89 OSTEOPENIA OF MULTIPLE SITES: ICD-10-CM

## 2018-09-11 PROCEDURE — 77080 DXA BONE DENSITY AXIAL: CPT

## 2018-09-12 ENCOUNTER — APPOINTMENT (RX ONLY)
Dept: URBAN - METROPOLITAN AREA CLINIC 4 | Facility: CLINIC | Age: 78
Setting detail: DERMATOLOGY
End: 2018-09-12

## 2018-09-12 DIAGNOSIS — Z48.02 ENCOUNTER FOR REMOVAL OF SUTURES: ICD-10-CM

## 2018-09-12 PROCEDURE — ? SUTURE REMOVAL (GLOBAL PERIOD)

## 2018-09-12 ASSESSMENT — LOCATION SIMPLE DESCRIPTION DERM: LOCATION SIMPLE: RIGHT FOREARM

## 2018-09-12 ASSESSMENT — LOCATION DETAILED DESCRIPTION DERM: LOCATION DETAILED: RIGHT DISTAL DORSAL FOREARM

## 2018-09-12 ASSESSMENT — LOCATION ZONE DERM: LOCATION ZONE: ARM

## 2018-09-12 NOTE — PROCEDURE: SUTURE REMOVAL (GLOBAL PERIOD)
Detail Level: Detailed
Add 88846 Cpt? (Important Note: In 2017 The Use Of 22775 Is Being Tracked By Cms To Determine Future Global Period Reimbursement For Global Periods): no

## 2018-09-17 ENCOUNTER — PATIENT MESSAGE (OUTPATIENT)
Dept: PULMONOLOGY | Facility: HOSPICE | Age: 78
End: 2018-09-17

## 2018-09-17 DIAGNOSIS — J44.9 CHRONIC OBSTRUCTIVE PULMONARY DISEASE, UNSPECIFIED COPD TYPE (HCC): ICD-10-CM

## 2018-09-17 RX ORDER — AZITHROMYCIN 250 MG/1
TABLET, FILM COATED ORAL
Qty: 6 TAB | Refills: 0 | Status: SHIPPED | OUTPATIENT
Start: 2018-09-17 | End: 2018-11-15 | Stop reason: SDUPTHER

## 2018-09-17 RX ORDER — METHYLPREDNISOLONE 4 MG/1
TABLET ORAL
Qty: 21 TAB | Refills: 0 | Status: SHIPPED | OUTPATIENT
Start: 2018-09-17 | End: 2018-11-15 | Stop reason: SDUPTHER

## 2018-09-17 NOTE — TELEPHONE ENCOUNTER
----- Message from Maddy Hodge sent at 9/17/2018 12:55 PM PDT -----  Regarding: Non-Urgent Medical Question  Contact: [phone]  Hi  I have some congestion in my chest with coughing that started on Friday. I have been taking the Azithromycin and Methylpred  since Saturday. So far I am not getting any worse maybe a little better.   Do I need to see you or should I finish my meds and see how I am doing? Also can you send a prescription in so that I can have these on hand.  Thank you, Maddy Hodge

## 2018-09-17 NOTE — TELEPHONE ENCOUNTER
Caller Name: Maddy Hodge                 Call Back Number: [phone] (home)         Patient approves a detailed voicemail message: N\A    Have we ever prescribed this med? Yes.  If yes, what date? BOTH 6/22/18    Last OV: 8/17/18 DOMINIQUE Pacheco       Next OV: 2/13/19 DOMINIQUE Pacheco     DX: COPD     Medications:  Current Outpatient Prescriptions   Medication Sig Dispense Refill   • furosemide (LASIX) 20 MG Tab Take 1 Tab by mouth every day. 90 Tab 3   • potassium chloride SA (KDUR) 20 MEQ Tab CR Take 1 Tab by mouth 2 times a day. 90 Tab 3   • diazePAM (VALIUM) 5 MG Tab Take 1 Tab by mouth every 8 hours as needed for Anxiety for up to 90 days. 60 Tab 2   • irbesartan (AVAPRO) 75 MG tablet Take 1 Tab by mouth every day. 90 Tab 3   • levothyroxine (SYNTHROID) 50 MCG Tab TAKE ONE TABLET BY MOUTH IN THE MORNING ON AN EMPTY STOMACH 90 Tab 3   • pregabalin (LYRICA) 150 MG Cap Take 200 mg by mouth 3 times a day.     • imipramine (TOFRANIL) 10 MG Tab Take 2 Tabs by mouth every evening. (Patient taking differently: Take 10 mg by mouth every evening.) 180 Tab 2   • duloxetine (CYMBALTA) 60 MG Cap DR Particles delayed-release capsule TAKE ONE CAPSULE BY MOUTH ONCE DAILY (Patient taking differently: Take 60 mg by mouth every day. TAKE ONE CAPSULE BY MOUTH ONCE DAILY) 90 Cap 2   • AMITIZA 24 MCG capsule Take 24 mcg by mouth every morning with breakfast.     • Umeclidinium Bromide (INCRUSE ELLIPTA) 62.5 MCG/INH AEROSOL POWDER, BREATH ACTIVATED Inhale 1 Puff by mouth every day. 1 Each 11   • estradiol (ESTRACE) 0.5 MG tablet TAKE ONE TABLET BY MOUTH ONCE DAILY 90 Tab 3   • polyethylene glycol/lytes (MIRALAX) Pack Take 17 g by mouth every day.     • XTAMPZA ER 18 MG Capsule Extended Release 12 hour Abuse-Deterrent Take 18 mg by mouth 2 Times a Day.     • Cyanocobalamin 2500 MCG Chew Tab Take 1 Tab by mouth every day.     • omeprazole (PRILOSEC) 20 MG delayed-release capsule Take 1 Cap by mouth every day. 90 Cap 3   •  VENTOLIN  (90 BASE) MCG/ACT Aero Soln inhalation aerosol Inhale 2 Puffs by mouth every 6 hours as needed for Shortness of Breath. 1 Inhaler 5   • aspirin EC (ECOTRIN) 81 MG Tablet Delayed Response Take 81 mg by mouth every day.     • docusate sodium (COLACE) 250 MG capsule Take 250 mg by mouth every day.     • Probiotic Product ([employer]) CAPS Take 1 Cap by mouth 2 Times a Day.       No current facility-administered medications for this visit.

## 2018-09-17 NOTE — TELEPHONE ENCOUNTER
Message sent via [facility] encouraged the patient to finish both RX if symptoms worsen advised she contact the clinic or go to the ER. Please send emergency Rx.

## 2018-10-01 DIAGNOSIS — N95.1 MENOPAUSAL SYMPTOMS: ICD-10-CM

## 2018-10-02 RX ORDER — ESTRADIOL 0.5 MG/1
TABLET ORAL
Qty: 90 TAB | Refills: 3 | Status: SHIPPED | OUTPATIENT
Start: 2018-10-02 | End: 2019-09-29 | Stop reason: SDUPTHER

## 2018-10-10 ENCOUNTER — HOSPITAL ENCOUNTER (OUTPATIENT)
Dept: HOSPITAL 8 - CACL | Age: 78
Discharge: HOME | End: 2018-10-10
Attending: INTERNAL MEDICINE
Payer: MEDICARE

## 2018-10-10 VITALS — BODY MASS INDEX: 33.44 KG/M2 | WEIGHT: 170.35 LBS | HEIGHT: 60 IN

## 2018-10-10 VITALS — SYSTOLIC BLOOD PRESSURE: 150 MMHG | DIASTOLIC BLOOD PRESSURE: 57 MMHG

## 2018-10-10 DIAGNOSIS — Z88.8: ICD-10-CM

## 2018-10-10 DIAGNOSIS — J44.9: ICD-10-CM

## 2018-10-10 DIAGNOSIS — Z88.0: ICD-10-CM

## 2018-10-10 DIAGNOSIS — I25.10: Primary | ICD-10-CM

## 2018-10-10 DIAGNOSIS — Z88.4: ICD-10-CM

## 2018-10-10 DIAGNOSIS — I10: ICD-10-CM

## 2018-10-10 DIAGNOSIS — Z79.82: ICD-10-CM

## 2018-10-10 DIAGNOSIS — Z79.899: ICD-10-CM

## 2018-10-10 LAB
ANION GAP SERPL CALC-SCNC: 9 MMOL/L (ref 5–15)
BASOPHILS # BLD AUTO: 0.03 X10^3/UL (ref 0–0.1)
BASOPHILS NFR BLD AUTO: 1 % (ref 0–1)
CALCIUM SERPL-MCNC: 9.1 MG/DL (ref 8.5–10.1)
CHLORIDE SERPL-SCNC: 102 MMOL/L (ref 98–107)
CREAT SERPL-MCNC: 1.07 MG/DL (ref 0.55–1.02)
EOSINOPHIL # BLD AUTO: 0.17 X10^3/UL (ref 0–0.4)
EOSINOPHIL NFR BLD AUTO: 3 % (ref 1–7)
ERYTHROCYTE [DISTWIDTH] IN BLOOD BY AUTOMATED COUNT: 15.2 % (ref 9.6–15.2)
INR PPP: 1.02 (ref 0.93–1.1)
LYMPHOCYTES # BLD AUTO: 1.32 X10^3/UL (ref 1–3.4)
LYMPHOCYTES NFR BLD AUTO: 22 % (ref 22–44)
MCH RBC QN AUTO: 30.4 PG (ref 27–34.8)
MCHC RBC AUTO-ENTMCNC: 34.6 G/DL (ref 32.4–35.8)
MCV RBC AUTO: 88 FL (ref 80–100)
MD: NO
MONOCYTES # BLD AUTO: 0.45 X10^3/UL (ref 0.2–0.8)
MONOCYTES NFR BLD AUTO: 7 % (ref 2–9)
NEUTROPHILS # BLD AUTO: 4.1 X10^3/UL (ref 1.8–6.8)
NEUTROPHILS NFR BLD AUTO: 68 % (ref 42–75)
PLATELET # BLD AUTO: 241 X10^3/UL (ref 130–400)
PMV BLD AUTO: 8.6 FL (ref 7.4–10.4)
PROTHROMBIN TIME: 10.5 SECONDS (ref 9.6–11.5)
RBC # BLD AUTO: 4.67 X10^6/UL (ref 3.82–5.3)

## 2018-10-10 PROCEDURE — 99157 MOD SED OTHER PHYS/QHP EA: CPT

## 2018-10-10 PROCEDURE — C1894 INTRO/SHEATH, NON-LASER: HCPCS

## 2018-10-10 PROCEDURE — C1760 CLOSURE DEV, VASC: HCPCS

## 2018-10-10 PROCEDURE — 80048 BASIC METABOLIC PNL TOTAL CA: CPT

## 2018-10-10 PROCEDURE — 85610 PROTHROMBIN TIME: CPT

## 2018-10-10 PROCEDURE — 99156 MOD SED OTH PHYS/QHP 5/>YRS: CPT

## 2018-10-10 PROCEDURE — 36415 COLL VENOUS BLD VENIPUNCTURE: CPT

## 2018-10-10 PROCEDURE — C1769 GUIDE WIRE: HCPCS

## 2018-10-10 PROCEDURE — 93460 R&L HRT ART/VENTRICLE ANGIO: CPT

## 2018-10-10 PROCEDURE — 85025 COMPLETE CBC W/AUTO DIFF WBC: CPT

## 2018-10-17 DIAGNOSIS — M48.061 SPINAL STENOSIS OF LUMBAR REGION, UNSPECIFIED WHETHER NEUROGENIC CLAUDICATION PRESENT: ICD-10-CM

## 2018-10-18 RX ORDER — DULOXETIN HYDROCHLORIDE 60 MG/1
CAPSULE, DELAYED RELEASE ORAL
Qty: 90 CAP | Refills: 2 | Status: SHIPPED | OUTPATIENT
Start: 2018-10-18 | End: 2019-06-11 | Stop reason: SDUPTHER

## 2018-10-18 NOTE — PROGRESS NOTES
Assumed care of pt at 0745. A/Ox4, discussed plan of care. Pt on room air, tolerating regular diet, up with standby assist with steady gait, pt uses walker when ambulating. All needs met at this time. Bed in lowest position, treaded socks on, personal belongings and call light within reach, instructed to call for any assistance.        Detail Level: Detailed

## 2018-10-23 DIAGNOSIS — Z87.448 HISTORY OF HEMATURIA: ICD-10-CM

## 2018-10-23 DIAGNOSIS — R79.89 BLOOD CREATININE INCREASED COMPARED WITH PRIOR MEASUREMENT: ICD-10-CM

## 2018-10-23 DIAGNOSIS — N18.30 CKD (CHRONIC KIDNEY DISEASE) STAGE 3, GFR 30-59 ML/MIN (HCC): ICD-10-CM

## 2018-11-02 ENCOUNTER — HOSPITAL ENCOUNTER (OUTPATIENT)
Dept: RADIOLOGY | Facility: MEDICAL CENTER | Age: 78
End: 2018-11-02
Attending: FAMILY MEDICINE
Payer: MEDICARE

## 2018-11-02 DIAGNOSIS — N18.30 CKD (CHRONIC KIDNEY DISEASE) STAGE 3, GFR 30-59 ML/MIN (HCC): ICD-10-CM

## 2018-11-02 DIAGNOSIS — Z87.448 HISTORY OF HEMATURIA: ICD-10-CM

## 2018-11-02 DIAGNOSIS — R79.89 BLOOD CREATININE INCREASED COMPARED WITH PRIOR MEASUREMENT: ICD-10-CM

## 2018-11-02 PROCEDURE — 76775 US EXAM ABDO BACK WALL LIM: CPT

## 2018-11-13 DIAGNOSIS — J44.9 CHRONIC OBSTRUCTIVE PULMONARY DISEASE, UNSPECIFIED COPD TYPE (HCC): ICD-10-CM

## 2018-11-13 NOTE — TELEPHONE ENCOUNTER
Caller Name: Maddy Hodge                 Call Back Number: [phone] (home)         Patient approves a detailed voicemail message: N\A    Have we ever prescribed this med? Yes.  If yes, what date? 11/10/17    Last OV: 8/17/18 DOMINIQUE Pacheco     Next OV: 2/13/19 DOMINIQUE Pacheco     DX: COPD     Medications:  Current Outpatient Prescriptions   Medication Sig Dispense Refill   • DULoxetine (CYMBALTA) 60 MG Cap DR Particles delayed-release capsule TAKE 1 CAPSULE ONE TIME DAILY 90 Cap 2   • estradiol (ESTRACE) 0.5 MG tablet TAKE ONE TABLET BY MOUTH ONCE DAILY 90 Tab 3   • MethylPREDNISolone (MEDROL DOSEPAK) 4 MG Tablet Therapy Pack Take as directed. 21 Tab 0   • azithromycin (ZITHROMAX) 250 MG Tab Take 2 tablets on day 1, then take 1 tablet a day for 4 days. 6 Tab 0   • furosemide (LASIX) 20 MG Tab Take 1 Tab by mouth every day. 90 Tab 3   • potassium chloride SA (KDUR) 20 MEQ Tab CR Take 1 Tab by mouth 2 times a day. 90 Tab 3   • diazePAM (VALIUM) 5 MG Tab Take 1 Tab by mouth every 8 hours as needed for Anxiety for up to 90 days. 60 Tab 2   • irbesartan (AVAPRO) 75 MG tablet Take 1 Tab by mouth every day. 90 Tab 3   • levothyroxine (SYNTHROID) 50 MCG Tab TAKE ONE TABLET BY MOUTH IN THE MORNING ON AN EMPTY STOMACH 90 Tab 3   • pregabalin (LYRICA) 150 MG Cap Take 200 mg by mouth 3 times a day.     • imipramine (TOFRANIL) 10 MG Tab Take 2 Tabs by mouth every evening. (Patient taking differently: Take 10 mg by mouth every evening.) 180 Tab 2   • AMITIZA 24 MCG capsule Take 24 mcg by mouth every morning with breakfast.     • Umeclidinium Bromide (INCRUSE ELLIPTA) 62.5 MCG/INH AEROSOL POWDER, BREATH ACTIVATED Inhale 1 Puff by mouth every day. 1 Each 11   • polyethylene glycol/lytes (MIRALAX) Pack Take 17 g by mouth every day.     • XTAMPZA ER 18 MG Capsule Extended Release 12 hour Abuse-Deterrent Take 18 mg by mouth 2 Times a Day.     • Cyanocobalamin 2500 MCG Chew Tab Take 1 Tab by mouth every day.     •  omeprazole (PRILOSEC) 20 MG delayed-release capsule Take 1 Cap by mouth every day. 90 Cap 3   • VENTOLIN  (90 BASE) MCG/ACT Aero Soln inhalation aerosol Inhale 2 Puffs by mouth every 6 hours as needed for Shortness of Breath. 1 Inhaler 5   • aspirin EC (ECOTRIN) 81 MG Tablet Delayed Response Take 81 mg by mouth every day.     • docusate sodium (COLACE) 250 MG capsule Take 250 mg by mouth every day.     • Probiotic Product ([employer]) CAPS Take 1 Cap by mouth 2 Times a Day.       No current facility-administered medications for this visit.

## 2018-11-15 DIAGNOSIS — J44.9 CHRONIC OBSTRUCTIVE PULMONARY DISEASE, UNSPECIFIED COPD TYPE (HCC): ICD-10-CM

## 2018-11-19 RX ORDER — AZITHROMYCIN 250 MG/1
TABLET, FILM COATED ORAL
Qty: 6 TAB | Refills: 0 | Status: SHIPPED | OUTPATIENT
Start: 2018-11-19 | End: 2018-12-10

## 2018-11-19 RX ORDER — METHYLPREDNISOLONE 4 MG/1
TABLET ORAL
Qty: 21 TAB | Refills: 0 | Status: SHIPPED | OUTPATIENT
Start: 2018-11-19 | End: 2018-12-10

## 2018-11-19 NOTE — TELEPHONE ENCOUNTER
Patient would like refill to have on hand for COPD exacerbation.        Have we ever prescribed this med? Yes.  If yes, what date? 09/17/18    Last OV: 08/17/18-Yam    Next OV: 02/13/19-Yam    DX: COPD    Medications:   Requested Prescriptions     Pending Prescriptions Disp Refills   • MethylPREDNISolone (MEDROL DOSEPAK) 4 MG Tablet Therapy Pack 21 Tab 0     Sig: Take as directed.   • azithromycin (ZITHROMAX) 250 MG Tab 6 Tab 0     Sig: Take 2 tablets on day 1, then take 1 tablet a day for 4 days.

## 2018-12-10 ENCOUNTER — OFFICE VISIT (OUTPATIENT)
Dept: MEDICAL GROUP | Facility: MEDICAL CENTER | Age: 78
End: 2018-12-10
Payer: MEDICARE

## 2018-12-10 VITALS
BODY MASS INDEX: 36.52 KG/M2 | SYSTOLIC BLOOD PRESSURE: 106 MMHG | DIASTOLIC BLOOD PRESSURE: 64 MMHG | RESPIRATION RATE: 14 BRPM | OXYGEN SATURATION: 92 % | HEIGHT: 60 IN | HEART RATE: 82 BPM | TEMPERATURE: 97.3 F | WEIGHT: 186 LBS

## 2018-12-10 DIAGNOSIS — F43.22 ADJUSTMENT DISORDER WITH ANXIETY: ICD-10-CM

## 2018-12-10 DIAGNOSIS — K21.00 GASTROESOPHAGEAL REFLUX DISEASE WITH ESOPHAGITIS: ICD-10-CM

## 2018-12-10 DIAGNOSIS — I27.20 PULMONARY HYPERTENSION (HCC): ICD-10-CM

## 2018-12-10 DIAGNOSIS — E06.3 HYPOTHYROIDISM DUE TO HASHIMOTO'S THYROIDITIS: ICD-10-CM

## 2018-12-10 DIAGNOSIS — E03.8 HYPOTHYROIDISM DUE TO HASHIMOTO'S THYROIDITIS: ICD-10-CM

## 2018-12-10 PROCEDURE — 99213 OFFICE O/P EST LOW 20 MIN: CPT | Performed by: FAMILY MEDICINE

## 2018-12-10 RX ORDER — NALOXONE HYDROCHLORIDE 4 MG/.1ML
1 SPRAY NASAL PRN
Refills: 0 | COMMUNITY
Start: 2018-10-10 | End: 2021-10-13

## 2018-12-10 RX ORDER — FUROSEMIDE 40 MG/1
40 TABLET ORAL
Refills: 3 | COMMUNITY
Start: 2018-11-09 | End: 2021-01-13

## 2018-12-10 RX ORDER — OMEPRAZOLE 20 MG/1
20 CAPSULE, DELAYED RELEASE ORAL
Qty: 90 CAP | Refills: 3 | Status: SHIPPED | OUTPATIENT
Start: 2018-12-10 | End: 2019-12-29

## 2018-12-10 RX ORDER — DIAZEPAM 5 MG/1
5 TABLET ORAL EVERY 8 HOURS PRN
Qty: 60 TAB | Refills: 2 | Status: SHIPPED | OUTPATIENT
Start: 2018-12-10 | End: 2019-03-10

## 2018-12-10 RX ORDER — SPIRONOLACTONE 25 MG/1
25 TABLET ORAL
Refills: 3 | COMMUNITY
Start: 2018-11-09 | End: 2021-11-24

## 2018-12-10 RX ORDER — ATORVASTATIN CALCIUM 20 MG/1
20 TABLET, FILM COATED ORAL
Refills: 12 | COMMUNITY
Start: 2018-12-02 | End: 2019-04-10 | Stop reason: SDUPTHER

## 2018-12-10 RX ORDER — OXYCODONE HYDROCHLORIDE AND ACETAMINOPHEN 5; 325 MG/1; MG/1
1 TABLET ORAL
Refills: 0 | COMMUNITY
Start: 2019-02-07 | End: 2021-10-13

## 2018-12-10 NOTE — PROGRESS NOTES
Chief Complaint   Patient presents with   • Hypothyroidism   • Gastrophageal Reflux   • Anxiety       Subjective:     HPI:   Maddy Hodge presents today with the followin. Hypothyroidism due to Hashimoto's thyroiditis  Patient reports good energy level on the medication. Patient denies insomnia, tremor or change in appetite.  Patient is taking the medication on an empty stomach in the morning and waiting at least 30 minutes before eating.  Last TSH in August was at target.  Antithyroid antibodies are still active.    2. Gastroesophageal reflux disease with esophagitis  She feels the current medication regimen of omeprazole is controlling the gastroesophageal reflux symptoms well. Denies dysphagia, reflux symptoms, acidity, abdominal pain or visible blood or mucus in the stool. Denies vomiting or hematemesis. Denies burping or abdominal bloating. Patient avoids nonsteroidal anti-inflammatory drugs. Avoids heavy meals or eating within 2 hours of bedtime.    3. Adjustment disorder with anxiety  Patient continues to use the diazepam sparingly, only when she starts to panic.  She is aware of the risks with her narcotics.  This amount and usage has been discussed and cleared with her pain management team.  This is renewed.   shows no inconsistencies.    4. Pulmonary hypertension (HCC)  She continues to be fully compliant with her bipap.  Despite this her pulmonary pressure has increased.  Her cardiologist has begun advanced medication.  Unfortunately, this is increasing her edema.  She is coping for now but will be addressing this with him at her next visit.  There are no rales or JVD elevation.  I feel this is edema from her combined medications, no failure.        Patient Active Problem List    Diagnosis Date Noted   • CKD (chronic kidney disease) stage 3, GFR 30-59 ml/min ([facility]) 2016     Priority: Medium   • Esophageal dysphagia 2016     Priority: Medium   • Hypothyroidism due to  Hashimoto's thyroiditis      Priority: Medium   • GERD (gastroesophageal reflux disease) 09/20/2011     Priority: Medium   • Essential hypertension 07/06/2009     Priority: Medium   • Major depressive disorder, recurrent episode, mild (CMS-HCC) 05/02/2016     Priority: Low   • Neuropathy (CMS-HCC) 10/17/2013     Priority: Low   • Family history of polycystic kidney disease      Priority: Low   • Facet arthritis of lumbar region ([facility]) 03/26/2012     Priority: Low   • History of vertebral fracture 03/26/2012     Priority: Low   • Spinal stenosis of lumbar region with neurogenic claudication      Priority: Low   • History of hematuria 10/23/2018   • BMI 33.0-33.9,adult 08/17/2018   • Dyslipidemia, goal LDL below 130 08/15/2018   • Chronic obstructive pulmonary disease ([facility]) 06/22/2018   • Pulmonary hypertension ([facility]) 02/21/2018   • COPD with asthma ([facility]) 11/10/2017   • ALISSA (obstructive sleep apnea) 11/10/2017   • Postlaminectomy syndrome, unspecified region 07/03/2017   • Recurrent UTI 06/28/2017   • Mixed restrictive and obstructive lung disease ([facility]) 06/22/2017   • Menopausal symptoms 09/13/2016   • Essential tremor 09/06/2016   • Postmenopausal osteoporosis    • Arachnoiditis        Current medicines (including changes today)  Current Outpatient Prescriptions   Medication Sig Dispense Refill   • diazePAM (VALIUM) 5 MG Tab Take 1 Tab by mouth every 8 hours as needed for Anxiety for up to 90 days. 60 Tab 2   • omeprazole (PRILOSEC) 20 MG delayed-release capsule Take 1 Cap by mouth every day. 90 Cap 3   • atorvastatin (LIPITOR) 20 MG Tab Take 20 mg by mouth every day.  12   • furosemide (LASIX) 40 MG Tab Take 40 mg by mouth every day.  3   • NARCAN 4 MG/0.1ML Liquid AS DIRECTED  0   • oxyCODONE-acetaminophen (PERCOCET) 7.5-325 MG per tablet TAKE 1 TABLET BY MOUTH 4 TIMES A DAY AS NEEDED FOR PAIN 11/8/18 G89.29  0   • spironolactone (ALDACTONE) 25 MG Tab Take 25 mg by mouth every day.  3   • INCRUSE ELLIPTA 62.5 MCG/INH  AEROSOL POWDER, BREATH ACTIVATED INHALE ONE PUFF BY MOUTH ONCE DAILY 3 Each 3   • DULoxetine (CYMBALTA) 60 MG Cap DR Particles delayed-release capsule TAKE 1 CAPSULE ONE TIME DAILY 90 Cap 2   • estradiol (ESTRACE) 0.5 MG tablet TAKE ONE TABLET BY MOUTH ONCE DAILY 90 Tab 3   • irbesartan (AVAPRO) 75 MG tablet Take 1 Tab by mouth every day. 90 Tab 3   • levothyroxine (SYNTHROID) 50 MCG Tab TAKE ONE TABLET BY MOUTH IN THE MORNING ON AN EMPTY STOMACH 90 Tab 3   • pregabalin (LYRICA) 150 MG Cap Take 200 mg by mouth 3 times a day.     • imipramine (TOFRANIL) 10 MG Tab Take 2 Tabs by mouth every evening. (Patient taking differently: Take 10 mg by mouth every evening.) 180 Tab 2   • AMITIZA 24 MCG capsule Take 24 mcg by mouth every morning with breakfast.     • polyethylene glycol/lytes (MIRALAX) Pack Take 17 g by mouth every day.     • XTAMPZA ER 18 MG Capsule Extended Release 12 hour Abuse-Deterrent Take 18 mg by mouth 2 Times a Day.     • Cyanocobalamin 2500 MCG Chew Tab Take 1 Tab by mouth every day.     • VENTOLIN  (90 BASE) MCG/ACT Aero Soln inhalation aerosol Inhale 2 Puffs by mouth every 6 hours as needed for Shortness of Breath. 1 Inhaler 5   • aspirin EC (ECOTRIN) 81 MG Tablet Delayed Response Take 81 mg by mouth every day.     • docusate sodium (COLACE) 250 MG capsule Take 250 mg by mouth every day.     • Probiotic Product ([employer]) CAPS Take 1 Cap by mouth 2 Times a Day.       No current facility-administered medications for this visit.        Allergies   Allergen Reactions   • Pcn [Penicillins]    • Sulfa Drugs    • Tape    • Macrobid [Nitrofurantoin] Rash     rash   • Zoloft Unspecified     Worse depression       ROS: As per HPI       Objective:     Blood pressure 106/64, pulse 82, temperature 36.3 °C (97.3 °F), resp. rate 14, height 1.524 m (5'), weight 84.4 kg (186 lb), SpO2 92 %, not currently breastfeeding. Body mass index is 36.33 kg/m².    Physical Exam:  Constitutional:  Well-developed and well-nourished. Not diaphoretic. No distress. Lucid and fluent.  Skin: Skin is warm and dry. No rash noted.  Head: Atraumatic without lesions.  Eyes: Conjunctivae and extraocular motions are normal. Pupils are equal, round, and reactive to light. No scleral icterus.   Mouth/Throat: Tongue normal. Oropharynx is clear and moist. Posterior pharynx without erythema or exudates.  Neck: Supple, trachea midline. No thyromegaly present. No cervical or supraclavicular lymphadenopathy. No JVD or carotid bruits appreciated  Cardiovascular: Regular rate and rhythm.  Normal S1, S2 without murmur appreciated.  Chest: Effort normal. Clear to auscultation throughout. No adventitious sounds.  No rales appreciated.  Abdomen: Soft, non tender, and without distention. Active bowel sounds in all four quadrants. No rebound, guarding, masses or hepatosplenomegaly.  Extremities: No cyanosis, clubbing, erythema.  Has 2+ pitting edema bilaterally.    Neurological: Alert and oriented x 3.  Gait mildly unsteady.    Psychiatric:  Behavior, mood, and affect are appropriate.    Last TSH was in August, at target.  However microsomal antibodies are high.       Assessment and Plan:     78 y.o. female with the following issues:    1. Hypothyroidism due to Hashimoto's thyroiditis     2. Gastroesophageal reflux disease with esophagitis  omeprazole (PRILOSEC) 20 MG delayed-release capsule   3. Adjustment disorder with anxiety  diazePAM (VALIUM) 5 MG Tab   4. Pulmonary hypertension (HCC)           Followup: Return in about 4 months (around 4/10/2019), or if symptoms worsen or fail to improve.

## 2019-02-11 ENCOUNTER — OFFICE VISIT (OUTPATIENT)
Dept: PULMONOLOGY | Facility: HOSPICE | Age: 79
End: 2019-02-11
Payer: MEDICARE

## 2019-02-11 VITALS
BODY MASS INDEX: 34.91 KG/M2 | HEART RATE: 77 BPM | WEIGHT: 177.8 LBS | DIASTOLIC BLOOD PRESSURE: 60 MMHG | HEIGHT: 60 IN | TEMPERATURE: 98.1 F | OXYGEN SATURATION: 97 % | SYSTOLIC BLOOD PRESSURE: 124 MMHG

## 2019-02-11 DIAGNOSIS — J44.89 COPD WITH ASTHMA: ICD-10-CM

## 2019-02-11 DIAGNOSIS — G47.33 OSA (OBSTRUCTIVE SLEEP APNEA): ICD-10-CM

## 2019-02-11 DIAGNOSIS — J98.4 MIXED RESTRICTIVE AND OBSTRUCTIVE LUNG DISEASE (HCC): ICD-10-CM

## 2019-02-11 DIAGNOSIS — I27.20 PULMONARY HYPERTENSION (HCC): ICD-10-CM

## 2019-02-11 DIAGNOSIS — J43.9 MIXED RESTRICTIVE AND OBSTRUCTIVE LUNG DISEASE (HCC): ICD-10-CM

## 2019-02-11 PROCEDURE — 99214 OFFICE O/P EST MOD 30 MIN: CPT | Performed by: NURSE PRACTITIONER

## 2019-02-11 RX ORDER — AZITHROMYCIN 250 MG/1
TABLET, FILM COATED ORAL
Qty: 6 TAB | Refills: 1 | Status: SHIPPED | OUTPATIENT
Start: 2019-02-11 | End: 2019-04-10

## 2019-02-11 RX ORDER — METHYLPREDNISOLONE 4 MG/1
TABLET ORAL
Qty: 21 TAB | Refills: 1 | Status: SHIPPED | OUTPATIENT
Start: 2019-02-11 | End: 2019-04-10

## 2019-02-11 RX ORDER — AMBRISENTAN 10 MG/1
10 TABLET, FILM COATED ORAL
Status: ON HOLD | COMMUNITY
Start: 2019-01-31 | End: 2023-10-18

## 2019-02-11 NOTE — PATIENT INSTRUCTIONS
1) Continue BIPAP ST with back up rate of 12 - 12/7cmH20  2) Clean mask and supplies weekly and change them as insurance allows  3) Continue using Incruse and rescue inhaler as needed  4) Vaccines: Up to date with Prevnar 13, Pneumovax 23, flu  5) Continue weekly exercise   6) Zpack and Medrol pack on hand for exacerbation   7) Return in about 6 months (around 8/11/2019) for follow up with DOMINIQUE Sims, if not sooner, review of symptoms.

## 2019-02-11 NOTE — PROGRESS NOTES
CC:  Here for f/u sleep and pulmonary issues as listed below    HPI:   Maddy presents today for follow up for COPD with hx of pulmonary HTN and ALISSA.       PFTs from 8/2018 are stable in comparison to 2017 and indicate a Fev1 of 1.36L or 80% predicted with a mild bronchodilator response, Fev1/FVC ratio of 67, DLCO 84%.  She is a never smoker. CTA study completed 9/2017 was negative for PE.     For further workup of pulmonary HTN an echo was completed without significant change since 2016 study noting estimated EF of 65%, RVSP of 50mmHg.  She was referred to Dr. Cook for further workup of her pulmonary hypertension.  He completed a right and left heart catheterization with elevated pulmonary pressures.  She was started on Letaris 10 mg daily.  She continues to have some water retention with +2-3 bilateral lower extremity edema.  She does report improved dyspnea since starting the medication     She is compliant using Incruse and rare use of rescue. Stopped symbicort b/c caused hoarseness. She does have a history of choking with dry food. She was offered referral to ENT for further evaluation but she declines at this time.She denies wheeze, hemoptysis, chest pain or chest tightness, fever or chills, unintentional weight loss, acid reflux.  Since last OV 8/2018 she required zpack and medrol pack x2 for exacerbation with benefit. A She has been increasing her activity level with improved dyspnea. She has been bicycling and walking. BMI is 34.     PSG from 6/2017 indicated an AHI of 5.5 and low oxygenation of 81%.  She completed a titration study for potential ASV therapy given elevated AHI and use of chronic pain medication.  She was then switched from CPAP to BiPAP given her central apneas resolved with BiPAP therapy.  Currently she is being treated with BIPAP ST @ 12/7cmH20.  Compliance download from the dates 7/19/2018 - 8/17/2018 indicates she is wearing the device 97% for an avg of 6 hours and 58 minutes per  "night with a reduced AHI of 8.2. She does tolerate pressure and mask well.  She wakes up refreshed and is less tired throughout the day. She denies morning H/A and sleep better overall. She will continue to clean supplies weekly and change them as insurance allows.        Patient Active Problem List    Diagnosis Date Noted   • CKD (chronic kidney disease) stage 3, GFR 30-59 ml/min ([facility]) 05/23/2016     Priority: Medium   • Esophageal dysphagia 05/19/2016     Priority: Medium   • Hypothyroidism due to Hashimoto's thyroiditis      Priority: Medium   • GERD (gastroesophageal reflux disease) 09/20/2011     Priority: Medium   • Essential hypertension 07/06/2009     Priority: Medium   • Major depressive disorder, recurrent episode, mild (CMS-HCC) 05/02/2016     Priority: Low   • Neuropathy (CMS-HCC) 10/17/2013     Priority: Low   • Family history of polycystic kidney disease      Priority: Low   • Facet arthritis of lumbar region ([facility]) 03/26/2012     Priority: Low   • History of vertebral fracture 03/26/2012     Priority: Low   • Spinal stenosis of lumbar region with neurogenic claudication      Priority: Low   • History of hematuria 10/23/2018   • BMI 34.0-34.9,adult 08/17/2018   • Dyslipidemia, goal LDL below 130 08/15/2018   • Chronic obstructive pulmonary disease ([facility]) 06/22/2018   • Pulmonary hypertension ([facility]) 02/21/2018   • COPD with asthma ([facility]) 11/10/2017   • ALISSA (obstructive sleep apnea) 11/10/2017   • Postlaminectomy syndrome, unspecified region 07/03/2017   • Recurrent UTI 06/28/2017   • Mixed restrictive and obstructive lung disease ([facility]) 06/22/2017   • Menopausal symptoms 09/13/2016   • Essential tremor 09/06/2016   • Postmenopausal osteoporosis    • Arachnoiditis        Past Medical History:   Diagnosis Date   • Allergy     seasonal   • Anesthesia     \"hard to wake up\"   • Anxiety     due to loss of . 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    • Arthritis     facet arthritis of " lumbar region   • Asthma     Inhaler use daily.   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 Albrecht, attaches to plastic, prulent   • Depression     and anxiety   • Encounter for long-term (current) use of other medications    • Erosive gastritis 5/09    antral   • Family history of polycystic kidney disease    • GERD (gastroesophageal reflux disease)    • [place] measles    • Hypertension    • Hypothyroidism    • Influenza    • Lumbar vertebral fracture (HCC) 5/2011   • Mumps    • Muscle disorder     Arachnoiditis   • Neuropathy (HCC)    • Obesity, Class I, BMI 30-34.9    • OSTEOPOROSIS    • Pain 5/12/16    back and legs    • Renal disorder    • Sleep apnea     on BiPap, follows with pulmonology   • Spinal stenosis, lumbar region, without neurogenic claudication    • Tonsillitis    • Urinary bladder disorder 6/30/2016    Currently has a catheter.       Past Surgical History:   Procedure Laterality Date   • SPINAL CORD STIMULATOR N/A 7/3/2017    Procedure: SPINAL CORD STIMULATOR FOR LEAD REMOVAL;  Surgeon: Amandeep Gonzalez D.O.;  Location: SURGERY [facility];  Service:    • GASTROSCOPY WITH BALLOON DILATATION N/A 5/19/2016    Procedure: GASTROSCOPY WITH DILATATION;  Surgeon: Tony Monae M.D.;  Location: SURGERY [facility];  Service:    • SPINAL CORD STIMULATOR  9/2/14   • EGD WITH ASP/BX  5/27/09    erosive gastritis   • HYSTERECTOMY, TOTAL ABDOMINAL  1976   • APPENDECTOMY  1976   • CATARACT EXTRACTION WITH IOL Bilateral    • COLONOSCOPY  2000,5/27/09    normal   • HYSTERECTOMY LAPAROSCOPY     • LUMBAR LAMINECTOMY DISKECTOMY     • TONSILLECTOMY         Family History   Problem Relation Age of Onset   • Genitourinary () Brother    • Lung Disease Mother         COPD   • Heart Disease Mother    • Genetic Father          Parkinsons/ from complications   • Hypertension Father    • Cancer Maternal Aunt         Breast cancer   • Stroke Paternal Grandmother    • Cancer Maternal Aunt         Breast cancer       Social History     Social History   • Marital status:      Spouse name: N/A   • Number of children: N/A   • Years of education: N/A     Occupational History   • Not on file.     Social History Main Topics   • Smoking status: Never Smoker   • Smokeless tobacco: Never Used   • Alcohol use No   • Drug use: No   • Sexual activity: No     Other Topics Concern   • Stress Concern No      cancer     Social History Narrative   • No narrative on file       Current Outpatient Prescriptions   Medication Sig Dispense Refill   • azithromycin (ZITHROMAX) 250 MG Tab Take 2 tablets on day 1, then take 1 tablet a day for 4 days. 6 Tab 1   • MethylPREDNISolone (MEDROL DOSEPAK) 4 MG Tablet Therapy Pack Take as directed. 21 Tab 1   • atorvastatin (LIPITOR) 20 MG Tab Take 20 mg by mouth every day.  12   • furosemide (LASIX) 40 MG Tab Take 40 mg by mouth every day.  3   • NARCAN 4 MG/0.1ML Liquid AS DIRECTED  0   • oxyCODONE-acetaminophen (PERCOCET) 5-325 MG Tab TAKE 1 TABLET BY MOUTH 4 TIMES A DAY AS NEEDED FOR PAIN 18 G89.29  0   • spironolactone (ALDACTONE) 25 MG Tab Take 25 mg by mouth every day.  3   • omeprazole (PRILOSEC) 20 MG delayed-release capsule Take 1 Cap by mouth every day. 90 Cap 3   • INCRUSE ELLIPTA 62.5 MCG/INH AEROSOL POWDER, BREATH ACTIVATED INHALE ONE PUFF BY MOUTH ONCE DAILY 3 Each 3   • DULoxetine (CYMBALTA) 60 MG Cap DR Particles delayed-release capsule TAKE 1 CAPSULE ONE TIME DAILY 90 Cap 2   • estradiol (ESTRACE) 0.5 MG tablet TAKE ONE TABLET BY MOUTH ONCE DAILY 90 Tab 3   • levothyroxine (SYNTHROID) 50 MCG Tab TAKE ONE TABLET BY MOUTH IN THE MORNING ON AN EMPTY STOMACH 90 Tab 3   • pregabalin (LYRICA) 150 MG Cap Take 200 mg by mouth 3 times a day.     • imipramine (TOFRANIL) 10 MG Tab Take 2  Tabs by mouth every evening. (Patient taking differently: Take 10 mg by mouth every evening.) 180 Tab 2   • AMITIZA 24 MCG capsule Take 24 mcg by mouth every morning with breakfast.     • polyethylene glycol/lytes (MIRALAX) Pack Take 17 g by mouth every day.     • XTAMPZA ER 18 MG Capsule Extended Release 12 hour Abuse-Deterrent Take 18 mg by mouth 2 Times a Day.     • Cyanocobalamin 2500 MCG Chew Tab Take 1 Tab by mouth every day.     • aspirin EC (ECOTRIN) 81 MG Tablet Delayed Response Take 81 mg by mouth every day.     • docusate sodium (COLACE) 250 MG capsule Take 250 mg by mouth every day.     • Probiotic Product ([employer]) CAPS Take 1 Cap by mouth 2 Times a Day.     • LETAIRIS 10 MG tablet Take 10 Tabs by mouth every day.     • diazePAM (VALIUM) 5 MG Tab Take 1 Tab by mouth every 8 hours as needed for Anxiety for up to 90 days. 60 Tab 2   • irbesartan (AVAPRO) 75 MG tablet Take 1 Tab by mouth every day. (Patient not taking: Reported on 2/11/2019) 90 Tab 3   • VENTOLIN  (90 BASE) MCG/ACT Aero Soln inhalation aerosol Inhale 2 Puffs by mouth every 6 hours as needed for Shortness of Breath. 1 Inhaler 5     No current facility-administered medications for this visit.           Allergies: Pcn [penicillins]; Sulfa drugs; Tape; Macrobid [nitrofurantoin]; and Zoloft      ROS   Gen: Denies fever, chills, unintentional weight loss, fatigue, night sweats  E/N/T: Denies ear pain, nasal congestion  Resp:Denies , wheezing, cough, sputum production, hemoptysis  CV: Denies chest pain, chest tightness, palpitations, BLE edema  Sleep:Denies morning headache, insomnia, daytime somnolence, snoring, gasping for air, apnea  Neuro: Denies frequent headaches, weakness, dizziness  GI: Denies N/V, acid reflux/heartburn  See HPI.  All other systems reviewed and negative      Vital signs for this encounter:  Vitals:    02/11/19 1000 02/11/19 1011   Height:  1.524 m (5')   Weight:  80.6 kg (177 lb 12.8 oz)   Weight %  change since last entry.:  0 %   BP:  124/60   Pulse:  77   BMI (Calculated):  34.72   Temp:  36.7 °C (98.1 °F)   O2 sat % room air: 97 %                  Physical Exam:   Appearance: well developed, well nourished, no acute distress.  Eyes: PERRL, EOM intact, sclere white, conjunctiva moist.  Ears: no lesions or deformities.  Hearing: grossly intact.  Nose: no lesions or deformities.  Dentition: good dentition.  Oropharynx: tongue normal, posterior pharynx without erythema or exudate.  Neck: supple, trachea midline, no masses.  Respiratory effort: no intercostal retractions or use of accessory muscles.  Lung auscultation: Bilateral diminished with crackles in bases L greater than R  Heart auscultation: no murmur, rub, or gallop.   Extremities: no cyanosis or edema.  Abdomen: soft, non-tender, no masses.  Gait and station: grossly normal   Digits and Nails: no clubbing, cyanosis, petechiae, or nodes.  Cranial nerves: grossly normal.  Motor: no focal deficits observed.  Skin: no rashes, lesions, or ulcers noted.  Orientation: oriented to time, place, and person.  Mood and affect: mood and affect appropriate, normal interaction with examiner.    Assessment   1. Pulmonary hypertension (HCC)     2. ALISSA (obstructive sleep apnea)     3. Mixed restrictive and obstructive lung disease (HCC)     4. COPD with asthma (HCC)     5. BMI 34.0-34.9,adult  OBESITY COUNSELING (No Charge): Patient identified as having weight management issue.  Appropriate orders and counseling given.       Patient was seen for 25 minutes, more than 50% of time spent in face to face review, counseling, and arranging future evaluation and follow up of medical conditions and care related to review of Dr. Cook's notes, importance of regular exercise and medication managment. Patient is clinically stable and will proceed with following plan.     PLAN:   Patient Instructions   1) Continue BIPAP ST with back up rate of 12 - 12/7cmH20  2) Clean mask and  supplies weekly and change them as insurance allows  3) Continue using Incruse and rescue inhaler as needed  4) Vaccines: Up to date with Prevnar 13, Pneumovax 23, flu  5) Continue weekly exercise   6) Zpack and Medrol pack on hand for exacerbation   Return in about 6 months (around 8/11/2019) for follow up with DOMINIQUE Sims, if not sooner, review of symptoms.

## 2019-02-14 ENCOUNTER — APPOINTMENT (RX ONLY)
Dept: URBAN - METROPOLITAN AREA CLINIC 4 | Facility: CLINIC | Age: 79
Setting detail: DERMATOLOGY
End: 2019-02-14

## 2019-02-14 DIAGNOSIS — L57.0 ACTINIC KERATOSIS: ICD-10-CM

## 2019-02-14 DIAGNOSIS — D18.0 HEMANGIOMA: ICD-10-CM

## 2019-02-14 DIAGNOSIS — L82.1 OTHER SEBORRHEIC KERATOSIS: ICD-10-CM

## 2019-02-14 DIAGNOSIS — L90.5 SCAR CONDITIONS AND FIBROSIS OF SKIN: ICD-10-CM

## 2019-02-14 DIAGNOSIS — L81.4 OTHER MELANIN HYPERPIGMENTATION: ICD-10-CM

## 2019-02-14 DIAGNOSIS — D22 MELANOCYTIC NEVI: ICD-10-CM

## 2019-02-14 PROBLEM — D48.5 NEOPLASM OF UNCERTAIN BEHAVIOR OF SKIN: Status: ACTIVE | Noted: 2019-02-14

## 2019-02-14 PROBLEM — D18.01 HEMANGIOMA OF SKIN AND SUBCUTANEOUS TISSUE: Status: ACTIVE | Noted: 2019-02-14

## 2019-02-14 PROBLEM — D22.5 MELANOCYTIC NEVI OF TRUNK: Status: ACTIVE | Noted: 2019-02-14

## 2019-02-14 PROCEDURE — ? BIOPSY BY SHAVE METHOD

## 2019-02-14 PROCEDURE — ? LIQUID NITROGEN

## 2019-02-14 PROCEDURE — 17003 DESTRUCT PREMALG LES 2-14: CPT

## 2019-02-14 PROCEDURE — 11102 TANGNTL BX SKIN SINGLE LES: CPT

## 2019-02-14 PROCEDURE — ? COUNSELING

## 2019-02-14 PROCEDURE — 99213 OFFICE O/P EST LOW 20 MIN: CPT | Mod: 25

## 2019-02-14 PROCEDURE — 17000 DESTRUCT PREMALG LESION: CPT | Mod: 59

## 2019-02-14 PROCEDURE — ? DIAGNOSIS COMMENT

## 2019-02-14 ASSESSMENT — LOCATION SIMPLE DESCRIPTION DERM
LOCATION SIMPLE: LEFT SHOULDER
LOCATION SIMPLE: RIGHT POSTERIOR UPPER ARM
LOCATION SIMPLE: RIGHT BUTTOCK
LOCATION SIMPLE: RIGHT FOREARM
LOCATION SIMPLE: LEFT PRETIBIAL REGION
LOCATION SIMPLE: RIGHT UPPER BACK
LOCATION SIMPLE: UPPER BACK
LOCATION SIMPLE: LEFT LOWER BACK
LOCATION SIMPLE: RIGHT SHOULDER
LOCATION SIMPLE: LEFT BUTTOCK
LOCATION SIMPLE: RIGHT HAND
LOCATION SIMPLE: LEFT FOREHEAD
LOCATION SIMPLE: RIGHT TEMPLE
LOCATION SIMPLE: RIGHT ANTERIOR NECK
LOCATION SIMPLE: LEFT FOREARM

## 2019-02-14 ASSESSMENT — LOCATION ZONE DERM
LOCATION ZONE: ARM
LOCATION ZONE: LEG
LOCATION ZONE: HAND
LOCATION ZONE: TRUNK
LOCATION ZONE: NECK
LOCATION ZONE: FACE

## 2019-02-14 ASSESSMENT — LOCATION DETAILED DESCRIPTION DERM
LOCATION DETAILED: LEFT POSTERIOR SHOULDER
LOCATION DETAILED: RIGHT CENTRAL TEMPLE
LOCATION DETAILED: LEFT SUPERIOR LATERAL LOWER BACK
LOCATION DETAILED: RIGHT PROXIMAL DORSAL FOREARM
LOCATION DETAILED: RIGHT SUPERIOR LATERAL NECK
LOCATION DETAILED: RIGHT SUPERIOR UPPER BACK
LOCATION DETAILED: LEFT BUTTOCK
LOCATION DETAILED: SUPERIOR THORACIC SPINE
LOCATION DETAILED: RIGHT INFERIOR LATERAL NECK
LOCATION DETAILED: RIGHT RADIAL DORSAL HAND
LOCATION DETAILED: RIGHT ANTERIOR SHOULDER
LOCATION DETAILED: LEFT DISTAL DORSAL FOREARM
LOCATION DETAILED: LEFT PROXIMAL DORSAL FOREARM
LOCATION DETAILED: RIGHT BUTTOCK
LOCATION DETAILED: RIGHT DISTAL DORSAL FOREARM
LOCATION DETAILED: LEFT MEDIAL FOREHEAD
LOCATION DETAILED: LEFT DISTAL PRETIBIAL REGION
LOCATION DETAILED: RIGHT DISTAL POSTERIOR UPPER ARM

## 2019-02-14 NOTE — PROCEDURE: BIOPSY BY SHAVE METHOD
Depth Of Biopsy: dermis
Electrodesiccation Text: The wound bed was treated with electrodesiccation after the biopsy was performed.
Bill [id number] For Specimen Handling/Conveyance To Laboratory?: no
Size Of Lesion In Cm: 0
Render Post-Care Instructions In Note?: yes
Wound Care: Aquaphor
Lab Facility: 
Post-Care Instructions: I reviewed with the patient in detail post-care instructions. Patient is to keep the biopsy site dry overnight, and then apply bacitracin or petroleum twice daily until healed.
Dressing: bandage
Electrodesiccation And Curettage Text: The wound bed was treated with electrodesiccation and curettage after the biopsy was performed.
Anesthesia Volume In Cc: 1
Billing Type: Third-Party Bill
Biopsy Type: H and E
Type Of Destruction Used: Electrodesiccation
Biopsy Method: Personna blade
Notification Instructions: Patient will be notified of biopsy results. However, patient instructed to call the office if not contacted within 2 weeks.
Hemostasis: Drysol and Electrocautery
Silver Nitrate Text: The wound bed was treated with silver nitrate after the biopsy was performed.
Cryotherapy Text: The wound bed was treated with cryotherapy after the biopsy was performed.
Anesthesia Type: 1% lidocaine with 1:100,000 epinephrine and a 1:12 solution of 8.4% sodium bicarbonate
Consent: Written consent was obtained and risks were reviewed including but not limited to scarring, infection, bleeding, scabbing, incomplete removal, nerve damage and allergy to anesthesia.
Lab: [id number]
Detail Level: Detailed

## 2019-02-19 ENCOUNTER — HOSPITAL ENCOUNTER (OUTPATIENT)
Dept: CARDIOLOGY | Facility: MEDICAL CENTER | Age: 79
End: 2019-02-19
Attending: INTERNAL MEDICINE
Payer: MEDICARE

## 2019-02-19 DIAGNOSIS — R06.02 SHORTNESS OF BREATH: ICD-10-CM

## 2019-02-19 DIAGNOSIS — R60.9 EDEMA, UNSPECIFIED TYPE: ICD-10-CM

## 2019-02-19 LAB
LV EJECT FRACT  99904: 65
LV EJECT FRACT MOD 2C 99903: 66.88
LV EJECT FRACT MOD 4C 99902: 67.07
LV EJECT FRACT MOD BP 99901: 64.92

## 2019-02-19 PROCEDURE — 93306 TTE W/DOPPLER COMPLETE: CPT | Mod: 26 | Performed by: INTERNAL MEDICINE

## 2019-02-19 PROCEDURE — 93306 TTE W/DOPPLER COMPLETE: CPT

## 2019-02-21 ENCOUNTER — SLEEP CENTER VISIT (OUTPATIENT)
Dept: SLEEP MEDICINE | Facility: MEDICAL CENTER | Age: 79
End: 2019-02-21
Payer: MEDICARE

## 2019-02-21 VITALS
BODY MASS INDEX: 34.55 KG/M2 | HEART RATE: 88 BPM | HEIGHT: 60 IN | OXYGEN SATURATION: 90 % | WEIGHT: 176 LBS | SYSTOLIC BLOOD PRESSURE: 120 MMHG | DIASTOLIC BLOOD PRESSURE: 78 MMHG | TEMPERATURE: 97.9 F | RESPIRATION RATE: 16 BRPM

## 2019-02-21 DIAGNOSIS — G47.33 OSA (OBSTRUCTIVE SLEEP APNEA): ICD-10-CM

## 2019-02-21 DIAGNOSIS — J44.89 COPD WITH ASTHMA: ICD-10-CM

## 2019-02-21 DIAGNOSIS — G89.29 OTHER CHRONIC PAIN: ICD-10-CM

## 2019-02-21 PROCEDURE — 99214 OFFICE O/P EST MOD 30 MIN: CPT | Performed by: NURSE PRACTITIONER

## 2019-02-21 RX ORDER — ACETAMINOPHEN 160 MG
TABLET,DISINTEGRATING ORAL
Status: ON HOLD | COMMUNITY
End: 2020-08-12

## 2019-02-21 RX ORDER — OXYCODONE AND ACETAMINOPHEN 7.5; 325 MG/1; MG/1
TABLET ORAL
Refills: 0 | COMMUNITY
Start: 2019-01-09 | End: 2019-04-10

## 2019-02-21 NOTE — PROGRESS NOTES
CC:  Here for f/u sleep and pulmonary issues as listed below    HPI:     Maddy presents today for follow up for ALISSA.  She is also seen in our pulmonary office for history of COPD.  Please see previous office visit from 2- for further details.  Past medical history of pulmonary hypertension followed by Dr. Cook, depression, hypertension, CKD stage III. Has chronic pain on opioids.      She is compliant using Incruse and rare use of rescue. Stopped symbicort b/c caused hoarseness. She does have a history of choking with dry food. She was offered referral to ENT for further evaluation but she declined. She has been increasing her activity level with improved dyspnea. She has been bicycling and walking. BMI is 34.     PSG from 6/2017 indicated an AHI of 5.5 and low oxygenation of 81%.  She completed a titration study for potential ASV therapy given elevated AHI and use of chronic pain medication.  She was then switched from CPAP to BiPAP given her central apneas resolved with BiPAP therapy.  Currently she is being treated with BIPAP ST @ 12/7cmH20.  Compliance download from the dates 1/22/2019 - 2/20/2019 indicates she is wearing the device 100% for an avg of 7 hours and 43 minutes per night with a reduced AHI of 11.0. Most likely the elevated AHI is related to her recent change in decrease of opioids and having difficulty acclimating. She may consider other alternatives such as acupuncture therapy.   She does tolerate pressure and mask well.  She wakes up refreshed and is less tired throughout the day. She denies morning H/A and sleep better overall. She will continue to clean supplies weekly and change them as insurance allows.           Patient Active Problem List    Diagnosis Date Noted   • CKD (chronic kidney disease) stage 3, GFR 30-59 ml/min ([facility]) 05/23/2016     Priority: Medium   • Esophageal dysphagia 05/19/2016     Priority: Medium   • Hypothyroidism due to Hashimoto's thyroiditis      Priority:  "Medium   • GERD (gastroesophageal reflux disease) 09/20/2011     Priority: Medium   • Essential hypertension 07/06/2009     Priority: Medium   • Major depressive disorder, recurrent episode, mild (CMS-HCC) 05/02/2016     Priority: Low   • Neuropathy (CMS-HCC) 10/17/2013     Priority: Low   • Family history of polycystic kidney disease      Priority: Low   • Facet arthritis of lumbar region ([facility]) 03/26/2012     Priority: Low   • History of vertebral fracture 03/26/2012     Priority: Low   • Spinal stenosis of lumbar region with neurogenic claudication      Priority: Low   • Other chronic pain 02/21/2019   • History of hematuria 10/23/2018   • BMI 34.0-34.9,adult 08/17/2018   • Dyslipidemia, goal LDL below 130 08/15/2018   • Chronic obstructive pulmonary disease ([facility]) 06/22/2018   • Pulmonary hypertension ([facility]) 02/21/2018   • COPD with asthma (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) 06/22/2017   • Menopausal symptoms 09/13/2016   • Essential tremor 09/06/2016   • Postmenopausal osteoporosis    • Arachnoiditis        Past Medical History:   Diagnosis Date   • Allergy     seasonal   • Anesthesia     \"hard to wake up\"   • Anxiety     due to loss of . 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    • Arthritis     facet arthritis of lumbar region   • Asthma     Inhaler use daily.   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 Albrecht, attaches to plastic, prulent   • Depression     and anxiety   • Encounter for long-term (current) use of other medications    • Erosive gastritis 5/09    antral   • Family history of polycystic kidney disease    • GERD " (gastroesophageal reflux disease)    • [place] measles    • Hypertension    • Hypothyroidism    • Influenza    • Lumbar vertebral fracture (HCC) 2011   • Mumps    • Muscle disorder     Arachnoiditis   • Neuropathy (HCC)    • Obesity, Class I, BMI 30-34.9    • OSTEOPOROSIS    • Pain 16    back and legs    • Renal disorder    • Sleep apnea     on BiPap, follows with pulmonology   • Spinal stenosis, lumbar region, without neurogenic claudication    • Tonsillitis    • Urinary bladder disorder 2016    Currently has a catheter.       Past Surgical History:   Procedure Laterality Date   • SPINAL CORD STIMULATOR N/A 7/3/2017    Procedure: SPINAL CORD STIMULATOR FOR LEAD REMOVAL;  Surgeon: Amandeep Gonzalez D.O.;  Location: SURGERY [facility] [place];  Service:    • GASTROSCOPY WITH BALLOON DILATATION N/A 2016    Procedure: GASTROSCOPY WITH DILATATION;  Surgeon: Tony Monae M.D.;  Location: SURGERY [facility];  Service:    • SPINAL CORD STIMULATOR  14   • EGD WITH ASP/BX  09    erosive gastritis   • HYSTERECTOMY, TOTAL ABDOMINAL     • APPENDECTOMY     • CATARACT EXTRACTION WITH IOL Bilateral    • COLONOSCOPY  ,09    normal   • HYSTERECTOMY LAPAROSCOPY     • LUMBAR LAMINECTOMY DISKECTOMY     • TONSILLECTOMY         Family History   Problem Relation Age of Onset   • Genitourinary () Brother    • Lung Disease Mother         COPD   • Heart Disease Mother    • Genetic Father         Parkinsons/ from complications   • Hypertension Father    • Cancer Maternal Aunt         Breast cancer   • Stroke Paternal Grandmother    • Cancer Maternal Aunt         Breast cancer       Social History     Social History   • Marital status:      Spouse name: N/A   • Number of children: N/A   • Years of education: N/A     Occupational History   • Not on file.     Social History Main Topics   • Smoking status: Never Smoker   • Smokeless tobacco: Never Used   • Alcohol use No   •  Drug use: No   • Sexual activity: No     Other Topics Concern   • Stress Concern No      cancer     Social History Narrative   • No narrative on file       Current Outpatient Prescriptions   Medication Sig Dispense Refill   • Multiple Vitamins-Minerals (VITEYES AREDS ADVANCED PO) Take  by mouth.     • Cholecalciferol (VITAMIN D3) 2000 UNIT Cap Take  by mouth.     • LETAIRIS 10 MG tablet Take 10 Tabs by mouth every day.     • atorvastatin (LIPITOR) 20 MG Tab Take 20 mg by mouth every day.  12   • furosemide (LASIX) 40 MG Tab Take 40 mg by mouth every day.  3   • NARCAN 4 MG/0.1ML Liquid AS DIRECTED  0   • oxyCODONE-acetaminophen (PERCOCET) 5-325 MG Tab TAKE 1 TABLET BY MOUTH 4 TIMES A DAY AS NEEDED FOR PAIN 11/8/18 G89.29  0   • spironolactone (ALDACTONE) 25 MG Tab Take 25 mg by mouth every day.  3   • diazePAM (VALIUM) 5 MG Tab Take 1 Tab by mouth every 8 hours as needed for Anxiety for up to 90 days. 60 Tab 2   • omeprazole (PRILOSEC) 20 MG delayed-release capsule Take 1 Cap by mouth every day. 90 Cap 3   • INCRUSE ELLIPTA 62.5 MCG/INH AEROSOL POWDER, BREATH ACTIVATED INHALE ONE PUFF BY MOUTH ONCE DAILY 3 Each 3   • DULoxetine (CYMBALTA) 60 MG Cap DR Particles delayed-release capsule TAKE 1 CAPSULE ONE TIME DAILY 90 Cap 2   • estradiol (ESTRACE) 0.5 MG tablet TAKE ONE TABLET BY MOUTH ONCE DAILY 90 Tab 3   • irbesartan (AVAPRO) 75 MG tablet Take 1 Tab by mouth every day. 90 Tab 3   • levothyroxine (SYNTHROID) 50 MCG Tab TAKE ONE TABLET BY MOUTH IN THE MORNING ON AN EMPTY STOMACH 90 Tab 3   • pregabalin (LYRICA) 150 MG Cap Take 200 mg by mouth 3 times a day.     • imipramine (TOFRANIL) 10 MG Tab Take 2 Tabs by mouth every evening. (Patient taking differently: Take 10 mg by mouth every evening.) 180 Tab 2   • AMITIZA 24 MCG capsule Take 24 mcg by mouth every morning with breakfast.     • polyethylene glycol/lytes (MIRALAX) Pack Take 17 g by mouth every day.     • XTAMPZA ER 18 MG Capsule Extended Release 12  hour Abuse-Deterrent Take 18 mg by mouth 2 Times a Day.     • Cyanocobalamin 2500 MCG Chew Tab Take 1 Tab by mouth every day.     • VENTOLIN  (90 BASE) MCG/ACT Aero Soln inhalation aerosol Inhale 2 Puffs by mouth every 6 hours as needed for Shortness of Breath. 1 Inhaler 5   • aspirin EC (ECOTRIN) 81 MG Tablet Delayed Response Take 81 mg by mouth every day.     • docusate sodium (COLACE) 250 MG capsule Take 250 mg by mouth every day.     • oxyCODONE-acetaminophen (PERCOCET) 7.5-325 MG per tablet TAKE 1 TABLET BY MOUTH 4 TIMES A DAY AS NEEDED FOR PAIN (1/9/19) G89.29 30 DAYS  0   • azithromycin (ZITHROMAX) 250 MG Tab Take 2 tablets on day 1, then take 1 tablet a day for 4 days. (Patient not taking: Reported on 2/21/2019) 6 Tab 1   • MethylPREDNISolone (MEDROL DOSEPAK) 4 MG Tablet Therapy Pack Take as directed. (Patient not taking: Reported on 2/21/2019) 21 Tab 1   • Probiotic Product ([employer]) CAPS Take 1 Cap by mouth 2 Times a Day.       No current facility-administered medications for this visit.           Allergies: Pcn [penicillins]; Sulfa drugs; Tape; Macrobid [nitrofurantoin]; and Zoloft      ROS   Gen: Denies fever, chills, unintentional weight loss, fatigue, night sweats  E/N/T: Denies ear pain, nasal congestion  Resp:Denies Dyspnea, wheezing, cough, sputum production, hemoptysis  CV: Denies chest pain, chest tightness, palpitations, BLE edema  Sleep:Denies morning headache, insomnia, daytime somnolence, snoring, gasping for air, apnea  Neuro: Denies frequent headaches, weakness, dizziness  GI: Denies N/V, acid reflux/heartburn  See HPI.  All other systems reviewed and negative      Vital signs for this encounter:  Vitals:    02/21/19 0900 02/21/19 0927   Height:  1.524 m (5')   Weight:  79.8 kg (176 lb)   Weight % change since last entry.:  0 %   BP:  120/78   Pulse:  88   BMI (Calculated):  34.37   Resp:  16   Temp:  36.6 °C (97.9 °F)   TempSrc:  Temporal   O2 sat % room air: (!) 90 %                   Physical Exam:   Appearance: well developed, well nourished, no acute distress.  Eyes: PERRL, EOM intact, sclere white, conjunctiva moist.  Ears: no lesions or deformities.  Hearing: grossly intact.  Nose: no lesions or deformities.  Dentition: good dentition.  Oropharynx: tongue normal, posterior pharynx without erythema or exudate.  Neck: supple, trachea midline, no masses.  Respiratory effort: no intercostal retractions or use of accessory muscles.  Lung auscultation: Bilateral diminished   Heart auscultation: no murmur, rub, or gallop.   Extremities: no cyanosis or edema.  Abdomen: soft, non-tender, no masses.  Gait and station: grossly normal   Digits and Nails: no clubbing, cyanosis, petechiae, or nodes.  Cranial nerves: grossly normal.  Motor: no focal deficits observed.  Skin: no rashes, lesions, or ulcers noted.  Orientation: oriented to time, place, and person.  Mood and affect: mood and affect appropriate, normal interaction with examiner.    Assessment   1. ALISSA (obstructive sleep apnea)  DME Mask and Supplies   2. Other chronic pain     3. COPD with asthma (HCC)     4. BMI 34.0-34.9,adult  OBESITY COUNSELING (No Charge): Patient identified as having weight management issue.  Appropriate orders and counseling given.       Patient was seen for 25 minutes, more than 50% of time spent in face to face review, counseling, and arranging future evaluation and follow up of medical conditions and care related to ALISSA and compliance and chronic pain management with other alternatives. Patient is clinically stabld and will proceed with following plan.     PLAN:   Patient Instructions   1) Continue BIPAP ST with back up rate of 12 - 12/7cmH20  2) Clean mask and supplies weekly and change them as insurance allows  3) Continue using Incruse and rescue inhaler as needed  4) Vaccines: Up to date with Prevnar 13, Pneumovax 23, flu  5) Continue weekly exercise   6) Zpack and Medrol pack on hand for  exacerbation   7) Return in about 3 months (around 5/21/2019) for follow up with DOMINIQUE Sims, if not sooner, review of symptoms, Compliance, Two seperate sleep and pulmonary appointment.

## 2019-02-21 NOTE — PATIENT INSTRUCTIONS
1) Continue BIPAP ST with back up rate of 12 - 12/7cmH20  2) Clean mask and supplies weekly and change them as insurance allows  3) Continue using Incruse and rescue inhaler as needed  4) Vaccines: Up to date with Prevnar 13, Pneumovax 23, flu  5) Continue weekly exercise   6) Zpack and Medrol pack on hand for exacerbation   7) Return in about 3 months (around 5/21/2019) for follow up with DOMINIQUE Sims, if not sooner, review of symptoms, Compliance, Two seperate sleep and pulmonary appointment.

## 2019-03-05 ENCOUNTER — TELEPHONE (OUTPATIENT)
Dept: PULMONOLOGY | Facility: HOSPICE | Age: 79
End: 2019-03-05

## 2019-03-05 NOTE — TELEPHONE ENCOUNTER
Now that [facility] has entered into an agreement with [employer] for them to service [facility] patient I have forwarded this patients info to [employer] to get pt taken care of

## 2019-03-14 DIAGNOSIS — F33.0 MAJOR DEPRESSIVE DISORDER, RECURRENT EPISODE, MILD (HCC): ICD-10-CM

## 2019-03-14 RX ORDER — IMIPRAMINE HYDROCHLORIDE 10 MG/1
TABLET, FILM COATED ORAL
Qty: 180 TAB | Refills: 2 | Status: SHIPPED | OUTPATIENT
Start: 2019-03-14 | End: 2019-11-27 | Stop reason: SDUPTHER

## 2019-03-19 ENCOUNTER — TELEPHONE (OUTPATIENT)
Dept: PULMONOLOGY | Facility: HOSPICE | Age: 79
End: 2019-03-19

## 2019-03-19 ENCOUNTER — APPOINTMENT (RX ONLY)
Dept: URBAN - METROPOLITAN AREA CLINIC 4 | Facility: CLINIC | Age: 79
Setting detail: DERMATOLOGY
End: 2019-03-19

## 2019-03-19 PROBLEM — D04.39 CARCINOMA IN SITU OF SKIN OF OTHER PARTS OF FACE: Status: ACTIVE | Noted: 2019-03-19

## 2019-03-19 PROCEDURE — ? MEDICATION COUNSELING

## 2019-03-19 PROCEDURE — 11642 EXC F/E/E/N/L MAL+MRG 1.1-2: CPT

## 2019-03-19 PROCEDURE — 12052 INTMD RPR FACE/MM 2.6-5.0 CM: CPT

## 2019-03-19 PROCEDURE — ? EXCISION

## 2019-03-19 PROCEDURE — ? PRESCRIPTION

## 2019-03-19 RX ORDER — DOXYCYCLINE HYCLATE 100 MG/1
CAPSULE, GELATIN COATED ORAL
Qty: 14 | Refills: 0 | Status: ERX

## 2019-03-19 NOTE — PROCEDURE: MEDICATION COUNSELING
Erythromycin Counseling:  I discussed with the patient the risks of erythromycin including but not limited to GI upset, allergic reaction, drug rash, diarrhea, increase in liver enzymes, and yeast infections.
Solaraze Counseling:  I discussed with the patient the risks of Solaraze including but not limited to erythema, scaling, itching, weeping, crusting, and pain.
Stelara Pregnancy And Lactation Text: This medication is Pregnancy Category B and is considered safe during pregnancy. It is unknown if this medication is excreted in breast milk.
Cyclosporine Pregnancy And Lactation Text: This medication is Pregnancy Category C and it isn't know if it is safe during pregnancy. This medication is excreted in breast milk.
Benzoyl Peroxide Pregnancy And Lactation Text: This medication is Pregnancy Category C. It is unknown if benzoyl peroxide is excreted in breast milk.
Rituxan Counseling:  I discussed with the patient the risks of Rituxan infusions. Side effects can include infusion reactions, severe drug rashes including mucocutaneous reactions, reactivation of latent hepatitis and other infections and rarely progressive multifocal leukoencephalopathy.  All of the patient's questions and concerns were addressed.
Griseofulvin Pregnancy And Lactation Text: This medication is Pregnancy Category X and is known to cause serious birth defects. It is unknown if this medication is excreted in breast milk but breast feeding should be avoided.
Enbrel Counseling:  I discussed with the patient the risks of etanercept including but not limited to myelosuppression, immunosuppression, autoimmune hepatitis, demyelinating diseases, lymphoma, and infections.  The patient understands that monitoring is required including a PPD at baseline and must alert us or the primary physician if symptoms of infection or other concerning signs are noted.
Oxybutynin Pregnancy And Lactation Text: This medication is Pregnancy Category B and is considered safe during pregnancy. It is unknown if it is excreted in breast milk.
Niacinamide Pregnancy And Lactation Text: These medications are considered safe during pregnancy.
Hydroquinone Pregnancy And Lactation Text: This medication has not been assigned a Pregnancy Risk Category but animal studies failed to show danger with the topical medication. It is unknown if the medication is excreted in breast milk.
Dupixent Pregnancy And Lactation Text: This medication likely crosses the placenta but the risk for the fetus is uncertain. This medication is excreted in breast milk.
Cimetidine Counseling:  I discussed with the patient the risks of Cimetidine including but not limited to gynecomastia, headache, diarrhea, nausea, drowsiness, arrhythmias, pancreatitis, skin rashes, psychosis, bone marrow suppression and kidney toxicity.
Quinolones Pregnancy And Lactation Text: This medication is Pregnancy Category C and it isn't know if it is safe during pregnancy. It is also excreted in breast milk.
Erivedge Pregnancy And Lactation Text: This medication is Pregnancy Category X and is absolutely contraindicated during pregnancy. It is unknown if it is excreted in breast milk.
Xolair Pregnancy And Lactation Text: This medication is Pregnancy Category B and is considered safe during pregnancy. This medication is excreted in breast milk.
Bactrim Counseling:  I discussed with the patient the risks of sulfa antibiotics including but not limited to GI upset, allergic reaction, drug rash, diarrhea, dizziness, photosensitivity, and yeast infections.  Rarely, more serious reactions can occur including but not limited to aplastic anemia, agranulocytosis, methemoglobinemia, blood dyscrasias, liver or kidney failure, lung infiltrates or desquamative/blistering drug rashes.
Bexarotene Counseling:  I discussed with the patient the risks of bexarotene including but not limited to hair loss, dry lips/skin/eyes, liver abnormalities, hyperlipidemia, pancreatitis, depression/suicidal ideation, photosensitivity, drug rash/allergic reactions, hypothyroidism, anemia, leukopenia, infection, cataracts, and teratogenicity.  Patient understands that they will need regular blood tests to check lipid profile, liver function tests, white blood cell count, thyroid function tests and pregnancy test if applicable.
Elidel Pregnancy And Lactation Text: This medication is Pregnancy Category C. It is unknown if this medication is excreted in breast milk.
Cimetidine Pregnancy And Lactation Text: This medication is Pregnancy Category B and is considered safe during pregnancy. It is also excreted in breast milk and breast feeding isn't recommended.
Birth Control Pills Counseling: Birth Control Pill Counseling: I discussed with the patient the potential side effects of OCPs including but not limited to increased risk of stroke, heart attack, thrombophlebitis, deep venous thrombosis, hepatic adenomas, breast changes, GI upset, headaches, and depression.  The patient verbalized understanding of the proper use and possible adverse effects of OCPs. All of the patient's questions and concerns were addressed.
Carac Counseling:  I discussed with the patient the risks of Carac including but not limited to erythema, scaling, itching, weeping, crusting, and pain.
Rituxan Pregnancy And Lactation Text: This medication is Pregnancy Category C and it isn't know if it is safe during pregnancy. It is unknown if this medication is excreted in breast milk but similar antibodies are known to be excreted.
Nsaids Pregnancy And Lactation Text: These medications are considered safe up to 30 weeks gestation. It is excreted in breast milk.
Azathioprine Counseling:  I discussed with the patient the risks of azathioprine including but not limited to myelosuppression, immunosuppression, hepatotoxicity, lymphoma, and infections.  The patient understands that monitoring is required including baseline LFTs, Creatinine, possible TPMP genotyping and weekly CBCs for the first month and then every 2 weeks thereafter.  The patient verbalized understanding of the proper use and possible adverse effects of azathioprine.  All of the patient's questions and concerns were addressed.
Itraconazole Counseling:  I discussed with the patient the risks of itraconazole including but not limited to liver damage, nausea/vomiting, neuropathy, and severe allergy.  The patient understands that this medication is best absorbed when taken with acidic beverages such as non-diet cola or ginger ale.  The patient understands that monitoring is required including baseline LFTs and repeat LFTs at intervals.  The patient understands that they are to contact us or the primary physician if concerning signs are noted.
Methotrexate Counseling:  Patient counseled regarding adverse effects of methotrexate including but not limited to nausea, vomiting, abnormalities in liver function tests. Patients may develop mouth sores, rash, diarrhea, and abnormalities in blood counts. The patient understands that monitoring is required including LFT's and blood counts.  There is a rare possibility of scarring of the liver and lung problems that can occur when taking methotrexate. Persistent nausea, loss of appetite, pale stools, dark urine, cough, and shortness of breath should be reported immediately. Patient advised to discontinue methotrexate treatment at least three months before attempting to become pregnant.  I discussed the need for folate supplements while taking methotrexate.  These supplements can decrease side effects during methotrexate treatment. The patient verbalized understanding of the proper use and possible adverse effects of methotrexate.  All of the patient's questions and concerns were addressed.
Rifampin Pregnancy And Lactation Text: This medication is Pregnancy Category C and it isn't know if it is safe during pregnancy. It is also excreted in breast milk and should not be used if you are breast feeding.
Solaraze Pregnancy And Lactation Text: This medication is Pregnancy Category B and is considered safe. There is some data to suggest avoiding during the third trimester. It is unknown if this medication is excreted in breast milk.
Clofazimine Counseling:  I discussed with the patient the risks of clofazimine including but not limited to skin and eye pigmentation, liver damage, nausea/vomiting, gastrointestinal bleeding and allergy.
Include Pregnancy/Lactation Warning?: No
Imiquimod Counseling:  I discussed with the patient the risks of imiquimod including but not limited to erythema, scaling, itching, weeping, crusting, and pain.  Patient understands that the inflammatory response to imiquimod is variable from person to person and was educated regarded proper titration schedule.  If flu-like symptoms develop, patient knows to discontinue the medication and contact us.
Topical Sulfur Applications Counseling: Topical Sulfur Counseling: Patient counseled that this medication may cause skin irritation or allergic reactions.  In the event of skin irritation, the patient was advised to reduce the amount of the drug applied or use it less frequently.   The patient verbalized understanding of the proper use and possible adverse effects of topical sulfur application.  All of the patient's questions and concerns were addressed.
Bactrim Pregnancy And Lactation Text: This medication is Pregnancy Category D and is known to cause fetal risk.  It is also excreted in breast milk.
Rifampin Counseling: I discussed with the patient the risks of rifampin including but not limited to liver damage, kidney damage, red-orange body fluids, nausea/vomiting and severe allergy.
Nsaids Counseling: NSAID Counseling: I discussed with the patient that NSAIDs should be taken with food. Prolonged use of NSAIDs can result in the development of stomach ulcers.  Patient advised to stop taking NSAIDs if abdominal pain occurs.  The patient verbalized understanding of the proper use and possible adverse effects of NSAIDs.  All of the patient's questions and concerns were addressed.
Gabapentin Counseling: I discussed with the patient the risks of gabapentin including but not limited to dizziness, somnolence, fatigue and ataxia.
Taltz Counseling: I discussed with the patient the risks of ixekizumab including but not limited to immunosuppression, serious infections, worsening of inflammatory bowel disease and drug reactions.  The patient understands that monitoring is required including a PPD at baseline and must alert us or the primary physician if symptoms of infection or other concerning signs are noted.
Clofazimine Pregnancy And Lactation Text: This medication is Pregnancy Category C and isn't considered safe during pregnancy. It is excreted in breast milk.
Erythromycin Pregnancy And Lactation Text: This medication is Pregnancy Category B and is considered safe during pregnancy. It is also excreted in breast milk.
Topical Retinoid counseling:  Patient advised to apply a pea-sized amount only at bedtime and wait 30 minutes after washing their face before applying.  If too drying, patient may add a non-comedogenic moisturizer. The patient verbalized understanding of the proper use and possible adverse effects of retinoids.  All of the patient's questions and concerns were addressed.
Taltz Pregnancy And Lactation Text: The risk during pregnancy and breastfeeding is uncertain with this medication.
Methotrexate Pregnancy And Lactation Text: This medication is Pregnancy Category X and is known to cause fetal harm. This medication is excreted in breast milk.
Carac Pregnancy And Lactation Text: This medication is Pregnancy Category X and contraindicated in pregnancy and in women who may become pregnant. It is unknown if this medication is excreted in breast milk.
Minoxidil Counseling: Minoxidil is a topical medication which can increase blood flow where it is applied. It is uncertain how this medication increases hair growth. Side effects are uncommon and include stinging and allergic reactions.
Siliq Counseling:  I discussed with the patient the risks of Siliq including but not limited to new or worsening depression, suicidal thoughts and behavior, immunosuppression, malignancy, posterior leukoencephalopathy syndrome, and serious infections.  The patient understands that monitoring is required including a PPD at baseline and must alert us or the primary physician if symptoms of infection or other concerning signs are noted. There is also a special program designed to monitor depression which is required with Siliq.
Topical Sulfur Applications Pregnancy And Lactation Text: This medication is Pregnancy Category C and has an unknown safety profile during pregnancy. It is unknown if this topical medication is excreted in breast milk.
Sski Pregnancy And Lactation Text: This medication is Pregnancy Category D and isn't considered safe during pregnancy. It is excreted in breast milk.
Birth Control Pills Pregnancy And Lactation Text: This medication should be avoided if pregnant and for the first 30 days post-partum.
Bexarotene Pregnancy And Lactation Text: This medication is Pregnancy Category X and should not be given to women who are pregnant or may become pregnant. This medication should not be used if you are breast feeding.
Cephalexin Counseling: I counseled the patient regarding use of cephalexin as an antibiotic for prophylactic and/or therapeutic purposes. Cephalexin (commonly prescribed under brand name Keflex) is a cephalosporin antibiotic which is active against numerous classes of bacteria, including most skin bacteria. Side effects may include nausea, diarrhea, gastrointestinal upset, rash, hives, yeast infections, and in rare cases, hepatitis, kidney disease, seizures, fever, confusion, neurologic symptoms, and others. Patients with severe allergies to penicillin medications are cautioned that there is about a 10% incidence of cross-reactivity with cephalosporins. When possible, patients with penicillin allergies should use alternatives to cephalosporins for antibiotic therapy.
Doxepin Counseling:  Patient advised that the medication is sedating and not to drive a car after taking this medication. Patient informed of potential adverse effects including but not limited to dry mouth, urinary retention, and blurry vision.  The patient verbalized understanding of the proper use and possible adverse effects of doxepin.  All of the patient's questions and concerns were addressed.
Doxepin Pregnancy And Lactation Text: This medication is Pregnancy Category C and it isn't known if it is safe during pregnancy. It is also excreted in breast milk and breast feeding isn't recommended.
Ketoconazole Counseling:   Patient counseled regarding improving absorption with orange juice.  Adverse effects include but are not limited to breast enlargement, headache, diarrhea, nausea, upset stomach, liver function test abnormalities, taste disturbance, and stomach pain.  There is a rare possibility of liver failure that can occur when taking ketoconazole. The patient understands that monitoring of LFTs may be required, especially at baseline. The patient verbalized understanding of the proper use and possible adverse effects of ketoconazole.  All of the patient's questions and concerns were addressed.
Colchicine Counseling:  Patient counseled regarding adverse effects including but not limited to stomach upset (nausea, vomiting, stomach pain, or diarrhea).  Patient instructed to limit alcohol consumption while taking this medication.  Colchicine may reduce blood counts especially with prolonged use.  The patient understands that monitoring of kidney function and blood counts may be required, especially at baseline. The patient verbalized understanding of the proper use and possible adverse effects of colchicine.  All of the patient's questions and concerns were addressed.
Isotretinoin Pregnancy And Lactation Text: This medication is Pregnancy Category X and is considered extremely dangerous during pregnancy. It is unknown if it is excreted in breast milk.
5-Fu Counseling: 5-Fluorouracil Counseling:  I discussed with the patient the risks of 5-fluorouracil including but not limited to erythema, scaling, itching, weeping, crusting, and pain.
Prednisone Counseling:  I discussed with the patient the risks of prolonged use of prednisone including but not limited to weight gain, insomnia, osteoporosis, mood changes, diabetes, susceptibility to infection, glaucoma and high blood pressure.  In cases where prednisone use is prolonged, patients should be monitored with blood pressure checks, serum glucose levels and an eye exam.  Additionally, the patient may need to be placed on GI prophylaxis, PCP prophylaxis, and calcium and vitamin D supplementation and/or a bisphosphonate.  The patient verbalized understanding of the proper use and the possible adverse effects of prednisone.  All of the patient's questions and concerns were addressed.
Azathioprine Pregnancy And Lactation Text: This medication is Pregnancy Category D and isn't considered safe during pregnancy. It is unknown if this medication is excreted in breast milk.
Isotretinoin Counseling: Patient should get monthly blood tests, not donate blood, not drive at night if vision affected, not share medication, and not undergo elective surgery for 6 months after tx completed. Side effects reviewed, pt to contact office should one occur.
Humira Counseling:  I discussed with the patient the risks of adalimumab including but not limited to myelosuppression, immunosuppression, autoimmune hepatitis, demyelinating diseases, lymphoma, and serious infections.  The patient understands that monitoring is required including a PPD at baseline and must alert us or the primary physician if symptoms of infection or other concerning signs are noted.
Tetracycline Counseling: Patient counseled regarding possible photosensitivity and increased risk for sunburn.  Patient instructed to avoid sunlight, if possible.  When exposed to sunlight, patients should wear protective clothing, sunglasses, and sunscreen.  The patient was instructed to call the office immediately if the following severe adverse effects occur:  hearing changes, easy bruising/bleeding, severe headache, or vision changes.  The patient verbalized understanding of the proper use and possible adverse effects of tetracycline.  All of the patient's questions and concerns were addressed. Patient understands to avoid pregnancy while on therapy due to potential birth defects.
Cimzia Counseling:  I discussed with the patient the risks of Cimzia including but not limited to immunosuppression, allergic reactions and infections.  The patient understands that monitoring is required including a PPD at baseline and must alert us or the primary physician if symptoms of infection or other concerning signs are noted.
Odomzo Counseling- I discussed with the patient the risks of Odomzo including but not limited to nausea, vomiting, diarrhea, constipation, weight loss, changes in the sense of taste, decreased appetite, muscle spasms, and hair loss.  The patient verbalized understanding of the proper use and possible adverse effects of Odomzo.  All of the patient's questions and concerns were addressed.
Tremfya Counseling: I discussed with the patient the risks of guselkumab including but not limited to immunosuppression, serious infections, worsening of inflammatory bowel disease and drug reactions.  The patient understands that monitoring is required including a PPD at baseline and must alert us or the primary physician if symptoms of infection or other concerning signs are noted.
Cephalexin Pregnancy And Lactation Text: This medication is Pregnancy Category B and considered safe during pregnancy.  It is also excreted in breast milk but can be used safely for shorter doses.
Thalidomide Counseling: I discussed with the patient the risks of thalidomide including but not limited to birth defects, anxiety, weakness, chest pain, dizziness, cough and severe allergy.
Wartpeel Counseling:  I discussed with the patient the risks of Wartpeel including but not limited to erythema, scaling, itching, weeping, crusting, and pain.
Metronidazole Counseling:  I discussed with the patient the risks of metronidazole including but not limited to seizures, nausea/vomiting, a metallic taste in the mouth, nausea/vomiting and severe allergy.
Spironolactone Counseling: Patient advised regarding risks of diarrhea, abdominal pain, hyperkalemia, birth defects (for female patients), liver toxicity and renal toxicity. The patient may need blood work to monitor liver and kidney function and potassium levels while on therapy. The patient verbalized understanding of the proper use and possible adverse effects of spironolactone.  All of the patient's questions and concerns were addressed.
Glycopyrrolate Counseling:  I discussed with the patient the risks of glycopyrrolate including but not limited to skin rash, drowsiness, dry mouth, difficulty urinating, and blurred vision.
Spironolactone Pregnancy And Lactation Text: This medication can cause feminization of the male fetus and should be avoided during pregnancy. The active metabolite is also found in breast milk.
Ketoconazole Pregnancy And Lactation Text: This medication is Pregnancy Category C and it isn't know if it is safe during pregnancy. It is also excreted in breast milk and breast feeding isn't recommended.
Zyclara Counseling:  I discussed with the patient the risks of imiquimod including but not limited to erythema, scaling, itching, weeping, crusting, and pain.  Patient understands that the inflammatory response to imiquimod is variable from person to person and was educated regarded proper titration schedule.  If flu-like symptoms develop, patient knows to discontinue the medication and contact us.
Picato Counseling:  I discussed with the patient the risks of Picato including but not limited to erythema, scaling, itching, weeping, crusting, and pain.
Tetracycline Pregnancy And Lactation Text: This medication is Pregnancy Category D and not consider safe during pregnancy. It is also excreted in breast milk.
Glycopyrrolate Pregnancy And Lactation Text: This medication is Pregnancy Category B and is considered safe during pregnancy. It is unknown if it is excreted breast milk.
Cimzia Pregnancy And Lactation Text: This medication crosses the placenta but can be considered safe in certain situations. Cimzia may be excreted in breast milk.
Albendazole Counseling:  I discussed with the patient the risks of albendazole including but not limited to cytopenia, kidney damage, nausea/vomiting and severe allergy.  The patient understands that this medication is being used in an off-label manner.
Cellcept Counseling:  I discussed with the patient the risks of mycophenolate mofetil including but not limited to infection/immunosuppression, GI upset, hypokalemia, hypercholesterolemia, bone marrow suppression, lymphoproliferative disorders, malignancy, GI ulceration/bleed/perforation, colitis, interstitial lung disease, kidney failure, progressive multifocal leukoencephalopathy, and birth defects.  The patient understands that monitoring is required including a baseline creatinine and regular CBC testing. In addition, patient must alert us immediately if symptoms of infection or other concerning signs are noted.
Metronidazole Pregnancy And Lactation Text: This medication is Pregnancy Category B and considered safe during pregnancy.  It is also excreted in breast milk.
Simponi Counseling:  I discussed with the patient the risks of golimumab including but not limited to myelosuppression, immunosuppression, autoimmune hepatitis, demyelinating diseases, lymphoma, and serious infections.  The patient understands that monitoring is required including a PPD at baseline and must alert us or the primary physician if symptoms of infection or other concerning signs are noted.
Hydroxyzine Counseling: Patient advised that the medication is sedating and not to drive a car after taking this medication.  Patient informed of potential adverse effects including but not limited to dry mouth, urinary retention, and blurry vision.  The patient verbalized understanding of the proper use and possible adverse effects of hydroxyzine.  All of the patient's questions and concerns were addressed.
Clindamycin Counseling: I counseled the patient regarding use of clindamycin as an antibiotic for prophylactic and/or therapeutic purposes. Clindamycin is active against numerous classes of bacteria, including skin bacteria. Side effects may include nausea, diarrhea, gastrointestinal upset, rash, hives, yeast infections, and in rare cases, colitis.
Eucrisa Counseling: Patient may experience a mild burning sensation during topical application. Eucrisa is not approved in children less than 2 years of age.
Dapsone Counseling: I discussed with the patient the risks of dapsone including but not limited to hemolytic anemia, agranulocytosis, rashes, methemoglobinemia, kidney failure, peripheral neuropathy, headaches, GI upset, and liver toxicity.  Patients who start dapsone require monitoring including baseline LFTs and weekly CBCs for the first month, then every month thereafter.  The patient verbalized understanding of the proper use and possible adverse effects of dapsone.  All of the patient's questions and concerns were addressed.
Tazorac Counseling:  Patient advised that medication is irritating and drying.  Patient may need to apply sparingly and wash off after an hour before eventually leaving it on overnight.  The patient verbalized understanding of the proper use and possible adverse effects of tazorac.  All of the patient's questions and concerns were addressed.
Fluconazole Counseling:  Patient counseled regarding adverse effects of fluconazole including but not limited to headache, diarrhea, nausea, upset stomach, liver function test abnormalities, taste disturbance, and stomach pain.  There is a rare possibility of liver failure that can occur when taking fluconazole.  The patient understands that monitoring of LFTs and kidney function test may be required, especially at baseline. The patient verbalized understanding of the proper use and possible adverse effects of fluconazole.  All of the patient's questions and concerns were addressed.
Terbinafine Counseling: Patient counseling regarding adverse effects of terbinafine including but not limited to headache, diarrhea, rash, upset stomach, liver function test abnormalities, itching, taste/smell disturbance, nausea, abdominal pain, and flatulence.  There is a rare possibility of liver failure that can occur when taking terbinafine.  The patient understands that a baseline LFT and kidney function test may be required. The patient verbalized understanding of the proper use and possible adverse effects of terbinafine.  All of the patient's questions and concerns were addressed.
Otezla Counseling: The side effects of Otezla were discussed with the patient, including but not limited to worsening or new depression, weight loss, diarrhea, nausea, upper respiratory tract infection, and headache. Patient instructed to call the office should any adverse effect occur.  The patient verbalized understanding of the proper use and possible adverse effects of Otezla.  All the patient's questions and concerns were addressed.
Valtrex Counseling: I discussed with the patient the risks of valacyclovir including but not limited to kidney damage, nausea, vomiting and severe allergy.  The patient understands that if the infection seems to be worsening or is not improving, they are to call.
Ilumya Counseling: I discussed with the patient the risks of tildrakizumab including but not limited to immunosuppression, malignancy, posterior leukoencephalopathy syndrome, and serious infections.  The patient understands that monitoring is required including a PPD at baseline and must alert us or the primary physician if symptoms of infection or other concerning signs are noted.
High Dose Vitamin A Counseling: Side effects reviewed, pt to contact office should one occur.
Xeljanz Counseling: I discussed with the patient the risks of Xeljanz therapy including increased risk of infection, liver issues, headache, diarrhea, or cold symptoms. Live vaccines should be avoided. They were instructed to call if they have any problems.
Clindamycin Pregnancy And Lactation Text: This medication can be used in pregnancy if certain situations. Clindamycin is also present in breast milk.
Drysol Counseling:  I discussed with the patient the risks of drysol/aluminum chloride including but not limited to skin rash, itching, irritation, burning.
Cosentyx Counseling:  I discussed with the patient the risks of Cosentyx including but not limited to worsening of Crohn's disease, immunosuppression, allergic reactions and infections.  The patient understands that monitoring is required including a PPD at baseline and must alert us or the primary physician if symptoms of infection or other concerning signs are noted.
SSKI Counseling:  I discussed with the patient the risks of SSKI including but not limited to thyroid abnormalities, metallic taste, GI upset, fever, headache, acne, arthralgias, paraesthesias, lymphadenopathy, easy bleeding, arrhythmias, and allergic reaction.
Minocycline Counseling: Patient advised regarding possible photosensitivity and discoloration of the teeth, skin, lips, tongue and gums.  Patient instructed to avoid sunlight, if possible.  When exposed to sunlight, patients should wear protective clothing, sunglasses, and sunscreen.  The patient was instructed to call the office immediately if the following severe adverse effects occur:  hearing changes, easy bruising/bleeding, severe headache, or vision changes.  The patient verbalized understanding of the proper use and possible adverse effects of minocycline.  All of the patient's questions and concerns were addressed.
Albendazole Pregnancy And Lactation Text: This medication is Pregnancy Category C and it isn't known if it is safe during pregnancy. It is also excreted in breast milk.
Hydroxychloroquine Counseling:  I discussed with the patient that a baseline ophthalmologic exam is needed at the start of therapy and every year thereafter while on therapy. A CBC may also be warranted for monitoring.  The side effects of this medication were discussed with the patient, including but not limited to agranulocytosis, aplastic anemia, seizures, rashes, retinopathy, and liver toxicity. Patient instructed to call the office should any adverse effect occur.  The patient verbalized understanding of the proper use and possible adverse effects of Plaquenil.  All the patient's questions and concerns were addressed.
Drysol Pregnancy And Lactation Text: This medication is considered safe during pregnancy and breast feeding.
Acitretin Counseling:  I discussed with the patient the risks of acitretin including but not limited to hair loss, dry lips/skin/eyes, liver damage, hyperlipidemia, depression/suicidal ideation, photosensitivity.  Serious rare side effects can include but are not limited to pancreatitis, pseudotumor cerebri, bony changes, clot formation/stroke/heart attack.  Patient understands that alcohol is contraindicated since it can result in liver toxicity and significantly prolong the elimination of the drug by many years.
Doxycycline Counseling:  Patient counseled regarding possible photosensitivity and increased risk for sunburn.  Patient instructed to avoid sunlight, if possible.  When exposed to sunlight, patients should wear protective clothing, sunglasses, and sunscreen.  The patient was instructed to call the office immediately if the following severe adverse effects occur:  hearing changes, easy bruising/bleeding, severe headache, or vision changes.  The patient verbalized understanding of the proper use and possible adverse effects of doxycycline.  All of the patient's questions and concerns were addressed.
Infliximab Counseling:  I discussed with the patient the risks of infliximab including but not limited to myelosuppression, immunosuppression, autoimmune hepatitis, demyelinating diseases, lymphoma, and serious infections.  The patient understands that monitoring is required including a PPD at baseline and must alert us or the primary physician if symptoms of infection or other concerning signs are noted.
Cyclophosphamide Pregnancy And Lactation Text: This medication is Pregnancy Category D and it isn't considered safe during pregnancy. This medication is excreted in breast milk.
Protopic Counseling: Patient may experience a mild burning sensation during topical application. Protopic is not approved in children less than 2 years of age. There have been case reports of hematologic and skin malignancies in patients using topical calcineurin inhibitors although causality is questionable.
Otezla Pregnancy And Lactation Text: This medication is Pregnancy Category C and it isn't known if it is safe during pregnancy. It is unknown if it is excreted in breast milk.
Hydroxyzine Pregnancy And Lactation Text: This medication is not safe during pregnancy and should not be taken. It is also excreted in breast milk and breast feeding isn't recommended.
Hydroxychloroquine Pregnancy And Lactation Text: This medication has been shown to cause fetal harm but it isn't assigned a Pregnancy Risk Category. There are small amounts excreted in breast milk.
Cyclophosphamide Counseling:  I discussed with the patient the risks of cyclophosphamide including but not limited to hair loss, hormonal abnormalities, decreased fertility, abdominal pain, diarrhea, nausea and vomiting, bone marrow suppression and infection. The patient understands that monitoring is required while taking this medication.
High Dose Vitamin A Pregnancy And Lactation Text: High dose vitamin A therapy is contraindicated during pregnancy and breast feeding.
Azithromycin Counseling:  I discussed with the patient the risks of azithromycin including but not limited to GI upset, allergic reaction, drug rash, diarrhea, and yeast infections.
Valtrex Pregnancy And Lactation Text: this medication is Pregnancy Category B and is considered safe during pregnancy. This medication is not directly found in breast milk but it's metabolite acyclovir is present.
Dapsone Pregnancy And Lactation Text: This medication is Pregnancy Category C and is not considered safe during pregnancy or breast feeding.
Ivermectin Counseling:  Patient instructed to take medication on an empty stomach with a full glass of water.  Patient informed of potential adverse effects including but not limited to nausea, diarrhea, dizziness, itching, and swelling of the extremities or lymph nodes.  The patient verbalized understanding of the proper use and possible adverse effects of ivermectin.  All of the patient's questions and concerns were addressed.
Xelosminz Pregnancy And Lactation Text: This medication is Pregnancy Category D and is not considered safe during pregnancy.  The risk during breast feeding is also uncertain.
Tazorac Pregnancy And Lactation Text: This medication is not safe during pregnancy. It is unknown if this medication is excreted in breast milk.
Stelara Counseling:  I discussed with the patient the risks of ustekinumab including but not limited to immunosuppression, malignancy, posterior leukoencephalopathy syndrome, and serious infections.  The patient understands that monitoring is required including a PPD at baseline and must alert us or the primary physician if symptoms of infection or other concerning signs are noted.
Cyclosporine Counseling:  I discussed with the patient the risks of cyclosporine including but not limited to hypertension, gingival hyperplasia,myelosuppression, immunosuppression, liver damage, kidney damage, neurotoxicity, lymphoma, and serious infections. The patient understands that monitoring is required including baseline blood pressure, CBC, CMP, lipid panel and uric acid, and then 1-2 times monthly CMP and blood pressure.
Benzoyl Peroxide Counseling: Patient counseled that medicine may cause skin irritation and bleach clothing.  In the event of skin irritation, the patient was advised to reduce the amount of the drug applied or use it less frequently.   The patient verbalized understanding of the proper use and possible adverse effects of benzoyl peroxide.  All of the patient's questions and concerns were addressed.
Griseofulvin Counseling:  I discussed with the patient the risks of griseofulvin including but not limited to photosensitivity, cytopenia, liver damage, nausea/vomiting and severe allergy.  The patient understands that this medication is best absorbed when taken with a fatty meal (e.g., ice cream or french fries).
Acitretin Pregnancy And Lactation Text: This medication is Pregnancy Category X and should not be given to women who are pregnant or may become pregnant in the future. This medication is excreted in breast milk.
Oxybutynin Counseling:  I discussed with the patient the risks of oxybutynin including but not limited to skin rash, drowsiness, dry mouth, difficulty urinating, and blurred vision.
Erivedge Counseling- I discussed with the patient the risks of Erivedge including but not limited to nausea, vomiting, diarrhea, constipation, weight loss, changes in the sense of taste, decreased appetite, muscle spasms, and hair loss.  The patient verbalized understanding of the proper use and possible adverse effects of Erivedge.  All of the patient's questions and concerns were addressed.
Topical Clindamycin Counseling: Patient counseled that this medication may cause skin irritation or allergic reactions.  In the event of skin irritation, the patient was advised to reduce the amount of the drug applied or use it less frequently.   The patient verbalized understanding of the proper use and possible adverse effects of clindamycin.  All of the patient's questions and concerns were addressed.
Arava Counseling:  Patient counseled regarding adverse effects of Arava including but not limited to nausea, vomiting, abnormalities in liver function tests. Patients may develop mouth sores, rash, diarrhea, and abnormalities in blood counts. The patient understands that monitoring is required including LFTs and blood counts.  There is a rare possibility of scarring of the liver and lung problems that can occur when taking methotrexate. Persistent nausea, loss of appetite, pale stools, dark urine, cough, and shortness of breath should be reported immediately. Patient advised to discontinue Arava treatment and consult with a physician prior to attempting conception. The patient will have to undergo a treatment to eliminate Arava from the body prior to conception.
Protopic Pregnancy And Lactation Text: This medication is Pregnancy Category C. It is unknown if this medication is excreted in breast milk when applied topically.
Detail Level: Simple
Quinolones Counseling:  I discussed with the patient the risks of fluoroquinolones including but not limited to GI upset, allergic reaction, drug rash, diarrhea, dizziness, photosensitivity, yeast infections, liver function test abnormalities, tendonitis/tendon rupture.
Hydroquinone Counseling:  Patient advised that medication may result in skin irritation, lightening (hypopigmentation), dryness, and burning.  In the event of skin irritation, the patient was advised to reduce the amount of the drug applied or use it less frequently.  Rarely, spots that are treated with hydroquinone can become darker (pseudoochronosis).  Should this occur, patient instructed to stop medication and call the office. The patient verbalized understanding of the proper use and possible adverse effects of hydroquinone.  All of the patient's questions and concerns were addressed.
Niacinamide Counseling: I recommended taking niacin or niacinamide, also know as vitamin B3, twice daily. Recent evidence suggests that taking vitamin B3 (500 mg twice daily) can reduce the risk of actinic keratoses and non-melanoma skin cancers. Side effects of vitamin B3 include flushing and headache.
Azithromycin Pregnancy And Lactation Text: This medication is considered safe during pregnancy and is also secreted in breast milk.
Doxycycline Pregnancy And Lactation Text: This medication is Pregnancy Category D and not consider safe during pregnancy. It is also excreted in breast milk but is considered safe for shorter treatment courses.
Dupixent Counseling: I discussed with the patient the risks of dupilumab including but not limited to eye infection and irritation, cold sores, injection site reactions, worsening of asthma, allergic reactions and increased risk of parasitic infection.  Live vaccines should be avoided while taking dupilumab. Dupilumab will also interact with certain medications such as warfarin and cyclosporine. The patient understands that monitoring is required and they must alert us or the primary physician if symptoms of infection or other concerning signs are noted.
Elidel Counseling: Patient may experience a mild burning sensation during topical application. Elidel is not approved in children less than 2 years of age. There have been case reports of hematologic and skin malignancies in patients using topical calcineurin inhibitors although causality is questionable.
Xolair Counseling:  Patient informed of potential adverse effects including but not limited to fever, muscle aches, rash and allergic reactions.  The patient verbalized understanding of the proper use and possible adverse effects of Xolair.  All of the patient's questions and concerns were addressed.

## 2019-03-19 NOTE — TELEPHONE ENCOUNTER
Patient's son left message he still had not heard anything from [facility] and was unable to get anything through [facility].    Angle from [facility] emailed back pt was never billed past the 3 month rogelio when she got her machine in July.  Pt was seen too soon in August and had returned to the sleep center until FEB    I have faxed them those notes and they will send me a complaince DL from the correct date range to be hand signed off    They will get the pt taken care of as none of this was her fault      Briana has advised the son about all of this information and son knows that [facility] will call him to get his mother the needed supplies

## 2019-03-19 NOTE — PROCEDURE: EXCISION
Render Path Notes In Note?: no
Epidermal Autograft Text: The defect edges were debeveled with a #15 scalpel blade.  Given the location of the defect, shape of the defect and the proximity to free margins an epidermal autograft was deemed most appropriate.  Using a sterile surgical marker, the primary defect shape was transferred to the donor site. The epidermal graft was then harvested.  The skin graft was then placed in the primary defect and oriented appropriately.
Intermediate Repair Preamble Text (Leave Blank If You Do Not Want): Undermining was performed with blunt dissection.
Complex Repair And O-L Flap Text: The defect edges were debeveled with a #15 scalpel blade.  The primary defect was closed partially with a complex linear closure.  Given the location of the remaining defect, shape of the defect and the proximity to free margins an O-L flap was deemed most appropriate for complete closure of the defect.  Using a sterile surgical marker, an appropriate flap was drawn incorporating the defect and placing the expected incisions within the relaxed skin tension lines where possible.    The area thus outlined was incised deep to adipose tissue with a #15 scalpel blade.  The skin margins were undermined to an appropriate distance in all directions utilizing iris scissors.
Mucosal Advancement Flap Text: Given the location of the defect, shape of the defect and the proximity to free margins a mucosal advancement flap was deemed most appropriate. Incisions were made with a 15 blade scalpel in the appropriate fashion along the cutaneous vermilion border and the mucosal lip. The remaining actinically damaged mucosal tissue was excised.  The mucosal advancement flap was then elevated to the gingival sulcus with care taken to preserve the neurovascular structures and advanced into the primary defect. Care was taken to ensure that precise realignment of the vermilion border was achieved.
Perilesional Excision Additional Text (Leave Blank If You Do Not Want): The margin was drawn around the clinically apparent lesion. Incisions were then made along these lines to the appropriate tissue plane and the lesion was extirpated.
Excisional Biopsy Additional Text (Leave Blank If You Do Not Want): The margin was drawn around the clinically apparent lesion. An elliptical shape was then drawn on the skin incorporating the lesion and margins.  Incisions were then made along these lines to the appropriate tissue plane and the lesion was extirpated.
Show Pathology Comment Variable: Yes
Anesthesia Volume In Cc: 0
Complex Repair And O-T Advancement Flap Text: The defect edges were debeveled with a #15 scalpel blade.  The primary defect was closed partially with a complex linear closure.  Given the location of the remaining defect, shape of the defect and the proximity to free margins an O-T advancement flap was deemed most appropriate for complete closure of the defect.  Using a sterile surgical marker, an appropriate advancement flap was drawn incorporating the defect and placing the expected incisions within the relaxed skin tension lines where possible.    The area thus outlined was incised deep to adipose tissue with a #15 scalpel blade.  The skin margins were undermined to an appropriate distance in all directions utilizing iris scissors.
Additional Anesthesia Volume In Cc: 21
Additional Epidermal Sutures: 6-0 Prolene
W Plasty Text: The lesion was extirpated to the level of the fat with a #15 scalpel blade.  Given the location of the defect, shape of the defect and the proximity to free margins a W-plasty was deemed most appropriate for repair.  Using a sterile surgical marker, the appropriate transposition arms of the W-plasty were drawn incorporating the defect and placing the expected incisions within the relaxed skin tension lines where possible.    The area thus outlined was incised deep to adipose tissue with a #15 scalpel blade.  The skin margins were undermined to an appropriate distance in all directions utilizing iris scissors.  The opposing transposition arms were then transposed into place in opposite direction and anchored with interrupted buried subcutaneous sutures.
Complex Repair And Skin Substitute Graft Text: The defect edges were debeveled with a #15 scalpel blade.  The primary defect was closed partially with a complex linear closure.  Given the location of the remaining defect, shape of the defect and the proximity to free margins a skin substitute graft was deemed most appropriate to repair the remaining defect.  The graft was trimmed to fit the size of the remaining defect.  The graft was then placed in the primary defect, oriented appropriately, and sutured into place.
Crescentic Complex Repair Preamble Text (Leave Blank If You Do Not Want): Extensive wide undermining was performed.
Z Plasty Text: The lesion was extirpated to the level of the fat with a #15 scalpel blade.  Given the location of the defect, shape of the defect and the proximity to free margins a Z-plasty was deemed most appropriate for repair.  Using a sterile surgical marker, the appropriate transposition arms of the Z-plasty were drawn incorporating the defect and placing the expected incisions within the relaxed skin tension lines where possible.    The area thus outlined was incised deep to adipose tissue with a #15 scalpel blade.  The skin margins were undermined to an appropriate distance in all directions utilizing iris scissors.  The opposing transposition arms were then transposed into place in opposite direction and anchored with interrupted buried subcutaneous sutures.
Hatchet Flap Text: The defect edges were debeveled with a #15 scalpel blade.  Given the location of the defect, shape of the defect and the proximity to free margins a hatchet flap was deemed most appropriate.  Using a sterile surgical marker, an appropriate hatchet flap was drawn incorporating the defect and placing the expected incisions within the relaxed skin tension lines where possible.    The area thus outlined was incised deep to adipose tissue with a #15 scalpel blade.  The skin margins were undermined to an appropriate distance in all directions utilizing iris scissors.
Lab: [id number]
Trilobed Flap Text: The defect edges were debeveled with a #15 scalpel blade.  Given the location of the defect and the proximity to free margins a trilobed flap was deemed most appropriate.  Using a sterile surgical marker, an appropriate trilobed flap drawn around the defect.    The area thus outlined was incised deep to adipose tissue with a #15 scalpel blade.  The skin margins were undermined to an appropriate distance in all directions utilizing iris scissors.
Dermal Autograft Text: The defect edges were debeveled with a #15 scalpel blade.  Given the location of the defect, shape of the defect and the proximity to free margins a dermal autograft was deemed most appropriate.  Using a sterile surgical marker, the primary defect shape was transferred to the donor site. The area thus outlined was incised deep to adipose tissue with a #15 scalpel blade.  The harvested graft was then trimmed of adipose and epidermal tissue until only dermis was left.  The skin graft was then placed in the primary defect and oriented appropriately.
Skin Substitute Text: The defect edges were debeveled with a #15 scalpel blade.  Given the location of the defect, shape of the defect and the proximity to free margins a skin substitute graft was deemed most appropriate.  The graft material was trimmed to fit the size of the defect. The graft was then placed in the primary defect and oriented appropriately.
Repair Performed By Another Provider Text (Leave Blank If You Do Not Want): After the tissue was excised the defect was repaired by another provider.
Complex Repair And Melolabial Flap Text: The defect edges were debeveled with a #15 scalpel blade.  The primary defect was closed partially with a complex linear closure.  Given the location of the remaining defect, shape of the defect and the proximity to free margins a melolabial flap was deemed most appropriate for complete closure of the defect.  Using a sterile surgical marker, an appropriate advancement flap was drawn incorporating the defect and placing the expected incisions within the relaxed skin tension lines where possible.    The area thus outlined was incised deep to adipose tissue with a #15 scalpel blade.  The skin margins were undermined to an appropriate distance in all directions utilizing iris scissors.
Cheek Interpolation Flap Text: A decision was made to reconstruct the defect utilizing an interpolation axial flap and a staged reconstruction.  A telfa template was made of the defect.  This telfa template was then used to outline the Cheek Interpolation flap.  The donor area for the pedicle flap was then injected with anesthesia.  The flap was excised through the skin and subcutaneous tissue down to the layer of the underlying musculature.  The interpolation flap was carefully excised within this deep plane to maintain its blood supply.  The edges of the donor site were undermined.   The donor site was closed in a primary fashion.  The pedicle was then rotated into position and sutured.  Once the tube was sutured into place, adequate blood supply was confirmed with blanching and refill.  The pedicle was then wrapped with xeroform gauze and dressed appropriately with a telfa and gauze bandage to ensure continued blood supply and protect the attached pedicle.
Island Pedicle Flap Text: The defect edges were debeveled with a #15 scalpel blade.  Given the location of the defect, shape of the defect and the proximity to free margins an island pedicle advancement flap was deemed most appropriate.  Using a sterile surgical marker, an appropriate advancement flap was drawn incorporating the defect, outlining the appropriate donor tissue and placing the expected incisions within the relaxed skin tension lines where possible.    The area thus outlined was incised deep to adipose tissue with a #15 scalpel blade.  The skin margins were undermined to an appropriate distance in all directions around the primary defect and laterally outward around the island pedicle utilizing iris scissors.  There was minimal undermining beneath the pedicle flap.
Dorsal Nasal Flap Text: The defect edges were debeveled with a #15 scalpel blade.  Given the location of the defect and the proximity to free margins a dorsal nasal flap was deemed most appropriate.  Using a sterile surgical marker, an appropriate dorsal nasal flap was drawn around the defect.    The area thus outlined was incised deep to adipose tissue with a #15 scalpel blade.  The skin margins were undermined to an appropriate distance in all directions utilizing iris scissors.
Complex Repair And Bilobe Flap Text: The defect edges were debeveled with a #15 scalpel blade.  The primary defect was closed partially with a complex linear closure.  Given the location of the remaining defect, shape of the defect and the proximity to free margins a bilobe flap was deemed most appropriate for complete closure of the defect.  Using a sterile surgical marker, an appropriate advancement flap was drawn incorporating the defect and placing the expected incisions within the relaxed skin tension lines where possible.    The area thus outlined was incised deep to adipose tissue with a #15 scalpel blade.  The skin margins were undermined to an appropriate distance in all directions utilizing iris scissors.
Repair Type: Intermediate
Path Notes (To The Dermatopathologist): Please check margins.
Rotation Flap Text: The defect edges were debeveled with a #15 scalpel blade.  Given the location of the defect, shape of the defect and the proximity to free margins a rotation flap was deemed most appropriate.  Using a sterile surgical marker, an appropriate rotation flap was drawn incorporating the defect and placing the expected incisions within the relaxed skin tension lines where possible.    The area thus outlined was incised deep to adipose tissue with a #15 scalpel blade.  The skin margins were undermined to an appropriate distance in all directions utilizing iris scissors.
Spiral Flap Text: The defect edges were debeveled with a #15 scalpel blade.  Given the location of the defect, shape of the defect and the proximity to free margins a spiral flap was deemed most appropriate.  Using a sterile surgical marker, an appropriate rotation flap was drawn incorporating the defect and placing the expected incisions within the relaxed skin tension lines where possible. The area thus outlined was incised deep to adipose tissue with a #15 scalpel blade.  The skin margins were undermined to an appropriate distance in all directions utilizing iris scissors.
Cheek-To-Nose Interpolation Flap Text: A decision was made to reconstruct the defect utilizing an interpolation axial flap and a staged reconstruction.  A telfa template was made of the defect.  This telfa template was then used to outline the Cheek-To-Nose Interpolation flap.  The donor area for the pedicle flap was then injected with anesthesia.  The flap was excised through the skin and subcutaneous tissue down to the layer of the underlying musculature.  The interpolation flap was carefully excised within this deep plane to maintain its blood supply.  The edges of the donor site were undermined.   The donor site was closed in a primary fashion.  The pedicle was then rotated into position and sutured.  Once the tube was sutured into place, adequate blood supply was confirmed with blanching and refill.  The pedicle was then wrapped with xeroform gauze and dressed appropriately with a telfa and gauze bandage to ensure continued blood supply and protect the attached pedicle.
Epidermal Closure: running cuticular
Hemostasis: Pressure and Electrodesiccation
Complex Repair And Rotation Flap Text: The defect edges were debeveled with a #15 scalpel blade.  The primary defect was closed partially with a complex linear closure.  Given the location of the remaining defect, shape of the defect and the proximity to free margins a rotation flap was deemed most appropriate for complete closure of the defect.  Using a sterile surgical marker, an appropriate advancement flap was drawn incorporating the defect and placing the expected incisions within the relaxed skin tension lines where possible.    The area thus outlined was incised deep to adipose tissue with a #15 scalpel blade.  The skin margins were undermined to an appropriate distance in all directions utilizing iris scissors.
Lab Facility: [id number]
Deep Sutures: 4-0 Vicryl
Tissue Cultured Epidermal Autograft Text: The defect edges were debeveled with a #15 scalpel blade.  Given the location of the defect, shape of the defect and the proximity to free margins a tissue cultured epidermal autograft was deemed most appropriate.  The graft was then trimmed to fit the size of the defect.  The graft was then placed in the primary defect and oriented appropriately.
Xenograft Text: The defect edges were debeveled with a #15 scalpel blade.  Given the location of the defect, shape of the defect and the proximity to free margins a xenograft was deemed most appropriate.  The graft was then trimmed to fit the size of the defect.  The graft was then placed in the primary defect and oriented appropriately.
Intermediate / Complex Repair - Final Wound Length In Cm: 4
Complex Repair And Rhombic Flap Text: The defect edges were debeveled with a #15 scalpel blade.  The primary defect was closed partially with a complex linear closure.  Given the location of the remaining defect, shape of the defect and the proximity to free margins a rhombic flap was deemed most appropriate for complete closure of the defect.  Using a sterile surgical marker, an appropriate advancement flap was drawn incorporating the defect and placing the expected incisions within the relaxed skin tension lines where possible.    The area thus outlined was incised deep to adipose tissue with a #15 scalpel blade.  The skin margins were undermined to an appropriate distance in all directions utilizing iris scissors.
Advancement Flap (Single) Text: The defect edges were debeveled with a #15 scalpel blade.  Given the location of the defect and the proximity to free margins a single advancement flap was deemed most appropriate.  Using a sterile surgical marker, an appropriate advancement flap was drawn incorporating the defect and placing the expected incisions within the relaxed skin tension lines where possible.    The area thus outlined was incised deep to adipose tissue with a #15 scalpel blade.  The skin margins were undermined to an appropriate distance in all directions utilizing iris scissors.
Interpolation Flap Text: A decision was made to reconstruct the defect utilizing an interpolation axial flap and a staged reconstruction.  A telfa template was made of the defect.  This telfa template was then used to outline the interpolation flap.  The donor area for the pedicle flap was then injected with anesthesia.  The flap was excised through the skin and subcutaneous tissue down to the layer of the underlying musculature.  The interpolation flap was carefully excised within this deep plane to maintain its blood supply.  The edges of the donor site were undermined.   The donor site was closed in a primary fashion.  The pedicle was then rotated into position and sutured.  Once the tube was sutured into place, adequate blood supply was confirmed with blanching and refill.  The pedicle was then wrapped with xeroform gauze and dressed appropriately with a telfa and gauze bandage to ensure continued blood supply and protect the attached pedicle.
Island Pedicle Flap With Canthal Suspension Text: The defect edges were debeveled with a #15 scalpel blade.  Given the location of the defect, shape of the defect and the proximity to free margins an island pedicle advancement flap was deemed most appropriate.  Using a sterile surgical marker, an appropriate advancement flap was drawn incorporating the defect, outlining the appropriate donor tissue and placing the expected incisions within the relaxed skin tension lines where possible. The area thus outlined was incised deep to adipose tissue with a #15 scalpel blade.  The skin margins were undermined to an appropriate distance in all directions around the primary defect and laterally outward around the island pedicle utilizing iris scissors.  There was minimal undermining beneath the pedicle flap. A suspension suture was placed in the canthal tendon to prevent tension and prevent ectropion.
No Repair - Repaired With Adjacent Surgical Defect Text (Leave Blank If You Do Not Want): After the excision the defect was repaired concurrently with another surgical defect which was in close approximation.
Star Wedge Flap Text: The defect edges were debeveled with a #15 scalpel blade.  Given the location of the defect, shape of the defect and the proximity to free margins a star wedge flap was deemed most appropriate.  Using a sterile surgical marker, an appropriate rotation flap was drawn incorporating the defect and placing the expected incisions within the relaxed skin tension lines where possible. The area thus outlined was incised deep to adipose tissue with a #15 scalpel blade.  The skin margins were undermined to an appropriate distance in all directions utilizing iris scissors.
Excision Depth: adipose tissue
Melolabial Interpolation Flap Text: A decision was made to reconstruct the defect utilizing an interpolation axial flap and a staged reconstruction.  A telfa template was made of the defect.  This telfa template was then used to outline the melolabial interpolation flap.  The donor area for the pedicle flap was then injected with anesthesia.  The flap was excised through the skin and subcutaneous tissue down to the layer of the underlying musculature.  The pedicle flap was carefully excised within this deep plane to maintain its blood supply.  The edges of the donor site were undermined.   The donor site was closed in a primary fashion.  The pedicle was then rotated into position and sutured.  Once the tube was sutured into place, adequate blood supply was confirmed with blanching and refill.  The pedicle was then wrapped with xeroform gauze and dressed appropriately with a telfa and gauze bandage to ensure continued blood supply and protect the attached pedicle.
Transposition Flap Text: The defect edges were debeveled with a #15 scalpel blade.  Given the location of the defect and the proximity to free margins a transposition flap was deemed most appropriate.  Using a sterile surgical marker, an appropriate transposition flap was drawn incorporating the defect.    The area thus outlined was incised deep to adipose tissue with a #15 scalpel blade.  The skin margins were undermined to an appropriate distance in all directions utilizing iris scissors.
Purse String (Intermediate) Text: Given the location of the defect and the characteristics of the surrounding skin a purse string intermediate closure was deemed most appropriate.  Undermining was performed circumfirentially around the surgical defect.  A purse string suture was then placed and tightened.
Estimated Blood Loss (Cc): minimal
Information: Selecting Yes will display possible errors in your note based on the variables you have selected. This validation is only offered as a suggestion for you. PLEASE NOTE THAT THE VALIDATION TEXT WILL BE REMOVED WHEN YOU FINALIZE YOUR NOTE. IF YOU WANT TO FAX A PRELIMINARY NOTE YOU WILL NEED TO TOGGLE THIS TO 'NO' IF YOU DO NOT WANT IT IN YOUR FAXED NOTE.
Advancement Flap (Double) Text: The defect edges were debeveled with a #15 scalpel blade.  Given the location of the defect and the proximity to free margins a double advancement flap was deemed most appropriate.  Using a sterile surgical marker, the appropriate advancement flaps were drawn incorporating the defect and placing the expected incisions within the relaxed skin tension lines where possible.    The area thus outlined was incised deep to adipose tissue with a #15 scalpel blade.  The skin margins were undermined to an appropriate distance in all directions utilizing iris scissors.
Complex Repair And Transposition Flap Text: The defect edges were debeveled with a #15 scalpel blade.  The primary defect was closed partially with a complex linear closure.  Given the location of the remaining defect, shape of the defect and the proximity to free margins a transposition flap was deemed most appropriate for complete closure of the defect.  Using a sterile surgical marker, an appropriate advancement flap was drawn incorporating the defect and placing the expected incisions within the relaxed skin tension lines where possible.    The area thus outlined was incised deep to adipose tissue with a #15 scalpel blade.  The skin margins were undermined to an appropriate distance in all directions utilizing iris scissors.
Consent was obtained from the patient. The risks and benefits to therapy were discussed in detail. Specifically, the risks of infection, scarring, bleeding, prolonged wound healing, incomplete removal, allergy to anesthesia, nerve injury and recurrence were addressed. Prior to the procedure, the treatment site was clearly identified and confirmed by the patient. All components of Universal Protocol/PAUSE Rule completed.
Purse String (Simple) Text: Given the location of the defect and the characteristics of the surrounding skin a purse string simple closure was deemed most appropriate.  Undermining was performed circumferentially around the surgical defect.  A purse string suture was then placed and tightened.
Muscle Hinge Flap Text: The defect edges were debeveled with a #15 scalpel blade.  Given the size, depth and location of the defect and the proximity to free margins a muscle hinge flap was deemed most appropriate.  Using a sterile surgical marker, an appropriate hinge flap was drawn incorporating the defect. The area thus outlined was incised with a #15 scalpel blade.  The skin margins were undermined to an appropriate distance in all directions utilizing iris scissors.
Burow's Advancement Flap Text: The defect edges were debeveled with a #15 scalpel blade.  Given the location of the defect and the proximity to free margins a Burow's advancement flap was deemed most appropriate.  Using a sterile surgical marker, the appropriate advancement flap was drawn incorporating the defect and placing the expected incisions within the relaxed skin tension lines where possible.    The area thus outlined was incised deep to adipose tissue with a #15 scalpel blade.  The skin margins were undermined to an appropriate distance in all directions utilizing iris scissors.
Mastoid Interpolation Flap Text: A decision was made to reconstruct the defect utilizing an interpolation axial flap and a staged reconstruction.  A telfa template was made of the defect.  This telfa template was then used to outline the mastoid interpolation flap.  The donor area for the pedicle flap was then injected with anesthesia.  The flap was excised through the skin and subcutaneous tissue down to the layer of the underlying musculature.  The pedicle flap was carefully excised within this deep plane to maintain its blood supply.  The edges of the donor site were undermined.   The donor site was closed in a primary fashion.  The pedicle was then rotated into position and sutured.  Once the tube was sutured into place, adequate blood supply was confirmed with blanching and refill.  The pedicle was then wrapped with xeroform gauze and dressed appropriately with a telfa and gauze bandage to ensure continued blood supply and protect the attached pedicle.
Previous Accession (Optional): [id number].
Alar Island Pedicle Flap Text: The defect edges were debeveled with a #15 scalpel blade.  Given the location of the defect, shape of the defect and the proximity to the alar rim an island pedicle advancement flap was deemed most appropriate.  Using a sterile surgical marker, an appropriate advancement flap was drawn incorporating the defect, outlining the appropriate donor tissue and placing the expected incisions within the nasal ala running parallel to the alar rim. The area thus outlined was incised with a #15 scalpel blade.  The skin margins were undermined minimally to an appropriate distance in all directions around the primary defect and laterally outward around the island pedicle utilizing iris scissors.  There was minimal undermining beneath the pedicle flap.
Partial Purse String (Intermediate) Text: Given the location of the defect and the characteristics of the surrounding skin an intermediate purse string closure was deemed most appropriate.  Undermining was performed circumferentially around the surgical defect.  A purse string suture was then placed and tightened. Wound tension of the circular defect prevented complete closure of the wound.
Complex Repair And M Plasty Text: The defect edges were debeveled with a #15 scalpel blade.  The primary defect was closed partially with a complex linear closure.  Given the location of the remaining defect, shape of the defect and the proximity to free margins an M plasty was deemed most appropriate for complete closure of the defect.  Using a sterile surgical marker, an appropriate advancement flap was drawn incorporating the defect and placing the expected incisions within the relaxed skin tension lines where possible.    The area thus outlined was incised deep to adipose tissue with a #15 scalpel blade.  The skin margins were undermined to an appropriate distance in all directions utilizing iris scissors.
Crescentic Advancement Flap Text: The defect edges were debeveled with a #15 scalpel blade.  Given the location of the defect and the proximity to free margins a crescentic advancement flap was deemed most appropriate.  Using a sterile surgical marker, the appropriate advancement flap was drawn incorporating the defect and placing the expected incisions within the relaxed skin tension lines where possible.    The area thus outlined was incised deep to adipose tissue with a #15 scalpel blade.  The skin margins were undermined to an appropriate distance in all directions utilizing iris scissors.
Post-Care Instructions: I reviewed with the patient in detail post-care instructions. Patient is not to engage in any heavy lifting, exercise, or swimming for the next 14 days. Should the patient develop any fevers, chills, bleeding, severe pain patient will contact the office immediately.
Complex Repair And V-Y Plasty Text: The defect edges were debeveled with a #15 scalpel blade.  The primary defect was closed partially with a complex linear closure.  Given the location of the remaining defect, shape of the defect and the proximity to free margins a V-Y plasty was deemed most appropriate for complete closure of the defect.  Using a sterile surgical marker, an appropriate advancement flap was drawn incorporating the defect and placing the expected incisions within the relaxed skin tension lines where possible.    The area thus outlined was incised deep to adipose tissue with a #15 scalpel blade.  The skin margins were undermined to an appropriate distance in all directions utilizing iris scissors.
Scalpel Size: 15 blade
Double Island Pedicle Flap Text: The defect edges were debeveled with a #15 scalpel blade.  Given the location of the defect, shape of the defect and the proximity to free margins a double island pedicle advancement flap was deemed most appropriate.  Using a sterile surgical marker, an appropriate advancement flap was drawn incorporating the defect, outlining the appropriate donor tissue and placing the expected incisions within the relaxed skin tension lines where possible.    The area thus outlined was incised deep to adipose tissue with a #15 scalpel blade.  The skin margins were undermined to an appropriate distance in all directions around the primary defect and laterally outward around the island pedicle utilizing iris scissors.  There was minimal undermining beneath the pedicle flap.
Wound Care: Petrolatum
Melolabial Transposition Flap Text: The defect edges were debeveled with a #15 scalpel blade.  Given the location of the defect and the proximity to free margins a melolabial flap was deemed most appropriate.  Using a sterile surgical marker, an appropriate melolabial transposition flap was drawn incorporating the defect.    The area thus outlined was incised deep to adipose tissue with a #15 scalpel blade.  The skin margins were undermined to an appropriate distance in all directions utilizing iris scissors.
Complex Repair And Double M Plasty Text: The defect edges were debeveled with a #15 scalpel blade.  The primary defect was closed partially with a complex linear closure.  Given the location of the remaining defect, shape of the defect and the proximity to free margins a double M plasty was deemed most appropriate for complete closure of the defect.  Using a sterile surgical marker, an appropriate advancement flap was drawn incorporating the defect and placing the expected incisions within the relaxed skin tension lines where possible.    The area thus outlined was incised deep to adipose tissue with a #15 scalpel blade.  The skin margins were undermined to an appropriate distance in all directions utilizing iris scissors.
Rhombic Flap Text: The defect edges were debeveled with a #15 scalpel blade.  Given the location of the defect and the proximity to free margins a rhombic flap was deemed most appropriate.  Using a sterile surgical marker, an appropriate rhombic flap was drawn incorporating the defect.    The area thus outlined was incised deep to adipose tissue with a #15 scalpel blade.  The skin margins were undermined to an appropriate distance in all directions utilizing iris scissors.
Home Suture Removal Text: Patient was provided a home suture removal kit and will remove their sutures at home.  If they have any questions or difficulties they will call the office.
Partial Purse String (Simple) Text: Given the location of the defect and the characteristics of the surrounding skin a simple purse string closure was deemed most appropriate.  Undermining was performed circumferentially around the surgical defect.  A purse string suture was then placed and tightened. Wound tension of the circular defect prevented complete closure of the wound.
Island Pedicle Flap-Requiring Vessel Identification Text: The defect edges were debeveled with a #15 scalpel blade.  Given the location of the defect, shape of the defect and the proximity to free margins an island pedicle advancement flap was deemed most appropriate.  Using a sterile surgical marker, an appropriate advancement flap was drawn, based on the axial vessel mentioned above, incorporating the defect, outlining the appropriate donor tissue and placing the expected incisions within the relaxed skin tension lines where possible.    The area thus outlined was incised deep to adipose tissue with a #15 scalpel blade.  The skin margins were undermined to an appropriate distance in all directions around the primary defect and laterally outward around the island pedicle utilizing iris scissors.  There was minimal undermining beneath the pedicle flap.
Complex Repair And Single Advancement Flap Text: The defect edges were debeveled with a #15 scalpel blade.  The primary defect was closed partially with a complex linear closure.  Given the location of the remaining defect, shape of the defect and the proximity to free margins a single advancement flap was deemed most appropriate for complete closure of the defect.  Using a sterile surgical marker, an appropriate advancement flap was drawn incorporating the defect and placing the expected incisions within the relaxed skin tension lines where possible.    The area thus outlined was incised deep to adipose tissue with a #15 scalpel blade.  The skin margins were undermined to an appropriate distance in all directions utilizing iris scissors.
Complex Repair And W Plasty Text: The defect edges were debeveled with a #15 scalpel blade.  The primary defect was closed partially with a complex linear closure.  Given the location of the remaining defect, shape of the defect and the proximity to free margins a W plasty was deemed most appropriate for complete closure of the defect.  Using a sterile surgical marker, an appropriate advancement flap was drawn incorporating the defect and placing the expected incisions within the relaxed skin tension lines where possible.    The area thus outlined was incised deep to adipose tissue with a #15 scalpel blade.  The skin margins were undermined to an appropriate distance in all directions utilizing iris scissors.
A-T Advancement Flap Text: The defect edges were debeveled with a #15 scalpel blade.  Given the location of the defect, shape of the defect and the proximity to free margins an A-T advancement flap was deemed most appropriate.  Using a sterile surgical marker, an appropriate advancement flap was drawn incorporating the defect and placing the expected incisions within the relaxed skin tension lines where possible.    The area thus outlined was incised deep to adipose tissue with a #15 scalpel blade.  The skin margins were undermined to an appropriate distance in all directions utilizing iris scissors.
Dressing: dry sterile dressing
Posterior Auricular Interpolation Flap Text: A decision was made to reconstruct the defect utilizing an interpolation axial flap and a staged reconstruction.  A telfa template was made of the defect.  This telfa template was then used to outline the posterior auricular interpolation flap.  The donor area for the pedicle flap was then injected with anesthesia.  The flap was excised through the skin and subcutaneous tissue down to the layer of the underlying musculature.  The pedicle flap was carefully excised within this deep plane to maintain its blood supply.  The edges of the donor site were undermined.   The donor site was closed in a primary fashion.  The pedicle was then rotated into position and sutured.  Once the tube was sutured into place, adequate blood supply was confirmed with blanching and refill.  The pedicle was then wrapped with xeroform gauze and dressed appropriately with a telfa and gauze bandage to ensure continued blood supply and protect the attached pedicle.
Detail Level: Detailed
Rhomboid Transposition Flap Text: The defect edges were debeveled with a #15 scalpel blade.  Given the location of the defect and the proximity to free margins a rhomboid transposition flap was deemed most appropriate.  Using a sterile surgical marker, an appropriate rhomboid flap was drawn incorporating the defect.    The area thus outlined was incised deep to adipose tissue with a #15 scalpel blade.  The skin margins were undermined to an appropriate distance in all directions utilizing iris scissors.
Size Of Lesion In Cm: 1.3
Keystone Flap Text: The defect edges were debeveled with a #15 scalpel blade.  Given the location of the defect, shape of the defect a keystone flap was deemed most appropriate.  Using a sterile surgical marker, an appropriate keystone flap was drawn incorporating the defect, outlining the appropriate donor tissue and placing the expected incisions within the relaxed skin tension lines where possible. The area thus outlined was incised deep to adipose tissue with a #15 scalpel blade.  The skin margins were undermined to an appropriate distance in all directions around the primary defect and laterally outward around the flap utilizing iris scissors.
Complex Repair And Z Plasty Text: The defect edges were debeveled with a #15 scalpel blade.  The primary defect was closed partially with a complex linear closure.  Given the location of the remaining defect, shape of the defect and the proximity to free margins a Z plasty was deemed most appropriate for complete closure of the defect.  Using a sterile surgical marker, an appropriate advancement flap was drawn incorporating the defect and placing the expected incisions within the relaxed skin tension lines where possible.    The area thus outlined was incised deep to adipose tissue with a #15 scalpel blade.  The skin margins were undermined to an appropriate distance in all directions utilizing iris scissors.
Size Of Margin In Cm: 0.2
O-T Plasty Text: The defect edges were debeveled with a #15 scalpel blade.  Given the location of the defect, shape of the defect and the proximity to free margins an O-T plasty was deemed most appropriate.  Using a sterile surgical marker, an appropriate O-T plasty was drawn incorporating the defect and placing the expected incisions within the relaxed skin tension lines where possible.    The area thus outlined was incised deep to adipose tissue with a #15 scalpel blade.  The skin margins were undermined to an appropriate distance in all directions utilizing iris scissors.
Bi-Rhombic Flap Text: The defect edges were debeveled with a #15 scalpel blade.  Given the location of the defect and the proximity to free margins a bi-rhombic flap was deemed most appropriate.  Using a sterile surgical marker, an appropriate rhombic flap was drawn incorporating the defect. The area thus outlined was incised deep to adipose tissue with a #15 scalpel blade.  The skin margins were undermined to an appropriate distance in all directions utilizing iris scissors.
Paramedian Forehead Flap Text: A decision was made to reconstruct the defect utilizing an interpolation axial flap and a staged reconstruction.  A telfa template was made of the defect.  This telfa template was then used to outline the paramedian forehead pedicle flap.  The donor area for the pedicle flap was then injected with anesthesia.  The flap was excised through the skin and subcutaneous tissue down to the layer of the underlying musculature.  The pedicle flap was carefully excised within this deep plane to maintain its blood supply.  The edges of the donor site were undermined.   The donor site was closed in a primary fashion.  The pedicle was then rotated into position and sutured.  Once the tube was sutured into place, adequate blood supply was confirmed with blanching and refill.  The pedicle was then wrapped with xeroform gauze and dressed appropriately with a telfa and gauze bandage to ensure continued blood supply and protect the attached pedicle.
Complex Repair And Dorsal Nasal Flap Text: The defect edges were debeveled with a #15 scalpel blade.  The primary defect was closed partially with a complex linear closure.  Given the location of the remaining defect, shape of the defect and the proximity to free margins a dorsal nasal flap was deemed most appropriate for complete closure of the defect.  Using a sterile surgical marker, an appropriate flap was drawn incorporating the defect and placing the expected incisions within the relaxed skin tension lines where possible.    The area thus outlined was incised deep to adipose tissue with a #15 scalpel blade.  The skin margins were undermined to an appropriate distance in all directions utilizing iris scissors.
Fusiform Excision Additional Text (Leave Blank If You Do Not Want): The margin was drawn around the clinically apparent lesion.  A fusiform shape was then drawn on the skin incorporating the lesion and margins.  Incisions were then made along these lines to the appropriate tissue plane and the lesion was extirpated.
O-L Flap Text: The defect edges were debeveled with a #15 scalpel blade.  Given the location of the defect, shape of the defect and the proximity to free margins an O-L flap was deemed most appropriate.  Using a sterile surgical marker, an appropriate advancement flap was drawn incorporating the defect and placing the expected incisions within the relaxed skin tension lines where possible.    The area thus outlined was incised deep to adipose tissue with a #15 scalpel blade.  The skin margins were undermined to an appropriate distance in all directions utilizing iris scissors.
Complex Repair And Split-Thickness Skin Graft Text: The defect edges were debeveled with a #15 scalpel blade.  The primary defect was closed partially with a complex linear closure.  Given the location of the defect, shape of the defect and the proximity to free margins a split thickness skin graft was deemed most appropriate to repair the remaining defect.  The graft was trimmed to fit the size of the remaining defect.  The graft was then placed in the primary defect, oriented appropriately, and sutured into place.
O-T Advancement Flap Text: The defect edges were debeveled with a #15 scalpel blade.  Given the location of the defect, shape of the defect and the proximity to free margins an O-T advancement flap was deemed most appropriate.  Using a sterile surgical marker, an appropriate advancement flap was drawn incorporating the defect and placing the expected incisions within the relaxed skin tension lines where possible.    The area thus outlined was incised deep to adipose tissue with a #15 scalpel blade.  The skin margins were undermined to an appropriate distance in all directions utilizing iris scissors.
Curvilinear Excision Additional Text (Leave Blank If You Do Not Want): The margin was drawn around the clinically apparent lesion.  A curvilinear shape was then drawn on the skin incorporating the lesion and margins.  Incisions were then made along these lines to the appropriate tissue plane and the lesion was extirpated.
O-Z Plasty Text: The defect edges were debeveled with a #15 scalpel blade.  Given the location of the defect, shape of the defect and the proximity to free margins an O-Z plasty (double transposition flap) was deemed most appropriate.  Using a sterile surgical marker, the appropriate transposition flaps were drawn incorporating the defect and placing the expected incisions within the relaxed skin tension lines where possible.    The area thus outlined was incised deep to adipose tissue with a #15 scalpel blade.  The skin margins were undermined to an appropriate distance in all directions utilizing iris scissors.  Hemostasis was achieved with electrocautery.  The flaps were then transposed into place, one clockwise and the other counterclockwise, and anchored with interrupted buried subcutaneous sutures.
Helical Rim Advancement Flap Text: The defect edges were debeveled with a #15 blade scalpel.  Given the location of the defect and the proximity to free margins (helical rim) a double helical rim advancement flap was deemed most appropriate.  Using a sterile surgical marker, the appropriate advancement flaps were drawn incorporating the defect and placing the expected incisions between the helical rim and antihelix where possible.  The area thus outlined was incised through and through with a #15 scalpel blade.  With a skin hook and iris scissors, the flaps were gently and sharply undermined and freed up.
Lip Wedge Excision Repair Text: Given the location of the defect and the proximity to free margins a full thickness wedge repair was deemed most appropriate.  Using a sterile surgical marker, the appropriate repair was drawn incorporating the defect and placing the expected incisions perpendicular to the vermilion border.  The vermilion border was also meticulously outlined to ensure appropriate reapproximation during the repair.  The area thus outlined was incised through and through with a #15 scalpel blade.  The muscularis and dermis were reaproximated with deep sutures following hemostasis. Care was taken to realign the vermilion border before proceeding with the superficial closure.  Once the vermilion was realigned the superfical and mucosal closure was finished.
V-Y Flap Text: The defect edges were debeveled with a #15 scalpel blade.  Given the location of the defect, shape of the defect and the proximity to free margins a V-Y flap was deemed most appropriate.  Using a sterile surgical marker, an appropriate advancement flap was drawn incorporating the defect and placing the expected incisions within the relaxed skin tension lines where possible.    The area thus outlined was incised deep to adipose tissue with a #15 scalpel blade.  The skin margins were undermined to an appropriate distance in all directions utilizing iris scissors.
Epidermal Closure Graft Donor Site (Optional): simple interrupted
Anesthesia Type: 1% lidocaine with 1:100,000 epinephrine and a 1:12 solution of 8.4% sodium bicarbonate
Elliptical Excision Additional Text (Leave Blank If You Do Not Want): The margin was drawn around the clinically apparent lesion.  An elliptical shape was then drawn on the skin incorporating the lesion and margins.  Incisions were then made along these lines to the appropriate tissue plane and the lesion was extirpated.
Excision Method: Fusiform
Complex Repair And Double Advancement Flap Text: The defect edges were debeveled with a #15 scalpel blade.  The primary defect was closed partially with a complex linear closure.  Given the location of the remaining defect, shape of the defect and the proximity to free margins a double advancement flap was deemed most appropriate for complete closure of the defect.  Using a sterile surgical marker, an appropriate advancement flap was drawn incorporating the defect and placing the expected incisions within the relaxed skin tension lines where possible.    The area thus outlined was incised deep to adipose tissue with a #15 scalpel blade.  The skin margins were undermined to an appropriate distance in all directions utilizing iris scissors.
Double O-Z Plasty Text: The defect edges were debeveled with a #15 scalpel blade.  Given the location of the defect, shape of the defect and the proximity to free margins a Double O-Z plasty (double transposition flap) was deemed most appropriate.  Using a sterile surgical marker, the appropriate transposition flaps were drawn incorporating the defect and placing the expected incisions within the relaxed skin tension lines where possible. The area thus outlined was incised deep to adipose tissue with a #15 scalpel blade.  The skin margins were undermined to an appropriate distance in all directions utilizing iris scissors.  Hemostasis was achieved with electrocautery.  The flaps were then transposed into place, one clockwise and the other counterclockwise, and anchored with interrupted buried subcutaneous sutures.
Ftsg Text: The defect edges were debeveled with a #15 scalpel blade.  Given the location of the defect, shape of the defect and the proximity to free margins a full thickness skin graft was deemed most appropriate.  Using a sterile surgical marker, the primary defect shape was transferred to the donor site. The area thus outlined was incised deep to adipose tissue with a #15 scalpel blade.  The harvested graft was then trimmed of adipose tissue until only dermis and epidermis was left.  The skin margins of the secondary defect were undermined to an appropriate distance in all directions utilizing iris scissors.  The secondary defect was closed with interrupted buried subcutaneous sutures.  The skin edges were then re-apposed with running  sutures.  The skin graft was then placed in the primary defect and oriented appropriately.
Bilateral Helical Rim Advancement Flap Text: The defect edges were debeveled with a #15 blade scalpel.  Given the location of the defect and the proximity to free margins (helical rim) a bilateral helical rim advancement flap was deemed most appropriate.  Using a sterile surgical marker, the appropriate advancement flaps were drawn incorporating the defect and placing the expected incisions between the helical rim and antihelix where possible.  The area thus outlined was incised through and through with a #15 scalpel blade.  With a skin hook and iris scissors, the flaps were gently and sharply undermined and freed up.
Complex Repair And Epidermal Autograft Text: The defect edges were debeveled with a #15 scalpel blade.  The primary defect was closed partially with a complex linear closure.  Given the location of the defect, shape of the defect and the proximity to free margins an epidermal autograft was deemed most appropriate to repair the remaining defect.  The graft was trimmed to fit the size of the remaining defect.  The graft was then placed in the primary defect, oriented appropriately, and sutured into place.
Complex Repair And Dermal Autograft Text: The defect edges were debeveled with a #15 scalpel blade.  The primary defect was closed partially with a complex linear closure.  Given the location of the defect, shape of the defect and the proximity to free margins an dermal autograft was deemed most appropriate to repair the remaining defect.  The graft was trimmed to fit the size of the remaining defect.  The graft was then placed in the primary defect, oriented appropriately, and sutured into place.
Positioning (Leave Blank If You Do Not Want): The patient was placed in a comfortable position exposing the surgical site.
Advancement-Rotation Flap Text: The defect edges were debeveled with a #15 scalpel blade.  Given the location of the defect, shape of the defect and the proximity to free margins an advancement-rotation flap was deemed most appropriate.  Using a sterile surgical marker, an appropriate flap was drawn incorporating the defect and placing the expected incisions within the relaxed skin tension lines where possible. The area thus outlined was incised deep to adipose tissue with a #15 scalpel blade.  The skin margins were undermined to an appropriate distance in all directions utilizing iris scissors.
Saucerization Excision Additional Text (Leave Blank If You Do Not Want): The margin was drawn around the clinically apparent lesion.  Incisions were then made along these lines, in a tangential fashion, to the appropriate tissue plane and the lesion was extirpated.
Ear Star Wedge Flap Text: The defect edges were debeveled with a #15 blade scalpel.  Given the location of the defect and the proximity to free margins (helical rim) an ear star wedge flap was deemed most appropriate.  Using a sterile surgical marker, the appropriate flap was drawn incorporating the defect and placing the expected incisions between the helical rim and antihelix where possible.  The area thus outlined was incised through and through with a #15 scalpel blade.
S Plasty Text: Given the location and shape of the defect, and the orientation of relaxed skin tension lines, an S-plasty was deemed most appropriate for repair.  Using a sterile surgical marker, the appropriate outline of the S-plasty was drawn, incorporating the defect and placing the expected incisions within the relaxed skin tension lines where possible.  The area thus outlined was incised deep to adipose tissue with a #15 scalpel blade.  The skin margins were undermined to an appropriate distance in all directions utilizing iris scissors. The skin flaps were advanced over the defect.  The opposing margins were then approximated with interrupted buried subcutaneous sutures.
Split-Thickness Skin Graft Text: The defect edges were debeveled with a #15 scalpel blade.  Given the location of the defect, shape of the defect and the proximity to free margins a split thickness skin graft was deemed most appropriate.  Using a sterile surgical marker, the primary defect shape was transferred to the donor site. The split thickness graft was then harvested.  The skin graft was then placed in the primary defect and oriented appropriately.
Mercedes Flap Text: The defect edges were debeveled with a #15 scalpel blade.  Given the location of the defect, shape of the defect and the proximity to free margins a Mercedes flap was deemed most appropriate.  Using a sterile surgical marker, an appropriate advancement flap was drawn incorporating the defect and placing the expected incisions within the relaxed skin tension lines where possible. The area thus outlined was incised deep to adipose tissue with a #15 scalpel blade.  The skin margins were undermined to an appropriate distance in all directions utilizing iris scissors.
Anesthesia Type: 1% lidocaine with epinephrine and a 1:10 solution of 8.4% sodium bicarbonate
Pre-Excision Curettage Text (Leave Blank If You Do Not Want): Prior to drawing the surgical margin the visible lesion was removed with electrodesiccation and curettage to clearly define the lesion size.
Slit Excision Additional Text (Leave Blank If You Do Not Want): A linear line was drawn on the skin overlying the lesion. An incision was made slowly until the lesion was visualized.  Once visualized, the lesion was removed with blunt dissection.
Cartilage Graft Text: The defect edges were debeveled with a #15 scalpel blade.  Given the location of the defect, shape of the defect, the fact the defect involved a full thickness cartilage defect a cartilage graft was deemed most appropriate.  An appropriate donor site was identified, cleansed, and anesthetized. The cartilage graft was then harvested and transferred to the recipient site, oriented appropriately and then sutured into place.  The secondary defect was then repaired using a primary closure.
Complex Repair And Modified Advancement Flap Text: The defect edges were debeveled with a #15 scalpel blade.  The primary defect was closed partially with a complex linear closure.  Given the location of the remaining defect, shape of the defect and the proximity to free margins a modified advancement flap was deemed most appropriate for complete closure of the defect.  Using a sterile surgical marker, an appropriate advancement flap was drawn incorporating the defect and placing the expected incisions within the relaxed skin tension lines where possible.    The area thus outlined was incised deep to adipose tissue with a #15 scalpel blade.  The skin margins were undermined to an appropriate distance in all directions utilizing iris scissors.
Complex Repair And Ftsg Text: The defect edges were debeveled with a #15 scalpel blade.  The primary defect was closed partially with a complex linear closure.  Given the location of the defect, shape of the defect and the proximity to free margins a full thickness skin graft was deemed most appropriate to repair the remaining defect.  The graft was trimmed to fit the size of the remaining defect.  The graft was then placed in the primary defect, oriented appropriately, and sutured into place.
V-Y Plasty Text: The defect edges were debeveled with a #15 scalpel blade.  Given the location of the defect, shape of the defect and the proximity to free margins an V-Y advancement flap was deemed most appropriate.  Using a sterile surgical marker, an appropriate advancement flap was drawn incorporating the defect and placing the expected incisions within the relaxed skin tension lines where possible.    The area thus outlined was incised deep to adipose tissue with a #15 scalpel blade.  The skin margins were undermined to an appropriate distance in all directions utilizing iris scissors.
Complex Repair And Tissue Cultured Epidermal Autograft Text: The defect edges were debeveled with a #15 scalpel blade.  The primary defect was closed partially with a complex linear closure.  Given the location of the defect, shape of the defect and the proximity to free margins an tissue cultured epidermal autograft was deemed most appropriate to repair the remaining defect.  The graft was trimmed to fit the size of the remaining defect.  The graft was then placed in the primary defect, oriented appropriately, and sutured into place.
Epidermal Sutures: 5-0 Prolene
Banner Transposition Flap Text: The defect edges were debeveled with a #15 scalpel blade.  Given the location of the defect and the proximity to free margins a Banner transposition flap was deemed most appropriate.  Using a sterile surgical marker, an appropriate flap drawn around the defect. The area thus outlined was incised deep to adipose tissue with a #15 scalpel blade.  The skin margins were undermined to an appropriate distance in all directions utilizing iris scissors.
Complex Repair And Xenograft Text: The defect edges were debeveled with a #15 scalpel blade.  The primary defect was closed partially with a complex linear closure.  Given the location of the defect, shape of the defect and the proximity to free margins a xenograft was deemed most appropriate to repair the remaining defect.  The graft was trimmed to fit the size of the remaining defect.  The graft was then placed in the primary defect, oriented appropriately, and sutured into place.
Modified Advancement Flap Text: The defect edges were debeveled with a #15 scalpel blade.  Given the location of the defect, shape of the defect and the proximity to free margins a modified advancement flap was deemed most appropriate.  Using a sterile surgical marker, an appropriate advancement flap was drawn incorporating the defect and placing the expected incisions within the relaxed skin tension lines where possible.    The area thus outlined was incised deep to adipose tissue with a #15 scalpel blade.  The skin margins were undermined to an appropriate distance in all directions utilizing iris scissors.
Billing Type: Third-Party Bill
Bilobed Transposition Flap Text: The defect edges were debeveled with a #15 scalpel blade.  Given the location of the defect and the proximity to free margins a bilobed transposition flap was deemed most appropriate.  Using a sterile surgical marker, an appropriate bilobe flap drawn around the defect.    The area thus outlined was incised deep to adipose tissue with a #15 scalpel blade.  The skin margins were undermined to an appropriate distance in all directions utilizing iris scissors.
Bilobed Flap Text: The defect edges were debeveled with a #15 scalpel blade.  Given the location of the defect and the proximity to free margins a bilobe flap was deemed most appropriate.  Using a sterile surgical marker, an appropriate bilobe flap drawn around the defect.    The area thus outlined was incised deep to adipose tissue with a #15 scalpel blade.  The skin margins were undermined to an appropriate distance in all directions utilizing iris scissors.
Complex Repair And A-T Advancement Flap Text: The defect edges were debeveled with a #15 scalpel blade.  The primary defect was closed partially with a complex linear closure.  Given the location of the remaining defect, shape of the defect and the proximity to free margins an A-T advancement flap was deemed most appropriate for complete closure of the defect.  Using a sterile surgical marker, an appropriate advancement flap was drawn incorporating the defect and placing the expected incisions within the relaxed skin tension lines where possible.    The area thus outlined was incised deep to adipose tissue with a #15 scalpel blade.  The skin margins were undermined to an appropriate distance in all directions utilizing iris scissors.
Suture Removal: 7 days
Surgeon Performing The Repair (Optional): Dr. Rohan Smith MD
Composite Graft Text: The defect edges were debeveled with a #15 scalpel blade.  Given the location of the defect, shape of the defect, the proximity to free margins and the fact the defect was full thickness a composite graft was deemed most appropriate.  The defect was outline and then transferred to the donor site.  A full thickness graft was then excised from the donor site. The graft was then placed in the primary defect, oriented appropriately and then sutured into place.  The secondary defect was then repaired using a primary closure.
H Plasty Text: Given the location of the defect, shape of the defect and the proximity to free margins a H-plasty was deemed most appropriate for repair.  Using a sterile surgical marker, the appropriate advancement arms of the H-plasty were drawn incorporating the defect and placing the expected incisions within the relaxed skin tension lines where possible. The area thus outlined was incised deep to adipose tissue with a #15 scalpel blade. The skin margins were undermined to an appropriate distance in all directions utilizing iris scissors.  The opposing advancement arms were then advanced into place in opposite direction and anchored with interrupted buried subcutaneous sutures.

## 2019-03-26 ENCOUNTER — APPOINTMENT (RX ONLY)
Dept: URBAN - METROPOLITAN AREA CLINIC 4 | Facility: CLINIC | Age: 79
Setting detail: DERMATOLOGY
End: 2019-03-26

## 2019-03-26 DIAGNOSIS — Z48.02 ENCOUNTER FOR REMOVAL OF SUTURES: ICD-10-CM

## 2019-03-26 PROCEDURE — ? COUNSELING

## 2019-03-26 PROCEDURE — 99024 POSTOP FOLLOW-UP VISIT: CPT

## 2019-03-26 PROCEDURE — ? SUTURE REMOVAL (GLOBAL PERIOD)

## 2019-03-26 ASSESSMENT — LOCATION ZONE DERM: LOCATION ZONE: FACE

## 2019-03-26 ASSESSMENT — LOCATION DETAILED DESCRIPTION DERM: LOCATION DETAILED: LEFT LATERAL BUCCAL CHEEK

## 2019-03-26 ASSESSMENT — LOCATION SIMPLE DESCRIPTION DERM: LOCATION SIMPLE: LEFT CHEEK

## 2019-03-26 NOTE — PROCEDURE: SUTURE REMOVAL (GLOBAL PERIOD)
Add 11091 Cpt? (Important Note: In 2017 The Use Of 52790 Is Being Tracked By Cms To Determine Future Global Period Reimbursement For Global Periods): yes
Detail Level: Detailed

## 2019-04-04 DIAGNOSIS — E03.4 IDIOPATHIC ATROPHIC HYPOTHYROIDISM: ICD-10-CM

## 2019-04-04 RX ORDER — LEVOTHYROXINE SODIUM 0.05 MG/1
TABLET ORAL
Qty: 90 TAB | Refills: 1 | Status: SHIPPED | OUTPATIENT
Start: 2019-04-04 | End: 2019-10-01 | Stop reason: SDUPTHER

## 2019-04-10 ENCOUNTER — OFFICE VISIT (OUTPATIENT)
Dept: MEDICAL GROUP | Facility: MEDICAL CENTER | Age: 79
End: 2019-04-10
Payer: MEDICARE

## 2019-04-10 VITALS
RESPIRATION RATE: 12 BRPM | SYSTOLIC BLOOD PRESSURE: 110 MMHG | BODY MASS INDEX: 34.36 KG/M2 | TEMPERATURE: 98.3 F | WEIGHT: 175 LBS | HEART RATE: 92 BPM | DIASTOLIC BLOOD PRESSURE: 60 MMHG | OXYGEN SATURATION: 96 % | HEIGHT: 60 IN

## 2019-04-10 DIAGNOSIS — I27.20 PULMONARY HYPERTENSION (HCC): ICD-10-CM

## 2019-04-10 DIAGNOSIS — M47.816 FACET ARTHRITIS OF LUMBAR REGION: ICD-10-CM

## 2019-04-10 DIAGNOSIS — E55.9 VITAMIN D DEFICIENCY DISEASE: ICD-10-CM

## 2019-04-10 DIAGNOSIS — E78.5 DYSLIPIDEMIA, GOAL LDL BELOW 130: ICD-10-CM

## 2019-04-10 DIAGNOSIS — E06.3 HYPOTHYROIDISM DUE TO HASHIMOTO'S THYROIDITIS: ICD-10-CM

## 2019-04-10 DIAGNOSIS — R49.0 HOARSENESS, PERSISTENT: ICD-10-CM

## 2019-04-10 DIAGNOSIS — R73.09 ELEVATED GLYCOHEMOGLOBIN: ICD-10-CM

## 2019-04-10 DIAGNOSIS — F33.0 MAJOR DEPRESSIVE DISORDER, RECURRENT EPISODE, MILD (HCC): ICD-10-CM

## 2019-04-10 DIAGNOSIS — E03.8 HYPOTHYROIDISM DUE TO HASHIMOTO'S THYROIDITIS: ICD-10-CM

## 2019-04-10 DIAGNOSIS — N18.30 CKD (CHRONIC KIDNEY DISEASE) STAGE 3, GFR 30-59 ML/MIN (HCC): ICD-10-CM

## 2019-04-10 PROCEDURE — 99214 OFFICE O/P EST MOD 30 MIN: CPT | Performed by: FAMILY MEDICINE

## 2019-04-10 RX ORDER — ATORVASTATIN CALCIUM 20 MG/1
20 TABLET, FILM COATED ORAL
Qty: 90 TAB | Refills: 3 | Status: SHIPPED | OUTPATIENT
Start: 2019-04-10 | End: 2020-05-26

## 2019-04-10 RX ORDER — ZOSTER VACCINE RECOMBINANT, ADJUVANTED 50 MCG/0.5
KIT INTRAMUSCULAR
Refills: 0 | COMMUNITY
Start: 2019-04-02 | End: 2019-09-12

## 2019-04-10 RX ORDER — POTASSIUM CHLORIDE 600 MG/1
16 TABLET, FILM COATED, EXTENDED RELEASE ORAL DAILY
Refills: 12 | COMMUNITY
Start: 2019-03-23 | End: 2020-03-12 | Stop reason: SDUPTHER

## 2019-04-10 NOTE — PROGRESS NOTES
Chief Complaint   Patient presents with   • Chronic Kidney Disease   • Hypothyroidism   • Elevated Glucose   • Vitamin D Deficiency       Subjective:     HPI:   Maddy Hodge presents today with the followin. CKD (chronic kidney disease) stage 3, GFR 30-59 ml/min ([facility])  This has been stable, needs to continue monitoring, lab orders discussed and placed.    2. Dyslipidemia, goal LDL below 130  Patient denies chest pain, chest pressure, palpitations or exertional shortness of breath. Patient denies muscle aches or muscle weakness from the atorvastatin medication. Patient is a never smoker. Patient takes 81 mg aspirin daily. Patient has no history of myocardial infarction, stroke or PVD.  Has lab orders from cardiology.    3. Hypothyroidism due to Hashimoto's thyroiditis  Patient reports good energy level on the medication. Patient denies insomnia, tremor or change in appetite.  Patient is taking the medication on an empty stomach in the morning and waiting at least 30 minutes before eating.  Last TSH in 2019 was at [facility].  Lab order discussed and placed.    4. Elevated glycohemoglobin  This was in the past.  Lab orders discussed and placed.    5. Vitamin D deficiency disease  Patient does have vitamin D deficiency and takes daily supplementation.  She has no history of pathologic fracture.  She is due for follow-up lab testing, order discussed and placed.    6. 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  She is working with pain management to control.  Stable overall.    7. Major depressive disorder, recurrent episode, mild ([facility])  She feels she is doing well overall with this.  Has occasional triggers but overall doing okay. Does well with only one maker of the imipramine.  Denies thoughts of self harm.  Denies suicidal ideation.  Currently quite stable.    8. Pulmonary  hypertension ([facility])  Follows carefully with Dr. Cook.  Has responded to medication.  Is on BiPAP that is helping as well.  Continues to follow with pulmonology.    9. Hoarseness, persistent  This is not going away, referred to ENT, needs evaluation.        Patient Active Problem List    Diagnosis Date Noted   • CKD (chronic kidney disease) stage 3, GFR 30-59 ml/min ([facility]) 05/23/2016     Priority: Medium   • Esophageal dysphagia 05/19/2016     Priority: Medium   • Hypothyroidism due to Hashimoto's thyroiditis      Priority: Medium   • GERD (gastroesophageal reflux disease) 09/20/2011     Priority: Medium   • Essential hypertension 07/06/2009     Priority: Medium   • Major depressive disorder, recurrent episode, mild (CMS-HCC) 05/02/2016     Priority: Low   • Neuropathy (CMS-HCC) 10/17/2013     Priority: Low   • Family history of polycystic kidney disease      Priority: Low   • Facet arthritis of lumbar region ([facility]) 03/26/2012     Priority: Low   • History of vertebral fracture 03/26/2012     Priority: Low   • Spinal stenosis of lumbar region with neurogenic claudication      Priority: Low   • Vitamin D deficiency disease 04/10/2019   • Hoarseness, persistent 04/10/2019   • Other chronic pain 02/21/2019   • History of hematuria 10/23/2018   • BMI 34.0-34.9,adult 08/17/2018   • Dyslipidemia, goal LDL below 130 08/15/2018   • Chronic obstructive pulmonary disease ([facility]) 06/22/2018   • Pulmonary hypertension ([facility]) 02/21/2018   • COPD with asthma ([facility]) 11/10/2017   • ALISSA (obstructive sleep apnea) 11/10/2017   • Postlaminectomy syndrome, unspecified region 07/03/2017   • Recurrent UTI 06/28/2017   • 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 90 Tab 3   • SHINGRIX 50 MCG/0.5ML Recon Susp   0   • potassium chloride (KLOR-CON) 8 MEQ tablet Take  by mouth every day. WITH FOOD  12   • levothyroxine (SYNTHROID) 50 MCG Tab TAKE 1 TABLET BY MOUTH IN THE MORNING ON AN EMPTY STOMACH 90 Tab 1   • imipramine (TOFRANIL) 10 MG Tab TAKE 2 TABLETS BY MOUTH IN THE EVENING 180 Tab 2   • Multiple Vitamins-Minerals (VITEYES AREDS ADVANCED PO) Take  by mouth.     • Cholecalciferol (VITAMIN D3) 2000 UNIT Cap Take  by mouth.     • LETAIRIS 10 MG tablet Take 10 Tabs by mouth every day.     • furosemide (LASIX) 40 MG Tab Take 40 mg by mouth every day.  3   • NARCAN 4 MG/0.1ML Liquid AS DIRECTED  0   • oxyCODONE-acetaminophen (PERCOCET) 5-325 MG Tab TAKE 1 TABLET BY MOUTH 4 TIMES A DAY AS NEEDED FOR PAIN 11/8/18 G89.29  0   • spironolactone (ALDACTONE) 25 MG Tab Take 25 mg by mouth every day.  3   • omeprazole (PRILOSEC) 20 MG delayed-release capsule Take 1 Cap by mouth every day. 90 Cap 3   • INCRUSE ELLIPTA 62.5 MCG/INH AEROSOL POWDER, BREATH ACTIVATED INHALE ONE PUFF BY MOUTH ONCE DAILY 3 Each 3   • DULoxetine (CYMBALTA) 60 MG Cap DR Particles delayed-release capsule TAKE 1 CAPSULE ONE TIME DAILY 90 Cap 2   • estradiol (ESTRACE) 0.5 MG tablet TAKE ONE TABLET BY MOUTH ONCE DAILY 90 Tab 3   • pregabalin (LYRICA) 150 MG Cap Take 200 mg by mouth 3 times a day.     • AMITIZA 24 MCG capsule Take 24 mcg by mouth every morning with breakfast.     • XTAMPZA ER 18 MG Capsule Extended Release 12 hour Abuse-Deterrent Take 18 mg by mouth 2 Times a Day.     • Cyanocobalamin 2500 MCG Chew Tab Take 1 Tab by mouth every day.     • VENTOLIN  (90 BASE) MCG/ACT Aero Soln inhalation aerosol Inhale 2 Puffs by mouth every 6 hours as needed for Shortness of Breath. 1 Inhaler 5   • aspirin EC (ECOTRIN) 81 MG Tablet Delayed Response Take 81 mg by mouth every day.     • docusate sodium (COLACE) 250 MG capsule Take 250 mg by mouth every day.       No  current facility-administered medications for this visit.        Allergies   Allergen Reactions   • Pcn [Penicillins]    • Sulfa Drugs    • Tape    • Macrobid [Nitrofurantoin] Rash     rash   • Zoloft Unspecified     Worse depression       ROS: As per HPI       Objective:     /60   Pulse 92   Temp 36.8 °C (98.3 °F)   Resp 12   Ht 1.524 m (5')   Wt 79.4 kg (175 lb)   SpO2 96%  Body mass index is 34.18 kg/m².    Physical Exam:  Constitutional: Well-developed and well-nourished. Not diaphoretic. No distress. Lucid and fluent.  Skin: Skin is warm and dry. No rash noted.  Head: Atraumatic without lesions.  Eyes: Conjunctivae and extraocular motions are normal. Pupils are equal, round, and reactive to light. No scleral icterus.   Mouth/Throat: Tongue normal. Oropharynx is clear and moist. Posterior pharynx without erythema or exudates.  Neck: Supple, trachea midline. No thyromegaly present. No cervical or supraclavicular lymphadenopathy. No JVD or carotid bruits appreciated  Cardiovascular: Regular rate and rhythm.  Normal S1, S2 without murmur appreciated.  Chest: Effort normal. Clear to auscultation throughout. No adventitious sounds.   Abdomen: Soft, non tender, and without distention. Active bowel sounds in all four quadrants. No rebound, guarding, masses or hepatosplenomegaly.  Extremities: No cyanosis, clubbing, erythema, nor edema.   Neurological: Alert and oriented x 3.  She has some fine tremor.  Psychiatric:  Behavior, mood, and affect are appropriate.       Assessment and Plan:     78 y.o. female with the following issues:    1. CKD (chronic kidney disease) stage 3, GFR 30-59 ml/min ([facility])     2. Dyslipidemia, goal LDL below 130  atorvastatin (LIPITOR) 20 MG Tab   3. Hypothyroidism due to Hashimoto's thyroiditis  TSH   4. Elevated glycohemoglobin  HEMOGLOBIN A1C   5. Vitamin D deficiency disease  VITAMIN D,25 HYDROXY   6. Facet arthritis of lumbar region ([facility])     7. Major depressive disorder,  recurrent episode, mild (HCC)     8. Pulmonary hypertension (HCC)     9. Hoarseness, persistent  REFERRAL TO ENT         Followup: Return in about 6 months (around 10/10/2019), or if symptoms worsen or fail to improve.

## 2019-05-22 ENCOUNTER — SLEEP CENTER VISIT (OUTPATIENT)
Dept: SLEEP MEDICINE | Facility: MEDICAL CENTER | Age: 79
End: 2019-05-22
Payer: MEDICARE

## 2019-05-22 VITALS
DIASTOLIC BLOOD PRESSURE: 68 MMHG | HEIGHT: 60 IN | SYSTOLIC BLOOD PRESSURE: 110 MMHG | RESPIRATION RATE: 16 BRPM | WEIGHT: 175 LBS | BODY MASS INDEX: 34.36 KG/M2

## 2019-05-22 DIAGNOSIS — J44.9 CHRONIC OBSTRUCTIVE PULMONARY DISEASE, UNSPECIFIED COPD TYPE (HCC): ICD-10-CM

## 2019-05-22 DIAGNOSIS — G47.33 OSA (OBSTRUCTIVE SLEEP APNEA): ICD-10-CM

## 2019-05-22 PROCEDURE — 99214 OFFICE O/P EST MOD 30 MIN: CPT | Performed by: NURSE PRACTITIONER

## 2019-05-22 RX ORDER — DIAZEPAM 5 MG/1
5 TABLET ORAL EVERY 6 HOURS PRN
COMMUNITY
End: 2019-07-15

## 2019-05-22 NOTE — PROGRESS NOTES
CC:  Here for f/u sleep issues as listed below    HPI:   Maddy presents today for follow up for ALISSA.      She is also seen in our pulmonary office for history of COPD.  Please see previous office visit from 2- for further details.  Past medical history of pulmonary hypertension followed by Dr. Cook, depression, hypertension, CKD stage III. Has chronic pain on opioids.       PSG from 6/2017 indicated an AHI of 5.5 and low oxygenation of 81%.  She completed a titration study for potential ASV therapy given elevated AHI and use of chronic pain medication.  She was then switched from CPAP to BiPAP given her central apneas resolved with BiPAP therapy.  Currently she is being treated with BIPAP ST @ 12/7cmH20.  Compliance download from the dates 4/22/2019 - 5/21/2019 indicates she is wearing the device 100% for an avg of 7 hours and 52 minutes per night with a reduced AHI of 9.1.  She does tolerate pressure and mask well.  She wakes up refreshed and is less tired throughout the day. She denies morning H/A and sleep better overall. She will continue to clean supplies weekly and change them as insurance allows.  BMI is 34. She is walking daily.        Patient Active Problem List    Diagnosis Date Noted   • CKD (chronic kidney disease) stage 3, GFR 30-59 ml/min (Formerly [facility]) 05/23/2016     Priority: Medium   • Esophageal dysphagia 05/19/2016     Priority: Medium   • Hypothyroidism due to Hashimoto's thyroiditis      Priority: Medium   • GERD (gastroesophageal reflux disease) 09/20/2011     Priority: Medium   • Essential hypertension 07/06/2009     Priority: Medium   • Major depressive disorder, recurrent episode, mild (CMS-HCC) 05/02/2016     Priority: Low   • Neuropathy (CMS-HCC) 10/17/2013     Priority: Low   • Family history of polycystic kidney disease      Priority: Low   • Facet arthritis of lumbar region ([facility]) 03/26/2012     Priority: Low   • History of vertebral fracture 03/26/2012     Priority: Low   • Spinal  "stenosis of lumbar region with neurogenic claudication      Priority: Low   • Vitamin D deficiency disease 04/10/2019   • Hoarseness, persistent 04/10/2019   • Other chronic pain 02/21/2019   • History of hematuria 10/23/2018   • BMI 34.0-34.9,adult 08/17/2018   • Dyslipidemia, goal LDL below 130 08/15/2018   • Chronic obstructive pulmonary disease ([facility]) 06/22/2018   • Pulmonary hypertension ([facility]) 02/21/2018   • COPD with asthma ([facility]) 11/10/2017   • ALISSA (obstructive sleep apnea) 11/10/2017   • Postlaminectomy syndrome, unspecified region 07/03/2017   • Recurrent UTI 06/28/2017   • 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    • Arthritis     facet arthritis of lumbar region   • Asthma     Inhaler use daily.   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 Albrecht, attaches to plastic, prulent   • Depression     and anxiety   • Encounter for long-term (current) use of other medications    • Erosive gastritis 5/09    antral   • Family history of polycystic kidney disease    • GERD (gastroesophageal reflux disease)    • [place] measles    • Hypertension    • Hypothyroidism    • Influenza    • Lumbar vertebral fracture ([facility]) 5/2011   • Mumps    • Muscle disorder     Arachnoiditis   • Neuropathy ([facility])    • Obesity, Class I, BMI 30-34.9    • OSTEOPOROSIS    • Pain 5/12/16    back and legs    • Renal disorder    • Sleep apnea     on BiPap, follows with pulmonology   • Spinal stenosis, lumbar region, " without neurogenic claudication    • Tonsillitis    • Urinary bladder disorder 2016    Currently has a catheter.       Past Surgical History:   Procedure Laterality Date   • SPINAL CORD STIMULATOR N/A 7/3/2017    Procedure: SPINAL CORD STIMULATOR FOR LEAD REMOVAL;  Surgeon: Amandeep Gonzalez D.O.;  Location: SURGERY [facility];  Service:    • GASTROSCOPY WITH BALLOON DILATATION N/A 2016    Procedure: GASTROSCOPY WITH DILATATION;  Surgeon: Tony Monae M.D.;  Location: SURGERY [facility];  Service:    • SPINAL CORD STIMULATOR  14   • EGD WITH ASP/BX  09    erosive gastritis   • HYSTERECTOMY, TOTAL ABDOMINAL     • APPENDECTOMY     • CATARACT EXTRACTION WITH IOL Bilateral    • COLONOSCOPY  ,09    normal   • HYSTERECTOMY LAPAROSCOPY     • LUMBAR LAMINECTOMY DISKECTOMY     • TONSILLECTOMY         Family History   Problem Relation Age of Onset   • Genitourinary () Brother    • Lung Disease Mother         COPD   • Heart Disease Mother    • Genetic Father         Parkinsons/ from complications   • Hypertension Father    • Cancer Maternal Aunt         Breast cancer   • Stroke Paternal Grandmother    • Cancer Maternal Aunt         Breast cancer       Social History     Social History   • Marital status:      Spouse name: N/A   • Number of children: N/A   • Years of education: N/A     Occupational History   • Not on file.     Social History Main Topics   • Smoking status: Never Smoker   • Smokeless tobacco: Never Used   • Alcohol use No   • Drug use: No   • Sexual activity: No     Other Topics Concern   • Stress Concern No      cancer     Social History Narrative   • No narrative on file       Current Outpatient Prescriptions   Medication Sig Dispense Refill   • diazePAM (VALIUM) 5 MG Tab Take 5 mg by mouth every 6 hours as needed for Anxiety.     • potassium chloride (KLOR-CON) 8 MEQ tablet Take  by mouth every day. WITH FOOD  12   • atorvastatin  (LIPITOR) 20 MG Tab Take 1 Tab by mouth every day. 90 Tab 3   • levothyroxine (SYNTHROID) 50 MCG Tab TAKE 1 TABLET BY MOUTH IN THE MORNING ON AN EMPTY STOMACH 90 Tab 1   • imipramine (TOFRANIL) 10 MG Tab TAKE 2 TABLETS BY MOUTH IN THE EVENING 180 Tab 2   • Multiple Vitamins-Minerals (VITEYES AREDS ADVANCED PO) Take  by mouth.     • Cholecalciferol (VITAMIN D3) 2000 UNIT Cap Take  by mouth.     • LETAIRIS 10 MG tablet Take 10 Tabs by mouth every day.     • furosemide (LASIX) 40 MG Tab Take 40 mg by mouth every day.  3   • oxyCODONE-acetaminophen (PERCOCET) 5-325 MG Tab TAKE 1 TABLET BY MOUTH 4 TIMES A DAY AS NEEDED FOR PAIN 11/8/18 G89.29  0   • spironolactone (ALDACTONE) 25 MG Tab Take 25 mg by mouth every day.  3   • omeprazole (PRILOSEC) 20 MG delayed-release capsule Take 1 Cap by mouth every day. 90 Cap 3   • INCRUSE ELLIPTA 62.5 MCG/INH AEROSOL POWDER, BREATH ACTIVATED INHALE ONE PUFF BY MOUTH ONCE DAILY 3 Each 3   • DULoxetine (CYMBALTA) 60 MG Cap DR Particles delayed-release capsule TAKE 1 CAPSULE ONE TIME DAILY 90 Cap 2   • estradiol (ESTRACE) 0.5 MG tablet TAKE ONE TABLET BY MOUTH ONCE DAILY 90 Tab 3   • pregabalin (LYRICA) 150 MG Cap Take 200 mg by mouth 3 times a day.     • AMITIZA 24 MCG capsule Take 24 mcg by mouth every morning with breakfast.     • XTAMPZA ER 18 MG Capsule Extended Release 12 hour Abuse-Deterrent Take 18 mg by mouth 2 Times a Day.     • aspirin EC (ECOTRIN) 81 MG Tablet Delayed Response Take 81 mg by mouth every day.     • SHINGRIX 50 MCG/0.5ML Recon Susp   0   • NARCAN 4 MG/0.1ML Liquid AS DIRECTED  0   • Cyanocobalamin 2500 MCG Chew Tab Take 1 Tab by mouth every day.     • VENTOLIN  (90 BASE) MCG/ACT Aero Soln inhalation aerosol Inhale 2 Puffs by mouth every 6 hours as needed for Shortness of Breath. 1 Inhaler 5   • docusate sodium (COLACE) 250 MG capsule Take 250 mg by mouth every day.       No current facility-administered medications for this visit.           Allergies: Pcn  [penicillins]; Sulfa drugs; Tape; Macrobid [nitrofurantoin]; and Zoloft      ROS   Gen: Denies fever, chills, unintentional weight loss, fatigue  Resp:Denies Dyspnea  CV: Denies chest pain, chest tightness  Sleep:Denies morning headache, insomnia, daytime somnolence, snoring, gasping for air, apnea  Neuro: Denies frequent headaches, weakness, dizziness  See HPI.  All other systems reviewed and negative        Vital signs for this encounter:  Vitals:    05/22/19 1013   Height: 1.524 m (5')   Weight: 79.4 kg (175 lb)   Weight % change since last entry.: 0 %   BP: 110/68   BMI (Calculated): 34.18   Resp: 16                   Physical Exam:   Gen:         Alert and oriented, No apparent distress.   Neck:        No Lymphadenopathy.  Lungs:     Clear to auscultation bilaterally.    CV:          Regular rate and rhythm. No murmurs, rubs or gallops.   Abd:         Soft non tender, non distended.            Ext:          No clubbing, cyanosis, edema.    Assessment   1. ALISSA (obstructive sleep apnea)  DME Mask Fitting    DME Other   2. Chronic obstructive pulmonary disease, unspecified COPD type (HCC)  PULMONARY FUNCTION TESTS -Test requested: Complete Pulmonary Function Test       Patient is clinically stable and will proceed with following plan.     PLAN:   Patient Instructions   1) Continue BIPAP ST with back up rate of 12 - 12/7cmH20  2) Clean mask and supplies weekly and change them as insurance allows  3) Continue using Incruse and rescue inhaler as needed  4) Vaccines: Up to date with Prevnar 13, Pneumovax 23, flu  5) Continue weekly exercise   6) Zpack and Medrol pack on hand for exacerbation   7) Return in about 6 months (around 11/22/2019) for follow up with DOMINIQUE Sims, if not sooner, review of symptoms, Compliance.

## 2019-05-22 NOTE — PATIENT INSTRUCTIONS
1) Continue BIPAP ST with back up rate of 12 - 12/7cmH20  2) Clean mask and supplies weekly and change them as insurance allows  3) Continue using Incruse and rescue inhaler as needed  4) Vaccines: Up to date with Prevnar 13, Pneumovax 23, flu  5) Continue weekly exercise   6) Zpack and Medrol pack on hand for exacerbation   7) Return in about 6 months (around 11/22/2019) for follow up with DOMINIQUE Sims, if not sooner, review of symptoms, Compliance, pulmonary function results.

## 2019-06-11 DIAGNOSIS — M48.061 SPINAL STENOSIS OF LUMBAR REGION, UNSPECIFIED WHETHER NEUROGENIC CLAUDICATION PRESENT: ICD-10-CM

## 2019-06-11 RX ORDER — DULOXETIN HYDROCHLORIDE 60 MG/1
CAPSULE, DELAYED RELEASE ORAL
Qty: 90 CAP | Refills: 2 | Status: SHIPPED | OUTPATIENT
Start: 2019-06-11 | End: 2019-09-12

## 2019-06-20 LAB
25(OH)D3+25(OH)D2 SERPL-MCNC: 61 NG/ML (ref 30–100)
HBA1C MFR BLD: 5.6 % (ref 4.8–5.6)
TSH SERPL DL<=0.005 MIU/L-ACNC: 1.47 UIU/ML (ref 0.45–4.5)

## 2019-07-03 DIAGNOSIS — F41.8 SITUATIONAL ANXIETY: ICD-10-CM

## 2019-07-03 RX ORDER — BUSPIRONE HYDROCHLORIDE 10 MG/1
10 TABLET ORAL 2 TIMES DAILY
Qty: 60 TAB | Refills: 2 | Status: SHIPPED | OUTPATIENT
Start: 2019-07-03 | End: 2019-07-15 | Stop reason: SINTOL

## 2019-07-04 NOTE — PROGRESS NOTES
Buspar sent to [facility].  No interactions found.    Remington Quinones M.D.

## 2019-07-15 DIAGNOSIS — F41.9 CHRONIC ANXIETY: ICD-10-CM

## 2019-07-15 RX ORDER — HYDROXYZINE HYDROCHLORIDE 10 MG/1
10 TABLET, FILM COATED ORAL 3 TIMES DAILY PRN
Qty: 90 TAB | Refills: 2 | Status: SHIPPED | OUTPATIENT
Start: 2019-07-15 | End: 2019-09-12

## 2019-07-16 NOTE — PROGRESS NOTES
Unfortunately I cannot think for of anything for anxiety other than may be Atarax.  It may cause drowsiness but I will start a low dose.

## 2019-08-12 ENCOUNTER — OFFICE VISIT (OUTPATIENT)
Dept: PULMONOLOGY | Facility: HOSPICE | Age: 79
End: 2019-08-12
Payer: MEDICARE

## 2019-08-12 VITALS
BODY MASS INDEX: 33.57 KG/M2 | HEIGHT: 60 IN | HEART RATE: 90 BPM | DIASTOLIC BLOOD PRESSURE: 86 MMHG | OXYGEN SATURATION: 94 % | SYSTOLIC BLOOD PRESSURE: 122 MMHG | WEIGHT: 171 LBS

## 2019-08-12 DIAGNOSIS — J43.9 MIXED RESTRICTIVE AND OBSTRUCTIVE LUNG DISEASE (HCC): ICD-10-CM

## 2019-08-12 DIAGNOSIS — J98.4 MIXED RESTRICTIVE AND OBSTRUCTIVE LUNG DISEASE (HCC): ICD-10-CM

## 2019-08-12 DIAGNOSIS — J44.89 COPD WITH ASTHMA: ICD-10-CM

## 2019-08-12 DIAGNOSIS — G47.33 OSA (OBSTRUCTIVE SLEEP APNEA): ICD-10-CM

## 2019-08-12 DIAGNOSIS — I27.20 PULMONARY HYPERTENSION (HCC): ICD-10-CM

## 2019-08-12 DIAGNOSIS — T17.308D CHOKING, SUBSEQUENT ENCOUNTER: ICD-10-CM

## 2019-08-12 PROCEDURE — 99214 OFFICE O/P EST MOD 30 MIN: CPT | Performed by: NURSE PRACTITIONER

## 2019-08-12 NOTE — PATIENT INSTRUCTIONS
1) Continue BIPAP ST with back up rate of 12 - 14/9cmH20  2) Clean mask and supplies weekly and change them as insurance allows  3) Continue using Incruse and rescue inhaler as needed  4) Vaccines: Up to date with Prevnar 13, Pneumovax 23, flu  5) Continue weekly exercise   6) Zpack and Medrol pack on hand for exacerbation   7) F/U with GI    8) Return in about 6 months (around 2/12/2020), or already has nov appt , for follow up with DOMINIQUE Sims, if not sooner, review of symptoms, pulmonary function results.

## 2019-08-12 NOTE — PROGRESS NOTES
CC:  Here for f/u sleep and pulmonary issues as listed below    HPI:   Maddy presents today for follow up for COPD with hx of pulmonary HTN and ALISSA.  Past medical history of pulmonary hypertension followed by Dr. Cook, depression, hypertension, CKD stage III. Has chronic pain on opioids.      She is also seen as a sleep patient in her clinic .please see last office visit from 5- for further details. Currently she is being treated with BIPAP ST @ 12/7cmH20.  She started having difficulty sleeping since they stopped her Valium.  She is on a new pain medication but has not been working and causing her anxiety, most likely disrupting her sleep.  They will be following up with a provider soon to discuss this.    PFTs from 8/2018 are stable in comparison to 2017 and indicate a Fev1 of 1.36L or 80% predicted with a mild bronchodilator response, Fev1/FVC ratio of 67, DLCO 84%.  She is a never smoker. CTA study completed 9/2017 was negative for PE.      For further workup of pulmonary HTN an echo was completed without significant change since 2016 study noting estimated EF of 65%, RVSP of 50mmHg.  She established with Dr. Cook, last seen June 2019 He completed a right and left heart catheterization with elevated pulmonary pressures.  She was started on Letaris 10 mg daily.  She continues to have some water retention with +2-3 bilateral lower extremity edema, and finds water retention in her thighs now.  She does report improved dyspnea since starting the medication.     She is compliant using Incruse and rare use of rescue. Stopped symbicort b/c caused hoarseness. She does have a history of choking with dry food. She was offered referral to ENT for further evaluation, r/t tightening of vocal cords, typical for age. Trying to concentrate when she eats to help prevent choking.  She denies wheeze, hemoptysis, chest pain or chest tightness, fever or chills, unintentional weight loss, acid reflux. Since last  "OV she required zpack and medrol pack x2 for exacerbation with benefit. A She has been increasing her activity level with improved dyspnea. She has been bicycling and walking. BMI is 33.         Patient Active Problem List    Diagnosis Date Noted   • CKD (chronic kidney disease) stage 3, GFR 30-59 ml/min ([facility]) 05/23/2016     Priority: Medium   • Esophageal dysphagia 05/19/2016     Priority: Medium   • Hypothyroidism due to Hashimoto's thyroiditis      Priority: Medium   • GERD (gastroesophageal reflux disease) 09/20/2011     Priority: Medium   • Essential hypertension 07/06/2009     Priority: Medium   • Major depressive disorder, recurrent episode, mild (CMS-HCC) 05/02/2016     Priority: Low   • Neuropathy (CMS-HCC) 10/17/2013     Priority: Low   • Family history of polycystic kidney disease      Priority: Low   • Facet arthritis of lumbar region 03/26/2012     Priority: Low   • History of vertebral fracture 03/26/2012     Priority: Low   • Spinal stenosis of lumbar region with neurogenic claudication      Priority: Low   • Choking 08/15/2019   • Chronic anxiety 07/15/2019   • Vitamin D deficiency disease 04/10/2019   • Hoarseness, persistent 04/10/2019   • Other chronic pain 02/21/2019   • History of hematuria 10/23/2018   • BMI 34.0-34.9,adult 08/17/2018   • Dyslipidemia, goal LDL below 130 08/15/2018   • Chronic obstructive pulmonary disease ([facility]) 06/22/2018   • Pulmonary hypertension ([facility]) 02/21/2018   • COPD with asthma ([facility]) 11/10/2017   • ALISSA (obstructive sleep apnea) 11/10/2017   • Postlaminectomy syndrome, unspecified region 07/03/2017   • Recurrent UTI 06/28/2017   • 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 managed with " medication   • Arachnoiditis     No menigitis.    • Arthritis     facet arthritis of lumbar region   • Asthma     Inhaler use daily.   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 Albrecht, attaches to plastic, prulent   • Depression     and anxiety   • Encounter for long-term (current) use of other medications    • Erosive gastritis 5/09    antral   • Family history of polycystic kidney disease    • GERD (gastroesophageal reflux disease)    • Arabic measles    • Hypertension    • Hypothyroidism    • Influenza    • Lumbar vertebral fracture (HCC) 5/2011   • Mumps    • Muscle disorder     Arachnoiditis   • Neuropathy (HCC)    • Obesity, Class I, BMI 30-34.9    • OSTEOPOROSIS    • Pain 5/12/16    back and legs    • Renal disorder    • Sleep apnea     on BiPap, follows with pulmonology   • Spinal stenosis, lumbar region, without neurogenic claudication    • Tonsillitis    • Urinary bladder disorder 6/30/2016    Currently has a catheter.       Past Surgical History:   Procedure Laterality Date   • SPINAL CORD STIMULATOR N/A 7/3/2017    Procedure: SPINAL CORD STIMULATOR FOR LEAD REMOVAL;  Surgeon: Amandeep Gonzalez D.O.;  Location: SURGERY [facility];  Service:    • GASTROSCOPY WITH BALLOON DILATATION N/A 5/19/2016    Procedure: GASTROSCOPY WITH DILATATION;  Surgeon: Tony Monae M.D.;  Location: SURGERY [facility];  Service:    • SPINAL CORD STIMULATOR  9/2/14   • EGD WITH ASP/BX  5/27/09    erosive gastritis   • HYSTERECTOMY, TOTAL ABDOMINAL  1976   • APPENDECTOMY  1976   • CATARACT EXTRACTION WITH IOL Bilateral    • COLONOSCOPY  2000,5/27/09    normal   • HYSTERECTOMY LAPAROSCOPY     • LUMBAR LAMINECTOMY DISKECTOMY     • TONSILLECTOMY         Family History   Problem Relation Age of Onset   • Genitourinary ()  Problems Brother    • Lung Disease Mother         COPD   • Heart Disease Mother    • Genetic Disorder Father         Parkinsons/ from complications   • Hypertension Father    • Cancer Maternal Aunt         Breast cancer   • Stroke Paternal Grandmother    • Cancer Maternal Aunt         Breast cancer       Social History     Socioeconomic History   • Marital status:      Spouse name: Not on file   • Number of children: Not on file   • Years of education: Not on file   • Highest education level: Not on file   Occupational History   • Not on file   Social Needs   • Financial resource strain: Not on file   • Food insecurity:     Worry: Not on file     Inability: Not on file   • Transportation needs:     Medical: Not on file     Non-medical: Not on file   Tobacco Use   • Smoking status: Never Smoker   • Smokeless tobacco: Never Used   Substance and Sexual Activity   • Alcohol use: No     Alcohol/week: 0.0 oz   • Drug use: No   • Sexual activity: Never     Partners: Male     Birth control/protection: Abstinence   Lifestyle   • Physical activity:     Days per week: Not on file     Minutes per session: Not on file   • Stress: Not on file   Relationships   • Social connections:     Talks on phone: Not on file     Gets together: Not on file     Attends Caodaism service: Not on file     Active member of club or organization: Not on file     Attends meetings of clubs or organizations: Not on file     Relationship status: Not on file   • Intimate partner violence:     Fear of current or ex partner: Not on file     Emotionally abused: Not on file     Physically abused: Not on file     Forced sexual activity: Not on file   Other Topics Concern   •  Service Not Asked   • Blood Transfusions Not Asked   • Caffeine Concern Not Asked   • Occupational Exposure Not Asked   • Hobby Hazards Not Asked   • Sleep Concern Not Asked   • Stress Concern No     Comment:  cancer   • Weight Concern Not Asked   • Special  Diet Not Asked   • Back Care Not Asked   • Exercise Not Asked   • Bike Helmet Not Asked   • Seat Belt Not Asked   • Self-Exams Not Asked   Social History Narrative   • Not on file       Current Outpatient Medications   Medication Sig Dispense Refill   • hydrOXYzine HCl (ATARAX) 10 MG Tab Take 1 Tab by mouth 3 times a day as needed. 90 Tab 2   • DULoxetine (CYMBALTA) 60 MG Cap DR Particles delayed-release capsule TAKE 1 CAPSULE EVERY DAY 90 Cap 2   • potassium chloride (KLOR-CON) 8 MEQ tablet Take 16 mEq by mouth every day. WITH FOOD  12   • atorvastatin (LIPITOR) 20 MG Tab Take 1 Tab by mouth every day. 90 Tab 3   • levothyroxine (SYNTHROID) 50 MCG Tab TAKE 1 TABLET BY MOUTH IN THE MORNING ON AN EMPTY STOMACH 90 Tab 1   • imipramine (TOFRANIL) 10 MG Tab TAKE 2 TABLETS BY MOUTH IN THE EVENING 180 Tab 2   • Multiple Vitamins-Minerals (VITEYES AREDS ADVANCED PO) Take  by mouth.     • Cholecalciferol (VITAMIN D3) 2000 UNIT Cap Take  by mouth.     • LETAIRIS 10 MG tablet Take 10 Tabs by mouth every day.     • furosemide (LASIX) 40 MG Tab Take 40 mg by mouth every day.  3   • NARCAN 4 MG/0.1ML Liquid AS DIRECTED  0   • oxyCODONE-acetaminophen (PERCOCET) 5-325 MG Tab TAKE 1 TABLET BY MOUTH 4 TIMES A DAY AS NEEDED FOR PAIN 11/8/18 G89.29  0   • spironolactone (ALDACTONE) 25 MG Tab Take 25 mg by mouth every day.  3   • omeprazole (PRILOSEC) 20 MG delayed-release capsule Take 1 Cap by mouth every day. 90 Cap 3   • INCRUSE ELLIPTA 62.5 MCG/INH AEROSOL POWDER, BREATH ACTIVATED INHALE ONE PUFF BY MOUTH ONCE DAILY 3 Each 3   • estradiol (ESTRACE) 0.5 MG tablet TAKE ONE TABLET BY MOUTH ONCE DAILY 90 Tab 3   • pregabalin (LYRICA) 150 MG Cap Take 150 mg by mouth 3 times a day.     • AMITIZA 24 MCG capsule Take 48 mcg by mouth every morning with breakfast.     • XTAMPZA ER 18 MG Capsule Extended Release 12 hour Abuse-Deterrent Take 18 mg by mouth 2 Times a Day.     • Cyanocobalamin 2500 MCG Chew Tab Take 1 Tab by mouth every day.      • VENTOLIN  (90 BASE) MCG/ACT Aero Soln inhalation aerosol Inhale 2 Puffs by mouth every 6 hours as needed for Shortness of Breath. 1 Inhaler 5   • aspirin EC (ECOTRIN) 81 MG Tablet Delayed Response Take 81 mg by mouth every day.     • docusate sodium (COLACE) 250 MG capsule Take 250 mg by mouth every day.     • SHINGRIX 50 MCG/0.5ML Recon Susp   0     No current facility-administered medications for this visit.           Allergies: Pcn [penicillins]; Sulfa drugs; Tape; Macrobid [nitrofurantoin]; and Zoloft      ROS   Gen: Denies fever, chills, unintentional weight loss, fatigue, night sweats  E/N/T: Denies ear pain, nasal congestion  Resp:Denies  wheezing, cough, sputum production, hemoptysis  CV: Denies chest pain, chest tightness, palpitations, BLE edema  Sleep:Denies morning headache, insomnia, daytime somnolence, snoring, gasping for air, apnea  Neuro: Denies frequent headaches, weakness, dizziness  GI: Denies N/V, acid reflux/heartburn  See HPI.  All other systems reviewed and negative      Vital signs for this encounter:  Vitals:    08/12/19 1003 08/12/19 1006   Height:  1.524 m (5')   Weight:  77.6 kg (171 lb)   Weight % change since last entry.:  0 %   BP:  122/86   Pulse:  90   BMI (Calculated):  33.4   O2 sat % room air: 94 %                  Physical Exam:   Appearance: well developed, well nourished, no acute distress.  Eyes: PERRL, EOM intact, sclere white, conjunctiva moist.  Ears: no lesions or deformities.  Hearing: grossly intact.  Nose: no lesions or deformities.  Dentition: good dentition.  Oropharynx: tongue normal, posterior pharynx without erythema or exudate.  Neck: supple, trachea midline, no masses.  Respiratory effort: no intercostal retractions or use of accessory muscles.  Lung auscultation: Bilateral diminished   Heart auscultation: no murmur, rub, or gallop.   Extremities: +2-3 BLE  Abdomen: soft, non-tender, no masses.  Gait and station: grossly normal   Digits and Nails: no  clubbing, cyanosis, petechiae, or nodes.  Cranial nerves: grossly normal.  Motor: no focal deficits observed.  Skin: no rashes, lesions, or ulcers noted.  Orientation: oriented to time, place, and person.  Mood and affect: mood and affect appropriate, normal interaction with examiner.    Assessment   1. Mixed restrictive and obstructive lung disease (HCC)  PULMONARY FUNCTION TESTS -Test requested: Complete Pulmonary Function Test   2. Pulmonary hypertension (HCC)     3. ALISSA (obstructive sleep apnea)     4. BMI 33.0-33.9,adult     5. COPD with asthma (HCC)     6. Choking, subsequent encounter         Patient was seen for 25 minutes, more than 50% of time spent in face to face review, counseling, and arranging future evaluation and follow up of medical conditions and care related to review of medication management, concerns of sleep management, pulmonary hypertension follow-up. Patient is clinically stable and will proceed with following plan.     PLAN:   Patient Instructions   1) Continue BIPAP ST with back up rate of 12 - 14/9cmH20  2) Clean mask and supplies weekly and change them as insurance allows  3) Continue using Incruse and rescue inhaler as needed  4) Vaccines: Up to date with Prevnar 13, Pneumovax 23, flu  5) Continue weekly exercise   6) Zpack and Medrol pack on hand for exacerbation   7) F/U with speech for swallow study regarding choking  8) Return in about 6 months (around 2/12/2020), or already has nov appt , for follow up with DOMINIQUE Sims, if not sooner, review of symptoms, pulmonary function results.

## 2019-08-14 DIAGNOSIS — J44.9 CHRONIC OBSTRUCTIVE PULMONARY DISEASE, UNSPECIFIED COPD TYPE (HCC): ICD-10-CM

## 2019-08-14 DIAGNOSIS — R13.10 DYSPHAGIA, UNSPECIFIED TYPE: ICD-10-CM

## 2019-08-15 PROBLEM — T17.308A CHOKING: Status: ACTIVE | Noted: 2019-08-15

## 2019-09-07 LAB
25(OH)D3+25(OH)D2 SERPL-MCNC: 46.6 NG/ML (ref 30–100)
HBA1C MFR BLD: 5.6 % (ref 4.8–5.6)
TSH SERPL DL<=0.005 MIU/L-ACNC: 1.15 UIU/ML (ref 0.45–4.5)

## 2019-09-12 ENCOUNTER — OFFICE VISIT (OUTPATIENT)
Dept: MEDICAL GROUP | Facility: MEDICAL CENTER | Age: 79
End: 2019-09-12
Payer: MEDICARE

## 2019-09-12 VITALS
HEIGHT: 60 IN | RESPIRATION RATE: 12 BRPM | OXYGEN SATURATION: 94 % | TEMPERATURE: 98.1 F | DIASTOLIC BLOOD PRESSURE: 70 MMHG | WEIGHT: 175 LBS | BODY MASS INDEX: 34.36 KG/M2 | SYSTOLIC BLOOD PRESSURE: 112 MMHG | HEART RATE: 76 BPM

## 2019-09-12 DIAGNOSIS — Z23 NEED FOR IMMUNIZATION AGAINST INFLUENZA: ICD-10-CM

## 2019-09-12 DIAGNOSIS — M54.16 LUMBAR BACK PAIN WITH RADICULOPATHY AFFECTING RIGHT LOWER EXTREMITY: ICD-10-CM

## 2019-09-12 DIAGNOSIS — M48.061 SPINAL STENOSIS OF LUMBAR REGION, UNSPECIFIED WHETHER NEUROGENIC CLAUDICATION PRESENT: ICD-10-CM

## 2019-09-12 DIAGNOSIS — F41.1 GENERALIZED ANXIETY DISORDER: ICD-10-CM

## 2019-09-12 PROCEDURE — G0008 ADMIN INFLUENZA VIRUS VAC: HCPCS | Performed by: FAMILY MEDICINE

## 2019-09-12 PROCEDURE — 90662 IIV NO PRSV INCREASED AG IM: CPT | Performed by: FAMILY MEDICINE

## 2019-09-12 PROCEDURE — 99214 OFFICE O/P EST MOD 30 MIN: CPT | Mod: 25 | Performed by: FAMILY MEDICINE

## 2019-09-12 RX ORDER — DULOXETIN HYDROCHLORIDE 30 MG/1
30 CAPSULE, DELAYED RELEASE ORAL
Qty: 30 CAP | Refills: 0 | Status: SHIPPED | OUTPATIENT
Start: 2019-09-12 | End: 2020-03-12

## 2019-09-12 RX ORDER — ESCITALOPRAM OXALATE 10 MG/1
10 TABLET ORAL DAILY
Qty: 30 TAB | Refills: 3 | Status: SHIPPED | OUTPATIENT
Start: 2019-09-12 | End: 2020-01-13

## 2019-09-12 ASSESSMENT — PATIENT HEALTH QUESTIONNAIRE - PHQ9
6. FEELING BAD ABOUT YOURSELF - OR THAT YOU ARE A FAILURE OR HAVE LET YOURSELF OR YOUR FAMILY DOWN: NOT AL ALL
3. TROUBLE FALLING OR STAYING ASLEEP OR SLEEPING TOO MUCH: NOT AT ALL
9. THOUGHTS THAT YOU WOULD BE BETTER OFF DEAD, OR OF HURTING YOURSELF: NOT AT ALL
1. LITTLE INTEREST OR PLEASURE IN DOING THINGS: NOT AT ALL
7. TROUBLE CONCENTRATING ON THINGS, SUCH AS READING THE NEWSPAPER OR WATCHING TELEVISION: NOT AT ALL
SUM OF ALL RESPONSES TO PHQ9 QUESTIONS 1 AND 2: 0
4. FEELING TIRED OR HAVING LITTLE ENERGY: MORE THAN HALF THE DAYS
8. MOVING OR SPEAKING SO SLOWLY THAT OTHER PEOPLE COULD HAVE NOTICED. OR THE OPPOSITE, BEING SO FIGETY OR RESTLESS THAT YOU HAVE BEEN MOVING AROUND A LOT MORE THAN USUAL: NOT AT ALL
5. POOR APPETITE OR OVEREATING: NOT AT ALL
2. FEELING DOWN, DEPRESSED, IRRITABLE, OR HOPELESS: NOT AT ALL
SUM OF ALL RESPONSES TO PHQ QUESTIONS 1-9: 2

## 2019-09-12 NOTE — PROGRESS NOTES
Chief Complaint   Patient presents with   • Anxiety   • Leg Pain       Subjective:     HPI:   Maddy Hodge presents today with the followin. Need for immunization against influenza  HD administered today    2. Generalized anxiety disorder  The hydroxizine did not help.  Patient does have generalized anxiety disorder.  She is on duloxetine for radicular pain.  Patient states it is not helping very much.  We will wean off this and switch to an SSRI that has FDA approval for treatment of generalized anxiety disorder.  We have agreed on S-Citalopram.    3. Lumbar back pain with radiculopathy affecting right lower extremity/Spinal stenosis of lumbar region, unspecified whether neurogenic claudication present  Patient has long-standing multilevel mixed degenerative change including lumbar disc disease, arthritis, ligamentous thickening.  Patient is using duloxetine and Lyrica to treat the radicular component.  She states the Lyrica does help but the duloxetine does not.  We are trying to find a way to treat her chronic anxiety.  She cannot use benzodiazepines as her morphine milligram equivalent with pain management is higher than 50.  We will reduce the duloxetine in an effort to change to generic Lexapro for her chronic anxiety.  Patient denies new or worsening urine or stool incontinence. Patient denies leg weakness and balance problems.   She continues to work with pain management.        Patient Active Problem List    Diagnosis Date Noted   • CKD (chronic kidney disease) stage 3, GFR 30-59 ml/min ([facility]) 2016     Priority: Medium   • Esophageal dysphagia 2016     Priority: Medium   • Hypothyroidism due to Hashimoto's thyroiditis      Priority: Medium   • GERD (gastroesophageal reflux disease) 2011     Priority: Medium   • Essential hypertension 2009     Priority: Medium   • Major depressive disorder, recurrent episode, mild (CMS-[facility]) 2016     Priority: Low   • Neuropathy  (CMS-[facility]) 10/17/2013     Priority: Low   • Family history of polycystic kidney disease      Priority: Low   • Facet arthritis of lumbar region 03/26/2012     Priority: Low   • History of vertebral fracture 03/26/2012     Priority: Low   • Spinal stenosis of lumbar region with neurogenic claudication      Priority: Low   • Choking 08/15/2019   • Chronic anxiety 07/15/2019   • Vitamin D deficiency disease 04/10/2019   • Hoarseness, persistent 04/10/2019   • Other chronic pain 02/21/2019   • History of hematuria 10/23/2018   • BMI 34.0-34.9,adult 08/17/2018   • Dyslipidemia, goal LDL below 130 08/15/2018   • Chronic obstructive pulmonary disease ([facility]) 06/22/2018   • Pulmonary hypertension ([facility]) 02/21/2018   • COPD with asthma ([facility]) 11/10/2017   • ALISSA (obstructive sleep apnea) 11/10/2017   • Postlaminectomy syndrome, unspecified region 07/03/2017   • Recurrent UTI 06/28/2017   • Mixed restrictive and obstructive lung disease ([facility]) 06/22/2017   • Menopausal symptoms 09/13/2016   • Essential tremor 09/06/2016   • Postmenopausal osteoporosis    • Arachnoiditis        Current medicines (including changes today)  Current Outpatient Medications   Medication Sig Dispense Refill   • escitalopram (LEXAPRO) 10 MG Tab Take 1 Tab by mouth every day. 30 Tab 3   • DULoxetine (CYMBALTA) 30 MG Cap DR Particles Take 1 Cap by mouth every day. 30 Cap 0   • potassium chloride (KLOR-CON) 8 MEQ tablet Take 16 mEq by mouth every day. WITH FOOD  12   • atorvastatin (LIPITOR) 20 MG Tab Take 1 Tab by mouth every day. 90 Tab 3   • levothyroxine (SYNTHROID) 50 MCG Tab TAKE 1 TABLET BY MOUTH IN THE MORNING ON AN EMPTY STOMACH 90 Tab 1   • imipramine (TOFRANIL) 10 MG Tab TAKE 2 TABLETS BY MOUTH IN THE EVENING 180 Tab 2   • Multiple Vitamins-Minerals (VITEYES AREDS ADVANCED PO) Take  by mouth.     • Cholecalciferol (VITAMIN D3) 2000 UNIT Cap Take  by mouth.     • LETAIRIS 10 MG tablet Take 10 Tabs by mouth every day.     • furosemide (LASIX) 40 MG  Tab Take 40 mg by mouth every day.  3   • NARCAN 4 MG/0.1ML Liquid AS DIRECTED  0   • oxyCODONE-acetaminophen (PERCOCET) 5-325 MG Tab TAKE 1 TABLET BY MOUTH 4 TIMES A DAY AS NEEDED FOR PAIN 11/8/18 G89.29  0   • spironolactone (ALDACTONE) 25 MG Tab Take 25 mg by mouth every day.  3   • omeprazole (PRILOSEC) 20 MG delayed-release capsule Take 1 Cap by mouth every day. 90 Cap 3   • INCRUSE ELLIPTA 62.5 MCG/INH AEROSOL POWDER, BREATH ACTIVATED INHALE ONE PUFF BY MOUTH ONCE DAILY 3 Each 3   • estradiol (ESTRACE) 0.5 MG tablet TAKE ONE TABLET BY MOUTH ONCE DAILY 90 Tab 3   • pregabalin (LYRICA) 150 MG Cap Take 150 mg by mouth 3 times a day.     • AMITIZA 24 MCG capsule Take 48 mcg by mouth every morning with breakfast.     • XTAMPZA ER 18 MG Capsule Extended Release 12 hour Abuse-Deterrent Take 18 mg by mouth 2 Times a Day.     • Cyanocobalamin 2500 MCG Chew Tab Take 1 Tab by mouth every day.     • VENTOLIN  (90 BASE) MCG/ACT Aero Soln inhalation aerosol Inhale 2 Puffs by mouth every 6 hours as needed for Shortness of Breath. 1 Inhaler 5   • aspirin EC (ECOTRIN) 81 MG Tablet Delayed Response Take 81 mg by mouth every day.     • docusate sodium (COLACE) 250 MG capsule Take 250 mg by mouth every day.       No current facility-administered medications for this visit.        Allergies   Allergen Reactions   • Pcn [Penicillins]    • Sulfa Drugs    • Tape    • Macrobid [Nitrofurantoin] Rash     rash   • Zoloft Unspecified     Worse depression       ROS: As per HPI       Objective:     /70   Pulse 76   Temp 36.7 °C (98.1 °F)   Resp 12   Ht 1.524 m (5')   Wt 79.4 kg (175 lb)   SpO2 94%  Body mass index is 34.18 kg/m².    Physical Exam:  Constitutional: Well-developed and well-nourished. Not diaphoretic. No distress. Lucid and fluent.  Skin: Skin is warm and dry. No rash noted.  Head: Atraumatic without lesions.  Eyes: Conjunctivae and extraocular motions are normal. Pupils are equal, round, and reactive to  light. No scleral icterus.   Neck: Supple, trachea midline. No thyromegaly present. No cervical or supraclavicular lymphadenopathy. No JVD or carotid bruits appreciated  Cardiovascular: Regular rate and rhythm.  Normal S1, S2 without murmur appreciated.  Chest: Effort normal. Clear to auscultation throughout. No adventitious sounds.   Extremities: No cyanosis, clubbing, erythema, nor edema.   Neurological: Alert and oriented x 3.  Gait is broad-based, currently fairly stable.  Psychiatric:  Behavior, mood, and affect are appropriate.       Assessment and Plan:     79 y.o. female with the following issues:    1. Need for immunization against influenza  Influenza Vaccine, High Dose (65+ Only)   2. Generalized anxiety disorder  escitalopram (LEXAPRO) 10 MG Tab   3. Lumbar back pain with radiculopathy affecting right lower extremity     4. Spinal stenosis of lumbar region, unspecified whether neurogenic claudication present  DULoxetine (CYMBALTA) 30 MG Cap DR Particles         Followup: Return in about 6 months (around 3/12/2020), or if symptoms worsen or fail to improve.

## 2019-09-12 NOTE — PATIENT INSTRUCTIONS
Start escitalopram 10 mg daily.  Reduce duloxetine to 30 mg for three weeks and then 30 mg every other day until gone..

## 2019-09-19 ENCOUNTER — APPOINTMENT (RX ONLY)
Dept: URBAN - METROPOLITAN AREA CLINIC 4 | Facility: CLINIC | Age: 79
Setting detail: DERMATOLOGY
End: 2019-09-19

## 2019-09-19 DIAGNOSIS — L57.0 ACTINIC KERATOSIS: ICD-10-CM

## 2019-09-19 DIAGNOSIS — L81.4 OTHER MELANIN HYPERPIGMENTATION: ICD-10-CM

## 2019-09-19 DIAGNOSIS — D22 MELANOCYTIC NEVI: ICD-10-CM

## 2019-09-19 DIAGNOSIS — L82.1 OTHER SEBORRHEIC KERATOSIS: ICD-10-CM

## 2019-09-19 DIAGNOSIS — L90.5 SCAR CONDITIONS AND FIBROSIS OF SKIN: ICD-10-CM

## 2019-09-19 DIAGNOSIS — L72.8 OTHER FOLLICULAR CYSTS OF THE SKIN AND SUBCUTANEOUS TISSUE: ICD-10-CM

## 2019-09-19 PROBLEM — D48.5 NEOPLASM OF UNCERTAIN BEHAVIOR OF SKIN: Status: ACTIVE | Noted: 2019-09-19

## 2019-09-19 PROBLEM — D22.5 MELANOCYTIC NEVI OF TRUNK: Status: ACTIVE | Noted: 2019-09-19

## 2019-09-19 PROCEDURE — ? DIAGNOSIS COMMENT

## 2019-09-19 PROCEDURE — 99212 OFFICE O/P EST SF 10 MIN: CPT | Mod: 25

## 2019-09-19 PROCEDURE — ? LIQUID NITROGEN

## 2019-09-19 PROCEDURE — 11102 TANGNTL BX SKIN SINGLE LES: CPT | Mod: 59

## 2019-09-19 PROCEDURE — 17004 DESTROY PREMAL LESIONS 15/>: CPT

## 2019-09-19 PROCEDURE — 11103 TANGNTL BX SKIN EA SEP/ADDL: CPT

## 2019-09-19 PROCEDURE — ? COUNSELING

## 2019-09-19 PROCEDURE — ? BIOPSY BY SHAVE METHOD

## 2019-09-19 ASSESSMENT — LOCATION DETAILED DESCRIPTION DERM
LOCATION DETAILED: LEFT PROXIMAL RADIAL DORSAL FOREARM
LOCATION DETAILED: LEFT MEDIAL MALAR CHEEK
LOCATION DETAILED: RIGHT RADIAL DORSAL HAND
LOCATION DETAILED: RIGHT DISTAL RADIAL DORSAL FOREARM
LOCATION DETAILED: LEFT LATERAL ZYGOMA
LOCATION DETAILED: LEFT ANTERIOR SHOULDER
LOCATION DETAILED: RIGHT SUPERIOR UPPER BACK
LOCATION DETAILED: RIGHT INFERIOR LATERAL NECK
LOCATION DETAILED: LEFT RADIAL DORSAL HAND
LOCATION DETAILED: LEFT DISTAL DORSAL FOREARM
LOCATION DETAILED: LEFT VENTRAL PROXIMAL FOREARM
LOCATION DETAILED: RIGHT PROXIMAL DORSAL FOREARM
LOCATION DETAILED: RIGHT MID-UPPER BACK
LOCATION DETAILED: RIGHT SUPERIOR LATERAL NECK
LOCATION DETAILED: LEFT PROXIMAL DORSAL FOREARM
LOCATION DETAILED: RIGHT BUTTOCK
LOCATION DETAILED: RIGHT ANTERIOR DISTAL UPPER ARM
LOCATION DETAILED: RIGHT DISTAL POSTERIOR UPPER ARM
LOCATION DETAILED: RIGHT DISTAL DORSAL FOREARM
LOCATION DETAILED: LEFT BUTTOCK
LOCATION DETAILED: SUPERIOR THORACIC SPINE
LOCATION DETAILED: 2ND WEB SPACE RIGHT HAND
LOCATION DETAILED: LEFT DORSAL MIDDLE METACARPOPHALANGEAL JOINT
LOCATION DETAILED: LEFT VENTRAL LATERAL DISTAL FOREARM
LOCATION DETAILED: RIGHT ULNAR DORSAL HAND

## 2019-09-19 ASSESSMENT — LOCATION SIMPLE DESCRIPTION DERM
LOCATION SIMPLE: UPPER BACK
LOCATION SIMPLE: LEFT ZYGOMA
LOCATION SIMPLE: LEFT BUTTOCK
LOCATION SIMPLE: LEFT HAND
LOCATION SIMPLE: LEFT CHEEK
LOCATION SIMPLE: RIGHT HAND
LOCATION SIMPLE: LEFT FOREARM
LOCATION SIMPLE: RIGHT UPPER BACK
LOCATION SIMPLE: RIGHT UPPER ARM
LOCATION SIMPLE: RIGHT BUTTOCK
LOCATION SIMPLE: LEFT SHOULDER
LOCATION SIMPLE: RIGHT ANTERIOR NECK
LOCATION SIMPLE: RIGHT FOREARM
LOCATION SIMPLE: RIGHT POSTERIOR UPPER ARM

## 2019-09-19 ASSESSMENT — LOCATION ZONE DERM
LOCATION ZONE: HAND
LOCATION ZONE: TRUNK
LOCATION ZONE: NECK
LOCATION ZONE: FACE
LOCATION ZONE: ARM

## 2019-09-19 NOTE — PROCEDURE: BIOPSY BY SHAVE METHOD
Billing Type: Third-Party Bill
Biopsy Method: Personna blade
Hemostasis: Drysol and Electrocautery
Render Path Notes In Note?: No
Lab: [id number]
Detail Level: Detailed
Additional Anesthesia Volume In Cc (Will Not Render If 0): 0
Anesthesia Type: 1% lidocaine with 1:100,000 epinephrine and a 1:12 solution of 8.4% sodium bicarbonate
Post-Care Instructions: I reviewed with the patient in detail post-care instructions. Patient is to keep the biopsy site dry overnight, and then apply bacitracin or petroleum twice daily until healed.
Wound Care: Aquaphor
Notification Instructions: Patient will be notified of biopsy results. However, patient instructed to call the office if not contacted within 2 weeks.
Lab Facility: 
Cryotherapy Text: The wound bed was treated with cryotherapy after the biopsy was performed.
Depth Of Biopsy: dermis
Electrodesiccation Text: The wound bed was treated with electrodesiccation after the biopsy was performed.
Anesthesia Volume In Cc: 1
Consent: Written consent was obtained and risks were reviewed including but not limited to scarring, infection, bleeding, scabbing, incomplete removal, nerve damage and allergy to anesthesia.
Biopsy Type: H and E
Render Post-Care Instructions In Note?: yes
Electrodesiccation And Curettage Text: The wound bed was treated with electrodesiccation and curettage after the biopsy was performed.
Type Of Destruction Used: Electrodesiccation
Silver Nitrate Text: The wound bed was treated with silver nitrate after the biopsy was performed.
Dressing: bandage
Body Location Override (Optional - Billing Will Still Be Based On Selected Body Map Location If Applicable): left anterior shoulder
Body Location Override (Optional - Billing Will Still Be Based On Selected Body Map Location If Applicable): left lateral malar cheek

## 2019-09-24 DIAGNOSIS — B37.31 YEAST VAGINITIS: ICD-10-CM

## 2019-09-24 RX ORDER — FLUCONAZOLE 150 MG/1
150 TABLET ORAL DAILY
Qty: 1 TAB | Refills: 0 | Status: SHIPPED | OUTPATIENT
Start: 2019-09-24 | End: 2020-11-16

## 2019-09-29 DIAGNOSIS — N95.1 MENOPAUSAL SYMPTOMS: ICD-10-CM

## 2019-09-29 RX ORDER — ESTRADIOL 0.5 MG/1
TABLET ORAL
Qty: 90 TAB | Refills: 3 | Status: ON HOLD | OUTPATIENT
Start: 2019-09-29 | End: 2020-08-12

## 2019-10-01 DIAGNOSIS — E03.4 IDIOPATHIC ATROPHIC HYPOTHYROIDISM: ICD-10-CM

## 2019-10-01 RX ORDER — LEVOTHYROXINE SODIUM 0.05 MG/1
TABLET ORAL
Qty: 90 TAB | Refills: 1 | Status: SHIPPED | OUTPATIENT
Start: 2019-10-01 | End: 2020-04-03 | Stop reason: SDUPTHER

## 2019-10-31 ENCOUNTER — APPOINTMENT (RX ONLY)
Dept: URBAN - METROPOLITAN AREA CLINIC 4 | Facility: CLINIC | Age: 79
Setting detail: DERMATOLOGY
End: 2019-10-31

## 2019-10-31 DIAGNOSIS — D22 MELANOCYTIC NEVI: ICD-10-CM

## 2019-10-31 PROBLEM — D22.5 MELANOCYTIC NEVI OF TRUNK: Status: ACTIVE | Noted: 2019-10-31

## 2019-10-31 PROCEDURE — 11402 EXC TR-EXT B9+MARG 1.1-2 CM: CPT

## 2019-10-31 PROCEDURE — ? EXCISION

## 2019-10-31 PROCEDURE — 12032 INTMD RPR S/A/T/EXT 2.6-7.5: CPT

## 2019-10-31 PROCEDURE — ? DIAGNOSIS COMMENT

## 2019-10-31 ASSESSMENT — LOCATION DETAILED DESCRIPTION DERM: LOCATION DETAILED: SUPERIOR THORACIC SPINE

## 2019-10-31 ASSESSMENT — LOCATION ZONE DERM: LOCATION ZONE: TRUNK

## 2019-10-31 ASSESSMENT — LOCATION SIMPLE DESCRIPTION DERM: LOCATION SIMPLE: UPPER BACK

## 2019-10-31 NOTE — PROCEDURE: EXCISION
Complex Repair And Modified Advancement Flap Text: The defect edges were debeveled with a #15 scalpel blade.  The primary defect was closed partially with a complex linear closure.  Given the location of the remaining defect, shape of the defect and the proximity to free margins a modified advancement flap was deemed most appropriate for complete closure of the defect.  Using a sterile surgical marker, an appropriate advancement flap was drawn incorporating the defect and placing the expected incisions within the relaxed skin tension lines where possible.    The area thus outlined was incised deep to adipose tissue with a #15 scalpel blade.  The skin margins were undermined to an appropriate distance in all directions utilizing iris scissors.
V-Y Flap Text: The defect edges were debeveled with a #15 scalpel blade.  Given the location of the defect, shape of the defect and the proximity to free margins a V-Y flap was deemed most appropriate.  Using a sterile surgical marker, an appropriate advancement flap was drawn incorporating the defect and placing the expected incisions within the relaxed skin tension lines where possible.    The area thus outlined was incised deep to adipose tissue with a #15 scalpel blade.  The skin margins were undermined to an appropriate distance in all directions utilizing iris scissors.
Interpolation Flap Text: A decision was made to reconstruct the defect utilizing an interpolation axial flap and a staged reconstruction.  A telfa template was made of the defect.  This telfa template was then used to outline the interpolation flap.  The donor area for the pedicle flap was then injected with anesthesia.  The flap was excised through the skin and subcutaneous tissue down to the layer of the underlying musculature.  The interpolation flap was carefully excised within this deep plane to maintain its blood supply.  The edges of the donor site were undermined.   The donor site was closed in a primary fashion.  The pedicle was then rotated into position and sutured.  Once the tube was sutured into place, adequate blood supply was confirmed with blanching and refill.  The pedicle was then wrapped with xeroform gauze and dressed appropriately with a telfa and gauze bandage to ensure continued blood supply and protect the attached pedicle.
Medical Necessity Information: It is in your best interest to select a reason for this procedure from the list below. All of these items fulfill various CMS LCD requirements except lesion extends to a margin.
Bilobed Transposition Flap Text: The defect edges were debeveled with a #15 scalpel blade.  Given the location of the defect and the proximity to free margins a bilobed transposition flap was deemed most appropriate.  Using a sterile surgical marker, an appropriate bilobe flap drawn around the defect.    The area thus outlined was incised deep to adipose tissue with a #15 scalpel blade.  The skin margins were undermined to an appropriate distance in all directions utilizing iris scissors.
Show Repair Anesthesia Variables: Yes
Previous Accession (Optional): [id number]
Complex Repair And Tissue Cultured Epidermal Autograft Text: The defect edges were debeveled with a #15 scalpel blade.  The primary defect was closed partially with a complex linear closure.  Given the location of the defect, shape of the defect and the proximity to free margins an tissue cultured epidermal autograft was deemed most appropriate to repair the remaining defect.  The graft was trimmed to fit the size of the remaining defect.  The graft was then placed in the primary defect, oriented appropriately, and sutured into place.
Complex Repair And A-T Advancement Flap Text: The defect edges were debeveled with a #15 scalpel blade.  The primary defect was closed partially with a complex linear closure.  Given the location of the remaining defect, shape of the defect and the proximity to free margins an A-T advancement flap was deemed most appropriate for complete closure of the defect.  Using a sterile surgical marker, an appropriate advancement flap was drawn incorporating the defect and placing the expected incisions within the relaxed skin tension lines where possible.    The area thus outlined was incised deep to adipose tissue with a #15 scalpel blade.  The skin margins were undermined to an appropriate distance in all directions utilizing iris scissors.
Melolabial Interpolation Flap Text: A decision was made to reconstruct the defect utilizing an interpolation axial flap and a staged reconstruction.  A telfa template was made of the defect.  This telfa template was then used to outline the melolabial interpolation flap.  The donor area for the pedicle flap was then injected with anesthesia.  The flap was excised through the skin and subcutaneous tissue down to the layer of the underlying musculature.  The pedicle flap was carefully excised within this deep plane to maintain its blood supply.  The edges of the donor site were undermined.   The donor site was closed in a primary fashion.  The pedicle was then rotated into position and sutured.  Once the tube was sutured into place, adequate blood supply was confirmed with blanching and refill.  The pedicle was then wrapped with xeroform gauze and dressed appropriately with a telfa and gauze bandage to ensure continued blood supply and protect the attached pedicle.
Complex Repair And Xenograft Text: The defect edges were debeveled with a #15 scalpel blade.  The primary defect was closed partially with a complex linear closure.  Given the location of the defect, shape of the defect and the proximity to free margins a xenograft was deemed most appropriate to repair the remaining defect.  The graft was trimmed to fit the size of the remaining defect.  The graft was then placed in the primary defect, oriented appropriately, and sutured into place.
Trilobed Flap Text: The defect edges were debeveled with a #15 scalpel blade.  Given the location of the defect and the proximity to free margins a trilobed flap was deemed most appropriate.  Using a sterile surgical marker, an appropriate trilobed flap drawn around the defect.    The area thus outlined was incised deep to adipose tissue with a #15 scalpel blade.  The skin margins were undermined to an appropriate distance in all directions utilizing iris scissors.
Epidermal Closure Graft Donor Site (Optional): simple interrupted
Mercedes Flap Text: The defect edges were debeveled with a #15 scalpel blade.  Given the location of the defect, shape of the defect and the proximity to free margins a Mercedes flap was deemed most appropriate.  Using a sterile surgical marker, an appropriate advancement flap was drawn incorporating the defect and placing the expected incisions within the relaxed skin tension lines where possible. The area thus outlined was incised deep to adipose tissue with a #15 scalpel blade.  The skin margins were undermined to an appropriate distance in all directions utilizing iris scissors.
Validate That Anesthesia Volume Is Not Zero (If You Leave At 0 It Will Not Render In Note): No
Primary Defect Length (In Cm): 0
Complex Repair And Skin Substitute Graft Text: The defect edges were debeveled with a #15 scalpel blade.  The primary defect was closed partially with a complex linear closure.  Given the location of the remaining defect, shape of the defect and the proximity to free margins a skin substitute graft was deemed most appropriate to repair the remaining defect.  The graft was trimmed to fit the size of the remaining defect.  The graft was then placed in the primary defect, oriented appropriately, and sutured into place.
Modified Advancement Flap Text: The defect edges were debeveled with a #15 scalpel blade.  Given the location of the defect, shape of the defect and the proximity to free margins a modified advancement flap was deemed most appropriate.  Using a sterile surgical marker, an appropriate advancement flap was drawn incorporating the defect and placing the expected incisions within the relaxed skin tension lines where possible.    The area thus outlined was incised deep to adipose tissue with a #15 scalpel blade.  The skin margins were undermined to an appropriate distance in all directions utilizing iris scissors.
Mastoid Interpolation Flap Text: A decision was made to reconstruct the defect utilizing an interpolation axial flap and a staged reconstruction.  A telfa template was made of the defect.  This telfa template was then used to outline the mastoid interpolation flap.  The donor area for the pedicle flap was then injected with anesthesia.  The flap was excised through the skin and subcutaneous tissue down to the layer of the underlying musculature.  The pedicle flap was carefully excised within this deep plane to maintain its blood supply.  The edges of the donor site were undermined.   The donor site was closed in a primary fashion.  The pedicle was then rotated into position and sutured.  Once the tube was sutured into place, adequate blood supply was confirmed with blanching and refill.  The pedicle was then wrapped with xeroform gauze and dressed appropriately with a telfa and gauze bandage to ensure continued blood supply and protect the attached pedicle.
Hemostasis: Pressure and Electrodesiccation
Complex Repair And O-T Advancement Flap Text: The defect edges were debeveled with a #15 scalpel blade.  The primary defect was closed partially with a complex linear closure.  Given the location of the remaining defect, shape of the defect and the proximity to free margins an O-T advancement flap was deemed most appropriate for complete closure of the defect.  Using a sterile surgical marker, an appropriate advancement flap was drawn incorporating the defect and placing the expected incisions within the relaxed skin tension lines where possible.    The area thus outlined was incised deep to adipose tissue with a #15 scalpel blade.  The skin margins were undermined to an appropriate distance in all directions utilizing iris scissors.
Dorsal Nasal Flap Text: The defect edges were debeveled with a #15 scalpel blade.  Given the location of the defect and the proximity to free margins a dorsal nasal flap was deemed most appropriate.  Using a sterile surgical marker, an appropriate dorsal nasal flap was drawn around the defect.    The area thus outlined was incised deep to adipose tissue with a #15 scalpel blade.  The skin margins were undermined to an appropriate distance in all directions utilizing iris scissors.
Where Do You Want The Question To Include Opioid Counseling Located?: Case Summary Tab
Path Notes (To The Dermatopathologist): Please check margins.
Complex Repair And O-L Flap Text: The defect edges were debeveled with a #15 scalpel blade.  The primary defect was closed partially with a complex linear closure.  Given the location of the remaining defect, shape of the defect and the proximity to free margins an O-L flap was deemed most appropriate for complete closure of the defect.  Using a sterile surgical marker, an appropriate flap was drawn incorporating the defect and placing the expected incisions within the relaxed skin tension lines where possible.    The area thus outlined was incised deep to adipose tissue with a #15 scalpel blade.  The skin margins were undermined to an appropriate distance in all directions utilizing iris scissors.
Mucosal Advancement Flap Text: Given the location of the defect, shape of the defect and the proximity to free margins a mucosal advancement flap was deemed most appropriate. Incisions were made with a 15 blade scalpel in the appropriate fashion along the cutaneous vermillion border and the mucosal lip. The remaining actinically damaged mucosal tissue was excised.  The mucosal advancement flap was then elevated to the gingival sulcus with care taken to preserve the neurovascular structures and advanced into the primary defect. Care was taken to ensure that precise realignment of the vermilion border was achieved.
Information: Selecting Yes will display possible errors in your note based on the variables you have selected. This validation is only offered as a suggestion for you. PLEASE NOTE THAT THE VALIDATION TEXT WILL BE REMOVED WHEN YOU FINALIZE YOUR NOTE. IF YOU WANT TO FAX A PRELIMINARY NOTE YOU WILL NEED TO TOGGLE THIS TO 'NO' IF YOU DO NOT WANT IT IN YOUR FAXED NOTE.
Posterior Auricular Interpolation Flap Text: A decision was made to reconstruct the defect utilizing an interpolation axial flap and a staged reconstruction.  A telfa template was made of the defect.  This telfa template was then used to outline the posterior auricular interpolation flap.  The donor area for the pedicle flap was then injected with anesthesia.  The flap was excised through the skin and subcutaneous tissue down to the layer of the underlying musculature.  The pedicle flap was carefully excised within this deep plane to maintain its blood supply.  The edges of the donor site were undermined.   The donor site was closed in a primary fashion.  The pedicle was then rotated into position and sutured.  Once the tube was sutured into place, adequate blood supply was confirmed with blanching and refill.  The pedicle was then wrapped with xeroform gauze and dressed appropriately with a telfa and gauze bandage to ensure continued blood supply and protect the attached pedicle.
Island Pedicle Flap Text: The defect edges were debeveled with a #15 scalpel blade.  Given the location of the defect, shape of the defect and the proximity to free margins an island pedicle advancement flap was deemed most appropriate.  Using a sterile surgical marker, an appropriate advancement flap was drawn incorporating the defect, outlining the appropriate donor tissue and placing the expected incisions within the relaxed skin tension lines where possible.    The area thus outlined was incised deep to adipose tissue with a #15 scalpel blade.  The skin margins were undermined to an appropriate distance in all directions around the primary defect and laterally outward around the island pedicle utilizing iris scissors.  There was minimal undermining beneath the pedicle flap.
Complex Repair And Bilobe Flap Text: The defect edges were debeveled with a #15 scalpel blade.  The primary defect was closed partially with a complex linear closure.  Given the location of the remaining defect, shape of the defect and the proximity to free margins a bilobe flap was deemed most appropriate for complete closure of the defect.  Using a sterile surgical marker, an appropriate advancement flap was drawn incorporating the defect and placing the expected incisions within the relaxed skin tension lines where possible.    The area thus outlined was incised deep to adipose tissue with a #15 scalpel blade.  The skin margins were undermined to an appropriate distance in all directions utilizing iris scissors.
Island Pedicle Flap With Canthal Suspension Text: The defect edges were debeveled with a #15 scalpel blade.  Given the location of the defect, shape of the defect and the proximity to free margins an island pedicle advancement flap was deemed most appropriate.  Using a sterile surgical marker, an appropriate advancement flap was drawn incorporating the defect, outlining the appropriate donor tissue and placing the expected incisions within the relaxed skin tension lines where possible. The area thus outlined was incised deep to adipose tissue with a #15 scalpel blade.  The skin margins were undermined to an appropriate distance in all directions around the primary defect and laterally outward around the island pedicle utilizing iris scissors.  There was minimal undermining beneath the pedicle flap. A suspension suture was placed in the canthal tendon to prevent tension and prevent ectropion.
Complex Repair Preamble Text (Leave Blank If You Do Not Want): Extensive wide undermining was performed.
Hatchet Flap Text: The defect edges were debeveled with a #15 scalpel blade.  Given the location of the defect, shape of the defect and the proximity to free margins a hatchet flap was deemed most appropriate.  Using a sterile surgical marker, an appropriate hatchet flap was drawn incorporating the defect and placing the expected incisions within the relaxed skin tension lines where possible.    The area thus outlined was incised deep to adipose tissue with a #15 scalpel blade.  The skin margins were undermined to an appropriate distance in all directions utilizing iris scissors.
Billing Type: Third-Party Bill
Excisional Biopsy Additional Text (Leave Blank If You Do Not Want): The margin was drawn around the clinically apparent lesion. An elliptical shape was then drawn on the skin incorporating the lesion and margins.  Incisions were then made along these lines to the appropriate tissue plane and the lesion was extirpated.
Estimated Blood Loss (Cc): minimal
Paramedian Forehead Flap Text: A decision was made to reconstruct the defect utilizing an interpolation axial flap and a staged reconstruction.  A telfa template was made of the defect.  This telfa template was then used to outline the paramedian forehead pedicle flap.  The donor area for the pedicle flap was then injected with anesthesia.  The flap was excised through the skin and subcutaneous tissue down to the layer of the underlying musculature.  The pedicle flap was carefully excised within this deep plane to maintain its blood supply.  The edges of the donor site were undermined.   The donor site was closed in a primary fashion.  The pedicle was then rotated into position and sutured.  Once the tube was sutured into place, adequate blood supply was confirmed with blanching and refill.  The pedicle was then wrapped with xeroform gauze and dressed appropriately with a telfa and gauze bandage to ensure continued blood supply and protect the attached pedicle.
Detail Level: Detailed
Rotation Flap Text: The defect edges were debeveled with a #15 scalpel blade.  Given the location of the defect, shape of the defect and the proximity to free margins a rotation flap was deemed most appropriate.  Using a sterile surgical marker, an appropriate rotation flap was drawn incorporating the defect and placing the expected incisions within the relaxed skin tension lines where possible.    The area thus outlined was incised deep to adipose tissue with a #15 scalpel blade.  The skin margins were undermined to an appropriate distance in all directions utilizing iris scissors.
Perilesional Excision Additional Text (Leave Blank If You Do Not Want): The margin was drawn around the clinically apparent lesion. Incisions were then made along these lines to the appropriate tissue plane and the lesion was extirpated.
Lip Wedge Excision Repair Text: Given the location of the defect and the proximity to free margins a full thickness wedge repair was deemed most appropriate.  Using a sterile surgical marker, the appropriate repair was drawn incorporating the defect and placing the expected incisions perpendicular to the vermilion border.  The vermilion border was also meticulously outlined to ensure appropriate reapproximation during the repair.  The area thus outlined was incised through and through with a #15 scalpel blade.  The muscularis and dermis were reaproximated with deep sutures following hemostasis. Care was taken to realign the vermilion border before proceeding with the superficial closure.  Once the vermilion was realigned the superfical and mucosal closure was finished.
Complex Repair And Melolabial Flap Text: The defect edges were debeveled with a #15 scalpel blade.  The primary defect was closed partially with a complex linear closure.  Given the location of the remaining defect, shape of the defect and the proximity to free margins a melolabial flap was deemed most appropriate for complete closure of the defect.  Using a sterile surgical marker, an appropriate advancement flap was drawn incorporating the defect and placing the expected incisions within the relaxed skin tension lines where possible.    The area thus outlined was incised deep to adipose tissue with a #15 scalpel blade.  The skin margins were undermined to an appropriate distance in all directions utilizing iris scissors.
Intermediate Repair Preamble Text (Leave Blank If You Do Not Want): Undermining was performed with blunt dissection.
Alar Island Pedicle Flap Text: The defect edges were debeveled with a #15 scalpel blade.  Given the location of the defect, shape of the defect and the proximity to the alar rim an island pedicle advancement flap was deemed most appropriate.  Using a sterile surgical marker, an appropriate advancement flap was drawn incorporating the defect, outlining the appropriate donor tissue and placing the expected incisions within the nasal ala running parallel to the alar rim. The area thus outlined was incised with a #15 scalpel blade.  The skin margins were undermined minimally to an appropriate distance in all directions around the primary defect and laterally outward around the island pedicle utilizing iris scissors.  There was minimal undermining beneath the pedicle flap.
Consent was obtained from the patient. The risks and benefits to therapy were discussed in detail. Specifically, the risks of infection, scarring, bleeding, prolonged wound healing, incomplete removal, allergy to anesthesia, nerve injury and recurrence were addressed. Prior to the procedure, the treatment site was clearly identified and confirmed by the patient. All components of Universal Protocol/PAUSE Rule completed.
Size Of Margin In Cm: 0.2
Complex Repair And Rotation Flap Text: The defect edges were debeveled with a #15 scalpel blade.  The primary defect was closed partially with a complex linear closure.  Given the location of the remaining defect, shape of the defect and the proximity to free margins a rotation flap was deemed most appropriate for complete closure of the defect.  Using a sterile surgical marker, an appropriate advancement flap was drawn incorporating the defect and placing the expected incisions within the relaxed skin tension lines where possible.    The area thus outlined was incised deep to adipose tissue with a #15 scalpel blade.  The skin margins were undermined to an appropriate distance in all directions utilizing iris scissors.
Spiral Flap Text: The defect edges were debeveled with a #15 scalpel blade.  Given the location of the defect, shape of the defect and the proximity to free margins a spiral flap was deemed most appropriate.  Using a sterile surgical marker, an appropriate rotation flap was drawn incorporating the defect and placing the expected incisions within the relaxed skin tension lines where possible. The area thus outlined was incised deep to adipose tissue with a #15 scalpel blade.  The skin margins were undermined to an appropriate distance in all directions utilizing iris scissors.
Surgeon Performing Repair (Optional): Rohan Smith MD
Ftsg Text: The defect edges were debeveled with a #15 scalpel blade.  Given the location of the defect, shape of the defect and the proximity to free margins a full thickness skin graft was deemed most appropriate.  Using a sterile surgical marker, the primary defect shape was transferred to the donor site. The area thus outlined was incised deep to adipose tissue with a #15 scalpel blade.  The harvested graft was then trimmed of adipose tissue until only dermis and epidermis was left.  The skin margins of the secondary defect were undermined to an appropriate distance in all directions utilizing iris scissors.  The secondary defect was closed with interrupted buried subcutaneous sutures.  The skin edges were then re-apposed with running  sutures.  The skin graft was then placed in the primary defect and oriented appropriately.
Double Island Pedicle Flap Text: The defect edges were debeveled with a #15 scalpel blade.  Given the location of the defect, shape of the defect and the proximity to free margins a double island pedicle advancement flap was deemed most appropriate.  Using a sterile surgical marker, an appropriate advancement flap was drawn incorporating the defect, outlining the appropriate donor tissue and placing the expected incisions within the relaxed skin tension lines where possible.    The area thus outlined was incised deep to adipose tissue with a #15 scalpel blade.  The skin margins were undermined to an appropriate distance in all directions around the primary defect and laterally outward around the island pedicle utilizing iris scissors.  There was minimal undermining beneath the pedicle flap.
Suture Removal: 14 days
Epidermal Sutures: 4-0 Prolene
Complex Repair And Rhombic Flap Text: The defect edges were debeveled with a #15 scalpel blade.  The primary defect was closed partially with a complex linear closure.  Given the location of the remaining defect, shape of the defect and the proximity to free margins a rhombic flap was deemed most appropriate for complete closure of the defect.  Using a sterile surgical marker, an appropriate advancement flap was drawn incorporating the defect and placing the expected incisions within the relaxed skin tension lines where possible.    The area thus outlined was incised deep to adipose tissue with a #15 scalpel blade.  The skin margins were undermined to an appropriate distance in all directions utilizing iris scissors.
Anesthesia Volume In Cc: 21
Excision Depth: adipose tissue
Island Pedicle Flap-Requiring Vessel Identification Text: The defect edges were debeveled with a #15 scalpel blade.  Given the location of the defect, shape of the defect and the proximity to free margins an island pedicle advancement flap was deemed most appropriate.  Using a sterile surgical marker, an appropriate advancement flap was drawn, based on the axial vessel mentioned above, incorporating the defect, outlining the appropriate donor tissue and placing the expected incisions within the relaxed skin tension lines where possible.    The area thus outlined was incised deep to adipose tissue with a #15 scalpel blade.  The skin margins were undermined to an appropriate distance in all directions around the primary defect and laterally outward around the island pedicle utilizing iris scissors.  There was minimal undermining beneath the pedicle flap.
Star Wedge Flap Text: The defect edges were debeveled with a #15 scalpel blade.  Given the location of the defect, shape of the defect and the proximity to free margins a star wedge flap was deemed most appropriate.  Using a sterile surgical marker, an appropriate rotation flap was drawn incorporating the defect and placing the expected incisions within the relaxed skin tension lines where possible. The area thus outlined was incised deep to adipose tissue with a #15 scalpel blade.  The skin margins were undermined to an appropriate distance in all directions utilizing iris scissors.
Split-Thickness Skin Graft Text: The defect edges were debeveled with a #15 scalpel blade.  Given the location of the defect, shape of the defect and the proximity to free margins a split thickness skin graft was deemed most appropriate.  Using a sterile surgical marker, the primary defect shape was transferred to the donor site. The split thickness graft was then harvested.  The skin graft was then placed in the primary defect and oriented appropriately.
Repair Performed By Another Provider Text (Leave Blank If You Do Not Want): After the tissue was excised the defect was repaired by another provider.
Excision Method: Elliptical
Keystone Flap Text: The defect edges were debeveled with a #15 scalpel blade.  Given the location of the defect, shape of the defect a keystone flap was deemed most appropriate.  Using a sterile surgical marker, an appropriate keystone flap was drawn incorporating the defect, outlining the appropriate donor tissue and placing the expected incisions within the relaxed skin tension lines where possible. The area thus outlined was incised deep to adipose tissue with a #15 scalpel blade.  The skin margins were undermined to an appropriate distance in all directions around the primary defect and laterally outward around the flap utilizing iris scissors.
Transposition Flap Text: The defect edges were debeveled with a #15 scalpel blade.  Given the location of the defect and the proximity to free margins a transposition flap was deemed most appropriate.  Using a sterile surgical marker, an appropriate transposition flap was drawn incorporating the defect.    The area thus outlined was incised deep to adipose tissue with a #15 scalpel blade.  The skin margins were undermined to an appropriate distance in all directions utilizing iris scissors.
No Repair - Repaired With Adjacent Surgical Defect Text (Leave Blank If You Do Not Want): After the excision the defect was repaired concurrently with another surgical defect which was in close approximation.
Complex Repair And Transposition Flap Text: The defect edges were debeveled with a #15 scalpel blade.  The primary defect was closed partially with a complex linear closure.  Given the location of the remaining defect, shape of the defect and the proximity to free margins a transposition flap was deemed most appropriate for complete closure of the defect.  Using a sterile surgical marker, an appropriate advancement flap was drawn incorporating the defect and placing the expected incisions within the relaxed skin tension lines where possible.    The area thus outlined was incised deep to adipose tissue with a #15 scalpel blade.  The skin margins were undermined to an appropriate distance in all directions utilizing iris scissors.
Cartilage Graft Text: The defect edges were debeveled with a #15 scalpel blade.  Given the location of the defect, shape of the defect, the fact the defect involved a full thickness cartilage defect a cartilage graft was deemed most appropriate.  An appropriate donor site was identified, cleansed, and anesthetized. The cartilage graft was then harvested and transferred to the recipient site, oriented appropriately and then sutured into place.  The secondary defect was then repaired using a primary closure.
Scalpel Size: 10 blade
Additional Epidermal Sutures: 3-0 Prolene
Complex Repair And V-Y Plasty Text: The defect edges were debeveled with a #15 scalpel blade.  The primary defect was closed partially with a complex linear closure.  Given the location of the remaining defect, shape of the defect and the proximity to free margins a V-Y plasty was deemed most appropriate for complete closure of the defect.  Using a sterile surgical marker, an appropriate advancement flap was drawn incorporating the defect and placing the expected incisions within the relaxed skin tension lines where possible.    The area thus outlined was incised deep to adipose tissue with a #15 scalpel blade.  The skin margins were undermined to an appropriate distance in all directions utilizing iris scissors.
Size Of Lesion In Cm: 0.8
Muscle Hinge Flap Text: The defect edges were debeveled with a #15 scalpel blade.  Given the size, depth and location of the defect and the proximity to free margins a muscle hinge flap was deemed most appropriate.  Using a sterile surgical marker, an appropriate hinge flap was drawn incorporating the defect. The area thus outlined was incised with a #15 scalpel blade.  The skin margins were undermined to an appropriate distance in all directions utilizing iris scissors.
Advancement Flap (Single) Text: The defect edges were debeveled with a #15 scalpel blade.  Given the location of the defect and the proximity to free margins a single advancement flap was deemed most appropriate.  Using a sterile surgical marker, an appropriate advancement flap was drawn incorporating the defect and placing the expected incisions within the relaxed skin tension lines where possible.    The area thus outlined was incised deep to adipose tissue with a #15 scalpel blade.  The skin margins were undermined to an appropriate distance in all directions utilizing iris scissors.
Composite Graft Text: The defect edges were debeveled with a #15 scalpel blade.  Given the location of the defect, shape of the defect, the proximity to free margins and the fact the defect was full thickness a composite graft was deemed most appropriate.  The defect was outline and then transferred to the donor site.  A full thickness graft was then excised from the donor site. The graft was then placed in the primary defect, oriented appropriately and then sutured into place.  The secondary defect was then repaired using a primary closure.
O-T Plasty Text: The defect edges were debeveled with a #15 scalpel blade.  Given the location of the defect, shape of the defect and the proximity to free margins an O-T plasty was deemed most appropriate.  Using a sterile surgical marker, an appropriate O-T plasty was drawn incorporating the defect and placing the expected incisions within the relaxed skin tension lines where possible.    The area thus outlined was incised deep to adipose tissue with a #15 scalpel blade.  The skin margins were undermined to an appropriate distance in all directions utilizing iris scissors.
Post-Care Instructions: I reviewed with the patient in detail post-care instructions. Patient is not to engage in any heavy lifting, exercise, or swimming for the next 14 days. Should the patient develop any fevers, chills, bleeding, severe pain patient will contact the office immediately.
Complex Repair And M Plasty Text: The defect edges were debeveled with a #15 scalpel blade.  The primary defect was closed partially with a complex linear closure.  Given the location of the remaining defect, shape of the defect and the proximity to free margins an M plasty was deemed most appropriate for complete closure of the defect.  Using a sterile surgical marker, an appropriate advancement flap was drawn incorporating the defect and placing the expected incisions within the relaxed skin tension lines where possible.    The area thus outlined was incised deep to adipose tissue with a #15 scalpel blade.  The skin margins were undermined to an appropriate distance in all directions utilizing iris scissors.
O-Z Plasty Text: The defect edges were debeveled with a #15 scalpel blade.  Given the location of the defect, shape of the defect and the proximity to free margins an O-Z plasty (double transposition flap) was deemed most appropriate.  Using a sterile surgical marker, the appropriate transposition flaps were drawn incorporating the defect and placing the expected incisions within the relaxed skin tension lines where possible.    The area thus outlined was incised deep to adipose tissue with a #15 scalpel blade.  The skin margins were undermined to an appropriate distance in all directions utilizing iris scissors.  Hemostasis was achieved with electrocautery.  The flaps were then transposed into place, one clockwise and the other counterclockwise, and anchored with interrupted buried subcutaneous sutures.
Melolabial Transposition Flap Text: The defect edges were debeveled with a #15 scalpel blade.  Given the location of the defect and the proximity to free margins a melolabial flap was deemed most appropriate.  Using a sterile surgical marker, an appropriate melolabial transposition flap was drawn incorporating the defect.    The area thus outlined was incised deep to adipose tissue with a #15 scalpel blade.  The skin margins were undermined to an appropriate distance in all directions utilizing iris scissors.
Advancement Flap (Double) Text: The defect edges were debeveled with a #15 scalpel blade.  Given the location of the defect and the proximity to free margins a double advancement flap was deemed most appropriate.  Using a sterile surgical marker, the appropriate advancement flaps were drawn incorporating the defect and placing the expected incisions within the relaxed skin tension lines where possible.    The area thus outlined was incised deep to adipose tissue with a #15 scalpel blade.  The skin margins were undermined to an appropriate distance in all directions utilizing iris scissors.
Epidermal Autograft Text: The defect edges were debeveled with a #15 scalpel blade.  Given the location of the defect, shape of the defect and the proximity to free margins an epidermal autograft was deemed most appropriate.  Using a sterile surgical marker, the primary defect shape was transferred to the donor site. The epidermal graft was then harvested.  The skin graft was then placed in the primary defect and oriented appropriately.
Home Suture Removal Text: Patient was provided a home suture removal kit and will remove their sutures at home.  If they have any questions or difficulties they will call the office.
Double O-Z Plasty Text: The defect edges were debeveled with a #15 scalpel blade.  Given the location of the defect, shape of the defect and the proximity to free margins a Double O-Z plasty (double transposition flap) was deemed most appropriate.  Using a sterile surgical marker, the appropriate transposition flaps were drawn incorporating the defect and placing the expected incisions within the relaxed skin tension lines where possible. The area thus outlined was incised deep to adipose tissue with a #15 scalpel blade.  The skin margins were undermined to an appropriate distance in all directions utilizing iris scissors.  Hemostasis was achieved with electrocautery.  The flaps were then transposed into place, one clockwise and the other counterclockwise, and anchored with interrupted buried subcutaneous sutures.
Rhombic Flap Text: The defect edges were debeveled with a #15 scalpel blade.  Given the location of the defect and the proximity to free margins a rhombic flap was deemed most appropriate.  Using a sterile surgical marker, an appropriate rhombic flap was drawn incorporating the defect.    The area thus outlined was incised deep to adipose tissue with a #15 scalpel blade.  The skin margins were undermined to an appropriate distance in all directions utilizing iris scissors.
Complex Repair And Double M Plasty Text: The defect edges were debeveled with a #15 scalpel blade.  The primary defect was closed partially with a complex linear closure.  Given the location of the remaining defect, shape of the defect and the proximity to free margins a double M plasty was deemed most appropriate for complete closure of the defect.  Using a sterile surgical marker, an appropriate advancement flap was drawn incorporating the defect and placing the expected incisions within the relaxed skin tension lines where possible.    The area thus outlined was incised deep to adipose tissue with a #15 scalpel blade.  The skin margins were undermined to an appropriate distance in all directions utilizing iris scissors.
Dermal Autograft Text: The defect edges were debeveled with a #15 scalpel blade.  Given the location of the defect, shape of the defect and the proximity to free margins a dermal autograft was deemed most appropriate.  Using a sterile surgical marker, the primary defect shape was transferred to the donor site. The area thus outlined was incised deep to adipose tissue with a #15 scalpel blade.  The harvested graft was then trimmed of adipose and epidermal tissue until only dermis was left.  The skin graft was then placed in the primary defect and oriented appropriately.
Burow's Advancement Flap Text: The defect edges were debeveled with a #15 scalpel blade.  Given the location of the defect and the proximity to free margins a Burow's advancement flap was deemed most appropriate.  Using a sterile surgical marker, the appropriate advancement flap was drawn incorporating the defect and placing the expected incisions within the relaxed skin tension lines where possible.    The area thus outlined was incised deep to adipose tissue with a #15 scalpel blade.  The skin margins were undermined to an appropriate distance in all directions utilizing iris scissors.
Complex Repair And W Plasty Text: The defect edges were debeveled with a #15 scalpel blade.  The primary defect was closed partially with a complex linear closure.  Given the location of the remaining defect, shape of the defect and the proximity to free margins a W plasty was deemed most appropriate for complete closure of the defect.  Using a sterile surgical marker, an appropriate advancement flap was drawn incorporating the defect and placing the expected incisions within the relaxed skin tension lines where possible.    The area thus outlined was incised deep to adipose tissue with a #15 scalpel blade.  The skin margins were undermined to an appropriate distance in all directions utilizing iris scissors.
Repair Type: Intermediate
Chonodrocutaneous Helical Advancement Flap Text: The defect edges were debeveled with a #15 scalpel blade.  Given the location of the defect and the proximity to free margins a chondrocutaneous helical advancement flap was deemed most appropriate.  Using a sterile surgical marker, the appropriate advancement flap was drawn incorporating the defect and placing the expected incisions within the relaxed skin tension lines where possible.    The area thus outlined was incised deep to adipose tissue with a #15 scalpel blade.  The skin margins were undermined to an appropriate distance in all directions utilizing iris scissors.
Skin Substitute Text: The defect edges were debeveled with a #15 scalpel blade.  Given the location of the defect, shape of the defect and the proximity to free margins a skin substitute graft was deemed most appropriate.  The graft material was trimmed to fit the size of the defect. The graft was then placed in the primary defect and oriented appropriately.
Anesthesia Type: 1% lidocaine with epinephrine
S Plasty Text: Given the location and shape of the defect, and the orientation of relaxed skin tension lines, an S-plasty was deemed most appropriate for repair.  Using a sterile surgical marker, the appropriate outline of the S-plasty was drawn, incorporating the defect and placing the expected incisions within the relaxed skin tension lines where possible.  The area thus outlined was incised deep to adipose tissue with a #15 scalpel blade.  The skin margins were undermined to an appropriate distance in all directions utilizing iris scissors. The skin flaps were advanced over the defect.  The opposing margins were then approximated with interrupted buried subcutaneous sutures.
Rhomboid Transposition Flap Text: The defect edges were debeveled with a #15 scalpel blade.  Given the location of the defect and the proximity to free margins a rhomboid transposition flap was deemed most appropriate.  Using a sterile surgical marker, an appropriate rhomboid flap was drawn incorporating the defect.    The area thus outlined was incised deep to adipose tissue with a #15 scalpel blade.  The skin margins were undermined to an appropriate distance in all directions utilizing iris scissors.
Epidermal Closure: running subcuticular
V-Y Plasty Text: The defect edges were debeveled with a #15 scalpel blade.  Given the location of the defect, shape of the defect and the proximity to free margins an V-Y advancement flap was deemed most appropriate.  Using a sterile surgical marker, an appropriate advancement flap was drawn incorporating the defect and placing the expected incisions within the relaxed skin tension lines where possible.    The area thus outlined was incised deep to adipose tissue with a #15 scalpel blade.  The skin margins were undermined to an appropriate distance in all directions utilizing iris scissors.
Complex Repair And Z Plasty Text: The defect edges were debeveled with a #15 scalpel blade.  The primary defect was closed partially with a complex linear closure.  Given the location of the remaining defect, shape of the defect and the proximity to free margins a Z plasty was deemed most appropriate for complete closure of the defect.  Using a sterile surgical marker, an appropriate advancement flap was drawn incorporating the defect and placing the expected incisions within the relaxed skin tension lines where possible.    The area thus outlined was incised deep to adipose tissue with a #15 scalpel blade.  The skin margins were undermined to an appropriate distance in all directions utilizing iris scissors.
Bi-Rhombic Flap Text: The defect edges were debeveled with a #15 scalpel blade.  Given the location of the defect and the proximity to free margins a bi-rhombic flap was deemed most appropriate.  Using a sterile surgical marker, an appropriate rhombic flap was drawn incorporating the defect. The area thus outlined was incised deep to adipose tissue with a #15 scalpel blade.  The skin margins were undermined to an appropriate distance in all directions utilizing iris scissors.
Crescentic Advancement Flap Text: The defect edges were debeveled with a #15 scalpel blade.  Given the location of the defect and the proximity to free margins a crescentic advancement flap was deemed most appropriate.  Using a sterile surgical marker, the appropriate advancement flap was drawn incorporating the defect and placing the expected incisions within the relaxed skin tension lines where possible.    The area thus outlined was incised deep to adipose tissue with a #15 scalpel blade.  The skin margins were undermined to an appropriate distance in all directions utilizing iris scissors.
Tissue Cultured Epidermal Autograft Text: The defect edges were debeveled with a #15 scalpel blade.  Given the location of the defect, shape of the defect and the proximity to free margins a tissue cultured epidermal autograft was deemed most appropriate.  The graft was then trimmed to fit the size of the defect.  The graft was then placed in the primary defect and oriented appropriately.
Intermediate / Complex Repair - Final Wound Length In Cm: 3.5
Fusiform Excision Additional Text (Leave Blank If You Do Not Want): The margin was drawn around the clinically apparent lesion.  A fusiform shape was then drawn on the skin incorporating the lesion and margins.  Incisions were then made along these lines to the appropriate tissue plane and the lesion was extirpated.
H Plasty Text: Given the location of the defect, shape of the defect and the proximity to free margins a H-plasty was deemed most appropriate for repair.  Using a sterile surgical marker, the appropriate advancement arms of the H-plasty were drawn incorporating the defect and placing the expected incisions within the relaxed skin tension lines where possible. The area thus outlined was incised deep to adipose tissue with a #15 scalpel blade. The skin margins were undermined to an appropriate distance in all directions utilizing iris scissors.  The opposing advancement arms were then advanced into place in opposite direction and anchored with interrupted buried subcutaneous sutures.
Medical Necessity Clause: This procedure was medically necessary because the lesion that was treated was:
Helical Rim Advancement Flap Text: The defect edges were debeveled with a #15 blade scalpel.  Given the location of the defect and the proximity to free margins (helical rim) a double helical rim advancement flap was deemed most appropriate.  Using a sterile surgical marker, the appropriate advancement flaps were drawn incorporating the defect and placing the expected incisions between the helical rim and antihelix where possible.  The area thus outlined was incised through and through with a #15 scalpel blade.  With a skin hook and iris scissors, the flaps were gently and sharply undermined and freed up.
Complex Repair And Dorsal Nasal Flap Text: The defect edges were debeveled with a #15 scalpel blade.  The primary defect was closed partially with a complex linear closure.  Given the location of the remaining defect, shape of the defect and the proximity to free margins a dorsal nasal flap was deemed most appropriate for complete closure of the defect.  Using a sterile surgical marker, an appropriate flap was drawn incorporating the defect and placing the expected incisions within the relaxed skin tension lines where possible.    The area thus outlined was incised deep to adipose tissue with a #15 scalpel blade.  The skin margins were undermined to an appropriate distance in all directions utilizing iris scissors.
Xenograft Text: The defect edges were debeveled with a #15 scalpel blade.  Given the location of the defect, shape of the defect and the proximity to free margins a xenograft was deemed most appropriate.  The graft was then trimmed to fit the size of the defect.  The graft was then placed in the primary defect and oriented appropriately.
A-T Advancement Flap Text: The defect edges were debeveled with a #15 scalpel blade.  Given the location of the defect, shape of the defect and the proximity to free margins an A-T advancement flap was deemed most appropriate.  Using a sterile surgical marker, an appropriate advancement flap was drawn incorporating the defect and placing the expected incisions within the relaxed skin tension lines where possible.    The area thus outlined was incised deep to adipose tissue with a #15 scalpel blade.  The skin margins were undermined to an appropriate distance in all directions utilizing iris scissors.
Anesthesia Type: 1% lidocaine with 1:100,000 epinephrine and a 1:12 solution of 8.4% sodium bicarbonate
Complex Repair And Ftsg Text: The defect edges were debeveled with a #15 scalpel blade.  The primary defect was closed partially with a complex linear closure.  Given the location of the defect, shape of the defect and the proximity to free margins a full thickness skin graft was deemed most appropriate to repair the remaining defect.  The graft was trimmed to fit the size of the remaining defect.  The graft was then placed in the primary defect, oriented appropriately, and sutured into place.
Purse String (Intermediate) Text: Given the location of the defect and the characteristics of the surrounding skin a purse string intermediate closure was deemed most appropriate.  Undermining was performed circumferentially around the surgical defect.  A purse string suture was then placed and tightened.
O-T Advancement Flap Text: The defect edges were debeveled with a #15 scalpel blade.  Given the location of the defect, shape of the defect and the proximity to free margins an O-T advancement flap was deemed most appropriate.  Using a sterile surgical marker, an appropriate advancement flap was drawn incorporating the defect and placing the expected incisions within the relaxed skin tension lines where possible.    The area thus outlined was incised deep to adipose tissue with a #15 scalpel blade.  The skin margins were undermined to an appropriate distance in all directions utilizing iris scissors.
W Plasty Text: The lesion was extirpated to the level of the fat with a #15 scalpel blade.  Given the location of the defect, shape of the defect and the proximity to free margins a W-plasty was deemed most appropriate for repair.  Using a sterile surgical marker, the appropriate transposition arms of the W-plasty were drawn incorporating the defect and placing the expected incisions within the relaxed skin tension lines where possible.    The area thus outlined was incised deep to adipose tissue with a #15 scalpel blade.  The skin margins were undermined to an appropriate distance in all directions utilizing iris scissors.  The opposing transposition arms were then transposed into place in opposite direction and anchored with interrupted buried subcutaneous sutures.
Eliptical Excision Additional Text (Leave Blank If You Do Not Want): The margin was drawn around the clinically apparent lesion.  An elliptical shape was then drawn on the skin incorporating the lesion and margins.  Incisions were then made along these lines to the appropriate tissue plane and the lesion was extirpated.
Lab: [id number]
Bilateral Helical Rim Advancement Flap Text: The defect edges were debeveled with a #15 blade scalpel.  Given the location of the defect and the proximity to free margins (helical rim) a bilateral helical rim advancement flap was deemed most appropriate.  Using a sterile surgical marker, the appropriate advancement flaps were drawn incorporating the defect and placing the expected incisions between the helical rim and antihelix where possible.  The area thus outlined was incised through and through with a #15 scalpel blade.  With a skin hook and iris scissors, the flaps were gently and sharply undermined and freed up.
Purse String (Simple) Text: Given the location of the defect and the characteristics of the surrounding skin a purse string simple closure was deemed most appropriate.  Undermining was performed circumferentially around the surgical defect.  A purse string suture was then placed and tightened.
Z Plasty Text: The lesion was extirpated to the level of the fat with a #15 scalpel blade.  Given the location of the defect, shape of the defect and the proximity to free margins a Z-plasty was deemed most appropriate for repair.  Using a sterile surgical marker, the appropriate transposition arms of the Z-plasty were drawn incorporating the defect and placing the expected incisions within the relaxed skin tension lines where possible.    The area thus outlined was incised deep to adipose tissue with a #15 scalpel blade.  The skin margins were undermined to an appropriate distance in all directions utilizing iris scissors.  The opposing transposition arms were then transposed into place in opposite direction and anchored with interrupted buried subcutaneous sutures.
Wound Care: Petrolatum
Ear Star Wedge Flap Text: The defect edges were debeveled with a #15 blade scalpel.  Given the location of the defect and the proximity to free margins (helical rim) an ear star wedge flap was deemed most appropriate.  Using a sterile surgical marker, the appropriate flap was drawn incorporating the defect and placing the expected incisions between the helical rim and antihelix where possible.  The area thus outlined was incised through and through with a #15 scalpel blade.
O-L Flap Text: The defect edges were debeveled with a #15 scalpel blade.  Given the location of the defect, shape of the defect and the proximity to free margins an O-L flap was deemed most appropriate.  Using a sterile surgical marker, an appropriate advancement flap was drawn incorporating the defect and placing the expected incisions within the relaxed skin tension lines where possible.    The area thus outlined was incised deep to adipose tissue with a #15 scalpel blade.  The skin margins were undermined to an appropriate distance in all directions utilizing iris scissors.
Lab Facility: 
Complex Repair And Split-Thickness Skin Graft Text: The defect edges were debeveled with a #15 scalpel blade.  The primary defect was closed partially with a complex linear closure.  Given the location of the defect, shape of the defect and the proximity to free margins a split thickness skin graft was deemed most appropriate to repair the remaining defect.  The graft was trimmed to fit the size of the remaining defect.  The graft was then placed in the primary defect, oriented appropriately, and sutured into place.
Saucerization Excision Additional Text (Leave Blank If You Do Not Want): The margin was drawn around the clinically apparent lesion.  Incisions were then made along these lines, in a tangential fashion, to the appropriate tissue plane and the lesion was extirpated.
Cheek Interpolation Flap Text: A decision was made to reconstruct the defect utilizing an interpolation axial flap and a staged reconstruction.  A telfa template was made of the defect.  This telfa template was then used to outline the Cheek Interpolation flap.  The donor area for the pedicle flap was then injected with anesthesia.  The flap was excised through the skin and subcutaneous tissue down to the layer of the underlying musculature.  The interpolation flap was carefully excised within this deep plane to maintain its blood supply.  The edges of the donor site were undermined.   The donor site was closed in a primary fashion.  The pedicle was then rotated into position and sutured.  Once the tube was sutured into place, adequate blood supply was confirmed with blanching and refill.  The pedicle was then wrapped with xeroform gauze and dressed appropriately with a telfa and gauze bandage to ensure continued blood supply and protect the attached pedicle.
Body Location Override (Optional - Billing Will Still Be Based On Selected Body Map Location If Applicable): right mid upper back
Complex Repair And Epidermal Autograft Text: The defect edges were debeveled with a #15 scalpel blade.  The primary defect was closed partially with a complex linear closure.  Given the location of the defect, shape of the defect and the proximity to free margins an epidermal autograft was deemed most appropriate to repair the remaining defect.  The graft was trimmed to fit the size of the remaining defect.  The graft was then placed in the primary defect, oriented appropriately, and sutured into place.
Banner Transposition Flap Text: The defect edges were debeveled with a #15 scalpel blade.  Given the location of the defect and the proximity to free margins a Banner transposition flap was deemed most appropriate.  Using a sterile surgical marker, an appropriate flap drawn around the defect. The area thus outlined was incised deep to adipose tissue with a #15 scalpel blade.  The skin margins were undermined to an appropriate distance in all directions utilizing iris scissors.
O-Z Flap Text: The defect edges were debeveled with a #15 scalpel blade.  Given the location of the defect, shape of the defect and the proximity to free margins an O-Z flap was deemed most appropriate.  Using a sterile surgical marker, an appropriate transposition flap was drawn incorporating the defect and placing the expected incisions within the relaxed skin tension lines where possible. The area thus outlined was incised deep to adipose tissue with a #15 scalpel blade.  The skin margins were undermined to an appropriate distance in all directions utilizing iris scissors.
Complex Repair And Single Advancement Flap Text: The defect edges were debeveled with a #15 scalpel blade.  The primary defect was closed partially with a complex linear closure.  Given the location of the remaining defect, shape of the defect and the proximity to free margins a single advancement flap was deemed most appropriate for complete closure of the defect.  Using a sterile surgical marker, an appropriate advancement flap was drawn incorporating the defect and placing the expected incisions within the relaxed skin tension lines where possible.    The area thus outlined was incised deep to adipose tissue with a #15 scalpel blade.  The skin margins were undermined to an appropriate distance in all directions utilizing iris scissors.
Deep Sutures: 3-0 Vicryl
Slit Excision Additional Text (Leave Blank If You Do Not Want): A linear line was drawn on the skin overlying the lesion. An incision was made slowly until the lesion was visualized.  Once visualized, the lesion was removed with blunt dissection.
Complex Repair And Dermal Autograft Text: The defect edges were debeveled with a #15 scalpel blade.  The primary defect was closed partially with a complex linear closure.  Given the location of the defect, shape of the defect and the proximity to free margins an dermal autograft was deemed most appropriate to repair the remaining defect.  The graft was trimmed to fit the size of the remaining defect.  The graft was then placed in the primary defect, oriented appropriately, and sutured into place.
Complex Repair And Double Advancement Flap Text: The defect edges were debeveled with a #15 scalpel blade.  The primary defect was closed partially with a complex linear closure.  Given the location of the remaining defect, shape of the defect and the proximity to free margins a double advancement flap was deemed most appropriate for complete closure of the defect.  Using a sterile surgical marker, an appropriate advancement flap was drawn incorporating the defect and placing the expected incisions within the relaxed skin tension lines where possible.    The area thus outlined was incised deep to adipose tissue with a #15 scalpel blade.  The skin margins were undermined to an appropriate distance in all directions utilizing iris scissors.
Double O-Z Flap Text: The defect edges were debeveled with a #15 scalpel blade.  Given the location of the defect, shape of the defect and the proximity to free margins a Double O-Z flap was deemed most appropriate.  Using a sterile surgical marker, an appropriate transposition flap was drawn incorporating the defect and placing the expected incisions within the relaxed skin tension lines where possible. The area thus outlined was incised deep to adipose tissue with a #15 scalpel blade.  The skin margins were undermined to an appropriate distance in all directions utilizing iris scissors.
Cheek-To-Nose Interpolation Flap Text: A decision was made to reconstruct the defect utilizing an interpolation axial flap and a staged reconstruction.  A telfa template was made of the defect.  This telfa template was then used to outline the Cheek-To-Nose Interpolation flap.  The donor area for the pedicle flap was then injected with anesthesia.  The flap was excised through the skin and subcutaneous tissue down to the layer of the underlying musculature.  The interpolation flap was carefully excised within this deep plane to maintain its blood supply.  The edges of the donor site were undermined.   The donor site was closed in a primary fashion.  The pedicle was then rotated into position and sutured.  Once the tube was sutured into place, adequate blood supply was confirmed with blanching and refill.  The pedicle was then wrapped with xeroform gauze and dressed appropriately with a telfa and gauze bandage to ensure continued blood supply and protect the attached pedicle.
Dressing: dry sterile dressing
Bilobed Flap Text: The defect edges were debeveled with a #15 scalpel blade.  Given the location of the defect and the proximity to free margins a bilobe flap was deemed most appropriate.  Using a sterile surgical marker, an appropriate bilobe flap drawn around the defect.    The area thus outlined was incised deep to adipose tissue with a #15 scalpel blade.  The skin margins were undermined to an appropriate distance in all directions utilizing iris scissors.

## 2019-11-07 ENCOUNTER — APPOINTMENT (RX ONLY)
Dept: URBAN - METROPOLITAN AREA CLINIC 4 | Facility: CLINIC | Age: 79
Setting detail: DERMATOLOGY
End: 2019-11-07

## 2019-11-07 PROBLEM — C44.319 BASAL CELL CARCINOMA OF SKIN OF OTHER PARTS OF FACE: Status: ACTIVE | Noted: 2019-11-07

## 2019-11-07 PROCEDURE — ? EXCISION

## 2019-11-07 PROCEDURE — 12052 INTMD RPR FACE/MM 2.6-5.0 CM: CPT | Mod: 79

## 2019-11-07 PROCEDURE — 11642 EXC F/E/E/N/L MAL+MRG 1.1-2: CPT | Mod: 79

## 2019-11-07 PROCEDURE — ? DIAGNOSIS COMMENT

## 2019-11-07 NOTE — PROCEDURE: EXCISION
Trilobed Flap Text: The defect edges were debeveled with a #15 scalpel blade.  Given the location of the defect and the proximity to free margins a trilobed flap was deemed most appropriate.  Using a sterile surgical marker, an appropriate trilobed flap drawn around the defect.    The area thus outlined was incised deep to adipose tissue with a #15 scalpel blade.  The skin margins were undermined to an appropriate distance in all directions utilizing iris scissors.
Saucerization Excision Additional Text (Leave Blank If You Do Not Want): The margin was drawn around the clinically apparent lesion.  Incisions were then made along these lines, in a tangential fashion, to the appropriate tissue plane and the lesion was extirpated.
Hemostasis: Pressure and Electrodesiccation
Complex Repair And Bilobe Flap Text: The defect edges were debeveled with a #15 scalpel blade.  The primary defect was closed partially with a complex linear closure.  Given the location of the remaining defect, shape of the defect and the proximity to free margins a bilobe flap was deemed most appropriate for complete closure of the defect.  Using a sterile surgical marker, an appropriate advancement flap was drawn incorporating the defect and placing the expected incisions within the relaxed skin tension lines where possible.    The area thus outlined was incised deep to adipose tissue with a #15 scalpel blade.  The skin margins were undermined to an appropriate distance in all directions utilizing iris scissors.
Bill 89737 For Specimen Handling/Conveyance To Laboratory?: no
Show Referring Physician Variable: Yes
Mercedes Flap Text: The defect edges were debeveled with a #15 scalpel blade.  Given the location of the defect, shape of the defect and the proximity to free margins a Mercedes flap was deemed most appropriate.  Using a sterile surgical marker, an appropriate advancement flap was drawn incorporating the defect and placing the expected incisions within the relaxed skin tension lines where possible. The area thus outlined was incised deep to adipose tissue with a #15 scalpel blade.  The skin margins were undermined to an appropriate distance in all directions utilizing iris scissors.
Ftsg Text: The defect edges were debeveled with a #15 scalpel blade.  Given the location of the defect, shape of the defect and the proximity to free margins a full thickness skin graft was deemed most appropriate.  Using a sterile surgical marker, the primary defect shape was transferred to the donor site. The area thus outlined was incised deep to adipose tissue with a #15 scalpel blade.  The harvested graft was then trimmed of adipose tissue until only dermis and epidermis was left.  The skin margins of the secondary defect were undermined to an appropriate distance in all directions utilizing iris scissors.  The secondary defect was closed with interrupted buried subcutaneous sutures.  The skin edges were then re-apposed with running  sutures.  The skin graft was then placed in the primary defect and oriented appropriately.
Modified Advancement Flap Text: The defect edges were debeveled with a #15 scalpel blade.  Given the location of the defect, shape of the defect and the proximity to free margins a modified advancement flap was deemed most appropriate.  Using a sterile surgical marker, an appropriate advancement flap was drawn incorporating the defect and placing the expected incisions within the relaxed skin tension lines where possible.    The area thus outlined was incised deep to adipose tissue with a #15 scalpel blade.  The skin margins were undermined to an appropriate distance in all directions utilizing iris scissors.
Slit Excision Additional Text (Leave Blank If You Do Not Want): A linear line was drawn on the skin overlying the lesion. An incision was made slowly until the lesion was visualized.  Once visualized, the lesion was removed with blunt dissection.
Complex Repair And Melolabial Flap Text: The defect edges were debeveled with a #15 scalpel blade.  The primary defect was closed partially with a complex linear closure.  Given the location of the remaining defect, shape of the defect and the proximity to free margins a melolabial flap was deemed most appropriate for complete closure of the defect.  Using a sterile surgical marker, an appropriate advancement flap was drawn incorporating the defect and placing the expected incisions within the relaxed skin tension lines where possible.    The area thus outlined was incised deep to adipose tissue with a #15 scalpel blade.  The skin margins were undermined to an appropriate distance in all directions utilizing iris scissors.
Dorsal Nasal Flap Text: The defect edges were debeveled with a #15 scalpel blade.  Given the location of the defect and the proximity to free margins a dorsal nasal flap was deemed most appropriate.  Using a sterile surgical marker, an appropriate dorsal nasal flap was drawn around the defect.    The area thus outlined was incised deep to adipose tissue with a #15 scalpel blade.  The skin margins were undermined to an appropriate distance in all directions utilizing iris scissors.
Split-Thickness Skin Graft Text: The defect edges were debeveled with a #15 scalpel blade.  Given the location of the defect, shape of the defect and the proximity to free margins a split thickness skin graft was deemed most appropriate.  Using a sterile surgical marker, the primary defect shape was transferred to the donor site. The split thickness graft was then harvested.  The skin graft was then placed in the primary defect and oriented appropriately.
Previous Accession (Optional): [id number]
Island Pedicle Flap-Requiring Vessel Identification Text: The defect edges were debeveled with a #15 scalpel blade.  Given the location of the defect, shape of the defect and the proximity to free margins an island pedicle advancement flap was deemed most appropriate.  Using a sterile surgical marker, an appropriate advancement flap was drawn, based on the axial vessel mentioned above, incorporating the defect, outlining the appropriate donor tissue and placing the expected incisions within the relaxed skin tension lines where possible.    The area thus outlined was incised deep to adipose tissue with a #15 scalpel blade.  The skin margins were undermined to an appropriate distance in all directions around the primary defect and laterally outward around the island pedicle utilizing iris scissors.  There was minimal undermining beneath the pedicle flap.
Information: Selecting Yes will display possible errors in your note based on the variables you have selected. This validation is only offered as a suggestion for you. PLEASE NOTE THAT THE VALIDATION TEXT WILL BE REMOVED WHEN YOU FINALIZE YOUR NOTE. IF YOU WANT TO FAX A PRELIMINARY NOTE YOU WILL NEED TO TOGGLE THIS TO 'NO' IF YOU DO NOT WANT IT IN YOUR FAXED NOTE.
Mucosal Advancement Flap Text: Given the location of the defect, shape of the defect and the proximity to free margins a mucosal advancement flap was deemed most appropriate. Incisions were made with a 15 blade scalpel in the appropriate fashion along the cutaneous vermilion border and the mucosal lip. The remaining actinically damaged mucosal tissue was excised.  The mucosal advancement flap was then elevated to the gingival sulcus with care taken to preserve the neurovascular structures and advanced into the primary defect. Care was taken to ensure that precise realignment of the vermilion border was achieved.
Island Pedicle Flap Text: The defect edges were debeveled with a #15 scalpel blade.  Given the location of the defect, shape of the defect and the proximity to free margins an island pedicle advancement flap was deemed most appropriate.  Using a sterile surgical marker, an appropriate advancement flap was drawn incorporating the defect, outlining the appropriate donor tissue and placing the expected incisions within the relaxed skin tension lines where possible.    The area thus outlined was incised deep to adipose tissue with a #15 scalpel blade.  The skin margins were undermined to an appropriate distance in all directions around the primary defect and laterally outward around the island pedicle utilizing iris scissors.  There was minimal undermining beneath the pedicle flap.
Complex Repair And Rotation Flap Text: The defect edges were debeveled with a #15 scalpel blade.  The primary defect was closed partially with a complex linear closure.  Given the location of the remaining defect, shape of the defect and the proximity to free margins a rotation flap was deemed most appropriate for complete closure of the defect.  Using a sterile surgical marker, an appropriate advancement flap was drawn incorporating the defect and placing the expected incisions within the relaxed skin tension lines where possible.    The area thus outlined was incised deep to adipose tissue with a #15 scalpel blade.  The skin margins were undermined to an appropriate distance in all directions utilizing iris scissors.
Consent was obtained from the patient. The risks and benefits to therapy were discussed in detail. Specifically, the risks of infection, scarring, bleeding, prolonged wound healing, incomplete removal, allergy to anesthesia, nerve injury and recurrence were addressed. Prior to the procedure, the treatment site was clearly identified and confirmed by the patient. All components of Universal Protocol/PAUSE Rule completed.
Cartilage Graft Text: The defect edges were debeveled with a #15 scalpel blade.  Given the location of the defect, shape of the defect, the fact the defect involved a full thickness cartilage defect a cartilage graft was deemed most appropriate.  An appropriate donor site was identified, cleansed, and anesthetized. The cartilage graft was then harvested and transferred to the recipient site, oriented appropriately and then sutured into place.  The secondary defect was then repaired using a primary closure.
Keystone Flap Text: The defect edges were debeveled with a #15 scalpel blade.  Given the location of the defect, shape of the defect a keystone flap was deemed most appropriate.  Using a sterile surgical marker, an appropriate keystone flap was drawn incorporating the defect, outlining the appropriate donor tissue and placing the expected incisions within the relaxed skin tension lines where possible. The area thus outlined was incised deep to adipose tissue with a #15 scalpel blade.  The skin margins were undermined to an appropriate distance in all directions around the primary defect and laterally outward around the flap utilizing iris scissors.
Estimated Blood Loss (Cc): minimal
Excisional Biopsy Additional Text (Leave Blank If You Do Not Want): The margin was drawn around the clinically apparent lesion. An elliptical shape was then drawn on the skin incorporating the lesion and margins.  Incisions were then made along these lines to the appropriate tissue plane and the lesion was extirpated.
Island Pedicle Flap With Canthal Suspension Text: The defect edges were debeveled with a #15 scalpel blade.  Given the location of the defect, shape of the defect and the proximity to free margins an island pedicle advancement flap was deemed most appropriate.  Using a sterile surgical marker, an appropriate advancement flap was drawn incorporating the defect, outlining the appropriate donor tissue and placing the expected incisions within the relaxed skin tension lines where possible. The area thus outlined was incised deep to adipose tissue with a #15 scalpel blade.  The skin margins were undermined to an appropriate distance in all directions around the primary defect and laterally outward around the island pedicle utilizing iris scissors.  There was minimal undermining beneath the pedicle flap. A suspension suture was placed in the canthal tendon to prevent tension and prevent ectropion.
Perilesional Excision Additional Text (Leave Blank If You Do Not Want): The margin was drawn around the clinically apparent lesion. Incisions were then made along these lines to the appropriate tissue plane and the lesion was extirpated.
O-T Plasty Text: The defect edges were debeveled with a #15 scalpel blade.  Given the location of the defect, shape of the defect and the proximity to free margins an O-T plasty was deemed most appropriate.  Using a sterile surgical marker, an appropriate O-T plasty was drawn incorporating the defect and placing the expected incisions within the relaxed skin tension lines where possible.    The area thus outlined was incised deep to adipose tissue with a #15 scalpel blade.  The skin margins were undermined to an appropriate distance in all directions utilizing iris scissors.
Where Do You Want The Question To Include Opioid Counseling Located?: Case Summary Tab
Hatchet Flap Text: The defect edges were debeveled with a #15 scalpel blade.  Given the location of the defect, shape of the defect and the proximity to free margins a hatchet flap was deemed most appropriate.  Using a sterile surgical marker, an appropriate hatchet flap was drawn incorporating the defect and placing the expected incisions within the relaxed skin tension lines where possible.    The area thus outlined was incised deep to adipose tissue with a #15 scalpel blade.  The skin margins were undermined to an appropriate distance in all directions utilizing iris scissors.
Primary Defect Length (In Cm): 0
Complex Repair And Rhombic Flap Text: The defect edges were debeveled with a #15 scalpel blade.  The primary defect was closed partially with a complex linear closure.  Given the location of the remaining defect, shape of the defect and the proximity to free margins a rhombic flap was deemed most appropriate for complete closure of the defect.  Using a sterile surgical marker, an appropriate advancement flap was drawn incorporating the defect and placing the expected incisions within the relaxed skin tension lines where possible.    The area thus outlined was incised deep to adipose tissue with a #15 scalpel blade.  The skin margins were undermined to an appropriate distance in all directions utilizing iris scissors.
Composite Graft Text: The defect edges were debeveled with a #15 scalpel blade.  Given the location of the defect, shape of the defect, the proximity to free margins and the fact the defect was full thickness a composite graft was deemed most appropriate.  The defect was outline and then transferred to the donor site.  A full thickness graft was then excised from the donor site. The graft was then placed in the primary defect, oriented appropriately and then sutured into place.  The secondary defect was then repaired using a primary closure.
Rotation Flap Text: The defect edges were debeveled with a #15 scalpel blade.  Given the location of the defect, shape of the defect and the proximity to free margins a rotation flap was deemed most appropriate.  Using a sterile surgical marker, an appropriate rotation flap was drawn incorporating the defect and placing the expected incisions within the relaxed skin tension lines where possible.    The area thus outlined was incised deep to adipose tissue with a #15 scalpel blade.  The skin margins were undermined to an appropriate distance in all directions utilizing iris scissors.
O-Z Plasty Text: The defect edges were debeveled with a #15 scalpel blade.  Given the location of the defect, shape of the defect and the proximity to free margins an O-Z plasty (double transposition flap) was deemed most appropriate.  Using a sterile surgical marker, the appropriate transposition flaps were drawn incorporating the defect and placing the expected incisions within the relaxed skin tension lines where possible.    The area thus outlined was incised deep to adipose tissue with a #15 scalpel blade.  The skin margins were undermined to an appropriate distance in all directions utilizing iris scissors.  Hemostasis was achieved with electrocautery.  The flaps were then transposed into place, one clockwise and the other counterclockwise, and anchored with interrupted buried subcutaneous sutures.
Excision Depth: adipose tissue
Alar Island Pedicle Flap Text: The defect edges were debeveled with a #15 scalpel blade.  Given the location of the defect, shape of the defect and the proximity to the alar rim an island pedicle advancement flap was deemed most appropriate.  Using a sterile surgical marker, an appropriate advancement flap was drawn incorporating the defect, outlining the appropriate donor tissue and placing the expected incisions within the nasal ala running parallel to the alar rim. The area thus outlined was incised with a #15 scalpel blade.  The skin margins were undermined minimally to an appropriate distance in all directions around the primary defect and laterally outward around the island pedicle utilizing iris scissors.  There was minimal undermining beneath the pedicle flap.
Complex Repair And Transposition Flap Text: The defect edges were debeveled with a #15 scalpel blade.  The primary defect was closed partially with a complex linear closure.  Given the location of the remaining defect, shape of the defect and the proximity to free margins a transposition flap was deemed most appropriate for complete closure of the defect.  Using a sterile surgical marker, an appropriate advancement flap was drawn incorporating the defect and placing the expected incisions within the relaxed skin tension lines where possible.    The area thus outlined was incised deep to adipose tissue with a #15 scalpel blade.  The skin margins were undermined to an appropriate distance in all directions utilizing iris scissors.
Positioning (Leave Blank If You Do Not Want): The patient was placed in a comfortable position exposing the surgical site.
Epidermal Autograft Text: The defect edges were debeveled with a #15 scalpel blade.  Given the location of the defect, shape of the defect and the proximity to free margins an epidermal autograft was deemed most appropriate.  Using a sterile surgical marker, the primary defect shape was transferred to the donor site. The epidermal graft was then harvested.  The skin graft was then placed in the primary defect and oriented appropriately.
Spiral Flap Text: The defect edges were debeveled with a #15 scalpel blade.  Given the location of the defect, shape of the defect and the proximity to free margins a spiral flap was deemed most appropriate.  Using a sterile surgical marker, an appropriate rotation flap was drawn incorporating the defect and placing the expected incisions within the relaxed skin tension lines where possible. The area thus outlined was incised deep to adipose tissue with a #15 scalpel blade.  The skin margins were undermined to an appropriate distance in all directions utilizing iris scissors.
Pre-Excision Curettage Text (Leave Blank If You Do Not Want): Prior to drawing the surgical margin the visible lesion was removed with electrodesiccation and curettage to clearly define the lesion size.
Double Island Pedicle Flap Text: The defect edges were debeveled with a #15 scalpel blade.  Given the location of the defect, shape of the defect and the proximity to free margins a double island pedicle advancement flap was deemed most appropriate.  Using a sterile surgical marker, an appropriate advancement flap was drawn incorporating the defect, outlining the appropriate donor tissue and placing the expected incisions within the relaxed skin tension lines where possible.    The area thus outlined was incised deep to adipose tissue with a #15 scalpel blade.  The skin margins were undermined to an appropriate distance in all directions around the primary defect and laterally outward around the island pedicle utilizing iris scissors.  There was minimal undermining beneath the pedicle flap.
Size Of Lesion In Cm: 0.7
Dermal Autograft Text: The defect edges were debeveled with a #15 scalpel blade.  Given the location of the defect, shape of the defect and the proximity to free margins a dermal autograft was deemed most appropriate.  Using a sterile surgical marker, the primary defect shape was transferred to the donor site. The area thus outlined was incised deep to adipose tissue with a #15 scalpel blade.  The harvested graft was then trimmed of adipose and epidermal tissue until only dermis was left.  The skin graft was then placed in the primary defect and oriented appropriately.
Post-Care Instructions: I reviewed with the patient in detail post-care instructions. Patient is not to engage in any heavy lifting, exercise, or swimming for the next 14 days. Should the patient develop any fevers, chills, bleeding, severe pain patient will contact the office immediately.
Double O-Z Plasty Text: The defect edges were debeveled with a #15 scalpel blade.  Given the location of the defect, shape of the defect and the proximity to free margins a Double O-Z plasty (double transposition flap) was deemed most appropriate.  Using a sterile surgical marker, the appropriate transposition flaps were drawn incorporating the defect and placing the expected incisions within the relaxed skin tension lines where possible. The area thus outlined was incised deep to adipose tissue with a #15 scalpel blade.  The skin margins were undermined to an appropriate distance in all directions utilizing iris scissors.  Hemostasis was achieved with electrocautery.  The flaps were then transposed into place, one clockwise and the other counterclockwise, and anchored with interrupted buried subcutaneous sutures.
Repair Performed By Another Provider Text (Leave Blank If You Do Not Want): After the tissue was excised the defect was repaired by another provider.
Complex Repair And V-Y Plasty Text: The defect edges were debeveled with a #15 scalpel blade.  The primary defect was closed partially with a complex linear closure.  Given the location of the remaining defect, shape of the defect and the proximity to free margins a V-Y plasty was deemed most appropriate for complete closure of the defect.  Using a sterile surgical marker, an appropriate advancement flap was drawn incorporating the defect and placing the expected incisions within the relaxed skin tension lines where possible.    The area thus outlined was incised deep to adipose tissue with a #15 scalpel blade.  The skin margins were undermined to an appropriate distance in all directions utilizing iris scissors.
Scalpel Size: 15 blade
Complex Repair Preamble Text (Leave Blank If You Do Not Want): Extensive wide undermining was performed.
Detail Level: Detailed
No Repair - Repaired With Adjacent Surgical Defect Text (Leave Blank If You Do Not Want): After the excision the defect was repaired concurrently with another surgical defect which was in close approximation.
S Plasty Text: Given the location and shape of the defect, and the orientation of relaxed skin tension lines, an S-plasty was deemed most appropriate for repair.  Using a sterile surgical marker, the appropriate outline of the S-plasty was drawn, incorporating the defect and placing the expected incisions within the relaxed skin tension lines where possible.  The area thus outlined was incised deep to adipose tissue with a #15 scalpel blade.  The skin margins were undermined to an appropriate distance in all directions utilizing iris scissors. The skin flaps were advanced over the defect.  The opposing margins were then approximated with interrupted buried subcutaneous sutures.
Star Wedge Flap Text: The defect edges were debeveled with a #15 scalpel blade.  Given the location of the defect, shape of the defect and the proximity to free margins a star wedge flap was deemed most appropriate.  Using a sterile surgical marker, an appropriate rotation flap was drawn incorporating the defect and placing the expected incisions within the relaxed skin tension lines where possible. The area thus outlined was incised deep to adipose tissue with a #15 scalpel blade.  The skin margins were undermined to an appropriate distance in all directions utilizing iris scissors.
Complex Repair And M Plasty Text: The defect edges were debeveled with a #15 scalpel blade.  The primary defect was closed partially with a complex linear closure.  Given the location of the remaining defect, shape of the defect and the proximity to free margins an M plasty was deemed most appropriate for complete closure of the defect.  Using a sterile surgical marker, an appropriate advancement flap was drawn incorporating the defect and placing the expected incisions within the relaxed skin tension lines where possible.    The area thus outlined was incised deep to adipose tissue with a #15 scalpel blade.  The skin margins were undermined to an appropriate distance in all directions utilizing iris scissors.
Transposition Flap Text: The defect edges were debeveled with a #15 scalpel blade.  Given the location of the defect and the proximity to free margins a transposition flap was deemed most appropriate.  Using a sterile surgical marker, an appropriate transposition flap was drawn incorporating the defect.    The area thus outlined was incised deep to adipose tissue with a #15 scalpel blade.  The skin margins were undermined to an appropriate distance in all directions utilizing iris scissors.
Skin Substitute Text: The defect edges were debeveled with a #15 scalpel blade.  Given the location of the defect, shape of the defect and the proximity to free margins a skin substitute graft was deemed most appropriate.  The graft material was trimmed to fit the size of the defect. The graft was then placed in the primary defect and oriented appropriately.
Home Suture Removal Text: Patient was provided a home suture removal kit and will remove their sutures at home.  If they have any questions or difficulties they will call the office.
Epidermal Sutures: 6-0 Prolene
V-Y Plasty Text: The defect edges were debeveled with a #15 scalpel blade.  Given the location of the defect, shape of the defect and the proximity to free margins an V-Y advancement flap was deemed most appropriate.  Using a sterile surgical marker, an appropriate advancement flap was drawn incorporating the defect and placing the expected incisions within the relaxed skin tension lines where possible.    The area thus outlined was incised deep to adipose tissue with a #15 scalpel blade.  The skin margins were undermined to an appropriate distance in all directions utilizing iris scissors.
Intermediate Repair Preamble Text (Leave Blank If You Do Not Want): Undermining was performed with blunt dissection.
Size Of Margin In Cm: 0.2
Complex Repair And Double M Plasty Text: The defect edges were debeveled with a #15 scalpel blade.  The primary defect was closed partially with a complex linear closure.  Given the location of the remaining defect, shape of the defect and the proximity to free margins a double M plasty was deemed most appropriate for complete closure of the defect.  Using a sterile surgical marker, an appropriate advancement flap was drawn incorporating the defect and placing the expected incisions within the relaxed skin tension lines where possible.    The area thus outlined was incised deep to adipose tissue with a #15 scalpel blade.  The skin margins were undermined to an appropriate distance in all directions utilizing iris scissors.
Tissue Cultured Epidermal Autograft Text: The defect edges were debeveled with a #15 scalpel blade.  Given the location of the defect, shape of the defect and the proximity to free margins a tissue cultured epidermal autograft was deemed most appropriate.  The graft was then trimmed to fit the size of the defect.  The graft was then placed in the primary defect and oriented appropriately.
Advancement Flap (Single) Text: The defect edges were debeveled with a #15 scalpel blade.  Given the location of the defect and the proximity to free margins a single advancement flap was deemed most appropriate.  Using a sterile surgical marker, an appropriate advancement flap was drawn incorporating the defect and placing the expected incisions within the relaxed skin tension lines where possible.    The area thus outlined was incised deep to adipose tissue with a #15 scalpel blade.  The skin margins were undermined to an appropriate distance in all directions utilizing iris scissors.
Muscle Hinge Flap Text: The defect edges were debeveled with a #15 scalpel blade.  Given the size, depth and location of the defect and the proximity to free margins a muscle hinge flap was deemed most appropriate.  Using a sterile surgical marker, an appropriate hinge flap was drawn incorporating the defect. The area thus outlined was incised with a #15 scalpel blade.  The skin margins were undermined to an appropriate distance in all directions utilizing iris scissors.
Xenograft Text: The defect edges were debeveled with a #15 scalpel blade.  Given the location of the defect, shape of the defect and the proximity to free margins a xenograft was deemed most appropriate.  The graft was then trimmed to fit the size of the defect.  The graft was then placed in the primary defect and oriented appropriately.
Excision Method: Fusiform
H Plasty Text: Given the location of the defect, shape of the defect and the proximity to free margins a H-plasty was deemed most appropriate for repair.  Using a sterile surgical marker, the appropriate advancement arms of the H-plasty were drawn incorporating the defect and placing the expected incisions within the relaxed skin tension lines where possible. The area thus outlined was incised deep to adipose tissue with a #15 scalpel blade. The skin margins were undermined to an appropriate distance in all directions utilizing iris scissors.  The opposing advancement arms were then advanced into place in opposite direction and anchored with interrupted buried subcutaneous sutures.
Suture Removal: 7 days
Advancement Flap (Double) Text: The defect edges were debeveled with a #15 scalpel blade.  Given the location of the defect and the proximity to free margins a double advancement flap was deemed most appropriate.  Using a sterile surgical marker, the appropriate advancement flaps were drawn incorporating the defect and placing the expected incisions within the relaxed skin tension lines where possible.    The area thus outlined was incised deep to adipose tissue with a #15 scalpel blade.  The skin margins were undermined to an appropriate distance in all directions utilizing iris scissors.
Complex Repair And W Plasty Text: The defect edges were debeveled with a #15 scalpel blade.  The primary defect was closed partially with a complex linear closure.  Given the location of the remaining defect, shape of the defect and the proximity to free margins a W plasty was deemed most appropriate for complete closure of the defect.  Using a sterile surgical marker, an appropriate advancement flap was drawn incorporating the defect and placing the expected incisions within the relaxed skin tension lines where possible.    The area thus outlined was incised deep to adipose tissue with a #15 scalpel blade.  The skin margins were undermined to an appropriate distance in all directions utilizing iris scissors.
Additional Epidermal Sutures: 5-0 Prolene
Burow's Advancement Flap Text: The defect edges were debeveled with a #15 scalpel blade.  Given the location of the defect and the proximity to free margins a Burow's advancement flap was deemed most appropriate.  Using a sterile surgical marker, the appropriate advancement flap was drawn incorporating the defect and placing the expected incisions within the relaxed skin tension lines where possible.    The area thus outlined was incised deep to adipose tissue with a #15 scalpel blade.  The skin margins were undermined to an appropriate distance in all directions utilizing iris scissors.
W Plasty Text: The lesion was extirpated to the level of the fat with a #15 scalpel blade.  Given the location of the defect, shape of the defect and the proximity to free margins a W-plasty was deemed most appropriate for repair.  Using a sterile surgical marker, the appropriate transposition arms of the W-plasty were drawn incorporating the defect and placing the expected incisions within the relaxed skin tension lines where possible.    The area thus outlined was incised deep to adipose tissue with a #15 scalpel blade.  The skin margins were undermined to an appropriate distance in all directions utilizing iris scissors.  The opposing transposition arms were then transposed into place in opposite direction and anchored with interrupted buried subcutaneous sutures.
Melolabial Transposition Flap Text: The defect edges were debeveled with a #15 scalpel blade.  Given the location of the defect and the proximity to free margins a melolabial flap was deemed most appropriate.  Using a sterile surgical marker, an appropriate melolabial transposition flap was drawn incorporating the defect.    The area thus outlined was incised deep to adipose tissue with a #15 scalpel blade.  The skin margins were undermined to an appropriate distance in all directions utilizing iris scissors.
Complex Repair And Z Plasty Text: The defect edges were debeveled with a #15 scalpel blade.  The primary defect was closed partially with a complex linear closure.  Given the location of the remaining defect, shape of the defect and the proximity to free margins a Z plasty was deemed most appropriate for complete closure of the defect.  Using a sterile surgical marker, an appropriate advancement flap was drawn incorporating the defect and placing the expected incisions within the relaxed skin tension lines where possible.    The area thus outlined was incised deep to adipose tissue with a #15 scalpel blade.  The skin margins were undermined to an appropriate distance in all directions utilizing iris scissors.
Purse String (Intermediate) Text: Given the location of the defect and the characteristics of the surrounding skin a purse string intermediate closure was deemed most appropriate.  Undermining was performed circumfirentially around the surgical defect.  A purse string suture was then placed and tightened.
Rhombic Flap Text: The defect edges were debeveled with a #15 scalpel blade.  Given the location of the defect and the proximity to free margins a rhombic flap was deemed most appropriate.  Using a sterile surgical marker, an appropriate rhombic flap was drawn incorporating the defect.    The area thus outlined was incised deep to adipose tissue with a #15 scalpel blade.  The skin margins were undermined to an appropriate distance in all directions utilizing iris scissors.
Z Plasty Text: The lesion was extirpated to the level of the fat with a #15 scalpel blade.  Given the location of the defect, shape of the defect and the proximity to free margins a Z-plasty was deemed most appropriate for repair.  Using a sterile surgical marker, the appropriate transposition arms of the Z-plasty were drawn incorporating the defect and placing the expected incisions within the relaxed skin tension lines where possible.    The area thus outlined was incised deep to adipose tissue with a #15 scalpel blade.  The skin margins were undermined to an appropriate distance in all directions utilizing iris scissors.  The opposing transposition arms were then transposed into place in opposite direction and anchored with interrupted buried subcutaneous sutures.
Complex Repair And Dorsal Nasal Flap Text: The defect edges were debeveled with a #15 scalpel blade.  The primary defect was closed partially with a complex linear closure.  Given the location of the remaining defect, shape of the defect and the proximity to free margins a dorsal nasal flap was deemed most appropriate for complete closure of the defect.  Using a sterile surgical marker, an appropriate flap was drawn incorporating the defect and placing the expected incisions within the relaxed skin tension lines where possible.    The area thus outlined was incised deep to adipose tissue with a #15 scalpel blade.  The skin margins were undermined to an appropriate distance in all directions utilizing iris scissors.
Chonodrocutaneous Helical Advancement Flap Text: The defect edges were debeveled with a #15 scalpel blade.  Given the location of the defect and the proximity to free margins a chondrocutaneous helical advancement flap was deemed most appropriate.  Using a sterile surgical marker, the appropriate advancement flap was drawn incorporating the defect and placing the expected incisions within the relaxed skin tension lines where possible.    The area thus outlined was incised deep to adipose tissue with a #15 scalpel blade.  The skin margins were undermined to an appropriate distance in all directions utilizing iris scissors.
Purse String (Simple) Text: Given the location of the defect and the characteristics of the surrounding skin a purse string simple closure was deemed most appropriate.  Undermining was performed circumferentially around the surgical defect.  A purse string suture was then placed and tightened.
Partial Purse String (Intermediate) Text: Given the location of the defect and the characteristics of the surrounding skin an intermediate purse string closure was deemed most appropriate.  Undermining was performed circumferentially around the surgical defect.  A purse string suture was then placed and tightened. Wound tension of the circular defect prevented complete closure of the wound.
Cheek Interpolation Flap Text: A decision was made to reconstruct the defect utilizing an interpolation axial flap and a staged reconstruction.  A telfa template was made of the defect.  This telfa template was then used to outline the Cheek Interpolation flap.  The donor area for the pedicle flap was then injected with anesthesia.  The flap was excised through the skin and subcutaneous tissue down to the layer of the underlying musculature.  The interpolation flap was carefully excised within this deep plane to maintain its blood supply.  The edges of the donor site were undermined.   The donor site was closed in a primary fashion.  The pedicle was then rotated into position and sutured.  Once the tube was sutured into place, adequate blood supply was confirmed with blanching and refill.  The pedicle was then wrapped with xeroform gauze and dressed appropriately with a telfa and gauze bandage to ensure continued blood supply and protect the attached pedicle.
Crescentic Advancement Flap Text: The defect edges were debeveled with a #15 scalpel blade.  Given the location of the defect and the proximity to free margins a crescentic advancement flap was deemed most appropriate.  Using a sterile surgical marker, the appropriate advancement flap was drawn incorporating the defect and placing the expected incisions within the relaxed skin tension lines where possible.    The area thus outlined was incised deep to adipose tissue with a #15 scalpel blade.  The skin margins were undermined to an appropriate distance in all directions utilizing iris scissors.
Complex Repair And Ftsg Text: The defect edges were debeveled with a #15 scalpel blade.  The primary defect was closed partially with a complex linear closure.  Given the location of the defect, shape of the defect and the proximity to free margins a full thickness skin graft was deemed most appropriate to repair the remaining defect.  The graft was trimmed to fit the size of the remaining defect.  The graft was then placed in the primary defect, oriented appropriately, and sutured into place.
Rhomboid Transposition Flap Text: The defect edges were debeveled with a #15 scalpel blade.  Given the location of the defect and the proximity to free margins a rhomboid transposition flap was deemed most appropriate.  Using a sterile surgical marker, an appropriate rhomboid flap was drawn incorporating the defect.    The area thus outlined was incised deep to adipose tissue with a #15 scalpel blade.  The skin margins were undermined to an appropriate distance in all directions utilizing iris scissors.
Anesthesia Type: 1% lidocaine with 1:100,000 epinephrine and a 1:12 solution of 8.4% sodium bicarbonate
A-T Advancement Flap Text: The defect edges were debeveled with a #15 scalpel blade.  Given the location of the defect, shape of the defect and the proximity to free margins an A-T advancement flap was deemed most appropriate.  Using a sterile surgical marker, an appropriate advancement flap was drawn incorporating the defect and placing the expected incisions within the relaxed skin tension lines where possible.    The area thus outlined was incised deep to adipose tissue with a #15 scalpel blade.  The skin margins were undermined to an appropriate distance in all directions utilizing iris scissors.
Anesthesia Type: 1% lidocaine with epinephrine and a 1:10 solution of 8.4% sodium bicarbonate
Bi-Rhombic Flap Text: The defect edges were debeveled with a #15 scalpel blade.  Given the location of the defect and the proximity to free margins a bi-rhombic flap was deemed most appropriate.  Using a sterile surgical marker, an appropriate rhombic flap was drawn incorporating the defect. The area thus outlined was incised deep to adipose tissue with a #15 scalpel blade.  The skin margins were undermined to an appropriate distance in all directions utilizing iris scissors.
Partial Purse String (Simple) Text: Given the location of the defect and the characteristics of the surrounding skin a simple purse string closure was deemed most appropriate.  Undermining was performed circumferentially around the surgical defect.  A purse string suture was then placed and tightened. Wound tension of the circular defect prevented complete closure of the wound.
Cheek-To-Nose Interpolation Flap Text: A decision was made to reconstruct the defect utilizing an interpolation axial flap and a staged reconstruction.  A telfa template was made of the defect.  This telfa template was then used to outline the Cheek-To-Nose Interpolation flap.  The donor area for the pedicle flap was then injected with anesthesia.  The flap was excised through the skin and subcutaneous tissue down to the layer of the underlying musculature.  The interpolation flap was carefully excised within this deep plane to maintain its blood supply.  The edges of the donor site were undermined.   The donor site was closed in a primary fashion.  The pedicle was then rotated into position and sutured.  Once the tube was sutured into place, adequate blood supply was confirmed with blanching and refill.  The pedicle was then wrapped with xeroform gauze and dressed appropriately with a telfa and gauze bandage to ensure continued blood supply and protect the attached pedicle.
Complex Repair And Split-Thickness Skin Graft Text: The defect edges were debeveled with a #15 scalpel blade.  The primary defect was closed partially with a complex linear closure.  Given the location of the defect, shape of the defect and the proximity to free margins a split thickness skin graft was deemed most appropriate to repair the remaining defect.  The graft was trimmed to fit the size of the remaining defect.  The graft was then placed in the primary defect, oriented appropriately, and sutured into place.
Epidermal Closure: running cuticular
Helical Rim Advancement Flap Text: The defect edges were debeveled with a #15 blade scalpel.  Given the location of the defect and the proximity to free margins (helical rim) a double helical rim advancement flap was deemed most appropriate.  Using a sterile surgical marker, the appropriate advancement flaps were drawn incorporating the defect and placing the expected incisions between the helical rim and antihelix where possible.  The area thus outlined was incised through and through with a #15 scalpel blade.  With a skin hook and iris scissors, the flaps were gently and sharply undermined and freed up.
Body Location Override (Optional - Billing Will Still Be Based On Selected Body Map Location If Applicable): left medial malar cheek
Complex Repair And Epidermal Autograft Text: The defect edges were debeveled with a #15 scalpel blade.  The primary defect was closed partially with a complex linear closure.  Given the location of the defect, shape of the defect and the proximity to free margins an epidermal autograft was deemed most appropriate to repair the remaining defect.  The graft was trimmed to fit the size of the remaining defect.  The graft was then placed in the primary defect, oriented appropriately, and sutured into place.
O-T Advancement Flap Text: The defect edges were debeveled with a #15 scalpel blade.  Given the location of the defect, shape of the defect and the proximity to free margins an O-T advancement flap was deemed most appropriate.  Using a sterile surgical marker, an appropriate advancement flap was drawn incorporating the defect and placing the expected incisions within the relaxed skin tension lines where possible.    The area thus outlined was incised deep to adipose tissue with a #15 scalpel blade.  The skin margins were undermined to an appropriate distance in all directions utilizing iris scissors.
Epidermal Closure Graft Donor Site (Optional): simple interrupted
Repair Type: Intermediate
Complex Repair And Single Advancement Flap Text: The defect edges were debeveled with a #15 scalpel blade.  The primary defect was closed partially with a complex linear closure.  Given the location of the remaining defect, shape of the defect and the proximity to free margins a single advancement flap was deemed most appropriate for complete closure of the defect.  Using a sterile surgical marker, an appropriate advancement flap was drawn incorporating the defect and placing the expected incisions within the relaxed skin tension lines where possible.    The area thus outlined was incised deep to adipose tissue with a #15 scalpel blade.  The skin margins were undermined to an appropriate distance in all directions utilizing iris scissors.
Interpolation Flap Text: A decision was made to reconstruct the defect utilizing an interpolation axial flap and a staged reconstruction.  A telfa template was made of the defect.  This telfa template was then used to outline the interpolation flap.  The donor area for the pedicle flap was then injected with anesthesia.  The flap was excised through the skin and subcutaneous tissue down to the layer of the underlying musculature.  The interpolation flap was carefully excised within this deep plane to maintain its blood supply.  The edges of the donor site were undermined.   The donor site was closed in a primary fashion.  The pedicle was then rotated into position and sutured.  Once the tube was sutured into place, adequate blood supply was confirmed with blanching and refill.  The pedicle was then wrapped with xeroform gauze and dressed appropriately with a telfa and gauze bandage to ensure continued blood supply and protect the attached pedicle.
Complex Repair And Double Advancement Flap Text: The defect edges were debeveled with a #15 scalpel blade.  The primary defect was closed partially with a complex linear closure.  Given the location of the remaining defect, shape of the defect and the proximity to free margins a double advancement flap was deemed most appropriate for complete closure of the defect.  Using a sterile surgical marker, an appropriate advancement flap was drawn incorporating the defect and placing the expected incisions within the relaxed skin tension lines where possible.    The area thus outlined was incised deep to adipose tissue with a #15 scalpel blade.  The skin margins were undermined to an appropriate distance in all directions utilizing iris scissors.
Anesthesia Volume In Cc: 21
Melolabial Interpolation Flap Text: A decision was made to reconstruct the defect utilizing an interpolation axial flap and a staged reconstruction.  A telfa template was made of the defect.  This telfa template was then used to outline the melolabial interpolation flap.  The donor area for the pedicle flap was then injected with anesthesia.  The flap was excised through the skin and subcutaneous tissue down to the layer of the underlying musculature.  The pedicle flap was carefully excised within this deep plane to maintain its blood supply.  The edges of the donor site were undermined.   The donor site was closed in a primary fashion.  The pedicle was then rotated into position and sutured.  Once the tube was sutured into place, adequate blood supply was confirmed with blanching and refill.  The pedicle was then wrapped with xeroform gauze and dressed appropriately with a telfa and gauze bandage to ensure continued blood supply and protect the attached pedicle.
O-L Flap Text: The defect edges were debeveled with a #15 scalpel blade.  Given the location of the defect, shape of the defect and the proximity to free margins an O-L flap was deemed most appropriate.  Using a sterile surgical marker, an appropriate advancement flap was drawn incorporating the defect and placing the expected incisions within the relaxed skin tension lines where possible.    The area thus outlined was incised deep to adipose tissue with a #15 scalpel blade.  The skin margins were undermined to an appropriate distance in all directions utilizing iris scissors.
Complex Repair And Dermal Autograft Text: The defect edges were debeveled with a #15 scalpel blade.  The primary defect was closed partially with a complex linear closure.  Given the location of the defect, shape of the defect and the proximity to free margins an dermal autograft was deemed most appropriate to repair the remaining defect.  The graft was trimmed to fit the size of the remaining defect.  The graft was then placed in the primary defect, oriented appropriately, and sutured into place.
Bilateral Helical Rim Advancement Flap Text: The defect edges were debeveled with a #15 blade scalpel.  Given the location of the defect and the proximity to free margins (helical rim) a bilateral helical rim advancement flap was deemed most appropriate.  Using a sterile surgical marker, the appropriate advancement flaps were drawn incorporating the defect and placing the expected incisions between the helical rim and antihelix where possible.  The area thus outlined was incised through and through with a #15 scalpel blade.  With a skin hook and iris scissors, the flaps were gently and sharply undermined and freed up.
O-Z Flap Text: The defect edges were debeveled with a #15 scalpel blade.  Given the location of the defect, shape of the defect and the proximity to free margins an O-Z flap was deemed most appropriate.  Using a sterile surgical marker, an appropriate transposition flap was drawn incorporating the defect and placing the expected incisions within the relaxed skin tension lines where possible. The area thus outlined was incised deep to adipose tissue with a #15 scalpel blade.  The skin margins were undermined to an appropriate distance in all directions utilizing iris scissors.
Ear Star Wedge Flap Text: The defect edges were debeveled with a #15 blade scalpel.  Given the location of the defect and the proximity to free margins (helical rim) an ear star wedge flap was deemed most appropriate.  Using a sterile surgical marker, the appropriate flap was drawn incorporating the defect and placing the expected incisions between the helical rim and antihelix where possible.  The area thus outlined was incised through and through with a #15 scalpel blade.
Complex Repair And Modified Advancement Flap Text: The defect edges were debeveled with a #15 scalpel blade.  The primary defect was closed partially with a complex linear closure.  Given the location of the remaining defect, shape of the defect and the proximity to free margins a modified advancement flap was deemed most appropriate for complete closure of the defect.  Using a sterile surgical marker, an appropriate advancement flap was drawn incorporating the defect and placing the expected incisions within the relaxed skin tension lines where possible.    The area thus outlined was incised deep to adipose tissue with a #15 scalpel blade.  The skin margins were undermined to an appropriate distance in all directions utilizing iris scissors.
Mastoid Interpolation Flap Text: A decision was made to reconstruct the defect utilizing an interpolation axial flap and a staged reconstruction.  A telfa template was made of the defect.  This telfa template was then used to outline the mastoid interpolation flap.  The donor area for the pedicle flap was then injected with anesthesia.  The flap was excised through the skin and subcutaneous tissue down to the layer of the underlying musculature.  The pedicle flap was carefully excised within this deep plane to maintain its blood supply.  The edges of the donor site were undermined.   The donor site was closed in a primary fashion.  The pedicle was then rotated into position and sutured.  Once the tube was sutured into place, adequate blood supply was confirmed with blanching and refill.  The pedicle was then wrapped with xeroform gauze and dressed appropriately with a telfa and gauze bandage to ensure continued blood supply and protect the attached pedicle.
Intermediate / Complex Repair - Final Wound Length In Cm: 2.8
Complex Repair And Tissue Cultured Epidermal Autograft Text: The defect edges were debeveled with a #15 scalpel blade.  The primary defect was closed partially with a complex linear closure.  Given the location of the defect, shape of the defect and the proximity to free margins an tissue cultured epidermal autograft was deemed most appropriate to repair the remaining defect.  The graft was trimmed to fit the size of the remaining defect.  The graft was then placed in the primary defect, oriented appropriately, and sutured into place.
Wound Care: Petrolatum
Banner Transposition Flap Text: The defect edges were debeveled with a #15 scalpel blade.  Given the location of the defect and the proximity to free margins a Banner transposition flap was deemed most appropriate.  Using a sterile surgical marker, an appropriate flap drawn around the defect. The area thus outlined was incised deep to adipose tissue with a #15 scalpel blade.  The skin margins were undermined to an appropriate distance in all directions utilizing iris scissors.
Curvilinear Excision Additional Text (Leave Blank If You Do Not Want): The margin was drawn around the clinically apparent lesion.  A curvilinear shape was then drawn on the skin incorporating the lesion and margins.  Incisions were then made along these lines to the appropriate tissue plane and the lesion was extirpated.
Complex Repair And Xenograft Text: The defect edges were debeveled with a #15 scalpel blade.  The primary defect was closed partially with a complex linear closure.  Given the location of the defect, shape of the defect and the proximity to free margins a xenograft was deemed most appropriate to repair the remaining defect.  The graft was trimmed to fit the size of the remaining defect.  The graft was then placed in the primary defect, oriented appropriately, and sutured into place.
Complex Repair And A-T Advancement Flap Text: The defect edges were debeveled with a #15 scalpel blade.  The primary defect was closed partially with a complex linear closure.  Given the location of the remaining defect, shape of the defect and the proximity to free margins an A-T advancement flap was deemed most appropriate for complete closure of the defect.  Using a sterile surgical marker, an appropriate advancement flap was drawn incorporating the defect and placing the expected incisions within the relaxed skin tension lines where possible.    The area thus outlined was incised deep to adipose tissue with a #15 scalpel blade.  The skin margins were undermined to an appropriate distance in all directions utilizing iris scissors.
Double O-Z Flap Text: The defect edges were debeveled with a #15 scalpel blade.  Given the location of the defect, shape of the defect and the proximity to free margins a Double O-Z flap was deemed most appropriate.  Using a sterile surgical marker, an appropriate transposition flap was drawn incorporating the defect and placing the expected incisions within the relaxed skin tension lines where possible. The area thus outlined was incised deep to adipose tissue with a #15 scalpel blade.  The skin margins were undermined to an appropriate distance in all directions utilizing iris scissors.
Lab: [id number]
Fusiform Excision Additional Text (Leave Blank If You Do Not Want): The margin was drawn around the clinically apparent lesion.  A fusiform shape was then drawn on the skin incorporating the lesion and margins.  Incisions were then made along these lines to the appropriate tissue plane and the lesion was extirpated.
Surgeon Performing The Repair (Optional): Dr. Rohan Smith MD
Posterior Auricular Interpolation Flap Text: A decision was made to reconstruct the defect utilizing an interpolation axial flap and a staged reconstruction.  A telfa template was made of the defect.  This telfa template was then used to outline the posterior auricular interpolation flap.  The donor area for the pedicle flap was then injected with anesthesia.  The flap was excised through the skin and subcutaneous tissue down to the layer of the underlying musculature.  The pedicle flap was carefully excised within this deep plane to maintain its blood supply.  The edges of the donor site were undermined.   The donor site was closed in a primary fashion.  The pedicle was then rotated into position and sutured.  Once the tube was sutured into place, adequate blood supply was confirmed with blanching and refill.  The pedicle was then wrapped with xeroform gauze and dressed appropriately with a telfa and gauze bandage to ensure continued blood supply and protect the attached pedicle.
Complex Repair And O-T Advancement Flap Text: The defect edges were debeveled with a #15 scalpel blade.  The primary defect was closed partially with a complex linear closure.  Given the location of the remaining defect, shape of the defect and the proximity to free margins an O-T advancement flap was deemed most appropriate for complete closure of the defect.  Using a sterile surgical marker, an appropriate advancement flap was drawn incorporating the defect and placing the expected incisions within the relaxed skin tension lines where possible.    The area thus outlined was incised deep to adipose tissue with a #15 scalpel blade.  The skin margins were undermined to an appropriate distance in all directions utilizing iris scissors.
Billing Type: Third-Party Bill
Deep Sutures: 4-0 Vicryl
Paramedian Forehead Flap Text: A decision was made to reconstruct the defect utilizing an interpolation axial flap and a staged reconstruction.  A telfa template was made of the defect.  This telfa template was then used to outline the paramedian forehead pedicle flap.  The donor area for the pedicle flap was then injected with anesthesia.  The flap was excised through the skin and subcutaneous tissue down to the layer of the underlying musculature.  The pedicle flap was carefully excised within this deep plane to maintain its blood supply.  The edges of the donor site were undermined.   The donor site was closed in a primary fashion.  The pedicle was then rotated into position and sutured.  Once the tube was sutured into place, adequate blood supply was confirmed with blanching and refill.  The pedicle was then wrapped with xeroform gauze and dressed appropriately with a telfa and gauze bandage to ensure continued blood supply and protect the attached pedicle.
V-Y Flap Text: The defect edges were debeveled with a #15 scalpel blade.  Given the location of the defect, shape of the defect and the proximity to free margins a V-Y flap was deemed most appropriate.  Using a sterile surgical marker, an appropriate advancement flap was drawn incorporating the defect and placing the expected incisions within the relaxed skin tension lines where possible.    The area thus outlined was incised deep to adipose tissue with a #15 scalpel blade.  The skin margins were undermined to an appropriate distance in all directions utilizing iris scissors.
Lab Facility: 
Complex Repair And Skin Substitute Graft Text: The defect edges were debeveled with a #15 scalpel blade.  The primary defect was closed partially with a complex linear closure.  Given the location of the remaining defect, shape of the defect and the proximity to free margins a skin substitute graft was deemed most appropriate to repair the remaining defect.  The graft was trimmed to fit the size of the remaining defect.  The graft was then placed in the primary defect, oriented appropriately, and sutured into place.
Bilobed Flap Text: The defect edges were debeveled with a #15 scalpel blade.  Given the location of the defect and the proximity to free margins a bilobe flap was deemed most appropriate.  Using a sterile surgical marker, an appropriate bilobe flap drawn around the defect.    The area thus outlined was incised deep to adipose tissue with a #15 scalpel blade.  The skin margins were undermined to an appropriate distance in all directions utilizing iris scissors.
Advancement-Rotation Flap Text: The defect edges were debeveled with a #15 scalpel blade.  Given the location of the defect, shape of the defect and the proximity to free margins an advancement-rotation flap was deemed most appropriate.  Using a sterile surgical marker, an appropriate flap was drawn incorporating the defect and placing the expected incisions within the relaxed skin tension lines where possible. The area thus outlined was incised deep to adipose tissue with a #15 scalpel blade.  The skin margins were undermined to an appropriate distance in all directions utilizing iris scissors.
Bilobed Transposition Flap Text: The defect edges were debeveled with a #15 scalpel blade.  Given the location of the defect and the proximity to free margins a bilobed transposition flap was deemed most appropriate.  Using a sterile surgical marker, an appropriate bilobe flap drawn around the defect.    The area thus outlined was incised deep to adipose tissue with a #15 scalpel blade.  The skin margins were undermined to an appropriate distance in all directions utilizing iris scissors.
Dressing: dry sterile dressing
Complex Repair And O-L Flap Text: The defect edges were debeveled with a #15 scalpel blade.  The primary defect was closed partially with a complex linear closure.  Given the location of the remaining defect, shape of the defect and the proximity to free margins an O-L flap was deemed most appropriate for complete closure of the defect.  Using a sterile surgical marker, an appropriate flap was drawn incorporating the defect and placing the expected incisions within the relaxed skin tension lines where possible.    The area thus outlined was incised deep to adipose tissue with a #15 scalpel blade.  The skin margins were undermined to an appropriate distance in all directions utilizing iris scissors.
Path Notes (To The Dermatopathologist): Please check margins. Incidental mole submitted in two fragments within the same bottle
Lip Wedge Excision Repair Text: Given the location of the defect and the proximity to free margins a full thickness wedge repair was deemed most appropriate.  Using a sterile surgical marker, the appropriate repair was drawn incorporating the defect and placing the expected incisions perpendicular to the vermilion border.  The vermilion border was also meticulously outlined to ensure appropriate reapproximation during the repair.  The area thus outlined was incised through and through with a #15 scalpel blade.  The muscularis and dermis were reaproximated with deep sutures following hemostasis. Care was taken to realign the vermilion border before proceeding with the superficial closure.  Once the vermilion was realigned the superfical and mucosal closure was finished.
Elliptical Excision Additional Text (Leave Blank If You Do Not Want): The margin was drawn around the clinically apparent lesion.  An elliptical shape was then drawn on the skin incorporating the lesion and margins.  Incisions were then made along these lines to the appropriate tissue plane and the lesion was extirpated.

## 2019-11-14 ENCOUNTER — APPOINTMENT (RX ONLY)
Dept: URBAN - METROPOLITAN AREA CLINIC 4 | Facility: CLINIC | Age: 79
Setting detail: DERMATOLOGY
End: 2019-11-14

## 2019-11-14 PROBLEM — C44.619 BASAL CELL CARCINOMA OF SKIN OF LEFT UPPER LIMB, INCLUDING SHOULDER: Status: ACTIVE | Noted: 2019-11-14

## 2019-11-14 PROCEDURE — 11602 EXC TR-EXT MAL+MARG 1.1-2 CM: CPT | Mod: 79

## 2019-11-14 PROCEDURE — 12032 INTMD RPR S/A/T/EXT 2.6-7.5: CPT | Mod: 79

## 2019-11-14 PROCEDURE — ? EXCISION

## 2019-11-14 NOTE — PROCEDURE: EXCISION
Secondary Defect Length (In Cm): 0
Complex Repair And V-Y Plasty Text: The defect edges were debeveled with a #15 scalpel blade.  The primary defect was closed partially with a complex linear closure.  Given the location of the remaining defect, shape of the defect and the proximity to free margins a V-Y plasty was deemed most appropriate for complete closure of the defect.  Using a sterile surgical marker, an appropriate advancement flap was drawn incorporating the defect and placing the expected incisions within the relaxed skin tension lines where possible.    The area thus outlined was incised deep to adipose tissue with a #15 scalpel blade.  The skin margins were undermined to an appropriate distance in all directions utilizing iris scissors.
Rhombic Flap Text: The defect edges were debeveled with a #15 scalpel blade.  Given the location of the defect and the proximity to free margins a rhombic flap was deemed most appropriate.  Using a sterile surgical marker, an appropriate rhombic flap was drawn incorporating the defect.    The area thus outlined was incised deep to adipose tissue with a #15 scalpel blade.  The skin margins were undermined to an appropriate distance in all directions utilizing iris scissors.
Melolabial Transposition Flap Text: The defect edges were debeveled with a #15 scalpel blade.  Given the location of the defect and the proximity to free margins a melolabial flap was deemed most appropriate.  Using a sterile surgical marker, an appropriate melolabial transposition flap was drawn incorporating the defect.    The area thus outlined was incised deep to adipose tissue with a #15 scalpel blade.  The skin margins were undermined to an appropriate distance in all directions utilizing iris scissors.
M-Plasty Intermediate Repair Preamble Text (Leave Blank If You Do Not Want): Undermining was performed with blunt dissection.
Complex Repair And Transposition Flap Text: The defect edges were debeveled with a #15 scalpel blade.  The primary defect was closed partially with a complex linear closure.  Given the location of the remaining defect, shape of the defect and the proximity to free margins a transposition flap was deemed most appropriate for complete closure of the defect.  Using a sterile surgical marker, an appropriate advancement flap was drawn incorporating the defect and placing the expected incisions within the relaxed skin tension lines where possible.    The area thus outlined was incised deep to adipose tissue with a #15 scalpel blade.  The skin margins were undermined to an appropriate distance in all directions utilizing iris scissors.
O-Z Plasty Text: The defect edges were debeveled with a #15 scalpel blade.  Given the location of the defect, shape of the defect and the proximity to free margins an O-Z plasty (double transposition flap) was deemed most appropriate.  Using a sterile surgical marker, the appropriate transposition flaps were drawn incorporating the defect and placing the expected incisions within the relaxed skin tension lines where possible.    The area thus outlined was incised deep to adipose tissue with a #15 scalpel blade.  The skin margins were undermined to an appropriate distance in all directions utilizing iris scissors.  Hemostasis was achieved with electrocautery.  The flaps were then transposed into place, one clockwise and the other counterclockwise, and anchored with interrupted buried subcutaneous sutures.
O-L Flap Text: The defect edges were debeveled with a #15 scalpel blade.  Given the location of the defect, shape of the defect and the proximity to free margins an O-L flap was deemed most appropriate.  Using a sterile surgical marker, an appropriate advancement flap was drawn incorporating the defect and placing the expected incisions within the relaxed skin tension lines where possible.    The area thus outlined was incised deep to adipose tissue with a #15 scalpel blade.  The skin margins were undermined to an appropriate distance in all directions utilizing iris scissors.
Show Primary And Secondary Defect Sizes Variable (Do Not Hide If You Perform Flaps Or Graft Closures): Yes
Anesthesia Volume In Cc: 21
Epidermal Autograft Text: The defect edges were debeveled with a #15 scalpel blade.  Given the location of the defect, shape of the defect and the proximity to free margins an epidermal autograft was deemed most appropriate.  Using a sterile surgical marker, the primary defect shape was transferred to the donor site. The epidermal graft was then harvested.  The skin graft was then placed in the primary defect and oriented appropriately.
Complex Repair And M Plasty Text: The defect edges were debeveled with a #15 scalpel blade.  The primary defect was closed partially with a complex linear closure.  Given the location of the remaining defect, shape of the defect and the proximity to free margins an M plasty was deemed most appropriate for complete closure of the defect.  Using a sterile surgical marker, an appropriate advancement flap was drawn incorporating the defect and placing the expected incisions within the relaxed skin tension lines where possible.    The area thus outlined was incised deep to adipose tissue with a #15 scalpel blade.  The skin margins were undermined to an appropriate distance in all directions utilizing iris scissors.
Rhomboid Transposition Flap Text: The defect edges were debeveled with a #15 scalpel blade.  Given the location of the defect and the proximity to free margins a rhomboid transposition flap was deemed most appropriate.  Using a sterile surgical marker, an appropriate rhomboid flap was drawn incorporating the defect.    The area thus outlined was incised deep to adipose tissue with a #15 scalpel blade.  The skin margins were undermined to an appropriate distance in all directions utilizing iris scissors.
S Plasty Text: Given the location and shape of the defect, and the orientation of relaxed skin tension lines, an S-plasty was deemed most appropriate for repair.  Using a sterile surgical marker, the appropriate outline of the S-plasty was drawn, incorporating the defect and placing the expected incisions within the relaxed skin tension lines where possible.  The area thus outlined was incised deep to adipose tissue with a #15 scalpel blade.  The skin margins were undermined to an appropriate distance in all directions utilizing iris scissors. The skin flaps were advanced over the defect.  The opposing margins were then approximated with interrupted buried subcutaneous sutures.
Curvilinear Excision Additional Text (Leave Blank If You Do Not Want): The margin was drawn around the clinically apparent lesion.  A curvilinear shape was then drawn on the skin incorporating the lesion and margins.  Incisions were then made along these lines to the appropriate tissue plane and the lesion was extirpated.
Skin Substitute Text: The defect edges were debeveled with a #15 scalpel blade.  Given the location of the defect, shape of the defect and the proximity to free margins a skin substitute graft was deemed most appropriate.  The graft material was trimmed to fit the size of the defect. The graft was then placed in the primary defect and oriented appropriately.
Double O-Z Flap Text: The defect edges were debeveled with a #15 scalpel blade.  Given the location of the defect, shape of the defect and the proximity to free margins a Double O-Z flap was deemed most appropriate.  Using a sterile surgical marker, an appropriate transposition flap was drawn incorporating the defect and placing the expected incisions within the relaxed skin tension lines where possible. The area thus outlined was incised deep to adipose tissue with a #15 scalpel blade.  The skin margins were undermined to an appropriate distance in all directions utilizing iris scissors.
Dermal Autograft Text: The defect edges were debeveled with a #15 scalpel blade.  Given the location of the defect, shape of the defect and the proximity to free margins a dermal autograft was deemed most appropriate.  Using a sterile surgical marker, the primary defect shape was transferred to the donor site. The area thus outlined was incised deep to adipose tissue with a #15 scalpel blade.  The harvested graft was then trimmed of adipose and epidermal tissue until only dermis was left.  The skin graft was then placed in the primary defect and oriented appropriately.
Size Of Lesion In Cm: 1
Double O-Z Plasty Text: The defect edges were debeveled with a #15 scalpel blade.  Given the location of the defect, shape of the defect and the proximity to free margins a Double O-Z plasty (double transposition flap) was deemed most appropriate.  Using a sterile surgical marker, the appropriate transposition flaps were drawn incorporating the defect and placing the expected incisions within the relaxed skin tension lines where possible. The area thus outlined was incised deep to adipose tissue with a #15 scalpel blade.  The skin margins were undermined to an appropriate distance in all directions utilizing iris scissors.  Hemostasis was achieved with electrocautery.  The flaps were then transposed into place, one clockwise and the other counterclockwise, and anchored with interrupted buried subcutaneous sutures.
Patient Will Remove Sutures At Home?: No
O-Z Flap Text: The defect edges were debeveled with a #15 scalpel blade.  Given the location of the defect, shape of the defect and the proximity to free margins an O-Z flap was deemed most appropriate.  Using a sterile surgical marker, an appropriate transposition flap was drawn incorporating the defect and placing the expected incisions within the relaxed skin tension lines where possible. The area thus outlined was incised deep to adipose tissue with a #15 scalpel blade.  The skin margins were undermined to an appropriate distance in all directions utilizing iris scissors.
Information: Selecting Yes will display possible errors in your note based on the variables you have selected. This validation is only offered as a suggestion for you. PLEASE NOTE THAT THE VALIDATION TEXT WILL BE REMOVED WHEN YOU FINALIZE YOUR NOTE. IF YOU WANT TO FAX A PRELIMINARY NOTE YOU WILL NEED TO TOGGLE THIS TO 'NO' IF YOU DO NOT WANT IT IN YOUR FAXED NOTE.
Bi-Rhombic Flap Text: The defect edges were debeveled with a #15 scalpel blade.  Given the location of the defect and the proximity to free margins a bi-rhombic flap was deemed most appropriate.  Using a sterile surgical marker, an appropriate rhombic flap was drawn incorporating the defect. The area thus outlined was incised deep to adipose tissue with a #15 scalpel blade.  The skin margins were undermined to an appropriate distance in all directions utilizing iris scissors.
Fusiform Excision Additional Text (Leave Blank If You Do Not Want): The margin was drawn around the clinically apparent lesion.  A fusiform shape was then drawn on the skin incorporating the lesion and margins.  Incisions were then made along these lines to the appropriate tissue plane and the lesion was extirpated.
Complex Repair And Double M Plasty Text: The defect edges were debeveled with a #15 scalpel blade.  The primary defect was closed partially with a complex linear closure.  Given the location of the remaining defect, shape of the defect and the proximity to free margins a double M plasty was deemed most appropriate for complete closure of the defect.  Using a sterile surgical marker, an appropriate advancement flap was drawn incorporating the defect and placing the expected incisions within the relaxed skin tension lines where possible.    The area thus outlined was incised deep to adipose tissue with a #15 scalpel blade.  The skin margins were undermined to an appropriate distance in all directions utilizing iris scissors.
V-Y Plasty Text: The defect edges were debeveled with a #15 scalpel blade.  Given the location of the defect, shape of the defect and the proximity to free margins an V-Y advancement flap was deemed most appropriate.  Using a sterile surgical marker, an appropriate advancement flap was drawn incorporating the defect and placing the expected incisions within the relaxed skin tension lines where possible.    The area thus outlined was incised deep to adipose tissue with a #15 scalpel blade.  The skin margins were undermined to an appropriate distance in all directions utilizing iris scissors.
V-Y Flap Text: The defect edges were debeveled with a #15 scalpel blade.  Given the location of the defect, shape of the defect and the proximity to free margins a V-Y flap was deemed most appropriate.  Using a sterile surgical marker, an appropriate advancement flap was drawn incorporating the defect and placing the expected incisions within the relaxed skin tension lines where possible.    The area thus outlined was incised deep to adipose tissue with a #15 scalpel blade.  The skin margins were undermined to an appropriate distance in all directions utilizing iris scissors.
Deep Sutures: 4-0 Vicryl
Tissue Cultured Epidermal Autograft Text: The defect edges were debeveled with a #15 scalpel blade.  Given the location of the defect, shape of the defect and the proximity to free margins a tissue cultured epidermal autograft was deemed most appropriate.  The graft was then trimmed to fit the size of the defect.  The graft was then placed in the primary defect and oriented appropriately.
Size Of Margin In Cm: 0.2
Billing Type: Third-Party Bill
Complex Repair And Ftsg Text: The defect edges were debeveled with a #15 scalpel blade.  The primary defect was closed partially with a complex linear closure.  Given the location of the defect, shape of the defect and the proximity to free margins a full thickness skin graft was deemed most appropriate to repair the remaining defect.  The graft was trimmed to fit the size of the remaining defect.  The graft was then placed in the primary defect, oriented appropriately, and sutured into place.
Wound Care: Petrolatum
Xenograft Text: The defect edges were debeveled with a #15 scalpel blade.  Given the location of the defect, shape of the defect and the proximity to free margins a xenograft was deemed most appropriate.  The graft was then trimmed to fit the size of the defect.  The graft was then placed in the primary defect and oriented appropriately.
Excision Method: Fusiform
Elliptical Excision Additional Text (Leave Blank If You Do Not Want): The margin was drawn around the clinically apparent lesion.  An elliptical shape was then drawn on the skin incorporating the lesion and margins.  Incisions were then made along these lines to the appropriate tissue plane and the lesion was extirpated.
H Plasty Text: Given the location of the defect, shape of the defect and the proximity to free margins a H-plasty was deemed most appropriate for repair.  Using a sterile surgical marker, the appropriate advancement arms of the H-plasty were drawn incorporating the defect and placing the expected incisions within the relaxed skin tension lines where possible. The area thus outlined was incised deep to adipose tissue with a #15 scalpel blade. The skin margins were undermined to an appropriate distance in all directions utilizing iris scissors.  The opposing advancement arms were then advanced into place in opposite direction and anchored with interrupted buried subcutaneous sutures.
Complex Repair And Epidermal Autograft Text: The defect edges were debeveled with a #15 scalpel blade.  The primary defect was closed partially with a complex linear closure.  Given the location of the defect, shape of the defect and the proximity to free margins an epidermal autograft was deemed most appropriate to repair the remaining defect.  The graft was trimmed to fit the size of the remaining defect.  The graft was then placed in the primary defect, oriented appropriately, and sutured into place.
Dressing: dry sterile dressing
Complex Repair And Split-Thickness Skin Graft Text: The defect edges were debeveled with a #15 scalpel blade.  The primary defect was closed partially with a complex linear closure.  Given the location of the defect, shape of the defect and the proximity to free margins a split thickness skin graft was deemed most appropriate to repair the remaining defect.  The graft was trimmed to fit the size of the remaining defect.  The graft was then placed in the primary defect, oriented appropriately, and sutured into place.
Helical Rim Advancement Flap Text: The defect edges were debeveled with a #15 blade scalpel.  Given the location of the defect and the proximity to free margins (helical rim) a double helical rim advancement flap was deemed most appropriate.  Using a sterile surgical marker, the appropriate advancement flaps were drawn incorporating the defect and placing the expected incisions between the helical rim and antihelix where possible.  The area thus outlined was incised through and through with a #15 scalpel blade.  With a skin hook and iris scissors, the flaps were gently and sharply undermined and freed up.
Complex Repair And W Plasty Text: The defect edges were debeveled with a #15 scalpel blade.  The primary defect was closed partially with a complex linear closure.  Given the location of the remaining defect, shape of the defect and the proximity to free margins a W plasty was deemed most appropriate for complete closure of the defect.  Using a sterile surgical marker, an appropriate advancement flap was drawn incorporating the defect and placing the expected incisions within the relaxed skin tension lines where possible.    The area thus outlined was incised deep to adipose tissue with a #15 scalpel blade.  The skin margins were undermined to an appropriate distance in all directions utilizing iris scissors.
Complex Repair And Dermal Autograft Text: The defect edges were debeveled with a #15 scalpel blade.  The primary defect was closed partially with a complex linear closure.  Given the location of the defect, shape of the defect and the proximity to free margins an dermal autograft was deemed most appropriate to repair the remaining defect.  The graft was trimmed to fit the size of the remaining defect.  The graft was then placed in the primary defect, oriented appropriately, and sutured into place.
Complex Repair And Z Plasty Text: The defect edges were debeveled with a #15 scalpel blade.  The primary defect was closed partially with a complex linear closure.  Given the location of the remaining defect, shape of the defect and the proximity to free margins a Z plasty was deemed most appropriate for complete closure of the defect.  Using a sterile surgical marker, an appropriate advancement flap was drawn incorporating the defect and placing the expected incisions within the relaxed skin tension lines where possible.    The area thus outlined was incised deep to adipose tissue with a #15 scalpel blade.  The skin margins were undermined to an appropriate distance in all directions utilizing iris scissors.
Bilateral Helical Rim Advancement Flap Text: The defect edges were debeveled with a #15 blade scalpel.  Given the location of the defect and the proximity to free margins (helical rim) a bilateral helical rim advancement flap was deemed most appropriate.  Using a sterile surgical marker, the appropriate advancement flaps were drawn incorporating the defect and placing the expected incisions between the helical rim and antihelix where possible.  The area thus outlined was incised through and through with a #15 scalpel blade.  With a skin hook and iris scissors, the flaps were gently and sharply undermined and freed up.
Saucerization Excision Additional Text (Leave Blank If You Do Not Want): The margin was drawn around the clinically apparent lesion.  Incisions were then made along these lines, in a tangential fashion, to the appropriate tissue plane and the lesion was extirpated.
W Plasty Text: The lesion was extirpated to the level of the fat with a #15 scalpel blade.  Given the location of the defect, shape of the defect and the proximity to free margins a W-plasty was deemed most appropriate for repair.  Using a sterile surgical marker, the appropriate transposition arms of the W-plasty were drawn incorporating the defect and placing the expected incisions within the relaxed skin tension lines where possible.    The area thus outlined was incised deep to adipose tissue with a #15 scalpel blade.  The skin margins were undermined to an appropriate distance in all directions utilizing iris scissors.  The opposing transposition arms were then transposed into place in opposite direction and anchored with interrupted buried subcutaneous sutures.
Hemostasis: Pressure and Electrodesiccation
Purse String (Intermediate) Text: Given the location of the defect and the characteristics of the surrounding skin a purse string intermediate closure was deemed most appropriate.  Undermining was performed circumfirentially around the surgical defect.  A purse string suture was then placed and tightened.
Complex Repair And Tissue Cultured Epidermal Autograft Text: The defect edges were debeveled with a #15 scalpel blade.  The primary defect was closed partially with a complex linear closure.  Given the location of the defect, shape of the defect and the proximity to free margins an tissue cultured epidermal autograft was deemed most appropriate to repair the remaining defect.  The graft was trimmed to fit the size of the remaining defect.  The graft was then placed in the primary defect, oriented appropriately, and sutured into place.
Banner Transposition Flap Text: The defect edges were debeveled with a #15 scalpel blade.  Given the location of the defect and the proximity to free margins a Banner transposition flap was deemed most appropriate.  Using a sterile surgical marker, an appropriate flap drawn around the defect. The area thus outlined was incised deep to adipose tissue with a #15 scalpel blade.  The skin margins were undermined to an appropriate distance in all directions utilizing iris scissors.
Cheek Interpolation Flap Text: A decision was made to reconstruct the defect utilizing an interpolation axial flap and a staged reconstruction.  A telfa template was made of the defect.  This telfa template was then used to outline the Cheek Interpolation flap.  The donor area for the pedicle flap was then injected with anesthesia.  The flap was excised through the skin and subcutaneous tissue down to the layer of the underlying musculature.  The interpolation flap was carefully excised within this deep plane to maintain its blood supply.  The edges of the donor site were undermined.   The donor site was closed in a primary fashion.  The pedicle was then rotated into position and sutured.  Once the tube was sutured into place, adequate blood supply was confirmed with blanching and refill.  The pedicle was then wrapped with xeroform gauze and dressed appropriately with a telfa and gauze bandage to ensure continued blood supply and protect the attached pedicle.
Ear Star Wedge Flap Text: The defect edges were debeveled with a #15 blade scalpel.  Given the location of the defect and the proximity to free margins (helical rim) an ear star wedge flap was deemed most appropriate.  Using a sterile surgical marker, the appropriate flap was drawn incorporating the defect and placing the expected incisions between the helical rim and antihelix where possible.  The area thus outlined was incised through and through with a #15 scalpel blade.
Complex Repair And Dorsal Nasal Flap Text: The defect edges were debeveled with a #15 scalpel blade.  The primary defect was closed partially with a complex linear closure.  Given the location of the remaining defect, shape of the defect and the proximity to free margins a dorsal nasal flap was deemed most appropriate for complete closure of the defect.  Using a sterile surgical marker, an appropriate flap was drawn incorporating the defect and placing the expected incisions within the relaxed skin tension lines where possible.    The area thus outlined was incised deep to adipose tissue with a #15 scalpel blade.  The skin margins were undermined to an appropriate distance in all directions utilizing iris scissors.
Z Plasty Text: The lesion was extirpated to the level of the fat with a #15 scalpel blade.  Given the location of the defect, shape of the defect and the proximity to free margins a Z-plasty was deemed most appropriate for repair.  Using a sterile surgical marker, the appropriate transposition arms of the Z-plasty were drawn incorporating the defect and placing the expected incisions within the relaxed skin tension lines where possible.    The area thus outlined was incised deep to adipose tissue with a #15 scalpel blade.  The skin margins were undermined to an appropriate distance in all directions utilizing iris scissors.  The opposing transposition arms were then transposed into place in opposite direction and anchored with interrupted buried subcutaneous sutures.
Slit Excision Additional Text (Leave Blank If You Do Not Want): A linear line was drawn on the skin overlying the lesion. An incision was made slowly until the lesion was visualized.  Once visualized, the lesion was removed with blunt dissection.
Purse String (Simple) Text: Given the location of the defect and the characteristics of the surrounding skin a purse string simple closure was deemed most appropriate.  Undermining was performed circumferentially around the surgical defect.  A purse string suture was then placed and tightened.
Perilesional Excision Additional Text (Leave Blank If You Do Not Want): The margin was drawn around the clinically apparent lesion. Incisions were then made along these lines to the appropriate tissue plane and the lesion was extirpated.
Cheek-To-Nose Interpolation Flap Text: A decision was made to reconstruct the defect utilizing an interpolation axial flap and a staged reconstruction.  A telfa template was made of the defect.  This telfa template was then used to outline the Cheek-To-Nose Interpolation flap.  The donor area for the pedicle flap was then injected with anesthesia.  The flap was excised through the skin and subcutaneous tissue down to the layer of the underlying musculature.  The interpolation flap was carefully excised within this deep plane to maintain its blood supply.  The edges of the donor site were undermined.   The donor site was closed in a primary fashion.  The pedicle was then rotated into position and sutured.  Once the tube was sutured into place, adequate blood supply was confirmed with blanching and refill.  The pedicle was then wrapped with xeroform gauze and dressed appropriately with a telfa and gauze bandage to ensure continued blood supply and protect the attached pedicle.
Partial Purse String (Simple) Text: Given the location of the defect and the characteristics of the surrounding skin a simple purse string closure was deemed most appropriate.  Undermining was performed circumferentially around the surgical defect.  A purse string suture was then placed and tightened. Wound tension of the circular defect prevented complete closure of the wound.
Lab Facility: 
Estimated Blood Loss (Cc): minimal
Excisional Biopsy Additional Text (Leave Blank If You Do Not Want): The margin was drawn around the clinically apparent lesion. An elliptical shape was then drawn on the skin incorporating the lesion and margins.  Incisions were then made along these lines to the appropriate tissue plane and the lesion was extirpated.
Lab: [id number]
Partial Purse String (Intermediate) Text: Given the location of the defect and the characteristics of the surrounding skin an intermediate purse string closure was deemed most appropriate.  Undermining was performed circumferentially around the surgical defect.  A purse string suture was then placed and tightened. Wound tension of the circular defect prevented complete closure of the wound.
Complex Repair And Xenograft Text: The defect edges were debeveled with a #15 scalpel blade.  The primary defect was closed partially with a complex linear closure.  Given the location of the defect, shape of the defect and the proximity to free margins a xenograft was deemed most appropriate to repair the remaining defect.  The graft was trimmed to fit the size of the remaining defect.  The graft was then placed in the primary defect, oriented appropriately, and sutured into place.
Interpolation Flap Text: A decision was made to reconstruct the defect utilizing an interpolation axial flap and a staged reconstruction.  A telfa template was made of the defect.  This telfa template was then used to outline the interpolation flap.  The donor area for the pedicle flap was then injected with anesthesia.  The flap was excised through the skin and subcutaneous tissue down to the layer of the underlying musculature.  The interpolation flap was carefully excised within this deep plane to maintain its blood supply.  The edges of the donor site were undermined.   The donor site was closed in a primary fashion.  The pedicle was then rotated into position and sutured.  Once the tube was sutured into place, adequate blood supply was confirmed with blanching and refill.  The pedicle was then wrapped with xeroform gauze and dressed appropriately with a telfa and gauze bandage to ensure continued blood supply and protect the attached pedicle.
Advancement-Rotation Flap Text: The defect edges were debeveled with a #15 scalpel blade.  Given the location of the defect, shape of the defect and the proximity to free margins an advancement-rotation flap was deemed most appropriate.  Using a sterile surgical marker, an appropriate flap was drawn incorporating the defect and placing the expected incisions within the relaxed skin tension lines where possible. The area thus outlined was incised deep to adipose tissue with a #15 scalpel blade.  The skin margins were undermined to an appropriate distance in all directions utilizing iris scissors.
Complex Repair And Single Advancement Flap Text: The defect edges were debeveled with a #15 scalpel blade.  The primary defect was closed partially with a complex linear closure.  Given the location of the remaining defect, shape of the defect and the proximity to free margins a single advancement flap was deemed most appropriate for complete closure of the defect.  Using a sterile surgical marker, an appropriate advancement flap was drawn incorporating the defect and placing the expected incisions within the relaxed skin tension lines where possible.    The area thus outlined was incised deep to adipose tissue with a #15 scalpel blade.  The skin margins were undermined to an appropriate distance in all directions utilizing iris scissors.
Path Notes (To The Dermatopathologist): Please check margins.
Bilobed Transposition Flap Text: The defect edges were debeveled with a #15 scalpel blade.  Given the location of the defect and the proximity to free margins a bilobed transposition flap was deemed most appropriate.  Using a sterile surgical marker, an appropriate bilobe flap drawn around the defect.    The area thus outlined was incised deep to adipose tissue with a #15 scalpel blade.  The skin margins were undermined to an appropriate distance in all directions utilizing iris scissors.
Excision Depth: adipose tissue
Repair Type: Intermediate
Positioning (Leave Blank If You Do Not Want): The patient was placed in a comfortable position exposing the surgical site.
Bilobed Flap Text: The defect edges were debeveled with a #15 scalpel blade.  Given the location of the defect and the proximity to free margins a bilobe flap was deemed most appropriate.  Using a sterile surgical marker, an appropriate bilobe flap drawn around the defect.    The area thus outlined was incised deep to adipose tissue with a #15 scalpel blade.  The skin margins were undermined to an appropriate distance in all directions utilizing iris scissors.
Complex Repair And Skin Substitute Graft Text: The defect edges were debeveled with a #15 scalpel blade.  The primary defect was closed partially with a complex linear closure.  Given the location of the remaining defect, shape of the defect and the proximity to free margins a skin substitute graft was deemed most appropriate to repair the remaining defect.  The graft was trimmed to fit the size of the remaining defect.  The graft was then placed in the primary defect, oriented appropriately, and sutured into place.
Where Do You Want The Question To Include Opioid Counseling Located?: Case Summary Tab
Mercedes Flap Text: The defect edges were debeveled with a #15 scalpel blade.  Given the location of the defect, shape of the defect and the proximity to free margins a Mercedes flap was deemed most appropriate.  Using a sterile surgical marker, an appropriate advancement flap was drawn incorporating the defect and placing the expected incisions within the relaxed skin tension lines where possible. The area thus outlined was incised deep to adipose tissue with a #15 scalpel blade.  The skin margins were undermined to an appropriate distance in all directions utilizing iris scissors.
Trilobed Flap Text: The defect edges were debeveled with a #15 scalpel blade.  Given the location of the defect and the proximity to free margins a trilobed flap was deemed most appropriate.  Using a sterile surgical marker, an appropriate trilobed flap drawn around the defect.    The area thus outlined was incised deep to adipose tissue with a #15 scalpel blade.  The skin margins were undermined to an appropriate distance in all directions utilizing iris scissors.
Complex Repair And Double Advancement Flap Text: The defect edges were debeveled with a #15 scalpel blade.  The primary defect was closed partially with a complex linear closure.  Given the location of the remaining defect, shape of the defect and the proximity to free margins a double advancement flap was deemed most appropriate for complete closure of the defect.  Using a sterile surgical marker, an appropriate advancement flap was drawn incorporating the defect and placing the expected incisions within the relaxed skin tension lines where possible.    The area thus outlined was incised deep to adipose tissue with a #15 scalpel blade.  The skin margins were undermined to an appropriate distance in all directions utilizing iris scissors.
Pre-Excision Curettage Text (Leave Blank If You Do Not Want): Prior to drawing the surgical margin the visible lesion was removed with electrodesiccation and curettage to clearly define the lesion size.
Melolabial Interpolation Flap Text: A decision was made to reconstruct the defect utilizing an interpolation axial flap and a staged reconstruction.  A telfa template was made of the defect.  This telfa template was then used to outline the melolabial interpolation flap.  The donor area for the pedicle flap was then injected with anesthesia.  The flap was excised through the skin and subcutaneous tissue down to the layer of the underlying musculature.  The pedicle flap was carefully excised within this deep plane to maintain its blood supply.  The edges of the donor site were undermined.   The donor site was closed in a primary fashion.  The pedicle was then rotated into position and sutured.  Once the tube was sutured into place, adequate blood supply was confirmed with blanching and refill.  The pedicle was then wrapped with xeroform gauze and dressed appropriately with a telfa and gauze bandage to ensure continued blood supply and protect the attached pedicle.
Repair Performed By Another Provider Text (Leave Blank If You Do Not Want): After the tissue was excised the defect was repaired by another provider.
Complex Repair Preamble Text (Leave Blank If You Do Not Want): Extensive wide undermining was performed.
Dorsal Nasal Flap Text: The defect edges were debeveled with a #15 scalpel blade.  Given the location of the defect and the proximity to free margins a dorsal nasal flap was deemed most appropriate.  Using a sterile surgical marker, an appropriate dorsal nasal flap was drawn around the defect.    The area thus outlined was incised deep to adipose tissue with a #15 scalpel blade.  The skin margins were undermined to an appropriate distance in all directions utilizing iris scissors.
Scalpel Size: 15 blade
Detail Level: Detailed
No Repair - Repaired With Adjacent Surgical Defect Text (Leave Blank If You Do Not Want): After the excision the defect was repaired concurrently with another surgical defect which was in close approximation.
Complex Repair And Modified Advancement Flap Text: The defect edges were debeveled with a #15 scalpel blade.  The primary defect was closed partially with a complex linear closure.  Given the location of the remaining defect, shape of the defect and the proximity to free margins a modified advancement flap was deemed most appropriate for complete closure of the defect.  Using a sterile surgical marker, an appropriate advancement flap was drawn incorporating the defect and placing the expected incisions within the relaxed skin tension lines where possible.    The area thus outlined was incised deep to adipose tissue with a #15 scalpel blade.  The skin margins were undermined to an appropriate distance in all directions utilizing iris scissors.
Intermediate / Complex Repair - Final Wound Length In Cm: 3
Mastoid Interpolation Flap Text: A decision was made to reconstruct the defect utilizing an interpolation axial flap and a staged reconstruction.  A telfa template was made of the defect.  This telfa template was then used to outline the mastoid interpolation flap.  The donor area for the pedicle flap was then injected with anesthesia.  The flap was excised through the skin and subcutaneous tissue down to the layer of the underlying musculature.  The pedicle flap was carefully excised within this deep plane to maintain its blood supply.  The edges of the donor site were undermined.   The donor site was closed in a primary fashion.  The pedicle was then rotated into position and sutured.  Once the tube was sutured into place, adequate blood supply was confirmed with blanching and refill.  The pedicle was then wrapped with xeroform gauze and dressed appropriately with a telfa and gauze bandage to ensure continued blood supply and protect the attached pedicle.
Modified Advancement Flap Text: The defect edges were debeveled with a #15 scalpel blade.  Given the location of the defect, shape of the defect and the proximity to free margins a modified advancement flap was deemed most appropriate.  Using a sterile surgical marker, an appropriate advancement flap was drawn incorporating the defect and placing the expected incisions within the relaxed skin tension lines where possible.    The area thus outlined was incised deep to adipose tissue with a #15 scalpel blade.  The skin margins were undermined to an appropriate distance in all directions utilizing iris scissors.
Epidermal Sutures: 4-0 Prolene
Consent was obtained from the patient. The risks and benefits to therapy were discussed in detail. Specifically, the risks of infection, scarring, bleeding, prolonged wound healing, incomplete removal, allergy to anesthesia, nerve injury and recurrence were addressed. Prior to the procedure, the treatment site was clearly identified and confirmed by the patient. All components of Universal Protocol/PAUSE Rule completed.
Paramedian Forehead Flap Text: A decision was made to reconstruct the defect utilizing an interpolation axial flap and a staged reconstruction.  A telfa template was made of the defect.  This telfa template was then used to outline the paramedian forehead pedicle flap.  The donor area for the pedicle flap was then injected with anesthesia.  The flap was excised through the skin and subcutaneous tissue down to the layer of the underlying musculature.  The pedicle flap was carefully excised within this deep plane to maintain its blood supply.  The edges of the donor site were undermined.   The donor site was closed in a primary fashion.  The pedicle was then rotated into position and sutured.  Once the tube was sutured into place, adequate blood supply was confirmed with blanching and refill.  The pedicle was then wrapped with xeroform gauze and dressed appropriately with a telfa and gauze bandage to ensure continued blood supply and protect the attached pedicle.
Surgeon Performing The Repair (Optional): Dr. Rohan Smith MD
Advancement Flap (Single) Text: The defect edges were debeveled with a #15 scalpel blade.  Given the location of the defect and the proximity to free margins a single advancement flap was deemed most appropriate.  Using a sterile surgical marker, an appropriate advancement flap was drawn incorporating the defect and placing the expected incisions within the relaxed skin tension lines where possible.    The area thus outlined was incised deep to adipose tissue with a #15 scalpel blade.  The skin margins were undermined to an appropriate distance in all directions utilizing iris scissors.
Posterior Auricular Interpolation Flap Text: A decision was made to reconstruct the defect utilizing an interpolation axial flap and a staged reconstruction.  A telfa template was made of the defect.  This telfa template was then used to outline the posterior auricular interpolation flap.  The donor area for the pedicle flap was then injected with anesthesia.  The flap was excised through the skin and subcutaneous tissue down to the layer of the underlying musculature.  The pedicle flap was carefully excised within this deep plane to maintain its blood supply.  The edges of the donor site were undermined.   The donor site was closed in a primary fashion.  The pedicle was then rotated into position and sutured.  Once the tube was sutured into place, adequate blood supply was confirmed with blanching and refill.  The pedicle was then wrapped with xeroform gauze and dressed appropriately with a telfa and gauze bandage to ensure continued blood supply and protect the attached pedicle.
Mucosal Advancement Flap Text: Given the location of the defect, shape of the defect and the proximity to free margins a mucosal advancement flap was deemed most appropriate. Incisions were made with a 15 blade scalpel in the appropriate fashion along the cutaneous vermilion border and the mucosal lip. The remaining actinically damaged mucosal tissue was excised.  The mucosal advancement flap was then elevated to the gingival sulcus with care taken to preserve the neurovascular structures and advanced into the primary defect. Care was taken to ensure that precise realignment of the vermilion border was achieved.
Complex Repair And A-T Advancement Flap Text: The defect edges were debeveled with a #15 scalpel blade.  The primary defect was closed partially with a complex linear closure.  Given the location of the remaining defect, shape of the defect and the proximity to free margins an A-T advancement flap was deemed most appropriate for complete closure of the defect.  Using a sterile surgical marker, an appropriate advancement flap was drawn incorporating the defect and placing the expected incisions within the relaxed skin tension lines where possible.    The area thus outlined was incised deep to adipose tissue with a #15 scalpel blade.  The skin margins were undermined to an appropriate distance in all directions utilizing iris scissors.
Island Pedicle Flap Text: The defect edges were debeveled with a #15 scalpel blade.  Given the location of the defect, shape of the defect and the proximity to free margins an island pedicle advancement flap was deemed most appropriate.  Using a sterile surgical marker, an appropriate advancement flap was drawn incorporating the defect, outlining the appropriate donor tissue and placing the expected incisions within the relaxed skin tension lines where possible.    The area thus outlined was incised deep to adipose tissue with a #15 scalpel blade.  The skin margins were undermined to an appropriate distance in all directions around the primary defect and laterally outward around the island pedicle utilizing iris scissors.  There was minimal undermining beneath the pedicle flap.
Complex Repair And O-L Flap Text: The defect edges were debeveled with a #15 scalpel blade.  The primary defect was closed partially with a complex linear closure.  Given the location of the remaining defect, shape of the defect and the proximity to free margins an O-L flap was deemed most appropriate for complete closure of the defect.  Using a sterile surgical marker, an appropriate flap was drawn incorporating the defect and placing the expected incisions within the relaxed skin tension lines where possible.    The area thus outlined was incised deep to adipose tissue with a #15 scalpel blade.  The skin margins were undermined to an appropriate distance in all directions utilizing iris scissors.
Lip Wedge Excision Repair Text: Given the location of the defect and the proximity to free margins a full thickness wedge repair was deemed most appropriate.  Using a sterile surgical marker, the appropriate repair was drawn incorporating the defect and placing the expected incisions perpendicular to the vermilion border.  The vermilion border was also meticulously outlined to ensure appropriate reapproximation during the repair.  The area thus outlined was incised through and through with a #15 scalpel blade.  The muscularis and dermis were reaproximated with deep sutures following hemostasis. Care was taken to realign the vermilion border before proceeding with the superficial closure.  Once the vermilion was realigned the superfical and mucosal closure was finished.
Rotation Flap Text: The defect edges were debeveled with a #15 scalpel blade.  Given the location of the defect, shape of the defect and the proximity to free margins a rotation flap was deemed most appropriate.  Using a sterile surgical marker, an appropriate rotation flap was drawn incorporating the defect and placing the expected incisions within the relaxed skin tension lines where possible.    The area thus outlined was incised deep to adipose tissue with a #15 scalpel blade.  The skin margins were undermined to an appropriate distance in all directions utilizing iris scissors.
Hatchet Flap Text: The defect edges were debeveled with a #15 scalpel blade.  Given the location of the defect, shape of the defect and the proximity to free margins a hatchet flap was deemed most appropriate.  Using a sterile surgical marker, an appropriate hatchet flap was drawn incorporating the defect and placing the expected incisions within the relaxed skin tension lines where possible.    The area thus outlined was incised deep to adipose tissue with a #15 scalpel blade.  The skin margins were undermined to an appropriate distance in all directions utilizing iris scissors.
Additional Epidermal Sutures: 5-0 Prolene
Island Pedicle Flap With Canthal Suspension Text: The defect edges were debeveled with a #15 scalpel blade.  Given the location of the defect, shape of the defect and the proximity to free margins an island pedicle advancement flap was deemed most appropriate.  Using a sterile surgical marker, an appropriate advancement flap was drawn incorporating the defect, outlining the appropriate donor tissue and placing the expected incisions within the relaxed skin tension lines where possible. The area thus outlined was incised deep to adipose tissue with a #15 scalpel blade.  The skin margins were undermined to an appropriate distance in all directions around the primary defect and laterally outward around the island pedicle utilizing iris scissors.  There was minimal undermining beneath the pedicle flap. A suspension suture was placed in the canthal tendon to prevent tension and prevent ectropion.
Complex Repair And O-T Advancement Flap Text: The defect edges were debeveled with a #15 scalpel blade.  The primary defect was closed partially with a complex linear closure.  Given the location of the remaining defect, shape of the defect and the proximity to free margins an O-T advancement flap was deemed most appropriate for complete closure of the defect.  Using a sterile surgical marker, an appropriate advancement flap was drawn incorporating the defect and placing the expected incisions within the relaxed skin tension lines where possible.    The area thus outlined was incised deep to adipose tissue with a #15 scalpel blade.  The skin margins were undermined to an appropriate distance in all directions utilizing iris scissors.
Advancement Flap (Double) Text: The defect edges were debeveled with a #15 scalpel blade.  Given the location of the defect and the proximity to free margins a double advancement flap was deemed most appropriate.  Using a sterile surgical marker, the appropriate advancement flaps were drawn incorporating the defect and placing the expected incisions within the relaxed skin tension lines where possible.    The area thus outlined was incised deep to adipose tissue with a #15 scalpel blade.  The skin margins were undermined to an appropriate distance in all directions utilizing iris scissors.
Ftsg Text: The defect edges were debeveled with a #15 scalpel blade.  Given the location of the defect, shape of the defect and the proximity to free margins a full thickness skin graft was deemed most appropriate.  Using a sterile surgical marker, the primary defect shape was transferred to the donor site. The area thus outlined was incised deep to adipose tissue with a #15 scalpel blade.  The harvested graft was then trimmed of adipose tissue until only dermis and epidermis was left.  The skin margins of the secondary defect were undermined to an appropriate distance in all directions utilizing iris scissors.  The secondary defect was closed with interrupted buried subcutaneous sutures.  The skin edges were then re-apposed with running  sutures.  The skin graft was then placed in the primary defect and oriented appropriately.
Complex Repair And Bilobe Flap Text: The defect edges were debeveled with a #15 scalpel blade.  The primary defect was closed partially with a complex linear closure.  Given the location of the remaining defect, shape of the defect and the proximity to free margins a bilobe flap was deemed most appropriate for complete closure of the defect.  Using a sterile surgical marker, an appropriate advancement flap was drawn incorporating the defect and placing the expected incisions within the relaxed skin tension lines where possible.    The area thus outlined was incised deep to adipose tissue with a #15 scalpel blade.  The skin margins were undermined to an appropriate distance in all directions utilizing iris scissors.
Spiral Flap Text: The defect edges were debeveled with a #15 scalpel blade.  Given the location of the defect, shape of the defect and the proximity to free margins a spiral flap was deemed most appropriate.  Using a sterile surgical marker, an appropriate rotation flap was drawn incorporating the defect and placing the expected incisions within the relaxed skin tension lines where possible. The area thus outlined was incised deep to adipose tissue with a #15 scalpel blade.  The skin margins were undermined to an appropriate distance in all directions utilizing iris scissors.
Double Island Pedicle Flap Text: The defect edges were debeveled with a #15 scalpel blade.  Given the location of the defect, shape of the defect and the proximity to free margins a double island pedicle advancement flap was deemed most appropriate.  Using a sterile surgical marker, an appropriate advancement flap was drawn incorporating the defect, outlining the appropriate donor tissue and placing the expected incisions within the relaxed skin tension lines where possible.    The area thus outlined was incised deep to adipose tissue with a #15 scalpel blade.  The skin margins were undermined to an appropriate distance in all directions around the primary defect and laterally outward around the island pedicle utilizing iris scissors.  There was minimal undermining beneath the pedicle flap.
Alar Island Pedicle Flap Text: The defect edges were debeveled with a #15 scalpel blade.  Given the location of the defect, shape of the defect and the proximity to the alar rim an island pedicle advancement flap was deemed most appropriate.  Using a sterile surgical marker, an appropriate advancement flap was drawn incorporating the defect, outlining the appropriate donor tissue and placing the expected incisions within the nasal ala running parallel to the alar rim. The area thus outlined was incised with a #15 scalpel blade.  The skin margins were undermined minimally to an appropriate distance in all directions around the primary defect and laterally outward around the island pedicle utilizing iris scissors.  There was minimal undermining beneath the pedicle flap.
Burow's Advancement Flap Text: The defect edges were debeveled with a #15 scalpel blade.  Given the location of the defect and the proximity to free margins a Burow's advancement flap was deemed most appropriate.  Using a sterile surgical marker, the appropriate advancement flap was drawn incorporating the defect and placing the expected incisions within the relaxed skin tension lines where possible.    The area thus outlined was incised deep to adipose tissue with a #15 scalpel blade.  The skin margins were undermined to an appropriate distance in all directions utilizing iris scissors.
Star Wedge Flap Text: The defect edges were debeveled with a #15 scalpel blade.  Given the location of the defect, shape of the defect and the proximity to free margins a star wedge flap was deemed most appropriate.  Using a sterile surgical marker, an appropriate rotation flap was drawn incorporating the defect and placing the expected incisions within the relaxed skin tension lines where possible. The area thus outlined was incised deep to adipose tissue with a #15 scalpel blade.  The skin margins were undermined to an appropriate distance in all directions utilizing iris scissors.
Island Pedicle Flap-Requiring Vessel Identification Text: The defect edges were debeveled with a #15 scalpel blade.  Given the location of the defect, shape of the defect and the proximity to free margins an island pedicle advancement flap was deemed most appropriate.  Using a sterile surgical marker, an appropriate advancement flap was drawn, based on the axial vessel mentioned above, incorporating the defect, outlining the appropriate donor tissue and placing the expected incisions within the relaxed skin tension lines where possible.    The area thus outlined was incised deep to adipose tissue with a #15 scalpel blade.  The skin margins were undermined to an appropriate distance in all directions around the primary defect and laterally outward around the island pedicle utilizing iris scissors.  There was minimal undermining beneath the pedicle flap.
Complex Repair And Melolabial Flap Text: The defect edges were debeveled with a #15 scalpel blade.  The primary defect was closed partially with a complex linear closure.  Given the location of the remaining defect, shape of the defect and the proximity to free margins a melolabial flap was deemed most appropriate for complete closure of the defect.  Using a sterile surgical marker, an appropriate advancement flap was drawn incorporating the defect and placing the expected incisions within the relaxed skin tension lines where possible.    The area thus outlined was incised deep to adipose tissue with a #15 scalpel blade.  The skin margins were undermined to an appropriate distance in all directions utilizing iris scissors.
Crescentic Advancement Flap Text: The defect edges were debeveled with a #15 scalpel blade.  Given the location of the defect and the proximity to free margins a crescentic advancement flap was deemed most appropriate.  Using a sterile surgical marker, the appropriate advancement flap was drawn incorporating the defect and placing the expected incisions within the relaxed skin tension lines where possible.    The area thus outlined was incised deep to adipose tissue with a #15 scalpel blade.  The skin margins were undermined to an appropriate distance in all directions utilizing iris scissors.
Home Suture Removal Text: Patient was provided a home suture removal kit and will remove their sutures at home.  If they have any questions or difficulties they will call the office.
Post-Care Instructions: I reviewed with the patient in detail post-care instructions. Patient is not to engage in any heavy lifting, exercise, or swimming for the next 14 days. Should the patient develop any fevers, chills, bleeding, severe pain patient will contact the office immediately.
Chonodrocutaneous Helical Advancement Flap Text: The defect edges were debeveled with a #15 scalpel blade.  Given the location of the defect and the proximity to free margins a chondrocutaneous helical advancement flap was deemed most appropriate.  Using a sterile surgical marker, the appropriate advancement flap was drawn incorporating the defect and placing the expected incisions within the relaxed skin tension lines where possible.    The area thus outlined was incised deep to adipose tissue with a #15 scalpel blade.  The skin margins were undermined to an appropriate distance in all directions utilizing iris scissors.
Split-Thickness Skin Graft Text: The defect edges were debeveled with a #15 scalpel blade.  Given the location of the defect, shape of the defect and the proximity to free margins a split thickness skin graft was deemed most appropriate.  Using a sterile surgical marker, the primary defect shape was transferred to the donor site. The split thickness graft was then harvested.  The skin graft was then placed in the primary defect and oriented appropriately.
Cartilage Graft Text: The defect edges were debeveled with a #15 scalpel blade.  Given the location of the defect, shape of the defect, the fact the defect involved a full thickness cartilage defect a cartilage graft was deemed most appropriate.  An appropriate donor site was identified, cleansed, and anesthetized. The cartilage graft was then harvested and transferred to the recipient site, oriented appropriately and then sutured into place.  The secondary defect was then repaired using a primary closure.
Anesthesia Type: 1% lidocaine with 1:100,000 epinephrine and a 1:12 solution of 8.4% sodium bicarbonate
Keystone Flap Text: The defect edges were debeveled with a #15 scalpel blade.  Given the location of the defect, shape of the defect a keystone flap was deemed most appropriate.  Using a sterile surgical marker, an appropriate keystone flap was drawn incorporating the defect, outlining the appropriate donor tissue and placing the expected incisions within the relaxed skin tension lines where possible. The area thus outlined was incised deep to adipose tissue with a #15 scalpel blade.  The skin margins were undermined to an appropriate distance in all directions around the primary defect and laterally outward around the flap utilizing iris scissors.
Anesthesia Type: 1% lidocaine with epinephrine and a 1:10 solution of 8.4% sodium bicarbonate
A-T Advancement Flap Text: The defect edges were debeveled with a #15 scalpel blade.  Given the location of the defect, shape of the defect and the proximity to free margins an A-T advancement flap was deemed most appropriate.  Using a sterile surgical marker, an appropriate advancement flap was drawn incorporating the defect and placing the expected incisions within the relaxed skin tension lines where possible.    The area thus outlined was incised deep to adipose tissue with a #15 scalpel blade.  The skin margins were undermined to an appropriate distance in all directions utilizing iris scissors.
Complex Repair And Rotation Flap Text: The defect edges were debeveled with a #15 scalpel blade.  The primary defect was closed partially with a complex linear closure.  Given the location of the remaining defect, shape of the defect and the proximity to free margins a rotation flap was deemed most appropriate for complete closure of the defect.  Using a sterile surgical marker, an appropriate advancement flap was drawn incorporating the defect and placing the expected incisions within the relaxed skin tension lines where possible.    The area thus outlined was incised deep to adipose tissue with a #15 scalpel blade.  The skin margins were undermined to an appropriate distance in all directions utilizing iris scissors.
Epidermal Closure: running cuticular
Transposition Flap Text: The defect edges were debeveled with a #15 scalpel blade.  Given the location of the defect and the proximity to free margins a transposition flap was deemed most appropriate.  Using a sterile surgical marker, an appropriate transposition flap was drawn incorporating the defect.    The area thus outlined was incised deep to adipose tissue with a #15 scalpel blade.  The skin margins were undermined to an appropriate distance in all directions utilizing iris scissors.
O-T Advancement Flap Text: The defect edges were debeveled with a #15 scalpel blade.  Given the location of the defect, shape of the defect and the proximity to free margins an O-T advancement flap was deemed most appropriate.  Using a sterile surgical marker, an appropriate advancement flap was drawn incorporating the defect and placing the expected incisions within the relaxed skin tension lines where possible.    The area thus outlined was incised deep to adipose tissue with a #15 scalpel blade.  The skin margins were undermined to an appropriate distance in all directions utilizing iris scissors.
Epidermal Closure Graft Donor Site (Optional): simple interrupted
Complex Repair And Rhombic Flap Text: The defect edges were debeveled with a #15 scalpel blade.  The primary defect was closed partially with a complex linear closure.  Given the location of the remaining defect, shape of the defect and the proximity to free margins a rhombic flap was deemed most appropriate for complete closure of the defect.  Using a sterile surgical marker, an appropriate advancement flap was drawn incorporating the defect and placing the expected incisions within the relaxed skin tension lines where possible.    The area thus outlined was incised deep to adipose tissue with a #15 scalpel blade.  The skin margins were undermined to an appropriate distance in all directions utilizing iris scissors.
Muscle Hinge Flap Text: The defect edges were debeveled with a #15 scalpel blade.  Given the size, depth and location of the defect and the proximity to free margins a muscle hinge flap was deemed most appropriate.  Using a sterile surgical marker, an appropriate hinge flap was drawn incorporating the defect. The area thus outlined was incised with a #15 scalpel blade.  The skin margins were undermined to an appropriate distance in all directions utilizing iris scissors.
O-T Plasty Text: The defect edges were debeveled with a #15 scalpel blade.  Given the location of the defect, shape of the defect and the proximity to free margins an O-T plasty was deemed most appropriate.  Using a sterile surgical marker, an appropriate O-T plasty was drawn incorporating the defect and placing the expected incisions within the relaxed skin tension lines where possible.    The area thus outlined was incised deep to adipose tissue with a #15 scalpel blade.  The skin margins were undermined to an appropriate distance in all directions utilizing iris scissors.
Composite Graft Text: The defect edges were debeveled with a #15 scalpel blade.  Given the location of the defect, shape of the defect, the proximity to free margins and the fact the defect was full thickness a composite graft was deemed most appropriate.  The defect was outline and then transferred to the donor site.  A full thickness graft was then excised from the donor site. The graft was then placed in the primary defect, oriented appropriately and then sutured into place.  The secondary defect was then repaired using a primary closure.
Suture Removal: 14 days

## 2019-11-20 ENCOUNTER — OFFICE VISIT (OUTPATIENT)
Dept: PULMONOLOGY | Facility: HOSPICE | Age: 79
End: 2019-11-20
Payer: MEDICARE

## 2019-11-20 VITALS
HEART RATE: 85 BPM | DIASTOLIC BLOOD PRESSURE: 64 MMHG | WEIGHT: 168 LBS | BODY MASS INDEX: 32.98 KG/M2 | OXYGEN SATURATION: 95 % | SYSTOLIC BLOOD PRESSURE: 128 MMHG | HEIGHT: 60 IN

## 2019-11-20 DIAGNOSIS — J44.9 CHRONIC OBSTRUCTIVE PULMONARY DISEASE, UNSPECIFIED COPD TYPE (HCC): ICD-10-CM

## 2019-11-20 DIAGNOSIS — G47.33 OSA (OBSTRUCTIVE SLEEP APNEA): ICD-10-CM

## 2019-11-20 PROCEDURE — 99214 OFFICE O/P EST MOD 30 MIN: CPT | Performed by: NURSE PRACTITIONER

## 2019-11-20 NOTE — PATIENT INSTRUCTIONS
1) Continue BIPAP ST with back up rate of 12 - 12/7cmH20  2) Clean mask and supplies weekly and change them as insurance allows - add Xilimelt for dry mouth. Adjust humidity. Consider adding chin strap.  3) Continue using Incruse and rescue inhaler as needed  4) Vaccines: Up to date with Prevnar 13, Pneumovax 23, flu  5) Continue weekly exercise   6) Zpack and Medrol pack on hand for exacerbation   7) Return in about 1 year (around 11/20/2020) for follow up with DOMINIQUE Sims, if not sooner, Compliance, Two seperate appointments.

## 2019-11-20 NOTE — PROGRESS NOTES
CC:  Here for f/u sleep and pulmonary issues as listed below    HPI:   Maddy presents today for follow up for ALISSA.       She is also seen in our pulmonary office for history of COPD.  Please see previous office visit from 8- for further details. Requests Incruse refill today. Overall doing well without difficulty since last OV. Past medical history of pulmonary hypertension followed by Dr. Cook, depression, hypertension, CKD stage III. Has chronic pain on opioids.       PSG from 6/2017 indicated an AHI of 5.5 and low oxygenation of 81%.  She completed a titration study for potential ASV therapy given elevated AHI and use of chronic pain medication.  She was then switched from CPAP to BiPAP given her central apneas resolved with BiPAP therapy.  Currently she is being treated with BIPAP ST @ 12/7cmH20.  Compliance download from the dates 10/21/2019 - 11/19/2019 indicates she is wearing the device 93% for an avg of 6 hours and 29 minutes per night with a reduced AHI of 2.7. AHI improved after she stopped having anxiety as SE of meds.  Now off Valium and now using Lexapro, but does not find it very effective.    She does tolerate pressure and mask well.  She wakes up refreshed and is less tired throughout the day. She naps less than before the machine. She will nap 1-2 hours if having a busy day. Dry mouth throughout the day.  Uses Biotene with some benefit.  She denies morning H/A and sleep better overall. She will continue to clean supplies weekly and change them as insurance allows.  BMI is 32. She is walking daily and feeling better with weight loss.            Patient Active Problem List    Diagnosis Date Noted   • CKD (chronic kidney disease) stage 3, GFR 30-59 ml/min (Former[facility]) 05/23/2016     Priority: Medium   • Esophageal dysphagia 05/19/2016     Priority: Medium   • Hypothyroidism due to Hashimoto's thyroiditis      Priority: Medium   • GERD (gastroesophageal reflux disease) 09/20/2011     Priority:  "Medium   • Essential hypertension 07/06/2009     Priority: Medium   • Major depressive disorder, recurrent episode, mild (CMS-HCC) 05/02/2016     Priority: Low   • Neuropathy (CMS-HCC) 10/17/2013     Priority: Low   • Family history of polycystic kidney disease      Priority: Low   • Facet arthritis of lumbar region 03/26/2012     Priority: Low   • History of vertebral fracture 03/26/2012     Priority: Low   • Spinal stenosis of lumbar region with neurogenic claudication      Priority: Low   • Choking 08/15/2019   • Chronic anxiety 07/15/2019   • Vitamin D deficiency disease 04/10/2019   • Hoarseness, persistent 04/10/2019   • Other chronic pain 02/21/2019   • History of hematuria 10/23/2018   • BMI 34.0-34.9,adult 08/17/2018   • Dyslipidemia, goal LDL below 130 08/15/2018   • Chronic obstructive pulmonary disease ([facility]) 06/22/2018   • Pulmonary hypertension ([facility]) 02/21/2018   • COPD with asthma ([facility]) 11/10/2017   • ALISSA (obstructive sleep apnea) 11/10/2017   • Postlaminectomy syndrome, unspecified region 07/03/2017   • Recurrent UTI 06/28/2017   • 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    • Arthritis     facet arthritis of lumbar region   • Asthma     Inhaler use daily.   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 Albrecht, attaches to plastic, prulent   • Depression     and anxiety   • Encounter for long-term (current) use of other medications    • Erosive gastritis 5/09    antral   • " Family history of polycystic kidney disease    • GERD (gastroesophageal reflux disease)    • [place] measles    • Hypertension    • Hypothyroidism    • Influenza    • Lumbar vertebral fracture (HCC) 2011   • Mumps    • Muscle disorder     Arachnoiditis   • Neuropathy (HCC)    • Obesity, Class I, BMI 30-34.9    • OSTEOPOROSIS    • Pain 16    back and legs    • Renal disorder    • Sleep apnea     on BiPap, follows with pulmonology   • Spinal stenosis, lumbar region, without neurogenic claudication    • Tonsillitis    • Urinary bladder disorder 2016    Currently has a catheter.       Past Surgical History:   Procedure Laterality Date   • SPINAL CORD STIMULATOR N/A 7/3/2017    Procedure: SPINAL CORD STIMULATOR FOR LEAD REMOVAL;  Surgeon: Amandeep Gonzalez D.O.;  Location: SURGERY [facility];  Service:    • GASTROSCOPY WITH BALLOON DILATATION N/A 2016    Procedure: GASTROSCOPY WITH DILATATION;  Surgeon: Tony Monae M.D.;  Location: SURGERY [facility];  Service:    • SPINAL CORD STIMULATOR  14   • EGD WITH ASP/BX  09    erosive gastritis   • HYSTERECTOMY, TOTAL ABDOMINAL     • APPENDECTOMY     • CATARACT EXTRACTION WITH IOL Bilateral    • COLONOSCOPY  ,09    normal   • HYSTERECTOMY LAPAROSCOPY     • LUMBAR LAMINECTOMY DISKECTOMY     • TONSILLECTOMY         Family History   Problem Relation Age of Onset   • Genitourinary () Problems Brother    • Lung Disease Mother         COPD   • Heart Disease Mother    • Genetic Disorder Father         Parkinsons/ from complications   • Hypertension Father    • Cancer Maternal Aunt         Breast cancer   • Stroke Paternal Grandmother    • Cancer Maternal Aunt         Breast cancer       Social History     Socioeconomic History   • Marital status:      Spouse name: Not on file   • Number of children: Not on file   • Years of education: Not on file   • Highest education level: Not on file   Occupational  History   • Not on file   Social Needs   • Financial resource strain: Not on file   • Food insecurity:     Worry: Not on file     Inability: Not on file   • Transportation needs:     Medical: Not on file     Non-medical: Not on file   Tobacco Use   • Smoking status: Never Smoker   • Smokeless tobacco: Never Used   Substance and Sexual Activity   • Alcohol use: No     Alcohol/week: 0.0 oz   • Drug use: No   • Sexual activity: Never     Partners: Male     Birth control/protection: Abstinence   Lifestyle   • Physical activity:     Days per week: Not on file     Minutes per session: Not on file   • Stress: Not on file   Relationships   • Social connections:     Talks on phone: Not on file     Gets together: Not on file     Attends Buddhist service: Not on file     Active member of club or organization: Not on file     Attends meetings of clubs or organizations: Not on file     Relationship status: Not on file   • Intimate partner violence:     Fear of current or ex partner: Not on file     Emotionally abused: Not on file     Physically abused: Not on file     Forced sexual activity: Not on file   Other Topics Concern   •  Service Not Asked   • Blood Transfusions Not Asked   • Caffeine Concern Not Asked   • Occupational Exposure Not Asked   • Hobby Hazards Not Asked   • Sleep Concern Not Asked   • Stress Concern No     Comment:  cancer   • Weight Concern Not Asked   • Special Diet Not Asked   • Back Care Not Asked   • Exercise Not Asked   • Bike Helmet Not Asked   • Seat Belt Not Asked   • Self-Exams Not Asked   Social History Narrative   • Not on file       Current Outpatient Medications   Medication Sig Dispense Refill   • Umeclidinium Bromide (INCRUSE ELLIPTA) 62.5 MCG/INH AEROSOL POWDER, BREATH ACTIVATED Inhale 1 Puff by mouth every day. 3 Each 3   • levothyroxine (SYNTHROID) 50 MCG Tab TAKE 1 TABLET BY MOUTH ONCE DAILY IN THE MORNING ON AN EMPTY STOMACH 90 Tab 1   • estradiol (ESTRACE) 0.5 MG tablet  TAKE 1 TABLET BY MOUTH ONCE DAILY 90 Tab 3   • escitalopram (LEXAPRO) 10 MG Tab Take 1 Tab by mouth every day. 30 Tab 3   • potassium chloride (KLOR-CON) 8 MEQ tablet Take 16 mEq by mouth every day. WITH FOOD  12   • atorvastatin (LIPITOR) 20 MG Tab Take 1 Tab by mouth every day. 90 Tab 3   • imipramine (TOFRANIL) 10 MG Tab TAKE 2 TABLETS BY MOUTH IN THE EVENING 180 Tab 2   • Multiple Vitamins-Minerals (VITEYES AREDS ADVANCED PO) Take  by mouth.     • Cholecalciferol (VITAMIN D3) 2000 UNIT Cap Take  by mouth.     • LETAIRIS 10 MG tablet Take 10 Tabs by mouth every day.     • furosemide (LASIX) 40 MG Tab Take 40 mg by mouth every day.  3   • NARCAN 4 MG/0.1ML Liquid AS DIRECTED  0   • oxyCODONE-acetaminophen (PERCOCET) 5-325 MG Tab TAKE 1 TABLET BY MOUTH 4 TIMES A DAY AS NEEDED FOR PAIN 11/8/18 G89.29  0   • spironolactone (ALDACTONE) 25 MG Tab Take 25 mg by mouth every day.  3   • omeprazole (PRILOSEC) 20 MG delayed-release capsule Take 1 Cap by mouth every day. 90 Cap 3   • pregabalin (LYRICA) 150 MG Cap Take 150 mg by mouth 3 times a day.     • AMITIZA 24 MCG capsule Take 48 mcg by mouth every morning with breakfast.     • XTAMPZA ER 18 MG Capsule Extended Release 12 hour Abuse-Deterrent Take 18 mg by mouth 2 Times a Day.     • Cyanocobalamin 2500 MCG Chew Tab Take 1 Tab by mouth every day.     • VENTOLIN  (90 BASE) MCG/ACT Aero Soln inhalation aerosol Inhale 2 Puffs by mouth every 6 hours as needed for Shortness of Breath. 1 Inhaler 5   • aspirin EC (ECOTRIN) 81 MG Tablet Delayed Response Take 81 mg by mouth every day.     • docusate sodium (COLACE) 250 MG capsule Take 250 mg by mouth every day.     • fluconazole (DIFLUCAN) 150 MG tablet Take 1 Tab by mouth every day. (Patient not taking: Reported on 11/20/2019) 1 Tab 0   • DULoxetine (CYMBALTA) 30 MG Cap DR Particles Take 1 Cap by mouth every day. (Patient not taking: Reported on 11/20/2019) 30 Cap 0     No current facility-administered medications for  this visit.           Allergies: Pcn [penicillins]; Sulfa drugs; Tape; Macrobid [nitrofurantoin]; and Zoloft      ROS   Gen: Denies fever, chills, unintentional weight loss, fatigue, night sweats  E/N/T: Denies ear pain, nasal congestion  Resp:Denies Dyspnea, wheezing, cough, sputum production, hemoptysis  CV: Denies chest pain, chest tightness, palpitations, BLE edema  Sleep:Denies morning headache, insomnia, snoring, gasping for air, apnea  Neuro: Denies frequent headaches, weakness, dizziness  GI: Denies N/V, acid reflux/heartburn  See HPI.  All other systems reviewed and negative      Vital signs for this encounter:  Vitals:    11/20/19 1014   Height: 1.524 m (5')   Weight: 76.2 kg (168 lb)   Weight % change since last entry.: 0 %   BP: 128/64   Pulse: 85   BMI (Calculated): 32.81                 Physical Exam:   Appearance: well developed, well nourished, no acute distress.  Eyes: PERRL, EOM intact, sclere white, conjunctiva moist.  Ears: no lesions or deformities.  Hearing: grossly intact.  Nose: no lesions or deformities.  Dentition: good dentition.  Oropharynx: tongue normal, posterior pharynx without erythema or exudate.  Neck: supple, trachea midline, no masses.  Respiratory effort: no intercostal retractions or use of accessory muscles.  Lung auscultation: Bilateral diminished   Heart auscultation: no murmur, rub, or gallop.   Extremities: no cyanosis or edema.  Abdomen: soft, non-tender, no masses.  Gait and station: grossly normal   Digits and Nails: no clubbing, cyanosis, petechiae, or nodes.  Cranial nerves: grossly normal.  Motor: no focal deficits observed.  Skin: no rashes, lesions, or ulcers noted.  Orientation: oriented to time, place, and person.  Mood and affect: mood and affect appropriate, normal interaction with examiner.    Assessment   1. ALISSA (obstructive sleep apnea)  DME Mask and Supplies   2. Chronic obstructive pulmonary disease, unspecified COPD type (HCC)  Umeclidinium Bromide  (INCRUSE ELLIPTA) 62.5 MCG/INH AEROSOL POWDER, BREATH ACTIVATED   3. BMI 32.0-32.9,adult  OBESITY COUNSELING (No Charge): Patient identified as having weight management issue.  Appropriate orders and counseling given.       Patient is clinically stable and will proceed with following plan.  Face-to-face time greater than 50% of 25 minutes reviewing pulmonary status, medication management, ALISSA, compliance, weight management.    PLAN:   Patient Instructions   1) Continue BIPAP ST with back up rate of 12 - 12/7cmH20  2) Clean mask and supplies weekly and change them as insurance allows - add Xilimelt for dry mouth. Adjust humidity. Consider adding chin strap.  3) Continue using Incruse and rescue inhaler as needed  4) Vaccines: Up to date with Prevnar 13, Pneumovax 23, flu  5) Continue weekly exercise   6) Zpack and Medrol pack on hand for exacerbation   7) Return in about 1 year (around 11/20/2020) for follow up with DOMINIQUE Sims, if not sooner, Compliance, Two seperate appointments.

## 2019-11-27 DIAGNOSIS — F33.0 MAJOR DEPRESSIVE DISORDER, RECURRENT EPISODE, MILD (HCC): ICD-10-CM

## 2019-11-27 RX ORDER — IMIPRAMINE HYDROCHLORIDE 10 MG/1
20 TABLET, FILM COATED ORAL EVERY EVENING
Qty: 180 TAB | Refills: 2 | Status: SHIPPED | OUTPATIENT
Start: 2019-11-27 | End: 2020-08-14

## 2019-12-28 DIAGNOSIS — K21.00 GASTROESOPHAGEAL REFLUX DISEASE WITH ESOPHAGITIS: ICD-10-CM

## 2019-12-29 RX ORDER — OMEPRAZOLE 20 MG/1
CAPSULE, DELAYED RELEASE ORAL
Qty: 90 CAP | Refills: 0 | Status: SHIPPED | OUTPATIENT
Start: 2019-12-29 | End: 2020-04-03 | Stop reason: SDUPTHER

## 2019-12-31 ENCOUNTER — APPOINTMENT (RX ONLY)
Dept: URBAN - METROPOLITAN AREA CLINIC 4 | Facility: CLINIC | Age: 79
Setting detail: DERMATOLOGY
End: 2019-12-31

## 2019-12-31 PROBLEM — C44.319 BASAL CELL CARCINOMA OF SKIN OF OTHER PARTS OF FACE: Status: ACTIVE | Noted: 2019-12-31

## 2019-12-31 PROCEDURE — 13132 CMPLX RPR F/C/C/M/N/AX/G/H/F: CPT

## 2019-12-31 PROCEDURE — ? EXCISION

## 2019-12-31 PROCEDURE — 11642 EXC F/E/E/N/L MAL+MRG 1.1-2: CPT

## 2019-12-31 PROCEDURE — ? DIAGNOSIS COMMENT

## 2019-12-31 NOTE — HPI: PROCEDURE (SKIN SURGERY)
Has The Growth Been Previously Biopsied?: has been previously biopsied
Body Location Override (Optional): Left lateral malar cheek

## 2019-12-31 NOTE — PROCEDURE: EXCISION
Island Pedicle Flap-Requiring Vessel Identification Text: The defect edges were debeveled with a #15 scalpel blade.  Given the location of the defect, shape of the defect and the proximity to free margins an island pedicle advancement flap was deemed most appropriate.  Using a sterile surgical marker, an appropriate advancement flap was drawn, based on the axial vessel mentioned above, incorporating the defect, outlining the appropriate donor tissue and placing the expected incisions within the relaxed skin tension lines where possible.    The area thus outlined was incised deep to adipose tissue with a #15 scalpel blade.  The skin margins were undermined to an appropriate distance in all directions around the primary defect and laterally outward around the island pedicle utilizing iris scissors.  There was minimal undermining beneath the pedicle flap.
No Repair - Repaired With Adjacent Surgical Defect Text (Leave Blank If You Do Not Want): After the excision the defect was repaired concurrently with another surgical defect which was in close approximation.
Split-Thickness Skin Graft Text: The defect edges were debeveled with a #15 scalpel blade.  Given the location of the defect, shape of the defect and the proximity to free margins a split thickness skin graft was deemed most appropriate.  Using a sterile surgical marker, the primary defect shape was transferred to the donor site. The split thickness graft was then harvested.  The skin graft was then placed in the primary defect and oriented appropriately.
Star Wedge Flap Text: The defect edges were debeveled with a #15 scalpel blade.  Given the location of the defect, shape of the defect and the proximity to free margins a star wedge flap was deemed most appropriate.  Using a sterile surgical marker, an appropriate rotation flap was drawn incorporating the defect and placing the expected incisions within the relaxed skin tension lines where possible. The area thus outlined was incised deep to adipose tissue with a #15 scalpel blade.  The skin margins were undermined to an appropriate distance in all directions utilizing iris scissors.
Spiral Flap Text: The defect edges were debeveled with a #15 scalpel blade.  Given the location of the defect, shape of the defect and the proximity to free margins a spiral flap was deemed most appropriate.  Using a sterile surgical marker, an appropriate rotation flap was drawn incorporating the defect and placing the expected incisions within the relaxed skin tension lines where possible. The area thus outlined was incised deep to adipose tissue with a #15 scalpel blade.  The skin margins were undermined to an appropriate distance in all directions utilizing iris scissors.
Hemostasis: Pressure and Electrodesiccation
Bill 64903 For Specimen Handling/Conveyance To Laboratory?: no
Previous Accession (Optional): [id number]
Show Asc Variables: Yes
Complex Repair And Melolabial Flap Text: The defect edges were debeveled with a #15 scalpel blade.  The primary defect was closed partially with a complex linear closure.  Given the location of the remaining defect, shape of the defect and the proximity to free margins a melolabial flap was deemed most appropriate for complete closure of the defect.  Using a sterile surgical marker, an appropriate advancement flap was drawn incorporating the defect and placing the expected incisions within the relaxed skin tension lines where possible.    The area thus outlined was incised deep to adipose tissue with a #15 scalpel blade.  The skin margins were undermined to an appropriate distance in all directions utilizing iris scissors.
Cartilage Graft Text: The defect edges were debeveled with a #15 scalpel blade.  Given the location of the defect, shape of the defect, the fact the defect involved a full thickness cartilage defect a cartilage graft was deemed most appropriate.  An appropriate donor site was identified, cleansed, and anesthetized. The cartilage graft was then harvested and transferred to the recipient site, oriented appropriately and then sutured into place.  The secondary defect was then repaired using a primary closure.
Advancement Flap (Single) Text: The defect edges were debeveled with a #15 scalpel blade.  Given the location of the defect and the proximity to free margins a single advancement flap was deemed most appropriate.  Using a sterile surgical marker, an appropriate advancement flap was drawn incorporating the defect and placing the expected incisions within the relaxed skin tension lines where possible.    The area thus outlined was incised deep to adipose tissue with a #15 scalpel blade.  The skin margins were undermined to an appropriate distance in all directions utilizing iris scissors.
Transposition Flap Text: The defect edges were debeveled with a #15 scalpel blade.  Given the location of the defect and the proximity to free margins a transposition flap was deemed most appropriate.  Using a sterile surgical marker, an appropriate transposition flap was drawn incorporating the defect.    The area thus outlined was incised deep to adipose tissue with a #15 scalpel blade.  The skin margins were undermined to an appropriate distance in all directions utilizing iris scissors.
Consent was obtained from the patient. The risks and benefits to therapy were discussed in detail. Specifically, the risks of infection, scarring, bleeding, prolonged wound healing, incomplete removal, allergy to anesthesia, nerve injury and recurrence were addressed. Prior to the procedure, the treatment site was clearly identified and confirmed by the patient. All components of Universal Protocol/PAUSE Rule completed.
Information: Selecting Yes will display possible errors in your note based on the variables you have selected. This validation is only offered as a suggestion for you. PLEASE NOTE THAT THE VALIDATION TEXT WILL BE REMOVED WHEN YOU FINALIZE YOUR NOTE. IF YOU WANT TO FAX A PRELIMINARY NOTE YOU WILL NEED TO TOGGLE THIS TO 'NO' IF YOU DO NOT WANT IT IN YOUR FAXED NOTE.
Estimated Blood Loss (Cc): minimal
Complex Repair And Rotation Flap Text: The defect edges were debeveled with a #15 scalpel blade.  The primary defect was closed partially with a complex linear closure.  Given the location of the remaining defect, shape of the defect and the proximity to free margins a rotation flap was deemed most appropriate for complete closure of the defect.  Using a sterile surgical marker, an appropriate advancement flap was drawn incorporating the defect and placing the expected incisions within the relaxed skin tension lines where possible.    The area thus outlined was incised deep to adipose tissue with a #15 scalpel blade.  The skin margins were undermined to an appropriate distance in all directions utilizing iris scissors.
Keystone Flap Text: The defect edges were debeveled with a #15 scalpel blade.  Given the location of the defect, shape of the defect a keystone flap was deemed most appropriate.  Using a sterile surgical marker, an appropriate keystone flap was drawn incorporating the defect, outlining the appropriate donor tissue and placing the expected incisions within the relaxed skin tension lines where possible. The area thus outlined was incised deep to adipose tissue with a #15 scalpel blade.  The skin margins were undermined to an appropriate distance in all directions around the primary defect and laterally outward around the flap utilizing iris scissors.
Complex Repair And Rhombic Flap Text: The defect edges were debeveled with a #15 scalpel blade.  The primary defect was closed partially with a complex linear closure.  Given the location of the remaining defect, shape of the defect and the proximity to free margins a rhombic flap was deemed most appropriate for complete closure of the defect.  Using a sterile surgical marker, an appropriate advancement flap was drawn incorporating the defect and placing the expected incisions within the relaxed skin tension lines where possible.    The area thus outlined was incised deep to adipose tissue with a #15 scalpel blade.  The skin margins were undermined to an appropriate distance in all directions utilizing iris scissors.
Muscle Hinge Flap Text: The defect edges were debeveled with a #15 scalpel blade.  Given the size, depth and location of the defect and the proximity to free margins a muscle hinge flap was deemed most appropriate.  Using a sterile surgical marker, an appropriate hinge flap was drawn incorporating the defect. The area thus outlined was incised with a #15 scalpel blade.  The skin margins were undermined to an appropriate distance in all directions utilizing iris scissors.
Lab Facility: 
Lab: [id number]
O-T Plasty Text: The defect edges were debeveled with a #15 scalpel blade.  Given the location of the defect, shape of the defect and the proximity to free margins an O-T plasty was deemed most appropriate.  Using a sterile surgical marker, an appropriate O-T plasty was drawn incorporating the defect and placing the expected incisions within the relaxed skin tension lines where possible.    The area thus outlined was incised deep to adipose tissue with a #15 scalpel blade.  The skin margins were undermined to an appropriate distance in all directions utilizing iris scissors.
Advancement Flap (Double) Text: The defect edges were debeveled with a #15 scalpel blade.  Given the location of the defect and the proximity to free margins a double advancement flap was deemed most appropriate.  Using a sterile surgical marker, the appropriate advancement flaps were drawn incorporating the defect and placing the expected incisions within the relaxed skin tension lines where possible.    The area thus outlined was incised deep to adipose tissue with a #15 scalpel blade.  The skin margins were undermined to an appropriate distance in all directions utilizing iris scissors.
Composite Graft Text: The defect edges were debeveled with a #15 scalpel blade.  Given the location of the defect, shape of the defect, the proximity to free margins and the fact the defect was full thickness a composite graft was deemed most appropriate.  The defect was outline and then transferred to the donor site.  A full thickness graft was then excised from the donor site. The graft was then placed in the primary defect, oriented appropriately and then sutured into place.  The secondary defect was then repaired using a primary closure.
O-Z Plasty Text: The defect edges were debeveled with a #15 scalpel blade.  Given the location of the defect, shape of the defect and the proximity to free margins an O-Z plasty (double transposition flap) was deemed most appropriate.  Using a sterile surgical marker, the appropriate transposition flaps were drawn incorporating the defect and placing the expected incisions within the relaxed skin tension lines where possible.    The area thus outlined was incised deep to adipose tissue with a #15 scalpel blade.  The skin margins were undermined to an appropriate distance in all directions utilizing iris scissors.  Hemostasis was achieved with electrocautery.  The flaps were then transposed into place, one clockwise and the other counterclockwise, and anchored with interrupted buried subcutaneous sutures.
Epidermal Autograft Text: The defect edges were debeveled with a #15 scalpel blade.  Given the location of the defect, shape of the defect and the proximity to free margins an epidermal autograft was deemed most appropriate.  Using a sterile surgical marker, the primary defect shape was transferred to the donor site. The epidermal graft was then harvested.  The skin graft was then placed in the primary defect and oriented appropriately.
Complex Repair And Transposition Flap Text: The defect edges were debeveled with a #15 scalpel blade.  The primary defect was closed partially with a complex linear closure.  Given the location of the remaining defect, shape of the defect and the proximity to free margins a transposition flap was deemed most appropriate for complete closure of the defect.  Using a sterile surgical marker, an appropriate advancement flap was drawn incorporating the defect and placing the expected incisions within the relaxed skin tension lines where possible.    The area thus outlined was incised deep to adipose tissue with a #15 scalpel blade.  The skin margins were undermined to an appropriate distance in all directions utilizing iris scissors.
Excision Depth: adipose tissue
Burow's Advancement Flap Text: The defect edges were debeveled with a #15 scalpel blade.  Given the location of the defect and the proximity to free margins a Burow's advancement flap was deemed most appropriate.  Using a sterile surgical marker, the appropriate advancement flap was drawn incorporating the defect and placing the expected incisions within the relaxed skin tension lines where possible.    The area thus outlined was incised deep to adipose tissue with a #15 scalpel blade.  The skin margins were undermined to an appropriate distance in all directions utilizing iris scissors.
Positioning (Leave Blank If You Do Not Want): The patient was placed in a comfortable position exposing the surgical site.
Melolabial Transposition Flap Text: The defect edges were debeveled with a #15 scalpel blade.  Given the location of the defect and the proximity to free margins a melolabial flap was deemed most appropriate.  Using a sterile surgical marker, an appropriate melolabial transposition flap was drawn incorporating the defect.    The area thus outlined was incised deep to adipose tissue with a #15 scalpel blade.  The skin margins were undermined to an appropriate distance in all directions utilizing iris scissors.
X Size Of Lesion In Cm (Optional): 0
Epidermal Sutures: 6-0 Prolene
Crescentic Advancement Flap Text: The defect edges were debeveled with a #15 scalpel blade.  Given the location of the defect and the proximity to free margins a crescentic advancement flap was deemed most appropriate.  Using a sterile surgical marker, the appropriate advancement flap was drawn incorporating the defect and placing the expected incisions within the relaxed skin tension lines where possible.    The area thus outlined was incised deep to adipose tissue with a #15 scalpel blade.  The skin margins were undermined to an appropriate distance in all directions utilizing iris scissors.
Skin Substitute Text: The defect edges were debeveled with a #15 scalpel blade.  Given the location of the defect, shape of the defect and the proximity to free margins a skin substitute graft was deemed most appropriate.  The graft material was trimmed to fit the size of the defect. The graft was then placed in the primary defect and oriented appropriately.
Dermal Autograft Text: The defect edges were debeveled with a #15 scalpel blade.  Given the location of the defect, shape of the defect and the proximity to free margins a dermal autograft was deemed most appropriate.  Using a sterile surgical marker, the primary defect shape was transferred to the donor site. The area thus outlined was incised deep to adipose tissue with a #15 scalpel blade.  The harvested graft was then trimmed of adipose and epidermal tissue until only dermis was left.  The skin graft was then placed in the primary defect and oriented appropriately.
Pre-Excision Curettage Text (Leave Blank If You Do Not Want): Prior to drawing the surgical margin the visible lesion was removed with electrodesiccation and curettage to clearly define the lesion size.
Chonodrocutaneous Helical Advancement Flap Text: The defect edges were debeveled with a #15 scalpel blade.  Given the location of the defect and the proximity to free margins a chondrocutaneous helical advancement flap was deemed most appropriate.  Using a sterile surgical marker, the appropriate advancement flap was drawn incorporating the defect and placing the expected incisions within the relaxed skin tension lines where possible.    The area thus outlined was incised deep to adipose tissue with a #15 scalpel blade.  The skin margins were undermined to an appropriate distance in all directions utilizing iris scissors.
Rhombic Flap Text: The defect edges were debeveled with a #15 scalpel blade.  Given the location of the defect and the proximity to free margins a rhombic flap was deemed most appropriate.  Using a sterile surgical marker, an appropriate rhombic flap was drawn incorporating the defect.    The area thus outlined was incised deep to adipose tissue with a #15 scalpel blade.  The skin margins were undermined to an appropriate distance in all directions utilizing iris scissors.
Complex Repair And V-Y Plasty Text: The defect edges were debeveled with a #15 scalpel blade.  The primary defect was closed partially with a complex linear closure.  Given the location of the remaining defect, shape of the defect and the proximity to free margins a V-Y plasty was deemed most appropriate for complete closure of the defect.  Using a sterile surgical marker, an appropriate advancement flap was drawn incorporating the defect and placing the expected incisions within the relaxed skin tension lines where possible.    The area thus outlined was incised deep to adipose tissue with a #15 scalpel blade.  The skin margins were undermined to an appropriate distance in all directions utilizing iris scissors.
Post-Care Instructions: I reviewed with the patient in detail post-care instructions. Patient is not to engage in any heavy lifting, exercise, or swimming for the next 14 days. Should the patient develop any fevers, chills, bleeding, severe pain patient will contact the office immediately.
Size Of Lesion In Cm: 0.8
Double O-Z Plasty Text: The defect edges were debeveled with a #15 scalpel blade.  Given the location of the defect, shape of the defect and the proximity to free margins a Double O-Z plasty (double transposition flap) was deemed most appropriate.  Using a sterile surgical marker, the appropriate transposition flaps were drawn incorporating the defect and placing the expected incisions within the relaxed skin tension lines where possible. The area thus outlined was incised deep to adipose tissue with a #15 scalpel blade.  The skin margins were undermined to an appropriate distance in all directions utilizing iris scissors.  Hemostasis was achieved with electrocautery.  The flaps were then transposed into place, one clockwise and the other counterclockwise, and anchored with interrupted buried subcutaneous sutures.
Size Of Margin In Cm: 0.3
Tissue Cultured Epidermal Autograft Text: The defect edges were debeveled with a #15 scalpel blade.  Given the location of the defect, shape of the defect and the proximity to free margins a tissue cultured epidermal autograft was deemed most appropriate.  The graft was then trimmed to fit the size of the defect.  The graft was then placed in the primary defect and oriented appropriately.
A-T Advancement Flap Text: The defect edges were debeveled with a #15 scalpel blade.  Given the location of the defect, shape of the defect and the proximity to free margins an A-T advancement flap was deemed most appropriate.  Using a sterile surgical marker, an appropriate advancement flap was drawn incorporating the defect and placing the expected incisions within the relaxed skin tension lines where possible.    The area thus outlined was incised deep to adipose tissue with a #15 scalpel blade.  The skin margins were undermined to an appropriate distance in all directions utilizing iris scissors.
Complex Repair And Double M Plasty Text: The defect edges were debeveled with a #15 scalpel blade.  The primary defect was closed partially with a complex linear closure.  Given the location of the remaining defect, shape of the defect and the proximity to free margins a double M plasty was deemed most appropriate for complete closure of the defect.  Using a sterile surgical marker, an appropriate advancement flap was drawn incorporating the defect and placing the expected incisions within the relaxed skin tension lines where possible.    The area thus outlined was incised deep to adipose tissue with a #15 scalpel blade.  The skin margins were undermined to an appropriate distance in all directions utilizing iris scissors.
Bi-Rhombic Flap Text: The defect edges were debeveled with a #15 scalpel blade.  Given the location of the defect and the proximity to free margins a bi-rhombic flap was deemed most appropriate.  Using a sterile surgical marker, an appropriate rhombic flap was drawn incorporating the defect. The area thus outlined was incised deep to adipose tissue with a #15 scalpel blade.  The skin margins were undermined to an appropriate distance in all directions utilizing iris scissors.
Intermediate Repair Preamble Text (Leave Blank If You Do Not Want): Undermining was performed with blunt dissection.
V-Y Plasty Text: The defect edges were debeveled with a #15 scalpel blade.  Given the location of the defect, shape of the defect and the proximity to free margins an V-Y advancement flap was deemed most appropriate.  Using a sterile surgical marker, an appropriate advancement flap was drawn incorporating the defect and placing the expected incisions within the relaxed skin tension lines where possible.    The area thus outlined was incised deep to adipose tissue with a #15 scalpel blade.  The skin margins were undermined to an appropriate distance in all directions utilizing iris scissors.
Rhomboid Transposition Flap Text: The defect edges were debeveled with a #15 scalpel blade.  Given the location of the defect and the proximity to free margins a rhomboid transposition flap was deemed most appropriate.  Using a sterile surgical marker, an appropriate rhomboid flap was drawn incorporating the defect.    The area thus outlined was incised deep to adipose tissue with a #15 scalpel blade.  The skin margins were undermined to an appropriate distance in all directions utilizing iris scissors.
Complex Repair And M Plasty Text: The defect edges were debeveled with a #15 scalpel blade.  The primary defect was closed partially with a complex linear closure.  Given the location of the remaining defect, shape of the defect and the proximity to free margins an M plasty was deemed most appropriate for complete closure of the defect.  Using a sterile surgical marker, an appropriate advancement flap was drawn incorporating the defect and placing the expected incisions within the relaxed skin tension lines where possible.    The area thus outlined was incised deep to adipose tissue with a #15 scalpel blade.  The skin margins were undermined to an appropriate distance in all directions utilizing iris scissors.
Detail Level: Detailed
Scalpel Size: 15 blade
Complex Repair Preamble Text (Leave Blank If You Do Not Want): Extensive wide undermining was performed.
S Plasty Text: Given the location and shape of the defect, and the orientation of relaxed skin tension lines, an S-plasty was deemed most appropriate for repair.  Using a sterile surgical marker, the appropriate outline of the S-plasty was drawn, incorporating the defect and placing the expected incisions within the relaxed skin tension lines where possible.  The area thus outlined was incised deep to adipose tissue with a #15 scalpel blade.  The skin margins were undermined to an appropriate distance in all directions utilizing iris scissors. The skin flaps were advanced over the defect.  The opposing margins were then approximated with interrupted buried subcutaneous sutures.
Xenograft Text: The defect edges were debeveled with a #15 scalpel blade.  Given the location of the defect, shape of the defect and the proximity to free margins a xenograft was deemed most appropriate.  The graft was then trimmed to fit the size of the defect.  The graft was then placed in the primary defect and oriented appropriately.
Helical Rim Advancement Flap Text: The defect edges were debeveled with a #15 blade scalpel.  Given the location of the defect and the proximity to free margins (helical rim) a double helical rim advancement flap was deemed most appropriate.  Using a sterile surgical marker, the appropriate advancement flaps were drawn incorporating the defect and placing the expected incisions between the helical rim and antihelix where possible.  The area thus outlined was incised through and through with a #15 scalpel blade.  With a skin hook and iris scissors, the flaps were gently and sharply undermined and freed up.
Additional Epidermal Sutures: 5-0 Prolene
Excision Method: Fusiform
Complex Repair And W Plasty Text: The defect edges were debeveled with a #15 scalpel blade.  The primary defect was closed partially with a complex linear closure.  Given the location of the remaining defect, shape of the defect and the proximity to free margins a W plasty was deemed most appropriate for complete closure of the defect.  Using a sterile surgical marker, an appropriate advancement flap was drawn incorporating the defect and placing the expected incisions within the relaxed skin tension lines where possible.    The area thus outlined was incised deep to adipose tissue with a #15 scalpel blade.  The skin margins were undermined to an appropriate distance in all directions utilizing iris scissors.
H Plasty Text: Given the location of the defect, shape of the defect and the proximity to free margins a H-plasty was deemed most appropriate for repair.  Using a sterile surgical marker, the appropriate advancement arms of the H-plasty were drawn incorporating the defect and placing the expected incisions within the relaxed skin tension lines where possible. The area thus outlined was incised deep to adipose tissue with a #15 scalpel blade. The skin margins were undermined to an appropriate distance in all directions utilizing iris scissors.  The opposing advancement arms were then advanced into place in opposite direction and anchored with interrupted buried subcutaneous sutures.
O-T Advancement Flap Text: The defect edges were debeveled with a #15 scalpel blade.  Given the location of the defect, shape of the defect and the proximity to free margins an O-T advancement flap was deemed most appropriate.  Using a sterile surgical marker, an appropriate advancement flap was drawn incorporating the defect and placing the expected incisions within the relaxed skin tension lines where possible.    The area thus outlined was incised deep to adipose tissue with a #15 scalpel blade.  The skin margins were undermined to an appropriate distance in all directions utilizing iris scissors.
Where Do You Want The Question To Include Opioid Counseling Located?: Case Summary Tab
Bilateral Helical Rim Advancement Flap Text: The defect edges were debeveled with a #15 blade scalpel.  Given the location of the defect and the proximity to free margins (helical rim) a bilateral helical rim advancement flap was deemed most appropriate.  Using a sterile surgical marker, the appropriate advancement flaps were drawn incorporating the defect and placing the expected incisions between the helical rim and antihelix where possible.  The area thus outlined was incised through and through with a #15 scalpel blade.  With a skin hook and iris scissors, the flaps were gently and sharply undermined and freed up.
Complex Repair And Z Plasty Text: The defect edges were debeveled with a #15 scalpel blade.  The primary defect was closed partially with a complex linear closure.  Given the location of the remaining defect, shape of the defect and the proximity to free margins a Z plasty was deemed most appropriate for complete closure of the defect.  Using a sterile surgical marker, an appropriate advancement flap was drawn incorporating the defect and placing the expected incisions within the relaxed skin tension lines where possible.    The area thus outlined was incised deep to adipose tissue with a #15 scalpel blade.  The skin margins were undermined to an appropriate distance in all directions utilizing iris scissors.
W Plasty Text: The lesion was extirpated to the level of the fat with a #15 scalpel blade.  Given the location of the defect, shape of the defect and the proximity to free margins a W-plasty was deemed most appropriate for repair.  Using a sterile surgical marker, the appropriate transposition arms of the W-plasty were drawn incorporating the defect and placing the expected incisions within the relaxed skin tension lines where possible.    The area thus outlined was incised deep to adipose tissue with a #15 scalpel blade.  The skin margins were undermined to an appropriate distance in all directions utilizing iris scissors.  The opposing transposition arms were then transposed into place in opposite direction and anchored with interrupted buried subcutaneous sutures.
O-L Flap Text: The defect edges were debeveled with a #15 scalpel blade.  Given the location of the defect, shape of the defect and the proximity to free margins an O-L flap was deemed most appropriate.  Using a sterile surgical marker, an appropriate advancement flap was drawn incorporating the defect and placing the expected incisions within the relaxed skin tension lines where possible.    The area thus outlined was incised deep to adipose tissue with a #15 scalpel blade.  The skin margins were undermined to an appropriate distance in all directions utilizing iris scissors.
Purse String (Intermediate) Text: Given the location of the defect and the characteristics of the surrounding skin a purse string intermediate closure was deemed most appropriate.  Undermining was performed circumfirentially around the surgical defect.  A purse string suture was then placed and tightened.
O-Z Flap Text: The defect edges were debeveled with a #15 scalpel blade.  Given the location of the defect, shape of the defect and the proximity to free margins an O-Z flap was deemed most appropriate.  Using a sterile surgical marker, an appropriate transposition flap was drawn incorporating the defect and placing the expected incisions within the relaxed skin tension lines where possible. The area thus outlined was incised deep to adipose tissue with a #15 scalpel blade.  The skin margins were undermined to an appropriate distance in all directions utilizing iris scissors.
Purse String (Simple) Text: Given the location of the defect and the characteristics of the surrounding skin a purse string simple closure was deemed most appropriate.  Undermining was performed circumferentially around the surgical defect.  A purse string suture was then placed and tightened.
Ear Star Wedge Flap Text: The defect edges were debeveled with a #15 blade scalpel.  Given the location of the defect and the proximity to free margins (helical rim) an ear star wedge flap was deemed most appropriate.  Using a sterile surgical marker, the appropriate flap was drawn incorporating the defect and placing the expected incisions between the helical rim and antihelix where possible.  The area thus outlined was incised through and through with a #15 scalpel blade.
Complex Repair And Dorsal Nasal Flap Text: The defect edges were debeveled with a #15 scalpel blade.  The primary defect was closed partially with a complex linear closure.  Given the location of the remaining defect, shape of the defect and the proximity to free margins a dorsal nasal flap was deemed most appropriate for complete closure of the defect.  Using a sterile surgical marker, an appropriate flap was drawn incorporating the defect and placing the expected incisions within the relaxed skin tension lines where possible.    The area thus outlined was incised deep to adipose tissue with a #15 scalpel blade.  The skin margins were undermined to an appropriate distance in all directions utilizing iris scissors.
Z Plasty Text: The lesion was extirpated to the level of the fat with a #15 scalpel blade.  Given the location of the defect, shape of the defect and the proximity to free margins a Z-plasty was deemed most appropriate for repair.  Using a sterile surgical marker, the appropriate transposition arms of the Z-plasty were drawn incorporating the defect and placing the expected incisions within the relaxed skin tension lines where possible.    The area thus outlined was incised deep to adipose tissue with a #15 scalpel blade.  The skin margins were undermined to an appropriate distance in all directions utilizing iris scissors.  The opposing transposition arms were then transposed into place in opposite direction and anchored with interrupted buried subcutaneous sutures.
Partial Purse String (Simple) Text: Given the location of the defect and the characteristics of the surrounding skin a simple purse string closure was deemed most appropriate.  Undermining was performed circumferentially around the surgical defect.  A purse string suture was then placed and tightened. Wound tension of the circular defect prevented complete closure of the wound.
Epidermal Closure: running cuticular
Complex Repair And Split-Thickness Skin Graft Text: The defect edges were debeveled with a #15 scalpel blade.  The primary defect was closed partially with a complex linear closure.  Given the location of the defect, shape of the defect and the proximity to free margins a split thickness skin graft was deemed most appropriate to repair the remaining defect.  The graft was trimmed to fit the size of the remaining defect.  The graft was then placed in the primary defect, oriented appropriately, and sutured into place.
Partial Purse String (Intermediate) Text: Given the location of the defect and the characteristics of the surrounding skin an intermediate purse string closure was deemed most appropriate.  Undermining was performed circumferentially around the surgical defect.  A purse string suture was then placed and tightened. Wound tension of the circular defect prevented complete closure of the wound.
Home Suture Removal Text: Patient was provided a home suture removal kit and will remove their sutures at home.  If they have any questions or difficulties they will call the office.
Complex Repair And Ftsg Text: The defect edges were debeveled with a #15 scalpel blade.  The primary defect was closed partially with a complex linear closure.  Given the location of the defect, shape of the defect and the proximity to free margins a full thickness skin graft was deemed most appropriate to repair the remaining defect.  The graft was trimmed to fit the size of the remaining defect.  The graft was then placed in the primary defect, oriented appropriately, and sutured into place.
Banner Transposition Flap Text: The defect edges were debeveled with a #15 scalpel blade.  Given the location of the defect and the proximity to free margins a Banner transposition flap was deemed most appropriate.  Using a sterile surgical marker, an appropriate flap drawn around the defect. The area thus outlined was incised deep to adipose tissue with a #15 scalpel blade.  The skin margins were undermined to an appropriate distance in all directions utilizing iris scissors.
Curvilinear Excision Additional Text (Leave Blank If You Do Not Want): The margin was drawn around the clinically apparent lesion.  A curvilinear shape was then drawn on the skin incorporating the lesion and margins.  Incisions were then made along these lines to the appropriate tissue plane and the lesion was extirpated.
Cheek Interpolation Flap Text: A decision was made to reconstruct the defect utilizing an interpolation axial flap and a staged reconstruction.  A telfa template was made of the defect.  This telfa template was then used to outline the Cheek Interpolation flap.  The donor area for the pedicle flap was then injected with anesthesia.  The flap was excised through the skin and subcutaneous tissue down to the layer of the underlying musculature.  The interpolation flap was carefully excised within this deep plane to maintain its blood supply.  The edges of the donor site were undermined.   The donor site was closed in a primary fashion.  The pedicle was then rotated into position and sutured.  Once the tube was sutured into place, adequate blood supply was confirmed with blanching and refill.  The pedicle was then wrapped with xeroform gauze and dressed appropriately with a telfa and gauze bandage to ensure continued blood supply and protect the attached pedicle.
Double O-Z Flap Text: The defect edges were debeveled with a #15 scalpel blade.  Given the location of the defect, shape of the defect and the proximity to free margins a Double O-Z flap was deemed most appropriate.  Using a sterile surgical marker, an appropriate transposition flap was drawn incorporating the defect and placing the expected incisions within the relaxed skin tension lines where possible. The area thus outlined was incised deep to adipose tissue with a #15 scalpel blade.  The skin margins were undermined to an appropriate distance in all directions utilizing iris scissors.
Body Location Override (Optional - Billing Will Still Be Based On Selected Body Map Location If Applicable): left lateral  malar cheek
Repair Type: Complex
Suture Removal: 7 days
Complex Repair And Single Advancement Flap Text: The defect edges were debeveled with a #15 scalpel blade.  The primary defect was closed partially with a complex linear closure.  Given the location of the remaining defect, shape of the defect and the proximity to free margins a single advancement flap was deemed most appropriate for complete closure of the defect.  Using a sterile surgical marker, an appropriate advancement flap was drawn incorporating the defect and placing the expected incisions within the relaxed skin tension lines where possible.    The area thus outlined was incised deep to adipose tissue with a #15 scalpel blade.  The skin margins were undermined to an appropriate distance in all directions utilizing iris scissors.
Bilobed Transposition Flap Text: The defect edges were debeveled with a #15 scalpel blade.  Given the location of the defect and the proximity to free margins a bilobed transposition flap was deemed most appropriate.  Using a sterile surgical marker, an appropriate bilobe flap drawn around the defect.    The area thus outlined was incised deep to adipose tissue with a #15 scalpel blade.  The skin margins were undermined to an appropriate distance in all directions utilizing iris scissors.
Complex Repair And Epidermal Autograft Text: The defect edges were debeveled with a #15 scalpel blade.  The primary defect was closed partially with a complex linear closure.  Given the location of the defect, shape of the defect and the proximity to free margins an epidermal autograft was deemed most appropriate to repair the remaining defect.  The graft was trimmed to fit the size of the remaining defect.  The graft was then placed in the primary defect, oriented appropriately, and sutured into place.
Interpolation Flap Text: A decision was made to reconstruct the defect utilizing an interpolation axial flap and a staged reconstruction.  A telfa template was made of the defect.  This telfa template was then used to outline the interpolation flap.  The donor area for the pedicle flap was then injected with anesthesia.  The flap was excised through the skin and subcutaneous tissue down to the layer of the underlying musculature.  The interpolation flap was carefully excised within this deep plane to maintain its blood supply.  The edges of the donor site were undermined.   The donor site was closed in a primary fashion.  The pedicle was then rotated into position and sutured.  Once the tube was sutured into place, adequate blood supply was confirmed with blanching and refill.  The pedicle was then wrapped with xeroform gauze and dressed appropriately with a telfa and gauze bandage to ensure continued blood supply and protect the attached pedicle.
Elliptical Excision Additional Text (Leave Blank If You Do Not Want): The margin was drawn around the clinically apparent lesion.  An elliptical shape was then drawn on the skin incorporating the lesion and margins.  Incisions were then made along these lines to the appropriate tissue plane and the lesion was extirpated.
Bilobed Flap Text: The defect edges were debeveled with a #15 scalpel blade.  Given the location of the defect and the proximity to free margins a bilobe flap was deemed most appropriate.  Using a sterile surgical marker, an appropriate bilobe flap drawn around the defect.    The area thus outlined was incised deep to adipose tissue with a #15 scalpel blade.  The skin margins were undermined to an appropriate distance in all directions utilizing iris scissors.
Anesthesia Type: 1% lidocaine with epinephrine and a 1:10 solution of 8.4% sodium bicarbonate
Fusiform Excision Additional Text (Leave Blank If You Do Not Want): The margin was drawn around the clinically apparent lesion.  A fusiform shape was then drawn on the skin incorporating the lesion and margins.  Incisions were then made along these lines to the appropriate tissue plane and the lesion was extirpated.
Cheek-To-Nose Interpolation Flap Text: A decision was made to reconstruct the defect utilizing an interpolation axial flap and a staged reconstruction.  A telfa template was made of the defect.  This telfa template was then used to outline the Cheek-To-Nose Interpolation flap.  The donor area for the pedicle flap was then injected with anesthesia.  The flap was excised through the skin and subcutaneous tissue down to the layer of the underlying musculature.  The interpolation flap was carefully excised within this deep plane to maintain its blood supply.  The edges of the donor site were undermined.   The donor site was closed in a primary fashion.  The pedicle was then rotated into position and sutured.  Once the tube was sutured into place, adequate blood supply was confirmed with blanching and refill.  The pedicle was then wrapped with xeroform gauze and dressed appropriately with a telfa and gauze bandage to ensure continued blood supply and protect the attached pedicle.
V-Y Flap Text: The defect edges were debeveled with a #15 scalpel blade.  Given the location of the defect, shape of the defect and the proximity to free margins a V-Y flap was deemed most appropriate.  Using a sterile surgical marker, an appropriate advancement flap was drawn incorporating the defect and placing the expected incisions within the relaxed skin tension lines where possible.    The area thus outlined was incised deep to adipose tissue with a #15 scalpel blade.  The skin margins were undermined to an appropriate distance in all directions utilizing iris scissors.
Anesthesia Type: 1% lidocaine with 1:100,000 epinephrine and a 1:12 solution of 8.4% sodium bicarbonate
Complex Repair And Double Advancement Flap Text: The defect edges were debeveled with a #15 scalpel blade.  The primary defect was closed partially with a complex linear closure.  Given the location of the remaining defect, shape of the defect and the proximity to free margins a double advancement flap was deemed most appropriate for complete closure of the defect.  Using a sterile surgical marker, an appropriate advancement flap was drawn incorporating the defect and placing the expected incisions within the relaxed skin tension lines where possible.    The area thus outlined was incised deep to adipose tissue with a #15 scalpel blade.  The skin margins were undermined to an appropriate distance in all directions utilizing iris scissors.
Trilobed Flap Text: The defect edges were debeveled with a #15 scalpel blade.  Given the location of the defect and the proximity to free margins a trilobed flap was deemed most appropriate.  Using a sterile surgical marker, an appropriate trilobed flap drawn around the defect.    The area thus outlined was incised deep to adipose tissue with a #15 scalpel blade.  The skin margins were undermined to an appropriate distance in all directions utilizing iris scissors.
Complex Repair And Dermal Autograft Text: The defect edges were debeveled with a #15 scalpel blade.  The primary defect was closed partially with a complex linear closure.  Given the location of the defect, shape of the defect and the proximity to free margins an dermal autograft was deemed most appropriate to repair the remaining defect.  The graft was trimmed to fit the size of the remaining defect.  The graft was then placed in the primary defect, oriented appropriately, and sutured into place.
Saucerization Excision Additional Text (Leave Blank If You Do Not Want): The margin was drawn around the clinically apparent lesion.  Incisions were then made along these lines, in a tangential fashion, to the appropriate tissue plane and the lesion was extirpated.
Melolabial Interpolation Flap Text: A decision was made to reconstruct the defect utilizing an interpolation axial flap and a staged reconstruction.  A telfa template was made of the defect.  This telfa template was then used to outline the melolabial interpolation flap.  The donor area for the pedicle flap was then injected with anesthesia.  The flap was excised through the skin and subcutaneous tissue down to the layer of the underlying musculature.  The pedicle flap was carefully excised within this deep plane to maintain its blood supply.  The edges of the donor site were undermined.   The donor site was closed in a primary fashion.  The pedicle was then rotated into position and sutured.  Once the tube was sutured into place, adequate blood supply was confirmed with blanching and refill.  The pedicle was then wrapped with xeroform gauze and dressed appropriately with a telfa and gauze bandage to ensure continued blood supply and protect the attached pedicle.
Mercedes Flap Text: The defect edges were debeveled with a #15 scalpel blade.  Given the location of the defect, shape of the defect and the proximity to free margins a Mercedes flap was deemed most appropriate.  Using a sterile surgical marker, an appropriate advancement flap was drawn incorporating the defect and placing the expected incisions within the relaxed skin tension lines where possible. The area thus outlined was incised deep to adipose tissue with a #15 scalpel blade.  The skin margins were undermined to an appropriate distance in all directions utilizing iris scissors.
Anesthesia Volume In Cc: 21
Advancement-Rotation Flap Text: The defect edges were debeveled with a #15 scalpel blade.  Given the location of the defect, shape of the defect and the proximity to free margins an advancement-rotation flap was deemed most appropriate.  Using a sterile surgical marker, an appropriate flap was drawn incorporating the defect and placing the expected incisions within the relaxed skin tension lines where possible. The area thus outlined was incised deep to adipose tissue with a #15 scalpel blade.  The skin margins were undermined to an appropriate distance in all directions utilizing iris scissors.
Epidermal Closure Graft Donor Site (Optional): simple interrupted
Mastoid Interpolation Flap Text: A decision was made to reconstruct the defect utilizing an interpolation axial flap and a staged reconstruction.  A telfa template was made of the defect.  This telfa template was then used to outline the mastoid interpolation flap.  The donor area for the pedicle flap was then injected with anesthesia.  The flap was excised through the skin and subcutaneous tissue down to the layer of the underlying musculature.  The pedicle flap was carefully excised within this deep plane to maintain its blood supply.  The edges of the donor site were undermined.   The donor site was closed in a primary fashion.  The pedicle was then rotated into position and sutured.  Once the tube was sutured into place, adequate blood supply was confirmed with blanching and refill.  The pedicle was then wrapped with xeroform gauze and dressed appropriately with a telfa and gauze bandage to ensure continued blood supply and protect the attached pedicle.
Slit Excision Additional Text (Leave Blank If You Do Not Want): A linear line was drawn on the skin overlying the lesion. An incision was made slowly until the lesion was visualized.  Once visualized, the lesion was removed with blunt dissection.
Modified Advancement Flap Text: The defect edges were debeveled with a #15 scalpel blade.  Given the location of the defect, shape of the defect and the proximity to free margins a modified advancement flap was deemed most appropriate.  Using a sterile surgical marker, an appropriate advancement flap was drawn incorporating the defect and placing the expected incisions within the relaxed skin tension lines where possible.    The area thus outlined was incised deep to adipose tissue with a #15 scalpel blade.  The skin margins were undermined to an appropriate distance in all directions utilizing iris scissors.
Surgeon (Optional): Dr. Smith
Complex Repair And Modified Advancement Flap Text: The defect edges were debeveled with a #15 scalpel blade.  The primary defect was closed partially with a complex linear closure.  Given the location of the remaining defect, shape of the defect and the proximity to free margins a modified advancement flap was deemed most appropriate for complete closure of the defect.  Using a sterile surgical marker, an appropriate advancement flap was drawn incorporating the defect and placing the expected incisions within the relaxed skin tension lines where possible.    The area thus outlined was incised deep to adipose tissue with a #15 scalpel blade.  The skin margins were undermined to an appropriate distance in all directions utilizing iris scissors.
Complex Repair And Tissue Cultured Epidermal Autograft Text: The defect edges were debeveled with a #15 scalpel blade.  The primary defect was closed partially with a complex linear closure.  Given the location of the defect, shape of the defect and the proximity to free margins an tissue cultured epidermal autograft was deemed most appropriate to repair the remaining defect.  The graft was trimmed to fit the size of the remaining defect.  The graft was then placed in the primary defect, oriented appropriately, and sutured into place.
Dorsal Nasal Flap Text: The defect edges were debeveled with a #15 scalpel blade.  Given the location of the defect and the proximity to free margins a dorsal nasal flap was deemed most appropriate.  Using a sterile surgical marker, an appropriate dorsal nasal flap was drawn around the defect.    The area thus outlined was incised deep to adipose tissue with a #15 scalpel blade.  The skin margins were undermined to an appropriate distance in all directions utilizing iris scissors.
Intermediate / Complex Repair - Final Wound Length In Cm: 3.5
Mucosal Advancement Flap Text: Given the location of the defect, shape of the defect and the proximity to free margins a mucosal advancement flap was deemed most appropriate. Incisions were made with a 15 blade scalpel in the appropriate fashion along the cutaneous vermilion border and the mucosal lip. The remaining actinically damaged mucosal tissue was excised.  The mucosal advancement flap was then elevated to the gingival sulcus with care taken to preserve the neurovascular structures and advanced into the primary defect. Care was taken to ensure that precise realignment of the vermilion border was achieved.
Wound Care: Petrolatum
Surgeon Performing The Repair (Optional): Dr. Rohan Smith MD
Complex Repair And A-T Advancement Flap Text: The defect edges were debeveled with a #15 scalpel blade.  The primary defect was closed partially with a complex linear closure.  Given the location of the remaining defect, shape of the defect and the proximity to free margins an A-T advancement flap was deemed most appropriate for complete closure of the defect.  Using a sterile surgical marker, an appropriate advancement flap was drawn incorporating the defect and placing the expected incisions within the relaxed skin tension lines where possible.    The area thus outlined was incised deep to adipose tissue with a #15 scalpel blade.  The skin margins were undermined to an appropriate distance in all directions utilizing iris scissors.
Island Pedicle Flap Text: The defect edges were debeveled with a #15 scalpel blade.  Given the location of the defect, shape of the defect and the proximity to free margins an island pedicle advancement flap was deemed most appropriate.  Using a sterile surgical marker, an appropriate advancement flap was drawn incorporating the defect, outlining the appropriate donor tissue and placing the expected incisions within the relaxed skin tension lines where possible.    The area thus outlined was incised deep to adipose tissue with a #15 scalpel blade.  The skin margins were undermined to an appropriate distance in all directions around the primary defect and laterally outward around the island pedicle utilizing iris scissors.  There was minimal undermining beneath the pedicle flap.
Complex Repair And Xenograft Text: The defect edges were debeveled with a #15 scalpel blade.  The primary defect was closed partially with a complex linear closure.  Given the location of the defect, shape of the defect and the proximity to free margins a xenograft was deemed most appropriate to repair the remaining defect.  The graft was trimmed to fit the size of the remaining defect.  The graft was then placed in the primary defect, oriented appropriately, and sutured into place.
Posterior Auricular Interpolation Flap Text: A decision was made to reconstruct the defect utilizing an interpolation axial flap and a staged reconstruction.  A telfa template was made of the defect.  This telfa template was then used to outline the posterior auricular interpolation flap.  The donor area for the pedicle flap was then injected with anesthesia.  The flap was excised through the skin and subcutaneous tissue down to the layer of the underlying musculature.  The pedicle flap was carefully excised within this deep plane to maintain its blood supply.  The edges of the donor site were undermined.   The donor site was closed in a primary fashion.  The pedicle was then rotated into position and sutured.  Once the tube was sutured into place, adequate blood supply was confirmed with blanching and refill.  The pedicle was then wrapped with xeroform gauze and dressed appropriately with a telfa and gauze bandage to ensure continued blood supply and protect the attached pedicle.
Excisional Biopsy Additional Text (Leave Blank If You Do Not Want): The margin was drawn around the clinically apparent lesion. An elliptical shape was then drawn on the skin incorporating the lesion and margins.  Incisions were then made along these lines to the appropriate tissue plane and the lesion was extirpated.
Path Notes (To The Dermatopathologist): Please check margins.
Complex Repair And O-L Flap Text: The defect edges were debeveled with a #15 scalpel blade.  The primary defect was closed partially with a complex linear closure.  Given the location of the remaining defect, shape of the defect and the proximity to free margins an O-L flap was deemed most appropriate for complete closure of the defect.  Using a sterile surgical marker, an appropriate flap was drawn incorporating the defect and placing the expected incisions within the relaxed skin tension lines where possible.    The area thus outlined was incised deep to adipose tissue with a #15 scalpel blade.  The skin margins were undermined to an appropriate distance in all directions utilizing iris scissors.
Alar Island Pedicle Flap Text: The defect edges were debeveled with a #15 scalpel blade.  Given the location of the defect, shape of the defect and the proximity to the alar rim an island pedicle advancement flap was deemed most appropriate.  Using a sterile surgical marker, an appropriate advancement flap was drawn incorporating the defect, outlining the appropriate donor tissue and placing the expected incisions within the nasal ala running parallel to the alar rim. The area thus outlined was incised with a #15 scalpel blade.  The skin margins were undermined minimally to an appropriate distance in all directions around the primary defect and laterally outward around the island pedicle utilizing iris scissors.  There was minimal undermining beneath the pedicle flap.
Complex Repair And O-T Advancement Flap Text: The defect edges were debeveled with a #15 scalpel blade.  The primary defect was closed partially with a complex linear closure.  Given the location of the remaining defect, shape of the defect and the proximity to free margins an O-T advancement flap was deemed most appropriate for complete closure of the defect.  Using a sterile surgical marker, an appropriate advancement flap was drawn incorporating the defect and placing the expected incisions within the relaxed skin tension lines where possible.    The area thus outlined was incised deep to adipose tissue with a #15 scalpel blade.  The skin margins were undermined to an appropriate distance in all directions utilizing iris scissors.
Island Pedicle Flap With Canthal Suspension Text: The defect edges were debeveled with a #15 scalpel blade.  Given the location of the defect, shape of the defect and the proximity to free margins an island pedicle advancement flap was deemed most appropriate.  Using a sterile surgical marker, an appropriate advancement flap was drawn incorporating the defect, outlining the appropriate donor tissue and placing the expected incisions within the relaxed skin tension lines where possible. The area thus outlined was incised deep to adipose tissue with a #15 scalpel blade.  The skin margins were undermined to an appropriate distance in all directions around the primary defect and laterally outward around the island pedicle utilizing iris scissors.  There was minimal undermining beneath the pedicle flap. A suspension suture was placed in the canthal tendon to prevent tension and prevent ectropion.
Complex Repair And Skin Substitute Graft Text: The defect edges were debeveled with a #15 scalpel blade.  The primary defect was closed partially with a complex linear closure.  Given the location of the remaining defect, shape of the defect and the proximity to free margins a skin substitute graft was deemed most appropriate to repair the remaining defect.  The graft was trimmed to fit the size of the remaining defect.  The graft was then placed in the primary defect, oriented appropriately, and sutured into place.
Billing Type: Third-Party Bill
Perilesional Excision Additional Text (Leave Blank If You Do Not Want): The margin was drawn around the clinically apparent lesion. Incisions were then made along these lines to the appropriate tissue plane and the lesion was extirpated.
Paramedian Forehead Flap Text: A decision was made to reconstruct the defect utilizing an interpolation axial flap and a staged reconstruction.  A telfa template was made of the defect.  This telfa template was then used to outline the paramedian forehead pedicle flap.  The donor area for the pedicle flap was then injected with anesthesia.  The flap was excised through the skin and subcutaneous tissue down to the layer of the underlying musculature.  The pedicle flap was carefully excised within this deep plane to maintain its blood supply.  The edges of the donor site were undermined.   The donor site was closed in a primary fashion.  The pedicle was then rotated into position and sutured.  Once the tube was sutured into place, adequate blood supply was confirmed with blanching and refill.  The pedicle was then wrapped with xeroform gauze and dressed appropriately with a telfa and gauze bandage to ensure continued blood supply and protect the attached pedicle.
Hatchet Flap Text: The defect edges were debeveled with a #15 scalpel blade.  Given the location of the defect, shape of the defect and the proximity to free margins a hatchet flap was deemed most appropriate.  Using a sterile surgical marker, an appropriate hatchet flap was drawn incorporating the defect and placing the expected incisions within the relaxed skin tension lines where possible.    The area thus outlined was incised deep to adipose tissue with a #15 scalpel blade.  The skin margins were undermined to an appropriate distance in all directions utilizing iris scissors.
Deep Sutures: 4-0 Vicryl
Complex Repair And Bilobe Flap Text: The defect edges were debeveled with a #15 scalpel blade.  The primary defect was closed partially with a complex linear closure.  Given the location of the remaining defect, shape of the defect and the proximity to free margins a bilobe flap was deemed most appropriate for complete closure of the defect.  Using a sterile surgical marker, an appropriate advancement flap was drawn incorporating the defect and placing the expected incisions within the relaxed skin tension lines where possible.    The area thus outlined was incised deep to adipose tissue with a #15 scalpel blade.  The skin margins were undermined to an appropriate distance in all directions utilizing iris scissors.
Double Island Pedicle Flap Text: The defect edges were debeveled with a #15 scalpel blade.  Given the location of the defect, shape of the defect and the proximity to free margins a double island pedicle advancement flap was deemed most appropriate.  Using a sterile surgical marker, an appropriate advancement flap was drawn incorporating the defect, outlining the appropriate donor tissue and placing the expected incisions within the relaxed skin tension lines where possible.    The area thus outlined was incised deep to adipose tissue with a #15 scalpel blade.  The skin margins were undermined to an appropriate distance in all directions around the primary defect and laterally outward around the island pedicle utilizing iris scissors.  There was minimal undermining beneath the pedicle flap.
Ftsg Text: The defect edges were debeveled with a #15 scalpel blade.  Given the location of the defect, shape of the defect and the proximity to free margins a full thickness skin graft was deemed most appropriate.  Using a sterile surgical marker, the primary defect shape was transferred to the donor site. The area thus outlined was incised deep to adipose tissue with a #15 scalpel blade.  The harvested graft was then trimmed of adipose tissue until only dermis and epidermis was left.  The skin margins of the secondary defect were undermined to an appropriate distance in all directions utilizing iris scissors.  The secondary defect was closed with interrupted buried subcutaneous sutures.  The skin edges were then re-apposed with running  sutures.  The skin graft was then placed in the primary defect and oriented appropriately.
Repair Performed By Another Provider Text (Leave Blank If You Do Not Want): After the tissue was excised the defect was repaired by another provider.
Dressing: dry sterile dressing
Lip Wedge Excision Repair Text: Given the location of the defect and the proximity to free margins a full thickness wedge repair was deemed most appropriate.  Using a sterile surgical marker, the appropriate repair was drawn incorporating the defect and placing the expected incisions perpendicular to the vermilion border.  The vermilion border was also meticulously outlined to ensure appropriate reapproximation during the repair.  The area thus outlined was incised through and through with a #15 scalpel blade.  The muscularis and dermis were reaproximated with deep sutures following hemostasis. Care was taken to realign the vermilion border before proceeding with the superficial closure.  Once the vermilion was realigned the superfical and mucosal closure was finished.
Rotation Flap Text: The defect edges were debeveled with a #15 scalpel blade.  Given the location of the defect, shape of the defect and the proximity to free margins a rotation flap was deemed most appropriate.  Using a sterile surgical marker, an appropriate rotation flap was drawn incorporating the defect and placing the expected incisions within the relaxed skin tension lines where possible.    The area thus outlined was incised deep to adipose tissue with a #15 scalpel blade.  The skin margins were undermined to an appropriate distance in all directions utilizing iris scissors.

## 2020-01-06 ENCOUNTER — APPOINTMENT (RX ONLY)
Dept: URBAN - METROPOLITAN AREA CLINIC 4 | Facility: CLINIC | Age: 80
Setting detail: DERMATOLOGY
End: 2020-01-06

## 2020-01-06 DIAGNOSIS — Z48.02 ENCOUNTER FOR REMOVAL OF SUTURES: ICD-10-CM

## 2020-01-06 PROCEDURE — ? COUNSELING

## 2020-01-06 ASSESSMENT — LOCATION ZONE DERM: LOCATION ZONE: FACE

## 2020-01-06 ASSESSMENT — LOCATION DETAILED DESCRIPTION DERM: LOCATION DETAILED: LEFT CENTRAL MALAR CHEEK

## 2020-01-06 ASSESSMENT — LOCATION SIMPLE DESCRIPTION DERM: LOCATION SIMPLE: LEFT CHEEK

## 2020-01-13 DIAGNOSIS — F41.1 GENERALIZED ANXIETY DISORDER: ICD-10-CM

## 2020-01-13 RX ORDER — ESCITALOPRAM OXALATE 10 MG/1
TABLET ORAL
Qty: 30 TAB | Refills: 0 | Status: SHIPPED | OUTPATIENT
Start: 2020-01-13 | End: 2020-02-14

## 2020-02-12 ENCOUNTER — HOSPITAL ENCOUNTER (OUTPATIENT)
Dept: HOSPITAL 8 - CFH | Age: 80
Discharge: HOME | End: 2020-02-12
Attending: INTERNAL MEDICINE
Payer: MEDICARE

## 2020-02-12 DIAGNOSIS — I25.10: ICD-10-CM

## 2020-02-12 DIAGNOSIS — I08.1: Primary | ICD-10-CM

## 2020-02-12 DIAGNOSIS — I27.20: ICD-10-CM

## 2020-02-12 PROCEDURE — 93306 TTE W/DOPPLER COMPLETE: CPT

## 2020-02-14 ENCOUNTER — APPOINTMENT (OUTPATIENT)
Dept: PULMONOLOGY | Facility: HOSPICE | Age: 80
End: 2020-02-14
Payer: MEDICARE

## 2020-02-14 ENCOUNTER — NON-PROVIDER VISIT (OUTPATIENT)
Dept: PULMONOLOGY | Facility: HOSPICE | Age: 80
End: 2020-02-14
Attending: NURSE PRACTITIONER
Payer: MEDICARE

## 2020-02-14 VITALS — WEIGHT: 170 LBS | BODY MASS INDEX: 33.2 KG/M2

## 2020-02-14 DIAGNOSIS — J43.9 MIXED RESTRICTIVE AND OBSTRUCTIVE LUNG DISEASE (HCC): ICD-10-CM

## 2020-02-14 DIAGNOSIS — J98.4 MIXED RESTRICTIVE AND OBSTRUCTIVE LUNG DISEASE (HCC): ICD-10-CM

## 2020-02-14 DIAGNOSIS — F41.1 GENERALIZED ANXIETY DISORDER: ICD-10-CM

## 2020-02-14 PROCEDURE — 94729 DIFFUSING CAPACITY: CPT | Performed by: INTERNAL MEDICINE

## 2020-02-14 PROCEDURE — 94726 PLETHYSMOGRAPHY LUNG VOLUMES: CPT | Performed by: INTERNAL MEDICINE

## 2020-02-14 PROCEDURE — 94060 EVALUATION OF WHEEZING: CPT | Performed by: INTERNAL MEDICINE

## 2020-02-14 RX ORDER — ESCITALOPRAM OXALATE 10 MG/1
TABLET ORAL
Qty: 30 TAB | Refills: 0 | Status: SHIPPED | OUTPATIENT
Start: 2020-02-14 | End: 2020-03-10

## 2020-02-14 ASSESSMENT — PULMONARY FUNCTION TESTS
FEV1/FVC_PERCENT_PREDICTED: 76
FEV1_PERCENT_PREDICTED: 67
FEV1/FVC_PERCENT_CHANGE: 400
FEV1/FVC_PERCENT_PREDICTED: 94
FEV1/FVC_PERCENT_PREDICTED: 93
FVC_PREDICTED: 2.3
FEV1_PERCENT_CHANGE: 1
FEV1/FVC: 74
FEV1/FVC_PERCENT_PREDICTED: 95
FEV1/FVC_PERCENT_LLN: 64
FVC_LLN: 1.92
FEV1/FVC_PERCENT_CHANGE: 2
FEV1/FVC: 72
FVC: 1.63
FVC_PERCENT_PREDICTED: 71
FVC_PERCENT_PREDICTED: 72
FEV1/FVC: 72
FEV1: 1.18
FEV1/FVC: 73.65
FEV1_PERCENT_CHANGE: 4
FEV1_PREDICTED: 1.75
FEV1_LLN: 1.46
FEV1/FVC_PERCENT_PREDICTED: 97
FEV1_PERCENT_PREDICTED: 70
FEV1: 1.23
FEV1/FVC_PREDICTED: 77
FVC: 1.67

## 2020-02-14 NOTE — PROCEDURES
Technician: NARINDER Aguillon    Technician Comment:  Good patient effort & cooperation.  The results of this test meet the ATS/ERS standards for acceptability & reproducibility.  Test was performed on the [employer] Plethysmograph-Elite DX system.  Predicted values were GLI-2012 for spirometry, GLI-2017 for DLCO, ITS for Lung Volumes.  The DLCO was uncorrected for Hgb.  A bronchodilator of Ventolin HFA -2puffs via spacer administered.  DLCO performed during dilation period.    Interpretation:  1.  Baseline spirometry shows mixed restrictive obstructive lung disease with an FEV1 of 1.18 L or 67% predicted and an FVC of 71% predicted with a ratio of 72.  There is very mild concavity to the expiratory flow volume loop.  2.  There is no significant bronchodilator response.  3.  Lung volumes are restricted with a total lung capacity at 75% predicted.  4.  DLCO is mildly reduced at 78% predicted.  Overall PFTs show a mixed restrictive obstructive pattern although primary abnormality is restriction with mild reduction in DLCO.  This could be musculoskeletal in nature although cannot rule out early interstitial lung disease.  Suggest clinical correlation.

## 2020-02-18 DIAGNOSIS — E78.5 DYSLIPIDEMIA, GOAL LDL BELOW 130: ICD-10-CM

## 2020-02-18 DIAGNOSIS — I10 ESSENTIAL HYPERTENSION: ICD-10-CM

## 2020-02-18 DIAGNOSIS — R13.19 ESOPHAGEAL DYSPHAGIA: ICD-10-CM

## 2020-02-18 DIAGNOSIS — K21.00 GASTROESOPHAGEAL REFLUX DISEASE WITH ESOPHAGITIS: ICD-10-CM

## 2020-02-18 DIAGNOSIS — N18.30 CKD (CHRONIC KIDNEY DISEASE) STAGE 3, GFR 30-59 ML/MIN: ICD-10-CM

## 2020-02-19 NOTE — PROGRESS NOTES
Lab orders are placed.  These will be faxed to [facility] on [facility] as requested.    Remington Quinones M.D.

## 2020-03-04 LAB
ALBUMIN SERPL-MCNC: 4.2 G/DL (ref 3.6–4.6)
ALBUMIN/GLOB SERPL: 2 {RATIO} (ref 1.2–2.2)
ALP SERPL-CCNC: 82 IU/L (ref 39–117)
ALT SERPL-CCNC: 20 IU/L (ref 0–32)
AST SERPL-CCNC: 20 IU/L (ref 0–40)
BILIRUB SERPL-MCNC: 0.4 MG/DL (ref 0–1.2)
BUN SERPL-MCNC: 11 MG/DL (ref 8–27)
BUN/CREAT SERPL: 11 (ref 12–28)
CALCIUM SERPL-MCNC: 9.3 MG/DL (ref 8.7–10.3)
CHLORIDE SERPL-SCNC: 101 MMOL/L (ref 96–106)
CHOLEST SERPL-MCNC: 146 MG/DL (ref 100–199)
CO2 SERPL-SCNC: 25 MMOL/L (ref 20–29)
CREAT SERPL-MCNC: 0.97 MG/DL (ref 0.57–1)
ERYTHROCYTE [DISTWIDTH] IN BLOOD BY AUTOMATED COUNT: 14.4 % (ref 11.7–15.4)
GLOBULIN SER CALC-MCNC: 2.1 G/DL (ref 1.5–4.5)
GLUCOSE SERPL-MCNC: 98 MG/DL (ref 65–99)
HCT VFR BLD AUTO: 42.3 % (ref 34–46.6)
HDLC SERPL-MCNC: 65 MG/DL
HGB BLD-MCNC: 14.1 G/DL (ref 11.1–15.9)
LABORATORY COMMENT REPORT: NORMAL
LDLC SERPL CALC-MCNC: 63 MG/DL (ref 0–99)
MCH RBC QN AUTO: 30.5 PG (ref 26.6–33)
MCHC RBC AUTO-ENTMCNC: 33.3 G/DL (ref 31.5–35.7)
MCV RBC AUTO: 91 FL (ref 79–97)
NRBC BLD AUTO-RTO: NORMAL %
PLATELET # BLD AUTO: 220 X10E3/UL (ref 150–450)
POTASSIUM SERPL-SCNC: 4.6 MMOL/L (ref 3.5–5.2)
PROT SERPL-MCNC: 6.3 G/DL (ref 6–8.5)
RBC # BLD AUTO: 4.63 X10E6/UL (ref 3.77–5.28)
SODIUM SERPL-SCNC: 139 MMOL/L (ref 134–144)
TRIGL SERPL-MCNC: 90 MG/DL (ref 0–149)
VLDLC SERPL CALC-MCNC: 18 MG/DL (ref 5–40)
WBC # BLD AUTO: 4.4 X10E3/UL (ref 3.4–10.8)

## 2020-03-06 ENCOUNTER — HOSPITAL ENCOUNTER (OUTPATIENT)
Dept: RADIOLOGY | Facility: MEDICAL CENTER | Age: 80
End: 2020-03-06
Attending: NURSE PRACTITIONER
Payer: MEDICARE

## 2020-03-06 DIAGNOSIS — J44.9 CHRONIC OBSTRUCTIVE PULMONARY DISEASE, UNSPECIFIED COPD TYPE (HCC): ICD-10-CM

## 2020-03-06 DIAGNOSIS — Z01.818 PREOP TESTING: ICD-10-CM

## 2020-03-06 PROCEDURE — 71046 X-RAY EXAM CHEST 2 VIEWS: CPT

## 2020-03-09 DIAGNOSIS — F41.1 GENERALIZED ANXIETY DISORDER: ICD-10-CM

## 2020-03-10 RX ORDER — ESCITALOPRAM OXALATE 10 MG/1
TABLET ORAL
Qty: 30 TAB | Refills: 2 | Status: SHIPPED | OUTPATIENT
Start: 2020-03-10 | End: 2020-03-12

## 2020-03-12 ENCOUNTER — OFFICE VISIT (OUTPATIENT)
Dept: MEDICAL GROUP | Facility: MEDICAL CENTER | Age: 80
End: 2020-03-12
Payer: MEDICARE

## 2020-03-12 VITALS
BODY MASS INDEX: 34.16 KG/M2 | OXYGEN SATURATION: 92 % | TEMPERATURE: 98 F | DIASTOLIC BLOOD PRESSURE: 62 MMHG | HEIGHT: 60 IN | WEIGHT: 174 LBS | RESPIRATION RATE: 14 BRPM | HEART RATE: 88 BPM | SYSTOLIC BLOOD PRESSURE: 126 MMHG

## 2020-03-12 DIAGNOSIS — E87.6 HYPOKALEMIA: ICD-10-CM

## 2020-03-12 DIAGNOSIS — I27.20 PULMONARY HYPERTENSION (HCC): ICD-10-CM

## 2020-03-12 DIAGNOSIS — J44.89 COPD WITH ASTHMA (HCC): ICD-10-CM

## 2020-03-12 DIAGNOSIS — J98.01 BRONCHOSPASM: ICD-10-CM

## 2020-03-12 DIAGNOSIS — F33.0 MAJOR DEPRESSIVE DISORDER, RECURRENT EPISODE, MILD (HCC): ICD-10-CM

## 2020-03-12 DIAGNOSIS — F41.1 GENERALIZED ANXIETY DISORDER: ICD-10-CM

## 2020-03-12 PROCEDURE — 99214 OFFICE O/P EST MOD 30 MIN: CPT | Performed by: FAMILY MEDICINE

## 2020-03-12 RX ORDER — POTASSIUM CHLORIDE 600 MG/1
16 TABLET, FILM COATED, EXTENDED RELEASE ORAL DAILY
Qty: 180 TAB | Refills: 3 | Status: SHIPPED | OUTPATIENT
Start: 2020-03-12 | End: 2021-02-08

## 2020-03-12 RX ORDER — ALBUTEROL SULFATE 90 UG/1
2 AEROSOL, METERED RESPIRATORY (INHALATION) EVERY 6 HOURS PRN
Qty: 1 INHALER | Refills: 5 | Status: SHIPPED | OUTPATIENT
Start: 2020-03-12 | End: 2021-10-13

## 2020-03-12 RX ORDER — ESCITALOPRAM OXALATE 20 MG/1
10 TABLET ORAL DAILY
Qty: 90 TAB | Refills: 3 | Status: SHIPPED | OUTPATIENT
Start: 2020-03-12 | End: 2020-03-16

## 2020-03-12 ASSESSMENT — FIBROSIS 4 INDEX: FIB4 SCORE: 1.65

## 2020-03-12 NOTE — PROGRESS NOTES
Chief Complaint   Patient presents with   • Hypokalemia   • Depression   • Anxiety       Subjective:     HPI:   Maddy Hodge presents today with the followin. Hypokalemia  Patient is trying to get the potassium tablets that are more tolerable for him.  The local pharmacy does not seem to stock them.  I have rewritten the prescription for her mail order pharmacy.    2. Major depressive disorder, recurrent episode, mild (HCC)/Generalized anxiety disorder  Patient states the anxiety and depression have improved on the Lexapro.  The anxiety is not as well-controlled as it was with the benzodiazepine but she realizes that per federal guidelines she cannot be prescribed benzodiazepines as she is on chronic narcotics.  She and her son feel the 10 mg Lexapro was somewhat helpful and wonder if this can be increased.  I have increased to 20 mg Lexapro.  She will let me know if this is not working well for her.    3. Pulmonary hypertension (HCC)  Patient continues to have stable moderate pulmonary hypertension.  This is followed by cardiology, Dr. Cook.    5. Bronchospasm  Needs renewal of albuterol inhaler.  She does not use this very often but with the coronavirus pandemic I would like to be sure she has an up-to-date inhaler.    REASON FOR VISIT: Pre-Op Consultation  Consultation Requested by: Dr. Perez, orthopedic  Procedure date and type: Date is not yet sent but they were thinking May or  for a left knee replacement.  With the coronavirus pandemic I have suggested moving the surgery into  or July.    History of condition for which surgery is planned: Patient has longstanding degenerative osteoarthritic change of the knees.  The left knee has been particularly problematic.  She has been receiving injections from Dr. Perez which have helped but only temporarily.  Total knee replacement is contemplated.      Patient Active Problem List    Diagnosis Date Noted   • CKD (chronic kidney disease)  stage 3, GFR 30-59 ml/min ([facility]) 05/23/2016     Priority: Medium   • Esophageal dysphagia 05/19/2016     Priority: Medium   • Hypothyroidism due to Hashimoto's thyroiditis      Priority: Medium   • GERD (gastroesophageal reflux disease) 09/20/2011     Priority: Medium   • Essential hypertension 07/06/2009     Priority: Medium   • Major depressive disorder, recurrent episode, mild (CMS-HCC) 05/02/2016     Priority: Low   • Neuropathy (CMS-HCC) 10/17/2013     Priority: Low   • Family history of polycystic kidney disease      Priority: Low   • Facet arthritis of lumbar region 03/26/2012     Priority: Low   • History of vertebral fracture 03/26/2012     Priority: Low   • Spinal stenosis of lumbar region with neurogenic claudication      Priority: Low   • Choking 08/15/2019   • Chronic anxiety 07/15/2019   • Vitamin D deficiency disease 04/10/2019   • Hoarseness, persistent 04/10/2019   • Other chronic pain 02/21/2019   • History of hematuria 10/23/2018   • BMI 34.0-34.9,adult 08/17/2018   • Dyslipidemia, goal LDL below 130 08/15/2018   • Chronic obstructive pulmonary disease ([facility]) 06/22/2018   • Pulmonary hypertension ([facility]) 02/21/2018   • COPD with asthma ([facility]) 11/10/2017   • ALISSA (obstructive sleep apnea) 11/10/2017   • Postlaminectomy syndrome, unspecified region 07/03/2017   • Recurrent UTI 06/28/2017   • 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 90 Tab 3   • potassium chloride (KLOR-CON) 8 MEQ tablet Take 2 Tabs by mouth every day. 180 Tab 3   • VENTOLIN  (90 Base) MCG/ACT Aero Soln inhalation aerosol Inhale 2 Puffs by mouth every 6 hours as needed for Shortness of Breath. 1 Inhaler 5    • omeprazole (PRILOSEC) 20 MG delayed-release capsule TAKE 1 CAPSULE BY MOUTH ONCE DAILY 90 Cap 0   • imipramine (TOFRANIL) 10 MG Tab Take 2 Tabs by mouth every evening. 180 Tab 2   • Umeclidinium Bromide (INCRUSE ELLIPTA) 62.5 MCG/INH AEROSOL POWDER, BREATH ACTIVATED Inhale 1 Puff by mouth every day. 3 Each 3   • levothyroxine (SYNTHROID) 50 MCG Tab TAKE 1 TABLET BY MOUTH ONCE DAILY IN THE MORNING ON AN EMPTY STOMACH 90 Tab 1   • estradiol (ESTRACE) 0.5 MG tablet TAKE 1 TABLET BY MOUTH ONCE DAILY 90 Tab 3   • fluconazole (DIFLUCAN) 150 MG tablet Take 1 Tab by mouth every day. (Patient not taking: Reported on 11/20/2019) 1 Tab 0   • atorvastatin (LIPITOR) 20 MG Tab Take 1 Tab by mouth every day. 90 Tab 3   • Multiple Vitamins-Minerals (VITEYES AREDS ADVANCED PO) Take  by mouth.     • Cholecalciferol (VITAMIN D3) 2000 UNIT Cap Take  by mouth.     • LETAIRIS 10 MG tablet Take 10 Tabs by mouth every day.     • furosemide (LASIX) 40 MG Tab Take 40 mg by mouth every day.  3   • NARCAN 4 MG/0.1ML Liquid AS DIRECTED  0   • oxyCODONE-acetaminophen (PERCOCET) 5-325 MG Tab TAKE 1 TABLET BY MOUTH 4 TIMES A DAY AS NEEDED FOR PAIN 11/8/18 G89.29  0   • spironolactone (ALDACTONE) 25 MG Tab Take 25 mg by mouth every day.  3   • pregabalin (LYRICA) 150 MG Cap Take 150 mg by mouth 3 times a day.     • AMITIZA 24 MCG capsule Take 48 mcg by mouth every morning with breakfast.     • XTAMPZA ER 18 MG Capsule Extended Release 12 hour Abuse-Deterrent Take 18 mg by mouth 2 Times a Day.     • Cyanocobalamin 2500 MCG Chew Tab Take 1 Tab by mouth every day.     • aspirin EC (ECOTRIN) 81 MG Tablet Delayed Response Take 81 mg by mouth every day.     • docusate sodium (COLACE) 250 MG capsule Take 250 mg by mouth every day.       No current facility-administered medications for this visit.        Allergies   Allergen Reactions   • Pcn [Penicillins]    • Sulfa Drugs    • Tape    • Macrobid [Nitrofurantoin] Rash     rash   • Zoloft Unspecified      Worse depression       ROS: As per HPI       Objective:     /62   Pulse 88   Temp 36.7 °C (98 °F)   Resp 14   Ht 1.524 m (5')   Wt 78.9 kg (174 lb)   SpO2 92%  Body mass index is 33.98 kg/m².    Physical Exam:  Constitutional: Well-developed and well-nourished. Not diaphoretic. No distress. Lucid and fluent.  Skin: Skin is warm and dry. No rash noted.  Head: Atraumatic without lesions.  Eyes: Conjunctivae and extraocular motions are normal. Pupils are equal, round, and reactive to light. No scleral icterus.   Mouth/Throat: Tongue normal. Oropharynx is clear and moist. Posterior pharynx without erythema or exudates.  Neck: Supple, trachea midline. No thyromegaly present. No cervical or supraclavicular lymphadenopathy. No JVD or carotid bruits appreciated  Cardiovascular: Regular rate and rhythm.  Normal S1, S2 without murmur appreciated.  Chest: Effort normal. Clear to auscultation throughout. No adventitious sounds.   Abdomen: Soft, non tender, and without distention. Active bowel sounds in all four quadrants. No rebound, guarding, masses or hepatosplenomegaly.  Extremities: No cyanosis, clubbing, erythema, nor edema.  Both knees show crepitus but the left is somewhat swollen with an effusion.  No erythema or heat appreciated.  Neurological: Alert and oriented x 3.  Very mild tremor noted.  Movements are symmetric.  Psychiatric:  Behavior, mood, and affect are appropriate.       Assessment and Plan:     79 y.o. female with the following issues:    1. Hypokalemia  potassium chloride (KLOR-CON) 8 MEQ tablet   2. Major depressive disorder, recurrent episode, mild (HCC)     3. Generalized anxiety disorder  escitalopram (LEXAPRO) 20 MG tablet   4. Pulmonary hypertension (HCC)     5. Bronchospasm  VENTOLIN  (90 Base) MCG/ACT Aero Soln inhalation aerosol   6. COPD with asthma (HCC)       Patient is medically stable and appropriate for left total knee replacement.  She falls in a low risk category.  The  only reason she does not fall in very low risk is her age.  Medical clearance letter is sent to Dr. Perez today.    Followup: Return in about 6 months (around 9/12/2020), or if symptoms worsen or fail to improve.

## 2020-03-12 NOTE — LETTER
March 12, 2020           [facility]  [address] [place] [address]    Fax: [phone]      Patient: Maddy Hodge   MR Number: [id number]   YOB: 1940   Date of Visit: 3/12/2020             Dear  Dr. Perez;    I saw today our mutual patient Maddy Hodge.  As you know, this 79-year-old lady has a complex history.  She has persistent arthritis problems with her knees, particularly than left knee.  I understand that she is contemplating a left total knee replacement with you.  She has discussed this with me in the past.  She has been treated conservatively with you and that treatment is no longer working well.  Patient states she has daily pain and would like to move forward with a total knee replacement.    She  has a past medical history of Allergy, Anesthesia, Anxiety, Arachnoiditis, Arthritis, Asthma, Back pain, Basal cell carcinoma, Bowel habit changes, CATARACT, Chickenpox, Chronic constipation, Coagulase-negative staphylococcal infection, Depression, Encounter for long-term (current) use of other medications, Erosive gastritis (5/09), Family history of polycystic kidney disease, GERD (gastroesophageal reflux disease), [place] measles, Hypertension, Hypothyroidism, Influenza, Lumbar vertebral fracture (HCC) (5/2011), Mumps, Muscle disorder, Neuropathy (HCC), Obesity, Class I, BMI 30-34.9, OSTEOPOROSIS, Pain (5/12/16), Renal disorder, Sleep apnea, Spinal stenosis, lumbar region, without neurogenic claudication, Tonsillitis, and Urinary bladder disorder (6/30/2016). Her  has a past surgical history that includes lumbar laminectomy diskectomy; hysterectomy, total abdominal (1976); colonoscopy (2000,5/27/09); egd with asp/bx (5/27/09); appendectomy (1976); spinal cord stimulator (9/2/14); cataract extraction with iol (Bilateral); gastroscopy with balloon dilatation (N/A, 5/19/2016); hysterectomy laparoscopy; tonsillectomy; and spinal cord stimulator  (N/A, 7/3/2017). She  reports that she has never smoked. She has never used smokeless tobacco. She reports that she does not drink alcohol or use drugs.    She has a current medication list which includes the following prescription(s): escitalopram, potassium chloride, ventolin hfa, omeprazole, imipramine, umeclidinium bromide, levothyroxine, estradiol, fluconazole, atorvastatin, multiple vitamins-minerals, vitamin d3, letairis, furosemide, narcan, oxycodone-acetaminophen, spironolactone, pregabalin, amitiza, xtampza er, cyanocobalamin, aspirin ec, and docusate sodium. She is allergic to pcn [penicillins]; sulfa drugs; tape; macrobid [nitrofurantoin]; and zoloft.    She is medically stable.  She has tolerated general surgery in the past.  Patient does have mild risk increase due to age but is generally low risk for surgery.  Delay would not be medically advantageous.  I feel she is a good candidate for total knee replacement surgery and general anesthesia.    Sincerely,                        Remington Quinones M.D.

## 2020-03-13 ENCOUNTER — OFFICE VISIT (OUTPATIENT)
Dept: PULMONOLOGY | Facility: HOSPICE | Age: 80
End: 2020-03-13
Payer: MEDICARE

## 2020-03-13 VITALS
DIASTOLIC BLOOD PRESSURE: 70 MMHG | WEIGHT: 174 LBS | HEART RATE: 77 BPM | HEIGHT: 60 IN | BODY MASS INDEX: 34.16 KG/M2 | SYSTOLIC BLOOD PRESSURE: 128 MMHG | OXYGEN SATURATION: 94 %

## 2020-03-13 DIAGNOSIS — J98.4 MIXED RESTRICTIVE AND OBSTRUCTIVE LUNG DISEASE (HCC): ICD-10-CM

## 2020-03-13 DIAGNOSIS — J44.89 COPD WITH ASTHMA (HCC): ICD-10-CM

## 2020-03-13 DIAGNOSIS — J43.9 MIXED RESTRICTIVE AND OBSTRUCTIVE LUNG DISEASE (HCC): ICD-10-CM

## 2020-03-13 DIAGNOSIS — G47.33 OSA (OBSTRUCTIVE SLEEP APNEA): ICD-10-CM

## 2020-03-13 DIAGNOSIS — T17.308D CHOKING, SUBSEQUENT ENCOUNTER: ICD-10-CM

## 2020-03-13 DIAGNOSIS — J44.9 CHRONIC OBSTRUCTIVE PULMONARY DISEASE, UNSPECIFIED COPD TYPE (HCC): ICD-10-CM

## 2020-03-13 DIAGNOSIS — I27.20 PULMONARY HYPERTENSION (HCC): ICD-10-CM

## 2020-03-13 PROCEDURE — 99214 OFFICE O/P EST MOD 30 MIN: CPT | Performed by: NURSE PRACTITIONER

## 2020-03-13 ASSESSMENT — FIBROSIS 4 INDEX: FIB4 SCORE: 1.65

## 2020-03-13 NOTE — PATIENT INSTRUCTIONS
1) Continue BIPAP ST with back up rate of 12 - 12/7cmH20  2) Clean mask and supplies weekly and change them as insurance allows  Consider xiliment. - changed pressure on RAMP  3) Continue using Incruse and rescue inhaler as needed  4) Vaccines: Up to date with Prevnar 13, Pneumovax 23, flu  5) Continue weekly exercise   6) Zpack and Medrol pack on hand for exacerbation   7) Surgical clearance   8) Return in about 6 months (around 9/13/2020), or for pulmonary , for follow up with DOMINIQUE Sims, if not sooner.

## 2020-03-13 NOTE — PROGRESS NOTES
CC:  Here for f/u sleep and pulmonary issues as listed below    HPI:   Maddy presents today for follow up for COPD with hx of pulmonary HTN and ALISSA with PFT results and surgical clearance.  Past medical history of pulmonary hypertension followed by Dr. Cook, depression, hypertension, CKD stage III. Has chronic pain on opioids.      She is also seen as a sleep patient in her clinic .please see last office visit from 11/20/2019 for further details. Currently she is being treated with BIPAP ST @ 12/7cmH20.  There are times when she is not getting air in when she first puts it on. Dry mouth is improved with changing humidity level. Uses Biotene.       PFTs from 2/2020 have declined since 2018 indicate a FVC 71%, Fev1 of 1.18L or 67% predicted with a mild bronchodilator response, Fev1/FVC ratio of 72, TLC 75%, DLCO 78%.  She is a never smoker. CTA study completed 9/2017 was negative for PE.  Most recent chest x-ray completed March 6, 2020 shows mild elevation of right diaphragm.     For further workup of pulmonary HTN an echo was completed without significant change since 2016 study noting estimated EF of 65%, RVSP of 50mmHg.  She established with Dr. Cook, last seen June 2019.  She was started on Letaris 10 mg daily.  She continues to have some water retention with +2-3 bilateral lower extremity edema, but overall improved.  She does report improved dyspnea since starting the medication.     She is compliant using Incruse and rare use of rescue. Stopped symbicort b/c caused hoarseness. She does have a history of choking with dry food. She was offered referral to ENT for further evaluation, r/t tightening of vocal cords, typical for age. Trying to concentrate when she eats to help prevent choking.  She denies wheeze, hemoptysis, chest pain or chest tightness, fever or chills, unintentional weight loss, acid reflux. Since last OV she required zpack and medrol pack x2 for exacerbation with benefit. A She has  "been increasing her activity level with improved dyspnea. She has been bicycling and walking. BMI is 33.              Patient Active Problem List    Diagnosis Date Noted   • CKD (chronic kidney disease) stage 3, GFR 30-59 ml/min ([facility]) 05/23/2016     Priority: Medium   • Esophageal dysphagia 05/19/2016     Priority: Medium   • Hypothyroidism due to Hashimoto's thyroiditis      Priority: Medium   • GERD (gastroesophageal reflux disease) 09/20/2011     Priority: Medium   • Essential hypertension 07/06/2009     Priority: Medium   • Major depressive disorder, recurrent episode, mild (CMS-HCC) 05/02/2016     Priority: Low   • Neuropathy (CMS-HCC) 10/17/2013     Priority: Low   • Family history of polycystic kidney disease      Priority: Low   • Facet arthritis of lumbar region 03/26/2012     Priority: Low   • History of vertebral fracture 03/26/2012     Priority: Low   • Spinal stenosis of lumbar region with neurogenic claudication      Priority: Low   • Choking 08/15/2019   • Chronic anxiety 07/15/2019   • Vitamin D deficiency disease 04/10/2019   • Hoarseness, persistent 04/10/2019   • Other chronic pain 02/21/2019   • History of hematuria 10/23/2018   • BMI 34.0-34.9,adult 08/17/2018   • Dyslipidemia, goal LDL below 130 08/15/2018   • Chronic obstructive pulmonary disease ([facility]) 06/22/2018   • Pulmonary hypertension ([facility]) 02/21/2018   • COPD with asthma ([facility]) 11/10/2017   • ALISSA (obstructive sleep apnea) 11/10/2017   • Postlaminectomy syndrome, unspecified region 07/03/2017   • Recurrent UTI 06/28/2017   • 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    • Arthritis     facet " arthritis of lumbar region   • Asthma     Inhaler use daily.   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 Albrecht, attaches to plastic, prulent   • Depression     and anxiety   • Encounter for long-term (current) use of other medications    • Erosive gastritis 5/09    antral   • Family history of polycystic kidney disease    • GERD (gastroesophageal reflux disease)    • [place] measles    • Hypertension    • Hypothyroidism    • Influenza    • Lumbar vertebral fracture (HCC) 5/2011   • Mumps    • Muscle disorder     Arachnoiditis   • Neuropathy (HCC)    • Obesity, Class I, BMI 30-34.9    • OSTEOPOROSIS    • Pain 5/12/16    back and legs    • Renal disorder    • Sleep apnea     on BiPap, follows with pulmonology   • Spinal stenosis, lumbar region, without neurogenic claudication    • Tonsillitis    • Urinary bladder disorder 6/30/2016    Currently has a catheter.       Past Surgical History:   Procedure Laterality Date   • SPINAL CORD STIMULATOR N/A 7/3/2017    Procedure: SPINAL CORD STIMULATOR FOR LEAD REMOVAL;  Surgeon: Amandeep Gonzalez D.O.;  Location: SURGERY [facility];  Service:    • GASTROSCOPY WITH BALLOON DILATATION N/A 5/19/2016    Procedure: GASTROSCOPY WITH DILATATION;  Surgeon: Tony Monae M.D.;  Location: SURGERY [facility];  Service:    • SPINAL CORD STIMULATOR  9/2/14   • EGD WITH ASP/BX  5/27/09    erosive gastritis   • HYSTERECTOMY, TOTAL ABDOMINAL  1976   • APPENDECTOMY  1976   • CATARACT EXTRACTION WITH IOL Bilateral    • COLONOSCOPY  2000,5/27/09    normal   • HYSTERECTOMY LAPAROSCOPY     • LUMBAR LAMINECTOMY DISKECTOMY     • TONSILLECTOMY         Family History   Problem Relation Age of Onset   • Genitourinary () Problems Brother    • Lung Disease Mother         COPD   • Heart Disease  Mother    • Genetic Disorder Father         Parkinsons/ from complications   • Hypertension Father    • Cancer Maternal Aunt         Breast cancer   • Stroke Paternal Grandmother    • Cancer Maternal Aunt         Breast cancer       Social History     Socioeconomic History   • Marital status:      Spouse name: Not on file   • Number of children: Not on file   • Years of education: Not on file   • Highest education level: Not on file   Occupational History   • Not on file   Social Needs   • Financial resource strain: Not on file   • Food insecurity     Worry: Not on file     Inability: Not on file   • Transportation needs     Medical: Not on file     Non-medical: Not on file   Tobacco Use   • Smoking status: Never Smoker   • Smokeless tobacco: Never Used   Substance and Sexual Activity   • Alcohol use: No     Alcohol/week: 0.0 oz   • Drug use: No   • Sexual activity: Not Currently     Partners: Male     Birth control/protection: Abstinence   Lifestyle   • Physical activity     Days per week: Not on file     Minutes per session: Not on file   • Stress: Not on file   Relationships   • Social connections     Talks on phone: Not on file     Gets together: Not on file     Attends Mandaen service: Not on file     Active member of club or organization: Not on file     Attends meetings of clubs or organizations: Not on file     Relationship status: Not on file   • Intimate partner violence     Fear of current or ex partner: Not on file     Emotionally abused: Not on file     Physically abused: Not on file     Forced sexual activity: Not on file   Other Topics Concern   •  Service Not Asked   • Blood Transfusions Not Asked   • Caffeine Concern Not Asked   • Occupational Exposure Not Asked   • Hobby Hazards Not Asked   • Sleep Concern Not Asked   • Stress Concern No     Comment:  cancer   • Weight Concern Not Asked   • Special Diet Not Asked   • Back Care Not Asked   • Exercise Not Asked   •  Bike Helmet Not Asked   • Seat Belt Not Asked   • Self-Exams Not Asked   Social History Narrative   • Not on file       Current Outpatient Medications   Medication Sig Dispense Refill   • potassium chloride (KLOR-CON) 8 MEQ tablet Take 2 Tabs by mouth every day. 180 Tab 3   • VENTOLIN  (90 Base) MCG/ACT Aero Soln inhalation aerosol Inhale 2 Puffs by mouth every 6 hours as needed for Shortness of Breath. 1 Inhaler 5   • omeprazole (PRILOSEC) 20 MG delayed-release capsule TAKE 1 CAPSULE BY MOUTH ONCE DAILY 90 Cap 0   • imipramine (TOFRANIL) 10 MG Tab Take 2 Tabs by mouth every evening. 180 Tab 2   • Umeclidinium Bromide (INCRUSE ELLIPTA) 62.5 MCG/INH AEROSOL POWDER, BREATH ACTIVATED Inhale 1 Puff by mouth every day. 3 Each 3   • levothyroxine (SYNTHROID) 50 MCG Tab TAKE 1 TABLET BY MOUTH ONCE DAILY IN THE MORNING ON AN EMPTY STOMACH 90 Tab 1   • estradiol (ESTRACE) 0.5 MG tablet TAKE 1 TABLET BY MOUTH ONCE DAILY 90 Tab 3   • atorvastatin (LIPITOR) 20 MG Tab Take 1 Tab by mouth every day. 90 Tab 3   • Multiple Vitamins-Minerals (VITEYES AREDS ADVANCED PO) Take  by mouth.     • Cholecalciferol (VITAMIN D3) 2000 UNIT Cap Take  by mouth.     • LETAIRIS 10 MG tablet Take 10 Tabs by mouth every day.     • furosemide (LASIX) 40 MG Tab Take 40 mg by mouth every day.  3   • oxyCODONE-acetaminophen (PERCOCET) 5-325 MG Tab TAKE 1 TABLET BY MOUTH 4 TIMES A DAY AS NEEDED FOR PAIN 11/8/18 G89.29  0   • spironolactone (ALDACTONE) 25 MG Tab Take 25 mg by mouth every day.  3   • pregabalin (LYRICA) 150 MG Cap Take 150 mg by mouth 3 times a day.     • AMITIZA 24 MCG capsule Take 48 mcg by mouth every morning with breakfast.     • XTAMPZA ER 18 MG Capsule Extended Release 12 hour Abuse-Deterrent Take 18 mg by mouth 2 Times a Day.     • aspirin EC (ECOTRIN) 81 MG Tablet Delayed Response Take 81 mg by mouth every day.     • docusate sodium (COLACE) 250 MG capsule Take 250 mg by mouth every day.     • escitalopram (LEXAPRO) 20 MG  tablet Take 1 Tab by mouth every day. 90 Tab 3   • fluconazole (DIFLUCAN) 150 MG tablet Take 1 Tab by mouth every day. (Patient not taking: Reported on 11/20/2019) 1 Tab 0   • NARCAN 4 MG/0.1ML Liquid AS DIRECTED  0   • Cyanocobalamin 2500 MCG Chew Tab Take 1 Tab by mouth every day.       No current facility-administered medications for this visit.           Allergies: Pcn [penicillins]; Sulfa drugs; Tape; Macrobid [nitrofurantoin]; and Zoloft      ROS   Gen: Denies fever, chills, unintentional weight loss, fatigue, night sweats  E/N/T: Denies ear pain, nasal congestion  Resp:Denies Dyspnea, wheezing, cough, sputum production, hemoptysis  CV: Denies chest pain, chest tightness, palpitations, BLE edema  Sleep:Denies morning headache, insomnia, daytime somnolence, snoring, gasping for air, apnea  Neuro: Denies frequent headaches, weakness, dizziness  GI: Denies N/V, acid reflux/heartburn  See HPI.  All other systems reviewed and negative      Vital signs for this encounter:  Vitals:    03/13/20 1246   Height: 1.524 m (5')   Weight: 78.9 kg (174 lb)   Weight % change since last entry.: 0 %   BP: 128/70   Pulse: 77   BMI (Calculated): 33.98                 Physical Exam:   Appearance: well developed, well nourished, no acute distress.  Eyes: PERRL, EOM intact, sclere white, conjunctiva moist.  Ears: no lesions or deformities.  Hearing: grossly intact.  Nose: no lesions or deformities.  Dentition: good dentition.  Oropharynx: tongue normal, posterior pharynx without erythema or exudate.  Neck: supple, trachea midline, no masses.  Respiratory effort: no intercostal retractions or use of accessory muscles.  Lung auscultation: Bilateral diminished   Heart auscultation: no murmur, rub, or gallop.   Extremities: no cyanosis or edema.  Abdomen: soft, non-tender, no masses.  Gait and station: grossly normal   Digits and Nails: no clubbing, cyanosis, petechiae, or nodes.  Cranial nerves: grossly normal.  Motor: no focal deficits  observed.  Skin: no rashes, lesions, or ulcers noted.  Orientation: oriented to time, place, and person.  Mood and affect: mood and affect appropriate, normal interaction with examiner.    Assessment   1. ALISSA (obstructive sleep apnea)  DME Other   2. Choking, subsequent encounter     3. Chronic obstructive pulmonary disease, unspecified COPD type (HCC)     4. Pulmonary hypertension (HCC)     5. COPD with asthma (HCC)     6. Mixed restrictive and obstructive lung disease (HCC)         Patient was seen for 25 minutes, more than 50% of time spent in face to face review, counseling, and arranging future evaluation and follow up of medical conditions and care related to review of PFTs and chest x-ray.  She is here for surgical clearance for knee replacement.  Surgical clearance letter completed. Reviewed questions and concerns related to pulmonary status and using BIPAP machine. Patient is clinically stable and will proceed with following plan.     PLAN:   Patient Instructions   1) Continue BIPAP ST with back up rate of 12 - 12/7cmH20  2) Clean mask and supplies weekly and change them as insurance allows  Consider xiliment. - changed pressure on RAMP  3) Continue using Incruse and rescue inhaler as needed  4) Vaccines: Up to date with Prevnar 13, Pneumovax 23, flu  5) Continue weekly exercise   6) Zpack and Medrol pack on hand for exacerbation   7) Surgical clearance letter completed  8) Return in about 6 months (around 9/13/2020), or for pulmonary , for follow up with DOMINIQUE Sims, if not sooner.

## 2020-03-13 NOTE — LETTER
March 18, 2020        Maddy Hodge  [address] [place] [address]        Dear Dr. Perez:    Maddy Hodge is currently under our care for a history of COPD and ALISSA. She is at an increased risk for perioperative complications given the above conditions. However, at this time there is nothing that can be done to decrease her perioperative risk. She   underwent PFTs on 2/14/2020 which indicated an FEV1 of 1.18 L or 67% predicted with an FEV1/FVC ratio of 72 and a DLCO of 78% predicted.  Chest x-ray completed on 3/6/2020 showed: Elevation of the right hemidiaphragm is again noted. Atherosclerotic plaque . She  is compliant with bronchodilators and BIPAP machine. She is encouraged early mobilization, coughing and deep breathing, use of incentive spirometer and routine bronchodilators postop. We will be happy to follow she postoperative care if needed.      If you have any questions or concerns, please don't hesitate to call.        Sincerely,        SLY Shukla.    Electronically Signed      [] : Resident [FreeTextEntry3] : X-ray reviewed with Resident and mother. Diffuse streaky patches bilaterally without evidence of consolidation. Patient is clinically well, afebrile, and cough essentially resolved. Instructed mom to complete 10 day course of Amox.

## 2020-03-16 DIAGNOSIS — F41.1 GENERALIZED ANXIETY DISORDER: ICD-10-CM

## 2020-03-16 RX ORDER — ESCITALOPRAM OXALATE 20 MG/1
20 TABLET ORAL DAILY
Qty: 90 TAB | Refills: 3 | Status: SHIPPED | OUTPATIENT
Start: 2020-03-16 | End: 2021-03-11

## 2020-03-27 ENCOUNTER — PATIENT MESSAGE (OUTPATIENT)
Dept: PULMONOLOGY | Facility: HOSPICE | Age: 80
End: 2020-03-27

## 2020-03-27 NOTE — TELEPHONE ENCOUNTER
From: Maddy Hodge  To: KAIN Shukla  Sent: 3/27/2020 3:54 PM PDT  Subject: Non-Urgent Medical Question    Hi  We just got a call from [facility] wanting us to bring the BiPap machine in to adjust pressures. After we saw you I looked at the settings that we can change and the ramp setting was 45 minutes. Mom thinks she was having her issues in that 45 minute ramp time so I changed the ramp time to 10 minutes. She is happier with this setting. Is it okay with you if we don't change main pressure as she is worried it will be too much air? Do you need to contact [facility] to cancel the change they were going to make or can I do that?  Thank you, Johnathan Hodge  for Maddy

## 2020-05-25 DIAGNOSIS — E78.5 DYSLIPIDEMIA, GOAL LDL BELOW 130: ICD-10-CM

## 2020-05-26 RX ORDER — ATORVASTATIN CALCIUM 20 MG/1
TABLET, FILM COATED ORAL
Qty: 90 TAB | Refills: 3 | Status: SHIPPED | OUTPATIENT
Start: 2020-05-26 | End: 2021-05-14

## 2020-08-07 DIAGNOSIS — Z01.810 PRE-OPERATIVE CARDIOVASCULAR EXAMINATION: ICD-10-CM

## 2020-08-07 DIAGNOSIS — Z01.812 PRE-OPERATIVE LABORATORY EXAMINATION: ICD-10-CM

## 2020-08-07 LAB
ANION GAP SERPL CALC-SCNC: 11 MMOL/L (ref 7–16)
APTT PPP: 33.4 SEC (ref 24.7–36)
BASOPHILS # BLD AUTO: 0.9 % (ref 0–1.8)
BASOPHILS # BLD: 0.05 K/UL (ref 0–0.12)
BUN SERPL-MCNC: 11 MG/DL (ref 8–22)
CALCIUM SERPL-MCNC: 9.2 MG/DL (ref 8.4–10.2)
CHLORIDE SERPL-SCNC: 101 MMOL/L (ref 96–112)
CO2 SERPL-SCNC: 25 MMOL/L (ref 20–33)
COVID ORDER STATUS COVID19: NORMAL
CREAT SERPL-MCNC: 0.96 MG/DL (ref 0.5–1.4)
EKG IMPRESSION: NORMAL
EOSINOPHIL # BLD AUTO: 0.21 K/UL (ref 0–0.51)
EOSINOPHIL NFR BLD: 3.8 % (ref 0–6.9)
ERYTHROCYTE [DISTWIDTH] IN BLOOD BY AUTOMATED COUNT: 50.5 FL (ref 35.9–50)
EST. AVERAGE GLUCOSE BLD GHB EST-MCNC: 114 MG/DL
GLUCOSE SERPL-MCNC: 98 MG/DL (ref 65–99)
HBA1C MFR BLD: 5.6 % (ref 0–5.6)
HCT VFR BLD AUTO: 43 % (ref 37–47)
HGB BLD-MCNC: 14.4 G/DL (ref 12–16)
HIV 1+2 AB+HIV1 P24 AG SERPL QL IA: NORMAL
IMM GRANULOCYTES # BLD AUTO: 0.02 K/UL (ref 0–0.11)
IMM GRANULOCYTES NFR BLD AUTO: 0.4 % (ref 0–0.9)
INR PPP: 0.97 (ref 0.87–1.13)
LYMPHOCYTES # BLD AUTO: 1.58 K/UL (ref 1–4.8)
LYMPHOCYTES NFR BLD: 28.6 % (ref 22–41)
MCH RBC QN AUTO: 30.1 PG (ref 27–33)
MCHC RBC AUTO-ENTMCNC: 33.5 G/DL (ref 33.6–35)
MCV RBC AUTO: 89.8 FL (ref 81.4–97.8)
MONOCYTES # BLD AUTO: 0.52 K/UL (ref 0–0.85)
MONOCYTES NFR BLD AUTO: 9.4 % (ref 0–13.4)
NEUTROPHILS # BLD AUTO: 3.15 K/UL (ref 2–7.15)
NEUTROPHILS NFR BLD: 56.9 % (ref 44–72)
NRBC # BLD AUTO: 0 K/UL
NRBC BLD-RTO: 0 /100 WBC
PLATELET # BLD AUTO: 223 K/UL (ref 164–446)
PMV BLD AUTO: 10.9 FL (ref 9–12.9)
POTASSIUM SERPL-SCNC: 4 MMOL/L (ref 3.6–5.5)
PROTHROMBIN TIME: 13 SEC (ref 12–14.6)
RBC # BLD AUTO: 4.79 M/UL (ref 4.2–5.4)
SARS-COV-2 RNA RESP QL NAA+PROBE: NOTDETECTED
SODIUM SERPL-SCNC: 137 MMOL/L (ref 135–145)
SPECIMEN SOURCE: NORMAL
WBC # BLD AUTO: 5.5 K/UL (ref 4.8–10.8)

## 2020-08-07 PROCEDURE — U0003 INFECTIOUS AGENT DETECTION BY NUCLEIC ACID (DNA OR RNA); SEVERE ACUTE RESPIRATORY SYNDROME CORONAVIRUS 2 (SARS-COV-2) (CORONAVIRUS DISEASE [COVID-19]), AMPLIFIED PROBE TECHNIQUE, MAKING USE OF HIGH THROUGHPUT TECHNOLOGIES AS DESCRIBED BY CMS-2020-01-R: HCPCS

## 2020-08-07 PROCEDURE — 93010 ELECTROCARDIOGRAM REPORT: CPT | Performed by: INTERNAL MEDICINE

## 2020-08-07 PROCEDURE — G0475 HIV COMBINATION ASSAY: HCPCS

## 2020-08-07 PROCEDURE — 80048 BASIC METABOLIC PNL TOTAL CA: CPT

## 2020-08-07 PROCEDURE — 93005 ELECTROCARDIOGRAM TRACING: CPT

## 2020-08-07 PROCEDURE — 36415 COLL VENOUS BLD VENIPUNCTURE: CPT

## 2020-08-07 PROCEDURE — 87640 STAPH A DNA AMP PROBE: CPT | Mod: XU

## 2020-08-07 PROCEDURE — 85610 PROTHROMBIN TIME: CPT

## 2020-08-07 PROCEDURE — 85730 THROMBOPLASTIN TIME PARTIAL: CPT

## 2020-08-07 PROCEDURE — 85025 COMPLETE CBC W/AUTO DIFF WBC: CPT

## 2020-08-07 PROCEDURE — 87641 MR-STAPH DNA AMP PROBE: CPT

## 2020-08-07 PROCEDURE — 83036 HEMOGLOBIN GLYCOSYLATED A1C: CPT

## 2020-08-07 RX ORDER — LEVOTHYROXINE SODIUM 0.05 MG/1
TABLET ORAL
COMMUNITY
End: 2020-09-30

## 2020-08-07 RX ORDER — MELOXICAM 7.5 MG/1
TABLET ORAL
COMMUNITY
Start: 2020-08-06 | End: 2021-01-13

## 2020-08-07 RX ORDER — ONDANSETRON 4 MG/1
TABLET, FILM COATED ORAL
COMMUNITY
Start: 2020-08-06 | End: 2021-01-13

## 2020-08-07 RX ORDER — PREGABALIN 200 MG/1
200 CAPSULE ORAL 3 TIMES DAILY
COMMUNITY
Start: 2020-07-07

## 2020-08-07 RX ORDER — PREGABALIN 200 MG/1
CAPSULE ORAL
COMMUNITY
End: 2020-08-07

## 2020-08-07 RX ORDER — ALBUTEROL SULFATE 90 UG/1
AEROSOL, METERED RESPIRATORY (INHALATION)
COMMUNITY
End: 2020-08-07

## 2020-08-07 RX ORDER — CIPROFLOXACIN 500 MG/1
500 TABLET, FILM COATED ORAL
Status: ON HOLD | COMMUNITY
Start: 2020-08-03 | End: 2020-08-12

## 2020-08-07 ASSESSMENT — FIBROSIS 4 INDEX: FIB4 SCORE: 1.67

## 2020-08-07 NOTE — DISCHARGE PLANNING
DISCHARGE PLANNING NOTE - TOTAL JOINT     Procedure: Procedure(s):  ARTHROPLASTY, KNEE, TOTAL  Procedure Date: 8/12/2020  Insurance:  Payor: MEDICARE / Plan: MEDICARE PART A & B / Product Type: *No Product type* /   Equipment currently available at home? cane, front-wheel walker, raised toilet seat, shower chair and ice machine  Steps into the home? 2  Steps within the home? 0  Toilet height? ADA  Type of shower? walk-in shower  Who will be with you during your recovery? Son and daughter.  Is Outpatient Physical Therapy set up after surgery? Yes  Did you take the Total Joint Class and where? Yes, at [facility] in March 2020   Planning on same day discharge? No.     This writer met with pt and her son during her preadmission appointment. Pt shares a household with her adult children. Pt states she has all needed equipment. Home safety checklist reviewed and copy given to pt. All questions answered and pt verbalizes understanding of instructions. Anticipate dc to home without barriers.

## 2020-08-07 NOTE — OR NURSING
PreAdmit Appointment: Patient given Preparing for your procedure handout. Patient instructed to continue regularly prescribed medications through day before surgery. Instructed to take the following medications the day of surgery with a sip of water per Anesthesia protocol: Patient states she is very slow to wake up. Covid and MRSA pending. Patient STOP Bang, uses Bipap, bringing day of surgery along with inhaler. Patient instructed to stop anti inflammatories and vitamins. Patient instructed to use Narcan and inhalers if needed. Patient instructed to take Lipitor, Colace, Lexapro, Tofranil, Letairis, Euthyrox, Prilosec,Amitiza, Lyrica day of surgery. Patient instructed to take Oxycodone and Xtampza if needed for pain. Education provided with son.Patient is a Fall Risk.

## 2020-08-08 LAB
SCCMEC + MECA PNL NOSE NAA+PROBE: NEGATIVE
SCCMEC + MECA PNL NOSE NAA+PROBE: NEGATIVE

## 2020-08-11 ENCOUNTER — ANESTHESIA EVENT (OUTPATIENT)
Dept: SURGERY | Facility: MEDICAL CENTER | Age: 80
End: 2020-08-11
Payer: MEDICARE

## 2020-08-12 ENCOUNTER — HOSPITAL ENCOUNTER (OUTPATIENT)
Facility: MEDICAL CENTER | Age: 80
End: 2020-08-13
Attending: ORTHOPAEDIC SURGERY | Admitting: ORTHOPAEDIC SURGERY
Payer: MEDICARE

## 2020-08-12 ENCOUNTER — APPOINTMENT (OUTPATIENT)
Dept: RADIOLOGY | Facility: MEDICAL CENTER | Age: 80
End: 2020-08-12
Attending: ORTHOPAEDIC SURGERY
Payer: MEDICARE

## 2020-08-12 ENCOUNTER — ANESTHESIA (OUTPATIENT)
Dept: SURGERY | Facility: MEDICAL CENTER | Age: 80
End: 2020-08-12
Payer: MEDICARE

## 2020-08-12 DIAGNOSIS — M17.12 PRIMARY OSTEOARTHRITIS OF LEFT KNEE: ICD-10-CM

## 2020-08-12 PROCEDURE — L8699 PROSTHETIC IMPLANT NOS: HCPCS | Performed by: ORTHOPAEDIC SURGERY

## 2020-08-12 PROCEDURE — 502579 HCHG PACK, TOTAL KNEE: Performed by: ORTHOPAEDIC SURGERY

## 2020-08-12 PROCEDURE — 501838 HCHG SUTURE GENERAL: Performed by: ORTHOPAEDIC SURGERY

## 2020-08-12 PROCEDURE — A9270 NON-COVERED ITEM OR SERVICE: HCPCS | Performed by: PHYSICIAN ASSISTANT

## 2020-08-12 PROCEDURE — 160035 HCHG PACU - 1ST 60 MINS PHASE I: Performed by: ORTHOPAEDIC SURGERY

## 2020-08-12 PROCEDURE — 700102 HCHG RX REV CODE 250 W/ 637 OVERRIDE(OP): Performed by: STUDENT IN AN ORGANIZED HEALTH CARE EDUCATION/TRAINING PROGRAM

## 2020-08-12 PROCEDURE — A9270 NON-COVERED ITEM OR SERVICE: HCPCS | Performed by: STUDENT IN AN ORGANIZED HEALTH CARE EDUCATION/TRAINING PROGRAM

## 2020-08-12 PROCEDURE — 94760 N-INVAS EAR/PLS OXIMETRY 1: CPT

## 2020-08-12 PROCEDURE — A9270 NON-COVERED ITEM OR SERVICE: HCPCS

## 2020-08-12 PROCEDURE — 700101 HCHG RX REV CODE 250: Performed by: ORTHOPAEDIC SURGERY

## 2020-08-12 PROCEDURE — G0378 HOSPITAL OBSERVATION PER HR: HCPCS

## 2020-08-12 PROCEDURE — 700111 HCHG RX REV CODE 636 W/ 250 OVERRIDE (IP): Performed by: PHYSICIAN ASSISTANT

## 2020-08-12 PROCEDURE — 700112 HCHG RX REV CODE 229: Performed by: PHYSICIAN ASSISTANT

## 2020-08-12 PROCEDURE — 700102 HCHG RX REV CODE 250 W/ 637 OVERRIDE(OP)

## 2020-08-12 PROCEDURE — 96376 TX/PRO/DX INJ SAME DRUG ADON: CPT | Mod: XU

## 2020-08-12 PROCEDURE — 160029 HCHG SURGERY MINUTES - 1ST 30 MINS LEVEL 4: Performed by: ORTHOPAEDIC SURGERY

## 2020-08-12 PROCEDURE — 502000 HCHG MISC OR IMPLANTS RC 0278: Performed by: ORTHOPAEDIC SURGERY

## 2020-08-12 PROCEDURE — 96367 TX/PROPH/DG ADDL SEQ IV INF: CPT | Mod: XU

## 2020-08-12 PROCEDURE — 160041 HCHG SURGERY MINUTES - EA ADDL 1 MIN LEVEL 4: Performed by: ORTHOPAEDIC SURGERY

## 2020-08-12 PROCEDURE — 700101 HCHG RX REV CODE 250: Performed by: PHYSICIAN ASSISTANT

## 2020-08-12 PROCEDURE — 96365 THER/PROPH/DIAG IV INF INIT: CPT | Mod: XU

## 2020-08-12 PROCEDURE — 700105 HCHG RX REV CODE 258: Performed by: ORTHOPAEDIC SURGERY

## 2020-08-12 PROCEDURE — 73560 X-RAY EXAM OF KNEE 1 OR 2: CPT | Mod: LT

## 2020-08-12 PROCEDURE — 94660 CPAP INITIATION&MGMT: CPT

## 2020-08-12 PROCEDURE — A9270 NON-COVERED ITEM OR SERVICE: HCPCS | Performed by: ORTHOPAEDIC SURGERY

## 2020-08-12 PROCEDURE — 64447 NJX AA&/STRD FEMORAL NRV IMG: CPT | Performed by: ORTHOPAEDIC SURGERY

## 2020-08-12 PROCEDURE — 700102 HCHG RX REV CODE 250 W/ 637 OVERRIDE(OP): Performed by: ORTHOPAEDIC SURGERY

## 2020-08-12 PROCEDURE — 96375 TX/PRO/DX INJ NEW DRUG ADDON: CPT | Mod: XU

## 2020-08-12 PROCEDURE — 160048 HCHG OR STATISTICAL LEVEL 1-5: Performed by: ORTHOPAEDIC SURGERY

## 2020-08-12 PROCEDURE — 700102 HCHG RX REV CODE 250 W/ 637 OVERRIDE(OP): Performed by: PHYSICIAN ASSISTANT

## 2020-08-12 PROCEDURE — 501445 HCHG STAPLER, SKIN DISP: Performed by: ORTHOPAEDIC SURGERY

## 2020-08-12 PROCEDURE — 160009 HCHG ANES TIME/MIN: Performed by: ORTHOPAEDIC SURGERY

## 2020-08-12 PROCEDURE — 700105 HCHG RX REV CODE 258: Performed by: PHYSICIAN ASSISTANT

## 2020-08-12 PROCEDURE — 700111 HCHG RX REV CODE 636 W/ 250 OVERRIDE (IP): Performed by: STUDENT IN AN ORGANIZED HEALTH CARE EDUCATION/TRAINING PROGRAM

## 2020-08-12 PROCEDURE — 160002 HCHG RECOVERY MINUTES (STAT): Performed by: ORTHOPAEDIC SURGERY

## 2020-08-12 PROCEDURE — 160036 HCHG PACU - EA ADDL 30 MINS PHASE I: Performed by: ORTHOPAEDIC SURGERY

## 2020-08-12 PROCEDURE — 700101 HCHG RX REV CODE 250: Performed by: STUDENT IN AN ORGANIZED HEALTH CARE EDUCATION/TRAINING PROGRAM

## 2020-08-12 PROCEDURE — 700111 HCHG RX REV CODE 636 W/ 250 OVERRIDE (IP): Performed by: ORTHOPAEDIC SURGERY

## 2020-08-12 DEVICE — CEMENT BONE SIMPLEX W/GENTAICIN (10/PK): Type: IMPLANTABLE DEVICE | Site: KNEE | Status: FUNCTIONAL

## 2020-08-12 DEVICE — IMPLANT GII OVAL RESURFACING PAT 29MM (1EA): Type: IMPLANTABLE DEVICE | Site: KNEE | Status: FUNCTIONAL

## 2020-08-12 DEVICE — IMPLANTABLE DEVICE: Type: IMPLANTABLE DEVICE | Site: KNEE | Status: FUNCTIONAL

## 2020-08-12 DEVICE — IMPLANT LGN PS HIGH FLEX XLPE SZ 3-4 9MM (1EA): Type: IMPLANTABLE DEVICE | Site: KNEE | Status: FUNCTIONAL

## 2020-08-12 DEVICE — IMPLANT GNS II CMT TIB SIZE  3 LEFT: Type: IMPLANTABLE DEVICE | Site: KNEE | Status: FUNCTIONAL

## 2020-08-12 RX ORDER — DIPHENHYDRAMINE HYDROCHLORIDE 50 MG/ML
25 INJECTION INTRAMUSCULAR; INTRAVENOUS EVERY 6 HOURS PRN
Status: DISCONTINUED | OUTPATIENT
Start: 2020-08-12 | End: 2020-08-13 | Stop reason: HOSPADM

## 2020-08-12 RX ORDER — CELECOXIB 200 MG/1
200 CAPSULE ORAL ONCE
Status: DISCONTINUED | OUTPATIENT
Start: 2020-08-12 | End: 2020-08-12

## 2020-08-12 RX ORDER — SODIUM CHLORIDE, SODIUM LACTATE, POTASSIUM CHLORIDE, CALCIUM CHLORIDE 600; 310; 30; 20 MG/100ML; MG/100ML; MG/100ML; MG/100ML
INJECTION, SOLUTION INTRAVENOUS CONTINUOUS
Status: DISCONTINUED | OUTPATIENT
Start: 2020-08-12 | End: 2020-08-12 | Stop reason: HOSPADM

## 2020-08-12 RX ORDER — EPINEPHRINE 1 MG/ML(1)
AMPUL (ML) INJECTION
Status: DISCONTINUED | OUTPATIENT
Start: 2020-08-12 | End: 2020-08-12 | Stop reason: HOSPADM

## 2020-08-12 RX ORDER — POLYETHYLENE GLYCOL 3350 17 G/17G
1 POWDER, FOR SOLUTION ORAL 2 TIMES DAILY PRN
Status: DISCONTINUED | OUTPATIENT
Start: 2020-08-12 | End: 2020-08-13 | Stop reason: HOSPADM

## 2020-08-12 RX ORDER — ONDANSETRON 2 MG/ML
INJECTION INTRAMUSCULAR; INTRAVENOUS PRN
Status: DISCONTINUED | OUTPATIENT
Start: 2020-08-12 | End: 2020-08-12 | Stop reason: SURG

## 2020-08-12 RX ORDER — LEVOTHYROXINE SODIUM 0.05 MG/1
50 TABLET ORAL
Status: DISCONTINUED | OUTPATIENT
Start: 2020-08-13 | End: 2020-08-13 | Stop reason: HOSPADM

## 2020-08-12 RX ORDER — IMIPRAMINE HYDROCHLORIDE 10 MG/1
20 TABLET, FILM COATED ORAL
Status: DISCONTINUED | OUTPATIENT
Start: 2020-08-12 | End: 2020-08-13 | Stop reason: HOSPADM

## 2020-08-12 RX ORDER — OXYCODONE HYDROCHLORIDE 10 MG/1
20 TABLET ORAL
Status: DISCONTINUED | OUTPATIENT
Start: 2020-08-12 | End: 2020-08-13 | Stop reason: HOSPADM

## 2020-08-12 RX ORDER — ALBUTEROL SULFATE 90 UG/1
2 AEROSOL, METERED RESPIRATORY (INHALATION) EVERY 6 HOURS PRN
Status: DISCONTINUED | OUTPATIENT
Start: 2020-08-12 | End: 2020-08-13 | Stop reason: HOSPADM

## 2020-08-12 RX ORDER — ONDANSETRON 2 MG/ML
4 INJECTION INTRAMUSCULAR; INTRAVENOUS
Status: DISCONTINUED | OUTPATIENT
Start: 2020-08-12 | End: 2020-08-12 | Stop reason: HOSPADM

## 2020-08-12 RX ORDER — KETOROLAC TROMETHAMINE 30 MG/ML
15 INJECTION, SOLUTION INTRAMUSCULAR; INTRAVENOUS EVERY 6 HOURS
Status: DISCONTINUED | OUTPATIENT
Start: 2020-08-12 | End: 2020-08-13 | Stop reason: HOSPADM

## 2020-08-12 RX ORDER — DIPHENHYDRAMINE HCL 25 MG
25 TABLET ORAL EVERY 6 HOURS PRN
Status: DISCONTINUED | OUTPATIENT
Start: 2020-08-12 | End: 2020-08-13 | Stop reason: HOSPADM

## 2020-08-12 RX ORDER — OXYCODONE HCL 5 MG/5 ML
10 SOLUTION, ORAL ORAL
Status: COMPLETED | OUTPATIENT
Start: 2020-08-12 | End: 2020-08-12

## 2020-08-12 RX ORDER — CHLORPROMAZINE HYDROCHLORIDE 25 MG/1
25 TABLET, FILM COATED ORAL EVERY 6 HOURS PRN
Status: DISCONTINUED | OUTPATIENT
Start: 2020-08-12 | End: 2020-08-13 | Stop reason: HOSPADM

## 2020-08-12 RX ORDER — SCOLOPAMINE TRANSDERMAL SYSTEM 1 MG/1
1 PATCH, EXTENDED RELEASE TRANSDERMAL
Status: DISCONTINUED | OUTPATIENT
Start: 2020-08-12 | End: 2020-08-13 | Stop reason: HOSPADM

## 2020-08-12 RX ORDER — ACETAMINOPHEN 500 MG
1000 TABLET ORAL ONCE
Status: COMPLETED | OUTPATIENT
Start: 2020-08-12 | End: 2020-08-12

## 2020-08-12 RX ORDER — ATORVASTATIN CALCIUM 20 MG/1
20 TABLET, FILM COATED ORAL DAILY
Status: DISCONTINUED | OUTPATIENT
Start: 2020-08-12 | End: 2020-08-13 | Stop reason: HOSPADM

## 2020-08-12 RX ORDER — HYDROMORPHONE HYDROCHLORIDE 1 MG/ML
0.2 INJECTION, SOLUTION INTRAMUSCULAR; INTRAVENOUS; SUBCUTANEOUS
Status: DISCONTINUED | OUTPATIENT
Start: 2020-08-12 | End: 2020-08-12 | Stop reason: HOSPADM

## 2020-08-12 RX ORDER — ROPIVACAINE HYDROCHLORIDE 5 MG/ML
INJECTION, SOLUTION EPIDURAL; INFILTRATION; PERINEURAL PRN
Status: DISCONTINUED | OUTPATIENT
Start: 2020-08-12 | End: 2020-08-12

## 2020-08-12 RX ORDER — ACETAMINOPHEN 500 MG
500 TABLET ORAL EVERY 6 HOURS
Status: DISCONTINUED | OUTPATIENT
Start: 2020-08-12 | End: 2020-08-13 | Stop reason: HOSPADM

## 2020-08-12 RX ORDER — SODIUM CHLORIDE, SODIUM LACTATE, POTASSIUM CHLORIDE, CALCIUM CHLORIDE 600; 310; 30; 20 MG/100ML; MG/100ML; MG/100ML; MG/100ML
INJECTION, SOLUTION INTRAVENOUS CONTINUOUS
Status: ACTIVE | OUTPATIENT
Start: 2020-08-12 | End: 2020-08-12

## 2020-08-12 RX ORDER — SODIUM CHLORIDE 9 MG/ML
INJECTION, SOLUTION INTRAMUSCULAR; INTRAVENOUS; SUBCUTANEOUS
Status: DISCONTINUED | OUTPATIENT
Start: 2020-08-12 | End: 2020-08-12 | Stop reason: HOSPADM

## 2020-08-12 RX ORDER — LUBIPROSTONE 24 UG/1
48 CAPSULE ORAL
Status: DISCONTINUED | OUTPATIENT
Start: 2020-08-14 | End: 2020-08-13 | Stop reason: HOSPADM

## 2020-08-12 RX ORDER — TRAMADOL HYDROCHLORIDE 50 MG/1
50 TABLET ORAL EVERY 4 HOURS PRN
Status: DISCONTINUED | OUTPATIENT
Start: 2020-08-12 | End: 2020-08-13 | Stop reason: HOSPADM

## 2020-08-12 RX ORDER — HYDROMORPHONE HYDROCHLORIDE 1 MG/ML
0.4 INJECTION, SOLUTION INTRAMUSCULAR; INTRAVENOUS; SUBCUTANEOUS
Status: DISCONTINUED | OUTPATIENT
Start: 2020-08-12 | End: 2020-08-12 | Stop reason: HOSPADM

## 2020-08-12 RX ORDER — ROPIVACAINE HYDROCHLORIDE 5 MG/ML
INJECTION, SOLUTION EPIDURAL; INFILTRATION; PERINEURAL
Status: COMPLETED | OUTPATIENT
Start: 2020-08-12 | End: 2020-08-12

## 2020-08-12 RX ORDER — ROPIVACAINE HYDROCHLORIDE 5 MG/ML
INJECTION, SOLUTION EPIDURAL; INFILTRATION; PERINEURAL
Status: DISCONTINUED | OUTPATIENT
Start: 2020-08-12 | End: 2020-08-12 | Stop reason: HOSPADM

## 2020-08-12 RX ORDER — OXYCODONE HYDROCHLORIDE 10 MG/1
10 TABLET ORAL
Status: DISCONTINUED | OUTPATIENT
Start: 2020-08-12 | End: 2020-08-13 | Stop reason: HOSPADM

## 2020-08-12 RX ORDER — DOCUSATE SODIUM 100 MG/1
100 CAPSULE, LIQUID FILLED ORAL 2 TIMES DAILY
Status: DISCONTINUED | OUTPATIENT
Start: 2020-08-12 | End: 2020-08-13 | Stop reason: HOSPADM

## 2020-08-12 RX ORDER — SPIRONOLACTONE 25 MG/1
25 TABLET ORAL DAILY
Status: DISCONTINUED | OUTPATIENT
Start: 2020-08-12 | End: 2020-08-13 | Stop reason: HOSPADM

## 2020-08-12 RX ORDER — ONDANSETRON 2 MG/ML
4 INJECTION INTRAMUSCULAR; INTRAVENOUS EVERY 4 HOURS PRN
Status: DISCONTINUED | OUTPATIENT
Start: 2020-08-12 | End: 2020-08-13 | Stop reason: HOSPADM

## 2020-08-12 RX ORDER — POTASSIUM CHLORIDE 20 MEQ/1
20 TABLET, EXTENDED RELEASE ORAL DAILY
Status: DISCONTINUED | OUTPATIENT
Start: 2020-08-13 | End: 2020-08-13 | Stop reason: HOSPADM

## 2020-08-12 RX ORDER — DIPHENHYDRAMINE HCL 25 MG
25 TABLET ORAL NIGHTLY PRN
Status: DISCONTINUED | OUTPATIENT
Start: 2020-08-13 | End: 2020-08-13 | Stop reason: HOSPADM

## 2020-08-12 RX ORDER — KETAMINE HYDROCHLORIDE 50 MG/ML
INJECTION, SOLUTION INTRAMUSCULAR; INTRAVENOUS PRN
Status: DISCONTINUED | OUTPATIENT
Start: 2020-08-12 | End: 2020-08-12 | Stop reason: SURG

## 2020-08-12 RX ORDER — HALOPERIDOL 5 MG/ML
1 INJECTION INTRAMUSCULAR
Status: DISCONTINUED | OUTPATIENT
Start: 2020-08-12 | End: 2020-08-12 | Stop reason: HOSPADM

## 2020-08-12 RX ORDER — GABAPENTIN 300 MG/1
300 CAPSULE ORAL ONCE
Status: COMPLETED | OUTPATIENT
Start: 2020-08-12 | End: 2020-08-12

## 2020-08-12 RX ORDER — FUROSEMIDE 40 MG/1
40 TABLET ORAL DAILY
Status: DISCONTINUED | OUTPATIENT
Start: 2020-08-12 | End: 2020-08-13 | Stop reason: HOSPADM

## 2020-08-12 RX ORDER — DEXAMETHASONE SODIUM PHOSPHATE 4 MG/ML
8 INJECTION, SOLUTION INTRA-ARTICULAR; INTRALESIONAL; INTRAMUSCULAR; INTRAVENOUS; SOFT TISSUE ONCE
Status: COMPLETED | OUTPATIENT
Start: 2020-08-13 | End: 2020-08-13

## 2020-08-12 RX ORDER — DEXTROSE MONOHYDRATE, SODIUM CHLORIDE, AND POTASSIUM CHLORIDE 50; 1.49; 4.5 G/1000ML; G/1000ML; G/1000ML
INJECTION, SOLUTION INTRAVENOUS CONTINUOUS
Status: ACTIVE | OUTPATIENT
Start: 2020-08-12 | End: 2020-08-12

## 2020-08-12 RX ORDER — DIPHENHYDRAMINE HYDROCHLORIDE 50 MG/ML
12.5 INJECTION INTRAMUSCULAR; INTRAVENOUS
Status: DISCONTINUED | OUTPATIENT
Start: 2020-08-12 | End: 2020-08-12 | Stop reason: HOSPADM

## 2020-08-12 RX ORDER — HYDROMORPHONE HYDROCHLORIDE 1 MG/ML
1 INJECTION, SOLUTION INTRAMUSCULAR; INTRAVENOUS; SUBCUTANEOUS
Status: DISCONTINUED | OUTPATIENT
Start: 2020-08-12 | End: 2020-08-13 | Stop reason: HOSPADM

## 2020-08-12 RX ORDER — DEXAMETHASONE SODIUM PHOSPHATE 4 MG/ML
INJECTION, SOLUTION INTRA-ARTICULAR; INTRALESIONAL; INTRAMUSCULAR; INTRAVENOUS; SOFT TISSUE PRN
Status: DISCONTINUED | OUTPATIENT
Start: 2020-08-12 | End: 2020-08-12 | Stop reason: SURG

## 2020-08-12 RX ORDER — LIDOCAINE HYDROCHLORIDE 20 MG/ML
INJECTION, SOLUTION EPIDURAL; INFILTRATION; INTRACAUDAL; PERINEURAL PRN
Status: DISCONTINUED | OUTPATIENT
Start: 2020-08-12 | End: 2020-08-12 | Stop reason: SURG

## 2020-08-12 RX ORDER — ENEMA 19; 7 G/133ML; G/133ML
1 ENEMA RECTAL
Status: DISCONTINUED | OUTPATIENT
Start: 2020-08-12 | End: 2020-08-13 | Stop reason: HOSPADM

## 2020-08-12 RX ORDER — HALOPERIDOL 5 MG/ML
1 INJECTION INTRAMUSCULAR EVERY 6 HOURS PRN
Status: DISCONTINUED | OUTPATIENT
Start: 2020-08-12 | End: 2020-08-13 | Stop reason: HOSPADM

## 2020-08-12 RX ORDER — ESCITALOPRAM OXALATE 10 MG/1
20 TABLET ORAL DAILY
Status: DISCONTINUED | OUTPATIENT
Start: 2020-08-12 | End: 2020-08-13 | Stop reason: HOSPADM

## 2020-08-12 RX ORDER — HYDROMORPHONE HYDROCHLORIDE 1 MG/ML
0.1 INJECTION, SOLUTION INTRAMUSCULAR; INTRAVENOUS; SUBCUTANEOUS
Status: DISCONTINUED | OUTPATIENT
Start: 2020-08-12 | End: 2020-08-12 | Stop reason: HOSPADM

## 2020-08-12 RX ORDER — ONDANSETRON 4 MG/1
4 TABLET, ORALLY DISINTEGRATING ORAL EVERY 4 HOURS PRN
Status: DISCONTINUED | OUTPATIENT
Start: 2020-08-12 | End: 2020-08-13 | Stop reason: HOSPADM

## 2020-08-12 RX ORDER — DEXAMETHASONE SODIUM PHOSPHATE 4 MG/ML
4 INJECTION, SOLUTION INTRA-ARTICULAR; INTRALESIONAL; INTRAMUSCULAR; INTRAVENOUS; SOFT TISSUE
Status: DISCONTINUED | OUTPATIENT
Start: 2020-08-12 | End: 2020-08-13 | Stop reason: HOSPADM

## 2020-08-12 RX ORDER — PREGABALIN 100 MG/1
100 CAPSULE ORAL 3 TIMES DAILY
Status: DISCONTINUED | OUTPATIENT
Start: 2020-08-12 | End: 2020-08-13 | Stop reason: HOSPADM

## 2020-08-12 RX ORDER — CHLORPROMAZINE HYDROCHLORIDE 25 MG/ML
25 INJECTION INTRAMUSCULAR EVERY 6 HOURS PRN
Status: DISCONTINUED | OUTPATIENT
Start: 2020-08-12 | End: 2020-08-13 | Stop reason: HOSPADM

## 2020-08-12 RX ORDER — AMOXICILLIN 250 MG
1 CAPSULE ORAL NIGHTLY
Status: DISCONTINUED | OUTPATIENT
Start: 2020-08-12 | End: 2020-08-13 | Stop reason: HOSPADM

## 2020-08-12 RX ORDER — AMBRISENTAN 10 MG/1
10 TABLET, FILM COATED ORAL
Status: DISCONTINUED | OUTPATIENT
Start: 2020-08-12 | End: 2020-08-13 | Stop reason: HOSPADM

## 2020-08-12 RX ORDER — CEFAZOLIN SODIUM 1 G/3ML
INJECTION, POWDER, FOR SOLUTION INTRAMUSCULAR; INTRAVENOUS PRN
Status: DISCONTINUED | OUTPATIENT
Start: 2020-08-12 | End: 2020-08-12 | Stop reason: SURG

## 2020-08-12 RX ORDER — OMEPRAZOLE 20 MG/1
20 CAPSULE, DELAYED RELEASE ORAL DAILY
Status: DISCONTINUED | OUTPATIENT
Start: 2020-08-13 | End: 2020-08-13 | Stop reason: HOSPADM

## 2020-08-12 RX ORDER — HYDROMORPHONE HYDROCHLORIDE 2 MG/ML
INJECTION, SOLUTION INTRAMUSCULAR; INTRAVENOUS; SUBCUTANEOUS PRN
Status: DISCONTINUED | OUTPATIENT
Start: 2020-08-12 | End: 2020-08-12 | Stop reason: SURG

## 2020-08-12 RX ORDER — ROCURONIUM BROMIDE 10 MG/ML
INJECTION, SOLUTION INTRAVENOUS PRN
Status: DISCONTINUED | OUTPATIENT
Start: 2020-08-12 | End: 2020-08-12 | Stop reason: SURG

## 2020-08-12 RX ORDER — BISACODYL 10 MG
10 SUPPOSITORY, RECTAL RECTAL
Status: DISCONTINUED | OUTPATIENT
Start: 2020-08-12 | End: 2020-08-13 | Stop reason: HOSPADM

## 2020-08-12 RX ORDER — OXYCODONE HCL 5 MG/5 ML
5 SOLUTION, ORAL ORAL
Status: COMPLETED | OUTPATIENT
Start: 2020-08-12 | End: 2020-08-12

## 2020-08-12 RX ORDER — AMOXICILLIN 250 MG
1 CAPSULE ORAL
Status: DISCONTINUED | OUTPATIENT
Start: 2020-08-12 | End: 2020-08-13 | Stop reason: HOSPADM

## 2020-08-12 RX ADMIN — OXYCODONE HYDROCHLORIDE 5 MG: 5 SOLUTION ORAL at 13:06

## 2020-08-12 RX ADMIN — OXYCODONE HYDROCHLORIDE 20 MG: 10 TABLET ORAL at 20:38

## 2020-08-12 RX ADMIN — DEXAMETHASONE SODIUM PHOSPHATE 8 MG: 4 INJECTION, SOLUTION INTRAMUSCULAR; INTRAVENOUS at 10:52

## 2020-08-12 RX ADMIN — ACETAMINOPHEN 500 MG: 500 TABLET, FILM COATED ORAL at 23:30

## 2020-08-12 RX ADMIN — ACETAMINOPHEN 500 MG: 500 TABLET, FILM COATED ORAL at 15:03

## 2020-08-12 RX ADMIN — POTASSIUM CHLORIDE, DEXTROSE MONOHYDRATE AND SODIUM CHLORIDE: 150; 5; 450 INJECTION, SOLUTION INTRAVENOUS at 15:00

## 2020-08-12 RX ADMIN — KETAMINE HYDROCHLORIDE 50 MG: 50 INJECTION INTRAMUSCULAR; INTRAVENOUS at 11:07

## 2020-08-12 RX ADMIN — SUGAMMADEX 200 MG: 100 INJECTION, SOLUTION INTRAVENOUS at 11:57

## 2020-08-12 RX ADMIN — PROPOFOL 100 MG: 10 INJECTION, EMULSION INTRAVENOUS at 10:46

## 2020-08-12 RX ADMIN — FUROSEMIDE 40 MG: 40 TABLET ORAL at 17:10

## 2020-08-12 RX ADMIN — ROPIVACAINE HYDROCHLORIDE 25 ML: 5 INJECTION, SOLUTION EPIDURAL; INFILTRATION; PERINEURAL at 10:16

## 2020-08-12 RX ADMIN — SENNOSIDES-DOCUSATE SODIUM TAB 8.6-50 MG 1 TABLET: 8.6-5 TAB at 20:38

## 2020-08-12 RX ADMIN — KETOROLAC TROMETHAMINE 15 MG: 30 INJECTION, SOLUTION INTRAMUSCULAR at 15:03

## 2020-08-12 RX ADMIN — SODIUM CHLORIDE, POTASSIUM CHLORIDE, SODIUM LACTATE AND CALCIUM CHLORIDE 1000 ML: 600; 310; 30; 20 INJECTION, SOLUTION INTRAVENOUS at 08:34

## 2020-08-12 RX ADMIN — PREGABALIN 100 MG: 100 CAPSULE ORAL at 19:10

## 2020-08-12 RX ADMIN — LIDOCAINE HYDROCHLORIDE 70 MG: 20 INJECTION, SOLUTION EPIDURAL; INFILTRATION; INTRACAUDAL; PERINEURAL at 10:46

## 2020-08-12 RX ADMIN — ONDANSETRON 4 MG: 2 INJECTION INTRAMUSCULAR; INTRAVENOUS at 11:57

## 2020-08-12 RX ADMIN — SODIUM CHLORIDE, POTASSIUM CHLORIDE, SODIUM LACTATE AND CALCIUM CHLORIDE: 600; 310; 30; 20 INJECTION, SOLUTION INTRAVENOUS at 11:43

## 2020-08-12 RX ADMIN — AMBRISENTAN 10 MG: 10 TABLET, FILM COATED ORAL at 21:00

## 2020-08-12 RX ADMIN — POVIDONE-IODINE 15 ML: 10 SOLUTION TOPICAL at 08:24

## 2020-08-12 RX ADMIN — ROCURONIUM BROMIDE 35 MG: 10 INJECTION, SOLUTION INTRAVENOUS at 10:46

## 2020-08-12 RX ADMIN — ATORVASTATIN CALCIUM 20 MG: 20 TABLET, FILM COATED ORAL at 15:03

## 2020-08-12 RX ADMIN — CEFAZOLIN 2 G: 1 INJECTION, POWDER, FOR SOLUTION INTRAVENOUS at 10:53

## 2020-08-12 RX ADMIN — TRANEXAMIC ACID 1000 MG: 100 INJECTION, SOLUTION INTRAVENOUS at 12:53

## 2020-08-12 RX ADMIN — SPIRONOLACTONE 25 MG: 25 TABLET ORAL at 17:09

## 2020-08-12 RX ADMIN — FENTANYL CITRATE 25 MCG: 50 INJECTION, SOLUTION INTRAMUSCULAR; INTRAVENOUS at 11:16

## 2020-08-12 RX ADMIN — KETAMINE HYDROCHLORIDE 25 MG: 50 INJECTION INTRAMUSCULAR; INTRAVENOUS at 11:42

## 2020-08-12 RX ADMIN — ACETAMINOPHEN 1000 MG: 500 TABLET, FILM COATED ORAL at 08:24

## 2020-08-12 RX ADMIN — DOCUSATE SODIUM 100 MG: 100 CAPSULE, LIQUID FILLED ORAL at 19:11

## 2020-08-12 RX ADMIN — OXYCODONE HYDROCHLORIDE 10 MG: 10 TABLET ORAL at 15:03

## 2020-08-12 RX ADMIN — PREGABALIN 100 MG: 100 CAPSULE ORAL at 15:03

## 2020-08-12 RX ADMIN — HYDROMORPHONE HYDROCHLORIDE 0.6 MG: 2 INJECTION, SOLUTION INTRAMUSCULAR; INTRAVENOUS; SUBCUTANEOUS at 11:25

## 2020-08-12 RX ADMIN — KETOROLAC TROMETHAMINE 15 MG: 30 INJECTION, SOLUTION INTRAMUSCULAR at 20:43

## 2020-08-12 RX ADMIN — IMIPRAMINE HYDROCHLORIDE 20 MG: 10 TABLET, FILM COATED ORAL at 21:00

## 2020-08-12 RX ADMIN — ESCITALOPRAM OXALATE 20 MG: 10 TABLET ORAL at 17:11

## 2020-08-12 RX ADMIN — SODIUM CHLORIDE 1 G: 900 INJECTION INTRAVENOUS at 19:12

## 2020-08-12 RX ADMIN — GABAPENTIN 300 MG: 300 CAPSULE ORAL at 08:25

## 2020-08-12 RX ADMIN — LIDOCAINE HYDROCHLORIDE 0.5 ML: 10 INJECTION, SOLUTION INFILTRATION; PERINEURAL at 08:24

## 2020-08-12 RX ADMIN — ASPIRIN 81 MG: 81 TABLET, COATED ORAL at 19:10

## 2020-08-12 RX ADMIN — FENTANYL CITRATE 25 MCG: 50 INJECTION, SOLUTION INTRAMUSCULAR; INTRAVENOUS at 10:46

## 2020-08-12 ASSESSMENT — LIFESTYLE VARIABLES
ON A TYPICAL DAY WHEN YOU DRINK ALCOHOL HOW MANY DRINKS DO YOU HAVE: 0
TOTAL SCORE: 0
HOW MANY TIMES IN THE PAST YEAR HAVE YOU HAD 5 OR MORE DRINKS IN A DAY: 0
HAVE PEOPLE ANNOYED YOU BY CRITICIZING YOUR DRINKING: NO
AVERAGE NUMBER OF DAYS PER WEEK YOU HAVE A DRINK CONTAINING ALCOHOL: 0
TOTAL SCORE: 0
EVER HAD A DRINK FIRST THING IN THE MORNING TO STEADY YOUR NERVES TO GET RID OF A HANGOVER: NO
EVER FELT BAD OR GUILTY ABOUT YOUR DRINKING: NO
CONSUMPTION TOTAL: NEGATIVE
EVER_SMOKED: NEVER
ALCOHOL_USE: NO
TOTAL SCORE: 0
HAVE YOU EVER FELT YOU SHOULD CUT DOWN ON YOUR DRINKING: NO

## 2020-08-12 ASSESSMENT — PAIN SCALES - GENERAL: PAIN_LEVEL: 5

## 2020-08-12 ASSESSMENT — PATIENT HEALTH QUESTIONNAIRE - PHQ9
SUM OF ALL RESPONSES TO PHQ9 QUESTIONS 1 AND 2: 0
1. LITTLE INTEREST OR PLEASURE IN DOING THINGS: NOT AT ALL
2. FEELING DOWN, DEPRESSED, IRRITABLE, OR HOPELESS: NOT AT ALL

## 2020-08-12 NOTE — PROGRESS NOTES
Pt arrived to room at this time via bed. Pt awake, alert, appropriate and pleasant. Pt reports pain increasing at this time, requests pain medication when available, rates 6/10. Otherwise, denies complaints. Provided with crackers and water at this time. Dressing CDI, no drainage noted. Some numbness to L leg, good flexion and dorsiflexion of feet.     VSS. /47   Pulse 83   Temp 36.3 °C (97.4 °F) (Temporal)   Resp 18   Ht 1.524 m (5')   Wt 77.6 kg (171 lb 1.2 oz)   SpO2 91%   BMI 33.41 kg/m²  Call light in reach. Oriented to room. Bed alarm on. Will continue to monitor.

## 2020-08-12 NOTE — ANESTHESIA POSTPROCEDURE EVALUATION
Patient: Maddy Hodge    Procedure Summary     Date: 08/12/20 Room / Location:  OR 04 / SURGERY [facility]    Anesthesia Start: 1038 Anesthesia Stop: 1219    Procedure: ARTHROPLASTY, KNEE, TOTAL (Left Knee) Diagnosis: (UNILATERAL PRIMARY OSTEOARTHRITIS LEFT KNEE)    Surgeon: Kayden Perez M.D. Responsible Provider: Jonathan Espinoza M.D.    Anesthesia Type: general, peripheral nerve block ASA Status: 3          Final Anesthesia Type: general, peripheral nerve block  Last vitals  BP   Blood Pressure : 120/55    Temp   36.2 °C (97.2 °F)    Pulse   Pulse: 77   Resp   12    SpO2   97 %      Anesthesia Post Evaluation    Patient location during evaluation: PACU  Patient participation: complete - patient participated  Level of consciousness: awake and alert  Pain score: 5    Airway patency: patent  Anesthetic complications: no  Cardiovascular status: hemodynamically stable  Respiratory status: acceptable  Hydration status: euvolemic    PONV: none    patient able to participate, but full recovery from regional anesthesia has not occurred and is not expected within the stipulated timeframe for the completion of the evaluation       Nurse Pain Score: 5 (NPRS)

## 2020-08-12 NOTE — ANESTHESIA PROCEDURE NOTES
Airway    Date/Time: 8/12/2020 10:49 AM  Performed by: Jonathan Espinoza M.D.  Authorized by: Jonathan Espinoza M.D.     Location:  OR  Urgency:  Elective  Indications for Airway Management:  Anesthesia      Spontaneous Ventilation: absent    Sedation Level:  Deep  Preoxygenated: Yes    Patient Position:  Sniffing  Mask Difficulty Assessment:  1 - vent by mask  Final Airway Type:  Endotracheal airway  Final Endotracheal Airway:  ETT  Cuffed: Yes    Technique Used for Successful ETT Placement:  Direct laryngoscopy  Devices/Methods Used in Placement:  Anterior pressure/BURP and cricoid pressure    Insertion Site:  Oral  Blade Type:  Maira  Laryngoscope Blade/Videolaryngoscope Blade Size:  3  ETT Size (mm):  7.0  Measured from:  Teeth  ETT to Teeth (cm):  21  Placement Verified by: auscultation and capnometry    Cormack-Lehane Classification:  Grade IIa - partial view of glottis  Number of Attempts at Approach:  1   Mild lip trauma with intubation

## 2020-08-12 NOTE — OR NURSING
1305 Report from ELEUTERIO Alvarenga. Pt tolerating water, polar care in use to Left leg, SELINA drain on; pt received peripheral nerve block. Pt c/o 5/10, medicated by ELEUTERIO Alvarenga previously.   1320 Pt states pain 5/10, and tolerable.   1335 Pt states pain tolerable, drinking water and cranberry juice. Knee x-rays being performed.   1350 Pt states pain is controlled and tolerable, denies nausea. Pt tolerating PO fluids. Meets transfer criteria, Report to ELEUTERIO Thompson. Transport called  1420 Pt taken to room by transport in stable condition on 3 lpm O2 via nasal cannula with portable tank @ 90% psi

## 2020-08-12 NOTE — OR NURSING
Patient allergies and NPO status verified, home medication reconciliation completed and belongings secured. Patient verbalizes understanding of pain scale, expected course of stay and plan of care. Surgical site verified with patient.  IV access established. Sequentials placed on right leg.  Pt has urinary catheter inplace, placed drainage bag.

## 2020-08-12 NOTE — ANESTHESIA PROCEDURE NOTES
Peripheral Block    Date/Time: 8/12/2020 10:16 AM  Performed by: Jonathan Espinoza M.D.  Authorized by: Jonathan Espinoza M.D.     Patient Location:  Pre-op  Start Time:  8/12/2020 10:16 AM  End Time:  8/12/2020 10:20 AM  Reason for Block: at surgeon's request and post-op pain management    patient identified, IV checked, site marked, risks and benefits discussed, surgical consent, monitors and equipment checked, pre-op evaluation and timeout performed    Patient Position:  Supine  Prep: ChloraPrep    Monitoring:  Heart rate and continuous pulse ox  Block Region:  Lower Extremity  Lower Extremity - Block Type:  Selective FEMORAL nerve block at the Adductor Canal    Laterality:  Left  Procedures: ultrasound guided  Image captured, interpreted and electronically stored.  Strength:  1 %  Dose:  3 ml  Block Type:  Single-shot  Needle Length:  100mm  Needle Gauge:  21 G  Needle Localization:  Ultrasound guidance  Injection Assessment:  Negative aspiration for heme, no paresthesia on injection, incremental injection and local visualized surrounding nerve on ultrasound  Evidence of intravascular injection: No

## 2020-08-12 NOTE — OP REPORT
Date of Surgery:  8-12-20  Pre-operative Diagnosis:  left knee arthritis    Post-operative Diagnosis:  left knee arthritis    Procedure:  left knee replacement    Implants used:  A [employer] PS total knee arthroplasty system with the following components  Femur: 4 Narrow Oxinium  Tibia: 3 left with 10 x 100 mm Short stubby stem  Polyethylene: 9 mm high flex XLPE  Patella: 29 oval  Fixation: 2 packages Simplex HV    Surgeon:  Kayden Perez MD    1st Assistant:   TRIP Ryder    Anesthesiologist:   THERESA Espinoza MD    EBL:  100 cc    Specimen:  none    Indications for procedure:  This patient is a 80 y.o. female with a long standing history of left knee arthritis. The patient had failed conservative therapy including anti-inflammatory medications, activity modifications, injections, physical therapy and assistive devices. She was indicated for a left total knee replacement. The patient expressed an understanding of the risks of pain, bleeding, infection, dislocation, malalignment, need for further surgery, damage to surrounding structures, neurovascular compromise, blood clots, leg-length discrepancy both apparent and actual, anesthetic complications, and serious medical consequences including but not limited to heart attack, stroke or even death. It was explained that the implants are man-made products and thus subject to possible failure.  The patient expressed an understanding and wished to proceed. Specific risks based on the patient's medical history were addressed. A clearance was obtained by the medical physicians and the patient was taken to the operating room on the day of surgery for the above named procedure.     Description of procedure:  The patient was met in the pre-operative holding area. The left knee was marked as the appropriate surgical site with indelible ink. They were taken to the operating room where the anesthesia department started a adductor/GETA for intraoperative and post-operative  "anesthesia and pain control. The patient was placed on the OR table and a tourniquet was placed high on the operative thigh. All bony prominences were padded appropriately. The left knee was prepped and draped in a standard sterile surgical fashion. A time out procedure was called to verify the side and site of surgery, the proposed surgical procedure, and the administration of pre-operative antibiotics. After successful completion of the time out procedure, our attention was turned to the left knee.  The tourniquet was inflated after exsanguination with an Esmarch bandage. The knee was flexed and a straight incision was made just medial to the midline. The capsule of the knee was cleared and a medial parapatellar arthrotomy was performed. The patella was subluxated laterally and a medial release was performed to properly visualize the posteromedial aspect of the tibial plateau. The knee was extended, the patellar tendon was protected and the infrapatellar fat pad was excised. A lateral release was performed. The patella was turned on its side and held in place with two penetrating towel clips. A free-hand patellar cut was made, the patella was sized and the appropriate lug holes were drilled. The patella was subluxed, the knee was flexed. The tourniquet was released. Hemostasis was obtained. Each hemijoint was cleared of soft tissue. The ACL was removed. The femoral canal was entered with the step drill and the distal femoral cutting guide was pinned in place in 6 degrees of valgus. The distal cut was made and the bony pieces were removed. The femur was sized to a size 4 Narrow and the appropriate cutting jig was pinned in place in 3 degrees of external rotation. The bony cuts were made, we noted a \"grand piano sign\" anteriorly. The box for the PS post was cut in the standard fashion. The patient's anatomy was noted to be appropriate for the narrow implant and this was chosen for her.   Now the femur was retracted " posteriorly with a lap sponge to protect the intercondylar area. The proximal tibia was exposed, the depth of our resection was based on taking 2 mm off the low side of the bone. We used the intramedullary drill to enter the tibial canal and set our alignment based on the patient's anatomy.  We first set our depth and then our checked our varus/valgus alignment with the extramedullary guide donal. We made our bony cuts and removed the tibial piece en bloc. The laminar spreaders were placed and the hypercurved osteotomes were used to remove posterior femoral osteophytes. The ligaments were balanced in flexion and extension.   The tibia was then sized to a size 3 and the trial component was placed in the proper amount of external rotation. A trial femoral component was placed and with the 9 mm PS polyethylene we noted full extension without recurvatum and greater than 125 degrees of flexion with appropriate patellar tracking. At this point we removed the trial components and thoroughly irrigated the wound. The 10 x 100 mm stem was prepped for. Osteophytes were removed. The tourniquet was reinflated.  The real components were opened in a sterile fashion on the back table and then cemented into place sequentially, first the tibia, then the femur, then the patella. The cement was allowed to cure with the trial polyethylene component in place. After the cement had hardened and all excess cement was removed, the knee was taken through a range of motion. Again we noted full extension and greater than 125 degrees of flexion with appropriate varus/valgus stability and patellar tracking. Therefore the real polyethylene was opened and inserted into the tibial tray. An audible click was heard as it engaged the tibia. Now a dilute betadine solution with 3 grams of tranexamic acid was instilled into the knee for 3 minutes before being pulse-lavaged out. The knee was flexed, the arthrotomy was closed with #1 Quill, followed by 2-0  Vicryl in the deep dermal layer in a buried interrupted fashion. The skin was closed with 3-0 monocryl in a running subcuticular fashion, and a sterile Martinez/Acticoat dressing was applied. The patient is currently in the operating room awaiting reversal of anesthesia.

## 2020-08-12 NOTE — ANESTHESIA PREPROCEDURE EVALUATION
80F w PMH anesthesia without problems, ALISSA with BiPAP, COPD not on oxygen, Pulm HTN managed w medications - last RVSP 35 and LV EF 65% on last echo with cardiology consults in chart, GERD managed w omeprazole and CKD, Hypothyroidism on synthroid.    COVID neg  NPO  DID take her medications today and last night    Relevant Problems   ANESTHESIA   (+) ALISSA (obstructive sleep apnea)      PULMONARY   (+) COPD with asthma (HCC)   (+) Chronic obstructive pulmonary disease (HCC)      NEURO   (+) History of hematuria   (+) History of vertebral fracture      CARDIAC   (+) Essential hypertension   (+) Pulmonary hypertension (HCC)      GI   (+) GERD (gastroesophageal reflux disease)         (+) CKD (chronic kidney disease) stage 3, GFR 30-59 ml/min (HCC)      ENDO   (+) Hypothyroidism due to Hashimoto's thyroiditis      OB (within normal limits)      Other   (+) Facet arthritis of lumbar region       Physical Exam    Airway   Mallampati: III  TM distance: >3 FB  Neck ROM: full       Cardiovascular - normal exam  Rhythm: regular  Rate: normal  (-) murmur     Dental - normal exam           Pulmonary - normal exam  Breath sounds clear to auscultation     Abdominal   (+) obese     Neurological - normal exam               Anesthesia Plan    ASA 3   ASA physical status 3 criteria: COPD, morbid obesity - BMI greater than or equal to 40 and hypertension - poorly controlled    Plan - general and peripheral nerve block     Peripheral nerve block will be post-op pain control  Airway plan will be ETT        Induction: intravenous    Postoperative Plan: Postoperative administration of opioids is intended.    Pertinent diagnostic labs and testing reviewed    Informed Consent:    Anesthetic plan and risks discussed with patient.    Use of blood products discussed with: patient whom consented to blood products.

## 2020-08-12 NOTE — OR NURSING
1218 Pt arrived from OR post   ARTHROPLASTY, KNEE, TOTAL Left   , report received. Pt breathing unlabored with clear lung sounds bilat.    1306 Report to Laura MCCLELLAN

## 2020-08-12 NOTE — ANESTHESIA TIME REPORT
Anesthesia Start and Stop Event Times     Date Time Event    8/12/2020 1026 Ready for Procedure     1038 Anesthesia Start     1219 Anesthesia Stop        Responsible Staff  08/12/20    Name Role Begin End    Jonathan Espinoza M.D. Anesth 1038 1219        Preop Diagnosis (Free Text):  Pre-op Diagnosis     UNILATERAL PRIMARY OSTEOARTHRITIS LEFT KNEE        Preop Diagnosis (Codes):    Post op Diagnosis  Osteoarthritis of left knee      Premium Reason  Non-Premium    Comments: nerve block

## 2020-08-13 VITALS
DIASTOLIC BLOOD PRESSURE: 45 MMHG | HEIGHT: 60 IN | BODY MASS INDEX: 33.59 KG/M2 | SYSTOLIC BLOOD PRESSURE: 103 MMHG | OXYGEN SATURATION: 96 % | WEIGHT: 171.08 LBS | HEART RATE: 78 BPM | RESPIRATION RATE: 18 BRPM | TEMPERATURE: 98.5 F

## 2020-08-13 DIAGNOSIS — F33.0 MAJOR DEPRESSIVE DISORDER, RECURRENT EPISODE, MILD (HCC): ICD-10-CM

## 2020-08-13 PROBLEM — M17.12 PRIMARY OSTEOARTHRITIS OF LEFT KNEE: Status: ACTIVE | Noted: 2020-08-13

## 2020-08-13 LAB
HCT VFR BLD AUTO: 33.7 % (ref 37–47)
HGB BLD-MCNC: 11.4 G/DL (ref 12–16)

## 2020-08-13 PROCEDURE — 96376 TX/PRO/DX INJ SAME DRUG ADON: CPT

## 2020-08-13 PROCEDURE — 700111 HCHG RX REV CODE 636 W/ 250 OVERRIDE (IP): Performed by: PHYSICIAN ASSISTANT

## 2020-08-13 PROCEDURE — 96375 TX/PRO/DX INJ NEW DRUG ADDON: CPT

## 2020-08-13 PROCEDURE — 700102 HCHG RX REV CODE 250 W/ 637 OVERRIDE(OP): Performed by: PHYSICIAN ASSISTANT

## 2020-08-13 PROCEDURE — 700112 HCHG RX REV CODE 229: Performed by: PHYSICIAN ASSISTANT

## 2020-08-13 PROCEDURE — G0378 HOSPITAL OBSERVATION PER HR: HCPCS

## 2020-08-13 PROCEDURE — 97535 SELF CARE MNGMENT TRAINING: CPT

## 2020-08-13 PROCEDURE — 97165 OT EVAL LOW COMPLEX 30 MIN: CPT

## 2020-08-13 PROCEDURE — 36415 COLL VENOUS BLD VENIPUNCTURE: CPT

## 2020-08-13 PROCEDURE — A9270 NON-COVERED ITEM OR SERVICE: HCPCS | Performed by: PHYSICIAN ASSISTANT

## 2020-08-13 PROCEDURE — 85018 HEMOGLOBIN: CPT

## 2020-08-13 PROCEDURE — 94660 CPAP INITIATION&MGMT: CPT

## 2020-08-13 PROCEDURE — 700105 HCHG RX REV CODE 258: Performed by: PHYSICIAN ASSISTANT

## 2020-08-13 PROCEDURE — 85014 HEMATOCRIT: CPT

## 2020-08-13 PROCEDURE — 97162 PT EVAL MOD COMPLEX 30 MIN: CPT

## 2020-08-13 RX ORDER — TRAMADOL HYDROCHLORIDE 50 MG/1
50 TABLET ORAL EVERY 6 HOURS PRN
Qty: 40 TAB | Refills: 0
Start: 2020-08-13 | End: 2020-08-23

## 2020-08-13 RX ORDER — ASPIRIN 81 MG/1
81 TABLET ORAL 2 TIMES DAILY
Qty: 30 TAB | Refills: 0
Start: 2020-08-13 | End: 2021-07-20

## 2020-08-13 RX ORDER — PSEUDOEPHEDRINE HCL 30 MG
100 TABLET ORAL 2 TIMES DAILY
Qty: 60 CAP | Refills: 0
Start: 2020-08-13 | End: 2021-07-20

## 2020-08-13 RX ORDER — ACETAMINOPHEN 500 MG
500 TABLET ORAL EVERY 6 HOURS
Qty: 30 TAB | Refills: 0 | Status: SHIPPED | OUTPATIENT
Start: 2020-08-13 | End: 2021-01-13

## 2020-08-13 RX ORDER — ONDANSETRON 4 MG/1
4 TABLET, ORALLY DISINTEGRATING ORAL EVERY 4 HOURS PRN
Qty: 10 TAB | Refills: 0 | Status: SHIPPED | OUTPATIENT
Start: 2020-08-13 | End: 2021-01-13

## 2020-08-13 RX ADMIN — POTASSIUM CHLORIDE 20 MEQ: 1500 TABLET, EXTENDED RELEASE ORAL at 06:01

## 2020-08-13 RX ADMIN — OMEPRAZOLE 20 MG: 20 CAPSULE, DELAYED RELEASE ORAL at 06:01

## 2020-08-13 RX ADMIN — ASPIRIN 81 MG: 81 TABLET, COATED ORAL at 13:31

## 2020-08-13 RX ADMIN — ACETAMINOPHEN 500 MG: 500 TABLET, FILM COATED ORAL at 06:00

## 2020-08-13 RX ADMIN — PREGABALIN 100 MG: 100 CAPSULE ORAL at 06:02

## 2020-08-13 RX ADMIN — ACETAMINOPHEN 500 MG: 500 TABLET, FILM COATED ORAL at 12:44

## 2020-08-13 RX ADMIN — OXYCODONE HYDROCHLORIDE 10 MG: 10 TABLET ORAL at 02:06

## 2020-08-13 RX ADMIN — LEVOTHYROXINE SODIUM 50 MCG: 0.05 TABLET ORAL at 06:02

## 2020-08-13 RX ADMIN — ATORVASTATIN CALCIUM 20 MG: 20 TABLET, FILM COATED ORAL at 06:02

## 2020-08-13 RX ADMIN — ESCITALOPRAM OXALATE 20 MG: 10 TABLET ORAL at 06:01

## 2020-08-13 RX ADMIN — PREGABALIN 100 MG: 100 CAPSULE ORAL at 12:44

## 2020-08-13 RX ADMIN — OXYCODONE HYDROCHLORIDE 10 MG: 10 TABLET ORAL at 12:44

## 2020-08-13 RX ADMIN — SODIUM CHLORIDE 1 G: 900 INJECTION INTRAVENOUS at 02:06

## 2020-08-13 RX ADMIN — DEXAMETHASONE SODIUM PHOSPHATE 8 MG: 4 INJECTION, SOLUTION INTRA-ARTICULAR; INTRALESIONAL; INTRAMUSCULAR; INTRAVENOUS; SOFT TISSUE at 06:03

## 2020-08-13 RX ADMIN — KETOROLAC TROMETHAMINE 15 MG: 30 INJECTION, SOLUTION INTRAMUSCULAR at 02:06

## 2020-08-13 RX ADMIN — DOCUSATE SODIUM 100 MG: 100 CAPSULE, LIQUID FILLED ORAL at 06:01

## 2020-08-13 ASSESSMENT — COGNITIVE AND FUNCTIONAL STATUS - GENERAL
DRESSING REGULAR LOWER BODY CLOTHING: A LITTLE
DRESSING REGULAR LOWER BODY CLOTHING: A LITTLE
DAILY ACTIVITIY SCORE: 21
DRESSING REGULAR UPPER BODY CLOTHING: A LITTLE
HELP NEEDED FOR BATHING: A LITTLE
TOILETING: A LITTLE
CLIMB 3 TO 5 STEPS WITH RAILING: A LITTLE
SUGGESTED CMS G CODE MODIFIER DAILY ACTIVITY: CJ
MOVING FROM LYING ON BACK TO SITTING ON SIDE OF FLAT BED: A LITTLE
TURNING FROM BACK TO SIDE WHILE IN FLAT BAD: A LITTLE
MOBILITY SCORE: 18
SUGGESTED CMS G CODE MODIFIER MOBILITY: CI
WALKING IN HOSPITAL ROOM: A LITTLE
SUGGESTED CMS G CODE MODIFIER DAILY ACTIVITY: CJ
MOVING TO AND FROM BED TO CHAIR: A LITTLE
TOILETING: A LITTLE
HELP NEEDED FOR BATHING: A LITTLE
MOBILITY SCORE: 23
STANDING UP FROM CHAIR USING ARMS: A LITTLE
CLIMB 3 TO 5 STEPS WITH RAILING: A LITTLE
SUGGESTED CMS G CODE MODIFIER MOBILITY: CK
DAILY ACTIVITIY SCORE: 20

## 2020-08-13 ASSESSMENT — FIBROSIS 4 INDEX: FIB4 SCORE: 1.64

## 2020-08-13 ASSESSMENT — GAIT ASSESSMENTS
DISTANCE (FEET): 100
DEVIATION: STEP TO
ASSISTIVE DEVICE: FRONT WHEEL WALKER
GAIT LEVEL OF ASSIST: SUPERVISED

## 2020-08-13 ASSESSMENT — ACTIVITIES OF DAILY LIVING (ADL): TOILETING: INDEPENDENT

## 2020-08-13 NOTE — THERAPY
Occupational Therapy   Initial Evaluation     Patient Name: Maddy Hodge  Age:  80 y.o., Sex:  female  Medical Record #: [id number]  Today's Date: 8/13/2020     Precautions  Precautions: Weight Bearing As Tolerated Left Lower Extremity    Assessment  Patient is 80 y.o. female , admit for a L TKA. Pt presents with minimal c/o pain with ADLS and functional mobility, is at the Supervised to Min A level. Pt will require assist at home from family, would benefit from home health for OT and for a home safety assessment.    Plan    Recommend Occupational Therapy for Evaluation only    DC Equipment Recommendations: None  Discharge Recommendations: Recommend home health for continued occupational therapy services        08/13/20 1158   Prior Living Situation   Prior Services None   Housing / Facility 1 Story House   Steps Into Home 3   Steps In Home 0   Bathroom Set up Walk In Shower   Equipment Owned Front-Wheel Walker;Tub / Shower Seat;Grab Bar(s) In Tub / Shower   Lives with - Patient's Self Care Capacity Adult Children   Comments Will have family to assist   Prior Level of ADL Function   Self Feeding Independent   Grooming / Hygiene Independent   Bathing Independent   Dressing Independent   Toileting Independent   Prior Level of IADL Function   Medication Management Independent   Laundry Requires Assist   Kitchen Mobility Independent   Finances Unable To Determine At This Time   Home Management Requires Assist   Shopping Requires Assist   Prior Level Of Mobility Supervision With Device in Community   Driving / Transportation Relatives / Others Provide Transportation   Occupation (Pre-Hospital Vocational) Retired Due To Age   Leisure Interests Family   History of Falls   History of Falls No   Precautions   Precautions Weight Bearing As Tolerated Left Lower Extremity   Balance Assessment   Sitting Balance (Static) Good   Sitting Balance (Dynamic) Good   Standing Balance (Static) Fair   Standing Balance (Dynamic) Fair  - LOB x1   Weight Shift Sitting Fair   Bed Mobility    Supine to Sit Supervised   Scooting Modified Independent   ADL Assessment   Eating Independent   Grooming Supervision;Standing   Upper Body Dressing Modified Independent   Lower Body Dressing Minimal Assist   Toileting Supervision   Functional Mobility   Sit to Stand Supervised   Bed, Chair, Wheelchair Transfer Supervised   Toilet Transfers Supervised   Transfer Method Stand Pivot   Mobility FWW   Patient / Family Goals   Patient / Family Goal #1 Home with family

## 2020-08-13 NOTE — DISCHARGE SUMMARY
Maddy Hodge was admitted on 8/12/2020 for UNILATERAL PRIMARY OSTEOARTHRITIS LEFT KNEE  Degenerative arthritis of left knee  Degenerative arthritis of left knee  Patient was diagnosed with severe degenerative arthritis in the left knee and underwent a left total knee arthroplasty by Dr. Kayden Perez on the date of admission. Please see dictated operative note for further information.    Hospital course: standard    The patient has done well, with no complications.  Patient denies chest pain, calf pain or shortness of breath.   Pain is well-controlled at present.  Patient is ambulating well with the use of an assistive device, and progressing in physical therapy.   Patient is neurologically and vascularly intact with palpable pedal pulses bilaterally.   Narcotic dosages were chosen by taking into account the the patient's previous history of opoid use and to ensure proper pain control after surgery    Discharge date: 8-13-20    Patient is being discharged to home after am physical therapy.     Allergies:  Pcn [penicillins], Sulfa drugs, Macrobid [nitrofurantoin], and Tape       Medication List      START taking these medications      Instructions   acetaminophen 500 MG Tabs  Commonly known as: TYLENOL   Take 1 Tab by mouth every 6 hours.  Dose: 500 mg     docusate sodium 100 MG Caps   Take 100 mg by mouth 2 Times a Day.  Dose: 100 mg     ondansetron 4 MG Tbdp  Commonly known as: ZOFRAN ODT   Take 1 Tab by mouth every four hours as needed for Nausea.  Dose: 4 mg     tramadol 50 MG Tabs  Commonly known as: ULTRAM   Take 1 Tab by mouth every 6 hours as needed (Moderate Pain (NRS Pain Scale 4-6; CPOT Pain Scale 3-5) if opiates not ordered or tolerated) for up to 10 days.  Dose: 50 mg        CHANGE how you take these medications      Instructions   aspirin 81 MG EC tablet  What changed: when to take this   Take 1 Tab by mouth 2 times a day.  Dose: 81 mg        CONTINUE taking these medications      Instructions    Amitiza 24 MCG capsule  Generic drug: lubiprostone   Take 48 mcg by mouth every morning with breakfast.  Dose: 48 mcg     atorvastatin 20 MG Tabs  Commonly known as: LIPITOR   TAKE 1 TABLET BY MOUTH EVERY DAY     escitalopram 20 MG tablet  Commonly known as: LEXAPRO   Doctor's comments: Please note the dose is changed to 20 mg.  I wrote for 10 mg dose in error.  This should be 90 tablets for 90 days with 3 refills.  Take 1 Tab by mouth every day.  Dose: 20 mg     Euthyrox 50 MCG Tabs  Generic drug: levothyroxine   Euthyrox 50 mcg tablet     fluconazole 150 MG tablet  Commonly known as: DIFLUCAN   Take 1 Tab by mouth every day.  Dose: 150 mg     furosemide 40 MG Tabs  Commonly known as: LASIX   Take 40 mg by mouth every day.  Dose: 40 mg     imipramine 10 MG Tabs  Commonly known as: TOFRANIL   Take 2 Tabs by mouth every evening.  Dose: 20 mg     Letairis 10 MG tablet  Generic drug: ambrisentan   Take 10 Tabs by mouth every day.  Dose: 10 Tab     meloxicam 7.5 MG Tabs  Commonly known as: MOBIC      Narcan 4 MG/0.1ML Liqd  Generic drug: Naloxone   AS DIRECTED     omeprazole 20 MG delayed-release capsule  Commonly known as: PRILOSEC   Take 1 Cap by mouth every day.  Dose: 20 mg     ondansetron 4 MG Tabs tablet  Commonly known as: ZOFRAN      oxyCODONE-acetaminophen 5-325 MG Tabs  Commonly known as: PERCOCET   TAKE 1 TABLET BY MOUTH 4 TIMES A DAY AS NEEDED FOR PAIN 11/8/18 G89.29     potassium chloride 8 MEQ tablet  Commonly known as: KLOR-CON   Take 2 Tabs by mouth every day.  Dose: 16 mEq     pregabalin 200 MG capsule  Commonly known as: LYRICA   TAKE 1 CAP BY MOUTH 3 TIMES DAILY FOR 30 DAYS, G89     spironolactone 25 MG Tabs  Commonly known as: ALDACTONE   Take 25 mg by mouth every day.  Dose: 25 mg     Umeclidinium Bromide 62.5 MCG/INH Aepb  Commonly known as: Incruse Ellipta   Doctor's comments: Please consider 90 day supplies to promote better adherence  Inhale 1 Puff by mouth every day.  Dose: 1 Puff      Ventolin  (90 Base) MCG/ACT Aers inhalation aerosol  Generic drug: albuterol   Inhale 2 Puffs by mouth every 6 hours as needed for Shortness of Breath.  Dose: 2 Puff     VITEYES AREDS ADVANCED PO   Take  by mouth.     Xtampza ER 18 MG C12a  Generic drug: oxyCODONE ER   Take 18 mg by mouth 2 Times a Day.  Dose: 18 mg            Discharge Instructions:     Patient is instructed to ambulate and weight bear as tolerated with the use of an assistive device, and to continue physical therapy exercises given during this hospital stay. Strict posterior hip precautions are to be observed for patients who underwent a total hip replacement.   Patient is to ice and elevate the surgical leg regularly, with pillows under the ankle, nothing is to be placed under the knee.   Patient was given detailed wound care instructions, and will leave the Incisional vac or silver dressing on until first post-op visit.   ASA twice daily for DVT prophylaxis.  Patient is to follow up with Dr. Perez's office in 1-2 weeks.

## 2020-08-13 NOTE — RESPIRATORY CARE
COPD EDUCATION by COPD CLINICAL EDUCATOR  8/13/2020 at 12:54 PM by Ana Taylor, RRT     Patient reviewed by COPD education team. Patient does  have a history or diagnosis of COPD and is a non-smoker, is not interested in the COPD program. We did discuss Pulmonary Rehab and a booklet was given with information. Action Plan was filled out.

## 2020-08-13 NOTE — PROGRESS NOTES
"Total Joint Replacement Program Rounding     Inpatient bedside rounding completed to address quality of care provided by total joint replacement program IDT and overall patient experience at [facility].    Patient Satisfaction addressed. Patient/family updated on POC during hospital stay.  Thorough safety education completed including use of call light prior to all mobility throughout the entirety of the hospital stay. Also educated on ankle pumps and incentive inspirometry use to prevent post-op complications.    No further questions/concerns at this time. Please see \"MyRounding\" for further details of rounding discussion. RN updated.         "

## 2020-08-13 NOTE — DISCHARGE INSTRUCTIONS
Dr. Perez's Discharge Instructions:  The first week after your joint replacement is a time to recover from the surgery. We expect light exercise to keep you active and mobile. Dr. Perez requires a walker or cane for most patients in the first few days following surgery to try to prevent falls or complications.     Most patients are prescribed two medications for pain control: a narcotic such as Oxycodone or Hydrocodone and a milder medication called Tramadol. These can be alternated for pain control, and the priority is to decrease use of the stronger narcotic as soon as tolerated.   Take your pain medication as appropriate to ensure that your pain is not limiting your recovery. You'll be seen in clinic in 7-10 days (this appointment has already been made, call our office if you're unsure of the time). At that time, we'll prescribe your physical therapy to help with the recovery phase.     -Keep dressing clean and dry. Leave dressing in place until follow up. If you have an incisional vac dressing, the battery may die before your first post operative appointment, but you can leave the surgical dressing in place and it will be removed at your clinic visit. The battery of the dressing may die, and the sponge will inflate because it is no longer holding suction. You can still leave the dressing in place and it will be removed at the office. Call the office if you notice drainage from the surgical dressing.    -OK to shower, keep dressing in place. Pat dressing dry, do not rub incision    -Do not soak incision in bath, hot tub or pool    -Follow up with Dr. Perez at regularly scheduled time    -Call [facility] office at [phone] for questions    -Weight bearing status: as tolerated with walker/cane    -Take medication as prescribed for DVT prophylaxis    Discharge Instructions    Discharged to home by car with relative. Discharged via wheelchair, hospital escort: Yes.  Special equipment needed: Not Applicable    Be sure  to schedule a follow-up appointment with your primary care doctor or any specialists as instructed.     Discharge Plan:        I understand that a diet low in cholesterol, fat, and sodium is recommended for good health. Unless I have been given specific instructions below for another diet, I accept this instruction as my diet prescription.   Other diet: n/a    Special Instructions: Discharge instructions for the Orthopedic Patient    Follow up with Primary Care Physician within 2 weeks of discharge to home, regarding:  Review of medications and diagnostic testing.  Surveillance for medical complications.  Workup and treatment of osteoporosis, if appropriate.     -Is this a Hip/Knee/Shoulder Joint Replacement patient? Yes   TOTAL KNEE REPLACEMENT, AFTER-CARE GUIDELINES     These instructions provide you with information on caring for yourself and your knee after surgery. Your health care provider may also give you instructions that are more specific. Your treatment was planned and performed according to current medical practices but problems sometimes occur. Call your health care provider if you have any problems or questions.     WHAT TO EXPECT AFTER THE PROCEDURE   After your procedure, your knee will typically be stiff, sore, and bruised. This will improve over time.     Pain   · Follow your home pain management plan as discussed with your nurse and as directed by your provider.   · It is important to follow any scheduled pain medications for maximal pain relief.   · If prescribed opioid medication, the goal is to use opioids only as needed and to wean off prescription pain medicine as soon as possible.   · Ice can be used for pain control.   · Put ice in a plastic bag.   · Place a towel between your skin and the bag.   · Leave the ice on for 20 minutes, 2-3 times a day at a minimum.   · Most patients are off the pain pills by 3 weeks. If your pain continues to be severe, follow up with your provider.      Infection   Knee joint infections occur in fewer than 2% of patients. The most common causes of infection following total knee replacement surgery are from bacteria that enter the bloodstream during dental procedures, urinary tract infections, or skin infections. These bacteria can lodge around your knee replacement and cause an infection.   · Keep the incision as clean and dry as possible.   · Always wash your hands before touching your incision.   · Avoid dental care for 3 months after surgery. Your provider may recommend taking a dose of antibiotics an hour prior to any dental procedure. After 2 years, most providers recommend antibiotics only before an extensive procedure. Ask your provider what they recommend.   · Signs and symptoms of infection include low-grade fever, redness, pain, swelling and drainage from your incision. Notify your provider IMMEDIATELY if you develop ANY of these symptoms.     Post op Disturbances   · Bowel Habits - Constipation is extremely common and caused by a combination of anesthesia, lack of mobility, dehydration and pain medicine. Use stool softeners or laxatives if necessary. It is important not to ignore this problem as bowel obstructions can be a serious complication after joint replacement surgery.   · Mood/Energy Level - Many patients experience a lack of energy and endurance for up to 2-3 months after surgery. Some people feel down and can even become depressed. This is likely due to postoperative anemia, change in activity level, lack of sleep, pain medicine and just the emotional reaction to the surgery itself that is a big disruption in a person’s life. This usually passes. If symptoms persist, follow up with your primary care provider.  · Returning to Work - Your provider will give you specific instructions based on your profession. Generally, if you work a sedentary job requiring little standing or walking, most patients may return within 2-6 weeks. Manual labor  jobs involving walking, lifting and standing may take 3-4 months. Your provider’s office can provide a release to part-time or light duty work early on in your recovery and progress you to full duty as able.   · Driving - You can begin driving once cleared by your provider, provided you are no longer taking narcotic pain medication or any other medications that impair driving. Discuss the length of time expected with your provider as returning to driving depends on things such as your vehicle, which knee was replaced (right or left), and knee motion, strength and reflexes returning appropriately.   · Avoiding falls - A fall during the first few weeks after surgery can damage your new knee and may result in a need for further surgery.  throw rugs and tack down loose carpeting. Be aware of floor hazards such as pets, small objects or uneven surfaces. Notify your provider of any falls.   · [employer] Metal Detectors - The sensitivity of metal detectors varies and it is likely that your prosthesis will cause an alarm. Inform the  of your artificial joint.     Diet   · Resume your normal diet as tolerated.   · It is important to achieve a healthy nutritional status by eating a well-balanced diet on a regular basis.   · Your provider may recommend that you take iron and vitamin supplements.   · Continue to drink plenty of fluids.     Shower/Bathing   · You may shower as soon as you get home from the hospital unless otherwise instructed.   · Keep your incision out of water to prevent infection. To keep the incision dry when showering, cover it with a plastic bag or plastic wrap. If your bandage is waterproof, this may not be necessary. o Pat incision dry if it gets wet. Do not rub. Notify your provider.   · Do not submerge in a bath until cleared by your provider. Your staples must be out and the incision completely healed.     Dressing Change: Only change your dressing if directed by your provider.    · Wash hands.   · Open all dressing change materials.   · Remove old dressing and discard.   · Inspect incision for signs of irritation or infection including redness, increase in clear drainage, yellow/green drainage, odor and surrounding skin hot to touch. Notify your provider if present.   ·  the new dressing by one corner and lay over the incision. Be careful not to touch the inside of the dressing that will lay over the incision.   · Secure in place as instructed. Swelling/Bruising   · Swelling is normal after knee replacement and can involve the thigh, knee, calf and foot.   · Swelling can last from 3-6 months.   · To reduce swelling, elevate your leg higher than your heart while reclining. The first week you are home you should elevate your leg an equal amount of time as you are active.   · The swelling is usually worse after you go home since you are upright for longer periods of time.   · Bruising often does not appear until after you arrive home and can be quite dramatic- appearing purple, black, or green. Bruising is typically not concerning and will subside without any treatment.     Blood Clot Prevention   Your treatment plan includes multiple preventative measures to decrease the risk of blood clots in the legs (DVTs) and the less common, but serious, clots that travel to the lungs (pulmonary emboli). Most patients are at standard risk for them, but people who are at higher risk include those who have had previous clots, a family history of clotting, smoking, diabetes, obesity, advanced age, use estrogen and/or live a sedentary lifestyle.     · Signs of blood clots in legs include - Swelling in thigh, calf or ankle that does not go down with elevation. Pain, heat and tenderness in calf, back of calf or groin area. NOTE: blood clots can occur in either leg.   · Signs of blood clots in lungs include - Sudden increased shortness of breath, sudden onset of chest pain, and localized chest pain with  coughing.   · If you experience any of the above symptoms, notify your provider and seek medical attention immediately.   · You received anticoagulant therapy (blood thinners) in the hospital. Continue the prescribed blood-thinning medication at home, as directed by your provider.   · Your risk for developing a clot continues for up to 2-3 months after surgery. Avoid prolonged sitting and dehydration (long air trips and car trips). If you do take a trip during this time, please get up, move around every 1-1.5 hours, and discuss all travel plans with your provider.     Activity   Once home, stay active. The key is not to overdo it. While you can expect some good days and some bad days, you should notice a gradual improvement and a gradual increase in your endurance over the next 6 to 12 months. Exercise is a critical component of recovery, particularly during the first few weeks after surgery.     · Normal activities of daily living - Expect to resume most within 3 to 6 weeks following surgery. Some pain with activity and at night is common for several weeks after surgery. Walk as much as you like once your doctor gives permission to proceed, but remember that walking is no substitute for the exercises your doctor and physical therapist prescribe. Use a walker, crutches or cane to assist with walking until you can walk smoothly (minimal or no limp) without assistance.   · Physical Therapy Exercises - Follow your home exercise program as instructed by your physical therapist during your hospital stay. Call and set up outpatient physical therapy appointments per your provider’s recommendations. Physical therapy after the hospital stay focuses on increasing your range of motion, strengthening your muscles and improving your gait/walking pattern. Contact your provider for the referral to outpatient physical therapy if you have not yet received this. -   · Riding a stationary bicycle can help maintain muscle tone and keep  your knee flexible. Begin stationary bicycling as directed by your physical therapist or provider.   · Sexual Activity - Your provider can tell you when it safe to resume sexual activity.   · Sleeping Positions - You can safely sleep on your back, on either side, or on your stomach.   · Other Activities - Lower impact activities are preferred. Consult your provider if you have specific questions.     When to Call the Doctor   Call the provider if you experience:   · Fever over 100.5° F   · Increased pain, drainage, redness, odor or heat around the incision area   · Shaking chills   · Increased knee pain with activity and rest   · Increased pain in calf, tenderness or redness above or below the knee   · Increased swelling of calf, ankle, foot   · Sudden increased shortness of breath, sudden onset of chest pain, localized chest pain with coughing   · Incision opening   Or, if there are any questions or concerns about medications or care.     Infection statistic resource:   https://www.[employer].eSpace/contents/prosthetic-joint-infection-epidemiology-microbiology-clinical-manifestations-and-diagnosis     -Is this patient being discharged with medication to prevent blood clots?  Yes, Aspirin Aspirin, ASA oral tablets  What is this medicine?  ASPIRIN (AS pir in) is a pain reliever. It is used to treat mild pain and fever. This medicine is also used as directed by a doctor to prevent and to treat heart attacks, to prevent strokes and blood clots, and to treat arthritis or inflammation.  This medicine may be used for other purposes; ask your health care provider or pharmacist if you have questions.  COMMON BRAND NAME(S): Aspir-Low, Aspir-Adry, Aspirtab, Chandu Advanced Aspirin, Chandu Aspirin, Chandu Aspirin Extra Strength, Chandu Aspirin Plus, Chandu Extra Strength, Chandu Extra Strength Plus, Chandu Genuine Aspirin, Chandu Womens Aspirin, Bufferin, Bufferin Extra Strength, Bufferin Low Dose  What should I tell my health care  provider before I take this medicine?  They need to know if you have any of these conditions:  · anemia  · asthma  · bleeding problems  · child with chickenpox, the flu, or other viral infection  · diabetes  · gout  · if you frequently drink alcohol containing drinks  · kidney disease  · liver disease  · low level of vitamin K  · lupus  · smoke tobacco  · stomach ulcers or other problems  · an unusual or allergic reaction to aspirin, tartrazine dye, other medicines, dyes, or preservatives  · pregnant or trying to get pregnant  · breast-feeding  How should I use this medicine?  Take this medicine by mouth with a glass of water. Follow the directions on the package or prescription label. You can take this medicine with or without food. If it upsets your stomach, take it with food. Do not take your medicine more often than directed.  Talk to your pediatrician regarding the use of this medicine in children. While this drug may be prescribed for children as young as 12 years of age for selected conditions, precautions do apply. Children and teenagers should not use this medicine to treat chicken pox or flu symptoms unless directed by a doctor.  Patients over 65 years old may have a stronger reaction and need a smaller dose.  Overdosage: If you think you have taken too much of this medicine contact a poison control center or emergency room at once.  NOTE: This medicine is only for you. Do not share this medicine with others.  What if I miss a dose?  If you are taking this medicine on a regular schedule and miss a dose, take it as soon as you can. If it is almost time for your next dose, take only that dose. Do not take double or extra doses.  What may interact with this medicine?  Do not take this medicine with any of the following medications:  · cidofovir  · ketorolac  · probenecid  This medicine may also interact with the following medications:  · alcohol  · alendronate  · bismuth subsalicylate  · flavocoxid  · herbal  supplements like feverfew, garlic, vickey, ginkgo biloba, horse chestnut  · medicines for diabetes or glaucoma like acetazolamide, methazolamide  · medicines for gout  · medicines that treat or prevent blood clots like enoxaparin, heparin, ticlopidine, warfarin  · other aspirin and aspirin-like medicines  · NSAIDs, medicines for pain and inflammation, like ibuprofen or naproxen  · pemetrexed  · sulfinpyrazone  · varicella live vaccine  This list may not describe all possible interactions. Give your health care provider a list of all the medicines, herbs, non-prescription drugs, or dietary supplements you use. Also tell them if you smoke, drink alcohol, or use illegal drugs. Some items may interact with your medicine.  What should I watch for while using this medicine?  If you are treating yourself for pain, tell your doctor or health care professional if the pain lasts more than 10 days, if it gets worse, or if there is a new or different kind of pain. Tell your doctor if you see redness or swelling. Also, check with your doctor if you have a fever that lasts for more than 3 days. Only take this medicine to prevent heart attacks or blood clotting if prescribed by your doctor or health care professional.  Do not take aspirin or aspirin-like medicines with this medicine. Too much aspirin can be dangerous. Always read the labels carefully.  This medicine can irritate your stomach or cause bleeding problems. Do not smoke cigarettes or drink alcohol while taking this medicine. Do not lie down for 30 minutes after taking this medicine to prevent irritation to your throat.  If you are scheduled for any medical or dental procedure, tell your healthcare provider that you are taking this medicine. You may need to stop taking this medicine before the procedure.  This medicine may be used to treat migraines. If you take migraine medicines for 10 or more days a month, your migraines may get worse. Keep a diary of headache days  and medicine use. Contact your healthcare professional if your migraine attacks occur more frequently.  What side effects may I notice from receiving this medicine?  Side effects that you should report to your doctor or health care professional as soon as possible:  · allergic reactions like skin rash, itching or hives, swelling of the face, lips, or tongue  · breathing problems  · changes in hearing, ringing in the ears  · confusion  · general ill feeling or flu-like symptoms  · pain on swallowing  · redness, blistering, peeling or loosening of the skin, including inside the mouth or nose  · signs and symptoms of bleeding such as bloody or black, tarry stools; red or dark-brown urine; spitting up blood or brown material that looks like coffee grounds; red spots on the skin; unusual bruising or bleeding from the eye, gums, or nose  · trouble passing urine or change in the amount of urine  · unusually weak or tired  · yellowing of the eyes or skin  Side effects that usually do not require medical attention (report to your doctor or health care professional if they continue or are bothersome):  · diarrhea or constipation  · headache  · nausea, vomiting  · stomach gas, heartburn  This list may not describe all possible side effects. Call your doctor for medical advice about side effects. You may report side effects to FDA at 2-479-FDA-5367.  Where should I keep my medicine?  Keep out of the reach of children.  Store at room temperature between 15 and 30 degrees C (59 and 86 degrees F). Protect from heat and moisture. Do not use this medicine if it has a strong vinegar smell. Throw away any unused medicine after the expiration date.  NOTE: This sheet is a summary. It may not cover all possible information. If you have questions about this medicine, talk to your doctor, pharmacist, or health care provider.  © 2020 [employer] (2018-01-30 10:42:13)      · Is patient discharged on Warfarin / Coumadin?   No      Depression / Suicide Risk    As you are discharged from this [facility] facility, it is important to learn how to keep safe from harming yourself.    Recognize the warning signs:  · Abrupt changes in personality, positive or negative- including increase in energy   · Giving away possessions  · Change in eating patterns- significant weight changes-  positive or negative  · Change in sleeping patterns- unable to sleep or sleeping all the time   · Unwillingness or inability to communicate  · Depression  · Unusual sadness, discouragement and loneliness  · Talk of wanting to die  · Neglect of personal appearance   · Rebelliousness- reckless behavior  · Withdrawal from people/activities they love  · Confusion- inability to concentrate     If you or a loved one observes any of these behaviors or has concerns about self-harm, here's what you can do:  · Talk about it- your feelings and reasons for harming yourself  · Remove any means that you might use to hurt yourself (examples: pills, rope, extension cords, firearm)  · Get professional help from the community (Mental Health, Substance Abuse, psychological counseling)  · Do not be alone:Call your Safe Contact- someone whom you trust who will be there for you.  · Call your local CRISIS HOTLINE 808-1686 or 068-660-8628  · Call your local Children's Mobile Crisis Response Team [place] (999) 045-0325 or www.[employer]  · Call the toll free National Suicide Prevention Hotlines   · National Suicide Prevention Lifeline 130-695-HNVT (7241)  · National Hope Line Network 800-SUICIDE (979-7250)

## 2020-08-13 NOTE — THERAPY
Physical Therapy   Initial Evaluation     Patient Name: Maddy Hodge  Age:  80 y.o., Sex:  female  Medical Record #: [id number]  Today's Date: 8/13/2020     Precautions: (P) Weight Bearing As Tolerated Left Lower Extremity    Assessment  Patient is 80 y.o. female with a diagnosis of L TKR Pt lives at home with son and is active.Pt was safe with bed mob,transfers,ambulation and stairs,she understands HEP and has no equipment needs.       Plan    Recommend Physical Therapy for Evaluation only      08/13/20 1000   Prior Living Situation   Prior Services None   Housing / Facility 1 Story House   Steps Into Home 3   Steps In Home 0   Equipment Owned Front-Wheel Walker   Lives with - Patient's Self Care Capacity Adult Children   Prior Level of Functional Mobility   Bed Mobility Independent   Transfer Status Independent   Ambulation Independent   Distance Ambulation (Feet)   (limited community)   Assistive Devices Used None   Stairs Independent   Passive ROM Lower Body   Lt Knee Flexion Degrees 90   Lt Knee Extension Degrees 0   Balance Assessment   Sitting Balance (Static) Good   Sitting Balance (Dynamic) Good   Standing Balance (Static) Good   Standing Balance (Dynamic) Good   Weight Shift Sitting Good   Weight Shift Standing Good   Gait Analysis   Gait Level Of Assist Supervised   Assistive Device Front Wheel Walker   Distance (Feet) 100   # of Times Distance was Traveled 1   Deviation Step To   # of Stairs Climbed 1   Level of Assist with Stairs Minimal Assist   Weight Bearing Status wbat L   Bed Mobility    Supine to Sit Modified Independent   Sit to Supine Minimal Assist   Scooting Modified Independent   Functional Mobility   Sit to Stand Supervised   Bed, Chair, Wheelchair Transfer Supervised   Transfer Method Stand Pivot   Activity Tolerance   Sitting Edge of Bed 10   Standing 10   Patient / Family Goals    Patient / Family Goal #1 Home   Anticipated Discharge Equipment and Recommendations   DC Equipment  Recommendations None   Discharge Recommendations Recommend outpatient physical therapy services to address higher level deficits       DC Equipment Recommendations: (P) None  Discharge Recommendations: (P) Recommend outpatient physical therapy services to address higher level deficits

## 2020-08-13 NOTE — PROGRESS NOTES
S:  s/p left TKA  Pain controlled  No N/V  No Chest Pain/SOB  Afebrile  Exam:    NAD A& O x3    RLE: +DF/PF/EHL SILT L4/L5/S1  LLE:  +DF/PF/EHL SILT L4/L5/S1    Plan:  Continue standard plan of care  Weight bearing: as tolerated with walker/cane  DVT prophylaxis: SCD/Teds + ASA  Dispo: home today

## 2020-08-13 NOTE — CARE PLAN
Problem: Communication  Goal: The ability to communicate needs accurately and effectively will improve  Outcome: PROGRESSING AS EXPECTED  Note: Pt calls appropriately, makes needs known     Problem: Safety  Goal: Will remain free from injury  Outcome: PROGRESSING AS EXPECTED  Goal: Will remain free from falls  Outcome: PROGRESSING AS EXPECTED     Problem: Infection  Goal: Will remain free from infection  Outcome: PROGRESSING AS EXPECTED     Problem: Bowel/Gastric:  Goal: Normal bowel function is maintained or improved  Outcome: PROGRESSING AS EXPECTED     Problem: Pain Management  Goal: Pain level will decrease to patient's comfort goal  Outcome: PROGRESSING AS EXPECTED  Note: Pain managed with current PRN and scheduled options

## 2020-08-13 NOTE — DISCHARGE PLANNING
Anticipated Discharge Disposition: Home w/ daughter and son.     Action: SW completed chart review. Patient here for knee surgery w/ Dr. Perez. Patient anticipates no needs per d/c. Reports she has all DME at home.     Barriers to Discharge: None    Plan: No needs. No IMM

## 2020-08-13 NOTE — CARE PLAN
Problem: Communication  Goal: The ability to communicate needs accurately and effectively will improve  Outcome: PROGRESSING AS EXPECTED   Patient is A&Ox4 and uses the call light appropriately when needs arise. Patient plan of care reviewed. Hourly rounding in place.     Problem: Venous Thromboembolism (VTW)/Deep Vein Thrombosis (DVT) Prevention:  Goal: Patient will participate in Venous Thrombosis (VTE)/Deep Vein Thrombosis (DVT)Prevention Measures  Intervention: Ensure patient wears graduated elastic stockings (ZA hose) and/or SCDs, if ordered, when in bed or chair (Remove at least once per shift for skin check)  Flowsheets (Taken 8/13/2020 0243)  SCDs, Sequential Compression Device: On

## 2020-08-13 NOTE — PROGRESS NOTES
Received report from ELEUTERIO Dennis. Assumed care of patient. Patient is currently resting in bed reading and is stating 3/10 pain to the left knee. Medication to be given when possible. Patient reporting no N/V, chest pain or SOB. Patient is currently on 3-4 liters of oxygen via nasal cannula and patient has CPAP for home. RT coordination in place. All other VSS. Dressing to left knee visualized. Dressing CDI and CMS intact. Pulses 1+ bilaterally. SCDs and polar ice to be placed. Patient WBAT with FWW. Patient has chronic mendez catheter in place--patient will need to ambulate 50 feet by midnight to meet discharge criteria. Plan of care reviewed. No further needs requested at this time. Hourly rounding and safety precautions in place. Bed locked and in lowest position with call light within reach.

## 2020-08-13 NOTE — CARE PLAN
Problem: Safety  Goal: Will remain free from injury  8/13/2020 0952 by Rasheed Boston R.N.  Outcome: PROGRESSING AS EXPECTED  8/13/2020 0921 by Rasheed Boston R.N.  Outcome: PROGRESSING AS EXPECTED  Intervention: Provide assistance with mobility  Note: Bed in low position, treaded slipper socks on, and call light in reach. Pt instructed to call nurse if ambulating out of bed.          Problem: Discharge Barriers/Planning  Goal: Patient's continuum of care needs will be met  Outcome: PROGRESSING AS EXPECTED     Problem: Pain Management  Goal: Pain level will decrease to patient's comfort goal  Outcome: PROGRESSING AS EXPECTED  Intervention: Follow pain managment plan developed in collaboration with patient and Interdisciplinary Team  Note: Making sure pain is controlled to a tolerable level conducive to ambulation in and out of bed. Assessing pain level while rounding, and medicating prn as ordered by MD.

## 2020-08-14 RX ORDER — IMIPRAMINE HYDROCHLORIDE 10 MG/1
TABLET, FILM COATED ORAL
Qty: 180 TAB | Refills: 0 | Status: SHIPPED | OUTPATIENT
Start: 2020-08-14 | End: 2020-11-11

## 2020-08-16 ENCOUNTER — HOSPITAL ENCOUNTER (EMERGENCY)
Facility: MEDICAL CENTER | Age: 80
End: 2020-08-16
Attending: EMERGENCY MEDICINE | Admitting: EMERGENCY MEDICINE
Payer: MEDICARE

## 2020-08-16 VITALS
OXYGEN SATURATION: 96 % | DIASTOLIC BLOOD PRESSURE: 87 MMHG | SYSTOLIC BLOOD PRESSURE: 152 MMHG | BODY MASS INDEX: 33.33 KG/M2 | HEIGHT: 60 IN | WEIGHT: 169.75 LBS | HEART RATE: 63 BPM | RESPIRATION RATE: 16 BRPM | TEMPERATURE: 97.8 F

## 2020-08-16 DIAGNOSIS — R10.9 ABDOMINAL DISCOMFORT: ICD-10-CM

## 2020-08-16 PROCEDURE — 99283 EMERGENCY DEPT VISIT LOW MDM: CPT

## 2020-08-16 ASSESSMENT — FIBROSIS 4 INDEX: FIB4 SCORE: 1.64

## 2020-08-17 NOTE — ED NOTES
D/C instn reviewed D/C via w/c with daughter To F/U with PMD and urologist Stable reassurred condkodi

## 2020-08-17 NOTE — DISCHARGE INSTRUCTIONS
As we discussed, check your weight, and if you are heavier than you expect, this may be because of IV fluids he got in the hospital.  There is no need to change her medications, but if you are sticking to a normal diet, and not drinking excessive amounts of fluids, you should notice your weight and the sense of abdominal discomfort drift back to normal.  If you are getting worse instead of gradually better, please return to medical care.

## 2020-08-17 NOTE — ED PROVIDER NOTES
ED Provider Note    Scribed for To Armstrong M.D. by To Armstrong M.D.. 8/16/2020  6:09 PM    CHIEF COMPLAINT  Chief Complaint   Patient presents with   • Urinary Catheter Problem       HPI  Maddy Hodge is a 80 y.o. female who presents to the Emergency Room for a sensation of urinary retention.  She has a chronic indwelling Garnett catheter.  She had a knee replacement on Wednesday, but feels she is recovering well.  She has not had fevers or chills, chest pain, cough, nausea or vomiting.  She denies any increased pain in her knee.  Throughout the day today, she has had a sensation of retained urine, and feels that her catheter is not draining into her bag as much as it should.  During the day, she wears a plug in her suprapubic catheter, then drains it at intervals.  She has not had cloudy urine or any noted debris.  She presents with 200 cc in her catheter bag, but feels she should have much more.  She has not had dysuria or burning or seeing blood in her urine.  [facility] and [facility] about 2 weeks ago.    REVIEW OF SYSTEMS  See HPI for further details.    PAST MEDICAL HISTORY   has a past medical history of Allergy, Anesthesia, Anxiety, Arachnoiditis, Arthritis, Asthma, Back pain, Basal cell carcinoma, Bowel habit changes, CATARACT, Chickenpox, Chronic constipation, Coagulase-negative staphylococcal infection, Depression, Encounter for long-term (current) use of other medications, Erosive gastritis (5/09), GERD (gastroesophageal reflux disease), [place] measles, Heart burn, High cholesterol, Hypertension, Hypothyroidism, Influenza, Lumbar vertebral fracture (HCC) (5/2011), Mumps, Muscle disorder, Neuropathy, Obesity, Class I, BMI 30-34.9, OSTEOPOROSIS, Pain (5/12/16), Pulmonary hypertension (HCC), Sleep apnea, Spinal stenosis, lumbar region, without neurogenic claudication, Tonsillitis, and Urinary bladder disorder (6/30/2016).    SOCIAL  HISTORY  Social History     Tobacco Use   • Smoking status: Never Smoker   • Smokeless tobacco: Never Used   Substance and Sexual Activity   • Alcohol use: No     Alcohol/week: 0.0 oz   • Drug use: No   • Sexual activity: Not Currently     Partners: Male     Birth control/protection: Abstinence       SURGICAL HISTORY   has a past surgical history that includes lumbar laminectomy diskectomy; hysterectomy, total abdominal (1976); colonoscopy (2000,5/27/09); egd with asp/bx (5/27/09); appendectomy (1976); spinal cord stimulator (9/2/14); cataract extraction with iol (Bilateral); gastroscopy with balloon dilatation (N/A, 5/19/2016); hysterectomy laparoscopy; tonsillectomy; spinal cord stimulator (N/A, 7/3/2017); other surgical procedure (Bilateral, 2012); and total knee arthroplasty (Left, 8/12/2020).    CURRENT MEDICATIONS  Home Medications    **Home medications have not yet been reviewed for this encounter**         ALLERGIES  Allergies   Allergen Reactions   • Pcn [Penicillins] Hives     hives   • Sulfa Drugs Hives     hives   • Macrobid [Nitrofurantoin] Rash     rash   • Tape Rash     Paper tape       PHYSICAL EXAM  VITAL SIGNS: /70   Pulse 89   Temp 36.6 °C (97.8 °F) (Temporal)   Resp 18   Ht 1.524 m (5')   Wt 77 kg (169 lb 12.1 oz)   SpO2 95%   BMI 33.15 kg/m²   Pulse ox interpretation: I interpret this pulse ox as normal.  Constitutional: Alert in no apparent distress.  HENT: Normocephalic, Atraumatic, Bilateral external ears normal. Nose normal.   Eyes: Conjunctiva normal, non-icteric.   Heart: Regular rate and rythm, no murmurs.    Lungs: Clear to auscultation bilaterally.  Abdomen:   Skin: Warm, Dry, No erythema, No rash.   Extremities: Notable symmetric pitting edema bilaterally to above the knee  Neurologic: Alert, Grossly non-focal.   Psychiatric: Affect normal, Judgment normal, Mood normal, Appears appropriate and not intoxicated.     COURSE & MEDICAL DECISION MAKING  The patient's VS, Nurses  notes reviewed. (See chart for details)    6:09 PM Patient seen and examined at bedside. Ordered for bladder scan and to evaluate. Patient will be treated with bladder irrigation for her symptoms.  This patient does have some significant edema, including pannicular edema.  She may have a sensation of abdominal tightness from fluid retention from her surgery 2 days ago.  Her bladder scan does not show anything, though is limited by body habitus.  We are still going to manually irrigate half a liter via Diamante syringe to make sure that she is draining, and to remove any debris.    7:19 PM the patient reports feeling better after bladder irrigation.  There were no large pieces of debris noted.  We discussed that with her peripheral edema, especially since she normally takes a diuretic, a lot of her sensation of discomfort may be from third spacing of IV fluids into her soft tissues.  She normally plugs her catheter during the day and wears a leg bag at night.  I suggested keeping the leg bag in place for the next couple of days until she is sure that he is feeling back to normal, and she agrees to do so.  She will continue her usual medications, be careful to avoid excessive oral fluid intake, but continue to hydrate normally.  We discussed return precautions, especially for any dysuria or color change of the urine or flulike symptoms that might suggest a urinary tract infection.  At this point, I think it is very unlikely that she has a true urinary tract infection.  Inevitably, with her chronic indwelling catheter, urinalysis which show bacteria, though in the current clinical context of the patient's presentation evaluation, I think that is overwhelmingly likely to be simple colonization.  We discussed that she should return to medical care if her situation changes.     The patient will return for new or worsening symptoms and is stable at the time of discharge.    The patient is referred to a primary physician for  blood pressure management, diabetic screening, and for all other preventative health concerns.    DISPOSITION:  Patient will be discharged home in stable condition.    FOLLOW UP:  Remington Quinones M.D.  [address] [place] [address]  [place] [id number] [phone]      As needed    [facility], Emergency Dept  [address]  [place] [id number] [phone]    If symptoms worsen      OUTPATIENT MEDICATIONS:  New Prescriptions    No medications on file         FINAL IMPRESSION  1. Abdominal discomfort

## 2020-08-17 NOTE — ED TRIAGE NOTES
"Pt presents with a hx of left knee osteoarthritis with corrective surgery completed this past Wednesday.  She reports a hx of chronic Garnett catheter indwelling.  C/O of decreased urinary output, and \"bladder pain\" for the past few hours.   Chief Complaint   Patient presents with   • Urinary Catheter Problem     /70   Pulse 89   Temp 36.6 °C (97.8 °F) (Temporal)   Resp 18   Ht 1.524 m (5')   Wt 77 kg (169 lb 12.1 oz)   SpO2 95%   BMI 33.15 kg/m²     "

## 2020-09-14 ENCOUNTER — HOSPITAL ENCOUNTER (OUTPATIENT)
Dept: RADIOLOGY | Facility: MEDICAL CENTER | Age: 80
End: 2020-09-14
Attending: ORTHOPAEDIC SURGERY | Admitting: ORTHOPAEDIC SURGERY
Payer: MEDICARE

## 2020-09-14 DIAGNOSIS — Z96.652 PRESENCE OF LEFT ARTIFICIAL KNEE JOINT: ICD-10-CM

## 2020-09-14 PROCEDURE — 93971 EXTREMITY STUDY: CPT | Mod: 26,GZ | Performed by: INTERNAL MEDICINE

## 2020-09-14 PROCEDURE — 93971 EXTREMITY STUDY: CPT | Mod: LT

## 2020-09-30 DIAGNOSIS — R73.09 ELEVATED GLYCOHEMOGLOBIN: ICD-10-CM

## 2020-09-30 DIAGNOSIS — N18.30 CKD (CHRONIC KIDNEY DISEASE) STAGE 3, GFR 30-59 ML/MIN: ICD-10-CM

## 2020-09-30 DIAGNOSIS — E55.9 VITAMIN D DEFICIENCY DISEASE: ICD-10-CM

## 2020-09-30 DIAGNOSIS — E03.4 IDIOPATHIC ATROPHIC HYPOTHYROIDISM: ICD-10-CM

## 2020-09-30 DIAGNOSIS — D63.8 ANEMIA OF CHRONIC DISEASE: ICD-10-CM

## 2020-09-30 DIAGNOSIS — I10 ESSENTIAL HYPERTENSION: ICD-10-CM

## 2020-09-30 DIAGNOSIS — E78.5 DYSLIPIDEMIA, GOAL LDL BELOW 130: ICD-10-CM

## 2020-09-30 DIAGNOSIS — K21.00 GASTROESOPHAGEAL REFLUX DISEASE WITH ESOPHAGITIS: ICD-10-CM

## 2020-09-30 RX ORDER — LEVOTHYROXINE SODIUM 0.05 MG/1
50 TABLET ORAL
Qty: 90 TAB | Refills: 1 | Status: SHIPPED | OUTPATIENT
Start: 2020-09-30 | End: 2021-03-23

## 2020-11-11 DIAGNOSIS — F33.0 MAJOR DEPRESSIVE DISORDER, RECURRENT EPISODE, MILD (HCC): ICD-10-CM

## 2020-11-11 DIAGNOSIS — N95.1 MENOPAUSAL SYMPTOMS: ICD-10-CM

## 2020-11-11 RX ORDER — ESTRADIOL 0.5 MG/1
0.5 TABLET ORAL DAILY
Qty: 90 TAB | Refills: 3 | Status: SHIPPED | OUTPATIENT
Start: 2020-11-11 | End: 2021-11-02

## 2020-11-11 RX ORDER — IMIPRAMINE HYDROCHLORIDE 10 MG/1
TABLET, FILM COATED ORAL
Qty: 180 TAB | Refills: 0 | Status: SHIPPED | OUTPATIENT
Start: 2020-11-11 | End: 2021-02-19

## 2020-11-16 ENCOUNTER — TELEMEDICINE (OUTPATIENT)
Dept: MEDICAL GROUP | Facility: MEDICAL CENTER | Age: 80
End: 2020-11-16
Payer: MEDICARE

## 2020-11-16 VITALS — WEIGHT: 165 LBS | BODY MASS INDEX: 32.39 KG/M2 | HEIGHT: 60 IN

## 2020-11-16 DIAGNOSIS — Z96.0 INDWELLING URINARY CATHETER PRESENT: ICD-10-CM

## 2020-11-16 DIAGNOSIS — N18.31 STAGE 3A CHRONIC KIDNEY DISEASE: ICD-10-CM

## 2020-11-16 DIAGNOSIS — I27.20 PULMONARY HYPERTENSION (HCC): ICD-10-CM

## 2020-11-16 DIAGNOSIS — J44.89 COPD WITH ASTHMA (HCC): ICD-10-CM

## 2020-11-16 DIAGNOSIS — K21.00 GASTROESOPHAGEAL REFLUX DISEASE WITH ESOPHAGITIS: ICD-10-CM

## 2020-11-16 PROBLEM — R33.9 RETENTION OF URINE: Status: ACTIVE | Noted: 2020-04-09

## 2020-11-16 PROCEDURE — 99213 OFFICE O/P EST LOW 20 MIN: CPT | Mod: 95,CR | Performed by: FAMILY MEDICINE

## 2020-11-16 RX ORDER — OMEPRAZOLE 20 MG/1
20 CAPSULE, DELAYED RELEASE ORAL DAILY
Qty: 90 CAP | Refills: 3 | Status: SHIPPED | OUTPATIENT
Start: 2020-11-16 | End: 2021-11-24 | Stop reason: SDUPTHER

## 2020-11-16 RX ORDER — AZITHROMYCIN 250 MG/1
TABLET, FILM COATED ORAL
Qty: 6 TAB | Refills: 0 | Status: SHIPPED | OUTPATIENT
Start: 2020-11-16 | End: 2021-04-01

## 2020-11-16 RX ORDER — METHYLPREDNISOLONE 4 MG/1
TABLET ORAL
Qty: 21 TAB | Refills: 0 | Status: SHIPPED | OUTPATIENT
Start: 2020-11-16 | End: 2021-04-01

## 2020-11-16 ASSESSMENT — FIBROSIS 4 INDEX: FIB4 SCORE: 1.64

## 2020-11-16 NOTE — PROGRESS NOTES
Virtual Visit: Established Patient   This visit was conducted via Zoom  using secure and encrypted videoconferencing technology. The patient was in a private location in the state of [place].    The patient's identity was confirmed and verbal consent was obtained for this virtual visit.      CC: GERD, COPD, urinary catheter                                                                                                                              HPI:   Maddy presents today with the following.    1. Gastroesophageal reflux disease with esophagitis  The patient feels the current medication regimen of omeprazole is controlling the gastroesophageal reflux symptoms well. Denies dysphagia, reflux symptoms, acidity, abdominal pain or visible blood or mucus in the stool. Denies vomiting or hematemesis. Denies burping or abdominal bloating. Patient avoids nonsteroidal anti-inflammatory drugs. Avoids heavy meals or eating within 2 hours of bedtime.    2. COPD with asthma ([facility])  Patient has longstanding COPD with asthma.  She follows with pulmonology for this and will be having an appointment at the sleep center soon.  Patient is a never smoker.  Patient does have hyperexpanded and emphysematous lungs with apical blebs.    3. Pulmonary hypertension ([facility])  Patient's most recent echocardiogram shows continued pulmonary hypertension with right ventricular systolic pressure of 55.  She is following carefully with cardiology and pulmonology for this.    4. Stage 3a chronic kidney disease  Patient has mild chronic kidney disease.  She has a chronic indwelling urinary catheter.  Urology has been discussing with her the advisability of changing this to a suprapubic.  Discussed with the patient that she might have fewer urinary tract infections.    5. Indwelling urinary catheter present  Patient is emptying the bladder every 2 hours while awake.  She does wear her bag at night.  Converting to suprapubic bladder catheter discussed.   She will discuss this further with urology.      Patient Active Problem List    Diagnosis Date Noted   • CKD (chronic kidney disease) stage 3, GFR 30-59 ml/min 05/23/2016     Priority: Medium   • Esophageal dysphagia 05/19/2016     Priority: Medium   • Hypothyroidism due to Hashimoto's thyroiditis      Priority: Medium   • GERD (gastroesophageal reflux disease) 09/20/2011     Priority: Medium   • Essential hypertension 07/06/2009     Priority: Medium   • Major depressive disorder, recurrent episode, mild (CMS-HCC) 05/02/2016     Priority: Low   • Neuropathy (CMS-HCC) 10/17/2013     Priority: Low   • Family history of polycystic kidney disease      Priority: Low   • Facet arthritis of lumbar region 03/26/2012     Priority: Low   • History of vertebral fracture 03/26/2012     Priority: Low   • Spinal stenosis of lumbar region with neurogenic claudication      Priority: Low   • Indwelling urinary catheter present 11/16/2020   • Gastroesophageal reflux disease with esophagitis 11/16/2020   • Primary osteoarthritis of left knee 08/13/2020   • Retention of urine 04/09/2020   • Choking 08/15/2019   • Chronic anxiety 07/15/2019   • Vitamin D deficiency disease 04/10/2019   • Hoarseness, persistent 04/10/2019   • Other chronic pain 02/21/2019   • History of hematuria 10/23/2018   • BMI 34.0-34.9,adult 08/17/2018   • Dyslipidemia, goal LDL below 130 08/15/2018   • Chronic obstructive pulmonary disease (HCC) 06/22/2018   • Pulmonary hypertension ([facility]) 02/21/2018   • COPD with asthma ([facility]) 11/10/2017   • ALISSA (obstructive sleep apnea) 11/10/2017   • Postlaminectomy syndrome, unspecified region 07/03/2017   • Recurrent UTI 06/28/2017   • 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   • Menopausal symptoms 09/13/2016   • Essential tremor 09/06/2016   • Postmenopausal osteoporosis    • Arachnoiditis        Current Outpatient Medications   Medication Sig Dispense Refill   • omeprazole (PRILOSEC) 20 MG  delayed-release capsule Take 1 Cap by mouth every day. 90 Cap 3   • azithromycin (ZITHROMAX) 250 MG Tab As directed on pack 6 Tab 0   • methylPREDNISolone (MEDROL DOSEPAK) 4 MG Tablet Therapy Pack As directed on the packaging label. 21 Tab 0   • imipramine (TOFRANIL) 10 MG Tab TAKE 2 TABLETS BY MOUTH IN THE EVENING 180 Tab 0   • estradiol (ESTRACE) 0.5 MG tablet Take 1 Tab by mouth every day. 90 Tab 3   • levothyroxine (SYNTHROID) 50 MCG Tab Take 1 Tab by mouth Every morning on an empty stomach. 90 Tab 1   • aspirin EC 81 MG EC tablet Take 1 Tab by mouth 2 times a day. 30 Tab 0   • acetaminophen (TYLENOL) 500 MG Tab Take 1 Tab by mouth every 6 hours. 30 Tab 0   • docusate sodium 100 MG Cap Take 100 mg by mouth 2 Times a Day. 60 Cap 0   • ondansetron (ZOFRAN ODT) 4 MG TABLET DISPERSIBLE Take 1 Tab by mouth every four hours as needed for Nausea. 10 Tab 0   • meloxicam (MOBIC) 7.5 MG Tab      • ondansetron (ZOFRAN) 4 MG Tab tablet      • pregabalin (LYRICA) 200 MG capsule TAKE 1 CAP BY MOUTH 3 TIMES DAILY FOR 30 DAYS, G89     • atorvastatin (LIPITOR) 20 MG Tab TAKE 1 TABLET BY MOUTH EVERY DAY 90 Tab 3   • escitalopram (LEXAPRO) 20 MG tablet Take 1 Tab by mouth every day. 90 Tab 3   • potassium chloride (KLOR-CON) 8 MEQ tablet Take 2 Tabs by mouth every day. 180 Tab 3   • VENTOLIN  (90 Base) MCG/ACT Aero Soln inhalation aerosol Inhale 2 Puffs by mouth every 6 hours as needed for Shortness of Breath. 1 Inhaler 5   • Umeclidinium Bromide (INCRUSE ELLIPTA) 62.5 MCG/INH AEROSOL POWDER, BREATH ACTIVATED Inhale 1 Puff by mouth every day. 3 Each 3   • Multiple Vitamins-Minerals (VITEYES AREDS ADVANCED PO) Take  by mouth.     • LETAIRIS 10 MG tablet Take 10 Tabs by mouth every day.     • furosemide (LASIX) 40 MG Tab Take 40 mg by mouth every day.  3   • NARCAN 4 MG/0.1ML Liquid AS DIRECTED  0   • oxyCODONE-acetaminophen (PERCOCET) 5-325 MG Tab TAKE 1 TABLET BY MOUTH 4 TIMES A DAY AS NEEDED FOR PAIN 11/8/18 G89.29  0   •  spironolactone (ALDACTONE) 25 MG Tab Take 25 mg by mouth every day.  3   • AMITIZA 24 MCG capsule Take 48 mcg by mouth every morning with breakfast.     • XTAMPZA ER 18 MG Capsule Extended Release 12 hour Abuse-Deterrent Take 18 mg by mouth 2 Times a Day.       No current facility-administered medications for this visit.          Allergies as of 11/16/2020 - Reviewed 11/16/2020   Allergen Reaction Noted   • Pcn [penicillins] Hives 06/21/2017   • Sulfa drugs Hives 06/21/2017   • Macrobid [nitrofurantoin] Rash 06/28/2017   • Tape Rash 06/21/2017        ROS:  Denies, chest pain, Shortness of breath, Edema.     Ht 1.524 m (5')   Wt 74.8 kg (165 lb)   BMI 32.22 kg/m²   Patient states pulse and BP have been stable but has not checked in a few days.      Physical Exam:  Constitutional: Alert, no distress, well-groomed.  Skin: No rashes in visible areas.  Eye: Round. Conjunctiva clear, No icterus.   ENMT: Lips pink without lesions, good dentition, moist mucous membranes. Phonation normal.  Neck: No masses, no thyromegaly. Moves freely without pain.  Respiratory: Unlabored respiratory effort, no cough or audible wheeze  Psych: Alert and oriented x3, normal affect and mood.      Recent lab orders and imaging reviewed, discussed.    Assessment and Plan.   80 y.o. female with the following issues.    1. Gastroesophageal reflux disease with esophagitis  The medication regimen is helpful and well-tolerated and is renewed  - omeprazole (PRILOSEC) 20 MG delayed-release capsule; Take 1 Cap by mouth every day.  Dispense: 90 Cap; Refill: 3    2. COPD with asthma (HCC)  Patient follows with pulmonology.  She has responded well to her general regimen.  In the past, pulmonology had her have a Z-Demian and rescue steroids on hand for use when needed.  She is out of those at this time.  This is renewed.  Patient has typically use these 2 or 3 times a year at most.  - azithromycin (ZITHROMAX) 250 MG Tab; As directed on pack  Dispense: 6 Tab;  Refill: 0  - methylPREDNISolone (MEDROL DOSEPAK) 4 MG Tablet Therapy Pack; As directed on the packaging label.  Dispense: 21 Tab; Refill: 0    3. Pulmonary hypertension (HCC)  Present, followed by pulmonology and cardiology    4. Stage 3a chronic kidney disease  Her CKD continues to be mild per the recent labs.  Followed by urology.  She will be having renal ultrasounds to make sure she is not developing hydronephrosis.  She is a very meticulous patient.    5. Indwelling urinary catheter present  Patient does have an indwelling catheter.  She goes to urology once a month to have the catheter changed.  Urology has brought up the idea of a suprapubic catheter instead.  Patient did not realize that this would have less risk of infection.  She will be considering this and discussing with urology.    She will plan on lab testing end of December.  Orders are already in place.    Recheck 4 months, sooner as needed

## 2020-12-29 ENCOUNTER — OFFICE VISIT (OUTPATIENT)
Dept: SLEEP MEDICINE | Facility: MEDICAL CENTER | Age: 80
End: 2020-12-29
Payer: MEDICARE

## 2020-12-29 VITALS
DIASTOLIC BLOOD PRESSURE: 70 MMHG | HEART RATE: 82 BPM | OXYGEN SATURATION: 94 % | HEIGHT: 60 IN | SYSTOLIC BLOOD PRESSURE: 124 MMHG | BODY MASS INDEX: 33.18 KG/M2 | TEMPERATURE: 98.1 F | RESPIRATION RATE: 16 BRPM | WEIGHT: 169 LBS

## 2020-12-29 DIAGNOSIS — I27.20 PULMONARY HYPERTENSION (HCC): ICD-10-CM

## 2020-12-29 DIAGNOSIS — J43.9 MIXED RESTRICTIVE AND OBSTRUCTIVE LUNG DISEASE (HCC): ICD-10-CM

## 2020-12-29 DIAGNOSIS — G47.33 OSA (OBSTRUCTIVE SLEEP APNEA): ICD-10-CM

## 2020-12-29 DIAGNOSIS — J98.4 MIXED RESTRICTIVE AND OBSTRUCTIVE LUNG DISEASE (HCC): ICD-10-CM

## 2020-12-29 DIAGNOSIS — E66.9 CLASS 1 OBESITY WITH BODY MASS INDEX (BMI) OF 33.0 TO 33.9 IN ADULT, UNSPECIFIED OBESITY TYPE, UNSPECIFIED WHETHER SERIOUS COMORBIDITY PRESENT: ICD-10-CM

## 2020-12-29 PROCEDURE — 99214 OFFICE O/P EST MOD 30 MIN: CPT | Performed by: INTERNAL MEDICINE

## 2020-12-29 ASSESSMENT — ENCOUNTER SYMPTOMS
NECK PAIN: 0
FALLS: 0
EYE REDNESS: 0
ABDOMINAL PAIN: 0
PHOTOPHOBIA: 0
ORTHOPNEA: 0
CHILLS: 0
MYALGIAS: 0
EYE PAIN: 0
EYE DISCHARGE: 0
SINUS PAIN: 0
COUGH: 0
DEPRESSION: 0
TREMORS: 0
HEMOPTYSIS: 0
WHEEZING: 0
FEVER: 0
VOMITING: 0
WEIGHT LOSS: 0
DIZZINESS: 0
DOUBLE VISION: 0
STRIDOR: 0
DIAPHORESIS: 0
HEADACHES: 0
SHORTNESS OF BREATH: 0
SPEECH CHANGE: 0
SORE THROAT: 0
CLAUDICATION: 0
FOCAL WEAKNESS: 0
SPUTUM PRODUCTION: 0
PALPITATIONS: 0
CONSTIPATION: 0
NAUSEA: 0
HEARTBURN: 0
BLURRED VISION: 0
DIARRHEA: 0
BACK PAIN: 1
WEAKNESS: 0
PND: 0

## 2020-12-29 ASSESSMENT — FIBROSIS 4 INDEX: FIB4 SCORE: 1.64

## 2020-12-29 NOTE — PROGRESS NOTES
Chief Complaint   Patient presents with   • Follow-Up     last seen 3/13/2020 jocelyn Rico          HPI: This patient is a 80 y.o. female whom is followed in our clinic for chart diagnosis of COPD although patient is a lifelong non-smoker last seen by Lamar Ortega on 3/13/2020.  The patient also carries a diagnosis of obstructive sleep apnea on BiPAP therapy and followed in sleep medicine clinic.  In addition to this, she has pulmonary hypertension for which she is followed in the cardiology clinic with Dr. Cook at [facility] on ambrisentan, spironolactone and Lasix.  The patient is a lifelong non-smoker.  She is on chronic opiate medication for pain and followed in pain medicine.  From a pulmonary standpoint, she has been treated with inhaled corticosteroids, long-acting beta agonist and anticholinergics.  All controller therapies have caused hoarseness of the voice and for the last 30 days the patient has been using Ventolin inhaler only as needed but tells me she uses it almost never.  She does typically require prednisone and antibiotics 1-2 times per year for episodes of bronchitis.  She reports recurrent episodes of bronchitis in childhood but no formal diagnosis of asthma in the past.  Her last pulmonary function testing from February of this year showed a mixed restrictive obstructive pattern with slightly reduced DLCO.  Overall PFTs were slightly worse when compared to 2018.  She has a chronically elevated right hemidiaphragm but denies orthopnea.  She had a CT chest in 2017 that showed basilar interstitial changes in the posterior lung fields most consistent with atelectasis.  She has 6-minute walk test with Dr. Cook and has desaturated in the past although her most recent one from June of this year the patient did not drop below 92%.  She does not use supplemental oxygen during the day.  She presents today for routine follow-up.  She has no acute complaints.  She is  "up-to-date on vaccines.  She has recently had issues with urinary retention requiring Garnett catheter placement.    Past Medical History:   Diagnosis Date   • Allergy     seasonal   • Anesthesia     \"hard to wake up\"   • Anxiety     due to loss of . 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    • Arthritis     facet arthritis of lumbar region   • Asthma     Inhaler use daily.   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 Albrecht, attaches to plastic, prulent   • Depression     and anxiety   • Encounter for long-term (current) use of other medications    • Erosive gastritis 5/09    antral   • GERD (gastroesophageal reflux disease)    • [place] measles    • Heart burn    • High cholesterol    • Hypertension    • Hypothyroidism    • Indwelling urinary catheter present 11/16/2020   • Influenza    • Lumbar vertebral fracture (HCC) 5/2011   • Mumps    • Muscle disorder     Arachnoiditis   • Neuropathy    • Obesity, Class I, BMI 30-34.9    • OSTEOPOROSIS    • Pain 5/12/16    back and legs    • Pulmonary hypertension (HCC)    • Sleep apnea     on BiPap, follows with pulmonology   • Spinal stenosis, lumbar region, without neurogenic claudication    • Tonsillitis    • Urinary bladder disorder 6/30/2016    Currently has a catheter.       Social History     Socioeconomic History   • Marital status:      Spouse name: Not on file   • Number of children: Not on file   • Years of education: Not on file   • Highest education level: Not on file   Occupational History   • Not on file   Social Needs   • Financial resource strain: Not on file   • Food insecurity     Worry: Not on file     Inability: Not on file   • Transportation needs     Medical: Not on file     Non-medical: Not on file   Tobacco Use   • Smoking status: " Never Smoker   • Smokeless tobacco: Never Used   Substance and Sexual Activity   • Alcohol use: No     Alcohol/week: 0.0 oz   • Drug use: No   • Sexual activity: Not Currently     Partners: Male     Birth control/protection: Abstinence   Lifestyle   • Physical activity     Days per week: Not on file     Minutes per session: Not on file   • Stress: Not on file   Relationships   • Social connections     Talks on phone: Not on file     Gets together: Not on file     Attends Gnosticism service: Not on file     Active member of club or organization: Not on file     Attends meetings of clubs or organizations: Not on file     Relationship status: Not on file   • Intimate partner violence     Fear of current or ex partner: Not on file     Emotionally abused: Not on file     Physically abused: Not on file     Forced sexual activity: Not on file   Other Topics Concern   •  Service Not Asked   • Blood Transfusions Not Asked   • Caffeine Concern Not Asked   • Occupational Exposure Not Asked   • Hobby Hazards Not Asked   • Sleep Concern Not Asked   • Stress Concern No     Comment:  cancer   • Weight Concern Not Asked   • Special Diet Not Asked   • Back Care Not Asked   • Exercise Not Asked   • Bike Helmet Not Asked   • Seat Belt Not Asked   • Self-Exams Not Asked   Social History Narrative   • Not on file       Family History   Problem Relation Age of Onset   • Genitourinary () Problems Brother    • Lung Disease Mother         COPD   • Heart Disease Mother    • Genetic Disorder Father         Parkinsons/ from complications   • Hypertension Father    • Cancer Maternal Aunt         Breast cancer   • Stroke Paternal Grandmother    • Cancer Maternal Aunt         Breast cancer       Current Outpatient Medications on File Prior to Visit   Medication Sig Dispense Refill   • omeprazole (PRILOSEC) 20 MG delayed-release capsule Take 1 Cap by mouth every day. 90 Cap 3   • azithromycin (ZITHROMAX) 250 MG Tab As  directed on pack 6 Tab 0   • methylPREDNISolone (MEDROL DOSEPAK) 4 MG Tablet Therapy Pack As directed on the packaging label. 21 Tab 0   • imipramine (TOFRANIL) 10 MG Tab TAKE 2 TABLETS BY MOUTH IN THE EVENING 180 Tab 0   • estradiol (ESTRACE) 0.5 MG tablet Take 1 Tab by mouth every day. 90 Tab 3   • levothyroxine (SYNTHROID) 50 MCG Tab Take 1 Tab by mouth Every morning on an empty stomach. 90 Tab 1   • aspirin EC 81 MG EC tablet Take 1 Tab by mouth 2 times a day. 30 Tab 0   • acetaminophen (TYLENOL) 500 MG Tab Take 1 Tab by mouth every 6 hours. 30 Tab 0   • docusate sodium 100 MG Cap Take 100 mg by mouth 2 Times a Day. 60 Cap 0   • ondansetron (ZOFRAN ODT) 4 MG TABLET DISPERSIBLE Take 1 Tab by mouth every four hours as needed for Nausea. 10 Tab 0   • meloxicam (MOBIC) 7.5 MG Tab      • ondansetron (ZOFRAN) 4 MG Tab tablet      • pregabalin (LYRICA) 200 MG capsule TAKE 1 CAP BY MOUTH 3 TIMES DAILY FOR 30 DAYS, G89     • atorvastatin (LIPITOR) 20 MG Tab TAKE 1 TABLET BY MOUTH EVERY DAY 90 Tab 3   • escitalopram (LEXAPRO) 20 MG tablet Take 1 Tab by mouth every day. 90 Tab 3   • potassium chloride (KLOR-CON) 8 MEQ tablet Take 2 Tabs by mouth every day. 180 Tab 3   • VENTOLIN  (90 Base) MCG/ACT Aero Soln inhalation aerosol Inhale 2 Puffs by mouth every 6 hours as needed for Shortness of Breath. 1 Inhaler 5   • Umeclidinium Bromide (INCRUSE ELLIPTA) 62.5 MCG/INH AEROSOL POWDER, BREATH ACTIVATED Inhale 1 Puff by mouth every day. 3 Each 3   • Multiple Vitamins-Minerals (VITEYES AREDS ADVANCED PO) Take  by mouth.     • LETAIRIS 10 MG tablet Take 10 Tabs by mouth every day.     • furosemide (LASIX) 40 MG Tab Take 40 mg by mouth every day.  3   • NARCAN 4 MG/0.1ML Liquid AS DIRECTED  0   • oxyCODONE-acetaminophen (PERCOCET) 5-325 MG Tab TAKE 1 TABLET BY MOUTH 4 TIMES A DAY AS NEEDED FOR PAIN 11/8/18 G89.29  0   • spironolactone (ALDACTONE) 25 MG Tab Take 25 mg by mouth every day.  3   • AMITIZA 24 MCG capsule Take 48  mcg by mouth every morning with breakfast.     • XTAMPZA ER 18 MG Capsule Extended Release 12 hour Abuse-Deterrent Take 18 mg by mouth 2 Times a Day.       No current facility-administered medications on file prior to visit.        Pcn [penicillins], Sulfa drugs, Macrobid [nitrofurantoin], and Tape      ROS:   Review of Systems   Constitutional: Negative for chills, diaphoresis, fever, malaise/fatigue and weight loss.   HENT: Negative for congestion, ear discharge, ear pain, hearing loss, nosebleeds, sinus pain, sore throat and tinnitus.    Eyes: Negative for blurred vision, double vision, photophobia, pain, discharge and redness.   Respiratory: Negative for cough, hemoptysis, sputum production, shortness of breath, wheezing and stridor.    Cardiovascular: Negative for chest pain, palpitations, orthopnea, claudication, leg swelling and PND.   Gastrointestinal: Negative for abdominal pain, constipation, diarrhea, heartburn, nausea and vomiting.   Genitourinary: Positive for dysuria. Negative for urgency.   Musculoskeletal: Positive for back pain and joint pain. Negative for falls, myalgias and neck pain.   Skin: Negative for itching and rash.   Neurological: Negative for dizziness, tremors, speech change, focal weakness, weakness and headaches.   Endo/Heme/Allergies: Negative for environmental allergies.   Psychiatric/Behavioral: Negative for depression.       /70 (BP Location: Right arm, Patient Position: Sitting, BP Cuff Size: Adult)   Pulse 82   Temp 36.7 °C (98.1 °F) (Temporal)   Resp 16   Ht 1.524 m (5')   Wt 76.7 kg (169 lb)   SpO2 94%   Physical Exam  Vitals signs reviewed.   Constitutional:       General: She is not in acute distress.     Appearance: Normal appearance. She is well-developed. She is obese.   HENT:      Head: Normocephalic and atraumatic.      Right Ear: External ear normal.      Left Ear: External ear normal.      Nose: Nose normal. No congestion.      Mouth/Throat:      Mouth:  Mucous membranes are moist.      Pharynx: Oropharynx is clear. No oropharyngeal exudate.   Eyes:      General: No scleral icterus.     Extraocular Movements: Extraocular movements intact.      Conjunctiva/sclera: Conjunctivae normal.      Pupils: Pupils are equal, round, and reactive to light.   Neck:      Musculoskeletal: Normal range of motion and neck supple.      Vascular: No JVD.      Trachea: No tracheal deviation.   Cardiovascular:      Rate and Rhythm: Normal rate and regular rhythm.      Heart sounds: Normal heart sounds. No murmur. No friction rub. No gallop.    Pulmonary:      Effort: Pulmonary effort is normal. No accessory muscle usage or respiratory distress.      Breath sounds: No wheezing or rales.      Comments: Decreased diaphragm excursion on the right, no crackles  Abdominal:      General: There is no distension.      Palpations: Abdomen is soft.      Tenderness: There is no abdominal tenderness.   Musculoskeletal: Normal range of motion.         General: No tenderness or deformity.      Right lower leg: No edema.      Left lower leg: No edema.   Lymphadenopathy:      Cervical: No cervical adenopathy.   Skin:     General: Skin is warm and dry.      Findings: No rash.      Nails: There is no clubbing.     Neurological:      Mental Status: She is alert and oriented to person, place, and time.      Cranial Nerves: No cranial nerve deficit.      Gait: Gait normal.   Psychiatric:         Mood and Affect: Mood normal.         Behavior: Behavior normal.         PFTs as reviewed by me personally: As per HPI    Imaging as reviewed by me personally: As per HPI    Assessment:  1. Mixed restrictive and obstructive lung disease (HCC)  PULMONARY FUNCTION TESTS -Test requested: Complete Pulmonary Function Test   2. ALISSA (obstructive sleep apnea)     3. Pulmonary hypertension (HCC)     4. Class 1 obesity with body mass index (BMI) of 33.0 to 33.9 in adult, unspecified obesity type, unspecified whether serious  comorbidity present         Plan:  1.  This patient has a mixed restrictive obstructive pattern with trend toward bronchodilator responsiveness which I suspect is more reactive airways disease than true COPD.  No significant emphysematous changes on CT that I have reviewed and she is a lifelong non-smoker.  She has done quite well off any controller therapy with 1-2 occasions per year where she is treated for acute bronchitis.  I do not think this patient actually has COPD but rather likely some underlying reactive airways disease.  We will continue short acting bronchodilators as needed.  I did encourage activity as tolerated.  We will repeat pulmonary function test in 3 months and consider pulmonary rehab.  He is up-to-date on vaccines.  2.  Fairly severe and may be in part related to elevation of right hemidiaphragm.  I did not pursue sniff test given chronicity and normal oxygenation during the day.  I did review her compliance data and treatment AHI is 8.  She sees sleep medicine on January 13 at which point pressures can be adjusted if necessary.  Continue BiPAP.  3.  Patient is followed closely by cardiology and has done quite well with diuresis and ambrisentan.  She sees Dr. Cook next week with repeat 6-minute walk test.  We will request these records.  4.  This does put patient at increased risk for obesity related pulmonary complications.  Encouraged healthy lifestyle habits.  Return in about 3 months (around 3/29/2021) for PFTs.

## 2021-01-11 DIAGNOSIS — Z23 NEED FOR VACCINATION: ICD-10-CM

## 2021-01-12 ENCOUNTER — HOSPITAL ENCOUNTER (OUTPATIENT)
Dept: HOSPITAL 8 - CFH | Age: 81
Discharge: HOME | End: 2021-01-12
Attending: INTERNAL MEDICINE
Payer: MEDICARE

## 2021-01-12 DIAGNOSIS — J84.10: Primary | ICD-10-CM

## 2021-01-12 DIAGNOSIS — I27.20: ICD-10-CM

## 2021-01-12 DIAGNOSIS — R60.9: ICD-10-CM

## 2021-01-12 DIAGNOSIS — R06.02: ICD-10-CM

## 2021-01-12 DIAGNOSIS — J98.4: ICD-10-CM

## 2021-01-12 DIAGNOSIS — J98.11: ICD-10-CM

## 2021-01-12 DIAGNOSIS — Q79.1: ICD-10-CM

## 2021-01-12 PROCEDURE — 71046 X-RAY EXAM CHEST 2 VIEWS: CPT

## 2021-01-13 ENCOUNTER — TELEMEDICINE (OUTPATIENT)
Dept: SLEEP MEDICINE | Facility: MEDICAL CENTER | Age: 81
End: 2021-01-13
Payer: MEDICARE

## 2021-01-13 VITALS — HEIGHT: 60 IN | BODY MASS INDEX: 32.39 KG/M2 | WEIGHT: 165 LBS

## 2021-01-13 DIAGNOSIS — I27.20 PULMONARY HYPERTENSION (HCC): ICD-10-CM

## 2021-01-13 DIAGNOSIS — G47.33 OSA (OBSTRUCTIVE SLEEP APNEA): ICD-10-CM

## 2021-01-13 DIAGNOSIS — Z78.9 NONSMOKER: ICD-10-CM

## 2021-01-13 PROCEDURE — 99214 OFFICE O/P EST MOD 30 MIN: CPT | Mod: 95,CR | Performed by: NURSE PRACTITIONER

## 2021-01-13 RX ORDER — BUMETANIDE 2 MG/1
2 TABLET ORAL
COMMUNITY

## 2021-01-13 ASSESSMENT — FIBROSIS 4 INDEX: FIB4 SCORE: 1.64

## 2021-01-13 NOTE — PROGRESS NOTES
"Virtual Visit: Established Patient   This visit was conducted via Zoom using secure and encrypted videoconferencing technology. The patient was in a private location in the state of [place].    The patient's identity was confirmed and verbal consent was obtained for this virtual visit.    Subjective:   CC: No chief complaint on file.      Maddy Hodge is a 80 y.o. female presenting for evaluation and management of:  Sleep apnea; accompanied by her son    PSG from 6/2017 indicated an AHI of 5.5 and low oxygenation of 81%.  She completed a titration study for potential ASV therapy given elevated AHI and use of chronic pain medication.  She was then switched from CPAP to BiPAP given her central apneas resolved with BiPAP therapy.  Currently she is being treated with BIPAP ST @ 14/9cm with back up rate 12.    Compliance download   She notes waking 1x per night and may read her book if unable to fall asleep. It may take an hour to resume sleep at the longest. She is using FFM that she feels she \"fights\" with it. She notes pressure to be comfortable.  She recently saw Dr. Cook for pulm htn f/u. He performed 6MW and O2 desaturation and ordered O2 24/7. They are awaiting phone call for set up. We will also investigate if she needs night time O2.   She denies cough/phlegm but ongoing hoarseness. Shortness of breath occurs with longer walks or excited/anxious. She had a recent diuretic change and in the last 2-3 days her breathing is better with loss of fluid and improved O2 levels.     ROS in HPI; otherwise negative.    Allergies   Allergen Reactions   • Pcn [Penicillins] Hives     hives   • Sulfa Drugs Hives     hives   • Macrobid [Nitrofurantoin] Rash     rash   • Tape Rash     Paper tape       Current medicines (including changes today)  Current Outpatient Medications   Medication Sig Dispense Refill   • bumetanide (BUMEX) 2 MG tablet Take 2 mg by mouth every day.     • omeprazole (PRILOSEC) 20 MG " delayed-release capsule Take 1 Cap by mouth every day. 90 Cap 3   • azithromycin (ZITHROMAX) 250 MG Tab As directed on pack 6 Tab 0   • methylPREDNISolone (MEDROL DOSEPAK) 4 MG Tablet Therapy Pack As directed on the packaging label. 21 Tab 0   • imipramine (TOFRANIL) 10 MG Tab TAKE 2 TABLETS BY MOUTH IN THE EVENING 180 Tab 0   • estradiol (ESTRACE) 0.5 MG tablet Take 1 Tab by mouth every day. 90 Tab 3   • levothyroxine (SYNTHROID) 50 MCG Tab Take 1 Tab by mouth Every morning on an empty stomach. 90 Tab 1   • aspirin EC 81 MG EC tablet Take 1 Tab by mouth 2 times a day. 30 Tab 0   • docusate sodium 100 MG Cap Take 100 mg by mouth 2 Times a Day. 60 Cap 0   • pregabalin (LYRICA) 200 MG capsule TAKE 1 CAP BY MOUTH 3 TIMES DAILY FOR 30 DAYS, G89     • atorvastatin (LIPITOR) 20 MG Tab TAKE 1 TABLET BY MOUTH EVERY DAY 90 Tab 3   • escitalopram (LEXAPRO) 20 MG tablet Take 1 Tab by mouth every day. 90 Tab 3   • potassium chloride (KLOR-CON) 8 MEQ tablet Take 2 Tabs by mouth every day. 180 Tab 3   • VENTOLIN  (90 Base) MCG/ACT Aero Soln inhalation aerosol Inhale 2 Puffs by mouth every 6 hours as needed for Shortness of Breath. 1 Inhaler 5   • Multiple Vitamins-Minerals (VITEYES AREDS ADVANCED PO) Take  by mouth.     • LETAIRIS 10 MG tablet Take 10 Tabs by mouth every day.     • NARCAN 4 MG/0.1ML Liquid AS DIRECTED  0   • oxyCODONE-acetaminophen (PERCOCET) 5-325 MG Tab TAKE 1 TABLET BY MOUTH 4 TIMES A DAY AS NEEDED FOR PAIN 11/8/18 G89.29  0   • spironolactone (ALDACTONE) 25 MG Tab Take 25 mg by mouth every day.  3   • AMITIZA 24 MCG capsule Take 48 mcg by mouth every morning with breakfast.     • XTAMPZA ER 18 MG Capsule Extended Release 12 hour Abuse-Deterrent Take 18 mg by mouth 2 Times a Day.       No current facility-administered medications for this visit.        Patient Active Problem List    Diagnosis Date Noted   • CKD (chronic kidney disease) stage 3, GFR 30-59 ml/min 05/23/2016     Priority: Medium   •  Esophageal dysphagia 2016     Priority: Medium   • Hypothyroidism due to Hashimoto's thyroiditis      Priority: Medium   • GERD (gastroesophageal reflux disease) 2011     Priority: Medium   • Essential hypertension 2009     Priority: Medium   • Major depressive disorder, recurrent episode, mild (CMS-HCC) 2016     Priority: Low   • Neuropathy (CMS-HCC) 10/17/2013     Priority: Low   • Family history of polycystic kidney disease      Priority: Low   • Facet arthritis of lumbar region 2012     Priority: Low   • History of vertebral fracture 2012     Priority: Low   • Spinal stenosis of lumbar region with neurogenic claudication      Priority: Low   • Indwelling urinary catheter present 2020   • Gastroesophageal reflux disease with esophagitis 2020   • Primary osteoarthritis of left knee 2020   • Retention of urine 2020   • Choking 08/15/2019   • Chronic anxiety 07/15/2019   • Vitamin D deficiency disease 04/10/2019   • Hoarseness, persistent 04/10/2019   • Other chronic pain 2019   • History of hematuria 10/23/2018   • BMI 34.0-34.9,adult 2018   • Dyslipidemia, goal LDL below 130 08/15/2018   • Chronic obstructive pulmonary disease ([facility]) 2018   • Pulmonary hypertension ([facility]) 2018   • COPD with asthma ([facility]) 11/10/2017   • ALISSA (obstructive sleep apnea) 11/10/2017   • Postlaminectomy syndrome, unspecified region 2017   • Recurrent UTI 2017   • Mixed restrictive and obstructive lung disease ([facility]) 2017   • Menopausal symptoms 2016   • Essential tremor 2016   • Postmenopausal osteoporosis    • Arachnoiditis        Family History   Problem Relation Age of Onset   • Genitourinary () Problems Brother    • Lung Disease Mother         COPD   • Heart Disease Mother    • Genetic Disorder Father         Parkinsons/ from complications   • Hypertension Father    • Cancer Maternal Aunt         Breast cancer   •  Stroke Paternal Grandmother    • Cancer Maternal Aunt         Breast cancer       She  has a past medical history of Allergy, Anesthesia, Anxiety, Arachnoiditis, Arthritis, Asthma, Back pain, Basal cell carcinoma, Bowel habit changes, CATARACT, Chickenpox, Chronic constipation, Coagulase-negative staphylococcal infection, Depression, Encounter for long-term (current) use of other medications, Erosive gastritis (5/09), GERD (gastroesophageal reflux disease), [place] measles, Heart burn, High cholesterol, Hypertension, Hypothyroidism, Indwelling urinary catheter present (11/16/2020), Influenza, Lumbar vertebral fracture (HCC) (5/2011), Mumps, Muscle disorder, Neuropathy, Obesity, Class I, BMI 30-34.9, OSTEOPOROSIS, Pain (5/12/16), Pulmonary hypertension (HCC), Sleep apnea, Spinal stenosis, lumbar region, without neurogenic claudication, Tonsillitis, and Urinary bladder disorder (6/30/2016).  She  has a past surgical history that includes lumbar laminectomy diskectomy; hysterectomy, total abdominal (1976); colonoscopy (2000,5/27/09); egd with asp/bx (5/27/09); appendectomy (1976); spinal cord stimulator (9/2/14); cataract extraction with iol (Bilateral); gastroscopy with balloon dilatation (N/A, 5/19/2016); hysterectomy laparoscopy; tonsillectomy; spinal cord stimulator (N/A, 7/3/2017); other surgical procedure (Bilateral, 2012); and pr total knee arthroplasty (Left, 8/12/2020).       Objective:   Ht 1.524 m (5')   Wt 74.8 kg (165 lb)   BMI 32.22 kg/m²     Physical Exam:  Constitutional: Alert, no distress, well-groomed.  Skin: No rashes in visible areas.  Eye: Round. Conjunctiva clear, lids normal. No icterus. Glasses.  ENMT: Lips pink without lesions, good dentition, moist mucous membranes. Phonation normal.  Neck: No masses, no thyromegaly. Moves freely without pain.  Respiratory: Unlabored respiratory effort, no cough or audible wheeze  Psych: Alert and oriented x3, normal affect and mood.       Assessment and  Plan:   The following treatment plan was discussed:     1. ALISSA (obstructive sleep apnea)    2. Pulmonary hypertension (HCC)    3. BMI 32.0-32.9,adult  - Height And Weight    4. Nonsmoker    Other orders  - bumetanide (BUMEX) 2 MG tablet; Take 2 mg by mouth every day.    Sleep apnea could be better controlled; we will adjust pressures to 12/7cm with back up rate 12 and investigate nocturnal hypoxia with CNOX.  CNOX on BIPAP ST 12/7, 12BR NOW. May post results via [employer]. Pending results, consider bleed in O2/repeat titration study.  DME mask/supplies; set up with local DME for mask fit.   F/u with PCP for other health concerns  F/u with pulmonary in April as scheduled  F/u with Dr. Cook as scheduled and in regards to O2 use during the day and nocturnally.     Follow-up: 2 mos for short follow up for review of compliance, sooner if needed.

## 2021-01-25 ENCOUNTER — HOSPITAL ENCOUNTER (OUTPATIENT)
Dept: HOSPITAL 8 - CFH | Age: 81
Discharge: HOME | End: 2021-01-25
Attending: INTERNAL MEDICINE
Payer: MEDICARE

## 2021-01-25 DIAGNOSIS — I08.1: Primary | ICD-10-CM

## 2021-01-25 DIAGNOSIS — I27.20: ICD-10-CM

## 2021-01-25 PROCEDURE — 93306 TTE W/DOPPLER COMPLETE: CPT

## 2021-01-27 ENCOUNTER — HOME STUDY (OUTPATIENT)
Dept: SLEEP MEDICINE | Facility: MEDICAL CENTER | Age: 81
End: 2021-01-27
Attending: NURSE PRACTITIONER
Payer: MEDICARE

## 2021-01-27 DIAGNOSIS — G47.33 OSA (OBSTRUCTIVE SLEEP APNEA): ICD-10-CM

## 2021-01-27 DIAGNOSIS — I27.20 PULMONARY HYPERTENSION (HCC): ICD-10-CM

## 2021-01-28 ENCOUNTER — IMMUNIZATION (OUTPATIENT)
Dept: FAMILY PLANNING/WOMEN'S HEALTH CLINIC | Facility: IMMUNIZATION CENTER | Age: 81
End: 2021-01-28
Attending: INTERNAL MEDICINE
Payer: MEDICARE

## 2021-01-28 DIAGNOSIS — Z23 ENCOUNTER FOR VACCINATION: Primary | ICD-10-CM

## 2021-01-28 DIAGNOSIS — Z23 NEED FOR VACCINATION: ICD-10-CM

## 2021-01-28 PROCEDURE — 91301 MODERNA SARS-COV-2 VACCINE: CPT

## 2021-01-28 PROCEDURE — 0011A MODERNA SARS-COV-2 VACCINE: CPT

## 2021-01-29 PROCEDURE — 94762 N-INVAS EAR/PLS OXIMTRY CONT: CPT | Performed by: INTERNAL MEDICINE

## 2021-01-29 NOTE — PROCEDURES
Overnight oximetry performed on BiPAP ST.      The basal SPO2 was 87%.  Desaturations noted below 90% for 88% of the night, to a charlene of 83%.    Interpretation:  Persistent desaturations on BiPAP ST.    Recommendations:  A BiPAP retitration is recommended in the sleep laboratory for accurate calibration of pressures and/or oxygen bleedin.

## 2021-02-01 ENCOUNTER — TELEPHONE (OUTPATIENT)
Dept: SLEEP MEDICINE | Facility: MEDICAL CENTER | Age: 81
End: 2021-02-01

## 2021-02-01 DIAGNOSIS — G47.34 NOCTURNAL HYPOXIA: ICD-10-CM

## 2021-02-01 DIAGNOSIS — I27.20 PULMONARY HYPERTENSION (HCC): ICD-10-CM

## 2021-02-01 DIAGNOSIS — G47.33 OSA (OBSTRUCTIVE SLEEP APNEA): ICD-10-CM

## 2021-02-01 NOTE — TELEPHONE ENCOUNTER
Spoke with pts son and he stated that pt is reluctant to completing another sleep study right now. They will call back when they are ready to schedule.

## 2021-02-01 NOTE — TELEPHONE ENCOUNTER
----- Message from KAIN Bills sent at 2/1/2021  8:00 AM PST -----  Persistent low oxygen levels on BIPAP ST; recommend titration study now to evaluate pressure adjustment and/or oxygen bleed in. Order signed for study.

## 2021-02-01 NOTE — TELEPHONE ENCOUNTER
Ok, recommend completing so we can obtain O2 bleed in to improve her health. Patient may schedule.

## 2021-02-05 LAB
25(OH)D3+25(OH)D2 SERPL-MCNC: 35.4 NG/ML (ref 30–100)
ALBUMIN SERPL-MCNC: 4.3 G/DL (ref 3.6–4.6)
ALBUMIN/GLOB SERPL: 2 {RATIO} (ref 1.2–2.2)
ALP SERPL-CCNC: 95 IU/L (ref 39–117)
ALT SERPL-CCNC: 13 IU/L (ref 0–32)
AST SERPL-CCNC: 18 IU/L (ref 0–40)
BASOPHILS # BLD AUTO: 0 X10E3/UL (ref 0–0.2)
BASOPHILS NFR BLD AUTO: 0 %
BILIRUB SERPL-MCNC: 0.5 MG/DL (ref 0–1.2)
BUN SERPL-MCNC: 17 MG/DL (ref 8–27)
BUN/CREAT SERPL: 18 (ref 12–28)
CALCIUM SERPL-MCNC: 9.4 MG/DL (ref 8.7–10.3)
CHLORIDE SERPL-SCNC: 98 MMOL/L (ref 96–106)
CO2 SERPL-SCNC: 26 MMOL/L (ref 20–29)
CREAT SERPL-MCNC: 0.94 MG/DL (ref 0.57–1)
EOSINOPHIL # BLD AUTO: 0.2 X10E3/UL (ref 0–0.4)
EOSINOPHIL NFR BLD AUTO: 4 %
ERYTHROCYTE [DISTWIDTH] IN BLOOD BY AUTOMATED COUNT: 17 % (ref 11.7–15.4)
GLOBULIN SER CALC-MCNC: 2.2 G/DL (ref 1.5–4.5)
GLUCOSE SERPL-MCNC: 103 MG/DL (ref 65–99)
HCT VFR BLD AUTO: 40.3 % (ref 34–46.6)
HGB BLD-MCNC: 12.9 G/DL (ref 11.1–15.9)
IMM GRANULOCYTES # BLD AUTO: 0 X10E3/UL (ref 0–0.1)
IMM GRANULOCYTES NFR BLD AUTO: 0 %
IMMATURE CELLS  115398: ABNORMAL
LYMPHOCYTES # BLD AUTO: 1.5 X10E3/UL (ref 0.7–3.1)
LYMPHOCYTES NFR BLD AUTO: 44 %
MCH RBC QN AUTO: 26.9 PG (ref 26.6–33)
MCHC RBC AUTO-ENTMCNC: 32 G/DL (ref 31.5–35.7)
MCV RBC AUTO: 84 FL (ref 79–97)
MONOCYTES # BLD AUTO: 0.4 X10E3/UL (ref 0.1–0.9)
MONOCYTES NFR BLD AUTO: 11 %
MORPHOLOGY BLD-IMP: ABNORMAL
NEUTROPHILS # BLD AUTO: 1.5 X10E3/UL (ref 1.4–7)
NEUTROPHILS NFR BLD AUTO: 41 %
NRBC BLD AUTO-RTO: ABNORMAL %
PLATELET # BLD AUTO: 233 X10E3/UL (ref 150–450)
PLEASE NOTE   199999: NORMAL
POTASSIUM SERPL-SCNC: 4.6 MMOL/L (ref 3.5–5.2)
PROT SERPL-MCNC: 6.5 G/DL (ref 6–8.5)
RBC # BLD AUTO: 4.8 X10E6/UL (ref 3.77–5.28)
SODIUM SERPL-SCNC: 138 MMOL/L (ref 134–144)
TSH SERPL DL<=0.005 MIU/L-ACNC: 2.37 UIU/ML (ref 0.45–4.5)
WBC # BLD AUTO: 3.6 X10E3/UL (ref 3.4–10.8)

## 2021-02-08 DIAGNOSIS — E87.6 HYPOKALEMIA: ICD-10-CM

## 2021-02-08 RX ORDER — POTASSIUM CHLORIDE 600 MG/1
TABLET, FILM COATED, EXTENDED RELEASE ORAL
Qty: 180 TAB | Refills: 3 | Status: SHIPPED | OUTPATIENT
Start: 2021-02-08 | End: 2021-11-27

## 2021-02-19 DIAGNOSIS — F33.0 MAJOR DEPRESSIVE DISORDER, RECURRENT EPISODE, MILD (HCC): ICD-10-CM

## 2021-02-19 RX ORDER — IMIPRAMINE HYDROCHLORIDE 10 MG/1
TABLET, FILM COATED ORAL
Qty: 180 TABLET | Refills: 0 | Status: SHIPPED | OUTPATIENT
Start: 2021-02-19 | End: 2021-05-13

## 2021-02-25 ENCOUNTER — IMMUNIZATION (OUTPATIENT)
Dept: FAMILY PLANNING/WOMEN'S HEALTH CLINIC | Facility: IMMUNIZATION CENTER | Age: 81
End: 2021-02-25
Attending: INTERNAL MEDICINE
Payer: MEDICARE

## 2021-02-25 DIAGNOSIS — Z23 ENCOUNTER FOR VACCINATION: Primary | ICD-10-CM

## 2021-02-25 PROCEDURE — 91301 MODERNA SARS-COV-2 VACCINE: CPT

## 2021-02-25 PROCEDURE — 0012A MODERNA SARS-COV-2 VACCINE: CPT

## 2021-03-08 ENCOUNTER — DOCUMENTATION (OUTPATIENT)
Dept: MEDICAL GROUP | Facility: MEDICAL CENTER | Age: 81
End: 2021-03-08

## 2021-03-09 ENCOUNTER — DOCUMENTATION (OUTPATIENT)
Dept: MEDICAL GROUP | Facility: MEDICAL CENTER | Age: 81
End: 2021-03-09

## 2021-03-11 DIAGNOSIS — F41.1 GENERALIZED ANXIETY DISORDER: ICD-10-CM

## 2021-03-11 RX ORDER — ESCITALOPRAM OXALATE 20 MG/1
TABLET ORAL
Qty: 90 TABLET | Refills: 2 | Status: SHIPPED | OUTPATIENT
Start: 2021-03-11 | End: 2021-12-02

## 2021-03-21 DIAGNOSIS — E03.4 IDIOPATHIC ATROPHIC HYPOTHYROIDISM: ICD-10-CM

## 2021-03-23 RX ORDER — LEVOTHYROXINE SODIUM 0.05 MG/1
TABLET ORAL
Qty: 90 TABLET | Refills: 3 | Status: SHIPPED | OUTPATIENT
Start: 2021-03-23 | End: 2022-03-04 | Stop reason: SDUPTHER

## 2021-03-31 ENCOUNTER — NON-PROVIDER VISIT (OUTPATIENT)
Dept: SLEEP MEDICINE | Facility: MEDICAL CENTER | Age: 81
End: 2021-03-31
Attending: INTERNAL MEDICINE
Payer: MEDICARE

## 2021-03-31 VITALS — HEIGHT: 62 IN | WEIGHT: 158 LBS | BODY MASS INDEX: 29.08 KG/M2

## 2021-03-31 DIAGNOSIS — J98.4 MIXED RESTRICTIVE AND OBSTRUCTIVE LUNG DISEASE (HCC): ICD-10-CM

## 2021-03-31 DIAGNOSIS — J43.9 MIXED RESTRICTIVE AND OBSTRUCTIVE LUNG DISEASE (HCC): ICD-10-CM

## 2021-03-31 PROCEDURE — 94729 DIFFUSING CAPACITY: CPT | Performed by: INTERNAL MEDICINE

## 2021-03-31 PROCEDURE — 94726 PLETHYSMOGRAPHY LUNG VOLUMES: CPT | Performed by: INTERNAL MEDICINE

## 2021-03-31 PROCEDURE — 94010 BREATHING CAPACITY TEST: CPT | Performed by: INTERNAL MEDICINE

## 2021-03-31 ASSESSMENT — PULMONARY FUNCTION TESTS
FEV1/FVC_PERCENT_PREDICTED: 87
FEV1/FVC_PERCENT_PREDICTED: 76
FEV1/FVC_PREDICTED: 77
FVC: 1.6
FEV1_LLN: 1.44
FVC_PREDICTED: 2.27
FEV1_PREDICTED: 1.73
FEV1/FVC: 67
FVC_LLN: 1.90
FVC_PERCENT_PREDICTED: 70
FEV1/FVC_PERCENT_PREDICTED: 89
FEV1/FVC: 67
FEV1/FVC_PERCENT_LLN: 64
FEV1: 1.07
FEV1_PERCENT_PREDICTED: 62

## 2021-03-31 ASSESSMENT — FIBROSIS 4 INDEX: FIB4 SCORE: 1.76

## 2021-03-31 NOTE — PROCEDURES
Technician: Triny Braun RRT, CPFT  Good patient effort & cooperation. Patient took Albuterol 2 hours prior to testing, NO POST FVC DONE. The results of this test meet the ATS/ERS standards for acceptability & reproducibility.  Test was performed on the [id number] Body Plethysmograph-Elite DX system.  Predicted equations for Spirometry are GLI-2012, ITS for lung volumes, and GLI-2017 for DLCO.  The DLCO was uncorrected for Hgb.  DLCO performed during dilation period.    1. Baseline spirometry demonstrates a mild to moderate reduction in FEV1. FEV1/FVC ratio is reduced at 67%.  FEV1 is 1.07 L which is 62% of predicted.    2.  A bronchodilator was not administered.    3. Lung volumes demonstrate a mild reduction in total lung capacity at 76% of predicted.    4. Gas exchange as estimated by DLCO is mildly reduced at 72% of predicted.    5. Airway resistance is in the normal range.      Impression:    This study demonstrates the presence of a mild to moderate mixed obstructive/restrictive process.

## 2021-04-01 DIAGNOSIS — J44.89 COPD WITH ASTHMA (HCC): ICD-10-CM

## 2021-04-02 RX ORDER — AZITHROMYCIN 250 MG/1
TABLET, FILM COATED ORAL
Qty: 6 TABLET | Refills: 0 | Status: SHIPPED | OUTPATIENT
Start: 2021-04-02 | End: 2021-04-09

## 2021-04-02 RX ORDER — METHYLPREDNISOLONE 4 MG/1
TABLET ORAL
Qty: 21 TABLET | Refills: 0 | Status: SHIPPED | OUTPATIENT
Start: 2021-04-02 | End: 2021-04-09

## 2021-04-09 ENCOUNTER — OFFICE VISIT (OUTPATIENT)
Dept: SLEEP MEDICINE | Facility: MEDICAL CENTER | Age: 81
End: 2021-04-09
Payer: MEDICARE

## 2021-04-09 VITALS
WEIGHT: 159 LBS | SYSTOLIC BLOOD PRESSURE: 118 MMHG | HEART RATE: 83 BPM | RESPIRATION RATE: 16 BRPM | BODY MASS INDEX: 31.22 KG/M2 | TEMPERATURE: 98.1 F | OXYGEN SATURATION: 93 % | DIASTOLIC BLOOD PRESSURE: 66 MMHG | HEIGHT: 60 IN

## 2021-04-09 DIAGNOSIS — I27.20 PULMONARY HYPERTENSION (HCC): ICD-10-CM

## 2021-04-09 DIAGNOSIS — G47.33 OSA (OBSTRUCTIVE SLEEP APNEA): ICD-10-CM

## 2021-04-09 DIAGNOSIS — J43.9 MIXED RESTRICTIVE AND OBSTRUCTIVE LUNG DISEASE (HCC): ICD-10-CM

## 2021-04-09 DIAGNOSIS — E66.9 CLASS 1 OBESITY WITH BODY MASS INDEX (BMI) OF 31.0 TO 31.9 IN ADULT, UNSPECIFIED OBESITY TYPE, UNSPECIFIED WHETHER SERIOUS COMORBIDITY PRESENT: ICD-10-CM

## 2021-04-09 DIAGNOSIS — J98.4 MIXED RESTRICTIVE AND OBSTRUCTIVE LUNG DISEASE (HCC): ICD-10-CM

## 2021-04-09 DIAGNOSIS — J96.11 CHRONIC RESPIRATORY FAILURE WITH HYPOXIA (HCC): ICD-10-CM

## 2021-04-09 PROCEDURE — 99214 OFFICE O/P EST MOD 30 MIN: CPT | Performed by: INTERNAL MEDICINE

## 2021-04-09 ASSESSMENT — ENCOUNTER SYMPTOMS
ORTHOPNEA: 0
CONSTIPATION: 0
DIZZINESS: 0
WEAKNESS: 0
SPEECH CHANGE: 0
BLURRED VISION: 0
DEPRESSION: 0
NAUSEA: 0
SINUS PAIN: 0
SORE THROAT: 0
EYE REDNESS: 0
FALLS: 0
BACK PAIN: 0
WHEEZING: 0
EYE DISCHARGE: 0
FEVER: 0
TREMORS: 0
COUGH: 0
SHORTNESS OF BREATH: 0
PALPITATIONS: 0
VOMITING: 0
SPUTUM PRODUCTION: 1
MYALGIAS: 0
ABDOMINAL PAIN: 0
PHOTOPHOBIA: 0
FOCAL WEAKNESS: 0
STRIDOR: 0
DIARRHEA: 0
CHILLS: 0
DOUBLE VISION: 0
DIAPHORESIS: 0
CLAUDICATION: 0
WEIGHT LOSS: 0
HEMOPTYSIS: 0
HEARTBURN: 0
NECK PAIN: 0
PND: 0
HEADACHES: 0
EYE PAIN: 0

## 2021-04-09 ASSESSMENT — FIBROSIS 4 INDEX: FIB4 SCORE: 1.76

## 2021-04-09 NOTE — PROGRESS NOTES
Chief Complaint   Patient presents with   • Follow-Up     last seen 12/29/21   • Results     PFT 3/31/21   • Apnea     last seen 1/13/21 jocelyn Dunham          HPI: This patient is a 80 y.o. female whom is followed in our clinic for mixed restrictive obstructive lung disease c/b chronic hypoxic respiratory failure recently prescribed supplemental O2 with activity after desaturation to 83% on 6 MWT at Cardiology in February with Dr. Cook at [facility] last seen by me on 12/29/21.  The patient also carries a diagnosis of obstructive sleep apnea for which she is on BiPAP therapy and followed by sleep medicine.  The patient is followed by cardiology for diagnosis of mild pulmonary hypertension and on ambrisentan and in addition to spironolactone and Lasix.  She is a lifelong non-smoker.  She is on chronic opiate medication for pain and followed by pain medicine.  In the past she was treated with inhaled corticosteroid with long-acting beta agonist as well as anticholinergic with Incruse.  She has had issues with hoarseness of the voice and actually has been off all controller therapy since December.  She rarely uses her Ventolin inhaler and does not have a nebulizer at home.  She did use prednisone and antibiotics 1 time since her last clinic visit.  She presents today for routine follow-up.  Updated pulmonary function testing shows an FEV1 of 1.07 L or 62% predicted which is mildly reduced from February of last year at 1.18 L.  Total lung capacity is unchanged at 76% predicted and DLCO is mildly reduced at 72% predicted when was previously 78% predicted.  When seen by cardiology in February she was noted to have mild elevation of RVSP which was thought to be related to untreated hypoxia given desaturation to 83% during 6-minute walk test.  In keeping with this, she had an overnight oximetry study done on BiPAP prior to her last clinic visit with sleep in January which showed ongoing desaturation.  A titration study was  "ordered however she has since qualified for supplemental oxygen based on 6-minute walk test at [place].  Patient and son are hoping they can start oxygen bleed in without having to undergo titration study due to concern for coronavirus and patient at her age does not do well at the sleep center.    Past Medical History:   Diagnosis Date   • Allergy     seasonal   • Anesthesia     \"hard to wake up\"   • Anxiety     due to loss of . 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    • Arthritis     facet arthritis of lumbar region   • Asthma     Inhaler use daily.   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 Albrecht, attaches to plastic, prulent   • Depression     and anxiety   • Encounter for long-term (current) use of other medications    • Erosive gastritis 5/09    antral   • GERD (gastroesophageal reflux disease)    • [place] measles    • Heart burn    • High cholesterol    • Hypertension    • Hypothyroidism    • Indwelling urinary catheter present 11/16/2020   • Influenza    • Lumbar vertebral fracture (HCC) 5/2011   • Mumps    • Muscle disorder     Arachnoiditis   • Neuropathy    • Obesity, Class I, BMI 30-34.9    • OSTEOPOROSIS    • Pain 5/12/16    back and legs    • Pulmonary hypertension (HCC)    • Sleep apnea     on BiPap, follows with pulmonology   • Spinal stenosis, lumbar region, without neurogenic claudication    • Tonsillitis    • Urinary bladder disorder 6/30/2016    Currently has a catheter.       Social History     Socioeconomic History   • Marital status:      Spouse name: Not on file   • Number of children: Not on file   • Years of education: Not on file   • Highest education level: Not on file   Occupational History   • Not on file   Tobacco Use   • Smoking status: Never Smoker   • " Smokeless tobacco: Never Used   Substance and Sexual Activity   • Alcohol use: No     Alcohol/week: 0.0 oz   • Drug use: No   • Sexual activity: Not Currently     Partners: Male     Birth control/protection: Abstinence   Other Topics Concern   •  Service Not Asked   • Blood Transfusions Not Asked   • Caffeine Concern Not Asked   • Occupational Exposure Not Asked   • Hobby Hazards Not Asked   • Sleep Concern Not Asked   • Stress Concern No     Comment:  cancer   • Weight Concern Not Asked   • Special Diet Not Asked   • Back Care Not Asked   • Exercise Not Asked   • Bike Helmet Not Asked   • Seat Belt Not Asked   • Self-Exams Not Asked   Social History Narrative   • Not on file     Social Determinants of Health     Financial Resource Strain:    • Difficulty of Paying Living Expenses:    Food Insecurity:    • Worried About Running Out of Food in the Last Year:    • Ran Out of Food in the Last Year:    Transportation Needs:    • Lack of Transportation (Medical):    • Lack of Transportation (Non-Medical):    Physical Activity:    • Days of Exercise per Week:    • Minutes of Exercise per Session:    Stress:    • Feeling of Stress :    Social Connections:    • Frequency of Communication with Friends and Family:    • Frequency of Social Gatherings with Friends and Family:    • Attends Yazdan[employer] Services:    • Active Member of Clubs or Organizations:    • Attends Club or Organization Meetings:    • Marital Status:    Intimate Partner Violence:    • Fear of Current or Ex-Partner:    • Emotionally Abused:    • Physically Abused:    • Sexually Abused:        Family History   Problem Relation Age of Onset   • Genitourinary () Problems Brother    • Lung Disease Mother         COPD   • Heart Disease Mother    • Genetic Disorder Father         Parkinsons/ from complications   • Hypertension Father    • Cancer Maternal Aunt         Breast cancer   • Stroke Paternal Grandmother    • Cancer Maternal Aunt          Breast cancer       Current Outpatient Medications on File Prior to Visit   Medication Sig Dispense Refill   • azithromycin (ZITHROMAX) 250 MG Tab TAKE 2 TABLETS BY MOUTH ON DAY 1, AND THEN TAKE 1 TABLET BY MOUTH ONCE A DAY ON DAY 2 THROUGH DAY 5 6 tablet 0   • methylPREDNISolone (MEDROL DOSEPAK) 4 MG Tablet Therapy Pack TAKE BY MOUTH AS DIRECTED ON INSIDE OF PACKAGE 21 tablet 0   • levothyroxine (SYNTHROID) 50 MCG Tab TAKE 1 TABLET BY MOUTH IN THE MORNING ON AN EMPTY STOMACH 90 tablet 3   • escitalopram (LEXAPRO) 20 MG tablet Take 1 tablet by mouth once daily 90 tablet 2   • imipramine (TOFRANIL) 10 MG Tab TAKE 2 TABLETS BY MOUTH IN THE EVENING 180 tablet 0   • potassium chloride (KLOR-CON) 8 MEQ tablet TAKE 2 TABLETS EVERY  Tab 3   • bumetanide (BUMEX) 2 MG tablet Take 2 mg by mouth every day.     • omeprazole (PRILOSEC) 20 MG delayed-release capsule Take 1 Cap by mouth every day. 90 Cap 3   • estradiol (ESTRACE) 0.5 MG tablet Take 1 Tab by mouth every day. 90 Tab 3   • aspirin EC 81 MG EC tablet Take 1 Tab by mouth 2 times a day. 30 Tab 0   • docusate sodium 100 MG Cap Take 100 mg by mouth 2 Times a Day. 60 Cap 0   • pregabalin (LYRICA) 200 MG capsule TAKE 1 CAP BY MOUTH 3 TIMES DAILY FOR 30 DAYS, G89     • atorvastatin (LIPITOR) 20 MG Tab TAKE 1 TABLET BY MOUTH EVERY DAY 90 Tab 3   • VENTOLIN  (90 Base) MCG/ACT Aero Soln inhalation aerosol Inhale 2 Puffs by mouth every 6 hours as needed for Shortness of Breath. 1 Inhaler 5   • Multiple Vitamins-Minerals (VITEYES AREDS ADVANCED PO) Take  by mouth.     • LETAIRIS 10 MG tablet Take 10 Tabs by mouth every day.     • NARCAN 4 MG/0.1ML Liquid AS DIRECTED  0   • oxyCODONE-acetaminophen (PERCOCET) 5-325 MG Tab TAKE 1 TABLET BY MOUTH 4 TIMES A DAY AS NEEDED FOR PAIN 11/8/18 G89.29  0   • spironolactone (ALDACTONE) 25 MG Tab Take 25 mg by mouth every day.  3   • AMITIZA 24 MCG capsule Take 48 mcg by mouth every morning with breakfast.     • XTAMPZA ER 18  MG Capsule Extended Release 12 hour Abuse-Deterrent Take 18 mg by mouth 2 Times a Day.       No current facility-administered medications on file prior to visit.       Pcn [penicillins], Sulfa drugs, Macrobid [nitrofurantoin], and Tape      ROS:   Review of Systems   Constitutional: Negative for chills, diaphoresis, fever, malaise/fatigue and weight loss.   HENT: Negative for congestion, ear discharge, ear pain, hearing loss, nosebleeds, sinus pain, sore throat and tinnitus.    Eyes: Negative for blurred vision, double vision, photophobia, pain, discharge and redness.   Respiratory: Positive for sputum production. Negative for cough, hemoptysis, shortness of breath, wheezing and stridor.    Cardiovascular: Negative for chest pain, palpitations, orthopnea, claudication, leg swelling and PND.   Gastrointestinal: Negative for abdominal pain, constipation, diarrhea, heartburn, nausea and vomiting.   Genitourinary: Negative for dysuria and urgency.   Musculoskeletal: Negative for back pain, falls, joint pain, myalgias and neck pain.   Skin: Negative for itching and rash.   Neurological: Negative for dizziness, tremors, speech change, focal weakness, weakness and headaches.   Endo/Heme/Allergies: Negative for environmental allergies.   Psychiatric/Behavioral: Negative for depression.       /66 (BP Location: Right arm, Patient Position: Sitting, BP Cuff Size: Adult)   Pulse 83   Temp 36.7 °C (98.1 °F) (Temporal)   Resp 16   Ht 1.524 m (5')   Wt 72.1 kg (159 lb)   SpO2 93%   Physical Exam  Vitals reviewed.   Constitutional:       General: She is not in acute distress.     Appearance: Normal appearance. She is well-developed. She is obese.   HENT:      Head: Normocephalic and atraumatic.      Right Ear: External ear normal.      Left Ear: External ear normal.      Nose: Nose normal. No congestion.      Mouth/Throat:      Mouth: Mucous membranes are moist.      Pharynx: Oropharynx is clear. No oropharyngeal  exudate.   Eyes:      General: No scleral icterus.     Extraocular Movements: Extraocular movements intact.      Conjunctiva/sclera: Conjunctivae normal.      Pupils: Pupils are equal, round, and reactive to light.   Neck:      Vascular: No JVD.      Trachea: No tracheal deviation.   Cardiovascular:      Rate and Rhythm: Normal rate and regular rhythm.      Heart sounds: Normal heart sounds. No murmur. No friction rub. No gallop.    Pulmonary:      Effort: Pulmonary effort is normal. No accessory muscle usage or respiratory distress.      Breath sounds: Normal breath sounds. No wheezing or rales.   Abdominal:      General: There is no distension.      Palpations: Abdomen is soft.      Tenderness: There is no abdominal tenderness.   Musculoskeletal:         General: No tenderness or deformity. Normal range of motion.      Cervical back: Normal range of motion and neck supple.      Right lower leg: No edema.      Left lower leg: No edema.   Lymphadenopathy:      Cervical: No cervical adenopathy.   Skin:     General: Skin is warm and dry.      Findings: No rash.      Nails: There is no clubbing.   Neurological:      Mental Status: She is alert and oriented to person, place, and time.      Cranial Nerves: No cranial nerve deficit.      Gait: Gait normal.   Psychiatric:         Mood and Affect: Mood normal.         Behavior: Behavior normal.         PFTs as reviewed by me personally: as per HPI    Imaging as reviewed by me personally: CT chest from 2017 with PE protocol showed no evidence of pulmonary emboli, dependent atelectasis with no pleural effusions or significant parenchymal lung disease.    Assessment:  1. Mixed restrictive and obstructive lung disease (HCC)     2. ALISSA (obstructive sleep apnea)     3. Pulmonary hypertension (HCC)     4. Chronic respiratory failure with hypoxia (HCC)     5. Class 1 obesity with body mass index (BMI) of 31.0 to 31.9 in adult, unspecified obesity type, unspecified whether serious  comorbidity present         Plan:  1.  This patient is a lifelong non-smoker with chronic but stable mixed restrictive obstructive lung disease.  Likely more asthmatic than COPD.  She does have episodes of bronchitis typically 1-2 times per year but has done quite well off controller therapy which she has had significant side effects to in the past.  While he believes she may have been worse on her 6-minute walk test being off therapy it is unlikely that resuming her controller therapy would improve her oxygenation particularly given reduction of DLCO.  At this point I think we are safe to remain on as needed bronchodilators only.  If she is having frequent use of rescue inhaler we can resume Incruse first and avoid inhaled corticosteroid.  I will see her back in 3 months after she has repeat 6-minute walk test with cardiology.  She did complain of some mild phlegm for which I recommended bronchodilators via nebulizer but problem is minimal at this point patient would like to hold off.  2.  This was mild but she has required BiPAP and has ongoing desaturation which may be contributing to elevation of her RVSP.  I think we can hold off on sleep study given she did qualify for supplemental oxygen with activity and simply start supplemental oxygen bleed in at 2 L/min.  3.  Etiology unknown but she is followed by cardiology and on pH specific therapy with recent increase in RVSP on echo thought to be related to hypoxia.  She did desaturate on 6-minute walk test and has ongoing desaturation despite BiPAP for which was started on supplemental oxygen.  We will add oxygen to BiPAP at night and encouraged use of supplemental oxygen at 2 L/min with activity as prescribed.  4.  Etiology unknown suspect this is a combination of atelectasis in a patient with hypoventilation on chronic opiates and underlying obstructive lung disease.  Continue supplemental oxygen at 2 L/min with activity and add to BiPAP at night.  5.  This is  with patient increased risk for obesity related pulmonary complications.  Encouraged healthy lifestyle habits.    Return in about 3 months (around 7/9/2021) for obstructive lung disease.

## 2021-04-15 ENCOUNTER — TELEMEDICINE (OUTPATIENT)
Dept: SLEEP MEDICINE | Facility: MEDICAL CENTER | Age: 81
End: 2021-04-15
Payer: MEDICARE

## 2021-04-15 VITALS — WEIGHT: 160 LBS | BODY MASS INDEX: 31.41 KG/M2 | HEIGHT: 60 IN

## 2021-04-15 DIAGNOSIS — Z78.9 NONSMOKER: ICD-10-CM

## 2021-04-15 DIAGNOSIS — G47.34 NOCTURNAL HYPOXIA: ICD-10-CM

## 2021-04-15 DIAGNOSIS — G47.33 OSA (OBSTRUCTIVE SLEEP APNEA): Chronic | ICD-10-CM

## 2021-04-15 DIAGNOSIS — I27.20 PULMONARY HYPERTENSION (HCC): ICD-10-CM

## 2021-04-15 PROCEDURE — 99213 OFFICE O/P EST LOW 20 MIN: CPT | Mod: 95,CR | Performed by: NURSE PRACTITIONER

## 2021-04-15 ASSESSMENT — FIBROSIS 4 INDEX: FIB4 SCORE: 1.76

## 2021-04-15 NOTE — PROGRESS NOTES
Virtual Visit: Established Patient   This visit was conducted via Zoom using secure and encrypted videoconferencing technology. The patient was in a private location in the state of [place].    The patient's identity was confirmed and verbal consent was obtained for this virtual visit.    Subjective:   CC:   Chief Complaint   Patient presents with   • Apnea     last seen1/13/2021       Maddy Hodge is a 80 y.o. female presenting for evaluation and management of:    ALISSA; accompanied by her son who helps manage her care  Last OV 1/13/21  ALSO FOLLOWED BY PULMONARY DR. CAMPOS; SEE NOTE 4/9/21    Since last OV, CNOX on BiPap ST noted persistent low oxygen levels with basal spo2 87% <90% for 88% of study. Results called and patient declined sleep study. In discussion today, patient was previously ordered 2LPM bleed in O2 but was not set up with adaptor to have this bled in and orders updated last week to obtain this which they still have not. Recommend starting 2LPM bleed in to resolve this issues.  Also, compliance report notes 3/17/21-4/15/21 notes 93% compliance, avg nightly use of 6hr 46min, minimal to significant mask leak with overall AHI 16.1/hr. Pressures were decreased to 12/7cm since last OV for intolerance. Reviewed detail report noting significant mask leak but she just received a new one with improved fit per patient. She notes not sleeping overall well due to waking at 2am startled but then returns to sleep. She denies regular AM headaches. She notes energy consistent during day and not napping. She has no other complaints.    SLEEP HX:     PSG from 6/2017 indicated an AHI of 5.5 and low oxygenation of 81%.  She completed a titration study for potential ASV therapy given elevated AHI and use of chronic pain medication.  She was then switched from CPAP to BiPAP given her central apneas resolved with BiPAP therapy.  Currently she is being treated with BIPAP ST @ 14/9cm with back up rate 12.      ROS  in HPI; otherwise negative.  Denies any recent fevers or chills. No nausea or vomiting. No chest pains or shortness of breath.     Allergies   Allergen Reactions   • Pcn [Penicillins] Hives     hives   • Sulfa Drugs Hives     hives   • Macrobid [Nitrofurantoin] Rash     rash   • Tape Rash     Paper tape       Current medicines (including changes today)  Current Outpatient Medications   Medication Sig Dispense Refill   • levothyroxine (SYNTHROID) 50 MCG Tab TAKE 1 TABLET BY MOUTH IN THE MORNING ON AN EMPTY STOMACH 90 tablet 3   • escitalopram (LEXAPRO) 20 MG tablet Take 1 tablet by mouth once daily 90 tablet 2   • imipramine (TOFRANIL) 10 MG Tab TAKE 2 TABLETS BY MOUTH IN THE EVENING 180 tablet 0   • potassium chloride (KLOR-CON) 8 MEQ tablet TAKE 2 TABLETS EVERY  Tab 3   • bumetanide (BUMEX) 2 MG tablet Take 2 mg by mouth every day.     • omeprazole (PRILOSEC) 20 MG delayed-release capsule Take 1 Cap by mouth every day. 90 Cap 3   • estradiol (ESTRACE) 0.5 MG tablet Take 1 Tab by mouth every day. 90 Tab 3   • aspirin EC 81 MG EC tablet Take 1 Tab by mouth 2 times a day. 30 Tab 0   • docusate sodium 100 MG Cap Take 100 mg by mouth 2 Times a Day. 60 Cap 0   • pregabalin (LYRICA) 200 MG capsule TAKE 1 CAP BY MOUTH 3 TIMES DAILY FOR 30 DAYS, G89     • atorvastatin (LIPITOR) 20 MG Tab TAKE 1 TABLET BY MOUTH EVERY DAY 90 Tab 3   • VENTOLIN  (90 Base) MCG/ACT Aero Soln inhalation aerosol Inhale 2 Puffs by mouth every 6 hours as needed for Shortness of Breath. 1 Inhaler 5   • Multiple Vitamins-Minerals (VITEYES AREDS ADVANCED PO) Take  by mouth.     • LETAIRIS 10 MG tablet Take 10 Tabs by mouth every day.     • NARCAN 4 MG/0.1ML Liquid AS DIRECTED  0   • oxyCODONE-acetaminophen (PERCOCET) 5-325 MG Tab TAKE 1 TABLET BY MOUTH 4 TIMES A DAY AS NEEDED FOR PAIN 11/8/18 G89.29  0   • spironolactone (ALDACTONE) 25 MG Tab Take 25 mg by mouth every day.  3   • AMITIZA 24 MCG capsule Take 24 mcg by mouth 2 times a day  with meals.     • XTAMPZA ER 18 MG Capsule Extended Release 12 hour Abuse-Deterrent Take 18 mg by mouth 2 Times a Day.       No current facility-administered medications for this visit.       Patient Active Problem List    Diagnosis Date Noted   • CKD (chronic kidney disease) stage 3, GFR 30-59 ml/min 05/23/2016     Priority: Medium   • Esophageal dysphagia 05/19/2016     Priority: Medium   • Hypothyroidism due to Hashimoto's thyroiditis      Priority: Medium   • GERD (gastroesophageal reflux disease) 09/20/2011     Priority: Medium   • Essential hypertension 07/06/2009     Priority: Medium   • Major depressive disorder, recurrent episode, mild (CMS-HCC) 05/02/2016     Priority: Low   • Neuropathy (CMS-HCC) 10/17/2013     Priority: Low   • Family history of polycystic kidney disease      Priority: Low   • Facet arthritis of lumbar region 03/26/2012     Priority: Low   • History of vertebral fracture 03/26/2012     Priority: Low   • Spinal stenosis of lumbar region with neurogenic claudication      Priority: Low   • Indwelling urinary catheter present 11/16/2020   • Gastroesophageal reflux disease with esophagitis 11/16/2020   • Primary osteoarthritis of left knee 08/13/2020   • Retention of urine 04/09/2020   • Choking 08/15/2019   • Chronic anxiety 07/15/2019   • Vitamin D deficiency disease 04/10/2019   • Hoarseness, persistent 04/10/2019   • Other chronic pain 02/21/2019   • History of hematuria 10/23/2018   • BMI 34.0-34.9,adult 08/17/2018   • Dyslipidemia, goal LDL below 130 08/15/2018   • Chronic obstructive pulmonary disease ([facility]) 06/22/2018   • Pulmonary hypertension ([facility]) 02/21/2018   • COPD with asthma ([facility]) 11/10/2017   • ALISSA (obstructive sleep apnea) 11/10/2017   • Postlaminectomy syndrome, unspecified region 07/03/2017   • Recurrent UTI 06/28/2017   • Mixed restrictive and obstructive lung disease ([facility]) 06/22/2017   • Menopausal symptoms 09/13/2016   • Essential tremor 09/06/2016   • Postmenopausal  osteoporosis    • Arachnoiditis        Family History   Problem Relation Age of Onset   • Genitourinary () Problems Brother    • Lung Disease Mother         COPD   • Heart Disease Mother    • Genetic Disorder Father         Parkinsons/ from complications   • Hypertension Father    • Cancer Maternal Aunt         Breast cancer   • Stroke Paternal Grandmother    • Cancer Maternal Aunt         Breast cancer       She  has a past medical history of Allergy, Anesthesia, Anxiety, Arachnoiditis, Arthritis, Asthma, Back pain, Basal cell carcinoma, Bowel habit changes, CATARACT, Chickenpox, Chronic constipation, Coagulase-negative staphylococcal infection, Depression, Encounter for long-term (current) use of other medications, Erosive gastritis (), GERD (gastroesophageal reflux disease), [place] measles, Heart burn, High cholesterol, Hypertension, Hypothyroidism, Indwelling urinary catheter present (2020), Influenza, Lumbar vertebral fracture (HCC) (2011), Mumps, Muscle disorder, Neuropathy, Obesity, Class I, BMI 30-34.9, OSTEOPOROSIS, Pain (16), Pulmonary hypertension (), Sleep apnea, Spinal stenosis, lumbar region, without neurogenic claudication, Tonsillitis, and Urinary bladder disorder (2016).  She  has a past surgical history that includes lumbar laminectomy diskectomy; hysterectomy, total abdominal (); colonoscopy (,09); egd with asp/bx (09); appendectomy (); spinal cord stimulator (14); cataract extraction with iol (Bilateral); gastroscopy with balloon dilatation (N/A, 2016); hysterectomy laparoscopy; tonsillectomy; spinal cord stimulator (N/A, 7/3/2017); other surgical procedure (Bilateral, ); and pr total knee arthroplasty (Left, 2020).       Objective:   Ht 1.524 m (5')   Wt 72.6 kg (160 lb)   BMI 31.25 kg/m²     Physical Exam:  Constitutional: Alert, no distress, well-groomed.  Skin: No rashes in visible areas.  Eye: Round. Conjunctiva  clear, lids normal. No icterus.   ENMT: Lips pink without lesions, good dentition, moist mucous membranes. Phonation normal.  Neck: No masses, no thyromegaly. Moves freely without pain.  Respiratory: Unlabored respiratory effort, no cough or audible wheeze  Psych: Alert and oriented x3, normal affect and mood.       Assessment and Plan:   The following treatment plan was discussed:     1. ALISSA (obstructive sleep apnea)  - DME Other    2. Nocturnal hypoxia  - DME Other    3. Pulmonary hypertension (HCC)  - DME Other    4. BMI 31.0-31.9,adult  - Height And Weight    5. Nonsmoker    She continues to benefit from therapy but recommend adjusting BIPAP ST to 14/9cm with 2LPM bleed in O2. Son will call DME to get adaptor.  DME other; adjust bipap 14/9cm with 2LPM  Discussed sleep hygiene    Follow-up: Return in about 2 months (around 6/15/2021) for Sunita FOX, AT SLEEP, SHORT follow up, Sooner if needed., With Compliance Card.

## 2021-05-13 DIAGNOSIS — F33.0 MAJOR DEPRESSIVE DISORDER, RECURRENT EPISODE, MILD (HCC): ICD-10-CM

## 2021-05-13 RX ORDER — IMIPRAMINE HYDROCHLORIDE 10 MG/1
TABLET, FILM COATED ORAL
Qty: 180 TABLET | Refills: 1 | Status: SHIPPED | OUTPATIENT
Start: 2021-05-13 | End: 2021-10-13

## 2021-05-14 DIAGNOSIS — E78.5 DYSLIPIDEMIA, GOAL LDL BELOW 130: ICD-10-CM

## 2021-05-14 RX ORDER — ATORVASTATIN CALCIUM 20 MG/1
TABLET, FILM COATED ORAL
Qty: 90 TABLET | Refills: 3 | Status: SHIPPED | OUTPATIENT
Start: 2021-05-14 | End: 2022-03-04 | Stop reason: SDUPTHER

## 2021-05-19 ENCOUNTER — OFFICE VISIT (OUTPATIENT)
Dept: MEDICAL GROUP | Facility: MEDICAL CENTER | Age: 81
End: 2021-05-19
Payer: MEDICARE

## 2021-05-19 VITALS
BODY MASS INDEX: 32 KG/M2 | OXYGEN SATURATION: 97 % | SYSTOLIC BLOOD PRESSURE: 106 MMHG | WEIGHT: 163 LBS | HEIGHT: 60 IN | TEMPERATURE: 97.6 F | DIASTOLIC BLOOD PRESSURE: 66 MMHG | HEART RATE: 85 BPM | RESPIRATION RATE: 16 BRPM

## 2021-05-19 DIAGNOSIS — I27.20 PULMONARY HYPERTENSION (HCC): ICD-10-CM

## 2021-05-19 DIAGNOSIS — G25.0 ESSENTIAL TREMOR: ICD-10-CM

## 2021-05-19 DIAGNOSIS — G47.33 OSA (OBSTRUCTIVE SLEEP APNEA): ICD-10-CM

## 2021-05-19 DIAGNOSIS — N31.9 NEUROGENIC BLADDER: ICD-10-CM

## 2021-05-19 DIAGNOSIS — M47.816 FACET ARTHRITIS OF LUMBAR REGION: ICD-10-CM

## 2021-05-19 DIAGNOSIS — G62.9 NEUROPATHY: ICD-10-CM

## 2021-05-19 DIAGNOSIS — J44.89 COPD WITH ASTHMA (HCC): ICD-10-CM

## 2021-05-19 DIAGNOSIS — M96.1 POSTLAMINECTOMY SYNDROME: ICD-10-CM

## 2021-05-19 DIAGNOSIS — Z87.81 HISTORY OF VERTEBRAL FRACTURE: ICD-10-CM

## 2021-05-19 DIAGNOSIS — R13.19 ESOPHAGEAL DYSPHAGIA: ICD-10-CM

## 2021-05-19 DIAGNOSIS — F41.9 CHRONIC ANXIETY: ICD-10-CM

## 2021-05-19 DIAGNOSIS — G03.9 ARACHNOIDITIS: ICD-10-CM

## 2021-05-19 DIAGNOSIS — J96.11 CHRONIC RESPIRATORY FAILURE WITH HYPOXIA (HCC): ICD-10-CM

## 2021-05-19 DIAGNOSIS — J43.9 MIXED RESTRICTIVE AND OBSTRUCTIVE LUNG DISEASE (HCC): ICD-10-CM

## 2021-05-19 DIAGNOSIS — R49.0 HOARSENESS, PERSISTENT: ICD-10-CM

## 2021-05-19 DIAGNOSIS — I10 ESSENTIAL HYPERTENSION: ICD-10-CM

## 2021-05-19 DIAGNOSIS — E78.5 DYSLIPIDEMIA, GOAL LDL BELOW 130: ICD-10-CM

## 2021-05-19 DIAGNOSIS — E03.8 HYPOTHYROIDISM DUE TO HASHIMOTO'S THYROIDITIS: ICD-10-CM

## 2021-05-19 DIAGNOSIS — E55.9 VITAMIN D DEFICIENCY: ICD-10-CM

## 2021-05-19 DIAGNOSIS — J98.4 MIXED RESTRICTIVE AND OBSTRUCTIVE LUNG DISEASE (HCC): ICD-10-CM

## 2021-05-19 DIAGNOSIS — K21.00 GASTROESOPHAGEAL REFLUX DISEASE WITH ESOPHAGITIS WITHOUT HEMORRHAGE: ICD-10-CM

## 2021-05-19 DIAGNOSIS — M81.0 POSTMENOPAUSAL OSTEOPOROSIS: ICD-10-CM

## 2021-05-19 DIAGNOSIS — J43.2 CENTRILOBULAR EMPHYSEMA (HCC): ICD-10-CM

## 2021-05-19 DIAGNOSIS — F33.0 MAJOR DEPRESSIVE DISORDER, RECURRENT EPISODE, MILD (HCC): ICD-10-CM

## 2021-05-19 DIAGNOSIS — R52 PAIN MANAGEMENT: ICD-10-CM

## 2021-05-19 DIAGNOSIS — Z00.00 MEDICARE ANNUAL WELLNESS VISIT, SUBSEQUENT: ICD-10-CM

## 2021-05-19 DIAGNOSIS — E06.3 HYPOTHYROIDISM DUE TO HASHIMOTO'S THYROIDITIS: ICD-10-CM

## 2021-05-19 DIAGNOSIS — Z93.59 PRESENCE OF SUPRAPUBIC CATHETER (HCC): ICD-10-CM

## 2021-05-19 DIAGNOSIS — M48.062 SPINAL STENOSIS OF LUMBAR REGION WITH NEUROGENIC CLAUDICATION: ICD-10-CM

## 2021-05-19 DIAGNOSIS — N18.31 STAGE 3A CHRONIC KIDNEY DISEASE: ICD-10-CM

## 2021-05-19 PROBLEM — R33.9 RETENTION OF URINE: Status: RESOLVED | Noted: 2020-04-09 | Resolved: 2021-05-19

## 2021-05-19 PROBLEM — N39.0 RECURRENT UTI: Status: RESOLVED | Noted: 2017-06-28 | Resolved: 2021-05-19

## 2021-05-19 PROBLEM — Z87.448 HISTORY OF HEMATURIA: Status: RESOLVED | Noted: 2018-10-23 | Resolved: 2021-05-19

## 2021-05-19 PROCEDURE — G0439 PPPS, SUBSEQ VISIT: HCPCS | Performed by: FAMILY MEDICINE

## 2021-05-19 ASSESSMENT — PATIENT HEALTH QUESTIONNAIRE - PHQ9
3. TROUBLE FALLING OR STAYING ASLEEP OR SLEEPING TOO MUCH: NOT AT ALL
7. TROUBLE CONCENTRATING ON THINGS, SUCH AS READING THE NEWSPAPER OR WATCHING TELEVISION: NOT AT ALL
8. MOVING OR SPEAKING SO SLOWLY THAT OTHER PEOPLE COULD HAVE NOTICED. OR THE OPPOSITE, BEING SO FIGETY OR RESTLESS THAT YOU HAVE BEEN MOVING AROUND A LOT MORE THAN USUAL: NOT AT ALL
SUM OF ALL RESPONSES TO PHQ9 QUESTIONS 1 AND 2: 0
1. LITTLE INTEREST OR PLEASURE IN DOING THINGS: NOT AT ALL
5. POOR APPETITE OR OVEREATING: NOT AT ALL
4. FEELING TIRED OR HAVING LITTLE ENERGY: NOT AT ALL
2. FEELING DOWN, DEPRESSED, IRRITABLE, OR HOPELESS: NOT AT ALL
9. THOUGHTS THAT YOU WOULD BE BETTER OFF DEAD, OR OF HURTING YOURSELF: NOT AT ALL
SUM OF ALL RESPONSES TO PHQ QUESTIONS 1-9: 0
6. FEELING BAD ABOUT YOURSELF - OR THAT YOU ARE A FAILURE OR HAVE LET YOURSELF OR YOUR FAMILY DOWN: NOT AL ALL

## 2021-05-19 ASSESSMENT — FIBROSIS 4 INDEX: FIB4 SCORE: 1.76

## 2021-05-19 NOTE — PROGRESS NOTES
Chief Complaint   Patient presents with   • Annual Wellness Visit       HPI:  Maddy Hdoge is a 80 y.o. here for Medicare Annual Wellness Visit     Patient Active Problem List    Diagnosis Date Noted   • CKD (chronic kidney disease) stage 3, GFR 30-59 ml/min ([facility]) 05/23/2016   • Esophageal dysphagia 05/19/2016   • Hypothyroidism due to Hashimoto's thyroiditis    • GERD (gastroesophageal reflux disease) 09/20/2011   • Essential hypertension 07/06/2009   • Major depressive disorder, recurrent episode, mild (CMS-HCC) 05/02/2016   • Neuropathy (CMS-HCC) 10/17/2013   • Family history of polycystic kidney disease    • Facet arthritis of lumbar region 03/26/2012   • History of vertebral fracture 03/26/2012   • Spinal stenosis of lumbar region with neurogenic claudication    • Pain management 05/20/2021   • Neurogenic bladder 05/04/2021   • Indwelling urinary catheter present 11/16/2020   • Gastroesophageal reflux disease with esophagitis 11/16/2020   • Primary osteoarthritis of left knee 08/13/2020   • Choking 08/15/2019   • Chronic anxiety 07/15/2019   • Vitamin D deficiency disease 04/10/2019   • Hoarseness, persistent 04/10/2019   • Other chronic pain 02/21/2019   • Dyslipidemia, goal LDL below 130 08/15/2018   • Chronic obstructive pulmonary disease ([facility]) 06/22/2018   • Pulmonary hypertension ([facility]) 02/21/2018   • COPD with asthma (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) 06/22/2017   • Menopausal symptoms 09/13/2016   • Essential tremor 09/06/2016   • Postmenopausal osteoporosis    • Arachnoiditis        Current Outpatient Medications   Medication Sig Dispense Refill   • atorvastatin (LIPITOR) 20 MG Tab TAKE 1 TABLET BY MOUTH EVERY DAY 90 tablet 3   • imipramine (TOFRANIL) 10 MG Tab TAKE 2 TABLETS BY MOUTH IN THE EVENING 180 tablet 1   • levothyroxine (SYNTHROID) 50 MCG Tab  TAKE 1 TABLET BY MOUTH IN THE MORNING ON AN EMPTY STOMACH 90 tablet 3   • escitalopram (LEXAPRO) 20 MG tablet Take 1 tablet by mouth once daily 90 tablet 2   • potassium chloride (KLOR-CON) 8 MEQ tablet TAKE 2 TABLETS EVERY  Tab 3   • bumetanide (BUMEX) 2 MG tablet Take 2 mg by mouth every day.     • omeprazole (PRILOSEC) 20 MG delayed-release capsule Take 1 Cap by mouth every day. 90 Cap 3   • estradiol (ESTRACE) 0.5 MG tablet Take 1 Tab by mouth every day. 90 Tab 3   • aspirin EC 81 MG EC tablet Take 1 Tab by mouth 2 times a day. 30 Tab 0   • docusate sodium 100 MG Cap Take 100 mg by mouth 2 Times a Day. 60 Cap 0   • pregabalin (LYRICA) 200 MG capsule TAKE 1 CAP BY MOUTH 3 TIMES DAILY FOR 30 DAYS, G89     • VENTOLIN  (90 Base) MCG/ACT Aero Soln inhalation aerosol Inhale 2 Puffs by mouth every 6 hours as needed for Shortness of Breath. 1 Inhaler 5   • Multiple Vitamins-Minerals (VITEYES AREDS ADVANCED PO) Take  by mouth.     • LETAIRIS 10 MG tablet Take 10 Tabs by mouth every day.     • NARCAN 4 MG/0.1ML Liquid AS DIRECTED  0   • oxyCODONE-acetaminophen (PERCOCET) 5-325 MG Tab TAKE 1 TABLET BY MOUTH 4 TIMES A DAY AS NEEDED FOR PAIN 11/8/18 G89.29  0   • spironolactone (ALDACTONE) 25 MG Tab Take 25 mg by mouth every day.  3   • AMITIZA 24 MCG capsule Take 24 mcg by mouth 2 times a day with meals.     • XTAMPZA ER 18 MG Capsule Extended Release 12 hour Abuse-Deterrent Take 18 mg by mouth 2 Times a Day.       No current facility-administered medications for this visit.          Current supplements as per medication list.     Allergies: Pcn [penicillins], Sulfa drugs, Macrobid [nitrofurantoin], and Tape    Current social contact/activities:  Discussed, is active with family     She  reports that she has never smoked. She has never used smokeless tobacco. She reports that she does not drink alcohol and does not use drugs.  Counseling given: Yes      DPA/Advanced Directive:  Patient has Advanced Directive,  Living Will and Durable Power of  on file.     ROS:    Gait: Uses a cane  Ostomy: No suprapubic catheter.  Other tubes: Yes  Suprapubic catheter  Amputations: No  Chronic oxygen use: Yes, nighttime and with any exertion  Last eye exam: 4/2021  Wears hearing aids: Yes   : Reports urinary leakage during the last 6 months that has interfered a lot with their daily activities or sleep.  This is why she now has a suprapubic catheter.    Screening:  Discussed, all up to date now.    Depression Screening    Little interest or pleasure in doing things?   0  Feeling down, depressed , or hopeless?  0  Trouble falling or staying asleep, or sleeping too much?   1  Feeling tired or having little energy?   1  Poor appetite or overeating?   0  Feeling bad about yourself - or that you are a failure or have let yourself or your family down?  0  Trouble concentrating on things, such as reading the newspaper or watching television?  1  Moving or speaking so slowly that other people could have noticed.  Or the opposite - being so fidgety or restless that you have been moving around a lot more than usual?   0  Thoughts that you would be better off dead, or of hurting yourself?   0  Patient Health Questionnaire Score: 3    If depressive symptoms identified deferred to follow up visit unless specifically addressed in assessment and plan.    Interpretation of PHQ-9 Total Score   Score Severity   1-4 No Depression   5-9 Mild Depression   10-14 Moderate Depression   15-19 Moderately Severe Depression   20-27 Severe Depression      Screening for Cognitive Impairment    Three Minute Recall (captain, garden, picture)  3/3    Elpidio clock face with all 12 numbers and set the hands to show 5 past 8.   not done    Cognitive concerns identified deferred for follow up unless specifically addressed in assessment and plan.    Fall Risk Assessment    Has the patient had two or more falls in the last year or any fall with injury in the last  year?  No    Safety Assessment    Throw rugs on floor.   no  Handrails on all stairs.   yes  Good lighting in all hallways.   yes  Difficulty hearing.   poor, has hearing aids  Patient counseled about all safety risks that were identified.    Functional Assessment ADLs    Are there any barriers preventing you from cooking for yourself or meeting nutritional needs?   yes, son cooks.    Are there any barriers preventing you from driving safely or obtaining transportation?   does not drive.  Her son does.    Are there any barriers preventing you from using a telephone or calling for help?   .no    Are there any barriers preventing you from shopping?   .yes, son does this    Are there any barriers preventing you from taking care of your own finances?   .yes, children assist her    Are there any barriers preventing you from managing your medications?   .yes, assistance from her son    Are there any barriers preventing you from showering, bathing or dressing yourself?  no, has a shower chair.    Are you currently engaging in any exercise or physical activity?   .no, major orthopedic limitations    What is your perception of your health?   .good    Health Maintenance Summary                Annual Pulmonary Function Test / Spirometry Next Due 3/31/2022      Done 3/31/2021 PULMONARY FUNCTION TESTS     Patient has more history with this topic...    Annual Wellness Visit Next Due 5/20/2022      Done 5/19/2021 Visit Dx: Medicare annual wellness visit, subsequent     Patient has more history with this topic...    BONE DENSITY Next Due 9/11/2023      Done 9/11/2018 DS-BONE DENSITY STUDY (DEXA)     Patient has more history with this topic...    IMM DTaP/Tdap/Td Vaccine Next Due 9/21/2023      Done 9/21/2013 Imm Admin: Tdap Vaccine          Patient Care Team:  Remington Quinones M.D. as PCP - General  Tony Monae M.D. as Consulting Physician (Gastroenterology)  Domingo Michelle M.D. as Consulting Physician  (Dermatology)  Amandeep Gonzalez D.O. as Consulting Physician (Phys Med and Rehab)  Corinne L Cooper, O.D. as Consulting Physician (Optometry)  Yvon Edwards M.D. as Consulting Physician (Urology)  Kayden Perez M.D. as Consulting Physician (Orthopaedics)  Sridhar Cook D.O. as Consulting Physician (Cardiology)  Luisito  as Respiratory Therapist (DME Supplier)  Dafne Flores M.D. as Consulting Physician (Ophthalmology)  Angela Kam M.D. as Consulting Physician (Pulmonary Medicine)      Social History     Tobacco Use   • Smoking status: Never Smoker   • Smokeless tobacco: Never Used   Vaping Use   • Vaping Use: Never used   Substance Use Topics   • Alcohol use: No     Alcohol/week: 0.0 oz   • Drug use: No     Family History   Problem Relation Age of Onset   • Genitourinary () Problems Brother    • Lung Disease Mother         COPD   • Heart Disease Mother    • Genetic Disorder Father         Parkinsons/ from complications   • Hypertension Father    • Cancer Maternal Aunt         Breast cancer   • Stroke Paternal Grandmother    • Cancer Maternal Aunt         Breast cancer     She  has a past medical history of Allergy, Anesthesia, Anxiety, Arachnoiditis, Arthritis, Asthma, Back pain, Basal cell carcinoma, Bowel habit changes, CATARACT, Chickenpox, Chronic constipation, Coagulase-negative staphylococcal infection, Depression, Encounter for long-term (current) use of other medications, Erosive gastritis (), GERD (gastroesophageal reflux disease), Micronesian measles, Heart burn, High cholesterol, Hypertension, Hypothyroidism, Indwelling urinary catheter present (2020), Influenza, Lumbar vertebral fracture (HCC) (2011), Mumps, Muscle disorder, Neuropathy, Obesity, Class I, BMI 30-34.9, OSTEOPOROSIS, Pain (16), Pain management (2021), Pulmonary hypertension (HCC), Recurrent UTI (2017), Sleep apnea, Spinal stenosis, lumbar region, without neurogenic claudication,  Tonsillitis, and Urinary bladder disorder (6/30/2016).   Past Surgical History:   Procedure Laterality Date   • PB TOTAL KNEE ARTHROPLASTY Left 8/12/2020    Procedure: ARTHROPLASTY, KNEE, TOTAL;  Surgeon: Kayden Perez M.D.;  Location: SURGERY [facility];  Service: Orthopedics   • SPINAL CORD STIMULATOR N/A 7/3/2017    Procedure: SPINAL CORD STIMULATOR FOR LEAD REMOVAL;  Surgeon: Amandeep Gonzalez D.O.;  Location: SURGERY [facility];  Service:    • GASTROSCOPY WITH BALLOON DILATATION N/A 5/19/2016    Procedure: GASTROSCOPY WITH DILATATION;  Surgeon: Tony Monae M.D.;  Location: SURGERY [facility];  Service:    • SPINAL CORD STIMULATOR  9/2/14   • OTHER SURGICAL PROCEDURE Bilateral 2012    plantar fascitis surgery   • EGD WITH ASP/BX  5/27/09    erosive gastritis   • HYSTERECTOMY, TOTAL ABDOMINAL  1976   • APPENDECTOMY  1976   • CATARACT EXTRACTION WITH IOL Bilateral    • COLONOSCOPY  2000,5/27/09    normal   • HYSTERECTOMY LAPAROSCOPY     • LUMBAR LAMINECTOMY DISKECTOMY     • TONSILLECTOMY         Exam:   /66   Pulse 85   Temp 36.4 °C (97.6 °F)   Resp 16   Ht 1.524 m (5')   Wt 73.9 kg (163 lb)   SpO2 97% on 3 liters, 84% on walking in Body mass index is 31.83 kg/m².    Hearing poor, wears hearing aids.    Dentition, all her own teeth.  Took mask off to wear oxygen.  Alert, oriented in no acute distress.  Eye contact is good, speech goal directed, affect calm  HEENT:   EOMI, PERRLA.   Oropharynx is well hydrated with normal soft palate motion. No exudate or ulcerations noted. .  Neck:       Full range of motion. No JVD or carotid bruits appreciated. No cervical adenopathy appreciated. No thyromegaly or neck masses appreciated.  No retractions appreciated.  Lungs:     Clear to auscultation A&P with good air movement.   Heart:      Regular rate and rhythm normal S1 and S2 without murmur appreciated.  Strong and symmetric radial and DP pulses.  Abd:        Soft, bowel sounds  positive, nontender. No bloating noted. No hepatosplenomegaly or mass appreciated. No pulsatile mass appreciated.  Ext:         Extremities show symmetric and full range of motion with normal strength. No cyanosis or clubbing appreciated. No peripheral edema appreciated.  Neuro:    Gait is slow, using her jaye. Patient is lucid, fluent and appropriate. No significant tremor appreciated.  Skin:       Exhibit no rashes, pigmented lesions or ulcerations.      Assessment and Plan. The following treatment and monitoring plan is recommended:      1. Medicare annual wellness visit, subsequent  Her medical problems are generally stable.    2. Centrilobular emphysema (HCC)/COPD with asthma (HCC)/Mixed restrictive and obstructive lung disease (HCC)  Patient does have emphysema on past CT scans. This is mostly apical but also central. She has COPD with asthma and mixed disease. She follows carefully with pulmonology. Stable problem.    3. ALISSA (obstructive sleep apnea)/Pulmonary hypertension (HCC)  Patient has complex sleep apnea and pulmonary hypertension. She is very compliant with with her treatment but still does not appear to have ideal control overall. She is continuing to follow carefully with pulmonology and cardiology. Overall stable though not well controlled problem.    4. Chronic respiratory failure with hypoxia (HCC)  Patient has nocturnal hypoxia for which she has bleeding on oxygen. She also uses oxygen in the daytime when she exerts. Here in the office her oxygen was quite low after the exertion of walking up here from the car. She has discussed this with her cardiologist who is reevaluating her pulmonary hypertension. Unfortunately this is a chronic progressive and overall stable problem.    5. Dyslipidemia, goal LDL below 130  Patient denies chest pain, chest pressure, palpitations or exertional shortness of breath. Patient denies muscle aches or muscle weakness from the atorvastatin medication. Patient is a  never smoker. She did have very significant secondhand smoke exposure in childhood. Patient takes 81 mg aspirin daily. Patient has no history of myocardial infarction, stroke or PVD.  The problem is clinically stable.    6. Essential hypertension/Stage 3a chronic kidney disease (HCC)  HTN - Chronic condition stable. Currently taking all meds as directed.   She is taking baby aspirin daily.   She is monitoring BP at home. This is not daily as her blood pressure control has been good.  Denies symptoms low BP: light-headed, tunnel-vision, unusual fatigue.   Denies symptoms high BP:pounding headache, visual changes, palpitations, flushed face.   Patient has mild chronic kidney disease, very stable.  Denies medicine side effects: unusual fatigue, slow heartbeat, foot/leg swelling, cough.    7. Facet arthritis of lumbar region/Arachnoiditis/Postlaminectomy syndrome, unspecified region/Spinal stenosis of lumbar region with neurogenic claudication/pain management  Patient has complex degenerative and inflammatory changes of the lower spine with arachnoiditis. She is post laminectomy but is still in chronic pain. This is quite significant. She follows carefully with pain management and continues to have periodic injections. She is also on chronic narcotic pain medication and pregabalin for the nerve pain. Overall her gait is weaker. However, patient is motivated to try to regain some of her strength. Chronic stable problem, major problem for her.    8. History of vertebral fracture/Postmenopausal osteoporosisVitamin D deficiency  Patient does have a history of partially traumatic and partially osteoporotic fracture. Patient has known vitamin D deficiency and continues supplementation. Bone density testing has shown osteoporosis. I believe she was treated with bisphosphonates in the past but she cannot tolerate any oral bisphosphonates due to esophageal dysphagia with inflammation and gastroesophageal reflux.    9.  Hypothyroidism due to Hashimoto's thyroiditis  Patient reports good energy level on the medication. Patient denies insomnia, tremor or change in appetite.  Patient is taking the medication on an empty stomach in the morning and waiting at least 30 minutes before eating.  Last TSH in February this year was at target. She tolerates the thyroid supplementation well. Stable problem.    10. Gastroesophageal reflux disease with esophagitis without hemorrhage/Esophageal dysphagia  The patient feels the current medication regimen of omeprazole is controlling the gastroesophageal reflux symptoms well. Denies dysphagia, reflux symptoms, acidity, abdominal pain or visible blood or mucus in the stool. Denies vomiting or hematemesis. Denies burping or abdominal bloating. Patient avoids nonsteroidal anti-inflammatory drugs. Avoids heavy meals or eating within 2 hours of bedtime.  The problem is stable.    11. Essential tremor  Patient does have moderate essential tremor, this has not changed appreciably.  Stable problem.    12. Presence of suprapubic catheter (HCC)/Neurogenic bladder  Patient is doing much better now with her suprapubic catheter for treatment of the symptoms of her neurogenic bladder.  She is having far less irritation and urinary tract infections.  She feels this has been a significant asset.  She will continue to follow with urology.  Stable problem.    13. Neuropathy (CMS-[facility])  Patient has multifactorial neuropathy.  A lot of her neuropathy does seem to emanate from her chronic spine problems.    14. Hoarseness, persistent  Patient does have persistent hoarseness and generally it is assumed to be laryngeal weakness.  This I believe is related to her general neurologic problems.  Stable.    15. Major depressive disorder, recurrent episode, mild (CMS-HCC)  Patient does have multifactorial depression.  Some of this is situational. The Lexapro continues to be helpful. She has strong family support. Stable  problem.    16. Chronic anxiety  Patient was seeing how fortunate she is to have her son living with her. This has helped her so much not only with the pandemic but also with her chronic anxiety. Patient is not on benzodiazepines as she is on chronic narcotics. However, the Lexapro has been helpful in treating not only the depression but also controlling the anxiety overall. Stable problem.          Services suggested: No services needed at this time  Health Care Screening: Age-appropriate preventive services recommended by USPTF and ACIP covered by Medicare were discussed today. Services ordered if indicated and agreed upon by the patient.  Referrals offered: Community-based lifestyle interventions to reduce health risks and promote self-management and wellness, fall prevention, nutrition, physical activity, tobacco-use cessation, weight loss, and mental health services as per orders if indicated.    Discussion today about general wellness and lifestyle habits:  discussed  · Prevent falls and reduce trip hazards; Cautioned about securing or removing rugs.  · Have a working fire alarm and carbon monoxide detector;  has  · Engage in regular physical activity and social activities     Follow-up: Return in about 6 months (around 11/19/2021), or if symptoms worsen or fail to improve.

## 2021-05-20 PROBLEM — R52 PAIN MANAGEMENT: Status: ACTIVE | Noted: 2021-05-20

## 2021-07-02 PROBLEM — G56.22 CUBITAL TUNNEL SYNDROME ON LEFT: Status: ACTIVE | Noted: 2021-07-02

## 2021-07-02 PROBLEM — G56.02 CARPAL TUNNEL SYNDROME ON LEFT: Status: ACTIVE | Noted: 2021-07-02

## 2021-07-06 NOTE — PREADMISSION SCREENING NOTE
ASTHMA - INHALER PRN  COPD - MILD   SLEEP APNEA - BIPAP      OXYGEN 2L - AT NOC   PULMONARY HTN  - HAS  PORTABLE TANK   HTN  THYROID  LIVER - CHRONIC OPOIDS  KIDNEY -       7/6/21 1500 SPOKE VIA PHONE.  SON, FELI.      LABS - 2/3/21 GLUCOSE 103 GFR 57  EKG - 8/7/20 SR BORDERLINE AV CONDUCTION BORDERLINE LT AXIS DEVIATION

## 2021-07-12 NOTE — PREADMISSION SCREENING NOTE
EMPHYSEMA  SLEEP APNEA- CPAP WITH O2  HBN  ASTHMA  HASHIMOTO'S DISEASE    LABS: 2/3/21 CMP      EK20 SINUS RHYTHM BORDERLINE AV CONDUCTION DELAY BORDERLINE LEFT AXIS DEVIATION    *SEE LAST PULMONOLOGY NOTES 21*

## 2021-07-14 ENCOUNTER — TELEPHONE (OUTPATIENT)
Dept: SLEEP MEDICINE | Facility: MEDICAL CENTER | Age: 81
End: 2021-07-14

## 2021-07-14 ENCOUNTER — OFFICE VISIT (OUTPATIENT)
Dept: SLEEP MEDICINE | Facility: MEDICAL CENTER | Age: 81
End: 2021-07-14
Payer: MEDICARE

## 2021-07-14 VITALS
RESPIRATION RATE: 16 BRPM | WEIGHT: 157 LBS | DIASTOLIC BLOOD PRESSURE: 72 MMHG | BODY MASS INDEX: 30.82 KG/M2 | HEART RATE: 90 BPM | OXYGEN SATURATION: 92 % | SYSTOLIC BLOOD PRESSURE: 122 MMHG | HEIGHT: 60 IN

## 2021-07-14 DIAGNOSIS — G47.34 NOCTURNAL HYPOXIA: ICD-10-CM

## 2021-07-14 DIAGNOSIS — G47.33 OSA (OBSTRUCTIVE SLEEP APNEA): Chronic | ICD-10-CM

## 2021-07-14 DIAGNOSIS — I27.20 PULMONARY HYPERTENSION (HCC): Chronic | ICD-10-CM

## 2021-07-14 DIAGNOSIS — I10 ESSENTIAL HYPERTENSION: ICD-10-CM

## 2021-07-14 DIAGNOSIS — Z78.9 NONSMOKER: ICD-10-CM

## 2021-07-14 PROCEDURE — 99214 OFFICE O/P EST MOD 30 MIN: CPT | Performed by: NURSE PRACTITIONER

## 2021-07-14 ASSESSMENT — FIBROSIS 4 INDEX: FIB4 SCORE: 1.76

## 2021-07-14 NOTE — TELEPHONE ENCOUNTER
Check compliance in 1 week (7/21/21); pressures adjusted to BIPAp 13/8cm 12RR with 2LPM; check mask fit and AHI readings

## 2021-07-14 NOTE — PROGRESS NOTES
Chief Complaint   Patient presents with   • Apnea     last seen 4/15/2021        HPI:  Maddy Hodge is a 80 y.o. year old female here today for follow-up on ALISSA.   She is accompanied by her son.  Last OV 4/15/21  ALSO PULM PATIENT; SEE NOTE 49/21 BY DR. CAMPOS    Since last OV, pressures adjusted to BIPAP ST to 14/9cm, [id number] with 2LPM bleed in O2; RESMED; device obtained . Prior report AHI 16.1/hr.  Compliance report 6/15/21-7/14/12 notes 97% compliance, avg nightly use of 6hr 20min, significant mask leak with overall AHI 29.8/hr. Reviewed findings with patient.  She is tolerating mask and pressure.  She obtained a new mass since last office visit that is a DreamWear with the tubing coming out the top of her head which she prefers.  She is having significant mask leak though which needs to be improved.  Her AHI is also elevated.  She is previously using BiPAP ST 12/7 cm with respiratory rate of 12 breaths/min.  She also now has her oxygen bleed in therapy.  She feels overall she is sleeping well and falls asleep within 30 minutes to 1 hour after reading and generally sleeps through the night and may wake 1 time.  She denies headaches.  Her main complaint is right knee pain and pending evaluation by [facility] for possible knee replacement.  It is pertinent we correct her sleep apnea for optimal respirations perioperatively and in recovery.  She feels her breathing is stable today without complaint.  No chest pain or chest tightness.  She uses a walker for stability.  She has chronic bilateral lower extremity edema.    SLEEP HX:  PSG from 6/2017 indicated an AHI of 5.5 and low oxygenation of 81%.  She completed a titration study for potential ASV therapy given elevated AHI and use of chronic pain medication.  She was then switched from CPAP to BiPAP given her central apneas resolved with BiPAP therapy.  Currently she is being treated with BIPAP ST @ 14/9cm with back up rate 12.    Echo 2/19/2019 noted EF of 65%,  "mild MR, aortic sclerosis without stenosis, moderate TR with RVSP of 55 mmHg.      ROS: As per HPI and otherwise negative if not stated.    Past Medical History:   Diagnosis Date   • Allergy     seasonal   • Anesthesia     \"hard to wake up\"   • Anxiety     due to loss of . 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    • Arthritis     facet arthritis of lumbar region   • Asthma     Inhaler use daily.   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 Albrecht, attaches to plastic, prulent   • Depression     and anxiety   • Encounter for long-term (current) use of other medications    • Erosive gastritis 5/09    antral   • GERD (gastroesophageal reflux disease)    • [place] measles    • Heart burn    • High cholesterol    • Hypertension    • Hypothyroidism    • Indwelling urinary catheter present 11/16/2020   • Influenza    • Lumbar vertebral fracture (HCC) 5/2011   • Mumps    • Muscle disorder     Arachnoiditis   • Neuropathy    • Obesity, Class I, BMI 30-34.9    • OSTEOPOROSIS    • Pain 5/12/16    back and legs    • Pain management 5/20/2021   • Pulmonary hypertension (HCC)    • Recurrent UTI 6/28/2017   • Sleep apnea     on BiPap, follows with pulmonology   • Spinal stenosis, lumbar region, without neurogenic claudication    • Tonsillitis    • Urinary bladder disorder 6/30/2016    Currently has a catheter.       Past Surgical History:   Procedure Laterality Date   • PB TOTAL KNEE ARTHROPLASTY Left 8/12/2020    Procedure: ARTHROPLASTY, KNEE, TOTAL;  Surgeon: Kayden Perez M.D.;  Location: SURGERY [facility];  Service: Orthopedics   • SPINAL CORD STIMULATOR N/A 7/3/2017    Procedure: SPINAL CORD STIMULATOR FOR LEAD REMOVAL;  Surgeon: Amandeep Gonzalez D.O.;  Location: SURGERY [facility];  Service:    • " GASTROSCOPY WITH BALLOON DILATATION N/A 2016    Procedure: GASTROSCOPY WITH DILATATION;  Surgeon: Tony Monae M.D.;  Location: SURGERY [facility];  Service:    • SPINAL CORD STIMULATOR  14   • OTHER SURGICAL PROCEDURE Bilateral 2012    plantar fascitis surgery   • EGD WITH ASP/BX  09    erosive gastritis   • HYSTERECTOMY, TOTAL ABDOMINAL     • APPENDECTOMY     • CATARACT EXTRACTION WITH IOL Bilateral    • COLONOSCOPY  ,09    normal   • HYSTERECTOMY LAPAROSCOPY     • LUMBAR LAMINECTOMY DISKECTOMY     • TONSILLECTOMY         Family History   Problem Relation Age of Onset   • Genitourinary () Problems Brother    • Lung Disease Mother         COPD   • Heart Disease Mother    • Genetic Disorder Father         Parkinsons/ from complications   • Hypertension Father    • Cancer Maternal Aunt         Breast cancer   • Stroke Paternal Grandmother    • Cancer Maternal Aunt         Breast cancer       Social History     Socioeconomic History   • Marital status:      Spouse name: Not on file   • Number of children: Not on file   • Years of education: Not on file   • Highest education level: Not on file   Occupational History   • Not on file   Tobacco Use   • Smoking status: Never Smoker   • Smokeless tobacco: Never Used   Vaping Use   • Vaping Use: Never used   Substance and Sexual Activity   • Alcohol use: No     Alcohol/week: 0.0 oz   • Drug use: No   • Sexual activity: Not Currently     Partners: Male     Birth control/protection: Abstinence   Other Topics Concern   •  Service Not Asked   • Blood Transfusions Not Asked   • Caffeine Concern Not Asked   • Occupational Exposure Not Asked   • Hobby Hazards Not Asked   • Sleep Concern Not Asked   • Stress Concern No     Comment:  cancer   • Weight Concern Not Asked   • Special Diet Not Asked   • Back Care Not Asked   • Exercise Not Asked   • Bike Helmet Not Asked   • Seat Belt Not Asked   • Self-Exams Not  Asked   Social History Narrative   • Not on file     Social Determinants of Health     Financial Resource Strain:    • Difficulty of Paying Living Expenses:    Food Insecurity:    • Worried About Running Out of Food in the Last Year:    • Ran Out of Food in the Last Year:    Transportation Needs:    • Lack of Transportation (Medical):    • Lack of Transportation (Non-Medical):    Physical Activity:    • Days of Exercise per Week:    • Minutes of Exercise per Session:    Stress:    • Feeling of Stress :    Social Connections:    • Frequency of Communication with Friends and Family:    • Frequency of Social Gatherings with Friends and Family:    • Attends Zoroastrianism Services:    • Active Member of Clubs or Organizations:    • Attends Club or Organization Meetings:    • Marital Status:    Intimate Partner Violence:    • Fear of Current or Ex-Partner:    • Emotionally Abused:    • Physically Abused:    • Sexually Abused:        Allergies as of 07/14/2021 - Reviewed 07/14/2021   Allergen Reaction Noted   • Pcn [penicillins] Hives 06/21/2017   • Sulfa drugs Hives 06/21/2017   • Macrobid [nitrofurantoin] Rash 06/28/2017   • Tape Rash 06/21/2017        Vitals:  /72 (BP Location: Left arm, Patient Position: Sitting, BP Cuff Size: Adult)   Pulse 90   Resp 16   Ht 1.524 m (5')   Wt 71.2 kg (157 lb)   SpO2 92%     Current medications as of today   Current Outpatient Medications   Medication Sig Dispense Refill   • atorvastatin (LIPITOR) 20 MG Tab TAKE 1 TABLET BY MOUTH EVERY DAY 90 tablet 3   • imipramine (TOFRANIL) 10 MG Tab TAKE 2 TABLETS BY MOUTH IN THE EVENING 180 tablet 1   • levothyroxine (SYNTHROID) 50 MCG Tab TAKE 1 TABLET BY MOUTH IN THE MORNING ON AN EMPTY STOMACH 90 tablet 3   • escitalopram (LEXAPRO) 20 MG tablet Take 1 tablet by mouth once daily 90 tablet 2   • potassium chloride (KLOR-CON) 8 MEQ tablet TAKE 2 TABLETS EVERY  Tab 3   • bumetanide (BUMEX) 2 MG tablet Take 2 mg by mouth every day.      • omeprazole (PRILOSEC) 20 MG delayed-release capsule Take 1 Cap by mouth every day. 90 Cap 3   • estradiol (ESTRACE) 0.5 MG tablet Take 1 Tab by mouth every day. 90 Tab 3   • aspirin EC 81 MG EC tablet Take 1 Tab by mouth 2 times a day. 30 Tab 0   • docusate sodium 100 MG Cap Take 100 mg by mouth 2 Times a Day. 60 Cap 0   • pregabalin (LYRICA) 200 MG capsule TAKE 1 CAP BY MOUTH 3 TIMES DAILY FOR 30 DAYS, G89     • VENTOLIN  (90 Base) MCG/ACT Aero Soln inhalation aerosol Inhale 2 Puffs by mouth every 6 hours as needed for Shortness of Breath. 1 Inhaler 5   • LETAIRIS 10 MG tablet Take 10 Tabs by mouth every day.     • NARCAN 4 MG/0.1ML Liquid AS DIRECTED  0   • oxyCODONE-acetaminophen (PERCOCET) 5-325 MG Tab TAKE 1 TABLET BY MOUTH 4 TIMES A DAY AS NEEDED FOR PAIN 11/8/18 G89.29  0   • spironolactone (ALDACTONE) 25 MG Tab Take 25 mg by mouth every day.  3   • AMITIZA 24 MCG capsule Take 24 mcg by mouth 2 times a day with meals.     • XTAMPZA ER 18 MG Capsule Extended Release 12 hour Abuse-Deterrent Take 18 mg by mouth 2 Times a Day.       No current facility-administered medications for this visit.         Physical Exam:   Gen:           Alert and oriented, No apparent distress. Mood and affect appropriate, normal interaction with examiner.  Eyes:          PERRL, EOM intact, sclere white, conjunctive moist.  Ears:          Not examined.   Hearing:     Grossly intact.  Nose:          Normal, no lesions or deformities.  Dentition:    mask  Oropharynx:   mask  Mallampati Classification: mask  Neck:        Supple, trachea midline, no masses.  Respiratory Effort: No intercostal retractions or use of accessory muscles.   Lung Auscultation:      Clear to auscultation bilaterally; no rales, rhonchi or wheezing.  CV:            Regular rate and rhythm. No murmurs, rubs or gallops.  Abd:           Not examined.   Lymphadenopathy: Not examined.  Gait and Station: walker  Digits and Nails: No clubbing, cyanosis,  petechiae, or nodes.   Cranial Nerves: II-XII grossly intact.  Skin:        No rashes, lesions or ulcers noted.               Ext:           BLE edema with stockings in place.      Assessment:  1. ALISSA (obstructive sleep apnea)     2. Pulmonary hypertension (HCC)     3. Nocturnal hypoxia     4. Essential hypertension     5. BMI 30.0-30.9,adult  Height And Weight   6. Nonsmoker         Immunizations:    Flu:10/2020  Pneumovax 23:2018  Prevnar 13:2015  COVID-19: 2/25/21, 1/28/21    Plan:  1.  Patient central sleep apnea is not well treated at this time.  She declines a titration study due to the fear of exposure to COVID-19.  I reassured her of the safety and cleanliness of the facility.  Ultimately this may be necessary to effectively treat her sleep apnea.  At this time we will try to improve mask fit with tightening of headgear and consideration of urgent mask fit for smaller size cushion to her full facemask.  Otherwise she will return to her older mask which did have a better fit.  We will also adjust her pressures to BiPAP ST 13/8 cm, respiratory rate 12 breaths/min with 2 L oxygen bleed in.  DME other; pressure adjustment  She previously did well using BiPAP ST 14/9 cm on prior compliance reports with a reduced AHI.  2.  Discussed sleep hygiene.  3.  Follow-up primary care for ongoing management of other health concerns including hypertension management  4.  Encourage weight loss through diet and exercise.  5.  Follow-up in 6 weeks for compliance check on newly adjusted pressures and review of mask fit, sooner if needed.  Patient may be pending right knee surgery this September by renal orthopedic clinic.    Please note that this dictation was created using voice recognition software. I have made every reasonable attempt to correct obvious errors, but it is possible there are errors of grammar and possibly content that I did not discover before finalizing the note.

## 2021-07-15 ENCOUNTER — OFFICE VISIT (OUTPATIENT)
Dept: SLEEP MEDICINE | Facility: MEDICAL CENTER | Age: 81
End: 2021-07-15
Payer: MEDICARE

## 2021-07-15 VITALS
OXYGEN SATURATION: 94 % | HEIGHT: 60 IN | RESPIRATION RATE: 16 BRPM | TEMPERATURE: 97.9 F | BODY MASS INDEX: 30.82 KG/M2 | HEART RATE: 89 BPM | SYSTOLIC BLOOD PRESSURE: 130 MMHG | WEIGHT: 157 LBS | DIASTOLIC BLOOD PRESSURE: 86 MMHG

## 2021-07-15 DIAGNOSIS — J43.9 MIXED RESTRICTIVE AND OBSTRUCTIVE LUNG DISEASE (HCC): ICD-10-CM

## 2021-07-15 DIAGNOSIS — J96.11 CHRONIC RESPIRATORY FAILURE WITH HYPOXIA (HCC): ICD-10-CM

## 2021-07-15 DIAGNOSIS — I27.20 PULMONARY HYPERTENSION (HCC): ICD-10-CM

## 2021-07-15 DIAGNOSIS — G47.33 OSA (OBSTRUCTIVE SLEEP APNEA): ICD-10-CM

## 2021-07-15 DIAGNOSIS — J98.4 MIXED RESTRICTIVE AND OBSTRUCTIVE LUNG DISEASE (HCC): ICD-10-CM

## 2021-07-15 PROCEDURE — 99214 OFFICE O/P EST MOD 30 MIN: CPT | Performed by: INTERNAL MEDICINE

## 2021-07-15 ASSESSMENT — ENCOUNTER SYMPTOMS
STRIDOR: 0
SPEECH CHANGE: 0
DIZZINESS: 0
FALLS: 0
EYE PAIN: 0
FEVER: 0
SPUTUM PRODUCTION: 0
BACK PAIN: 0
DEPRESSION: 0
CHILLS: 0
PHOTOPHOBIA: 0
FOCAL WEAKNESS: 0
CLAUDICATION: 0
WEAKNESS: 0
NAUSEA: 0
SINUS PAIN: 0
HEARTBURN: 0
TREMORS: 0
PND: 0
EYE DISCHARGE: 0
NECK PAIN: 0
COUGH: 0
WEIGHT LOSS: 0
CONSTIPATION: 0
DOUBLE VISION: 0
HEMOPTYSIS: 0
DIARRHEA: 0
BLURRED VISION: 0
WHEEZING: 0
ORTHOPNEA: 0
DIAPHORESIS: 0
SORE THROAT: 0
HEADACHES: 0
SHORTNESS OF BREATH: 0
PALPITATIONS: 0
VOMITING: 0
EYE REDNESS: 0
ABDOMINAL PAIN: 0
MYALGIAS: 0

## 2021-07-15 ASSESSMENT — FIBROSIS 4 INDEX: FIB4 SCORE: 1.76

## 2021-07-15 NOTE — PROGRESS NOTES
Chief Complaint   Patient presents with   • Follow-Up     last seen 4/9/21    • Apnea     last seen 7/14/21 jocelyn Dunham          HPI: This patient is a 80 y.o. female whom is followed in our clinic for mixed restrictive obstructive lung disease last seen by me on 4/9/21. The patient also carries a diagnosis of obstructive sleep apnea for which she is on BiPAP therapy and followed by sleep medicine.  The patient is followed by cardiology for diagnosis of mild pulmonary hypertension and on ambrisentan and in addition to spironolactone and Lasix.  She is a lifelong non-smoker.  She is on chronic opiate medication for pain and followed by pain medicine.  In the past she was treated with inhaled corticosteroid with long-acting beta agonist as well as anticholinergic with Incruse.  She has had issues with hoarseness of the voice and actually has been off all controller therapy since December.  We opted to keep her off given good control of symptoms at last follow-up.  Pulmonary function testing from March showed mild reduction in FEV1 compared to February 2020 at 1.  07 L or 62% predicted however FVC was overall stable from 1.67 to 1.6 L, total lung capacity from 3.54 L to 3.57 L and there was a mild decline in DLCO from 13.42 mL/min/mmHg to 12.42 mL/min/mmHg.  She has been started on supplemental oxygen with activity after desaturating on 6-minute walk test and cardiology clinic.  She has since been using 2 L of supplemental oxygen with BiPAP and with activity.  She presents today for routine follow-up.  She was seen by sleep medicine yesterday and is compliant with BiPAP.  She did have some ongoing elevation of AHI thought to be related to mask leak which they are addressing.  She has no acute complaints today.  She is up-to-date on vaccines.  Her last echo with cardiology showed RVSP of 40 mmHg which was thought to be related to her need for oxygen as she had not yet started supplemental oxygen with activity and ALISSA  "was still inadequately treated.  She had a CT chest in 2017 which showed dependent atelectasis but no evidence of ILD.    Past Medical History:   Diagnosis Date   • Allergy     seasonal   • Anesthesia     \"hard to wake up\"   • Anxiety     due to loss of . 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    • Arthritis     facet arthritis of lumbar region   • Asthma     Inhaler use daily.   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 Albrecht, attaches to plastic, prulent   • Depression     and anxiety   • Encounter for long-term (current) use of other medications    • Erosive gastritis 5/09    antral   • GERD (gastroesophageal reflux disease)    • [place] measles    • Heart burn    • High cholesterol    • Hypertension    • Hypothyroidism    • Indwelling urinary catheter present 11/16/2020   • Influenza    • Lumbar vertebral fracture (HCC) 5/2011   • Mumps    • Muscle disorder     Arachnoiditis   • Neuropathy    • Obesity, Class I, BMI 30-34.9    • OSTEOPOROSIS    • Pain 5/12/16    back and legs    • Pain management 5/20/2021   • Pulmonary hypertension (HCC)    • Recurrent UTI 6/28/2017   • Sleep apnea     on BiPap, follows with pulmonology   • Spinal stenosis, lumbar region, without neurogenic claudication    • Tonsillitis    • Urinary bladder disorder 6/30/2016    Currently has a catheter.       Social History     Socioeconomic History   • Marital status:      Spouse name: Not on file   • Number of children: Not on file   • Years of education: Not on file   • Highest education level: Not on file   Occupational History   • Not on file   Tobacco Use   • Smoking status: Never Smoker   • Smokeless tobacco: Never Used   Vaping Use   • Vaping Use: Never used   Substance and Sexual Activity   • Alcohol use: No     " Alcohol/week: 0.0 oz   • Drug use: No   • Sexual activity: Not Currently     Partners: Male     Birth control/protection: Abstinence   Other Topics Concern   •  Service Not Asked   • Blood Transfusions Not Asked   • Caffeine Concern Not Asked   • Occupational Exposure Not Asked   • Hobby Hazards Not Asked   • Sleep Concern Not Asked   • Stress Concern No     Comment:  cancer   • Weight Concern Not Asked   • Special Diet Not Asked   • Back Care Not Asked   • Exercise Not Asked   • Bike Helmet Not Asked   • Seat Belt Not Asked   • Self-Exams Not Asked   Social History Narrative   • Not on file     Social Determinants of Health     Financial Resource Strain:    • Difficulty of Paying Living Expenses:    Food Insecurity:    • Worried About Running Out of Food in the Last Year:    • Ran Out of Food in the Last Year:    Transportation Needs:    • Lack of Transportation (Medical):    • Lack of Transportation (Non-Medical):    Physical Activity:    • Days of Exercise per Week:    • Minutes of Exercise per Session:    Stress:    • Feeling of Stress :    Social Connections:    • Frequency of Communication with Friends and Family:    • Frequency of Social Gatherings with Friends and Family:    • Attends Mormon Services:    • Active Member of Clubs or Organizations:    • Attends Club or Organization Meetings:    • Marital Status:    Intimate Partner Violence:    • Fear of Current or Ex-Partner:    • Emotionally Abused:    • Physically Abused:    • Sexually Abused:        Family History   Problem Relation Age of Onset   • Genitourinary () Problems Brother    • Lung Disease Mother         COPD   • Heart Disease Mother    • Genetic Disorder Father         Parkinsons/ from complications   • Hypertension Father    • Cancer Maternal Aunt         Breast cancer   • Stroke Paternal Grandmother    • Cancer Maternal Aunt         Breast cancer       Current Outpatient Medications on File Prior to Visit    Medication Sig Dispense Refill   • atorvastatin (LIPITOR) 20 MG Tab TAKE 1 TABLET BY MOUTH EVERY DAY 90 tablet 3   • imipramine (TOFRANIL) 10 MG Tab TAKE 2 TABLETS BY MOUTH IN THE EVENING 180 tablet 1   • levothyroxine (SYNTHROID) 50 MCG Tab TAKE 1 TABLET BY MOUTH IN THE MORNING ON AN EMPTY STOMACH 90 tablet 3   • escitalopram (LEXAPRO) 20 MG tablet Take 1 tablet by mouth once daily 90 tablet 2   • potassium chloride (KLOR-CON) 8 MEQ tablet TAKE 2 TABLETS EVERY  Tab 3   • bumetanide (BUMEX) 2 MG tablet Take 2 mg by mouth every day.     • omeprazole (PRILOSEC) 20 MG delayed-release capsule Take 1 Cap by mouth every day. 90 Cap 3   • estradiol (ESTRACE) 0.5 MG tablet Take 1 Tab by mouth every day. 90 Tab 3   • aspirin EC 81 MG EC tablet Take 1 Tab by mouth 2 times a day. 30 Tab 0   • docusate sodium 100 MG Cap Take 100 mg by mouth 2 Times a Day. 60 Cap 0   • pregabalin (LYRICA) 200 MG capsule TAKE 1 CAP BY MOUTH 3 TIMES DAILY FOR 30 DAYS, G89     • VENTOLIN  (90 Base) MCG/ACT Aero Soln inhalation aerosol Inhale 2 Puffs by mouth every 6 hours as needed for Shortness of Breath. 1 Inhaler 5   • LETAIRIS 10 MG tablet Take 10 Tabs by mouth every day.     • NARCAN 4 MG/0.1ML Liquid AS DIRECTED  0   • oxyCODONE-acetaminophen (PERCOCET) 5-325 MG Tab TAKE 1 TABLET BY MOUTH 4 TIMES A DAY AS NEEDED FOR PAIN 11/8/18 G89.29  0   • spironolactone (ALDACTONE) 25 MG Tab Take 25 mg by mouth every day.  3   • AMITIZA 24 MCG capsule Take 24 mcg by mouth 2 times a day with meals.     • XTAMPZA ER 18 MG Capsule Extended Release 12 hour Abuse-Deterrent Take 18 mg by mouth 2 Times a Day.       No current facility-administered medications on file prior to visit.       Pcn [penicillins], Sulfa drugs, Macrobid [nitrofurantoin], and Tape      ROS:   Review of Systems   Constitutional: Negative for chills, diaphoresis, fever, malaise/fatigue and weight loss.   HENT: Negative for congestion, ear discharge, ear pain, hearing loss,  nosebleeds, sinus pain, sore throat and tinnitus.    Eyes: Negative for blurred vision, double vision, photophobia, pain, discharge and redness.   Respiratory: Negative for cough, hemoptysis, sputum production, shortness of breath, wheezing and stridor.    Cardiovascular: Negative for chest pain, palpitations, orthopnea, claudication, leg swelling and PND.   Gastrointestinal: Negative for abdominal pain, constipation, diarrhea, heartburn, nausea and vomiting.   Genitourinary: Negative for dysuria and urgency.   Musculoskeletal: Negative for back pain, falls, joint pain, myalgias and neck pain.   Skin: Negative for itching and rash.   Neurological: Negative for dizziness, tremors, speech change, focal weakness, weakness and headaches.   Endo/Heme/Allergies: Negative for environmental allergies.   Psychiatric/Behavioral: Negative for depression.       /86 (BP Location: Right arm, Patient Position: Sitting, BP Cuff Size: Adult)   Pulse 89   Temp 36.6 °C (97.9 °F) (Temporal)   Resp 16   Ht 1.524 m (5')   Wt 71.2 kg (157 lb)   SpO2 94%   Physical Exam  Vitals reviewed.   Constitutional:       General: She is not in acute distress.     Appearance: Normal appearance. She is well-developed. She is obese.   HENT:      Head: Normocephalic and atraumatic.      Right Ear: External ear normal.      Left Ear: External ear normal.      Nose: Nose normal. No congestion.      Mouth/Throat:      Mouth: Mucous membranes are moist.      Pharynx: Oropharynx is clear. No oropharyngeal exudate.   Eyes:      General: No scleral icterus.     Extraocular Movements: Extraocular movements intact.      Conjunctiva/sclera: Conjunctivae normal.      Pupils: Pupils are equal, round, and reactive to light.   Neck:      Vascular: No JVD.      Trachea: No tracheal deviation.   Cardiovascular:      Rate and Rhythm: Normal rate and regular rhythm.      Heart sounds: Normal heart sounds. No murmur heard.   No friction rub. No gallop.     Pulmonary:      Effort: Pulmonary effort is normal. No accessory muscle usage or respiratory distress.      Breath sounds: Normal breath sounds. No wheezing or rales.   Abdominal:      General: There is no distension.      Palpations: Abdomen is soft.      Tenderness: There is no abdominal tenderness.   Musculoskeletal:         General: No tenderness or deformity. Normal range of motion.      Cervical back: Normal range of motion and neck supple.      Right lower leg: No edema.      Left lower leg: No edema.   Lymphadenopathy:      Cervical: No cervical adenopathy.   Skin:     General: Skin is warm and dry.      Findings: No rash.      Nails: There is no clubbing.   Neurological:      Mental Status: She is alert and oriented to person, place, and time.      Cranial Nerves: No cranial nerve deficit.      Gait: Gait normal.   Psychiatric:         Mood and Affect: Mood normal.         Behavior: Behavior normal.         PFTs as reviewed by me personally: as per HPI    Imaging as reviewed by me personally:  As per hPI    Assessment:  1. Chronic respiratory failure with hypoxia (HCC)  PULMONARY FUNCTION TESTS -Test requested: Complete Pulmonary Function Test   2. Mixed restrictive and obstructive lung disease (HCC)     3. ALISSA (obstructive sleep apnea)     4. Pulmonary hypertension (HCC)         Plan:  1.  This is a new diagnosis although only mild decline in DLCO on PFTs.  She has no crackles on exam.  Oxygenation is stable on 2 L.  This has been attributed in part to hypoventilation in the setting of chronic opiate use and adequately treated sleep apnea.  She has no exam findings to suggest ILD but I cannot rule this out.  I would like to repeat PFTs in the next 3 months and if DLCO continues to decline would recommend HRCT chest.  In the meantime continue supplemental oxygen with activity and at night.  2.  Chronic and overall stable.  We did not see worsening of restriction on PFTs from 20 20-20 21 although we did  see mild decline in DLCO.  PFTs again in 3 months.  She is off medication for obstructive lung disease with no worsening of symptoms.  Continue albuterol as needed.  She is up-to-date on vaccines.  3.  On BiPAP with supplemental oxygen and followed by sleep medicine.  They are working on optimizing therapy for this.  4.  Mild followed by cardiology at [facility].  I am not sure there is a clear underlying cause for this although could be related to complex sleep apnea.  Return in about 4 months (around 11/15/2021) for pfts.

## 2021-07-16 PROBLEM — M17.11 OSTEOARTHRITIS OF RIGHT KNEE: Status: ACTIVE | Noted: 2021-07-16

## 2021-07-20 ENCOUNTER — PRE-ADMISSION TESTING (OUTPATIENT)
Dept: ADMISSIONS | Facility: MEDICAL CENTER | Age: 81
End: 2021-07-20
Attending: ORTHOPAEDIC SURGERY
Payer: MEDICARE

## 2021-07-20 RX ORDER — DOCUSATE SODIUM 250 MG
500 CAPSULE ORAL EVERY EVENING
COMMUNITY

## 2021-07-20 RX ORDER — ASPIRIN 81 MG/1
81 TABLET ORAL
COMMUNITY
End: 2021-10-19

## 2021-07-21 NOTE — TELEPHONE ENCOUNTER
Compliance 7/15/2021 through 7/21/2021 notes 6 days of use, currently using BiPAP 13/8 cm with respiratory rate of 12, overall AHI 12.0/h.  No significant mask leak.  Overall readings are improved compared to last compliance noting overall AHI 29.8/h.  We will continue with current settings.  Continue oxygen bleed in at 2 L/min.  Follow-up as scheduled to review.

## 2021-07-27 ENCOUNTER — PATIENT MESSAGE (OUTPATIENT)
Dept: SLEEP MEDICINE | Facility: MEDICAL CENTER | Age: 81
End: 2021-07-27

## 2021-07-27 ENCOUNTER — PRE-ADMISSION TESTING (OUTPATIENT)
Dept: ADMISSIONS | Facility: MEDICAL CENTER | Age: 81
End: 2021-07-27
Attending: ORTHOPAEDIC SURGERY
Payer: MEDICARE

## 2021-07-27 DIAGNOSIS — Z01.812 PRE-OPERATIVE LABORATORY EXAMINATION: ICD-10-CM

## 2021-07-27 DIAGNOSIS — Z01.810 PRE-OPERATIVE CARDIOVASCULAR EXAMINATION: ICD-10-CM

## 2021-07-27 LAB
ANION GAP SERPL CALC-SCNC: 11 MMOL/L (ref 7–16)
BUN SERPL-MCNC: 14 MG/DL (ref 8–22)
CALCIUM SERPL-MCNC: 9.2 MG/DL (ref 8.5–10.5)
CHLORIDE SERPL-SCNC: 101 MMOL/L (ref 96–112)
CO2 SERPL-SCNC: 29 MMOL/L (ref 20–33)
CREAT SERPL-MCNC: 0.98 MG/DL (ref 0.5–1.4)
EKG IMPRESSION: NORMAL
GLUCOSE SERPL-MCNC: 86 MG/DL (ref 65–99)
POTASSIUM SERPL-SCNC: 3.9 MMOL/L (ref 3.6–5.5)
SODIUM SERPL-SCNC: 141 MMOL/L (ref 135–145)

## 2021-07-27 PROCEDURE — 80048 BASIC METABOLIC PNL TOTAL CA: CPT

## 2021-07-27 PROCEDURE — 36415 COLL VENOUS BLD VENIPUNCTURE: CPT

## 2021-07-27 PROCEDURE — 93010 ELECTROCARDIOGRAM REPORT: CPT | Performed by: INTERNAL MEDICINE

## 2021-07-27 PROCEDURE — 93005 ELECTROCARDIOGRAM TRACING: CPT

## 2021-07-28 DIAGNOSIS — G47.33 OSA (OBSTRUCTIVE SLEEP APNEA): ICD-10-CM

## 2021-07-28 NOTE — TELEPHONE ENCOUNTER
From: Maddy Hodge  To: Nurse Practitioner Sunita Mcguire  Sent: 7/27/2021 1:33 PM PDT  Subject: Non-Urgent Medical Question    Hi  We have adjusted the mask and would like to know if you are still seeing leaking in the numbers.  Thank you, Maddy Hodge

## 2021-08-05 ENCOUNTER — ANESTHESIA (OUTPATIENT)
Dept: SURGERY | Facility: MEDICAL CENTER | Age: 81
End: 2021-08-05
Payer: MEDICARE

## 2021-08-05 ENCOUNTER — ANESTHESIA EVENT (OUTPATIENT)
Dept: SURGERY | Facility: MEDICAL CENTER | Age: 81
End: 2021-08-05
Payer: MEDICARE

## 2021-08-05 ENCOUNTER — HOSPITAL ENCOUNTER (OUTPATIENT)
Facility: MEDICAL CENTER | Age: 81
End: 2021-08-05
Attending: ORTHOPAEDIC SURGERY | Admitting: ORTHOPAEDIC SURGERY
Payer: MEDICARE

## 2021-08-05 VITALS
SYSTOLIC BLOOD PRESSURE: 114 MMHG | OXYGEN SATURATION: 99 % | RESPIRATION RATE: 18 BRPM | WEIGHT: 158.73 LBS | BODY MASS INDEX: 31.16 KG/M2 | HEIGHT: 60 IN | DIASTOLIC BLOOD PRESSURE: 55 MMHG | HEART RATE: 68 BPM | TEMPERATURE: 97.6 F

## 2021-08-05 DIAGNOSIS — G56.02 CARPAL TUNNEL SYNDROME ON LEFT: ICD-10-CM

## 2021-08-05 DIAGNOSIS — G56.22 CUBITAL TUNNEL SYNDROME ON LEFT: ICD-10-CM

## 2021-08-05 PROCEDURE — 160002 HCHG RECOVERY MINUTES (STAT): Performed by: ORTHOPAEDIC SURGERY

## 2021-08-05 PROCEDURE — 160048 HCHG OR STATISTICAL LEVEL 1-5: Performed by: ORTHOPAEDIC SURGERY

## 2021-08-05 PROCEDURE — 160046 HCHG PACU - 1ST 60 MINS PHASE II: Performed by: ORTHOPAEDIC SURGERY

## 2021-08-05 PROCEDURE — 64718 REVISE ULNAR NERVE AT ELBOW: CPT | Mod: LT | Performed by: ORTHOPAEDIC SURGERY

## 2021-08-05 PROCEDURE — 160035 HCHG PACU - 1ST 60 MINS PHASE I: Performed by: ORTHOPAEDIC SURGERY

## 2021-08-05 PROCEDURE — 501838 HCHG SUTURE GENERAL: Performed by: ORTHOPAEDIC SURGERY

## 2021-08-05 PROCEDURE — 160025 RECOVERY II MINUTES (STATS): Performed by: ORTHOPAEDIC SURGERY

## 2021-08-05 PROCEDURE — 700101 HCHG RX REV CODE 250: Performed by: ANESTHESIOLOGY

## 2021-08-05 PROCEDURE — 700111 HCHG RX REV CODE 636 W/ 250 OVERRIDE (IP): Performed by: ANESTHESIOLOGY

## 2021-08-05 PROCEDURE — 160039 HCHG SURGERY MINUTES - EA ADDL 1 MIN LEVEL 3: Performed by: ORTHOPAEDIC SURGERY

## 2021-08-05 PROCEDURE — 160028 HCHG SURGERY MINUTES - 1ST 30 MINS LEVEL 3: Performed by: ORTHOPAEDIC SURGERY

## 2021-08-05 PROCEDURE — 700105 HCHG RX REV CODE 258: Performed by: ANESTHESIOLOGY

## 2021-08-05 PROCEDURE — 160009 HCHG ANES TIME/MIN: Performed by: ORTHOPAEDIC SURGERY

## 2021-08-05 PROCEDURE — 64721 CARPAL TUNNEL SURGERY: CPT | Mod: LT | Performed by: ORTHOPAEDIC SURGERY

## 2021-08-05 PROCEDURE — 700101 HCHG RX REV CODE 250: Performed by: ORTHOPAEDIC SURGERY

## 2021-08-05 RX ORDER — ONDANSETRON 2 MG/ML
4 INJECTION INTRAMUSCULAR; INTRAVENOUS
Status: DISCONTINUED | OUTPATIENT
Start: 2021-08-05 | End: 2021-08-05 | Stop reason: HOSPADM

## 2021-08-05 RX ORDER — HYDROMORPHONE HYDROCHLORIDE 1 MG/ML
0.1 INJECTION, SOLUTION INTRAMUSCULAR; INTRAVENOUS; SUBCUTANEOUS
Status: DISCONTINUED | OUTPATIENT
Start: 2021-08-05 | End: 2021-08-05 | Stop reason: HOSPADM

## 2021-08-05 RX ORDER — HYDROMORPHONE HYDROCHLORIDE 1 MG/ML
0.4 INJECTION, SOLUTION INTRAMUSCULAR; INTRAVENOUS; SUBCUTANEOUS
Status: DISCONTINUED | OUTPATIENT
Start: 2021-08-05 | End: 2021-08-05 | Stop reason: HOSPADM

## 2021-08-05 RX ORDER — SODIUM CHLORIDE, SODIUM LACTATE, POTASSIUM CHLORIDE, CALCIUM CHLORIDE 600; 310; 30; 20 MG/100ML; MG/100ML; MG/100ML; MG/100ML
INJECTION, SOLUTION INTRAVENOUS CONTINUOUS
Status: DISCONTINUED | OUTPATIENT
Start: 2021-08-05 | End: 2021-08-05 | Stop reason: HOSPADM

## 2021-08-05 RX ORDER — MEPERIDINE HYDROCHLORIDE 25 MG/ML
6.25 INJECTION INTRAMUSCULAR; INTRAVENOUS; SUBCUTANEOUS
Status: DISCONTINUED | OUTPATIENT
Start: 2021-08-05 | End: 2021-08-05 | Stop reason: HOSPADM

## 2021-08-05 RX ORDER — LIDOCAINE HYDROCHLORIDE 10 MG/ML
INJECTION, SOLUTION INFILTRATION; PERINEURAL
Status: DISCONTINUED | OUTPATIENT
Start: 2021-08-05 | End: 2021-08-05 | Stop reason: HOSPADM

## 2021-08-05 RX ORDER — BUPIVACAINE HYDROCHLORIDE 5 MG/ML
INJECTION, SOLUTION EPIDURAL; INTRACAUDAL
Status: DISCONTINUED
Start: 2021-08-05 | End: 2021-08-05 | Stop reason: HOSPADM

## 2021-08-05 RX ORDER — LIDOCAINE HYDROCHLORIDE 10 MG/ML
INJECTION, SOLUTION EPIDURAL; INFILTRATION; INTRACAUDAL; PERINEURAL
Status: DISCONTINUED
Start: 2021-08-05 | End: 2021-08-05 | Stop reason: HOSPADM

## 2021-08-05 RX ORDER — DEXAMETHASONE SODIUM PHOSPHATE 4 MG/ML
INJECTION, SOLUTION INTRA-ARTICULAR; INTRALESIONAL; INTRAMUSCULAR; INTRAVENOUS; SOFT TISSUE PRN
Status: DISCONTINUED | OUTPATIENT
Start: 2021-08-05 | End: 2021-08-05 | Stop reason: SURG

## 2021-08-05 RX ORDER — ONDANSETRON 2 MG/ML
INJECTION INTRAMUSCULAR; INTRAVENOUS PRN
Status: DISCONTINUED | OUTPATIENT
Start: 2021-08-05 | End: 2021-08-05 | Stop reason: SURG

## 2021-08-05 RX ORDER — HALOPERIDOL 5 MG/ML
1 INJECTION INTRAMUSCULAR
Status: DISCONTINUED | OUTPATIENT
Start: 2021-08-05 | End: 2021-08-05 | Stop reason: HOSPADM

## 2021-08-05 RX ORDER — DIPHENHYDRAMINE HYDROCHLORIDE 50 MG/ML
12.5 INJECTION INTRAMUSCULAR; INTRAVENOUS
Status: DISCONTINUED | OUTPATIENT
Start: 2021-08-05 | End: 2021-08-05 | Stop reason: HOSPADM

## 2021-08-05 RX ORDER — SODIUM CHLORIDE, SODIUM LACTATE, POTASSIUM CHLORIDE, CALCIUM CHLORIDE 600; 310; 30; 20 MG/100ML; MG/100ML; MG/100ML; MG/100ML
INJECTION, SOLUTION INTRAVENOUS
Status: DISCONTINUED | OUTPATIENT
Start: 2021-08-05 | End: 2021-08-05 | Stop reason: SURG

## 2021-08-05 RX ORDER — OXYCODONE HCL 5 MG/5 ML
5 SOLUTION, ORAL ORAL
Status: DISCONTINUED | OUTPATIENT
Start: 2021-08-05 | End: 2021-08-05 | Stop reason: HOSPADM

## 2021-08-05 RX ORDER — BUPIVACAINE HYDROCHLORIDE 5 MG/ML
INJECTION, SOLUTION EPIDURAL; INTRACAUDAL
Status: DISCONTINUED | OUTPATIENT
Start: 2021-08-05 | End: 2021-08-05 | Stop reason: HOSPADM

## 2021-08-05 RX ORDER — CEFAZOLIN SODIUM 1 G/3ML
INJECTION, POWDER, FOR SOLUTION INTRAMUSCULAR; INTRAVENOUS PRN
Status: DISCONTINUED | OUTPATIENT
Start: 2021-08-05 | End: 2021-08-05 | Stop reason: SURG

## 2021-08-05 RX ORDER — LIDOCAINE HYDROCHLORIDE 20 MG/ML
INJECTION, SOLUTION EPIDURAL; INFILTRATION; INTRACAUDAL; PERINEURAL PRN
Status: DISCONTINUED | OUTPATIENT
Start: 2021-08-05 | End: 2021-08-05 | Stop reason: SURG

## 2021-08-05 RX ORDER — OXYCODONE HCL 5 MG/5 ML
10 SOLUTION, ORAL ORAL
Status: DISCONTINUED | OUTPATIENT
Start: 2021-08-05 | End: 2021-08-05 | Stop reason: HOSPADM

## 2021-08-05 RX ORDER — HYDROMORPHONE HYDROCHLORIDE 1 MG/ML
0.2 INJECTION, SOLUTION INTRAMUSCULAR; INTRAVENOUS; SUBCUTANEOUS
Status: DISCONTINUED | OUTPATIENT
Start: 2021-08-05 | End: 2021-08-05 | Stop reason: HOSPADM

## 2021-08-05 RX ADMIN — FENTANYL CITRATE 50 MCG: 50 INJECTION, SOLUTION INTRAMUSCULAR; INTRAVENOUS at 09:25

## 2021-08-05 RX ADMIN — LIDOCAINE HYDROCHLORIDE 100 MG: 20 INJECTION, SOLUTION EPIDURAL; INFILTRATION; INTRACAUDAL at 09:25

## 2021-08-05 RX ADMIN — CEFAZOLIN 2 G: 330 INJECTION, POWDER, FOR SOLUTION INTRAMUSCULAR; INTRAVENOUS at 09:25

## 2021-08-05 RX ADMIN — PROPOFOL 200 MG: 10 INJECTION, EMULSION INTRAVENOUS at 09:25

## 2021-08-05 RX ADMIN — SODIUM CHLORIDE, POTASSIUM CHLORIDE, SODIUM LACTATE AND CALCIUM CHLORIDE: 600; 310; 30; 20 INJECTION, SOLUTION INTRAVENOUS at 09:22

## 2021-08-05 RX ADMIN — ONDANSETRON 4 MG: 2 INJECTION INTRAMUSCULAR; INTRAVENOUS at 09:25

## 2021-08-05 RX ADMIN — DEXAMETHASONE SODIUM PHOSPHATE 4 MG: 4 INJECTION, SOLUTION INTRA-ARTICULAR; INTRALESIONAL; INTRAMUSCULAR; INTRAVENOUS; SOFT TISSUE at 09:25

## 2021-08-05 ASSESSMENT — FIBROSIS 4 INDEX: FIB4 SCORE: 1.78

## 2021-08-05 ASSESSMENT — PAIN SCALES - GENERAL: PAIN_LEVEL: 0

## 2021-08-05 NOTE — OP REPORT
OPERATIVE NOTE     DATE OF PROCEDURE: 8/5/2021            PRE-OP DIAGNOSIS: Left carpal tunnel syndrome, left cubital tunnel syndrome            POST-OP DIAGNOSIS: same            PROCEDURE: Left carpal tunnel release, left cubital tunnel release            SURGEON: Ian Delgado M.D. - Primary            ANESTHESIA: General            ESTIMATED BLOOD LOSS: Minimal                   SPECIMENS: None            COMPLICATIONS: none            CONDITION: stable to PACU            OPERATIVE INDICATIONS AND DESCRIPTION OF PROCEDURE: Maddy is a pleasant 81-year-old female who presented to my office with left carpal tunnel syndrome and cubital tunnel syndrome.  Diagnosis was confirmed with an EMG.  They failed conservative treatments.  She also had some other symptoms which were more attributable to a cervical radiculopathy.  We specifically discussed symptoms of carpal tunnel and cubital tunnel syndrome, and the chance that we could improve these symptoms with surgical treatment.  We discussed carpal tunnel release and cubital tunnel release.  Risks including, but not limited to, bleeding, infection, stiffness, recurrence of symptoms, incomplete resolution of symptoms, expected recovery depending on preoperative EMG findings, risks of anesthesia, were discussed.  They elected to proceed.  The patient was seen in the preoperative holding area, identified, and the wrist was marked.  They were taken back to the operating room.  General anesthesia was induced by the anesthesia provider.  A tourniquet was placed on the arm and the upper extremity was prepped and draped in usual orthopedic fashion.  Another timeout was performed.  The tourniquet was inflated to 250 mmHg.  I began with a carpal tunnel release.  A 1 cm longitudinal incision was made in the palm.  I dissected through the subcutaneous tissues and down to the palmar fascia.  The palmar fascia was longitudinally split.  Deep retractors were placed.  The  transverse carpal ligament was identified.  I incised the distal part of the transverse carpal ligament with a 15 blade.  I placed the Integra safeguard sled underneath the transverse carpal ligament, and above the median nerve, making sure to protect the nerve at all times.  Once I was happy with the positioning of the sled and that the nerve was protected, I slid the Integra blade proximally along the groove of the sled, transecting the remainder of the transverse carpal ligament.  I visually confirmed complete transection of the entire ligament as well as maintained protection of the median nerve. I inspected the distal part of the transverse carpal ligament to make sure it was completely transected.  The wound was irrigated with normal saline.  The skin was closed with 4-0 nylon suture.  I then proceeded with the cubital tunnel release portion of the procedure.  I made a 6 to 8 cm curvilinear incision just posterior to the medial epicondyle.  I bluntly spread through the subcutaneous tissues.  I protected any superficial medial antebrachial cutaneous branches.  Identified the medial epicondyle as well as Saunders's ligament.  I identified the ulnar nerve just proximal to this.  I began to perform a neurolysis of the nerve, carefully dividing any tissue overlying the nerve and the proximal part of the incision including a portion of the medial intermuscular septum.  I followed the nerve distally, transecting Saunders's ligament.  I continued following the nerve distally and split some of the FCU fascia as the nerve entered the muscle belly.  I examined the nerve and confirmed that it was completely released.  The nerve was quite swollen at the level of the cubital tunnel.  I did not perform a circumferential neurolysis around the cubital tunnel in order to prevent instability of the nerve.  I took the elbow through range of motion and found the ulnar nerve to be stable in the groove.  The wound was thoroughly  irrigated with normal saline.  The deep tissue layers were closed with 4-0 Vicryl.  The skin was closed with 4-0 nylon.  Local anesthetic was infiltrated around the wounds.  A bulky sterile dressing was placed on the elbow.  A sterile dressing was applied on the carpal tunnel incision. The tourniquet was deflated.  The patient awoke from anesthesia and was transferred to the PACU in stable condition.

## 2021-08-05 NOTE — ANESTHESIA PREPROCEDURE EVALUATION
Relevant Problems   ANESTHESIA   (positive) ALISSA (obstructive sleep apnea)      PULMONARY   (positive) COPD with asthma (HCC)   (positive) Chronic obstructive pulmonary disease (HCC)      NEURO   (positive) History of vertebral fracture      CARDIAC   (positive) Essential hypertension   (positive) Pulmonary hypertension (HCC)      GI   (positive) GERD (gastroesophageal reflux disease)   (positive) Gastroesophageal reflux disease with esophagitis         (positive) CKD (chronic kidney disease) stage 3, GFR 30-59 ml/min (HCC)      ENDO   (positive) Hypothyroidism due to Hashimoto's thyroiditis      Other   (positive) Facet arthritis of lumbar region     /59   Pulse 85   Temp 36.1 °C (97 °F) (Temporal)   Resp 18   Ht 1.524 m (5')   Wt 72 kg (158 lb 11.7 oz)   SpO2 93%   BMI 31.00 kg/m²     Physical Exam    Airway   Mallampati: II  TM distance: >3 FB  Neck ROM: full       Cardiovascular - normal exam  Rhythm: regular  Rate: normal  (-) murmur     Dental - normal exam           Pulmonary - normal exam  Breath sounds clear to auscultation     Abdominal    Neurological - normal exam                 Anesthesia Plan    ASA 3       Plan - general       Airway plan will be LMA          Induction: intravenous    Postoperative Plan: Postoperative administration of opioids is intended.    Pertinent diagnostic labs and testing reviewed    Informed Consent:    Anesthetic plan and risks discussed with patient.    Use of blood products discussed with: patient whom consented to blood products.          Retinal tear and detachment warning symptoms reviewed and patient instructed to call immediately if increasing floaters, flashes, or decreasing peripheral vision.

## 2021-08-05 NOTE — ANESTHESIA TIME REPORT
Anesthesia Start and Stop Event Times     Date Time Event    8/5/2021 0911 Ready for Procedure     0922 Anesthesia Start     1003 Anesthesia Stop        Responsible Staff  08/05/21    Name Role Begin End    Jimmie Cox M.D. Anesth 0922 1003        Preop Diagnosis (Free Text):  Pre-op Diagnosis     Carpal tunnel syndrome on left, Cubital tunnel syndrome on left        Preop Diagnosis (Codes):    Post op Diagnosis  Left carpal tunnel syndrome      Premium Reason  Non-Premium    Comments:

## 2021-08-05 NOTE — H&P
"Referring Provider: No ref. provider found  PCP: Remington Quinones M.D.  Reason for visit:   No chief complaint on file.              History: Maddy Hodge is a pleasant 80 y.o. female coming in today for follow-up of numbness and tingling in her left arm. She had her EMG which shows moderate carpal tunnel syndrome as well as moderate cubital tunnel syndrome. It did not show any obvious radiculopathy.        PMH:        Past Medical History:   Diagnosis Date   • Allergy       seasonal   • Anesthesia       \"hard to wake up\"   • Anxiety       due to loss of . 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    • Arthritis       facet arthritis of lumbar region   • Asthma       Inhaler use daily.   • Back pain     • Basal cell carcinoma       arm, neck, face   • Bowel habit changes       constipation   • CATARACT       operations 2/2012   • Chickenpox     • Chronic constipation     • Coagulase-negative staphylococcal infection       Dr. Albrecht, attaches to plastic, prulent   • Depression       and anxiety   • Encounter for long-term (current) use of other medications     • Erosive gastritis 5/09     antral   • GERD (gastroesophageal reflux disease)     • Bengali measles     • Heart burn     • High cholesterol     • Hypertension     • Hypothyroidism     • Indwelling urinary catheter present 11/16/2020   • Influenza     • Lumbar vertebral fracture (HCC) 5/2011   • Mumps     • Muscle disorder       Arachnoiditis   • Neuropathy     • Obesity, Class I, BMI 30-34.9     • OSTEOPOROSIS     • Pain 5/12/16     back and legs    • Pain management 5/20/2021   • Pulmonary hypertension (HCC)     • Recurrent UTI 6/28/2017   • Sleep apnea       on BiPap, follows with pulmonology   • Spinal stenosis, lumbar region, without neurogenic claudication     • Tonsillitis     • Urinary bladder disorder 6/30/2016     Currently has a catheter.         PSH:         Past Surgical History:   Procedure Laterality Date   • " PB TOTAL KNEE ARTHROPLASTY Left 8/12/2020     Procedure: ARTHROPLASTY, KNEE, TOTAL;  Surgeon: Kayden Perez M.D.;  Location: SURGERY [facility];  Service: Orthopedics   • SPINAL CORD STIMULATOR N/A 7/3/2017     Procedure: SPINAL CORD STIMULATOR FOR LEAD REMOVAL;  Surgeon: Amandeep Gonzalez D.O.;  Location: SURGERY [facility];  Service:    • GASTROSCOPY WITH BALLOON DILATATION N/A 5/19/2016     Procedure: GASTROSCOPY WITH DILATATION;  Surgeon: Tony Monae M.D.;  Location: SURGERY [facility];  Service:    • SPINAL CORD STIMULATOR   9/2/14   • OTHER SURGICAL PROCEDURE Bilateral 2012     plantar fascitis surgery   • EGD WITH ASP/BX   5/27/09     erosive gastritis   • HYSTERECTOMY, TOTAL ABDOMINAL   1976   • APPENDECTOMY   1976   • CATARACT EXTRACTION WITH IOL Bilateral     • COLONOSCOPY   2000,5/27/09     normal   • HYSTERECTOMY LAPAROSCOPY       • LUMBAR LAMINECTOMY DISKECTOMY       • TONSILLECTOMY             Tobacco history:   Social History          Tobacco Use   Smoking Status Never Smoker   Smokeless Tobacco Never Used         Medications:   Current Outpatient Medications:   •  atorvastatin (LIPITOR) 20 MG Tab, TAKE 1 TABLET BY MOUTH EVERY DAY, Disp: 90 tablet, Rfl: 3  •  imipramine (TOFRANIL) 10 MG Tab, TAKE 2 TABLETS BY MOUTH IN THE EVENING, Disp: 180 tablet, Rfl: 1  •  levothyroxine (SYNTHROID) 50 MCG Tab, TAKE 1 TABLET BY MOUTH IN THE MORNING ON AN EMPTY STOMACH, Disp: 90 tablet, Rfl: 3  •  escitalopram (LEXAPRO) 20 MG tablet, Take 1 tablet by mouth once daily, Disp: 90 tablet, Rfl: 2  •  potassium chloride (KLOR-CON) 8 MEQ tablet, TAKE 2 TABLETS EVERY DAY, Disp: 180 Tab, Rfl: 3  •  bumetanide (BUMEX) 2 MG tablet, Take 2 mg by mouth every day., Disp: , Rfl:   •  omeprazole (PRILOSEC) 20 MG delayed-release capsule, Take 1 Cap by mouth every day., Disp: 90 Cap, Rfl: 3  •  estradiol (ESTRACE) 0.5 MG tablet, Take 1 Tab by mouth every day., Disp: 90 Tab, Rfl: 3  •  aspirin EC 81 MG EC  tablet, Take 1 Tab by mouth 2 times a day., Disp: 30 Tab, Rfl: 0  •  docusate sodium 100 MG Cap, Take 100 mg by mouth 2 Times a Day., Disp: 60 Cap, Rfl: 0  •  pregabalin (LYRICA) 200 MG capsule, TAKE 1 CAP BY MOUTH 3 TIMES DAILY FOR 30 DAYS, G89, Disp: , Rfl:   •  VENTOLIN  (90 Base) MCG/ACT Aero Soln inhalation aerosol, Inhale 2 Puffs by mouth every 6 hours as needed for Shortness of Breath., Disp: 1 Inhaler, Rfl: 5  •  Multiple Vitamins-Minerals (VITEYES AREDS ADVANCED PO), Take  by mouth., Disp: , Rfl:   •  LETAIRIS 10 MG tablet, Take 10 Tabs by mouth every day., Disp: , Rfl:   •  NARCAN 4 MG/0.1ML Liquid, AS DIRECTED, Disp: , Rfl: 0  •  oxyCODONE-acetaminophen (PERCOCET) 5-325 MG Tab, TAKE 1 TABLET BY MOUTH 4 TIMES A DAY AS NEEDED FOR PAIN 11/8/18 G89.29, Disp: , Rfl: 0  •  spironolactone (ALDACTONE) 25 MG Tab, Take 25 mg by mouth every day., Disp: , Rfl: 3  •  AMITIZA 24 MCG capsule, Take 24 mcg by mouth 2 times a day with meals., Disp: , Rfl:   •  XTAMPZA ER 18 MG Capsule Extended Release 12 hour Abuse-Deterrent, Take 18 mg by mouth 2 Times a Day., Disp: , Rfl:      Allergies: Pcn [penicillins], Sulfa drugs, Macrobid [nitrofurantoin], and Tape     Exam: Left arm is atraumatic, skin intact.  She has a mildly positive Phalen's test.  She has good muscle bulk in the hand.  She has diminished station of the lateral side of the arm and forearm.  She has a negative Spurling's test as well.     Imaging: please see separate imaging dictation.     Assessment/Plan: 80 y.o. female with probable left upper extremity radiculopathy as well as EMG confirmed moderate carpal tunnel syndrome and moderate cubital tunnel syndrome. I went over these results with her and her son. We discussed their significance as well as how they relate to her actual symptoms. We discussed treatment options. We discussed continuing conservative care although she has failed this. She is having a knee replacement a few months and really  thinks that having this addressed would help her with her rehab as well as improve her quality of life. I very specifically explained to her what symptoms are caused by carpal tunnel syndrome and cubital tunnel syndrome and what symptoms I would expect to improve. We discussed that any sort of lateral arm numbness or other symptoms not related to these particular neuropathies would not improve. She expressed understanding. She wants to proceed with a left carpal tunnel release and left cubital tunnel release.        Ian Delgado M.D.

## 2021-08-05 NOTE — DISCHARGE INSTRUCTIONS
ACTIVITY: Rest and take it easy for the first 24 hours.  A responsible adult is recommended to remain with you during that time.  It is normal to feel sleepy.  We encourage you to not do anything that requires balance, judgment or coordination.    MILD FLU-LIKE SYMPTOMS ARE NORMAL. YOU MAY EXPERIENCE GENERALIZED MUSCLE ACHES, THROAT IRRITATION, HEADACHE AND/OR SOME NAUSEA.    FOR 24 HOURS DO NOT:  Drive, operate machinery or run household appliances.  Drink beer or alcoholic beverages.   Make important decisions or sign legal documents.    SPECIAL INSTRUCTIONS: follow Dr. Delgado's specific instructions on the front of packet.    DIET: To avoid nausea, slowly advance diet as tolerated, avoiding spicy or greasy foods for the first day.  Add more substantial food to your diet according to your physician's instructions. INCREASE FLUIDS AND FIBER TO AVOID CONSTIPATION.    FOLLOW-UP APPOINTMENT:  A follow-up appointment should be arranged with your doctor; call to schedule.    You should CALL YOUR PHYSICIAN if you develop:  Fever greater than 101 degrees F.  Pain not relieved by medication, or persistent nausea or vomiting.  Excessive bleeding (blood soaking through dressing) or unexpected drainage from the wound.  Extreme redness or swelling around the incision site, drainage of pus or foul smelling drainage.  Inability to urinate or empty your bladder within 8 hours.  Problems with breathing or chest pain.    You should call 911 if you develop problems with breathing or chest pain.  If you are unable to contact your doctor or surgical center, you should go to the nearest emergency room or urgent care center.  Physician's telephone #: Dr. Delgado at [phone]    If any questions arise, call your doctor.  If your doctor is not available, please feel free to call the Surgical Center at [phone]. The Contact Center is open Monday through Friday 7AM to 5PM and may speak to a nurse at [phone], or toll free at  [phone].     A registered nurse may call you a few days after your surgery to see how you are doing after your procedure.    MEDICATIONS: Resume taking daily medication.  Take prescribed pain medication with food.  If no medication is prescribed, you may take non-aspirin pain medication if needed.  PAIN MEDICATION CAN BE VERY CONSTIPATING.  Take a stool softener or laxative such as senokot, pericolace, or milk of magnesia if needed.    Prescription given for Roxicodone.  Last pain medication given at ***.    If your physician has prescribed pain medication that includes Acetaminophen (Tylenol), do not take additional Acetaminophen (Tylenol) while taking the prescribed medication.    Depression / Suicide Risk    As you are discharged from this [facility] facility, it is important to learn how to keep safe from harming yourself.    Recognize the warning signs:  · Abrupt changes in personality, positive or negative- including increase in energy   · Giving away possessions  · Change in eating patterns- significant weight changes-  positive or negative  · Change in sleeping patterns- unable to sleep or sleeping all the time   · Unwillingness or inability to communicate  · Depression  · Unusual sadness, discouragement and loneliness  · Talk of wanting to die  · Neglect of personal appearance   · Rebelliousness- reckless behavior  · Withdrawal from people/activities they love  · Confusion- inability to concentrate     If you or a loved one observes any of these behaviors or has concerns about self-harm, here's what you can do:  · Talk about it- your feelings and reasons for harming yourself  · Remove any means that you might use to hurt yourself (examples: pills, rope, extension cords, firearm)  · Get professional help from the community (Mental Health, Substance Abuse, psychological counseling)  · Do not be alone:Call your Safe Contact- someone whom you trust who will be there for you.  · Call your local CRISIS  HOTLINE 828-1875 or 888-165-1267  · Call your local Children's Mobile Crisis Response Team [place] (350) 672-0118 or www.norin.tv  · Call the toll free National Suicide Prevention Hotlines   · National Suicide Prevention Lifeline 183-596-CPRI (1123)  · National [employer] Line Network 800-SUICIDE (911-0063)

## 2021-08-05 NOTE — PROGRESS NOTES
1002- Patient arrived in PACU and was connected to monitors. Patient has oral airway in place, 6L O2 via mask, unlabored breathing. Vital signs are stable. Dressing is clean, dry, and intact.     1026- Oral airway removed. Patient remains on 6L mask. Report given to ELEUTERIO Vazquez.

## 2021-08-05 NOTE — ANESTHESIA POSTPROCEDURE EVALUATION
Patient: Maddy Hodge    Procedure Summary     Date: 08/05/21 Room / Location: [facility] ROOM 21 / SURGERY SAME DAY [facility]    Anesthesia Start: 0922 Anesthesia Stop: 1003    Procedures:       RELEASE, CARPAL TUNNEL (Left Wrist)      RELEASE, CUBITAL TUNNEL. (Left Elbow) Diagnosis:       Carpal tunnel syndrome on left      Cubital tunnel syndrome on left      (Carpal tunnel syndrome on left, Cubital tunnel syndrome on left)    Surgeons: Ian Delgado M.D. Responsible Provider: Jimmie Cox M.D.    Anesthesia Type: general ASA Status: 3          Final Anesthesia Type: general  Last vitals  BP   Blood Pressure : 126/59    Temp   36.1 °C (97 °F)    Pulse   85   Resp   18    SpO2   93 %      Anesthesia Post Evaluation    Patient location during evaluation: PACU  Patient participation: complete - patient participated  Level of consciousness: awake and alert  Pain score: 0    Airway patency: patent  Anesthetic complications: no  Cardiovascular status: hemodynamically stable  Respiratory status: acceptable  Hydration status: euvolemic    PONV: none          No complications documented.     Nurse Pain Score: 0 (NPRS)

## 2021-08-05 NOTE — OR NURSING
1028 Report received from [facility] patient care.   1031 respiration spontaneous and non-labored. Patient denies pain, denies nausea.   1036 patient able to move fingers. Denies N/T cap refill <3 seconds.   1031 Criteria met to transition patient to stage 2 re  1057 Discharge instructions given to patient and family son. Both verbalize understanding. Copy of instructions given to son.   1109 Criteria met to discharge patient home.  1111 Patient escorted via w/c to responsible adult. Patient has home oxygen. Patient with all her  Personal belongings.

## 2021-08-05 NOTE — LETTER
July 15, 2021    [facility]  [address] [place] [address]  [phone]    Patient Name: Maddy Hodge  Surgeon Name: Surgeon(s):  Ian Delgado M.D.  Surgery Facility: [facility] ([address] [place] [address])  Surgery Date: 8/5/2021    The time of your surgery is not final and may change up to and until the day of your surgery. You will be contacted 24-48 hours prior to your surgery date with your check-in and surgery time.    If you will not be at one of the below numbers please call/text the surgery scheduler at [phone]  Cell Phone: [phone]    BEFORE YOUR SURGERY  Pre Registration and/or Lab Work must be done within and no earlier than 28 days prior to your surgery date. Please call [phone] for an appointment as soon as possible.        Please refrain from smoking any substance after midnight prior to surgery. Smoking may interfere with the anesthetic and frequently produces nausea during the recovery period.    Continue taking all lifesaving medications. Including the morning of your surgery with small sip of water.    Please read the MEDICATION INSTRUCTIONS below completely.    DAY OF YOUR SURGERY  Nothing to eat or drink after midnight     Please arrive at the hospital/surgery center at the check-in time provided.     An adult will need to bring you and take you home after your surgery.     AFTER YOUR SURGERY  Post op Appointment:   Date: 8/18/21   Time: 8:45AM   With:    Location: [facility] [phone]      TIME OFF WORK  FMLA or Disability forms can be faxed directly to: [phone] or you may drop them off at [address] [facility] [phone]. Our office charges a $20.00 fee per form. Forms will be completed within 10 business days of drop off and payment received. For the status of your forms you may contact our disability office directly [phone].    MEDICATION INSTRUCTIONS  The following  medications should be stopped a minimum of 10 days prior to surgery:  All over the counter, Supplements & Herbal medications    Anorectics: Phentermine (Adipex-P, Lomaira and Suprenza), Phentermine-topiramate (Qsymia), Bupropion-naltrexone (Contrave)    Opiod Partial Agonists/Opioid Antagonists: Buprenorphine (Subocone, Belbuca, Butrans, Probuphine Implant, Sublocade), Naltrexone (ReVia, Vivitrol), Naloxone    Amphetamines: Dextroamphetamine/Amphetamine (Adderall, Mydayis), Methylphenidate Hydrochloride (Concerta, Metadate, Methylin, Ritalin)    The following medications should be stopped 5 days prior to surgery:  Blood Thinners: Any Aspirin, Aspirin products, anti-inflammatories such as ibuprofen and any blood thinners such as Coumadin and Plavix. Please consult your prescribing physician if you are on life saving blood thinners, in regards to when to stop medications prior to surgery.     The following medications should be stopped a minimum of 3 days prior to surgery:  PDE-5 inhibitors: Sildenafil (Viagra), Tadalafil (Cialis), Vardenafil (Levitra), Avanafil (Stendra)    MAO Inhibitors: Rasagiline (Azilect), Selegiline (Eldepryl, Emsam, Selapar), Isocarboxazid (Marplan), Phenelzine (Nardil)

## 2021-08-05 NOTE — ANESTHESIA PROCEDURE NOTES
Airway    Date/Time: 8/5/2021 9:26 AM  Performed by: Jimmie Cox M.D.  Authorized by: Jimmie Cox M.D.     Location:  OR  Urgency:  Elective  Difficult Airway: No    Indications for Airway Management:  Anesthesia      Spontaneous Ventilation: absent    Sedation Level:  Deep  Preoxygenated: Yes    Mask Difficulty Assessment:  0 - not attempted  Final Airway Type:  Supraglottic airway  Final Supraglottic Airway:  Standard LMA    SGA Size:  3  Number of Attempts at Approach:  1

## 2021-08-05 NOTE — OR NURSING
Patient has a suprapubic catheter, POA.  She typically leaves it plugged and empties it q2-3 hours, and wears a bag at night.  Patient requesting a bag for procedure and recovery-- worried about dexterity today after procedure.  RN attached suprapubic cath to a leg bag.

## 2021-08-05 NOTE — DISCHARGE INSTR - OTHER INFO
DR. DELGADO'S POST-OPERATIVE INSTRUCTIONS    You have just undergone a sugery by Dr. Delgado in the operating room.  It is our wish that your postoperative recovery be as quick and comfortable as possible.  Please carefully review the following items that are important for your recovery.    After any operation, a certain degree of pain is to be expected. Take Advil (ibuprofen) and Extra Strength Tylenol as first line medications for mild to moderate pain. Taking each one every 6 hours, and staggering them so that you are taking one medicine every 3 hours, is the most effective. Refer to dosing instructions on the bottle, but in general ibuprofen dose is 600-800mg and Tylenol dose is 500-1000mg. For most small procedures, this should be enough to keep you comfortable. Take these medications until your followup visit. You may have been given a small prescription for stronger pain medicine which will help relieve more severe pain.  Pain medicine may make you drowsy so please keep this in mind.  Do not drive while taking pain medicine.      When you go home, please keep your operated arm elevated at all times (above the level of your heart).  If you do this, your swelling will resolve more quickly and your pain will be improve more quickly as well. You may also place an ice pack over your dressing or splint to help with swelling and pain.    It is also very important to keep your fingers moving after most procedures, unless you had a tendon repair or fracture repair in which case you will be in a splint. Keep all of your fingers moving through a full range of motion to prevent stiffness.    For small hand procedures such as carpal tunnel release, trigger finger release, and cyst excision, the dressing that you have on your extremity should remain on for 3-4 days. It may then be removed, you can wash the incision  gently with soap and water, and keep the incision covered with a band-aid or similar clean dressing. For larger procedures or if you have a splint on, these should remain on until follow up or as specifically instructed. If you feel that your dressing is too tight during the first 3 days after surgery, you may loosen it. It is normal to see minor staining on the dressing after surgery. If there is significant bleeding, you are advised to call the office during regular office hours to have this checked.  Make sure that your dressing is kept dry at all times.  You can take a shower if you cover your arm with a plastic bag. If your dressing gets wet, replace it with sterile dressing that you can obtain from your local drug store.    Please call our office for a follow-up appointment, [phone]. Follow up after surgery is typically 10-14 days, unless you were specifically instructed otherwise. The sutures will be removed and you may be asked to see a hand therapist to optimize your functional result. Each of the hand therapists that you will be referred to have received special training in the care of the hand and upper extremity.    If you have questions regarding your surgery postop that you feel requires attention, please call the office at [phone] during business hours, or [phone] after hours for the answering service. If you feel that you have a surgical emergency postoperatively that requires immediate attention, please call the above numbers or go to the Emergency Department and ask for the Orthopedic Surgeon on call.

## 2021-08-26 ENCOUNTER — SLEEP CENTER VISIT (OUTPATIENT)
Dept: SLEEP MEDICINE | Facility: MEDICAL CENTER | Age: 81
End: 2021-08-26
Payer: MEDICARE

## 2021-08-26 VITALS
WEIGHT: 160 LBS | RESPIRATION RATE: 14 BRPM | BODY MASS INDEX: 31.41 KG/M2 | SYSTOLIC BLOOD PRESSURE: 110 MMHG | OXYGEN SATURATION: 97 % | HEIGHT: 60 IN | HEART RATE: 82 BPM | DIASTOLIC BLOOD PRESSURE: 70 MMHG | TEMPERATURE: 97.6 F

## 2021-08-26 DIAGNOSIS — J96.11 CHRONIC RESPIRATORY FAILURE WITH HYPOXIA (HCC): ICD-10-CM

## 2021-08-26 DIAGNOSIS — G47.33 OSA (OBSTRUCTIVE SLEEP APNEA): ICD-10-CM

## 2021-08-26 PROCEDURE — 99214 OFFICE O/P EST MOD 30 MIN: CPT | Performed by: NURSE PRACTITIONER

## 2021-08-26 RX ORDER — LEVOTHYROXINE SODIUM 0.05 MG/1
50 TABLET ORAL
COMMUNITY
End: 2021-10-13

## 2021-08-26 RX ORDER — OXYCODONE 18 MG/1
CAPSULE, EXTENDED RELEASE ORAL
COMMUNITY
End: 2021-10-04

## 2021-08-26 ASSESSMENT — PAIN SCALES - GENERAL: PAINLEVEL: 2=MINIMAL-SLIGHT

## 2021-08-26 ASSESSMENT — FIBROSIS 4 INDEX: FIB4 SCORE: 1.78

## 2021-08-26 NOTE — PATIENT INSTRUCTIONS
1.  Reviewed compliance data with patient and family member.  While leaks are currently controlled, there is an upward trend in AHI with an average of 12.8.  Therefore, I am ordering a stat sleep titration study to be done soon as possible.  Once we have the recommendations from the sleep titration study, we can adjust machine settings wirelessly.  Then, will follow-up with sleep MD.   At this time, I recommend continue using BiPAP ST machine nightly with 2 L of supplemental oxygen bleed in.

## 2021-08-26 NOTE — PROGRESS NOTES
Chief Complaint   Patient presents with   • Follow-Up     Last seen 7/15/21 Dr Kam   • Apnea     Uses Bi-pap    • Other     Hypoxia       HPI:  Maddy Hogde is a 81 y.o. year old female here today for follow-up on ALISSA.  Last seen via telemedicine on 04/15/2021 by DOMINIQUE Dunham.  Patient is also followed in pulmonary clinic for mixed restrictive obstructive lung disease, last seen 07/15/2021 by Dr. Kam.  She remains on supplemental oxygen at 2 L/min.    In regards to ALISSA, patient is currently on BiPAP ST at 14/9 centimeters/H2O with a respiratory rate of 12 bpm with 2 L/min O2 bleed-in.  She previously was experiencing difficulty with mask fit and experiencing significant leakage.  She was also experiencing intolerance due to the pressures.  Previously, her pressures were reduced to BiPAP ST of 13/8 centimeters with a backup respiratory rate of 12.  Patient has since found a more snug fitting and proper fitting mask, and the pressures were resumed at her normal pressures of 14/9.  In regards to sleep, patient states bedtime habits include going to sleep at 9 PM and waking up around 6 AM.  She alternates between good nights and bad nights.  The bad nights she experiences episodes of being startled while sleeping or gasping.  During these episodes she sometimes takes the mask and oxygen off and leaves the machine running.  He also suffers from severe back pain and myalgia.  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  It is deemed untreatable at this time, therefore patient is being managed with pain management.     Compliance report was reviewed and does show 97% usage with an average time of 7 hours and 9 minutes, however the AHI is noted to be trending upward with a monthly average of 12.8.  Since 8/15/2021 there is significant increase in AHI.  Patient denies any changes in medications, there is no leakage noted on compliance report.  Patient and family member are unaware of any changes that have been  "occurring since then.    SLEEP HX:     PSG from 6/2017 indicated an AHI of 5.5 and low oxygenation of 81%.  She completed a titration study for potential ASV therapy given elevated AHI and use of chronic pain medication.  She was then switched from CPAP to BiPAP given her central apneas resolved with BiPAP therapy.  Currently she is being treated with BIPAP ST @ 14/9cm with back up rate 12.      ROS: As per HPI and otherwise negative if not stated.    Past Medical History:   Diagnosis Date   • Allergy     seasonal   • Anesthesia     \"hard to wake up\"   • Anxiety     due to loss of . 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    • Arthritis     facet arthritis of lumbar region   • Asthma     Inhaler use daily.   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 Albrecht, attaches to plastic, prulent   • Delayed emergence from general anesthesia    • Depression     and anxiety   • Encounter for long-term (current) use of other medications    • Erosive gastritis 5/09    antral   • GERD (gastroesophageal reflux disease)    • Romansh measles    • High cholesterol    • Hypertension    • Hypothyroidism    • Indwelling urinary catheter present 11/16/2020   • Influenza    • Lumbar vertebral fracture (HCC) 5/2011   • Mumps    • Muscle disorder     Arachnoiditis   • Neuropathy    • Neuropathy    • Obesity, Class I, BMI 30-34.9    • OSTEOPOROSIS    • Oxygen dependent     2 liters, [facility]   • Pain 07/20/2021    left arm, knees, chronic back, right leg, 6/10   • Pain management 5/20/2021   • Pulmonary hypertension (HCC)    • Recurrent UTI 6/28/2017   • Sleep apnea     on BiPap, follows with pulmonology   • Spinal stenosis, lumbar region, without neurogenic claudication    • Suprapubic catheter (HCC)    • Tonsillitis        Past " Surgical History:   Procedure Laterality Date   • PB REVISE MEDIAN N/CARPAL TUNNEL SURG Left 2021    Procedure: RELEASE, CARPAL TUNNEL;  Surgeon: Ian Delgado M.D.;  Location: SURGERY SAME DAY [facility];  Service: Orthopedics   • PB REVISE ULNAR NERVE AT ELBOW Left 2021    Procedure: RELEASE, CUBITAL TUNNEL.;  Surgeon: Ian Delgado M.D.;  Location: SURGERY SAME DAY [facility];  Service: Orthopedics   • PB TOTAL KNEE ARTHROPLASTY Left 2020    Procedure: ARTHROPLASTY, KNEE, TOTAL;  Surgeon: Kayden Perez M.D.;  Location: SURGERY [facility];  Service: Orthopedics   • SPINAL CORD STIMULATOR N/A 7/3/2017    Procedure: SPINAL CORD STIMULATOR FOR LEAD REMOVAL;  Surgeon: Amandeep Gonzalez D.O.;  Location: SURGERY [facility];  Service:    • GASTROSCOPY WITH BALLOON DILATATION N/A 2016    Procedure: GASTROSCOPY WITH DILATATION;  Surgeon: Tony Monae M.D.;  Location: SURGERY [facility];  Service:    • SPINAL CORD STIMULATOR  2014    removed 2017   • OTHER SURGICAL PROCEDURE Bilateral 2012    plantar fascitis surgery   • EGD WITH ASP/BX  09    erosive gastritis   • HYSTERECTOMY, TOTAL ABDOMINAL     • APPENDECTOMY     • CATARACT EXTRACTION WITH IOL Bilateral    • COLONOSCOPY  ,09    normal   • LUMBAR LAMINECTOMY DISKECTOMY     • TONSILLECTOMY         Family History   Problem Relation Age of Onset   • Genitourinary () Problems Brother    • Lung Disease Mother         COPD   • Heart Disease Mother    • Genetic Disorder Father         Parkinsons/ from complications   • Hypertension Father    • Cancer Maternal Aunt         Breast cancer   • Stroke Paternal Grandmother    • Cancer Maternal Aunt         Breast cancer       Allergies as of 2021 - Reviewed 2021   Allergen Reaction Noted   • Pcn [penicillins] Hives 2017   • Sulfa drugs Hives 2017   • Macrobid [nitrofurantoin] Rash 2017   • Tape Rash and Itching 2017         Vitals:  /70 (BP Location: Right arm, Patient Position: Sitting, BP Cuff Size: Adult)   Pulse 82   Temp 36.4 °C (97.6 °F) (Temporal)   Resp 14   Ht 1.524 m (5')   Wt 72.6 kg (160 lb)   SpO2 97%     Current medications as of today   Current Outpatient Medications   Medication Sig Dispense Refill   • levothyroxine (EUTHYROX) 50 MCG Tab Euthyrox 50 mcg tablet   TAKE 1 TABLET BY MOUTH IN THE MORNING ON AN EMPTY STOMACH     • aspirin (ASPIRIN 81) 81 MG EC tablet Take 81 mg by mouth every day with lunch.     • Cyanocobalamin (VITAMIN B-12 PO) Take  by mouth every day.     • Multiple Vitamins-Minerals (EYE VITAMINS) Cap Take 2 Capsules by mouth every day.     • Cholecalciferol (VITAMIN D-3 PO) Take  by mouth every day.     • docusate sodium (COLACE) 250 MG capsule Take 250 mg by mouth every day. Indications: Constipation     • Omega-3 Fatty Acids (OMEGA 3 PO) Take 1,500 mg by mouth every day.     • atorvastatin (LIPITOR) 20 MG Tab TAKE 1 TABLET BY MOUTH EVERY DAY 90 tablet 3   • imipramine (TOFRANIL) 10 MG Tab TAKE 2 TABLETS BY MOUTH IN THE EVENING 180 tablet 1   • escitalopram (LEXAPRO) 20 MG tablet Take 1 tablet by mouth once daily 90 tablet 2   • potassium chloride (KLOR-CON) 8 MEQ tablet TAKE 2 TABLETS EVERY  Tab 3   • bumetanide (BUMEX) 2 MG tablet Take 2 mg by mouth at bedtime. Indications: Edema, High Blood Pressure Disorder     • omeprazole (PRILOSEC) 20 MG delayed-release capsule Take 1 Cap by mouth every day. 90 Cap 3   • estradiol (ESTRACE) 0.5 MG tablet Take 1 Tab by mouth every day. 90 Tab 3   • pregabalin (LYRICA) 200 MG capsule Take 200 mg by mouth in the morning, at noon, and at bedtime.     • VENTOLIN  (90 Base) MCG/ACT Aero Soln inhalation aerosol Inhale 2 Puffs by mouth every 6 hours as needed for Shortness of Breath. 1 Inhaler 5   • LETAIRIS 10 MG tablet Take 10 Tablets by mouth at bedtime. Indications: Pulmonary Arterial Hypertension     • NARCAN 4 MG/0.1ML Liquid  Administer 1 Spray into affected nostril(S) as needed. AS DIRECTED  0   • oxyCODONE-acetaminophen (PERCOCET) 5-325 MG Tab TAKE 1 TABLET BY MOUTH 4 TIMES A DAY AS NEEDED FOR PAIN 11/8/18 G89.29  0   • spironolactone (ALDACTONE) 25 MG Tab Take 25 mg by mouth at bedtime. Indications: Edema, High Blood Pressure Disorder  3   • AMITIZA 24 MCG capsule Take 24 mcg by mouth 2 times a day with meals. Indications: Chronic Constipation of Unknown Cause     • XTAMPZA ER 18 MG Capsule Extended Release 12 hour Abuse-Deterrent Take 18 mg by mouth 2 times a day. Indications: Chronic Pain     • oxyCODONE ER (XTAMPZA ER) 18 MG Capsule Extended Release 12 hour Abuse-Deterrent Xtampza ER 18 mg capsule sprinkle   TAKE 1 ORAL CAPSULE 2 TIMES A DAY. DNF UNTIL 7/03/21   60 FOR 30 DAYS. (Patient not taking: Reported on 8/26/2021)     • levothyroxine (SYNTHROID) 50 MCG Tab TAKE 1 TABLET BY MOUTH IN THE MORNING ON AN EMPTY STOMACH (Patient not taking: Reported on 8/26/2021) 90 tablet 3     No current facility-administered medications for this visit.         Physical Exam:   Gen:           Alert and oriented, No apparent distress. Mood and affect appropriate, normal interaction with examiner.  Eyes:          PERRL, EOM intact, sclere white, conjunctive moist.  Ears:          Not examined.   Hearing:     Grossly intact.  Nose:          Normal, no lesions or deformities.  Dentition:    Good dentition.  Oropharynx:   Tongue normal, posterior pharynx without erythema or exudate.  Neck:        Supple, trachea midline, no masses.  Respiratory Effort: No intercostal retractions or use of accessory muscles.   Lung Auscultation:      Clear to auscultation bilaterally; no rales, rhonchi or wheezing.  CV:            Regular rate and rhythm. No murmurs, rubs or gallops.  Abd:           Not examined.   Lymphadenopathy: Not examined.  Gait and Station: Normal.  Digits and Nails: No clubbing, cyanosis, petechiae, or nodes.   Cranial Nerves: II-XII grossly  intact.  Skin:        No rashes, lesions or ulcers noted.               Ext:           No cyanosis or edema.      Assessment:  1. ALISSA (obstructive sleep apnea)     2. Chronic respiratory failure with hypoxia (HCC)         Immunizations:    Flu: 10/1/2020  Pneumovax 23: 8/17/2018  Prevnar 13: 10/6/2015  COVID-19: 1/28/2021, 2/25/2021    Plan:  1.  Reviewed compliance data with patient and family member.  While leaks are currently controlled, there is an upward trend in AHI with an average of 12.8.  Therefore, I am ordering a stat sleep titration study to be done soon as possible.  Once we have the recommendations from the sleep titration study, we can adjust machine settings wirelessly.  Then, will follow-up with sleep MD.   At this time, I recommend continue using BiPAP ST machine nightly with 2 L of supplemental oxygen bleed in.    Please note that this dictation was created using voice recognition software. I have made every reasonable attempt to correct obvious errors, but it is possible there are errors of grammar and possibly content that I did not discover before finalizing the note.

## 2021-09-01 ENCOUNTER — PRE-ADMISSION TESTING (OUTPATIENT)
Dept: ADMISSIONS | Facility: MEDICAL CENTER | Age: 81
End: 2021-09-01
Attending: ORTHOPAEDIC SURGERY
Payer: MEDICARE

## 2021-09-01 DIAGNOSIS — M17.0 PRIMARY OSTEOARTHRITIS OF BOTH KNEES: ICD-10-CM

## 2021-09-01 LAB
ANION GAP SERPL CALC-SCNC: 12 MMOL/L (ref 7–16)
BUN SERPL-MCNC: 14 MG/DL (ref 8–22)
CALCIUM SERPL-MCNC: 9.2 MG/DL (ref 8.4–10.2)
CHLORIDE SERPL-SCNC: 102 MMOL/L (ref 96–112)
CO2 SERPL-SCNC: 27 MMOL/L (ref 20–33)
CREAT SERPL-MCNC: 1.05 MG/DL (ref 0.5–1.4)
ERYTHROCYTE [DISTWIDTH] IN BLOOD BY AUTOMATED COUNT: 49.1 FL (ref 35.9–50)
GLUCOSE SERPL-MCNC: 87 MG/DL (ref 65–99)
HCT VFR BLD AUTO: 42.4 % (ref 37–47)
HGB BLD-MCNC: 14.1 G/DL (ref 12–16)
MCH RBC QN AUTO: 30 PG (ref 27–33)
MCHC RBC AUTO-ENTMCNC: 33.3 G/DL (ref 33.6–35)
MCV RBC AUTO: 90.2 FL (ref 81.4–97.8)
PLATELET # BLD AUTO: 188 K/UL (ref 164–446)
PMV BLD AUTO: 11 FL (ref 9–12.9)
POTASSIUM SERPL-SCNC: 4.1 MMOL/L (ref 3.6–5.5)
RBC # BLD AUTO: 4.7 M/UL (ref 4.2–5.4)
SCCMEC + MECA PNL NOSE NAA+PROBE: NEGATIVE
SCCMEC + MECA PNL NOSE NAA+PROBE: NEGATIVE
SODIUM SERPL-SCNC: 141 MMOL/L (ref 135–145)
WBC # BLD AUTO: 5.1 K/UL (ref 4.8–10.8)

## 2021-09-01 PROCEDURE — 87640 STAPH A DNA AMP PROBE: CPT | Mod: XU

## 2021-09-01 PROCEDURE — 36415 COLL VENOUS BLD VENIPUNCTURE: CPT

## 2021-09-01 PROCEDURE — 85027 COMPLETE CBC AUTOMATED: CPT

## 2021-09-01 PROCEDURE — 87641 MR-STAPH DNA AMP PROBE: CPT

## 2021-09-01 PROCEDURE — 80048 BASIC METABOLIC PNL TOTAL CA: CPT

## 2021-09-01 ASSESSMENT — FIBROSIS 4 INDEX: FIB4 SCORE: 1.78

## 2021-09-01 NOTE — DISCHARGE PLANNING
DISCHARGE PLANNING NOTE - TOTAL JOINT    Procedure: Procedure(s):  ARTHROPLASTY, KNEE, TOTAL  Procedure Date: 9/20/2021  Insurance: Payor: MEDICARE / Plan: MEDICARE PART A & B / Product Type: *No Product type* /    Equipment currently available at home?  cane, front-wheel walker, raised toilet seat and shower chair  Steps into the home? 2  Steps within the home? 0  Toilet height? ADA  Type of shower? walk-in shower  Who will be with you during your recovery? Adult children.  Is Outpatient Physical Therapy set up after surgery? Yes  Did you take the Total Joint Class and where? Yes March 2020.  Planning same day discharge?No     This writer met with pt and son during her preadmission appointment.  Pt presents with a cane and [facility] oxygen [facility]. Pt will bring portable oxygen with her. Pt states she has all needed equipment and son will be surgery jake/transportation, whom she lives with. Home safety checklist reviewed and copy given to pt. Pt educated to dc criteria. All questions answered and verbalizes understanding of all instructions. No dc needs identified at this time. Anticipate dc to home without barriers.

## 2021-09-01 NOTE — PREPROCEDURE INSTRUCTIONS
"Pre-admit appointment completed. \"Preparing for your Procedure\" instructions given to Pt with verbal, written, and video content. Pt states all instructions given are understood and to call pre-admit or Dr's office for additional questions or any symptoms of illness/covid develop prior to DOS. Medications the patient will take the morning of surgery per anesthesia protocol: Levothroxine, Prilosec, Oxycodone, Percocet, Lyrica. Instructed to take other prescribed medications through the day before surgery.        Surgery preparation checklist for Joint Replacement Program given to Pt with verbal instructions.     Pt had UA on 8/31 with [facility] RE: permanent Garnett Catheter.    Pt scheduled for covid test on 9/17/21    Advise to bring BIPAP machine DOS    Health History sent to Dr. Elam for review. His response below indicates:    I have multiple concerns regarding this patient. Starting with her worsening ALISSA, she was slated to have a sleep titration study that cannot be done well after the surgery. It seems as though she has chronic pain that is managed with long-acting opioids. Finally her pulmonary hypertension studies are from two years ago and I have no idea if that entity has gotten worse or essentially stayed the same as she is on medication for that and her functional capacity is suboptimal.     Although we have some 19 days prior to her surgery, this patient should get a cardiology reevaluation and clearance from the pulmonary hypertension standpoint and get back to us as soon as possible. Obviously Dr. Perez and I can talk about this if he needs any input from me.  Furthermore, I assume that her pain management is done by a physician who will need to know that she is having surgery and give her instructions on how to manage her chronic pain medicines. The latter becomes extremely important postoperatively given her history of sleep apnea and the fact that she has had an increase in her AHI since " her last visit in the pulmonary clinic. Thank you.    Juan Elam M.D.  [facility]    Response request sent to Dr. Perez's office

## 2021-09-02 NOTE — OR NURSING
Talked to Kennedi, Surgery Scheduler and she will look into the request sent for Cardiology Clearance and Pulmonary Clearance.

## 2021-09-08 ENCOUNTER — TELEPHONE (OUTPATIENT)
Dept: SLEEP MEDICINE | Facility: MEDICAL CENTER | Age: 81
End: 2021-09-08

## 2021-09-09 NOTE — TELEPHONE ENCOUNTER
[facility] requesting pulmonary surgical clearance   1. Caller Name: Maddy Hodge                 Call Back Number: [phone] (home)       Patient approves a detailed voicemail message: N\A    2.  What is the procedure: R TKA    3.  When is the procedure scheduled: 9/20/21     4.  Who is the Surgeon: Kayden Perez MD    5. Has the patient completed any screening tests for the procedure: unknown     6. Has PCP received a written request from Surgeon: unknown     7. Patient scheduled for an appointment for this clearance?:  no    8. Last OV: 8/26/21 THERESA Mcguire, aprn   7/15/21 Dr. Kam   9. Next OV: 12/22/21 Dr. Kam     10. Last CXR: 3/6/2020 Chest Xray 2 view     11. Last PFT: 3/31/2021     12. Last CT: 9/5/17  CT-CTA CHEST PULMONARY ARTERY W/ RECONS      Current Medications  Current Outpatient Medications   Medication Sig Dispense Refill   • levothyroxine (EUTHYROX) 50 MCG Tab Euthyrox 50 mcg tablet   TAKE 1 TABLET BY MOUTH IN THE MORNING ON AN EMPTY STOMACH     • oxyCODONE ER (XTAMPZA ER) 18 MG Capsule Extended Release 12 hour Abuse-Deterrent Xtampza ER 18 mg capsule sprinkle   TAKE 1 ORAL CAPSULE 2 TIMES A DAY. DNF UNTIL 7/03/21   60 FOR 30 DAYS. (Patient not taking: Reported on 8/26/2021)     • aspirin (ASPIRIN 81) 81 MG EC tablet Take 81 mg by mouth every day with lunch.     • Cyanocobalamin (VITAMIN B-12 PO) Take  by mouth every day.     • Multiple Vitamins-Minerals (EYE VITAMINS) Cap Take 2 Capsules by mouth every day.     • Cholecalciferol (VITAMIN D-3 PO) Take  by mouth every day.     • docusate sodium (COLACE) 250 MG capsule Take 250 mg by mouth every day. Indications: Constipation     • Omega-3 Fatty Acids (OMEGA 3 PO) Take 1,500 mg by mouth every day.     • atorvastatin (LIPITOR) 20 MG Tab TAKE 1 TABLET BY MOUTH EVERY DAY 90 tablet 3   • imipramine (TOFRANIL) 10 MG Tab TAKE 2 TABLETS BY MOUTH IN THE EVENING 180 tablet 1   • levothyroxine (SYNTHROID) 50 MCG Tab TAKE 1 TABLET BY MOUTH IN THE MORNING  ON AN EMPTY STOMACH (Patient not taking: Reported on 8/26/2021) 90 tablet 3   • escitalopram (LEXAPRO) 20 MG tablet Take 1 tablet by mouth once daily 90 tablet 2   • potassium chloride (KLOR-CON) 8 MEQ tablet TAKE 2 TABLETS EVERY  Tab 3   • bumetanide (BUMEX) 2 MG tablet Take 2 mg by mouth at bedtime. Indications: Edema, High Blood Pressure Disorder     • omeprazole (PRILOSEC) 20 MG delayed-release capsule Take 1 Cap by mouth every day. 90 Cap 3   • estradiol (ESTRACE) 0.5 MG tablet Take 1 Tab by mouth every day. 90 Tab 3   • pregabalin (LYRICA) 200 MG capsule Take 200 mg by mouth in the morning, at noon, and at bedtime.     • VENTOLIN  (90 Base) MCG/ACT Aero Soln inhalation aerosol Inhale 2 Puffs by mouth every 6 hours as needed for Shortness of Breath. 1 Inhaler 5   • LETAIRIS 10 MG tablet Take 10 Tablets by mouth at bedtime. Indications: Pulmonary Arterial Hypertension     • NARCAN 4 MG/0.1ML Liquid Administer 1 Spray into affected nostril(S) as needed. AS DIRECTED  0   • oxyCODONE-acetaminophen (PERCOCET) 5-325 MG Tab TAKE 1 TABLET BY MOUTH 4 TIMES A DAY AS NEEDED FOR PAIN 11/8/18 G89.29  0   • spironolactone (ALDACTONE) 25 MG Tab Take 25 mg by mouth at bedtime. Indications: Edema, High Blood Pressure Disorder  3   • AMITIZA 24 MCG capsule Take 24 mcg by mouth 2 times a day with meals. Indications: Chronic Constipation of Unknown Cause     • XTAMPZA ER 18 MG Capsule Extended Release 12 hour Abuse-Deterrent Take 18 mg by mouth 2 times a day. Indications: Chronic Pain       No current facility-administered medications for this visit.       Please advise.

## 2021-09-09 NOTE — OR NURSING
Based on my previous note, even in the setting of a PCP clearance, we need to know more about the pulmonary hypertension diagnosis and whether further work up needs to be done prior to surgery to ensure patient safety throughout the perioperative time. It’s truly our biggest concern for this patient who has significant  multiple comorbidities.  If Dr. Quinones can help us with that by specifically addressing that issue, then great.  If not, then we need cardiology or Pulmonology to address this concern prior to surgery.  Thank you.  Juan Elam M.D.  [facility]      On Sep 9, 2021, at 13:35, SMPreadmit <[email or web address]> wrote:  ?   She is patient’s PCP     From: Juan Elam <[email or web address]   Sent: Thursday, September 9, 2021 12:39 PM  To: [email or web address] [email or web address]>  Subject: Re: Total Knee MRN: [id number] Secure      I’m sorry that I don’t know this but can you tell me who Shirlene Quinones is?  Thank you.   Juan Elam M.D.  [facility]        On Sep 9, 2021, at 11:50, SMPreadmit <[email or web address]> wrote:  ?   Frank Elam, I am following up on this clearance.  We received a PCP clearance from Shirlene Quinones, stating pt is medically stable and may undergo the proposed surgery.  Is this sufficient enough?     From: Juan Elam <[email or web address]>   Sent: Wednesday, September 1, 2021 12:37 PM  To: [email or web address] [email or web address]>  Subject: Re: Total Knee MRN: [id number] Secure      I have multiple concerns regarding this patient. Starting with her worsening ALISSA, she was slated to have a sleep titration study that cannot be done well after the surgery. It seems as though she has chronic pain that is managed with long-acting opioids. Finally her pulmonary hypertension studies are from two years ago and I have no idea if that entity has gotten worse or essentially stayed the same as she is on  medication for that and her functional capacity is suboptimal.      Although we have some 19 days prior to her surgery, this patient should get a cardiology reevaluation and clearance from the pulmonary hypertension standpoint and get back to us as soon as possible. Obviously Dr. Perez and I can talk about this if he needs any input from me.  Furthermore, I assume that her pain management is done by a physician who will need to know that she is having surgery and give her instructions on how to manage her chronic pain medicines. The latter becomes extremely important postoperatively given her history of sleep apnea and the fact that she has had an increase in her AHI since her last visit in the pulmonary clinic. Thank you.     Juan Elam M.D.  [facility]          On Sep 1, 2021, at 11:55, SMPreadmit [email or web address] wrote:  ?   Hi Dr. Elam,     This 81 yr. old patient is scheduled for a Right Total Knee on 9/20/21 with Dr. Perez. She has Sleep Apnea and uses is BiPAP and is on O2 at 2L. Also a history of COPD and Pulmonary HTN. Her Mets is less than 4. She had a Carpal Tunnel on 8/5/21 and a previous knee replacement in August of 2020. Any concerns at this time?  Thank you,     Vivek           Anesthesia Summary Report           Patient Name: Maddy Hodge MRN: [id number] Admission Date: Patient not admitted   Allergies: Pcn [Penicillins], Sulfa Drugs, Macrobid [Nitrofurantoin], Tape   Low Fall Risk - Click for more information    Low Fall Risk - Click for more information      81 y.o. / Female (1940)  Maddy Hodge MRN:[id number] (CSN:[id number]) (81 y.o. F)   Comment

## 2021-09-16 NOTE — H&P (VIEW-ONLY)
"    Maddy Hodge81 y.o.female  is here today for further discussion of their right knee  arthritis and knee pain.  They have tried and failed most all conservative measures and are ready to move forward with surgery.  They have obtained the appropriate clearances if necessary.  They have pain on a daily basis, pain at night, pain that affects her activities of daily living.  They have no history of blood clots.  They are ready to move forward with surgery.  We are planning on using [employer] with short stubby tibial stems as their implants.  They will be having their surgery at [facility].  Has been seen and evaluated by both her cardiologist and pulmonologist and been deemed stable for surgery.  Has a history of pulmonary hypertension and uses a BiPAP machine at night and supplemental oxygen while she ambulates    Past Medical History:   Diagnosis Date   • Allergy     seasonal   • Anesthesia     \"hard to wake up\"   • Anxiety     due to loss of . 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    • Arthritis     facet arthritis of lumbar region   • Asthma     Inhaler use daily.   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 Albrecht, attaches to plastic, prulent   • Delayed emergence from general anesthesia    • Depression     and anxiety   • Encounter for long-term (current) use of other medications    • Erosive gastritis 5/09    antral   • GERD (gastroesophageal reflux disease)    • [place] measles    • High cholesterol    • Hypertension    • Hypothyroidism    • Indwelling urinary catheter present 11/16/2020   • Influenza    • Lumbar vertebral fracture (HCC) 5/2011   • Mumps    • Muscle disorder     Arachnoiditis   • Neuropathy    • Neuropathy    • Obesity, Class I, BMI 30-34.9    • OSTEOPOROSIS    • " Oxygen dependent     2 liters, [facility]   • Pain 07/20/2021    left arm, knees, chronic back, right leg, 6/10   • Pain management 5/20/2021   • Pulmonary hypertension (HCC)    • Recurrent UTI 6/28/2017   • Sleep apnea     on BiPap, follows with pulmonology   • Spinal stenosis, lumbar region, without neurogenic claudication    • Suprapubic catheter (HCC)    • Tonsillitis      Past Surgical History:   Procedure Laterality Date   • PB REVISE MEDIAN N/CARPAL TUNNEL SURG Left 8/5/2021    Procedure: RELEASE, CARPAL TUNNEL;  Surgeon: Ian Delgado M.D.;  Location: SURGERY SAME DAY [facility];  Service: Orthopedics   • PB REVISE ULNAR NERVE AT ELBOW Left 8/5/2021    Procedure: RELEASE, CUBITAL TUNNEL.;  Surgeon: Ian Delgado M.D.;  Location: SURGERY SAME DAY [facility];  Service: Orthopedics   • PB TOTAL KNEE ARTHROPLASTY Left 8/12/2020    Procedure: ARTHROPLASTY, KNEE, TOTAL;  Surgeon: Kayden Perez M.D.;  Location: SURGERY [facility];  Service: Orthopedics   • SPINAL CORD STIMULATOR N/A 7/3/2017    Procedure: SPINAL CORD STIMULATOR FOR LEAD REMOVAL;  Surgeon: Amandeep Gonzalez D.O.;  Location: SURGERY [facility];  Service:    • GASTROSCOPY WITH BALLOON DILATATION N/A 5/19/2016    Procedure: GASTROSCOPY WITH DILATATION;  Surgeon: Tony Monae M.D.;  Location: SURGERY [facility];  Service:    • SPINAL CORD STIMULATOR  09/02/2014    removed 2017   • OTHER SURGICAL PROCEDURE Bilateral 2012    plantar fascitis surgery   • EGD WITH ASP/BX  5/27/09    erosive gastritis   • HYSTERECTOMY, TOTAL ABDOMINAL  1976   • APPENDECTOMY  1976   • CATARACT EXTRACTION WITH IOL Bilateral    • COLONOSCOPY  2000,5/27/09    normal   • LUMBAR LAMINECTOMY DISKECTOMY     • TONSILLECTOMY          Social Connections:    • Frequency of Communication with Friends and Family:    • Frequency of Social Gatherings with Friends and Family:    • Attends Orthodox Services:    • Active Member of Clubs or Organizations:     • Attends Club or Organization Meetings:    • Marital Status:        Current Outpatient Medications:   •  aspirin, 81 mg, Oral, BID  •  docusate sodium, 50 mg, Oral, BID  •  meloxicam, 7.5 mg, Oral, BID  •  oxyCODONE immediate-release, 5 mg, Oral, Q4HRS PRN  •  traMADol, 50 mg, Oral, Q6HRS PRN  •  ondansetron, 4 mg, Oral, Q4HRS PRN  •  levothyroxine, Euthyrox 50 mcg tablet  TAKE 1 TABLET BY MOUTH IN THE MORNING ON AN EMPTY STOMACH  •  Xtampza ER, Xtampza ER 18 mg capsule sprinkle  TAKE 1 ORAL CAPSULE 2 TIMES A DAY. DNF UNTIL 7/03/21   60 FOR 30 DAYS. (Patient not taking: Reported on 8/26/2021)  •  aspirin, 81 mg, Oral, DAILY WITH LUNCH  •  Cyanocobalamin (VITAMIN B-12 PO), Take  by mouth every day.  •  Eye Vitamins, 2 Capsule, Oral, DAILY  •  Cholecalciferol (VITAMIN D-3 PO), Take  by mouth every day.  •  docusate sodium, 250 mg, Oral, DAILY  •  Omega-3 Fatty Acids (OMEGA 3 PO), 1,500 mg, Oral, DAILY  •  atorvastatin, TAKE 1 TABLET BY MOUTH EVERY DAY  •  imipramine, TAKE 2 TABLETS BY MOUTH IN THE EVENING  •  levothyroxine, TAKE 1 TABLET BY MOUTH IN THE MORNING ON AN EMPTY STOMACH (Patient not taking: Reported on 8/26/2021)  •  escitalopram, Take 1 tablet by mouth once daily  •  potassium chloride, TAKE 2 TABLETS EVERY DAY  •  bumetanide, 2 mg, Oral, QHS  •  omeprazole, 20 mg, Oral, DAILY  •  estradiol, 0.5 mg, Oral, DAILY  •  pregabalin, 200 mg, Oral, TID  •  Ventolin HFA, 2 Puff, Inhalation, Q6HRS PRN  •  Letairis, 10 Tablet, Oral, QHS  •  Narcan, 1 Spray, Nasal, PRN  •  oxyCODONE-acetaminophen, TAKE 1 TABLET BY MOUTH 4 TIMES A DAY AS NEEDED FOR PAIN 11/8/18 G89.29  •  spironolactone, 25 mg, Oral, QHS  •  Amitiza, 24 mcg, Oral, BID WITH MEALS  •  Xtampza ER, 18 mg, Oral, BID  Pcn [penicillins], Sulfa drugs, Macrobid [nitrofurantoin], and Tape      Past medical history social history surgical history review of systems reviewed and is otherwise noncontributory except for above    Exam    Alert and oriented x3, no  apparent distress  Normocephalic atraumatic, trachea midline  Breathing is nonlabored  Abdomen is nonacute  Neurovascularly intact in bilateral lower extremities.  Soft Calf Compartments without signs of DVT.  Left lower extremity: Neurovascularly intact.  Soft Compartments without signs of DVT.  Incision over the left knee is fully healed consistent with previous left total knee arthroplasty.  She is able to flex from 0 to 125 degrees.  She has a benign left hip exam  Right lower extremity: Neurovascularly intact.  Soft calf compartments without signs of DVT.  She is able to flex from 10 to 95 degrees.  She has pain with patellofemoral grind and pain at tibiofemoral joint line.  She has laxity to varus valgus stress.  Negative anterior and posterior drawer.  Negative Lachman.  Benign right hip exam.       Images    All images were reviewed which are appropriate for templating purposes.  They show evidence of a cemented left total knee arthroplasty in good position without evidence of fracture dislocation and severe right knee arthritis with with joint space narrowing, osteophyte formation, subchondral sclerosis.    Impression    1) right knee pain  2) right knee arthritis  3) history of a left total knee arthroplasty functioning well    Plan    All the risk benefits and side effects of surgery were discussed including pain, bleeding, infection, injury to surrounding organs vessels, heart attack, stroke, fracture, dislocation, need for further surgery, and possibly death.  We will plan on using aspirin for DVT prophylaxis.  Patient will get all of their postoperative prescriptions today.  Patient will plan on going home the following day.  We will plan on using [employer] with tibial stems as their surgical implants.  All of their questions were answered and they agreed expressed full understanding.  We will see them on the day of surgery.  They will get a call from our surgical team about start time and  arrival time of surgery.  She has been seen and cleared by her cardiologist and pulmonologist who have deemed her stable for surgery.  We will match her other side components with the following implants put in on August 2020:    A Quantuvis PS total knee arthroplasty system with the following components  Femur: 4 Narrow Oxinium  Tibia: 3 left with 10 x 100 mm Short stubby stem  Polyethylene: 9 mm high flex XLPE  Patella: 29 oval  Fixation: 2 packages Simplex HV

## 2021-09-17 ENCOUNTER — PRE-ADMISSION TESTING (OUTPATIENT)
Dept: ADMISSIONS | Facility: MEDICAL CENTER | Age: 81
End: 2021-09-17
Attending: ORTHOPAEDIC SURGERY
Payer: MEDICARE

## 2021-09-17 DIAGNOSIS — Z01.812 PRE-OPERATIVE LABORATORY EXAMINATION: ICD-10-CM

## 2021-09-17 LAB — COVID ORDER STATUS COVID19: NORMAL

## 2021-09-19 ENCOUNTER — ANESTHESIA EVENT (OUTPATIENT)
Dept: SURGERY | Facility: MEDICAL CENTER | Age: 81
End: 2021-09-19
Payer: MEDICARE

## 2021-09-19 LAB
SARS-COV-2 RNA RESP QL NAA+PROBE: NOTDETECTED
SPECIMEN SOURCE: NORMAL

## 2021-09-20 ENCOUNTER — APPOINTMENT (OUTPATIENT)
Dept: RADIOLOGY | Facility: MEDICAL CENTER | Age: 81
End: 2021-09-20
Attending: PHYSICIAN ASSISTANT
Payer: MEDICARE

## 2021-09-20 ENCOUNTER — ANESTHESIA (OUTPATIENT)
Dept: SURGERY | Facility: MEDICAL CENTER | Age: 81
End: 2021-09-20
Payer: MEDICARE

## 2021-09-20 ENCOUNTER — HOSPITAL ENCOUNTER (OUTPATIENT)
Facility: MEDICAL CENTER | Age: 81
End: 2021-09-21
Attending: ORTHOPAEDIC SURGERY | Admitting: ORTHOPAEDIC SURGERY
Payer: MEDICARE

## 2021-09-20 DIAGNOSIS — M17.11 PRIMARY OSTEOARTHRITIS OF RIGHT KNEE: ICD-10-CM

## 2021-09-20 PROCEDURE — 700101 HCHG RX REV CODE 250: Performed by: PHYSICIAN ASSISTANT

## 2021-09-20 PROCEDURE — 700101 HCHG RX REV CODE 250: Performed by: ORTHOPAEDIC SURGERY

## 2021-09-20 PROCEDURE — 700102 HCHG RX REV CODE 250 W/ 637 OVERRIDE(OP): Performed by: PHYSICIAN ASSISTANT

## 2021-09-20 PROCEDURE — 97607 NEG PRS WND THR NDME<=50SQCM: CPT | Performed by: ORTHOPAEDIC SURGERY

## 2021-09-20 PROCEDURE — C1776 JOINT DEVICE (IMPLANTABLE): HCPCS | Performed by: ORTHOPAEDIC SURGERY

## 2021-09-20 PROCEDURE — 160048 HCHG OR STATISTICAL LEVEL 1-5: Performed by: ORTHOPAEDIC SURGERY

## 2021-09-20 PROCEDURE — 700111 HCHG RX REV CODE 636 W/ 250 OVERRIDE (IP): Performed by: ORTHOPAEDIC SURGERY

## 2021-09-20 PROCEDURE — 27447 TOTAL KNEE ARTHROPLASTY: CPT | Mod: ASROC,RT | Performed by: PHYSICIAN ASSISTANT

## 2021-09-20 PROCEDURE — 160036 HCHG PACU - EA ADDL 30 MINS PHASE I: Performed by: ORTHOPAEDIC SURGERY

## 2021-09-20 PROCEDURE — 700102 HCHG RX REV CODE 250 W/ 637 OVERRIDE(OP): Performed by: ANESTHESIOLOGY

## 2021-09-20 PROCEDURE — 501838 HCHG SUTURE GENERAL: Performed by: ORTHOPAEDIC SURGERY

## 2021-09-20 PROCEDURE — 700101 HCHG RX REV CODE 250: Performed by: ANESTHESIOLOGY

## 2021-09-20 PROCEDURE — 160009 HCHG ANES TIME/MIN: Performed by: ORTHOPAEDIC SURGERY

## 2021-09-20 PROCEDURE — 160041 HCHG SURGERY MINUTES - EA ADDL 1 MIN LEVEL 4: Performed by: ORTHOPAEDIC SURGERY

## 2021-09-20 PROCEDURE — 27447 TOTAL KNEE ARTHROPLASTY: CPT | Mod: RT | Performed by: ORTHOPAEDIC SURGERY

## 2021-09-20 PROCEDURE — 96374 THER/PROPH/DIAG INJ IV PUSH: CPT

## 2021-09-20 PROCEDURE — 96375 TX/PRO/DX INJ NEW DRUG ADDON: CPT

## 2021-09-20 PROCEDURE — 64447 NJX AA&/STRD FEMORAL NRV IMG: CPT | Performed by: ORTHOPAEDIC SURGERY

## 2021-09-20 PROCEDURE — 502000 HCHG MISC OR IMPLANTS RC 0278: Performed by: ORTHOPAEDIC SURGERY

## 2021-09-20 PROCEDURE — L8699 PROSTHETIC IMPLANT NOS: HCPCS | Performed by: ORTHOPAEDIC SURGERY

## 2021-09-20 PROCEDURE — 700105 HCHG RX REV CODE 258: Performed by: ORTHOPAEDIC SURGERY

## 2021-09-20 PROCEDURE — 96376 TX/PRO/DX INJ SAME DRUG ADON: CPT

## 2021-09-20 PROCEDURE — 97607 NEG PRS WND THR NDME<=50SQCM: CPT | Mod: ASROC | Performed by: PHYSICIAN ASSISTANT

## 2021-09-20 PROCEDURE — 160035 HCHG PACU - 1ST 60 MINS PHASE I: Performed by: ORTHOPAEDIC SURGERY

## 2021-09-20 PROCEDURE — 160002 HCHG RECOVERY MINUTES (STAT): Performed by: ORTHOPAEDIC SURGERY

## 2021-09-20 PROCEDURE — 700111 HCHG RX REV CODE 636 W/ 250 OVERRIDE (IP): Performed by: ANESTHESIOLOGY

## 2021-09-20 PROCEDURE — 160029 HCHG SURGERY MINUTES - 1ST 30 MINS LEVEL 4: Performed by: ORTHOPAEDIC SURGERY

## 2021-09-20 PROCEDURE — 502579 HCHG PACK, TOTAL KNEE: Performed by: ORTHOPAEDIC SURGERY

## 2021-09-20 PROCEDURE — 73560 X-RAY EXAM OF KNEE 1 OR 2: CPT | Mod: RT

## 2021-09-20 PROCEDURE — 94760 N-INVAS EAR/PLS OXIMETRY 1: CPT

## 2021-09-20 PROCEDURE — A9270 NON-COVERED ITEM OR SERVICE: HCPCS | Performed by: ANESTHESIOLOGY

## 2021-09-20 PROCEDURE — A9270 NON-COVERED ITEM OR SERVICE: HCPCS | Performed by: PHYSICIAN ASSISTANT

## 2021-09-20 PROCEDURE — 700111 HCHG RX REV CODE 636 W/ 250 OVERRIDE (IP): Performed by: PHYSICIAN ASSISTANT

## 2021-09-20 PROCEDURE — G0378 HOSPITAL OBSERVATION PER HR: HCPCS

## 2021-09-20 PROCEDURE — 503339 HCHG DRESSSING PICO: Performed by: ORTHOPAEDIC SURGERY

## 2021-09-20 DEVICE — CEMENT ORTHOPEDIC HV US  (10/PK): Type: IMPLANTABLE DEVICE | Site: KNEE | Status: FUNCTIONAL

## 2021-09-20 DEVICE — IMPLANT GII OVAL RESURFACING PAT 29MM (1EA): Type: IMPLANTABLE DEVICE | Site: KNEE | Status: FUNCTIONAL

## 2021-09-20 DEVICE — IMPLANTABLE DEVICE: Type: IMPLANTABLE DEVICE | Site: KNEE | Status: FUNCTIONAL

## 2021-09-20 DEVICE — IMPLANT GII CM TIB SIZE 3 RIGHT (1EA): Type: IMPLANTABLE DEVICE | Site: KNEE | Status: FUNCTIONAL

## 2021-09-20 DEVICE — LGN PS HIGH FLEX XLPE SZ 3-4 10MM (1EA): Type: IMPLANTABLE DEVICE | Site: KNEE | Status: FUNCTIONAL

## 2021-09-20 RX ORDER — ALBUTEROL SULFATE 90 UG/1
2 AEROSOL, METERED RESPIRATORY (INHALATION) EVERY 6 HOURS PRN
Status: DISCONTINUED | OUTPATIENT
Start: 2021-09-20 | End: 2021-09-21 | Stop reason: HOSPADM

## 2021-09-20 RX ORDER — CEFAZOLIN SODIUM IN 0.9 % NACL 2 G/100 ML
2 PLASTIC BAG, INJECTION (ML) INTRAVENOUS ONCE
Status: DISCONTINUED | OUTPATIENT
Start: 2021-09-20 | End: 2021-09-21 | Stop reason: HOSPADM

## 2021-09-20 RX ORDER — KETOROLAC TROMETHAMINE 30 MG/ML
15 INJECTION, SOLUTION INTRAMUSCULAR; INTRAVENOUS EVERY 6 HOURS
Status: DISCONTINUED | OUTPATIENT
Start: 2021-09-20 | End: 2021-09-21 | Stop reason: HOSPADM

## 2021-09-20 RX ORDER — LABETALOL HYDROCHLORIDE 5 MG/ML
5 INJECTION, SOLUTION INTRAVENOUS
Status: DISCONTINUED | OUTPATIENT
Start: 2021-09-20 | End: 2021-09-20 | Stop reason: HOSPADM

## 2021-09-20 RX ORDER — AMOXICILLIN 250 MG
1 CAPSULE ORAL NIGHTLY
Status: DISCONTINUED | OUTPATIENT
Start: 2021-09-20 | End: 2021-09-21 | Stop reason: HOSPADM

## 2021-09-20 RX ORDER — DIPHENHYDRAMINE HYDROCHLORIDE 50 MG/ML
25 INJECTION INTRAMUSCULAR; INTRAVENOUS EVERY 6 HOURS PRN
Status: DISCONTINUED | OUTPATIENT
Start: 2021-09-20 | End: 2021-09-21 | Stop reason: HOSPADM

## 2021-09-20 RX ORDER — DIPHENHYDRAMINE HCL 25 MG
25 TABLET ORAL NIGHTLY PRN
Status: DISCONTINUED | OUTPATIENT
Start: 2021-09-21 | End: 2021-09-21 | Stop reason: HOSPADM

## 2021-09-20 RX ORDER — PREGABALIN 100 MG/1
200 CAPSULE ORAL 3 TIMES DAILY
Status: DISCONTINUED | OUTPATIENT
Start: 2021-09-20 | End: 2021-09-21 | Stop reason: HOSPADM

## 2021-09-20 RX ORDER — AMOXICILLIN 250 MG
1 CAPSULE ORAL
Status: DISCONTINUED | OUTPATIENT
Start: 2021-09-20 | End: 2021-09-21 | Stop reason: HOSPADM

## 2021-09-20 RX ORDER — HYDROMORPHONE HYDROCHLORIDE 1 MG/ML
0.4 INJECTION, SOLUTION INTRAMUSCULAR; INTRAVENOUS; SUBCUTANEOUS
Status: DISCONTINUED | OUTPATIENT
Start: 2021-09-20 | End: 2021-09-20 | Stop reason: HOSPADM

## 2021-09-20 RX ORDER — ONDANSETRON 2 MG/ML
4 INJECTION INTRAMUSCULAR; INTRAVENOUS
Status: DISCONTINUED | OUTPATIENT
Start: 2021-09-20 | End: 2021-09-20 | Stop reason: HOSPADM

## 2021-09-20 RX ORDER — BUMETANIDE 1 MG/1
2 TABLET ORAL
Status: DISCONTINUED | OUTPATIENT
Start: 2021-09-20 | End: 2021-09-21 | Stop reason: HOSPADM

## 2021-09-20 RX ORDER — DEXAMETHASONE SODIUM PHOSPHATE 4 MG/ML
10 INJECTION, SOLUTION INTRA-ARTICULAR; INTRALESIONAL; INTRAMUSCULAR; INTRAVENOUS; SOFT TISSUE ONCE
Status: COMPLETED | OUTPATIENT
Start: 2021-09-21 | End: 2021-09-21

## 2021-09-20 RX ORDER — ROPIVACAINE HYDROCHLORIDE 5 MG/ML
INJECTION, SOLUTION EPIDURAL; INFILTRATION; PERINEURAL
Status: DISCONTINUED | OUTPATIENT
Start: 2021-09-20 | End: 2021-09-20 | Stop reason: HOSPADM

## 2021-09-20 RX ORDER — DIPHENHYDRAMINE HCL 25 MG
25 TABLET ORAL EVERY 6 HOURS PRN
Status: DISCONTINUED | OUTPATIENT
Start: 2021-09-20 | End: 2021-09-21 | Stop reason: HOSPADM

## 2021-09-20 RX ORDER — ONDANSETRON 2 MG/ML
4 INJECTION INTRAMUSCULAR; INTRAVENOUS EVERY 4 HOURS PRN
Status: DISCONTINUED | OUTPATIENT
Start: 2021-09-20 | End: 2021-09-21 | Stop reason: HOSPADM

## 2021-09-20 RX ORDER — ATORVASTATIN CALCIUM 20 MG/1
20 TABLET, FILM COATED ORAL
Status: DISCONTINUED | OUTPATIENT
Start: 2021-09-20 | End: 2021-09-21 | Stop reason: HOSPADM

## 2021-09-20 RX ORDER — MIDAZOLAM HYDROCHLORIDE 1 MG/ML
INJECTION INTRAMUSCULAR; INTRAVENOUS PRN
Status: DISCONTINUED | OUTPATIENT
Start: 2021-09-20 | End: 2021-09-20 | Stop reason: SURG

## 2021-09-20 RX ORDER — HYDRALAZINE HYDROCHLORIDE 20 MG/ML
5 INJECTION INTRAMUSCULAR; INTRAVENOUS
Status: DISCONTINUED | OUTPATIENT
Start: 2021-09-20 | End: 2021-09-20 | Stop reason: HOSPADM

## 2021-09-20 RX ORDER — ACETAMINOPHEN 325 MG/1
650 TABLET ORAL EVERY 6 HOURS
Status: DISCONTINUED | OUTPATIENT
Start: 2021-09-20 | End: 2021-09-21 | Stop reason: HOSPADM

## 2021-09-20 RX ORDER — ONDANSETRON 4 MG/1
4 TABLET, ORALLY DISINTEGRATING ORAL EVERY 4 HOURS PRN
Status: DISCONTINUED | OUTPATIENT
Start: 2021-09-20 | End: 2021-09-21 | Stop reason: HOSPADM

## 2021-09-20 RX ORDER — IBUPROFEN 400 MG/1
800 TABLET ORAL 3 TIMES DAILY PRN
Status: DISCONTINUED | OUTPATIENT
Start: 2021-09-23 | End: 2021-09-21 | Stop reason: HOSPADM

## 2021-09-20 RX ORDER — HYDROMORPHONE HYDROCHLORIDE 1 MG/ML
0.1 INJECTION, SOLUTION INTRAMUSCULAR; INTRAVENOUS; SUBCUTANEOUS
Status: DISCONTINUED | OUTPATIENT
Start: 2021-09-20 | End: 2021-09-20 | Stop reason: HOSPADM

## 2021-09-20 RX ORDER — LUBIPROSTONE 24 UG/1
24 CAPSULE ORAL 2 TIMES DAILY WITH MEALS
Status: DISCONTINUED | OUTPATIENT
Start: 2021-09-20 | End: 2021-09-21 | Stop reason: HOSPADM

## 2021-09-20 RX ORDER — OMEPRAZOLE 20 MG/1
20 CAPSULE, DELAYED RELEASE ORAL DAILY
Status: DISCONTINUED | OUTPATIENT
Start: 2021-09-20 | End: 2021-09-21 | Stop reason: HOSPADM

## 2021-09-20 RX ORDER — METOPROLOL TARTRATE 1 MG/ML
1 INJECTION, SOLUTION INTRAVENOUS
Status: DISCONTINUED | OUTPATIENT
Start: 2021-09-20 | End: 2021-09-20 | Stop reason: HOSPADM

## 2021-09-20 RX ORDER — MAGNESIUM SULFATE HEPTAHYDRATE 500 MG/ML
INJECTION, SOLUTION INTRAMUSCULAR; INTRAVENOUS PRN
Status: DISCONTINUED | OUTPATIENT
Start: 2021-09-20 | End: 2021-09-20 | Stop reason: SURG

## 2021-09-20 RX ORDER — DEXAMETHASONE SODIUM PHOSPHATE 4 MG/ML
4 INJECTION, SOLUTION INTRA-ARTICULAR; INTRALESIONAL; INTRAMUSCULAR; INTRAVENOUS; SOFT TISSUE
Status: DISCONTINUED | OUTPATIENT
Start: 2021-09-20 | End: 2021-09-21 | Stop reason: HOSPADM

## 2021-09-20 RX ORDER — CHLORPROMAZINE HYDROCHLORIDE 25 MG/ML
25 INJECTION INTRAMUSCULAR EVERY 6 HOURS PRN
Status: DISCONTINUED | OUTPATIENT
Start: 2021-09-20 | End: 2021-09-21 | Stop reason: HOSPADM

## 2021-09-20 RX ORDER — OXYCODONE HYDROCHLORIDE 10 MG/1
10 TABLET ORAL
Status: DISCONTINUED | OUTPATIENT
Start: 2021-09-20 | End: 2021-09-21 | Stop reason: HOSPADM

## 2021-09-20 RX ORDER — CEFAZOLIN SODIUM 1 G/3ML
2000 INJECTION, POWDER, FOR SOLUTION INTRAMUSCULAR; INTRAVENOUS ONCE
Status: COMPLETED | OUTPATIENT
Start: 2021-09-20 | End: 2021-09-20

## 2021-09-20 RX ORDER — ACETAMINOPHEN 500 MG
1000 TABLET ORAL ONCE
Status: COMPLETED | OUTPATIENT
Start: 2021-09-20 | End: 2021-09-20

## 2021-09-20 RX ORDER — ROCURONIUM BROMIDE 10 MG/ML
INJECTION, SOLUTION INTRAVENOUS PRN
Status: DISCONTINUED | OUTPATIENT
Start: 2021-09-20 | End: 2021-09-20 | Stop reason: SURG

## 2021-09-20 RX ORDER — OXYCODONE HCL 5 MG/5 ML
5 SOLUTION, ORAL ORAL
Status: COMPLETED | OUTPATIENT
Start: 2021-09-20 | End: 2021-09-20

## 2021-09-20 RX ORDER — TRANEXAMIC ACID 100 MG/ML
INJECTION, SOLUTION INTRAVENOUS PRN
Status: DISCONTINUED | OUTPATIENT
Start: 2021-09-20 | End: 2021-09-20 | Stop reason: SURG

## 2021-09-20 RX ORDER — SODIUM CHLORIDE 9 MG/ML
INJECTION, SOLUTION INTRAMUSCULAR; INTRAVENOUS; SUBCUTANEOUS
Status: DISCONTINUED | OUTPATIENT
Start: 2021-09-20 | End: 2021-09-20 | Stop reason: HOSPADM

## 2021-09-20 RX ORDER — DEXAMETHASONE SODIUM PHOSPHATE 4 MG/ML
INJECTION, SOLUTION INTRA-ARTICULAR; INTRALESIONAL; INTRAMUSCULAR; INTRAVENOUS; SOFT TISSUE PRN
Status: DISCONTINUED | OUTPATIENT
Start: 2021-09-20 | End: 2021-09-20 | Stop reason: SURG

## 2021-09-20 RX ORDER — EPINEPHRINE 1 MG/ML(1)
AMPUL (ML) INJECTION
Status: DISCONTINUED | OUTPATIENT
Start: 2021-09-20 | End: 2021-09-20 | Stop reason: HOSPADM

## 2021-09-20 RX ORDER — LIDOCAINE HYDROCHLORIDE 20 MG/ML
INJECTION, SOLUTION EPIDURAL; INFILTRATION; INTRACAUDAL; PERINEURAL PRN
Status: DISCONTINUED | OUTPATIENT
Start: 2021-09-20 | End: 2021-09-20 | Stop reason: SURG

## 2021-09-20 RX ORDER — OXYCODONE HYDROCHLORIDE 10 MG/1
20 TABLET ORAL
Status: DISCONTINUED | OUTPATIENT
Start: 2021-09-20 | End: 2021-09-21 | Stop reason: HOSPADM

## 2021-09-20 RX ORDER — HYDROMORPHONE HYDROCHLORIDE 1 MG/ML
0.2 INJECTION, SOLUTION INTRAMUSCULAR; INTRAVENOUS; SUBCUTANEOUS
Status: DISCONTINUED | OUTPATIENT
Start: 2021-09-20 | End: 2021-09-20 | Stop reason: HOSPADM

## 2021-09-20 RX ORDER — ESCITALOPRAM OXALATE 10 MG/1
20 TABLET ORAL DAILY
Status: DISCONTINUED | OUTPATIENT
Start: 2021-09-20 | End: 2021-09-21 | Stop reason: HOSPADM

## 2021-09-20 RX ORDER — DIPHENHYDRAMINE HYDROCHLORIDE 50 MG/ML
12.5 INJECTION INTRAMUSCULAR; INTRAVENOUS
Status: DISCONTINUED | OUTPATIENT
Start: 2021-09-20 | End: 2021-09-20 | Stop reason: HOSPADM

## 2021-09-20 RX ORDER — BISACODYL 10 MG
10 SUPPOSITORY, RECTAL RECTAL
Status: DISCONTINUED | OUTPATIENT
Start: 2021-09-20 | End: 2021-09-21 | Stop reason: HOSPADM

## 2021-09-20 RX ORDER — POLYETHYLENE GLYCOL 3350 17 G/17G
1 POWDER, FOR SOLUTION ORAL 2 TIMES DAILY PRN
Status: DISCONTINUED | OUTPATIENT
Start: 2021-09-20 | End: 2021-09-21 | Stop reason: HOSPADM

## 2021-09-20 RX ORDER — CHLORPROMAZINE HYDROCHLORIDE 25 MG/1
25 TABLET, FILM COATED ORAL EVERY 6 HOURS PRN
Status: DISCONTINUED | OUTPATIENT
Start: 2021-09-20 | End: 2021-09-21 | Stop reason: HOSPADM

## 2021-09-20 RX ORDER — LEVOTHYROXINE SODIUM 0.05 MG/1
50 TABLET ORAL
Status: DISCONTINUED | OUTPATIENT
Start: 2021-09-20 | End: 2021-09-21 | Stop reason: HOSPADM

## 2021-09-20 RX ORDER — HYDROMORPHONE HYDROCHLORIDE 1 MG/ML
1 INJECTION, SOLUTION INTRAMUSCULAR; INTRAVENOUS; SUBCUTANEOUS
Status: DISCONTINUED | OUTPATIENT
Start: 2021-09-20 | End: 2021-09-21 | Stop reason: HOSPADM

## 2021-09-20 RX ORDER — HALOPERIDOL 5 MG/ML
1 INJECTION INTRAMUSCULAR
Status: DISCONTINUED | OUTPATIENT
Start: 2021-09-20 | End: 2021-09-20 | Stop reason: HOSPADM

## 2021-09-20 RX ORDER — SODIUM CHLORIDE, SODIUM LACTATE, POTASSIUM CHLORIDE, CALCIUM CHLORIDE 600; 310; 30; 20 MG/100ML; MG/100ML; MG/100ML; MG/100ML
INJECTION, SOLUTION INTRAVENOUS CONTINUOUS
Status: DISCONTINUED | OUTPATIENT
Start: 2021-09-20 | End: 2021-09-20 | Stop reason: HOSPADM

## 2021-09-20 RX ORDER — HALOPERIDOL 5 MG/ML
1 INJECTION INTRAMUSCULAR EVERY 6 HOURS PRN
Status: DISCONTINUED | OUTPATIENT
Start: 2021-09-20 | End: 2021-09-21 | Stop reason: HOSPADM

## 2021-09-20 RX ORDER — POTASSIUM CHLORIDE 600 MG/1
16 TABLET, FILM COATED, EXTENDED RELEASE ORAL DAILY
Status: DISCONTINUED | OUTPATIENT
Start: 2021-09-20 | End: 2021-09-21 | Stop reason: HOSPADM

## 2021-09-20 RX ORDER — ONDANSETRON 2 MG/ML
INJECTION INTRAMUSCULAR; INTRAVENOUS PRN
Status: DISCONTINUED | OUTPATIENT
Start: 2021-09-20 | End: 2021-09-20 | Stop reason: SURG

## 2021-09-20 RX ORDER — ENEMA 19; 7 G/133ML; G/133ML
1 ENEMA RECTAL
Status: DISCONTINUED | OUTPATIENT
Start: 2021-09-20 | End: 2021-09-21 | Stop reason: HOSPADM

## 2021-09-20 RX ORDER — SODIUM CHLORIDE, SODIUM LACTATE, POTASSIUM CHLORIDE, CALCIUM CHLORIDE 600; 310; 30; 20 MG/100ML; MG/100ML; MG/100ML; MG/100ML
INJECTION, SOLUTION INTRAVENOUS CONTINUOUS
Status: ACTIVE | OUTPATIENT
Start: 2021-09-20 | End: 2021-09-20

## 2021-09-20 RX ORDER — DOCUSATE SODIUM 100 MG/1
100 CAPSULE, LIQUID FILLED ORAL 2 TIMES DAILY
Status: DISCONTINUED | OUTPATIENT
Start: 2021-09-20 | End: 2021-09-21 | Stop reason: HOSPADM

## 2021-09-20 RX ORDER — ACETAMINOPHEN 325 MG/1
650 TABLET ORAL EVERY 6 HOURS PRN
Status: DISCONTINUED | OUTPATIENT
Start: 2021-09-25 | End: 2021-09-21 | Stop reason: HOSPADM

## 2021-09-20 RX ORDER — IMIPRAMINE HYDROCHLORIDE 10 MG/1
20 TABLET, FILM COATED ORAL EVERY EVENING
Status: DISCONTINUED | OUTPATIENT
Start: 2021-09-20 | End: 2021-09-21 | Stop reason: HOSPADM

## 2021-09-20 RX ORDER — AMBRISENTAN 10 MG/1
10 TABLET, FILM COATED ORAL
Status: DISCONTINUED | OUTPATIENT
Start: 2021-09-20 | End: 2021-09-21 | Stop reason: HOSPADM

## 2021-09-20 RX ORDER — SPIRONOLACTONE 25 MG/1
25 TABLET ORAL
Status: DISCONTINUED | OUTPATIENT
Start: 2021-09-20 | End: 2021-09-21 | Stop reason: HOSPADM

## 2021-09-20 RX ORDER — DEXTROSE MONOHYDRATE, SODIUM CHLORIDE, AND POTASSIUM CHLORIDE 50; 1.49; 4.5 G/1000ML; G/1000ML; G/1000ML
INJECTION, SOLUTION INTRAVENOUS CONTINUOUS
Status: DISPENSED | OUTPATIENT
Start: 2021-09-20 | End: 2021-09-20

## 2021-09-20 RX ORDER — OXYCODONE HCL 5 MG/5 ML
10 SOLUTION, ORAL ORAL
Status: COMPLETED | OUTPATIENT
Start: 2021-09-20 | End: 2021-09-20

## 2021-09-20 RX ORDER — SCOLOPAMINE TRANSDERMAL SYSTEM 1 MG/1
1 PATCH, EXTENDED RELEASE TRANSDERMAL
Status: DISCONTINUED | OUTPATIENT
Start: 2021-09-20 | End: 2021-09-21 | Stop reason: HOSPADM

## 2021-09-20 RX ORDER — TRAMADOL HYDROCHLORIDE 50 MG/1
50 TABLET ORAL EVERY 4 HOURS PRN
Status: DISCONTINUED | OUTPATIENT
Start: 2021-09-20 | End: 2021-09-21 | Stop reason: HOSPADM

## 2021-09-20 RX ORDER — BUPIVACAINE HYDROCHLORIDE 2.5 MG/ML
INJECTION, SOLUTION EPIDURAL; INFILTRATION; INTRACAUDAL
Status: COMPLETED | OUTPATIENT
Start: 2021-09-20 | End: 2021-09-20

## 2021-09-20 RX ADMIN — PROPOFOL 80 MG: 10 INJECTION, EMULSION INTRAVENOUS at 10:16

## 2021-09-20 RX ADMIN — MIDAZOLAM HYDROCHLORIDE 0.5 MG: 1 INJECTION, SOLUTION INTRAMUSCULAR; INTRAVENOUS at 09:44

## 2021-09-20 RX ADMIN — TRANEXAMIC ACID 1000 MG: 100 INJECTION, SOLUTION INTRAVENOUS at 11:19

## 2021-09-20 RX ADMIN — ATORVASTATIN CALCIUM 20 MG: 20 TABLET, FILM COATED ORAL at 20:48

## 2021-09-20 RX ADMIN — TRANEXAMIC ACID 1000 MG: 100 INJECTION, SOLUTION INTRAVENOUS at 10:23

## 2021-09-20 RX ADMIN — FENTANYL CITRATE 25 MCG: 50 INJECTION, SOLUTION INTRAMUSCULAR; INTRAVENOUS at 12:12

## 2021-09-20 RX ADMIN — ACETAMINOPHEN 1000 MG: 500 TABLET, FILM COATED ORAL at 08:54

## 2021-09-20 RX ADMIN — ONDANSETRON 4 MG: 2 INJECTION INTRAMUSCULAR; INTRAVENOUS at 20:34

## 2021-09-20 RX ADMIN — LIDOCAINE HYDROCHLORIDE 50 MG: 20 INJECTION, SOLUTION EPIDURAL; INFILTRATION; INTRACAUDAL; PERINEURAL at 10:16

## 2021-09-20 RX ADMIN — ROCURONIUM BROMIDE 20 MG: 10 INJECTION, SOLUTION INTRAVENOUS at 10:16

## 2021-09-20 RX ADMIN — FENTANYL CITRATE 50 MCG: 50 INJECTION, SOLUTION INTRAMUSCULAR; INTRAVENOUS at 09:44

## 2021-09-20 RX ADMIN — FENTANYL CITRATE 25 MCG: 50 INJECTION, SOLUTION INTRAMUSCULAR; INTRAVENOUS at 12:47

## 2021-09-20 RX ADMIN — PREGABALIN 200 MG: 100 CAPSULE ORAL at 20:44

## 2021-09-20 RX ADMIN — FENTANYL CITRATE 25 MCG: 50 INJECTION, SOLUTION INTRAMUSCULAR; INTRAVENOUS at 12:17

## 2021-09-20 RX ADMIN — FENTANYL CITRATE 25 MCG: 50 INJECTION, SOLUTION INTRAMUSCULAR; INTRAVENOUS at 12:28

## 2021-09-20 RX ADMIN — SENNOSIDES AND DOCUSATE SODIUM 1 TABLET: 50; 8.6 TABLET ORAL at 20:44

## 2021-09-20 RX ADMIN — ASPIRIN 81 MG: 81 TABLET, COATED ORAL at 20:43

## 2021-09-20 RX ADMIN — AMBRISENTAN 10 MG: 10 TABLET, FILM COATED ORAL at 21:00

## 2021-09-20 RX ADMIN — BUMETANIDE 2 MG: 1 TABLET ORAL at 20:43

## 2021-09-20 RX ADMIN — DEXAMETHASONE SODIUM PHOSPHATE 8 MG: 4 INJECTION, SOLUTION INTRAMUSCULAR; INTRAVENOUS at 10:26

## 2021-09-20 RX ADMIN — ACETAMINOPHEN 650 MG: 325 TABLET, FILM COATED ORAL at 17:23

## 2021-09-20 RX ADMIN — KETOROLAC TROMETHAMINE 15 MG: 30 INJECTION, SOLUTION INTRAMUSCULAR; INTRAVENOUS at 23:44

## 2021-09-20 RX ADMIN — PREGABALIN 200 MG: 100 CAPSULE ORAL at 15:25

## 2021-09-20 RX ADMIN — ACETAMINOPHEN 650 MG: 325 TABLET, FILM COATED ORAL at 23:43

## 2021-09-20 RX ADMIN — EPHEDRINE SULFATE 5 MG: 50 INJECTION INTRAMUSCULAR; INTRAVENOUS; SUBCUTANEOUS at 10:36

## 2021-09-20 RX ADMIN — MAGNESIUM SULFATE HEPTAHYDRATE 1 G: 500 INJECTION, SOLUTION INTRAMUSCULAR; INTRAVENOUS at 10:37

## 2021-09-20 RX ADMIN — IMIPRAMINE HYDROCHLORIDE 20 MG: 10 TABLET, FILM COATED ORAL at 18:00

## 2021-09-20 RX ADMIN — EPHEDRINE SULFATE 5 MG: 50 INJECTION INTRAMUSCULAR; INTRAVENOUS; SUBCUTANEOUS at 10:25

## 2021-09-20 RX ADMIN — KETOROLAC TROMETHAMINE 15 MG: 30 INJECTION, SOLUTION INTRAMUSCULAR; INTRAVENOUS at 17:23

## 2021-09-20 RX ADMIN — DOCUSATE SODIUM 100 MG: 100 CAPSULE, LIQUID FILLED ORAL at 17:23

## 2021-09-20 RX ADMIN — OXYCODONE HYDROCHLORIDE 10 MG: 5 SOLUTION ORAL at 12:22

## 2021-09-20 RX ADMIN — ONDANSETRON 4 MG: 2 INJECTION INTRAMUSCULAR; INTRAVENOUS at 11:04

## 2021-09-20 RX ADMIN — BUPIVACAINE HYDROCHLORIDE 20 ML: 2.5 INJECTION, SOLUTION EPIDURAL; INFILTRATION; INTRACAUDAL; PERINEURAL at 09:51

## 2021-09-20 RX ADMIN — CEFAZOLIN 2 MG: 330 INJECTION, POWDER, FOR SOLUTION INTRAMUSCULAR; INTRAVENOUS at 10:17

## 2021-09-20 RX ADMIN — LUBIPROSTONE 24 MCG: 24 CAPSULE ORAL at 17:30

## 2021-09-20 RX ADMIN — DEXTROSE MONOHYDRATE, SODIUM CHLORIDE, AND POTASSIUM CHLORIDE: 50; 4.5; 1.49 INJECTION, SOLUTION INTRAVENOUS at 15:26

## 2021-09-20 RX ADMIN — LIDOCAINE HYDROCHLORIDE 0.5 ML: 10 INJECTION, SOLUTION INFILTRATION; PERINEURAL at 08:52

## 2021-09-20 RX ADMIN — OXYCODONE HYDROCHLORIDE 10 MG: 10 TABLET ORAL at 20:43

## 2021-09-20 RX ADMIN — ESCITALOPRAM OXALATE 20 MG: 10 TABLET ORAL at 20:48

## 2021-09-20 RX ADMIN — EPHEDRINE SULFATE 5 MG: 50 INJECTION INTRAMUSCULAR; INTRAVENOUS; SUBCUTANEOUS at 10:33

## 2021-09-20 RX ADMIN — BUPIVACAINE HYDROCHLORIDE 20 ML: 2.5 INJECTION, SOLUTION EPIDURAL; INFILTRATION; INTRACAUDAL; PERINEURAL at 09:44

## 2021-09-20 RX ADMIN — SPIRONOLACTONE 25 MG: 25 TABLET ORAL at 20:44

## 2021-09-20 RX ADMIN — SODIUM CHLORIDE, POTASSIUM CHLORIDE, SODIUM LACTATE AND CALCIUM CHLORIDE: 600; 310; 30; 20 INJECTION, SOLUTION INTRAVENOUS at 08:52

## 2021-09-20 ASSESSMENT — PATIENT HEALTH QUESTIONNAIRE - PHQ9
2. FEELING DOWN, DEPRESSED, IRRITABLE, OR HOPELESS: NOT AT ALL
1. LITTLE INTEREST OR PLEASURE IN DOING THINGS: NOT AT ALL
SUM OF ALL RESPONSES TO PHQ9 QUESTIONS 1 AND 2: 0

## 2021-09-20 ASSESSMENT — PAIN DESCRIPTION - PAIN TYPE
TYPE: SURGICAL PAIN

## 2021-09-20 ASSESSMENT — FIBROSIS 4 INDEX: FIB4 SCORE: 2.2

## 2021-09-20 ASSESSMENT — PAIN SCALES - GENERAL: PAIN_LEVEL: 0

## 2021-09-20 NOTE — LETTER
July 26, 2021    [facility]  [address] [place] [address]  [phone]    Patient Name: Maddy Hodge  Surgeon Name: Surgeon(s):  RAMU Sarabia II, P.A.-C.  [facility] [facility]  Surgery Date: 9/20/2021    The time of your surgery is not final and may change up to and until the day of your surgery. You will be contacted 24-48 hours prior to your surgery date with your check-in and surgery time.    If you will not be at one of the below numbers please call/text the surgery scheduler at [phone]  Cell Phone: [phone]    BEFORE YOUR SURGERY  Pre Registration and/or Lab Work must be done within and no earlier than 28 days prior to your surgery date. Please call [phone] for an appointment as soon as possible.    Pre op Appointment:   Date: 9/6/21   Time: 8:00AM   Provider:    [facility] [address] [facility] [phone]  Instructions: Bring a list of all medications you are taking including the dosing and frequency.      Please refrain from smoking any substance after midnight prior to surgery. Smoking may interfere with the anesthetic and frequently produces nausea during the recovery period.    Continue taking all lifesaving medications. Including the morning of your surgery with small sip of water.    Please read the MEDICATION INSTRUCTIONS below completely.    DAY OF YOUR SURGERY  Nothing to eat or drink after midnight     Please arrive at the hospital/surgery center at the check-in time provided.     An adult will need to bring you and take you home after your surgery.     AFTER YOUR SURGERY  Post op Appointment:   Date: 10/7/21   Time: 8:30AM   With:    [facility] [address] [facility] [phone]    - Therapy- Your first appointment should be 1  week(s) after your surgery. For your convenience we have 4 Physical Therapy locations: [facility]  [place], and [facility]. Call our office ASAP to schedule an appointment at [phone] or take the enclosed Therapy Prescription to a facility of your choice.    TIME OFF WORK  FMLA or Disability forms can be faxed directly to: [phone] or you may drop them off at [address] [place] [phone]. Our office charges a $20.00 fee per form. Forms will be completed within 10 business days of drop off and payment received. For the status of your forms you may contact our disability office directly [phone].    MEDICATION INSTRUCTIONS  The following medications should be stopped a minimum of 10 days prior to surgery:  All over the counter, Supplements & Herbal medications    Anorectics: Phentermine (Adipex-P, Lomaira and Suprenza), Phentermine-topiramate (Qsymia), Bupropion-naltrexone (Contrave)    Opiod Partial Agonists/Opioid Antagonists: Buprenorphine (Subocone, Belbuca, Butrans, Probuphine Implant, Sublocade), Naltrexone (ReVia, Vivitrol), Naloxone    Amphetamines: Dextroamphetamine/Amphetamine (Adderall, Mydayis), Methylphenidate Hydrochloride (Concerta, Metadate, Methylin, Ritalin)    The following medications should be stopped 5 days prior to surgery:  Blood Thinners: Any Aspirin, Aspirin products, anti-inflammatories such as ibuprofen and any blood thinners such as Coumadin and Plavix. Please consult your prescribing physician if you are on life saving blood thinners, in regards to when to stop medications prior to surgery.     The following medications should be stopped a minimum of 3 days prior to surgery:  PDE-5 inhibitors: Sildenafil (Viagra), Tadalafil (Cialis), Vardenafil (Levitra), Avanafil (Stendra)    MAO Inhibitors: Rasagiline (Azilect), Selegiline (Eldepryl, Emsam, Selapar), Isocarboxazid (Marplan), Phenelzine (Nardil)

## 2021-09-20 NOTE — OR NURSING
1424- Report received from Olimpia MCCLELLAN.  Pt resting comfortably at this time.  1452- Pt to room via CNA.

## 2021-09-20 NOTE — ANESTHESIA PROCEDURE NOTES
Peripheral Block    Date/Time: 9/20/2021 9:44 AM  Performed by: Adán Pennington M.D.  Authorized by: Adán Pennington M.D.     Start Time:  9/20/2021 9:44 AM  End Time:  9/20/2021 9:51 AM  Reason for Block: at surgeon's request and post-op pain management ONLY    patient identified, IV checked, site marked, risks and benefits discussed, surgical consent, monitors and equipment checked, pre-op evaluation and timeout performed    Patient Position:  Supine  Prep: ChloraPrep    Monitoring:  Heart rate, continuous pulse ox and cardiac monitor  Block Region:  Lower Extremity  Lower Extremity - Block Type:  Selective FEMORAL nerve block at the Adductor Canal    Laterality:  Right  Procedures: ultrasound guided  Image captured, interpreted and electronically stored.  Local Infiltration:  Lidocaine  Strength:  1 %  Dose:  3 ml  Block Type:  Single-shot  Needle Length:  100mm  Needle Gauge:  21 G  Needle Localization:  Ultrasound guidance  Injection Assessment:  Negative aspiration for heme, no paresthesia on injection, incremental injection and local visualized surrounding nerve on ultrasound  Evidence of intravascular injection: No

## 2021-09-20 NOTE — ANESTHESIA PROCEDURE NOTES
Airway    Date/Time: 9/20/2021 10:19 AM  Performed by: Adán Pennington M.D.  Authorized by: Adán Pennington M.D.     Location:  OR  Urgency:  Elective  Indications for Airway Management:  Anesthesia      Spontaneous Ventilation: absent    Sedation Level:  Deep  Preoxygenated: Yes    Patient Position:  Sniffing  Mask Difficulty Assessment:  1 - vent by mask  Final Airway Type:  Endotracheal airway  Final Endotracheal Airway:  ETT  Cuffed: Yes    Technique Used for Successful ETT Placement:  Direct laryngoscopy    Insertion Site:  Oral  Blade Type:  Maira  Laryngoscope Blade/Videolaryngoscope Blade Size:  3  ETT Size (mm):  7.0  Leak Pressue (cm H2O):  21  Measured from:  Lips  Placement Verified by: auscultation and capnometry    Cormack-Lehane Classification:  Grade I - full view of glottis  Number of Attempts at Approach:  1

## 2021-09-20 NOTE — OR NURSING
1140 PT RECEIVED IN PACU, REPORT RECEIVED.  VSS, RESP SPONT, EVEN, NON LABORED.     1210 PT REPORTS PAIN 7/10. PLAN TO MEDICATE FOR PAIN. VSS    1213 PT REPORTS PAIN 5-6/10 MEDICATE FOR PAIN. SEE MAR.      1217 PT REPORTS R KNEE PAIN 5/10. MEDICATE FOR PAIN. SEE MAR. XRAY AT BEDSIDE.     1221 PT REPORTS PAIN INCREASING PLAN FOR ORAL ANALGESIA. SEE MAR.     1243 4/10 MEDICATE FOR PAIN SEE MAR. VSS.     1300 PT REPORTS PAIN TO R KNEE 3/10 AND TOLERABLE.     1340 PT MEETS CRITERIA FOR TRANSFER TO ROOM.     1424 REPORT GIVEN TO GURU MCCLELLAN TO ASSUME CARE OF THIS PT.

## 2021-09-20 NOTE — ANESTHESIA PREPROCEDURE EVALUATION
80yo multiple medical problems    Relevant Problems   ANESTHESIA   (positive) ALISSA (obstructive sleep apnea)      PULMONARY   (positive) COPD with asthma (HCC)   (positive) Chronic obstructive pulmonary disease (HCC)      NEURO   (positive) History of vertebral fracture      CARDIAC   (positive) Essential hypertension   (positive) Pulmonary hypertension (HCC)      GI   (positive) GERD (gastroesophageal reflux disease)   (positive) Gastroesophageal reflux disease with esophagitis         (positive) CKD (chronic kidney disease) stage 3, GFR 30-59 ml/min (HCC)      ENDO   (positive) Hypothyroidism due to Hashimoto's thyroiditis      Other   (positive) Essential tremor   (positive) Facet arthritis of lumbar region   (positive) Major depressive disorder, recurrent episode, mild (CMS-HCC)   (positive) Neuropathy (CMS-HCC)   (positive) Osteoarthritis of right knee   (positive) Spinal stenosis of lumbar region with neurogenic claudication       Physical Exam    Airway   Mallampati: III  TM distance: >3 FB  Neck ROM: full       Cardiovascular - normal exam  Rhythm: regular  Rate: normal  (-) murmur     Dental - normal exam           Pulmonary - normal exam  Breath sounds clear to auscultation     Abdominal    Neurological - normal exam             ASA 3 due to Pulmonary Hypertension  Anesthesia Plan    ASA 3   ASA physical status 3 criteria: other (comment)    Plan - general and peripheral nerve block     Peripheral nerve block will be post-op pain control  Airway plan will be ETT    (Adductor Canal Block for post-op analgesia)      Induction: intravenous    Postoperative Plan: Postoperative administration of opioids is intended.    Pertinent diagnostic labs and testing reviewed    Informed Consent:    Anesthetic plan and risks discussed with patient.    Use of blood products discussed with: patient whom consented to blood products.

## 2021-09-20 NOTE — PROGRESS NOTES
COVID-19 surge in effect  Received report from Olimpia MCCLELLAN at 1346. Pt to floor via bed at 1457 Pt is awake and alert. No signs of distress. Pt denies any nausea. Pt denies any numbness or tingling. Pt reports pain 3/10, declines pain medication at this time. Pt is able to planter flex and dorsi flex ble, + pulses. Dressing to r knee is cdi. Polar ice in place to r knee fall precautions in place. Bed in lowest and locked position. Call light within reach.

## 2021-09-20 NOTE — DISCHARGE PLANNING
Anticipated Discharge Disposition: Home    Action: LSW completed chart review. Per d/c planning note, pt has all needed equipment including cane, front-wheel walker, raised toilet seat, and shower chair. Per note, pt has oxygen through [employer] and will bring portable oxygen with her. No d/c needs reported or identified at this time.    Barriers to Discharge: None    Plan: LSW to follow and assist as needed.

## 2021-09-20 NOTE — ANESTHESIA POSTPROCEDURE EVALUATION
Patient: Maddy Hodge    Procedure Summary     Date: 09/20/21 Room / Location:  OR 06 / SURGERY [facility]    Anesthesia Start: 1010 Anesthesia Stop: 1143    Procedure: ARTHROPLASTY, KNEE, TOTAL (Right Knee) Diagnosis:       Osteoarthritis of right knee      (Osteoarthritis of right knee [M17.11])    Surgeons: Kayden Perez M.D. Responsible Provider: Adán Pennington M.D.    Anesthesia Type: general, peripheral nerve block ASA Status: 3          Final Anesthesia Type: general, peripheral nerve block  Last vitals  BP   Blood Pressure : 144/64    Temp   36.6 °C (97.9 °F)    Pulse   79   Resp   16    SpO2   93 %      Anesthesia Post Evaluation    Patient location during evaluation: PACU  Patient participation: complete - patient participated  Level of consciousness: lethargic  Pain score: 0    Airway patency: patent  Anesthetic complications: no  Cardiovascular status: hemodynamically stable  Respiratory status: acceptable  Hydration status: euvolemic    PONV: none          No complications documented.     Nurse Pain Score: 0 (NPRS)

## 2021-09-20 NOTE — ANESTHESIA TIME REPORT
Anesthesia Start and Stop Event Times     Date Time Event    9/20/2021 0957 Ready for Procedure     1010 Anesthesia Start     1143 Anesthesia Stop        Responsible Staff  09/20/21    Name Role Begin End    Adán Pennington M.D. Anesth 1010 1143        Preop Diagnosis (Free Text):  Pre-op Diagnosis     Osteoarthritis of right knee [M17.11]        Preop Diagnosis (Codes):  Diagnosis Information     Diagnosis Code(s): Osteoarthritis of right knee [M17.11]        Post op Diagnosis  Osteoarthritis of right knee      Premium Reason  Non-Premium    Comments:

## 2021-09-20 NOTE — OP REPORT
Date of Surgery:  9-20-21  Pre-operative Diagnosis:  right knee arthritis    Post-operative Diagnosis:  right knee arthritis    Procedure:  right knee replacement    Implants used:  A [employer] PS total knee arthroplasty system with the following components  Femur: 4 Narrow Oxinium PS  Tibia: 3 left with 10 x 100 mm Stem Extension  Polyethylene: 10 mm high-flex PS XLPE  Patella: 29 Oval  Fixation: 2 Packages Simplex HV    Surgeon:  Kayden Perez MD    1st Assistant:   TRIP Ryder    Anesthesiologist:   DIOR Pennington MD    EBL:  100 cc    Specimen:  none    Indications for procedure:  This patient is a 81 y.o. female with a long standing history of right knee arthritis. The patient had failed conservative therapy including anti-inflammatory medications, activity modifications, injections, physical therapy and assistive devices. she was indicated for a right total knee replacement. The patient expressed an understanding of the risks of pain, bleeding, infection, dislocation, malalignment, need for further surgery, damage to surrounding structures, neurovascular compromise, blood clots, leg-length discrepancy both apparent and actual, anesthetic complications, and serious medical consequences including but not limited to heart attack, stroke or even death. It was explained that the implants are man-made products and thus subject to possible failure.  The patient expressed an understanding and wished to proceed. Specific risks based on the patient's medical history were addressed. A clearance was obtained by the medical physicians and the patient was taken to the operating room on the day of surgery for the above named procedure.     Description of procedure:  The patient was met in the pre-operative holding area. The right knee was marked as the appropriate surgical site with indelible ink. They were taken to the operating room where the anesthesia department started a adductor/general for intraoperative and  "post-operative anesthesia and pain control. The patient was placed on the OR table and a tourniquet was placed high on the operative thigh. All bony prominences were padded appropriately. The operative knee was prepped and draped in a standard sterile surgical fashion. A time out procedure was called to verify the side and site of surgery, the proposed surgical procedure, and the administration of pre-operative antibiotics. After successful completion of the time out procedure, our attention was turned to the right knee.  The tourniquet was inflated after exsanguination with an Esmarch bandage. The knee was flexed and a straight incision was made just medial to the midline. The capsule of the knee was cleared and a midvastus arthrotomy was performed. The patella was subluxated laterally and a medial release was performed to properly visualize the posteromedial aspect of the tibial plateau. The knee was extended, the patellar tendon was protected and the infrapatellar fat pad was excised. A lateral release was performed. The patella was turned on its side and held in place with two penetrating towel clips. A free-hand patellar cut was made, the patella was sized and the appropriate lug holes were drilled. The patella was subluxed, the knee was flexed. The tourniquet was released. Hemostasis was obtained. Each hemijoint was cleared of soft tissue. The ACL was removed. The femoral canal was entered with the step drill and the distal femoral cutting guide was pinned in place in 5 degrees of valgus. The distal cut was made and the bony pieces were removed. The femur was sized to a size 4 Narrow and the appropriate cutting jig was pinned in place in 3 degrees of external rotation. The bony cuts were made, we noted a \"grand piano sign\" anteriorly. The box for the PS post was cut in the standard fashion.  A large Hugo's cyst was evacuated  Now the femur was retracted posteriorly with a lap sponge to protect the " intercondylar area. The proximal tibia was exposed, the depth of our resection was based on taking 2 mm off the low side of the bone. We used the intramedullary drill to enter the tibial canal and set our alignment based on the patient's anatomy.  We first set our depth and then our checked our varus/valgus alignment with the extramedullary guide donal. We made our bony cuts and removed the tibial piece en bloc. The laminar spreaders were placed and the hypercurved osteotomes were used to remove posterior femoral osteophytes. The ligaments were balanced in flexion and extension.   The tibia was then sized to a size 3 and the trial component was placed in the proper amount of external rotation. A trial femoral component was placed and with the 10 mm polyethylene we noted full extension without recurvatum and greater than 130 degrees of flexion with appropriate patellar tracking. At this point we removed the trial components and thoroughly irrigated the wound. Given her poor bone quality a stem extension was warranted and the tibia was prepped in a standard fashion. Osteophytes were removed. The tourniquet was reinflated.  The real components were opened in a sterile fashion on the back table and then cemented into place sequentially, first the tibia, then the femur, then the patella. The cement was allowed to cure with the trial polyethylene component in place. After the cement had hardened and all excess cement was removed, the knee was taken through a range of motion. Again we noted full extension and greater than 125 degrees of flexion with appropriate varus/valgus stability and patellar tracking. Therefore the real polyethylene was opened and inserted into the tibial tray. An audible click was heard as it engaged the tibia. Now a dilute betadine solution was instilled into the knee for 3 minutes before being pulse-lavaged out. The tourniquet was again released and hemostasis was obtained. The knee was flexed, the  arthrotomy was closed with #1 Quill, followed by 2-0 Vicryl in the deep dermal layer in a buried interrupted fashion. The skin was closed with staples, and a sterile Negative Pressure wound dressing was applied. The patient is currently in the operating room awaiting reversal of anesthesia. All sponge and instrument counts were correct at the end of the case.

## 2021-09-21 VITALS
SYSTOLIC BLOOD PRESSURE: 102 MMHG | TEMPERATURE: 97.5 F | BODY MASS INDEX: 32.25 KG/M2 | HEIGHT: 60 IN | DIASTOLIC BLOOD PRESSURE: 47 MMHG | HEART RATE: 79 BPM | OXYGEN SATURATION: 97 % | WEIGHT: 164.24 LBS | RESPIRATION RATE: 17 BRPM

## 2021-09-21 LAB
HCT VFR BLD AUTO: 31.3 % (ref 37–47)
HGB BLD-MCNC: 10.3 G/DL (ref 12–16)

## 2021-09-21 PROCEDURE — A9270 NON-COVERED ITEM OR SERVICE: HCPCS | Performed by: PHYSICIAN ASSISTANT

## 2021-09-21 PROCEDURE — 85018 HEMOGLOBIN: CPT

## 2021-09-21 PROCEDURE — G0378 HOSPITAL OBSERVATION PER HR: HCPCS

## 2021-09-21 PROCEDURE — 700102 HCHG RX REV CODE 250 W/ 637 OVERRIDE(OP): Performed by: PHYSICIAN ASSISTANT

## 2021-09-21 PROCEDURE — 700111 HCHG RX REV CODE 636 W/ 250 OVERRIDE (IP): Performed by: PHYSICIAN ASSISTANT

## 2021-09-21 PROCEDURE — 99024 POSTOP FOLLOW-UP VISIT: CPT | Performed by: ORTHOPAEDIC SURGERY

## 2021-09-21 PROCEDURE — 94760 N-INVAS EAR/PLS OXIMETRY 1: CPT

## 2021-09-21 PROCEDURE — 96376 TX/PRO/DX INJ SAME DRUG ADON: CPT

## 2021-09-21 PROCEDURE — 97110 THERAPEUTIC EXERCISES: CPT

## 2021-09-21 PROCEDURE — 97535 SELF CARE MNGMENT TRAINING: CPT

## 2021-09-21 PROCEDURE — 97162 PT EVAL MOD COMPLEX 30 MIN: CPT

## 2021-09-21 PROCEDURE — 96375 TX/PRO/DX INJ NEW DRUG ADDON: CPT

## 2021-09-21 PROCEDURE — 36415 COLL VENOUS BLD VENIPUNCTURE: CPT

## 2021-09-21 PROCEDURE — 97165 OT EVAL LOW COMPLEX 30 MIN: CPT

## 2021-09-21 PROCEDURE — 85014 HEMATOCRIT: CPT

## 2021-09-21 RX ADMIN — LUBIPROSTONE 24 MCG: 24 CAPSULE ORAL at 05:45

## 2021-09-21 RX ADMIN — LEVOTHYROXINE SODIUM 50 MCG: 0.05 TABLET ORAL at 05:40

## 2021-09-21 RX ADMIN — ACETAMINOPHEN 650 MG: 325 TABLET, FILM COATED ORAL at 11:02

## 2021-09-21 RX ADMIN — OXYCODONE HYDROCHLORIDE 10 MG: 10 TABLET ORAL at 09:22

## 2021-09-21 RX ADMIN — DOCUSATE SODIUM 100 MG: 100 CAPSULE, LIQUID FILLED ORAL at 05:40

## 2021-09-21 RX ADMIN — TRAMADOL HYDROCHLORIDE 50 MG: 50 TABLET, FILM COATED ORAL at 11:02

## 2021-09-21 RX ADMIN — ASPIRIN 81 MG: 81 TABLET, COATED ORAL at 05:40

## 2021-09-21 RX ADMIN — OMEPRAZOLE 20 MG: 20 CAPSULE, DELAYED RELEASE ORAL at 05:40

## 2021-09-21 RX ADMIN — ESCITALOPRAM OXALATE 20 MG: 10 TABLET ORAL at 05:40

## 2021-09-21 RX ADMIN — ACETAMINOPHEN 650 MG: 325 TABLET, FILM COATED ORAL at 05:39

## 2021-09-21 RX ADMIN — DEXAMETHASONE SODIUM PHOSPHATE 10 MG: 4 INJECTION, SOLUTION INTRA-ARTICULAR; INTRALESIONAL; INTRAMUSCULAR; INTRAVENOUS; SOFT TISSUE at 05:40

## 2021-09-21 RX ADMIN — KETOROLAC TROMETHAMINE 15 MG: 30 INJECTION, SOLUTION INTRAMUSCULAR; INTRAVENOUS at 05:40

## 2021-09-21 RX ADMIN — PREGABALIN 200 MG: 100 CAPSULE ORAL at 08:40

## 2021-09-21 RX ADMIN — KETOROLAC TROMETHAMINE 15 MG: 30 INJECTION, SOLUTION INTRAMUSCULAR; INTRAVENOUS at 11:03

## 2021-09-21 ASSESSMENT — GAIT ASSESSMENTS
DISTANCE (FEET): 10
DISTANCE (FEET): 100
ASSISTIVE DEVICE: FRONT WHEEL WALKER
DEVIATION: DECREASED HEEL STRIKE;DECREASED TOE OFF;ANTALGIC

## 2021-09-21 ASSESSMENT — COGNITIVE AND FUNCTIONAL STATUS - GENERAL
PERSONAL GROOMING: A LITTLE
DRESSING REGULAR LOWER BODY CLOTHING: A LOT
WALKING IN HOSPITAL ROOM: A LITTLE
SUGGESTED CMS G CODE MODIFIER MOBILITY: CK
MOBILITY SCORE: 16
STANDING UP FROM CHAIR USING ARMS: A LITTLE
CLIMB 3 TO 5 STEPS WITH RAILING: A LITTLE
TOILETING: A LITTLE
HELP NEEDED FOR BATHING: A LITTLE
TURNING FROM BACK TO SIDE WHILE IN FLAT BAD: A LOT
DAILY ACTIVITIY SCORE: 18
DRESSING REGULAR UPPER BODY CLOTHING: A LITTLE
MOVING TO AND FROM BED TO CHAIR: A LOT
MOVING FROM LYING ON BACK TO SITTING ON SIDE OF FLAT BED: A LITTLE
SUGGESTED CMS G CODE MODIFIER DAILY ACTIVITY: CK

## 2021-09-21 ASSESSMENT — PAIN DESCRIPTION - PAIN TYPE
TYPE: ACUTE PAIN;SURGICAL PAIN

## 2021-09-21 ASSESSMENT — ACTIVITIES OF DAILY LIVING (ADL): TOILETING: INDEPENDENT

## 2021-09-21 NOTE — DISCHARGE INSTRUCTIONS
Dr. Perez's Discharge Instructions:  The first week after your joint replacement is a time to recover from the surgery. We expect light exercise to keep you active and mobile. Dr. Perez requires a walker or cane for most patients in the first few days following surgery to try to prevent falls or complications.     Most patients are prescribed two medications for pain control: a narcotic such as Oxycodone or Hydrocodone and a milder medication called Tramadol. These can be alternated for pain control, and the priority is to decrease use of the stronger narcotic as soon as tolerated.   Take your pain medication as appropriate to ensure that your pain is not limiting your recovery. You'll be seen in clinic in 7-10 days (this appointment has already been made, call our office if you're unsure of the time). At that time, we'll prescribe your physical therapy to help with the recovery phase.     -Keep dressing clean and dry. Leave dressing in place until follow up. If you have an incisional vac dressing, the battery may die before your first post operative appointment, but you can leave the surgical dressing in place and it will be removed at your clinic visit. The battery of the dressing may die, and the sponge will inflate because it is no longer holding suction. You can still leave the dressing in place and it will be removed at the office. Call the office if you notice drainage from the surgical dressing.    -OK to shower, keep dressing in place. Pat dressing dry, do not rub incision    -Do not soak incision in bath, hot tub or pool    -Follow up with Dr. Perez at regularly scheduled time    -Call [facility] office at [phone] for questions    -Weight bearing status: as tolerated with walker/cane    -Take medication as prescribed for DVT prophylaxis    Discharge Instructions    Discharged to home by car with relative. Discharged via wheelchair, hospital escort: Yes.  Special equipment needed: Not Applicable    Be sure  to schedule a follow-up appointment with your primary care doctor or any specialists as instructed.     Discharge Plan:   Diet Plan: Discussed  Activity Level: Discussed  Confirmed Follow up Appointment: Appointment Scheduled  Confirmed Symptoms Management: Discussed  Influenza Vaccine Indication: Patient Refuses    I understand that a diet low in cholesterol, fat, and sodium is recommended for good health. Unless I have been given specific instructions below for another diet, I accept this instruction as my diet prescription.   Other diet: resume home diet    Special Instructions: Discharge instructions for the Orthopedic Patient    Follow up with Primary Care Physician within 2 weeks of discharge to home, regarding:  Review of medications and diagnostic testing.  Surveillance for medical complications.  Workup and treatment of osteoporosis, if appropriate.     -Is this a Hip/Knee/Shoulder Joint Replacement patient? Yes   TOTAL KNEE REPLACEMENT, AFTER-CARE GUIDELINES     These instructions provide you with information on caring for yourself and your knee after surgery. Your health care provider may also give you instructions that are more specific. Your treatment was planned and performed according to current medical practices but problems sometimes occur. Call your health care provider if you have any problems or questions.     WHAT TO EXPECT AFTER THE PROCEDURE   After your procedure, your knee will typically be stiff, sore, and bruised. This will improve over time.     Pain   · Follow your home pain management plan as discussed with your nurse and as directed by your provider.   · It is important to follow any scheduled pain medications for maximal pain relief.   · If prescribed opioid medication, the goal is to use opioids only as needed and to wean off prescription pain medicine as soon as possible.   · Ice can be used for pain control.   · Put ice in a plastic bag.   · Place a towel between your skin and the  bag.   · Leave the ice on for 20 minutes, 2-3 times a day at a minimum.   · Most patients are off the pain pills by 3 weeks. If your pain continues to be severe, follow up with your provider.     Infection   Knee joint infections occur in fewer than 2% of patients. The most common causes of infection following total knee replacement surgery are from bacteria that enter the bloodstream during dental procedures, urinary tract infections, or skin infections. These bacteria can lodge around your knee replacement and cause an infection.   · Keep the incision as clean and dry as possible.   · Always wash your hands before touching your incision.   · Avoid dental care for 3 months after surgery. Your provider may recommend taking a dose of antibiotics an hour prior to any dental procedure. After 2 years, most providers recommend antibiotics only before an extensive procedure. Ask your provider what they recommend.   · Signs and symptoms of infection include low-grade fever, redness, pain, swelling and drainage from your incision. Notify your provider IMMEDIATELY if you develop ANY of these symptoms.     Post op Disturbances   · Bowel Habits - Constipation is extremely common and caused by a combination of anesthesia, lack of mobility, dehydration and pain medicine. Use stool softeners or laxatives if necessary. It is important not to ignore this problem as bowel obstructions can be a serious complication after joint replacement surgery.   · Mood/Energy Level - Many patients experience a lack of energy and endurance for up to 2-3 months after surgery. Some people feel down and can even become depressed. This is likely due to postoperative anemia, change in activity level, lack of sleep, pain medicine and just the emotional reaction to the surgery itself that is a big disruption in a person’s life. This usually passes. If symptoms persist, follow up with your primary care provider.  · Returning to Work - Your provider will  give you specific instructions based on your profession. Generally, if you work a sedentary job requiring little standing or walking, most patients may return within 2-6 weeks. Manual labor jobs involving walking, lifting and standing may take 3-4 months. Your provider’s office can provide a release to part-time or light duty work early on in your recovery and progress you to full duty as able.   · Driving - You can begin driving once cleared by your provider, provided you are no longer taking narcotic pain medication or any other medications that impair driving. Discuss the length of time expected with your provider as returning to driving depends on things such as your vehicle, which knee was replaced (right or left), and knee motion, strength and reflexes returning appropriately.   · Avoiding falls - A fall during the first few weeks after surgery can damage your new knee and may result in a need for further surgery.  throw rugs and tack down loose carpeting. Be aware of floor hazards such as pets, small objects or uneven surfaces. Notify your provider of any falls.   · [employer] Metal Detectors - The sensitivity of metal detectors varies and it is likely that your prosthesis will cause an alarm. Inform the  of your artificial joint.     Diet   · Resume your normal diet as tolerated.   · It is important to achieve a healthy nutritional status by eating a well-balanced diet on a regular basis.   · Your provider may recommend that you take iron and vitamin supplements.   · Continue to drink plenty of fluids.     Shower/Bathing   · You may shower as soon as you get home from the hospital unless otherwise instructed.   · Keep your incision out of water to prevent infection. To keep the incision dry when showering, cover it with a plastic bag or plastic wrap. If your bandage is waterproof, this may not be necessary. o Pat incision dry if it gets wet. Do not rub. Notify your provider.   · Do not  submerge in a bath until cleared by your provider. Your staples must be out and the incision completely healed.     Dressing Change: Only change your dressing if directed by your provider.   · Wash hands.   · Open all dressing change materials.   · Remove old dressing and discard.   · Inspect incision for signs of irritation or infection including redness, increase in clear drainage, yellow/green drainage, odor and surrounding skin hot to touch. Notify your provider if present.   ·  the new dressing by one corner and lay over the incision. Be careful not to touch the inside of the dressing that will lay over the incision.   · Secure in place as instructed. Swelling/Bruising   · Swelling is normal after knee replacement and can involve the thigh, knee, calf and foot.   · Swelling can last from 3-6 months.   · To reduce swelling, elevate your leg higher than your heart while reclining. The first week you are home you should elevate your leg an equal amount of time as you are active.   · The swelling is usually worse after you go home since you are upright for longer periods of time.   · Bruising often does not appear until after you arrive home and can be quite dramatic- appearing purple, black, or green. Bruising is typically not concerning and will subside without any treatment.     Blood Clot Prevention   Your treatment plan includes multiple preventative measures to decrease the risk of blood clots in the legs (DVTs) and the less common, but serious, clots that travel to the lungs (pulmonary emboli). Most patients are at standard risk for them, but people who are at higher risk include those who have had previous clots, a family history of clotting, smoking, diabetes, obesity, advanced age, use estrogen and/or live a sedentary lifestyle.     · Signs of blood clots in legs include - Swelling in thigh, calf or ankle that does not go down with elevation. Pain, heat and tenderness in calf, back of calf or groin  area. NOTE: blood clots can occur in either leg.   · Signs of blood clots in lungs include - Sudden increased shortness of breath, sudden onset of chest pain, and localized chest pain with coughing.   · If you experience any of the above symptoms, notify your provider and seek medical attention immediately.   · You received anticoagulant therapy (blood thinners) in the hospital. Continue the prescribed blood-thinning medication at home, as directed by your provider.   · Your risk for developing a clot continues for up to 2-3 months after surgery. Avoid prolonged sitting and dehydration (long air trips and car trips). If you do take a trip during this time, please get up, move around every 1-1.5 hours, and discuss all travel plans with your provider.     Activity   Once home, stay active. The key is not to overdo it. While you can expect some good days and some bad days, you should notice a gradual improvement and a gradual increase in your endurance over the next 6 to 12 months. Exercise is a critical component of recovery, particularly during the first few weeks after surgery.     · Normal activities of daily living - Expect to resume most within 3 to 6 weeks following surgery. Some pain with activity and at night is common for several weeks after surgery. Walk as much as you like once your doctor gives permission to proceed, but remember that walking is no substitute for the exercises your doctor and physical therapist prescribe. Use a walker, crutches or cane to assist with walking until you can walk smoothly (minimal or no limp) without assistance.   · Physical Therapy Exercises - Follow your home exercise program as instructed by your physical therapist during your hospital stay. Call and set up outpatient physical therapy appointments per your provider’s recommendations. Physical therapy after the hospital stay focuses on increasing your range of motion, strengthening your muscles and improving your  gait/walking pattern. Contact your provider for the referral to outpatient physical therapy if you have not yet received this. -   · Riding a stationary bicycle can help maintain muscle tone and keep your knee flexible. Begin stationary bicycling as directed by your physical therapist or provider.   · Sexual Activity - Your provider can tell you when it safe to resume sexual activity.   · Sleeping Positions - You can safely sleep on your back, on either side, or on your stomach.   · Other Activities - Lower impact activities are preferred. Consult your provider if you have specific questions.     When to Call the Doctor   Call the provider if you experience:   · Fever over 100.5° F   · Increased pain, drainage, redness, odor or heat around the incision area   · Shaking chills   · Increased knee pain with activity and rest   · Increased pain in calf, tenderness or redness above or below the knee   · Increased swelling of calf, ankle, foot   · Sudden increased shortness of breath, sudden onset of chest pain, localized chest pain with coughing   · Incision opening   Or, if there are any questions or concerns about medications or care.     Infection statistic resource:   https://www.[employer]/contents/prosthetic-joint-infection-epidemiology-microbiology-clinical-manifestations-and-diagnosis     -Is this patient being discharged with medication to prevent blood clots?  Yes, Aspirin Aspirin, ASA oral tablets  What is this medicine?  ASPIRIN (AS pir in) is a pain reliever. It is used to treat mild pain and fever. This medicine is also used as directed by a doctor to prevent and to treat heart attacks, to prevent strokes and blood clots, and to treat arthritis or inflammation.  This medicine may be used for other purposes; ask your health care provider or pharmacist if you have questions.  COMMON BRAND NAME(S): Aspir-Low, Aspir-Adry, Aspirtab, Chandu Advanced Aspirin, Chandu Aspirin, Chandu Aspirin Extra Strength, Chanud  Aspirin Plus, Chandu Extra Strength, Chandu Extra Strength Plus, Chandu Genuine Aspirin, Chandu Womens Aspirin, Bufferin, Bufferin Extra Strength, Bufferin Low Dose  What should I tell my health care provider before I take this medicine?  They need to know if you have any of these conditions:  · anemia  · asthma  · bleeding problems  · child with chickenpox, the flu, or other viral infection  · diabetes  · gout  · if you frequently drink alcohol containing drinks  · kidney disease  · liver disease  · low level of vitamin K  · lupus  · smoke tobacco  · stomach ulcers or other problems  · an unusual or allergic reaction to aspirin, tartrazine dye, other medicines, dyes, or preservatives  · pregnant or trying to get pregnant  · breast-feeding  How should I use this medicine?  Take this medicine by mouth with a glass of water. Follow the directions on the package or prescription label. You can take this medicine with or without food. If it upsets your stomach, take it with food. Do not take your medicine more often than directed.  Talk to your pediatrician regarding the use of this medicine in children. While this drug may be prescribed for children as young as 12 years of age for selected conditions, precautions do apply. Children and teenagers should not use this medicine to treat chicken pox or flu symptoms unless directed by a doctor.  Patients over 65 years old may have a stronger reaction and need a smaller dose.  Overdosage: If you think you have taken too much of this medicine contact a poison control center or emergency room at once.  NOTE: This medicine is only for you. Do not share this medicine with others.  What if I miss a dose?  If you are taking this medicine on a regular schedule and miss a dose, take it as soon as you can. If it is almost time for your next dose, take only that dose. Do not take double or extra doses.  What may interact with this medicine?  Do not take this medicine with any of the  following medications:  · cidofovir  · ketorolac  · probenecid  This medicine may also interact with the following medications:  · alcohol  · alendronate  · bismuth subsalicylate  · flavocoxid  · herbal supplements like feverfew, garlic, vickey, ginkgo biloba, horse chestnut  · medicines for diabetes or glaucoma like acetazolamide, methazolamide  · medicines for gout  · medicines that treat or prevent blood clots like enoxaparin, heparin, ticlopidine, warfarin  · other aspirin and aspirin-like medicines  · NSAIDs, medicines for pain and inflammation, like ibuprofen or naproxen  · pemetrexed  · sulfinpyrazone  · varicella live vaccine  This list may not describe all possible interactions. Give your health care provider a list of all the medicines, herbs, non-prescription drugs, or dietary supplements you use. Also tell them if you smoke, drink alcohol, or use illegal drugs. Some items may interact with your medicine.  What should I watch for while using this medicine?  If you are treating yourself for pain, tell your doctor or health care professional if the pain lasts more than 10 days, if it gets worse, or if there is a new or different kind of pain. Tell your doctor if you see redness or swelling. Also, check with your doctor if you have a fever that lasts for more than 3 days. Only take this medicine to prevent heart attacks or blood clotting if prescribed by your doctor or health care professional.  Do not take aspirin or aspirin-like medicines with this medicine. Too much aspirin can be dangerous. Always read the labels carefully.  This medicine can irritate your stomach or cause bleeding problems. Do not smoke cigarettes or drink alcohol while taking this medicine. Do not lie down for 30 minutes after taking this medicine to prevent irritation to your throat.  If you are scheduled for any medical or dental procedure, tell your healthcare provider that you are taking this medicine. You may need to stop taking  this medicine before the procedure.  This medicine may be used to treat migraines. If you take migraine medicines for 10 or more days a month, your migraines may get worse. Keep a diary of headache days and medicine use. Contact your healthcare professional if your migraine attacks occur more frequently.  What side effects may I notice from receiving this medicine?  Side effects that you should report to your doctor or health care professional as soon as possible:  · allergic reactions like skin rash, itching or hives, swelling of the face, lips, or tongue  · breathing problems  · changes in hearing, ringing in the ears  · confusion  · general ill feeling or flu-like symptoms  · pain on swallowing  · redness, blistering, peeling or loosening of the skin, including inside the mouth or nose  · signs and symptoms of bleeding such as bloody or black, tarry stools; red or dark-brown urine; spitting up blood or brown material that looks like coffee grounds; red spots on the skin; unusual bruising or bleeding from the eye, gums, or nose  · trouble passing urine or change in the amount of urine  · unusually weak or tired  · yellowing of the eyes or skin  Side effects that usually do not require medical attention (report to your doctor or health care professional if they continue or are bothersome):  · diarrhea or constipation  · headache  · nausea, vomiting  · stomach gas, heartburn  This list may not describe all possible side effects. Call your doctor for medical advice about side effects. You may report side effects to FDA at 2-845-FDA-2884.  Where should I keep my medicine?  Keep out of the reach of children.  Store at room temperature between 15 and 30 degrees C (59 and 86 degrees F). Protect from heat and moisture. Do not use this medicine if it has a strong vinegar smell. Throw away any unused medicine after the expiration date.  NOTE: This sheet is a summary. It may not cover all possible information. If you have  questions about this medicine, talk to your doctor, pharmacist, or health care provider.  © 2020 [employer] (2018-01-30 10:42:13)      · Is patient discharged on Warfarin / Coumadin?   No     Depression / Suicide Risk    As you are discharged from this [facility], it is important to learn how to keep safe from harming yourself.    Recognize the warning signs:  · Abrupt changes in personality, positive or negative- including increase in energy   · Giving away possessions  · Change in eating patterns- significant weight changes-  positive or negative  · Change in sleeping patterns- unable to sleep or sleeping all the time   · Unwillingness or inability to communicate  · Depression  · Unusual sadness, discouragement and loneliness  · Talk of wanting to die  · Neglect of personal appearance   · Rebelliousness- reckless behavior  · Withdrawal from people/activities they love  · Confusion- inability to concentrate     If you or a loved one observes any of these behaviors or has concerns about self-harm, here's what you can do:  · Talk about it- your feelings and reasons for harming yourself  · Remove any means that you might use to hurt yourself (examples: pills, rope, extension cords, firearm)  · Get professional help from the community (Mental Health, Substance Abuse, psychological counseling)  · Do not be alone:Call your Safe Contact- someone whom you trust who will be there for you.  · Call your local CRISIS HOTLINE 682-2538 or 101-774-5561  · Call your local Children's Mobile Crisis Response Team [place] (464) 489-3497 or www.[employer]  · Call the toll free National Suicide Prevention Hotlines   · National Suicide Prevention Lifeline 991-472-EBQT (4441)  · National Hope Line Network 800-SUICIDE (962-9663)

## 2021-09-21 NOTE — THERAPY
"Occupational Therapy   Initial Evaluation     Patient Name: Maddy Hodge  Age:  81 y.o., Sex:  female  Medical Record #: [id number]  Today's Date: 9/21/2021     Precautions  Precautions: Weight Bearing As Tolerated Right Lower Extremity  Comments: s/p R TKA    Assessment    Patient is 81 y.o. female with a diagnosis of R knee OA, now POD #1 R TKA. Additional factors influencing patient status / progress: h/o L TKA, ALISSA, COPD, HTN, CKD, GERD, HTD, depression, neuropathy, and lumbar spine stenosis. Pt uses continuous home O2 at 2L via NC. Pt lives with his supportive son, Johnathan, and was I with all ADLs/IADLs, ambulates with use of SPC for both indoor and outdoor ambulation at [facility]. She was provided detailed education re: post op care, dressing techniques, shower strategies with SELINA dressing mgt, use of AEs/AD/DMEs, and home safety. Pt presents today with post op pain and LOB x1 during STS, but was able to self-correct posture, and was able to tolerate most ADLs and functional txfs with FWW use at spv level, required mod A with donning shoes and min A to thread suprapubic catheter into L leg hole. She states her son would normally assist with these if pt is unable to. Pt also has a dtr who lives in [facility], and can provide assistance as well, if son is not able. Pt's home is adequately equipped with DMEs to aid her during recovery. No acute OT needs at this time. Pt also declined need for HH.    Plan    Recommend Occupational Therapy for Evaluation only .    DC Equipment Recommendations: None  Discharge Recommendations: Anticipate that the patient will have no further occupational therapy needs after discharge from the hospital     Subjective    \"I could use a pain pill now, actually.\" (RN informed)     Objective       09/21/21 1956   Prior Living Situation   Prior Services None   Housing / Facility 1 Story House   Steps Into Home 2   Steps In Home 0   Bathroom Set up Walk In Shower;Shower Glass Doors;Grab " Bars;Shower Chair  (handheld shower)   Equipment Owned Front-Wheel Walker;Single Point Cane;Tub / Shower Seat;Grab Bar(s) In Tub / Shower;Hand Held Shower   Lives with - Patient's Self Care Capacity Adult Children   Comments pt lives with supportive sonJohnathan   Prior Level of ADL Function   Self Feeding Independent   Grooming / Hygiene Independent   Bathing Independent   Dressing Independent   Toileting Independent   Prior Level of IADL Function   Medication Management Independent   Laundry Independent   Kitchen Mobility Independent   Finances Requires Assist   Home Management Requires Assist   Shopping Requires Assist   Prior Level Of Mobility Independent With Device in Home;Independent With Device in Community   Driving / Transportation Relatives / Others Provide Transportation   Occupation (Pre-Hospital Vocational) Retired Due To Age   Comments pt would use SPC for indoor/outdoor ambulation   History of Falls   History of Falls No   Precautions   Precautions Weight Bearing As Tolerated Right Lower Extremity   Comments s/p R TKA   Pain   Pain Scales 0 to 10 Scale    Intervention Medication (see MAR)   Pain 0 - 10 Group   Location Knee   Location Orientation Right   Pain Rating Scale (NPRS) 6   Description Aching   Comfort Goal Comfort with Movement   Therapist Pain Assessment 7;During Activity;Post Activity Pain Same as Prior to Activity;Nurse Notified   Non Verbal Descriptors   Non Verbal Scale  Calm;Unlabored Breathing   Cognition    Cognition / Consciousness WDL   Level of Consciousness Alert   Comments pleasant and cooperative   Active ROM Upper Body   Active ROM Upper Body  WDL   Dominant Hand Right   Strength Upper Body   Upper Body Strength  WDL   Balance Assessment   Sitting Balance (Static) Good   Sitting Balance (Dynamic) Fair +   Standing Balance (Static) Fair +   Standing Balance (Dynamic) Fair   Weight Shift Sitting Good   Weight Shift Standing Fair   Comments with FWW   Bed Mobility    Supine to Sit  Minimal Assist   Scooting Supervised   Comments HOB flat, rails not utilized   ADL Assessment   Grooming Seated;Supervision   Upper Body Dressing Supervision   Lower Body Dressing Moderate Assist  (donning shoes, threading catheter into L leg hole)   How much help from another person does the patient currently need...   Putting on and taking off regular lower body clothing? 2   Bathing (including washing, rinsing, and drying)? 3   Toileting, which includes using a toilet, bedpan, or urinal? 3   Putting on and taking off regular upper body clothing? 3   Taking care of personal grooming such as brushing teeth? 3   Eating meals? 4   6 Clicks Daily Activity Score 18   Functional Mobility   Sit to Stand Supervised   Bed, Chair, Wheelchair Transfer   (SBA)   Transfer Method Stand Step   Mobility in room with FWW   Distance (Feet) 10   # of Times Distance was Traveled 2   Activity Tolerance   Sitting in Chair 5 mins   Sitting Edge of Bed 15 mins   Standing 5 mins total   Education Group   Education Provided Role of Occupational Therapist;Home Safety;Transfers;Activities of Daily Living   Role of Occupational Therapist Patient Response Patient;Acceptance;Explanation   Home Safety Patient Response Patient;Acceptance;Explanation;Verbal Demonstration   Transfers Patient Response Patient;Acceptance;Explanation;Verbal Demonstration;Reinforcement Needed   ADL Patient Response Patient;Acceptance;Explanation;Verbal Demonstration;Action Demonstration   Anticipated Discharge Equipment and Recommendations   DC Equipment Recommendations None   Discharge Recommendations Anticipate that the patient will have no further occupational therapy needs after discharge from the hospital   Interdisciplinary Plan of Care Collaboration   IDT Collaboration with  Nursing   Patient Position at End of Therapy Seated;Call Light within Reach;Tray Table within Reach;Phone within Reach   Collaboration Comments RN aware of OT session and dc recs   Session  Information   Date / Session Number  9/21 #1 (one time only)   Priority 0                  shortness of breath and fever

## 2021-09-21 NOTE — DISCHARGE SUMMARY
Maddy Hodge was admitted on 9/20/2021 for Arthritis of right knee [M17.11]  Arthritis of knee, right [M17.11]  Patient was diagnosed with severe degenerative arthritis in the right knee and underwent a right total knee arthroplasty by Dr. Kayden Perez on the date of admission. Please see dictated operative note for further information.    Hospital course: standard    The patient has done well, with no complications.  Patient denies chest pain, calf pain or shortness of breath.   Pain is well-controlled at present.  Patient is ambulating well with the use of an assistive device, and progressing in physical therapy.   Patient is neurologically and vascularly intact with palpable pedal pulses bilaterally.   Narcotic dosages were chosen by taking into account the the patient's previous history of opoid use and to ensure proper pain control after surgery    Discharge date: 9-21-21    Patient is being discharged to home after am physical therapy.     Allergies:  Pcn [penicillins], Sulfa drugs, Macrobid [nitrofurantoin], and Tape       Medication List      CONTINUE taking these medications      Instructions   Amitiza 24 MCG capsule  Generic drug: lubiprostone   Take 24 mcg by mouth 2 times a day with meals. Indications: Chronic Constipation of Unknown Cause  Dose: 24 mcg     * Aspirin 81 81 MG EC tablet  Generic drug: aspirin   Take 81 mg by mouth every day with lunch.  Dose: 81 mg     * aspirin 81 MG EC tablet  Commonly known as: Aspirin 81   Take 1 Tablet by mouth 2 times a day.  Dose: 81 mg     atorvastatin 20 MG Tabs  Commonly known as: LIPITOR   TAKE 1 TABLET BY MOUTH EVERY DAY     bumetanide 2 MG tablet  Commonly known as: BUMEX   Take 2 mg by mouth at bedtime. Indications: Edema, High Blood Pressure Disorder  Dose: 2 mg     * docusate sodium 250 MG capsule  Commonly known as: COLACE   Take 250 mg by mouth every day. Indications: Constipation  Dose: 250 mg     * docusate sodium 50 MG Caps  Commonly known  as: COLACE   Take 1 Capsule by mouth 2 times a day.  Dose: 50 mg     escitalopram 20 MG tablet  Commonly known as: LEXAPRO   Take 1 tablet by mouth once daily     estradiol 0.5 MG tablet  Commonly known as: ESTRACE   Take 1 Tab by mouth every day.  Dose: 0.5 mg     Eye Vitamins Caps   Take 2 Capsules by mouth every day.  Dose: 2 Capsule     imipramine 10 MG Tabs  Commonly known as: TOFRANIL   TAKE 2 TABLETS BY MOUTH IN THE EVENING     Letairis 10 MG tablet  Generic drug: ambrisentan   Take 10 Tablets by mouth at bedtime. Indications: Pulmonary Arterial Hypertension  Dose: 10 Tablet     * Euthyrox 50 MCG Tabs  Generic drug: levothyroxine   Euthyrox 50 mcg tablet   TAKE 1 TABLET BY MOUTH IN THE MORNING ON AN EMPTY STOMACH     * levothyroxine 50 MCG Tabs  Commonly known as: SYNTHROID   TAKE 1 TABLET BY MOUTH IN THE MORNING ON AN EMPTY STOMACH     meloxicam 7.5 MG Tabs  Commonly known as: MOBIC   Take 1 Tablet by mouth 2 times a day for 30 days.  Dose: 7.5 mg     Narcan 4 MG/0.1ML Liqd  Generic drug: Naloxone   Administer 1 Spray into affected nostril(S) as needed. AS DIRECTED  Dose: 1 Spray     omeprazole 20 MG delayed-release capsule  Commonly known as: PRILOSEC   Take 1 Cap by mouth every day.  Dose: 20 mg     ondansetron 4 MG Tabs tablet  Commonly known as: Zofran   Take 1 Tablet by mouth every four hours as needed for Nausea/Vomiting for up to 30 days.  Dose: 4 mg     oxyCODONE immediate-release 5 MG Tabs  Commonly known as: ROXICODONE   Take 1 Tablet by mouth every four hours as needed for Severe Pain for up to 7 days.  Dose: 5 mg     oxyCODONE-acetaminophen 5-325 MG Tabs  Commonly known as: PERCOCET   TAKE 1 TABLET BY MOUTH 4 TIMES A DAY AS NEEDED FOR PAIN 11/8/18 G89.29     potassium chloride 8 MEQ tablet  Commonly known as: KLOR-CON   TAKE 2 TABLETS EVERY DAY     pregabalin 200 MG capsule  Commonly known as: LYRICA   Take 200 mg by mouth in the morning, at noon, and at bedtime.  Dose: 200 mg     spironolactone  25 MG Tabs  Commonly known as: ALDACTONE   Take 25 mg by mouth at bedtime. Indications: Edema, High Blood Pressure Disorder  Dose: 25 mg     traMADol 50 MG Tabs  Commonly known as: ULTRAM   Take 1 Tablet by mouth every 6 hours as needed for up to 10 days.  Dose: 50 mg     Ventolin  (90 Base) MCG/ACT Aers inhalation aerosol  Generic drug: albuterol   Inhale 2 Puffs by mouth every 6 hours as needed for Shortness of Breath.  Dose: 2 Puff     VITAMIN B-12 PO   Take  by mouth every day.     VITAMIN D-3 PO   Take  by mouth every day.     * Xtampza ER 18 MG C12a  Generic drug: oxyCODONE ER   Xtampza ER 18 mg capsule sprinkle   TAKE 1 ORAL CAPSULE 2 TIMES A DAY. DNF UNTIL 7/03/21   60 FOR 30 DAYS.     * Xtampza ER 18 MG C12a  Generic drug: oxyCODONE ER   Take 18 mg by mouth 2 times a day. Indications: Chronic Pain  Dose: 18 mg         * This list has 8 medication(s) that are the same as other medications prescribed for you. Read the directions carefully, and ask your doctor or other care provider to review them with you.            STOP taking these medications    OMEGA 3 PO            Discharge Instructions:     Patient is instructed to ambulate and weight bear as tolerated with the use of an assistive device, and to continue physical therapy exercises given during this hospital stay. Strict posterior hip precautions are to be observed for patients who underwent a total hip replacement.   Patient is to ice and elevate the surgical leg regularly, with pillows under the ankle, nothing is to be placed under the knee.   Patient was given detailed wound care instructions, and will leave the Incisional vac or silver dressing on until first post-op visit.   ASA twice daily for DVT prophylaxis.  Patient is to follow up with Dr. Perez's office in 1-2 weeks.

## 2021-09-21 NOTE — PROGRESS NOTES
COVID-19 surge in effect  Received report from night shift RN. Pt is awake and alert. No signs of distress. Pt denies any nausea. Pt denies any numbness or tingling. Pt reports pain 4/10, declines pain medication at this time. Suprapubic catheter in place. Pt is able to planter flex and dorsi flex ble, + pulses. Dressing to r knee is cdi. Martinez in place. Polar ice in place to r knee. fall precautions in place. Bed in lowest and locked position. Call light within reach.

## 2021-09-21 NOTE — PROGRESS NOTES
Discharge paperwork reviewed with patient and son at bedside. Copy given. Questions encouraged and answered. IV removed. Suprapubic catheter in place.  Patient escorted to ED entrance via wheelchair. Patient discharged to home.

## 2021-09-21 NOTE — FLOWSHEET NOTE
09/21/21 0330   Events/Summary/Plan   Events/Summary/Plan CPAP check   Vital Signs   Pulse 68   Respiration 17   Pulse Oximetry 95 %   $ Pulse Oximetry (Spot Check) Yes   Respiratory Assessment   Level of Consciousness Alert   Chest Exam   Work Of Breathing / Effort Mild   Oxygen   O2 (LPM) 3   O2 Delivery Device BIPAP   Non-Invasive Ventilation ALISSA Group   Nocturnal CPAP or BIPAP BIPAP - Home Unit   FiO2 or LPM 3

## 2021-09-21 NOTE — THERAPY
Physical Therapy   Initial Evaluation     Patient Name: Maddy Hodge  Age:  81 y.o., Sex:  female  Medical Record #: [id number]  Today's Date: 9/21/2021     Precautions  Precautions: Weight Bearing As Tolerated Right Lower Extremity  Comments: s/p R TKA    Assessment  Patient is 81 y.o. female s/p R TKA POD#1. Pt is able to ambulate safely with FWW SBA, steady with no LOB.  HEP reviewed with pt, handout issued. Pt lives with supportive son who is available to assist at home as needed. No further acute PT needs, DC PT.        Plan    Recommend Physical Therapy for Evaluation only     DC Equipment Recommendations: None  Discharge Recommendations: Recommend outpatient physical therapy services to address higher level deficits        09/21/21 1205   Prior Living Situation   Housing / Facility 1 Story House   Steps Into Home 2   Steps In Home 0   Rail Left Rail  (Steps into Home)   Equipment Owned Front-Wheel Walker;Single Point Cane;Tub / Shower Seat   Lives with - Patient's Self Care Capacity Adult Children   Comments Pt lives with supportive son who will be assisting pt upon DC   Prior Level of Functional Mobility   Bed Mobility Independent   Transfer Status Independent   Ambulation Independent   Assistive Devices Used Single Point Cane   Stairs Required Assist   Cognition    Level of Consciousness Alert   Comments Oriented x4   Passive ROM Lower Body   Passive ROM Lower Body X   Comments R knee 0-75 deg   Active ROM Lower Body    Active ROM Lower Body  X   Comments R knee 0-60 deg   Strength Lower Body   Lower Body Strength  X   Comments R knee NT   Sensation Lower Body   Lower Extremity Sensation   WDL   Balance Assessment   Sitting Balance (Static) Good   Sitting Balance (Dynamic) Fair +   Standing Balance (Static) Fair +   Standing Balance (Dynamic) Fair   Weight Shift Sitting Good   Weight Shift Standing Fair   Comments stdg with FWW   Gait Analysis   Gait Level Of Assist   (SBA)   Assistive Device Front  Wheel Walker   Distance (Feet) 100   # of Times Distance was Traveled 1   Deviation Decreased Heel Strike;Decreased Toe Off;Antalgic   # of Stairs Climbed 2   Level of Assist with Stairs Minimal Assist   Weight Bearing Status WBAT R LE   Bed Mobility    Supine to Sit Minimal Assist   Sit to Supine Minimal Assist   Functional Mobility   Sit to Stand   (SBA)   Bed, Chair, Wheelchair Transfer   (SBA)   Transfer Method Stand Step

## 2021-09-21 NOTE — PROGRESS NOTES
S:  s/p right TKA  Pain controlled  No N/V  No Chest Pain/SOB  Afebrile  Exam:    NAD A& O x3    RLE: +DF/PF/EHL SILT L4/L5/S1  LLE:  +DF/PF/EHL SILT L4/L5/S1    Plan:  Continue standard plan of care  Weight bearing: as tolerated with walker/cane  DVT prophylaxis: SCD/Teds + ASA  Dispo: home today     THEN LUCENTIS 0.5 MG TIMES 2 NEXT 2 -4 MONTHS.

## 2021-09-21 NOTE — PROGRESS NOTES
Pt is awake in bed. VSS. Pt c/o 5/10 pain in R knee. Medicated per MAR. Dressing to RLE is clean, dry, and intact. CMS is intact. Pt c/o mild tremor in BUE. Pt states this is intermitent and has been improving with time. Breath sounds are diminished throughout all lung lobes. pt denies SOB. RN educated pt on use of IS. Pt is able to inhale 500ml. Pt is requesting to rest. Fall precautions in place.    COVID-19 surge in effect.

## 2021-10-04 DIAGNOSIS — J44.89 COPD WITH ASTHMA (HCC): ICD-10-CM

## 2021-10-04 RX ORDER — METHYLPREDNISOLONE 4 MG/1
TABLET ORAL
Qty: 21 TABLET | Refills: 0 | Status: SHIPPED | OUTPATIENT
Start: 2021-10-04 | End: 2021-10-13

## 2021-10-04 RX ORDER — AZITHROMYCIN 250 MG/1
TABLET, FILM COATED ORAL
Qty: 6 TABLET | Refills: 0 | Status: SHIPPED | OUTPATIENT
Start: 2021-10-04 | End: 2021-10-13

## 2021-10-13 ENCOUNTER — APPOINTMENT (OUTPATIENT)
Dept: RADIOLOGY | Facility: MEDICAL CENTER | Age: 81
End: 2021-10-13
Attending: EMERGENCY MEDICINE
Payer: MEDICARE

## 2021-10-13 ENCOUNTER — HOSPITAL ENCOUNTER (EMERGENCY)
Facility: MEDICAL CENTER | Age: 81
End: 2021-10-13
Attending: EMERGENCY MEDICINE
Payer: MEDICARE

## 2021-10-13 VITALS
BODY MASS INDEX: 34.93 KG/M2 | HEART RATE: 72 BPM | WEIGHT: 177.91 LBS | OXYGEN SATURATION: 97 % | RESPIRATION RATE: 16 BRPM | HEIGHT: 60 IN | SYSTOLIC BLOOD PRESSURE: 112 MMHG | TEMPERATURE: 97.1 F | DIASTOLIC BLOOD PRESSURE: 49 MMHG

## 2021-10-13 DIAGNOSIS — R53.81 MALAISE AND FATIGUE: ICD-10-CM

## 2021-10-13 DIAGNOSIS — L03.115 CELLULITIS OF RIGHT LEG: ICD-10-CM

## 2021-10-13 DIAGNOSIS — R53.83 MALAISE AND FATIGUE: ICD-10-CM

## 2021-10-13 LAB
ALBUMIN SERPL BCP-MCNC: 3.1 G/DL (ref 3.2–4.9)
ALBUMIN/GLOB SERPL: 1.2 G/DL
ALP SERPL-CCNC: 110 U/L (ref 30–99)
ALT SERPL-CCNC: 13 U/L (ref 2–50)
ANION GAP SERPL CALC-SCNC: 14 MMOL/L (ref 7–16)
AST SERPL-CCNC: 17 U/L (ref 12–45)
BASOPHILS # BLD AUTO: 0.5 % (ref 0–1.8)
BASOPHILS # BLD: 0.03 K/UL (ref 0–0.12)
BILIRUB SERPL-MCNC: 0.4 MG/DL (ref 0.1–1.5)
BUN SERPL-MCNC: 21 MG/DL (ref 8–22)
CALCIUM SERPL-MCNC: 8.5 MG/DL (ref 8.4–10.2)
CHLORIDE SERPL-SCNC: 102 MMOL/L (ref 96–112)
CO2 SERPL-SCNC: 24 MMOL/L (ref 20–33)
CREAT SERPL-MCNC: 1.27 MG/DL (ref 0.5–1.4)
CRP SERPL HS-MCNC: 5.43 MG/DL (ref 0–0.75)
EOSINOPHIL # BLD AUTO: 0.2 K/UL (ref 0–0.51)
EOSINOPHIL NFR BLD: 3 % (ref 0–6.9)
ERYTHROCYTE [DISTWIDTH] IN BLOOD BY AUTOMATED COUNT: 53.1 FL (ref 35.9–50)
ERYTHROCYTE [SEDIMENTATION RATE] IN BLOOD BY WESTERGREN METHOD: 25 MM/HOUR (ref 0–25)
GLOBULIN SER CALC-MCNC: 2.5 G/DL (ref 1.9–3.5)
GLUCOSE SERPL-MCNC: 95 MG/DL (ref 65–99)
HCT VFR BLD AUTO: 29.9 % (ref 37–47)
HGB BLD-MCNC: 9.7 G/DL (ref 12–16)
IMM GRANULOCYTES # BLD AUTO: 0.02 K/UL (ref 0–0.11)
IMM GRANULOCYTES NFR BLD AUTO: 0.3 % (ref 0–0.9)
LACTATE BLD-SCNC: 1.2 MMOL/L (ref 0.5–2)
LYMPHOCYTES # BLD AUTO: 1.06 K/UL (ref 1–4.8)
LYMPHOCYTES NFR BLD: 16 % (ref 22–41)
MCH RBC QN AUTO: 30.5 PG (ref 27–33)
MCHC RBC AUTO-ENTMCNC: 32.4 G/DL (ref 33.6–35)
MCV RBC AUTO: 94 FL (ref 81.4–97.8)
MONOCYTES # BLD AUTO: 0.71 K/UL (ref 0–0.85)
MONOCYTES NFR BLD AUTO: 10.7 % (ref 0–13.4)
NEUTROPHILS # BLD AUTO: 4.61 K/UL (ref 2–7.15)
NEUTROPHILS NFR BLD: 69.5 % (ref 44–72)
NRBC # BLD AUTO: 0 K/UL
NRBC BLD-RTO: 0 /100 WBC
PLATELET # BLD AUTO: 196 K/UL (ref 164–446)
PMV BLD AUTO: 10.9 FL (ref 9–12.9)
POTASSIUM SERPL-SCNC: 5 MMOL/L (ref 3.6–5.5)
PROT SERPL-MCNC: 5.6 G/DL (ref 6–8.2)
RBC # BLD AUTO: 3.18 M/UL (ref 4.2–5.4)
SODIUM SERPL-SCNC: 140 MMOL/L (ref 135–145)
WBC # BLD AUTO: 6.6 K/UL (ref 4.8–10.8)

## 2021-10-13 PROCEDURE — 83605 ASSAY OF LACTIC ACID: CPT

## 2021-10-13 PROCEDURE — 73564 X-RAY EXAM KNEE 4 OR MORE: CPT | Mod: RT

## 2021-10-13 PROCEDURE — 86140 C-REACTIVE PROTEIN: CPT

## 2021-10-13 PROCEDURE — 99284 EMERGENCY DEPT VISIT MOD MDM: CPT

## 2021-10-13 PROCEDURE — 71045 X-RAY EXAM CHEST 1 VIEW: CPT

## 2021-10-13 PROCEDURE — 80053 COMPREHEN METABOLIC PANEL: CPT

## 2021-10-13 PROCEDURE — A9270 NON-COVERED ITEM OR SERVICE: HCPCS | Performed by: EMERGENCY MEDICINE

## 2021-10-13 PROCEDURE — 85025 COMPLETE CBC W/AUTO DIFF WBC: CPT

## 2021-10-13 PROCEDURE — 93971 EXTREMITY STUDY: CPT | Mod: 26,RT | Performed by: INTERNAL MEDICINE

## 2021-10-13 PROCEDURE — 85652 RBC SED RATE AUTOMATED: CPT

## 2021-10-13 PROCEDURE — 87040 BLOOD CULTURE FOR BACTERIA: CPT

## 2021-10-13 PROCEDURE — 93971 EXTREMITY STUDY: CPT | Mod: RT

## 2021-10-13 PROCEDURE — 700102 HCHG RX REV CODE 250 W/ 637 OVERRIDE(OP): Performed by: EMERGENCY MEDICINE

## 2021-10-13 RX ORDER — OXYCODONE HYDROCHLORIDE 5 MG/1
5 TABLET ORAL 4 TIMES DAILY
Status: SHIPPED | COMMUNITY
End: 2021-11-18

## 2021-10-13 RX ORDER — AZITHROMYCIN 250 MG/1
250-500 TABLET, FILM COATED ORAL DAILY
Status: SHIPPED | COMMUNITY
End: 2021-11-18

## 2021-10-13 RX ORDER — OXYCODONE HYDROCHLORIDE AND ACETAMINOPHEN 5; 325 MG/1; MG/1
1 TABLET ORAL 4 TIMES DAILY
COMMUNITY

## 2021-10-13 RX ORDER — IMIPRAMINE HYDROCHLORIDE 10 MG/1
10 TABLET, FILM COATED ORAL 2 TIMES DAILY
Status: SHIPPED | COMMUNITY
End: 2021-10-29

## 2021-10-13 RX ORDER — CHOLECALCIFEROL (VITAMIN D3) 50 MCG
2000 TABLET ORAL DAILY
Status: SHIPPED | COMMUNITY
End: 2023-09-26

## 2021-10-13 RX ORDER — CEPHALEXIN 500 MG/1
500 CAPSULE ORAL 4 TIMES DAILY
Qty: 40 CAPSULE | Refills: 0 | Status: SHIPPED | OUTPATIENT
Start: 2021-10-13 | End: 2021-10-23

## 2021-10-13 RX ORDER — CEPHALEXIN 250 MG/1
500 CAPSULE ORAL ONCE
Status: COMPLETED | OUTPATIENT
Start: 2021-10-13 | End: 2021-10-13

## 2021-10-13 RX ADMIN — CEPHALEXIN 500 MG: 250 CAPSULE ORAL at 16:20

## 2021-10-13 ASSESSMENT — FIBROSIS 4 INDEX: FIB4 SCORE: 2.2

## 2021-10-13 NOTE — ED PROVIDER NOTES
"ED Provider Note    CHIEF COMPLAINT  Chief Complaint   Patient presents with   • Knee Injury     Had a right knee replacement 9/20/2021, for the last 5 days no energy and fatigue. She does have a catheter was seen at Urology yesterday but no antibiotics given in lieu of culture results. Family does report redness to her knee is notable.       HPI  Maddy Hodge is a 81 y.o. female who presents for evaluation of general malaise and fatigue, decreased activity, increased sleepiness and generalized weakness over the past 4 to 5 days.  She is about 3-1/2 weeks status post a right knee replacement and over the past 24 hours had a significant increase in the redness involving the knee and distal leg.  She has not had a fever.  She also was just seen by her urologist yesterday and had a urine culture performed but was not started on antibiotics.  Her past medical history is extensive and significant for asthma and pulmonary hypertension, she is oxygen dependent.  She has not had any increasing shortness of breath or cough recently.  She also has a history of hypertension and thyroid dysregulation and dyslipidemia.  Family spoke to Dr. Perez, the orthopedic surgeon who is aware of them here in the emergency department.    REVIEW OF SYSTEMS  Negative for fever, rash, chest pain, dyspnea, abdominal pain, nausea, vomiting, diarrhea, headache, focal weakness, focal numbness, focal tingling. All other systems are negative.     PAST MEDICAL HISTORY  Past Medical History:   Diagnosis Date   • Allergy     seasonal   • Anesthesia     \"hard to wake up\"   • Anxiety     due to loss of . 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    • Arthritis     facet arthritis of lumbar region   • Asthma     Inhaler use daily.   • Basal cell carcinoma     arm, neck, face   • Bowel habit changes     constipation   • CATARACT     removed bilat   • Chickenpox    • Chronic constipation    • Coagulase-negative " staphylococcal infection     Dr. Albrecht, attaches to plastic, prulent   • Delayed emergence from general anesthesia    • Depression     and anxiety   • Encounter for long-term (current) use of other medications    • Erosive gastritis     antral   • GERD (gastroesophageal reflux disease)    • Yi measles    • High cholesterol    • Hypertension    • Hypothyroidism    • Indwelling urinary catheter present 2020   • Influenza    • Lumbar vertebral fracture (HCC) 2011   • Mumps    • Muscle disorder     Arachnoiditis   • Neuropathy    • Neuropathy    • Obesity, Class I, BMI 30-34.9    • OSTEOPOROSIS    • Oxygen dependent     2 liters, [facility]   • Pain 2021    left arm, knees, chronic back, right leg, 6/10   • Pain management 2021   • Pulmonary hypertension (HCC)    • Recurrent UTI 2017   • Sleep apnea     on BiPap, follows with pulmonology   • Spinal stenosis, lumbar region, without neurogenic claudication    • Suprapubic catheter ([facility])    • Tonsillitis        FAMILY HISTORY  Family History   Problem Relation Age of Onset   • Genitourinary () Problems Brother    • Lung Disease Mother         COPD   • Heart Disease Mother    • Genetic Disorder Father         Parkinsons/ from complications   • Hypertension Father    • Cancer Maternal Aunt         Breast cancer   • Stroke Paternal Grandmother    • Cancer Maternal Aunt         Breast cancer       SOCIAL HISTORY  Social History     Tobacco Use   • Smoking status: Never Smoker   • Smokeless tobacco: Never Used   Vaping Use   • Vaping Use: Never used   Substance Use Topics   • Alcohol use: Not Currently     Alcohol/week: 0.0 oz   • Drug use: Not Currently       SURGICAL HISTORY  Past Surgical History:   Procedure Laterality Date   • PB TOTAL KNEE ARTHROPLASTY Right 2021    Procedure: ARTHROPLASTY, KNEE, TOTAL;  Surgeon: Kayden ePrez M.D.;  Location: SURGERY [facility];  Service: Orthopedics   • PB REVISE MEDIAN  N/CARPAL TUNNEL SURG Left 8/5/2021    Procedure: RELEASE, CARPAL TUNNEL;  Surgeon: Ian Delgado M.D.;  Location: SURGERY SAME DAY [facility];  Service: Orthopedics   • PB REVISE ULNAR NERVE AT ELBOW Left 8/5/2021    Procedure: RELEASE, CUBITAL TUNNEL.;  Surgeon: Ian Delgado M.D.;  Location: SURGERY SAME DAY [facility];  Service: Orthopedics   • PB TOTAL KNEE ARTHROPLASTY Left 8/12/2020    Procedure: ARTHROPLASTY, KNEE, TOTAL;  Surgeon: Kayden Perez M.D.;  Location: SURGERY [facility];  Service: Orthopedics   • SPINAL CORD STIMULATOR N/A 7/3/2017    Procedure: SPINAL CORD STIMULATOR FOR LEAD REMOVAL;  Surgeon: Amandeep Gonzalez D.O.;  Location: SURGERY [facility];  Service:    • GASTROSCOPY WITH BALLOON DILATATION N/A 5/19/2016    Procedure: GASTROSCOPY WITH DILATATION;  Surgeon: Tony Monae M.D.;  Location: SURGERY [facility];  Service:    • SPINAL CORD STIMULATOR  09/02/2014    removed 2017   • OTHER SURGICAL PROCEDURE Bilateral 2012    plantar fascitis surgery   • EGD WITH ASP/BX  5/27/09    erosive gastritis   • HYSTERECTOMY, TOTAL ABDOMINAL  1976   • APPENDECTOMY  1976   • CATARACT EXTRACTION WITH IOL Bilateral    • COLONOSCOPY  2000,5/27/09    normal   • LUMBAR LAMINECTOMY DISKECTOMY     • TONSILLECTOMY         CURRENT MEDICATIONS  I personally reviewed the medication list in the charting documentation.     ALLERGIES  Allergies   Allergen Reactions   • Pcn [Penicillins] Hives     hives   • Sulfa Drugs Hives     hives   • Macrobid [Nitrofurantoin] Rash     rash   • Tape Rash and Itching     Paper tape ok       MEDICAL RECORD  I have reviewed patient's medical record and pertinent results are listed above.      PHYSICAL EXAM  VITAL SIGNS: /78   Pulse 78   Temp 37 °C (98.6 °F) (Temporal)   Resp 16   Ht 1.524 m (5')   Wt 80.7 kg (177 lb 14.6 oz)   LMP  (LMP Unknown)   SpO2 95%   BMI 34.75 kg/m²    Constitutional: Chronically ill-appearing and elderly, otherwise awake  and alert in no acute distress and not acutely toxic.  HENT: Normocephalic, no obvious evidence of acute trauma.  Eyes: No scleral icterus. Normal conjunctiva   Neck: Comfortable movement without any obvious restriction in the range of motion.  Cardiovascular: Upon ascultation I appreciate a regular heart rhythm and a normal rate.   Thorax & Lungs: Normal nonlabored respirations.  Upon application of the stethoscope for auscultation I find there to be no associated chest wall tenderness.  I appreciate no wheezing, rhonchi or rales. There is normal air movement.    Abdomen: The abdomen is not visibly distended. Upon palpation, I find it to be without tenderness.  No mass appreciated.  Skin: The exposed portions of skin reveal no obvious rash or other abnormalities.  Extremities/Musculoskeletal: Inspection of the right lower extremity reveals a anterior linear incision over the knee with Steri-Strips in place.  The knee is edematous.  The distal portion of the leg is erythematous anteriorly involving the inferior lateral part of the knee distal into the anterolateral leg.  No erythema on the medial side of the knee.  Neurovascularly intact.  Neurologic: Alert & oriented. No focal deficits observed.   Psychiatric: Normal affect appropriate for the clinical situation.    DIAGNOSTIC STUDIES / PROCEDURES    LABS/EKGs  Results for orders placed or performed during the hospital encounter of 10/13/21   CBC WITH DIFFERENTIAL   Result Value Ref Range    WBC 6.6 4.8 - 10.8 K/uL    RBC 3.18 (L) 4.20 - 5.40 M/uL    Hemoglobin 9.7 (L) 12.0 - 16.0 g/dL    Hematocrit 29.9 (L) 37.0 - 47.0 %    MCV 94.0 81.4 - 97.8 fL    MCH 30.5 27.0 - 33.0 pg    MCHC 32.4 (L) 33.6 - 35.0 g/dL    RDW 53.1 (H) 35.9 - 50.0 fL    Platelet Count 196 164 - 446 K/uL    MPV 10.9 9.0 - 12.9 fL    Neutrophils-Polys 69.50 44.00 - 72.00 %    Lymphocytes 16.00 (L) 22.00 - 41.00 %    Monocytes 10.70 0.00 - 13.40 %    Eosinophils 3.00 0.00 - 6.90 %    Basophils  0.50 0.00 - 1.80 %    Immature Granulocytes 0.30 0.00 - 0.90 %    Nucleated RBC 0.00 /100 WBC    Neutrophils (Absolute) 4.61 2.00 - 7.15 K/uL    Lymphs (Absolute) 1.06 1.00 - 4.80 K/uL    Monos (Absolute) 0.71 0.00 - 0.85 K/uL    Eos (Absolute) 0.20 0.00 - 0.51 K/uL    Baso (Absolute) 0.03 0.00 - 0.12 K/uL    Immature Granulocytes (abs) 0.02 0.00 - 0.11 K/uL    NRBC (Absolute) 0.00 K/uL   COMP METABOLIC PANEL   Result Value Ref Range    Sodium 140 135 - 145 mmol/L    Potassium 5.0 3.6 - 5.5 mmol/L    Chloride 102 96 - 112 mmol/L    Co2 24 20 - 33 mmol/L    Anion Gap 14.0 7.0 - 16.0    Glucose 95 65 - 99 mg/dL    Bun 21 8 - 22 mg/dL    Creatinine 1.27 0.50 - 1.40 mg/dL    Calcium 8.5 8.4 - 10.2 mg/dL    AST(SGOT) 17 12 - 45 U/L    ALT(SGPT) 13 2 - 50 U/L    Alkaline Phosphatase 110 (H) 30 - 99 U/L    Total Bilirubin 0.4 0.1 - 1.5 mg/dL    Albumin 3.1 (L) 3.2 - 4.9 g/dL    Total Protein 5.6 (L) 6.0 - 8.2 g/dL    Globulin 2.5 1.9 - 3.5 g/dL    A-G Ratio 1.2 g/dL   LACTIC ACID   Result Value Ref Range    Lactic Acid 1.2 0.5 - 2.0 mmol/L   CRP QUANTITIVE (NON-CARDIAC)   Result Value Ref Range    Stat C-Reactive Protein 5.43 (H) 0.00 - 0.75 mg/dL   Sed Rate   Result Value Ref Range    Sed Rate Westergren 25 0 - 25 mm/hour   ESTIMATED GFR   Result Value Ref Range    GFR If  49 (A) >60 mL/min/1.73 m 2    GFR If Non African American 40 (A) >60 mL/min/1.73 m 2        RADIOLOGY  US-EXTREMITY VENOUS LOWER UNILAT RIGHT   Final Result      DX-KNEE COMPLETE 4+ RIGHT   Final Result      No evidence of acute fracture or dislocation.      DX-CHEST-PORTABLE (1 VIEW)   Final Result      Linear bibasilar atelectasis.            COURSE & MEDICAL DECISION MAKING  I have reviewed any medical record information, laboratory studies and radiographic results as noted above.  Differential diagnoses includes: Postoperative knee infection versus a cellulitis, UTI, pneumonia, bacteremia, dehydration, lites abnormalities, anemia,  DVT    Encounter Summary: This is a very pleasant 81 y.o. female who unfortunately required evaluation in the emergency department today with malaise and fatigue over the past 4 days or so, almost 4 weeks postop from a knee replacement and over the past day or 2 has had redness of the leg.  On exam it seems most consistent with a cellulitis involving the inferolateral portion of the knee down the anterolateral aspect of the leg, there is really not much medial involvement.  However she is at high risk for septic joint given her recent surgery.  She also is at high risk for urinary tract infection with a suprapubic catheter.  Will obtain x-rays and DVT study, septic blood work, inflammatory markers, will consult her orthopedic surgeon ------- DVT study reveals no indication of DVT.  X-ray is unremarkable.  Her blood work reveals normal BUN and creatinine but reduced GFR with a history of chronic renal disease.  WBC is normal, sed rate is normal, a photograph of her leg was sent to Dr. Perez, in the absence of DVT she will will be treated for cellulitis, initially recommended Bactrim but given her allergies she will be treated with Keflex.  This will also cover her urine in the event that her urine culture from urology comes back positive.  At this point she has no markers of sepsis.  She does not appear acutely ill.  She will be discharged home in stable condition I brought over strict return instructions with the son at the bedside expresses understanding      DISPOSITION: Discharged home in stable condition      FINAL IMPRESSION  1. Cellulitis of right leg    2. Malaise and fatigue           This dictation was created using voice recognition software. The accuracy of the dictation is limited to the abilities of the software. I expect there may be some errors of grammar and possibly content. The nursing notes were reviewed and certain aspects of this information were incorporated into this note.    Electronically  signed by: Sammy Mullins M.D., 10/13/2021 2:05 PM

## 2021-10-13 NOTE — ED NOTES
Med rec updated and complete  Allergies reviewed, per son  Interviewed pt with son at bedside with permission from pt.  Pts son had a list of medications at bedside, went over list of medications and returned list of medications back to pt son at bedside.  Pts son brought in all of pts medications in case if we did not have them    No current facility-administered medications on file prior to encounter.     Current Outpatient Medications on File Prior to Encounter   Medication Sig Dispense Refill   • azithromycin (ZITHROMAX) 250 MG Tab Take 250-500 mg by mouth every day. Pt started on 9/27/2021 for 5 day course     • oxyCODONE-acetaminophen (PERCOCET) 5-325 MG Tab Take 1 Tablet by mouth 4 times a day.     • oxyCODONE immediate-release (ROXICODONE) 5 MG Tab Take 5 mg by mouth 4 times a day.     • imipramine (TOFRANIL) 10 MG Tab Take 10 mg by mouth 2 times a day.     • Multiple Vitamins-Minerals (VITEYES AREDS ADVANCED PO) Take 1 Capsule by mouth every day.     • Cholecalciferol (D3) 2000 UNIT Tab Take 2,000 Units by mouth every day.     • meloxicam (MOBIC) 7.5 MG Tab Take 1 Tablet by mouth 2 times a day for 30 days. 60 Tablet 0   • aspirin (ASPIRIN 81) 81 MG EC tablet Take 81 mg by mouth every day with lunch.     • Cyanocobalamin (VITAMIN B-12 PO) Take 2,500 mcg by mouth every day.     • docusate sodium (COLACE) 250 MG capsule Take 500 mg by mouth every evening. Indications: Constipation     • atorvastatin (LIPITOR) 20 MG Tab TAKE 1 TABLET BY MOUTH EVERY DAY (Patient taking differently: Take 20 mg by mouth every evening.) 90 tablet 3   • levothyroxine (SYNTHROID) 50 MCG Tab TAKE 1 TABLET BY MOUTH IN THE MORNING ON AN EMPTY STOMACH (Patient taking differently: Take 50 mcg by mouth every morning on an empty stomach.) 90 tablet 3   • escitalopram (LEXAPRO) 20 MG tablet Take 1 tablet by mouth once daily (Patient taking differently: Take 20 mg by mouth every day.) 90 tablet 2   • potassium chloride (KLOR-CON) 8 MEQ tablet  TAKE 2 TABLETS EVERY DAY (Patient taking differently: Take 16 mEq by mouth every day.) 180 Tab 3   • bumetanide (BUMEX) 2 MG tablet Take 2 mg by mouth at bedtime. Indications: Edema, High Blood Pressure Disorder     • omeprazole (PRILOSEC) 20 MG delayed-release capsule Take 1 Cap by mouth every day. 90 Cap 3   • estradiol (ESTRACE) 0.5 MG tablet Take 1 Tab by mouth every day. (Patient taking differently: Take 0.5 mg by mouth every evening.) 90 Tab 3   • pregabalin (LYRICA) 200 MG capsule Take 200 mg by mouth in the morning, at noon, and at bedtime. Pt takes @ 0600, 1200, and 2100     • LETAIRIS 10 MG tablet Take 10 Tablets by mouth at bedtime. Indications: Pulmonary Arterial Hypertension     • spironolactone (ALDACTONE) 25 MG Tab Take 25 mg by mouth at bedtime. Indications: Edema, High Blood Pressure Disorder  3   • AMITIZA 24 MCG capsule Take 48 mcg by mouth every morning with breakfast. Indications: Chronic Constipation of Unknown Cause     • XTAMPZA ER 18 MG Capsule Extended Release 12 hour Abuse-Deterrent Take 18 mg by mouth 2 times a day. Indications: Chronic Pain

## 2021-10-13 NOTE — ED NOTES
Pt medicated per MAR. Discussed discharge paper work and follow up care. Pt states that she is feeling pressure in her bladder. Leg bag attached to leg for mendez to drain. Will give that five minutes to drain before assisting pt to dress to leave ER.

## 2021-10-18 LAB
BACTERIA BLD CULT: NORMAL
SIGNIFICANT IND 70042: NORMAL
SITE SITE: NORMAL
SOURCE SOURCE: NORMAL

## 2021-10-28 ENCOUNTER — PATIENT MESSAGE (OUTPATIENT)
Dept: MEDICAL GROUP | Facility: MEDICAL CENTER | Age: 81
End: 2021-10-28

## 2021-10-29 RX ORDER — IMIPRAMINE HYDROCHLORIDE 10 MG/1
TABLET, FILM COATED ORAL
Qty: 180 TABLET | Refills: 0 | Status: SHIPPED | OUTPATIENT
Start: 2021-10-29 | End: 2021-11-01 | Stop reason: SDUPTHER

## 2021-11-01 RX ORDER — IMIPRAMINE HYDROCHLORIDE 10 MG/1
20 TABLET, FILM COATED ORAL EVERY EVENING
Qty: 180 TABLET | Refills: 0 | Status: SHIPPED | OUTPATIENT
Start: 2021-11-01 | End: 2022-01-26

## 2021-11-01 NOTE — TELEPHONE ENCOUNTER
From: Maddy Hodge  To: Physician Remington Quinones  Sent: 10/28/2021 4:01 PM PDT  Subject: Rx refill.    Frank Quinones,  Mom needs a refill on Imipramine HCL 10mg, she prefers the PAR brand if you can include that in the prescription that you send to [facility] on [address] I would appreciate it.  Thank you, Johnathan

## 2021-11-18 ENCOUNTER — SLEEP STUDY (OUTPATIENT)
Dept: SLEEP MEDICINE | Facility: MEDICAL CENTER | Age: 81
End: 2021-11-18
Attending: NURSE PRACTITIONER
Payer: MEDICARE

## 2021-11-18 DIAGNOSIS — J96.11 CHRONIC RESPIRATORY FAILURE WITH HYPOXIA (HCC): ICD-10-CM

## 2021-11-18 DIAGNOSIS — G47.33 OSA (OBSTRUCTIVE SLEEP APNEA): ICD-10-CM

## 2021-11-18 PROBLEM — H91.90 HEARING LOSS: Status: ACTIVE | Noted: 2021-11-10

## 2021-11-18 PROBLEM — M48.062 SPINAL STENOSIS OF LUMBAR REGION WITH NEUROGENIC CLAUDICATION: Status: ACTIVE | Noted: 2021-11-10

## 2021-11-18 PROBLEM — M81.0 POSTMENOPAUSAL OSTEOPOROSIS: Status: ACTIVE | Noted: 2021-11-10

## 2021-11-18 PROBLEM — E03.8 HYPOTHYROIDISM DUE TO HASHIMOTO'S THYROIDITIS: Status: ACTIVE | Noted: 2021-11-10

## 2021-11-18 PROBLEM — E06.3 HYPOTHYROIDISM DUE TO HASHIMOTO'S THYROIDITIS: Status: ACTIVE | Noted: 2021-11-10

## 2021-11-18 PROBLEM — F41.9 ANXIETY DISORDER: Status: ACTIVE | Noted: 2021-11-10

## 2021-11-18 PROBLEM — R03.0 FINDING OF ABOVE NORMAL BLOOD PRESSURE: Status: ACTIVE | Noted: 2021-11-10

## 2021-11-24 ENCOUNTER — OFFICE VISIT (OUTPATIENT)
Dept: MEDICAL GROUP | Facility: MEDICAL CENTER | Age: 81
End: 2021-11-24
Payer: MEDICARE

## 2021-11-24 VITALS
BODY MASS INDEX: 32.38 KG/M2 | HEIGHT: 60 IN | RESPIRATION RATE: 14 BRPM | SYSTOLIC BLOOD PRESSURE: 128 MMHG | HEART RATE: 86 BPM | OXYGEN SATURATION: 98 % | DIASTOLIC BLOOD PRESSURE: 68 MMHG | WEIGHT: 164.9 LBS | TEMPERATURE: 98.1 F

## 2021-11-24 DIAGNOSIS — K21.00 GASTROESOPHAGEAL REFLUX DISEASE WITH ESOPHAGITIS WITHOUT HEMORRHAGE: ICD-10-CM

## 2021-11-24 DIAGNOSIS — D64.9 ANEMIA, UNSPECIFIED TYPE: ICD-10-CM

## 2021-11-24 DIAGNOSIS — N18.31 STAGE 3A CHRONIC KIDNEY DISEASE: ICD-10-CM

## 2021-11-24 DIAGNOSIS — E03.8 HYPOTHYROIDISM DUE TO HASHIMOTO'S THYROIDITIS: ICD-10-CM

## 2021-11-24 DIAGNOSIS — E06.3 HYPOTHYROIDISM DUE TO HASHIMOTO'S THYROIDITIS: ICD-10-CM

## 2021-11-24 PROBLEM — R03.0 FINDING OF ABOVE NORMAL BLOOD PRESSURE: Status: RESOLVED | Noted: 2021-11-10 | Resolved: 2021-11-24

## 2021-11-24 PROCEDURE — 99214 OFFICE O/P EST MOD 30 MIN: CPT | Performed by: FAMILY MEDICINE

## 2021-11-24 RX ORDER — OMEPRAZOLE 20 MG/1
20 CAPSULE, DELAYED RELEASE ORAL DAILY
Qty: 90 CAPSULE | Refills: 3 | Status: SHIPPED | OUTPATIENT
Start: 2021-11-24 | End: 2021-11-24 | Stop reason: SDUPTHER

## 2021-11-24 RX ORDER — OMEPRAZOLE 20 MG/1
20 CAPSULE, DELAYED RELEASE ORAL DAILY
Qty: 90 CAPSULE | Refills: 3 | Status: SHIPPED | OUTPATIENT
Start: 2021-11-24 | End: 2022-03-04 | Stop reason: SDUPTHER

## 2021-11-24 ASSESSMENT — FIBROSIS 4 INDEX: FIB4 SCORE: 2

## 2021-11-24 NOTE — PROGRESS NOTES
Chief Complaint   Patient presents with   • Hypothyroidism   • Chronic Kidney Disease   • Anemia   • Gastrophageal Reflux       Subjective:     HPI:   Maddy Hodge presents today with the followin. Hypothyroidism due to Hashimoto's thyroiditis  Patient reports good energy level on the medication. Patient denies insomnia, tremor or change in appetite.  Patient is taking the medication on an empty stomach in the morning and waiting at least 30 minutes before eating.  Last TSH in 2021 was at [facility].    2. Stage 3a chronic kidney disease (HCC)  Mild, needs CKD follow up lab testing.  Orders discussed and placed.    3. Anemia, unspecified type  Lab orders for follow up discussed and placed.    4. Gastroesophageal reflux disease with esophagitis without hemorrhage  The patient feels the current medication regimen of omeprazole is controlling the gastroesophageal reflux symptoms well. Denies dysphagia, reflux symptoms, acidity, abdominal pain or visible blood or mucus in the stool. Denies vomiting or hematemesis. Denies burping or abdominal bloating. Patient avoids nonsteroidal anti-inflammatory drugs. Avoids heavy meals or eating within 2 hours of bedtime.        Patient Active Problem List    Diagnosis Date Noted   • Anemia 2021   • Spinal stenosis of lumbar region with neurogenic claudication 11/10/2021   • Postmenopausal osteoporosis 11/10/2021   • Hypothyroidism due to Hashimoto's thyroiditis 11/10/2021   • Anxiety disorder 11/10/2021   • Hearing loss 11/10/2021   • Osteoarthritis of right knee 2021   • Carpal tunnel syndrome on left 2021   • Cubital tunnel syndrome on left 2021   • Pain management 2021   • Neurogenic bladder 2021   • Indwelling urinary catheter present 2020   • Gastroesophageal reflux disease with esophagitis 2020   • Primary osteoarthritis of left knee 2020   • Choking 08/15/2019   • Chronic anxiety 07/15/2019   • Vitamin D  deficiency disease 04/10/2019   • Hoarseness, persistent 04/10/2019   • Other chronic pain 02/21/2019   • Dyslipidemia, goal LDL below 130 08/15/2018   • Chronic obstructive pulmonary disease ([facility]) 06/22/2018   • Pulmonary hypertension ([facility]) 02/21/2018   • COPD with asthma ([facility]) 11/10/2017   • ALISSA (obstructive sleep apnea) 11/10/2017   • Postlaminectomy syndrome, unspecified region 07/03/2017   • 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   • Menopausal symptoms 09/13/2016   • Essential tremor 09/06/2016   • CKD (chronic kidney disease) stage 3, GFR 30-59 ml/min ([facility]) 05/23/2016   • Esophageal dysphagia 05/19/2016   • Major depressive disorder, recurrent episode, mild (CMS-HCC) 05/02/2016   • Recurrent depressive disorder, current episode mild ([facility]) 05/02/2016   • 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 90 Capsule 3   • albuterol 108 (90 Base) MCG/ACT Aero Soln inhalation aerosol Ventolin HFA 90 mcg/actuation aerosol inhaler     • spironolactone (ALDACTONE) 25 MG Tab Take 25 mg by mouth every day.     • estradiol (ESTRACE) 0.5 MG tablet Take 1 tablet by mouth once daily 90 Tablet 3   • imipramine (TOFRANIL) 10 MG Tab Take 2 Tablets by mouth every evening. 180 Tablet 0   • ASPIRIN LOW DOSE 81 MG EC tablet TAKE 1 TABLET BY MOUTH TWICE A DAY 60 Tablet 2   • oxyCODONE-acetaminophen (PERCOCET) 5-325 MG Tab Take 1 Tablet by mouth 4 times a day.     • Cholecalciferol (D3) 2000 UNIT Tab Take 2,000 Units by mouth every day.     • Cyanocobalamin  (VITAMIN B-12 PO) Take 2,500 mcg by mouth every day.     • docusate sodium (COLACE) 250 MG capsule Take 500 mg by mouth every evening. Indications: Constipation     • atorvastatin (LIPITOR) 20 MG Tab TAKE 1 TABLET BY MOUTH EVERY DAY (Patient taking differently: Take 20 mg by mouth every evening.) 90 tablet 3   • levothyroxine (SYNTHROID) 50 MCG Tab TAKE 1 TABLET BY MOUTH IN THE MORNING ON AN EMPTY STOMACH (Patient taking differently: Take 50 mcg by mouth every morning on an empty stomach.) 90 tablet 3   • escitalopram (LEXAPRO) 20 MG tablet Take 1 tablet by mouth once daily (Patient taking differently: Take 20 mg by mouth every day.) 90 tablet 2   • potassium chloride (KLOR-CON) 8 MEQ tablet TAKE 2 TABLETS EVERY DAY (Patient taking differently: Take 16 mEq by mouth every day.) 180 Tab 3   • bumetanide (BUMEX) 2 MG tablet Take 2 mg by mouth at bedtime. Indications: Edema, High Blood Pressure Disorder     • pregabalin (LYRICA) 200 MG capsule Take 200 mg by mouth in the morning, at noon, and at bedtime. Pt takes @ 0600, 1200, and 2100     • LETAIRIS 10 MG tablet Take 10 Tablets by mouth at bedtime. Indications: Pulmonary Arterial Hypertension     • AMITIZA 24 MCG capsule Take 48 mcg by mouth every morning with breakfast. Indications: Chronic Constipation of Unknown Cause     • XTAMPZA ER 18 MG Capsule Extended Release 12 hour Abuse-Deterrent Take 18 mg by mouth 2 times a day. Indications: Chronic Pain     • Multiple Vitamins-Minerals (VITEYES AREDS ADVANCED PO) Take 1 Capsule by mouth every day.       No current facility-administered medications for this visit.       Allergies   Allergen Reactions   • Other Drug    • Penicillins Hives     hives  hives   • Sulfa Drugs Hives     hives  hives   • Macrobid [Nitrofurantoin] Rash     rash   • Tape Rash and Itching     Paper tape ok       ROS: As per HPI       Objective:     /68 (BP Location: Right arm, Patient Position: Sitting, BP Cuff Size: Adult)   Pulse 86   Temp  36.7 °C (98.1 °F) (Temporal)   Resp 14   Ht 1.524 m (5')   Wt 74.8 kg (164 lb 14.5 oz)   SpO2 98%  Body mass index is 32.21 kg/m².    Physical Exam:  Constitutional: Well-developed and well-nourished. Not diaphoretic. No distress. Lucid and fluent.  Patient, physician and staff all wearing masks.  Skin: Skin is warm and dry. No rash noted.  Head: Atraumatic without lesions.  Eyes: Conjunctivae and extraocular motions are normal. Pupils are equal, round, and reactive to light. No scleral icterus.   Ears:  External ears unremarkable.   Neck: Supple, trachea midline. No thyromegaly present. No cervical or supraclavicular lymphadenopathy. No JVD or carotid bruits appreciated  Cardiovascular: Regular rate and rhythm.  Normal S1, S2 without murmur appreciated.  Chest: Effort normal. Clear to auscultation throughout. No adventitious sounds.   Abdomen: Soft, non tender, and without distention. Active bowel sounds in all four quadrants. No rebound, guarding, masses or hepatosplenomegaly.  Extremities: No cyanosis, clubbing, erythema, nor edema.   Neurological: Alert and oriented x 3. No tremor appreciated.  Psychiatric:  Behavior, mood, and affect are appropriate.       Assessment and Plan:     81 y.o. female with the following issues:    1. Hypothyroidism due to Hashimoto's thyroiditis  TSH WITH REFLEX TO FT4   2. Stage 3a chronic kidney disease (HCC)  Comp Metabolic Panel   3. Anemia, unspecified type  CBC WITH DIFFERENTIAL    IRON/TOTAL IRON BIND   4. Gastroesophageal reflux disease with esophagitis without hemorrhage  Comp Metabolic Panel    omeprazole (PRILOSEC) 20 MG delayed-release capsule    DISCONTINUED: omeprazole (PRILOSEC) 20 MG delayed-release capsule         Followup: Return in about 6 months (around 5/24/2022), or if symptoms worsen or fail to improve.

## 2021-11-27 DIAGNOSIS — E87.6 HYPOKALEMIA: ICD-10-CM

## 2021-11-27 RX ORDER — POTASSIUM CHLORIDE 600 MG/1
TABLET, FILM COATED, EXTENDED RELEASE ORAL
Qty: 180 TABLET | Refills: 3 | Status: SHIPPED | OUTPATIENT
Start: 2021-11-27 | End: 2022-09-30

## 2021-12-08 ENCOUNTER — NON-PROVIDER VISIT (OUTPATIENT)
Dept: SLEEP MEDICINE | Facility: MEDICAL CENTER | Age: 81
End: 2021-12-08
Payer: MEDICARE

## 2021-12-08 VITALS — HEIGHT: 62 IN | BODY MASS INDEX: 29.44 KG/M2 | WEIGHT: 160 LBS

## 2021-12-08 DIAGNOSIS — J96.11 CHRONIC RESPIRATORY FAILURE WITH HYPOXIA (HCC): ICD-10-CM

## 2021-12-08 PROCEDURE — 94060 EVALUATION OF WHEEZING: CPT | Performed by: INTERNAL MEDICINE

## 2021-12-08 PROCEDURE — 94726 PLETHYSMOGRAPHY LUNG VOLUMES: CPT | Performed by: INTERNAL MEDICINE

## 2021-12-08 PROCEDURE — 94729 DIFFUSING CAPACITY: CPT | Performed by: INTERNAL MEDICINE

## 2021-12-08 ASSESSMENT — PULMONARY FUNCTION TESTS
FEV1/FVC_PERCENT_PREDICTED: 88
FEV1/FVC_PERCENT_LLN: 64
FEV1/FVC: 69
FVC_PREDICTED: 2.25
FEV1/FVC_PERCENT_PREDICTED: 90
FEV1/FVC_PERCENT_PREDICTED: 76
FEV1: 1.12
FEV1_PERCENT_PREDICTED: 65
FVC: 1.62
FEV1/FVC: 68.67
FEV1_PERCENT_PREDICTED: 66
FEV1_PERCENT_CHANGE: 2
FEV1/FVC_PERCENT_PREDICTED: 90
FVC: 1.66
FEV1/FVC_PERCENT_CHANGE: 50
FVC_PERCENT_PREDICTED: 73
FEV1_LLN: 1.43
FEV1_PREDICTED: 1.71
FEV1_PERCENT_CHANGE: 1
FEV1/FVC: 69
FEV1/FVC_PERCENT_CHANGE: -1
FVC_LLN: 1.88
FVC_PERCENT_PREDICTED: 72
FEV1/FVC: 68
FEV1/FVC_PREDICTED: 77
FEV1/FVC_PERCENT_PREDICTED: 89
FEV1: 1.14

## 2021-12-08 ASSESSMENT — FIBROSIS 4 INDEX: FIB4 SCORE: 2

## 2021-12-08 NOTE — PROCEDURES
Tech: Beronica Wang, RRT, CPFT  Tech notes: Good patient effort & cooperation.  The results of this test meet the ATS/ERS standards for acceptability & reproducibility.  Test was performed on the [employer]iance Body Plethysmograph-Elite DX system.  Predicted values were GLI-2012 for spirometry, GLI- 2017 for DLCO, ITS for Lung Volumes.  The DLCO was uncorrected for Hgb.  A bronchodilator of Ventolin HFA -2puffs via spacer administered.  DLCO performed during dilation period.    1. Baseline spirometry demonstrates a moderate reduction in FEV1 at 1.12 L which is 65% of predicted. FEV1/FVC ratio is 69%.    2. After administration of an inhaled bronchodilator there is no significant improvement in FEV1.    3. Lung volumes demonstrate a total lung capacity of 83% of predicted.    4. Gas exchange as estimated by DLCO is mildly reduced at 74% of predicted.    5. Airway resistance is in the normal range.      Impression:    This study demonstrates the presence of a mild to moderate obstructive pulmonary process.  No reversibility is noted on the study.  The mild reduction in DLCO suggests underlying emphysema.  Interstitial lung disease, anemia and pulmonary vascular disease are in the differential diagnosis.  Clinical correlation is necessary.

## 2021-12-08 NOTE — PROCEDURES
STUDY TYPE: BIPAP Titration  RECORDING TECHNIQUE:   After the scalp was prepared, gold plated electrodes were applied to the scalp according to the International 10-20 System. EEG (electroencephalogram) was continuously monitored from the O1-M2, O2-M1, C3-M2, C4-M1, F3-M2, and F4-M1. EOGs (electrooculograms) were monitored by electrodes placed at the left and right outer canthi. Chin EMG (electromyogram) was monitored by electrodes placed on the mentalis and sub-mentalis muscles. Nasal and oral airflow were monitored using a triple port thermocouple as well as oronasal pressure transducer. Respiratory effort was measured by inductive plethysmography technology employing abdominal and thoracic belts. Blood oxygen saturation and pulse were monitored by pulse oximetry. Heart rhythm was monitored by surface electrocardiogram. Leg EMG was studied using surface electrodes placed on left and right anterior tibialis. A microphone was used to monitor tracheal sounds and snoring. Body position was monitored and documented by technician observation.   SCORING CRITERIA:   A modification of the AASM manual for scoring of sleep and associated events was used. Obstructive apneas were scored by cessation of airflow for at least 10 seconds with continuing respiratory effort. Central apneas were scored by cessation of airflow for at least 10 seconds with no respiratory effort. Hypopneas were scored by a 30% or more reduction in airflow for at least 10 seconds accompanied by arterial oxygen desaturation of 3% or an arousal. For CMS (Medicare) patients, per AASM rule 1B, hypopneas are scored by 30% with mild reduction in airflow for at least 10 seconds accompanied by arterial saturation decreased at 4%.  Study start time was 09:07:05 PM. BIPAP recording time was 9h 6.5m with a total sleep time of 5h 38.0m resulting in a sleep efficiency of 61.85%%. Sleep latency from the start of the study was 127 minutes and the latency from sleep to  REM was 399 minutes. In total,159 arousals were scored for an arousal index of 28.2.  Respiratory:  There were a total of 9 apneas consisting of 5 obstructive apneas, 0 mixed apneas, and 4 central apneas. A total of 71 hypopneas were scored. The apnea index was 1.60 per hour and the hypopnea index was 12.60 per hour resulting in an overall AHI of 14.20. AHI during rem was 0.0 and AHI while supine was 14.20.  Oximetry:  There was a mean oxygen saturation of 90.0%. The minimum oxygen saturation in NREM was 83.0 % and in REM was 90.0. The patient spent 38.6 minutes of TST with SaO2 <88%.  Cardiac:  The highest heart rate seen while awake was 133 BPM while the highest heart rate during sleep was 108 BPM with an average sleeping heart rate of 74 BPM.  Limb Movements:  There were a total of 60 PLMs during sleep which resulted in a PLMS index of 10.7. Of these, 11 were associated with arousals which resulted in a PLMS arousal index of 2.0.  Titration:   This was a fully attended sleep study. This test was technically adequate. This patient was titrated on BIPAP starting at 11/7 cm of water pressure. Patient was titrated up to 19/15 cm of water pressure. Patient did best at *** cm of water pressure. Patient spent *** minutes at that pressure and their AHI was *** which is considered *** obstructive sleep apnea.   Assessment:   ***Obstructive Sleep Apnea Hypopnea - AHI*** ***Nocturnal desaturation - charlene saturation ***% - saturations <88% below for *** minutes of TST.  Recommendation:

## 2021-12-10 LAB
ALBUMIN SERPL-MCNC: 4.5 G/DL (ref 3.6–4.6)
ALBUMIN/GLOB SERPL: 2.5 {RATIO} (ref 1.2–2.2)
ALP SERPL-CCNC: 109 IU/L (ref 44–121)
ALT SERPL-CCNC: 12 IU/L (ref 0–32)
AST SERPL-CCNC: 18 IU/L (ref 0–40)
BASOPHILS # BLD AUTO: 0.1 X10E3/UL (ref 0–0.2)
BASOPHILS NFR BLD AUTO: 1 %
BILIRUB SERPL-MCNC: 0.4 MG/DL (ref 0–1.2)
BUN SERPL-MCNC: 14 MG/DL (ref 8–27)
BUN/CREAT SERPL: 12 (ref 12–28)
CALCIUM SERPL-MCNC: 9 MG/DL (ref 8.7–10.3)
CHLORIDE SERPL-SCNC: 98 MMOL/L (ref 96–106)
CO2 SERPL-SCNC: 25 MMOL/L (ref 20–29)
CREAT SERPL-MCNC: 1.16 MG/DL (ref 0.57–1)
EOSINOPHIL # BLD AUTO: 0.2 X10E3/UL (ref 0–0.4)
EOSINOPHIL NFR BLD AUTO: 5 %
ERYTHROCYTE [DISTWIDTH] IN BLOOD BY AUTOMATED COUNT: 13.5 % (ref 11.7–15.4)
GLOBULIN SER CALC-MCNC: 1.8 G/DL (ref 1.5–4.5)
GLUCOSE SERPL-MCNC: 81 MG/DL (ref 65–99)
HCT VFR BLD AUTO: 38.9 % (ref 34–46.6)
HGB BLD-MCNC: 12.5 G/DL (ref 11.1–15.9)
IMM GRANULOCYTES # BLD AUTO: 0 X10E3/UL (ref 0–0.1)
IMM GRANULOCYTES NFR BLD AUTO: 0 %
IMMATURE CELLS  115398: NORMAL
IRON SATN MFR SERPL: 14 % (ref 15–55)
IRON SERPL-MCNC: 43 UG/DL (ref 27–139)
LYMPHOCYTES # BLD AUTO: 1.3 X10E3/UL (ref 0.7–3.1)
LYMPHOCYTES NFR BLD AUTO: 30 %
MCH RBC QN AUTO: 27.8 PG (ref 26.6–33)
MCHC RBC AUTO-ENTMCNC: 32.1 G/DL (ref 31.5–35.7)
MCV RBC AUTO: 87 FL (ref 79–97)
MONOCYTES # BLD AUTO: 0.5 X10E3/UL (ref 0.1–0.9)
MONOCYTES NFR BLD AUTO: 12 %
MORPHOLOGY BLD-IMP: NORMAL
NEUTROPHILS # BLD AUTO: 2.2 X10E3/UL (ref 1.4–7)
NEUTROPHILS NFR BLD AUTO: 52 %
NRBC BLD AUTO-RTO: NORMAL %
PLATELET # BLD AUTO: 221 X10E3/UL (ref 150–450)
POTASSIUM SERPL-SCNC: 4.4 MMOL/L (ref 3.5–5.2)
PROT SERPL-MCNC: 6.3 G/DL (ref 6–8.5)
RBC # BLD AUTO: 4.49 X10E6/UL (ref 3.77–5.28)
SODIUM SERPL-SCNC: 138 MMOL/L (ref 134–144)
TIBC SERPL-MCNC: 313 UG/DL (ref 250–450)
TSH SERPL DL<=0.005 MIU/L-ACNC: 1.85 UIU/ML (ref 0.45–4.5)
UIBC SERPL-MCNC: 270 UG/DL (ref 118–369)
WBC # BLD AUTO: 4.3 X10E3/UL (ref 3.4–10.8)

## 2021-12-22 ENCOUNTER — OFFICE VISIT (OUTPATIENT)
Dept: SLEEP MEDICINE | Facility: MEDICAL CENTER | Age: 81
End: 2021-12-22
Payer: MEDICARE

## 2021-12-22 VITALS
DIASTOLIC BLOOD PRESSURE: 62 MMHG | WEIGHT: 165 LBS | BODY MASS INDEX: 30.67 KG/M2 | OXYGEN SATURATION: 99 % | SYSTOLIC BLOOD PRESSURE: 120 MMHG | HEART RATE: 70 BPM | TEMPERATURE: 98.2 F | RESPIRATION RATE: 16 BRPM

## 2021-12-22 DIAGNOSIS — J43.9 MIXED RESTRICTIVE AND OBSTRUCTIVE LUNG DISEASE (HCC): ICD-10-CM

## 2021-12-22 DIAGNOSIS — J96.11 CHRONIC RESPIRATORY FAILURE WITH HYPOXIA (HCC): ICD-10-CM

## 2021-12-22 DIAGNOSIS — J98.4 MIXED RESTRICTIVE AND OBSTRUCTIVE LUNG DISEASE (HCC): ICD-10-CM

## 2021-12-22 DIAGNOSIS — G47.33 OSA (OBSTRUCTIVE SLEEP APNEA): ICD-10-CM

## 2021-12-22 DIAGNOSIS — I27.20 PULMONARY HYPERTENSION (HCC): ICD-10-CM

## 2021-12-22 PROCEDURE — 99214 OFFICE O/P EST MOD 30 MIN: CPT | Performed by: INTERNAL MEDICINE

## 2021-12-22 ASSESSMENT — ENCOUNTER SYMPTOMS
PND: 0
FOCAL WEAKNESS: 0
HEARTBURN: 0
CLAUDICATION: 0
CONSTIPATION: 0
HEADACHES: 0
DEPRESSION: 0
COUGH: 0
VOMITING: 0
STRIDOR: 0
SPEECH CHANGE: 0
DOUBLE VISION: 0
ABDOMINAL PAIN: 0
EYE DISCHARGE: 0
PHOTOPHOBIA: 0
HEMOPTYSIS: 0
WEIGHT LOSS: 0
ORTHOPNEA: 0
CHILLS: 0
SINUS PAIN: 0
SORE THROAT: 0
DIZZINESS: 0
DIARRHEA: 0
NECK PAIN: 0
NAUSEA: 0
BACK PAIN: 0
SHORTNESS OF BREATH: 0
PALPITATIONS: 0
BLURRED VISION: 0
TREMORS: 0
FALLS: 0
SPUTUM PRODUCTION: 0
MYALGIAS: 0
FEVER: 0
EYE PAIN: 0
DIAPHORESIS: 0
EYE REDNESS: 0
WEAKNESS: 0
WHEEZING: 0

## 2021-12-22 ASSESSMENT — FIBROSIS 4 INDEX: FIB4 SCORE: 1.95

## 2021-12-22 NOTE — PROGRESS NOTES
"Chief Complaint   Patient presents with   • Follow-Up     last seen 7/15/21 Dr. Kam    • Results     PFt 12/8/21, CXR 10/13/21    • Apnea     last seen 8/26/21 jocelyn Ozuna          HPI: This patient is a 81 y.o. female whom is followed in our clinic for mixed restrictive obstructive lung disease c/b chronic hypoxic respiratory failure last seen by me on 7/15/21. The patient also carries a diagnosis of obstructive sleep apnea for which she is on BiPAP therapy and followed by sleep medicine.  The patient is followed by cardiology for diagnosis of mild pulmonary hypertension and on ambrisentan and in addition to spironolactone and Lasix.  She is a lifelong non-smoker.  She is on chronic opiate medication for pain and followed by pain medicine.  In the past she was treated with inhaled corticosteroid with long-acting beta agonist as well as anticholinergic with Incruse.  She has had issues with hoarseness of the voice and actually has been off all controller therapy for one year with no worsening of respiratory sxs. She is on O2 at 2LPLM for the past 6 mos. Updated PFTs ordered to evaluate a mild decline in DLCO are stable to slightly improved compared to March. Pt has undergone a series of surgeries for the past 6 months including knee surgery, elbow surgery and carpal tunnel release.  This was complicated by bladder infection and her son being treated with a course of emergency prednisone and antibiotics.  Outside of this she has done quite well.    Past Medical History:   Diagnosis Date   • Allergy     seasonal   • Anesthesia     \"hard to wake up\"   • Anxiety     due to loss of . 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    • Arthritis     facet arthritis of lumbar region   • Asthma     Inhaler use daily.   • Basal cell carcinoma     arm, neck, face   • Bowel habit changes     constipation   • CATARACT     removed bilat   • Chickenpox    • Chronic constipation    • Coagulase-negative " staphylococcal infection     Dr. Albrecht, attaches to plastic, prulent   • Delayed emergence from general anesthesia    • Depression     and anxiety   • Encounter for long-term (current) use of other medications    • Erosive gastritis 5/09    antral   • GERD (gastroesophageal reflux disease)    • [place] measles    • High cholesterol    • Hypertension    • Hypothyroidism    • Indwelling urinary catheter present 11/16/2020   • Influenza    • Lumbar vertebral fracture (HCC) 5/2011   • Mumps    • Muscle disorder     Arachnoiditis   • Neuropathy    • Neuropathy    • Obesity, Class I, BMI 30-34.9    • OSTEOPOROSIS    • Oxygen dependent     2 liters, [facility]   • Pain 07/20/2021    left arm, knees, chronic back, right leg, 6/10   • Pain management 5/20/2021   • Pulmonary hypertension ([facility])    • Recurrent UTI 6/28/2017   • Sleep apnea     on BiPap, follows with pulmonology   • Spinal stenosis, lumbar region, without neurogenic claudication    • Suprapubic catheter ([facility])    • Tonsillitis        Social History     Socioeconomic History   • Marital status:      Spouse name: Not on file   • Number of children: Not on file   • Years of education: Not on file   • Highest education level: Not on file   Occupational History   • Not on file   Tobacco Use   • Smoking status: Never Smoker   • Smokeless tobacco: Never Used   Vaping Use   • Vaping Use: Never used   Substance and Sexual Activity   • Alcohol use: Not Currently     Alcohol/week: 0.0 oz   • Drug use: Not Currently   • Sexual activity: Not Currently     Partners: Male     Birth control/protection: Abstinence   Other Topics Concern   •  Service Not Asked   • Blood Transfusions Not Asked   • Caffeine Concern Not Asked   • Occupational Exposure Not Asked   • Hobby Hazards Not Asked   • Sleep Concern Not Asked   • Stress Concern No     Comment:  cancer   • Weight Concern Not Asked   • Special Diet Not Asked   • Back Care Not Asked   • Exercise  Not Asked   • Bike Helmet Not Asked   • Seat Belt Not Asked   • Self-Exams Not Asked   Social History Narrative   • Not on file     Social Determinants of Health     Financial Resource Strain:    • Difficulty of Paying Living Expenses: Not on file   Food Insecurity:    • Worried About Running Out of Food in the Last Year: Not on file   • Ran Out of Food in the Last Year: Not on file   Transportation Needs:    • Lack of Transportation (Medical): Not on file   • Lack of Transportation (Non-Medical): Not on file   Physical Activity:    • Days of Exercise per Week: Not on file   • Minutes of Exercise per Session: Not on file   Stress:    • Feeling of Stress : Not on file   Social Connections:    • Frequency of Communication with Friends and Family: Not on file   • Frequency of Social Gatherings with Friends and Family: Not on file   • Attends Yarsanism Services: Not on file   • Active Member of Clubs or Organizations: Not on file   • Attends Club or Organization Meetings: Not on file   • Marital Status: Not on file   Intimate Partner Violence:    • Fear of Current or Ex-Partner: Not on file   • Emotionally Abused: Not on file   • Physically Abused: Not on file   • Sexually Abused: Not on file   Housing Stability:    • Unable to Pay for Housing in the Last Year: Not on file   • Number of Places Lived in the Last Year: Not on file   • Unstable Housing in the Last Year: Not on file       Family History   Problem Relation Age of Onset   • Genitourinary () Problems Brother    • Lung Disease Mother         COPD   • Heart Disease Mother    • Genetic Disorder Father         Parkinsons/ from complications   • Hypertension Father    • Cancer Maternal Aunt         Breast cancer   • Stroke Paternal Grandmother    • Cancer Maternal Aunt         Breast cancer       Current Outpatient Medications on File Prior to Visit   Medication Sig Dispense Refill   • escitalopram (LEXAPRO) 20 MG tablet Take 1 tablet by mouth once daily  90 Tablet 2   • potassium chloride (KLOR-CON) 8 MEQ tablet TAKE 2 TABLETS EVERY  Tablet 3   • omeprazole (PRILOSEC) 20 MG delayed-release capsule Take 1 Capsule by mouth every day. 90 Capsule 3   • albuterol 108 (90 Base) MCG/ACT Aero Soln inhalation aerosol Ventolin HFA 90 mcg/actuation aerosol inhaler     • spironolactone (ALDACTONE) 25 MG Tab Take 25 mg by mouth every day.     • estradiol (ESTRACE) 0.5 MG tablet Take 1 tablet by mouth once daily 90 Tablet 3   • imipramine (TOFRANIL) 10 MG Tab Take 2 Tablets by mouth every evening. 180 Tablet 0   • ASPIRIN LOW DOSE 81 MG EC tablet TAKE 1 TABLET BY MOUTH TWICE A DAY 60 Tablet 2   • oxyCODONE-acetaminophen (PERCOCET) 5-325 MG Tab Take 1 Tablet by mouth 4 times a day.     • Multiple Vitamins-Minerals (VITEYES AREDS ADVANCED PO) Take 1 Capsule by mouth every day.     • Cholecalciferol (D3) 2000 UNIT Tab Take 2,000 Units by mouth every day.     • Cyanocobalamin (VITAMIN B-12 PO) Take 2,500 mcg by mouth every day.     • docusate sodium (COLACE) 250 MG capsule Take 500 mg by mouth every evening. Indications: Constipation     • atorvastatin (LIPITOR) 20 MG Tab TAKE 1 TABLET BY MOUTH EVERY DAY (Patient taking differently: Take 20 mg by mouth every evening.) 90 tablet 3   • levothyroxine (SYNTHROID) 50 MCG Tab TAKE 1 TABLET BY MOUTH IN THE MORNING ON AN EMPTY STOMACH (Patient taking differently: Take 50 mcg by mouth every morning on an empty stomach.) 90 tablet 3   • bumetanide (BUMEX) 2 MG tablet Take 2 mg by mouth at bedtime. Indications: Edema, High Blood Pressure Disorder     • pregabalin (LYRICA) 200 MG capsule Take 200 mg by mouth in the morning, at noon, and at bedtime. Pt takes @ 0600, 1200, and 2100     • LETAIRIS 10 MG tablet Take 10 Tablets by mouth at bedtime. Indications: Pulmonary Arterial Hypertension     • AMITIZA 24 MCG capsule Take 48 mcg by mouth every morning with breakfast. Indications: Chronic Constipation of Unknown Cause     • XTAMPZA ER 18 MG  Capsule Extended Release 12 hour Abuse-Deterrent Take 18 mg by mouth 2 times a day. Indications: Chronic Pain       No current facility-administered medications on file prior to visit.       Other drug, Penicillins, Sulfa drugs, Macrobid [nitrofurantoin], and Tape      ROS:   Review of Systems   Constitutional: Negative for chills, diaphoresis, fever, malaise/fatigue and weight loss.   HENT: Negative for congestion, ear discharge, ear pain, hearing loss, nosebleeds, sinus pain, sore throat and tinnitus.    Eyes: Negative for blurred vision, double vision, photophobia, pain, discharge and redness.   Respiratory: Negative for cough, hemoptysis, sputum production, shortness of breath, wheezing and stridor.    Cardiovascular: Negative for chest pain, palpitations, orthopnea, claudication, leg swelling and PND.   Gastrointestinal: Negative for abdominal pain, constipation, diarrhea, heartburn, nausea and vomiting.   Genitourinary: Negative for dysuria and urgency.   Musculoskeletal: Negative for back pain, falls, joint pain, myalgias and neck pain.   Skin: Negative for itching and rash.   Neurological: Negative for dizziness, tremors, speech change, focal weakness, weakness and headaches.   Endo/Heme/Allergies: Negative for environmental allergies.   Psychiatric/Behavioral: Negative for depression.       Wt 74.8 kg (165 lb)   Physical Exam  Vitals reviewed.   Constitutional:       General: She is not in acute distress.     Appearance: Normal appearance. She is well-developed.   HENT:      Head: Normocephalic and atraumatic.      Right Ear: External ear normal.      Left Ear: External ear normal.      Nose: Nose normal. No congestion.      Mouth/Throat:      Mouth: Mucous membranes are moist.      Pharynx: Oropharynx is clear. No oropharyngeal exudate.   Eyes:      General: No scleral icterus.     Extraocular Movements: Extraocular movements intact.      Conjunctiva/sclera: Conjunctivae normal.      Pupils: Pupils are  equal, round, and reactive to light.   Neck:      Vascular: No JVD.      Trachea: No tracheal deviation.   Cardiovascular:      Rate and Rhythm: Normal rate and regular rhythm.      Heart sounds: Normal heart sounds. No murmur heard.  No friction rub. No gallop.    Pulmonary:      Effort: Pulmonary effort is normal. No accessory muscle usage or respiratory distress.      Breath sounds: Normal breath sounds. No wheezing or rales.   Abdominal:      General: There is no distension.      Palpations: Abdomen is soft.      Tenderness: There is no abdominal tenderness.   Musculoskeletal:         General: No tenderness or deformity. Normal range of motion.      Cervical back: Normal range of motion and neck supple.      Right lower leg: No edema.      Left lower leg: No edema.   Lymphadenopathy:      Cervical: No cervical adenopathy.   Skin:     General: Skin is warm and dry.      Findings: No rash.      Nails: There is no clubbing.   Neurological:      Mental Status: She is alert and oriented to person, place, and time.      Cranial Nerves: No cranial nerve deficit.      Gait: Gait normal.   Psychiatric:         Mood and Affect: Mood normal.         Behavior: Behavior normal.         PFTs as reviewed by me personally: as per hPI    Imaging as reviewed by me personally:  As per hPI    Assessment:  1. Chronic respiratory failure with hypoxia (HCC)     2. Mixed restrictive and obstructive lung disease (HCC)     3. ALISSA (obstructive sleep apnea)     4. Pulmonary hypertension (HCC)         Plan:  1.  This is fairly new in the past 6 months but oxygen needs are stable.  I suspect more likely related to hypoventilation in the setting of chronic opiate use.  I encouraged regular activity which hopefully will be easier for her now that she has had surgery on her knees.  Obviously minimizing respiratory suppressants is desirable.  She has not needed regular controller therapy.  Continue supplemental oxygen at 2 L/min.  Follow-up with  me to reassess in 6 months.  2.  She did need prednisone and antibiotics after surgery per son and we discussed nebulized bronchodilators for as needed use but with patient's son feels she is back to baseline.  If she needs another course of rescue therapy, we may consider resuming controller therapy or at least starting nebulized bronchodilators.  Encouraged regular activity.  3.  Followed by sleep medicine.  Recently underwent titration study and has follow-up in January.  4.  Mild and is likely multifactorial.  She does have hypoxic lung disease but I suspect her hypoxia is secondary to chronic opiate use.  Currently working to optimize ALISSA therapy.  Minimize opiate therapy.  Continue supplemental oxygen at 2 L/min.  Return in about 6 months (around 6/22/2022).

## 2022-01-04 ENCOUNTER — HOSPITAL ENCOUNTER (OUTPATIENT)
Facility: MEDICAL CENTER | Age: 82
End: 2022-01-04
Attending: PHYSICIAN ASSISTANT
Payer: MEDICARE

## 2022-01-04 PROCEDURE — 87086 URINE CULTURE/COLONY COUNT: CPT

## 2022-01-04 PROCEDURE — 87186 SC STD MICRODIL/AGAR DIL: CPT

## 2022-01-04 PROCEDURE — 87077 CULTURE AEROBIC IDENTIFY: CPT

## 2022-01-12 ENCOUNTER — OFFICE VISIT (OUTPATIENT)
Dept: SLEEP MEDICINE | Facility: MEDICAL CENTER | Age: 82
End: 2022-01-12
Payer: MEDICARE

## 2022-01-12 VITALS
HEIGHT: 60 IN | HEART RATE: 88 BPM | RESPIRATION RATE: 16 BRPM | SYSTOLIC BLOOD PRESSURE: 128 MMHG | BODY MASS INDEX: 32.39 KG/M2 | WEIGHT: 165 LBS | OXYGEN SATURATION: 94 % | DIASTOLIC BLOOD PRESSURE: 82 MMHG

## 2022-01-12 DIAGNOSIS — G47.33 OSA (OBSTRUCTIVE SLEEP APNEA): Chronic | ICD-10-CM

## 2022-01-12 PROCEDURE — 99214 OFFICE O/P EST MOD 30 MIN: CPT | Performed by: FAMILY MEDICINE

## 2022-01-12 ASSESSMENT — FIBROSIS 4 INDEX: FIB4 SCORE: 1.95

## 2022-01-12 NOTE — PROGRESS NOTES
[facility] Follow Up Note     Date: 1/12/2022 / Time: 10:58 AM    Patient ID:   Name:             Maddy Hodge   YOB: 1940  Age:                 81 y.o.  female   MRN:               [id number]      Thank you for requesting a sleep medicine consultation on Maddy Hodge at the sleep center. She presents today with the chief complaints of ALISSA follow up.     HISTORY OF PRESENT ILLNESS:       Pt is currently on BiPAP 14/5 cm back up rate 12 bpm with O2 bleed in at 2 L/min. She goes to sleep around 9:30 pm and wakes up around 7-8 am. She is getting about 7 hrs of sleep on a good night and about 6 hr of sleep on a bad night. She denies any symptoms of RLS, narcolepsy or any symptoms to suggest parasomnias such as nightmares, sleep walking or acting out of dreams.She is using BiPAP most days of the week. Pt reports 6 hrs of average nightly use of BiPAP. Pt denies snoring, gasping,choking.Pt also denies significant mask leak that is interfering with sleep. The 30 day compliance was downloaded which shows adequate compliance with more that 4 hr usage about 90%. The AHI is 33.7/hr. The mask leak is normal. Based on her last titration study the best tolerated pressure was BiPAP ST 19/15 centimeters with backup rate 14 breaths/min.  The AHI improved to 0/h with O2 charlene 88%.    She also has history of restrictive lung disease with chronic respiratory failure.  She is currently on O2 at 2 L/min.  Last PFT showed Baseline spirometry demonstrates a moderate reduction in FEV1 at 1.12 L which is 65% of predicted. FEV1/FVC ratio is 69%.    SLEEP HISTORY   The PAP titration was initiated with CPAP 6 cm of water and the pressure which was slowly titrated up in an attempt to eliminate sleep disordered breathing and snoring. The CPAP was titrated up to 9 cm however majority of the events were crentral apneas therefore he was titrated on BiPAP with ST between 9/4-12/7 cm. The central apneas  improved on BiPAP ST 12/7 cm with 12 bpm, the over AHI was 1.6/hr  Ans O2 charlene 90%. ASV was also tried with EPAP min 6 EPAP 10 cm PS 3/15 cm.The best tolerated pressure was BiPAP ST 12/7 cm with back up rate 12 bpm at this final pressure the patient was observed in the supine position but not REM sleep stage. The apnea hypopnea index improved to 1.6 per hour and O2 charlene 90%. The average O2 stauration was 93%. She spent 14 % of sleep time below 89% O2 saturation    PSG from 6/2017 indicated an AHI of 5.5 and low oxygenation of 81%.  She completed a titration study for potential ASV therapy given elevated AHI and use of chronic pain medication.  She was then switched from CPAP to BiPAP given her central apneas resolved with BiPAP therapy.  Currently she is being treated with BIPAP ST @ 14/9cm with back up rate 12.    REVIEW OF SYSTEMS:       Constitutional: Denies fevers, Denies weight changes  Eyes: Denies changes in vision, no eye pain  Ears/Nose/Throat/Mouth: Denies nasal congestion or sore throat   Cardiovascular: Denies chest pain or palpitations   Respiratory: Denies shortness of breath , Denies cough  Gastrointestinal/Hepatic: Denies abdominal pain, nausea, vomiting, diarrhea, constipation or GI bleeding   Genitourinary: Deniesdysuria or frequency  Musculoskeletal/Rheum: Denies  joint pain and swelling   Skin/Breast: Denies rash,   Neurological: Denies headache, confusion, memory loss or focal weakness/parasthesias  Psychiatric: denies mood disorder   Sleep: + insomnia and non restful sleep    Comprehensive review of systems form is reviewed with the patient and is attached in the EMR.     PMH:  has a past medical history of Allergy, Anesthesia, Anxiety, Arachnoiditis, Arthritis, Asthma, Basal cell carcinoma, Bowel habit changes, CATARACT, Chickenpox, Chronic constipation, Coagulase-negative staphylococcal infection, Delayed emergence from general anesthesia, Depression, Encounter for long-term (current) use  of other medications, Erosive gastritis (5/09), GERD (gastroesophageal reflux disease), [place] measles, High cholesterol, Hypertension, Hypothyroidism, Indwelling urinary catheter present (11/16/2020), Influenza, Lumbar vertebral fracture (HCC) (5/2011), Mumps, Muscle disorder, Neuropathy, Neuropathy, Obesity, Class I, BMI 30-34.9, OSTEOPOROSIS, Oxygen dependent, Pain (07/20/2021), Pain management (5/20/2021), Pulmonary hypertension ([facility]), Recurrent UTI (6/28/2017), Sleep apnea, Spinal stenosis, lumbar region, without neurogenic claudication, Suprapubic catheter ([facility]), and Tonsillitis.  MEDS:   Current Outpatient Medications:   •  Omega-3 Fatty Acids (OMEGA-3 FISH OIL PO), Take 1,500 mg by mouth., Disp: , Rfl:   •  D-MANNOSE PO, Take 1,300 mg by mouth., Disp: , Rfl:   •  escitalopram (LEXAPRO) 20 MG tablet, Take 1 tablet by mouth once daily, Disp: 90 Tablet, Rfl: 2  •  potassium chloride (KLOR-CON) 8 MEQ tablet, TAKE 2 TABLETS EVERY DAY, Disp: 180 Tablet, Rfl: 3  •  omeprazole (PRILOSEC) 20 MG delayed-release capsule, Take 1 Capsule by mouth every day., Disp: 90 Capsule, Rfl: 3  •  spironolactone (ALDACTONE) 25 MG Tab, Take 25 mg by mouth every day., Disp: , Rfl:   •  estradiol (ESTRACE) 0.5 MG tablet, Take 1 tablet by mouth once daily, Disp: 90 Tablet, Rfl: 3  •  imipramine (TOFRANIL) 10 MG Tab, Take 2 Tablets by mouth every evening., Disp: 180 Tablet, Rfl: 0  •  ASPIRIN LOW DOSE 81 MG EC tablet, TAKE 1 TABLET BY MOUTH TWICE A DAY, Disp: 60 Tablet, Rfl: 2  •  oxyCODONE-acetaminophen (PERCOCET) 5-325 MG Tab, Take 1 Tablet by mouth 4 times a day., Disp: , Rfl:   •  Multiple Vitamins-Minerals (VITEYES AREDS ADVANCED PO), Take 1 Capsule by mouth every day., Disp: , Rfl:   •  Cholecalciferol (D3) 2000 UNIT Tab, Take 2,000 Units by mouth every day., Disp: , Rfl:   •  Cyanocobalamin (VITAMIN B-12 PO), Take 2,500 mcg by mouth every day., Disp: , Rfl:   •  docusate sodium (COLACE) 250 MG capsule, Take 500 mg by mouth every  evening. Indications: Constipation, Disp: , Rfl:   •  atorvastatin (LIPITOR) 20 MG Tab, TAKE 1 TABLET BY MOUTH EVERY DAY (Patient taking differently: Take 20 mg by mouth every evening.), Disp: 90 tablet, Rfl: 3  •  levothyroxine (SYNTHROID) 50 MCG Tab, TAKE 1 TABLET BY MOUTH IN THE MORNING ON AN EMPTY STOMACH (Patient taking differently: Take 50 mcg by mouth every morning on an empty stomach.), Disp: 90 tablet, Rfl: 3  •  bumetanide (BUMEX) 2 MG tablet, Take 2 mg by mouth at bedtime. Indications: Edema, High Blood Pressure Disorder, Disp: , Rfl:   •  pregabalin (LYRICA) 200 MG capsule, Take 200 mg by mouth in the morning, at noon, and at bedtime. Pt takes @ 0600, 1200, and 2100, Disp: , Rfl:   •  LETAIRIS 10 MG tablet, Take 10 Tablets by mouth at bedtime. Indications: Pulmonary Arterial Hypertension, Disp: , Rfl:   •  AMITIZA 24 MCG capsule, Take 48 mcg by mouth every morning with breakfast. Indications: Chronic Constipation of Unknown Cause, Disp: , Rfl:   •  albuterol 108 (90 Base) MCG/ACT Aero Soln inhalation aerosol, Ventolin HFA 90 mcg/actuation aerosol inhaler, Disp: , Rfl:   •  XTAMPZA ER 18 MG Capsule Extended Release 12 hour Abuse-Deterrent, Take 18 mg by mouth 2 times a day. Indications: Chronic Pain, Disp: , Rfl:   ALLERGIES:   Allergies   Allergen Reactions   • Other Drug    • Penicillins Hives     hives  hives   • Sulfa Drugs Hives     hives  hives   • Macrobid [Nitrofurantoin] Rash     rash   • Tape Rash and Itching     Paper tape ok     SURGHX:   Past Surgical History:   Procedure Laterality Date   • PB TOTAL KNEE ARTHROPLASTY Right 9/20/2021    Procedure: ARTHROPLASTY, KNEE, TOTAL;  Surgeon: Kayden Perez M.D.;  Location: SURGERY [facility];  Service: Orthopedics   • PB REVISE MEDIAN N/CARPAL TUNNEL SURG Left 8/5/2021    Procedure: RELEASE, CARPAL TUNNEL;  Surgeon: Ian Delgado M.D.;  Location: SURGERY SAME DAY [facility];  Service: Orthopedics   • PB REVISE ULNAR NERVE AT ELBOW Left 8/5/2021     Procedure: RELEASE, CUBITAL TUNNEL.;  Surgeon: Ian Delgado M.D.;  Location: SURGERY SAME DAY [facility];  Service: Orthopedics   • PB TOTAL KNEE ARTHROPLASTY Left 2020    Procedure: ARTHROPLASTY, KNEE, TOTAL;  Surgeon: Kayden Perez M.D.;  Location: SURGERY [facility];  Service: Orthopedics   • SPINAL CORD STIMULATOR N/A 7/3/2017    Procedure: SPINAL CORD STIMULATOR FOR LEAD REMOVAL;  Surgeon: Amandeep Gonzalez D.O.;  Location: SURGERY [place] [facility];  Service:    • GASTROSCOPY WITH BALLOON DILATATION N/A 2016    Procedure: GASTROSCOPY WITH DILATATION;  Surgeon: Tony Monae M.D.;  Location: SURGERY [facility];  Service:    • SPINAL CORD STIMULATOR  2014    removed    • OTHER SURGICAL PROCEDURE Bilateral     plantar fascitis surgery   • EGD WITH ASP/BX  09    erosive gastritis   • HYSTERECTOMY, TOTAL ABDOMINAL     • APPENDECTOMY     • CATARACT EXTRACTION WITH IOL Bilateral    • COLONOSCOPY  ,09    normal   • LUMBAR LAMINECTOMY DISKECTOMY     • TONSILLECTOMY       SOCHX:  reports that she has never smoked. She has never used smokeless tobacco. She reports previous alcohol use. She reports previous drug use..  FH:   Family History   Problem Relation Age of Onset   • Genitourinary () Problems Brother    • Lung Disease Mother         COPD   • Heart Disease Mother    • Genetic Disorder Father         Parkinsons/ from complications   • Hypertension Father    • Cancer Maternal Aunt         Breast cancer   • Stroke Paternal Grandmother    • Cancer Maternal Aunt         Breast cancer         Physical Exam:  Vitals/ General Appearance:   Weight/BMI: Body mass index is 32.22 kg/m².  /82 (BP Location: Left arm, Patient Position: Sitting, BP Cuff Size: Adult)   Pulse 88   Resp 16   Ht 1.524 m (5')   Wt 74.8 kg (165 lb)   SpO2 94%   Vitals:    22 1050   BP: 128/82   BP Location: Left arm   Patient Position: Sitting   BP Cuff Size:  Adult   Pulse: 88   Resp: 16   SpO2: 94%   Weight: 74.8 kg (165 lb)   Height: 1.524 m (5')       Pt. is alert and oriented to time, place and person. Cooperative and in no apparent distress.       Constitutional: Alert, no distress, well-groomed.  Skin: No rashes in visible areas.  Eye: Round. Conjunctiva clear, lids normal. No icterus.   ENMT: Lips pink without lesions, good dentition, moist mucous membranes. Phonation normal.  Neck: No masses, no thyromegaly. Moves freely without pain.  CV: Pulse as reported by patient  Respiratory: Unlabored respiratory effort, no cough or audible wheeze  Psych: Alert and oriented x3, normal affect and mood.     ASSESSMENT AND PLAN     1. Sleep Apnea The pathophysiology of sleep anea and the increased risk of cardiovascular morbidity from untreated sleep apnea is discussed in detail with the patient.  She is urged to avoid supine sleep, weight gain and alcoholic beverages since all of these can worsen sleep apnea. She is cautioned against drowsy driving. If She feels sleepy while driving, She must pull over for a break/nap, rather than persist on the road, in the interest of She own safety and that of others on the road.   Plan   -Pressure was changed to BiPAP ST 19/15 centimeters with backup rate 14 breaths/min   - F/u in 6 weeks to assess the efficiacy of recommended pressure    - compliance download and sleep study was reviewed and discussed with the pt   - compliance was reinforced     2. Regarding treatment of other past medical problems and general health maintenance,  She is urged to follow up with PCP.

## 2022-02-23 ENCOUNTER — OFFICE VISIT (OUTPATIENT)
Dept: SLEEP MEDICINE | Facility: MEDICAL CENTER | Age: 82
End: 2022-02-23
Payer: MEDICARE

## 2022-02-23 VITALS
BODY MASS INDEX: 31.41 KG/M2 | DIASTOLIC BLOOD PRESSURE: 62 MMHG | RESPIRATION RATE: 16 BRPM | WEIGHT: 160 LBS | OXYGEN SATURATION: 96 % | HEART RATE: 85 BPM | HEIGHT: 60 IN | SYSTOLIC BLOOD PRESSURE: 122 MMHG

## 2022-02-23 DIAGNOSIS — G47.33 OSA (OBSTRUCTIVE SLEEP APNEA): ICD-10-CM

## 2022-02-23 DIAGNOSIS — J96.11 CHRONIC RESPIRATORY FAILURE WITH HYPOXIA (HCC): ICD-10-CM

## 2022-02-23 PROCEDURE — 99214 OFFICE O/P EST MOD 30 MIN: CPT | Performed by: NURSE PRACTITIONER

## 2022-02-23 ASSESSMENT — PATIENT HEALTH QUESTIONNAIRE - PHQ9: CLINICAL INTERPRETATION OF PHQ2 SCORE: 0

## 2022-02-23 ASSESSMENT — FIBROSIS 4 INDEX: FIB4 SCORE: 1.95

## 2022-02-23 NOTE — PROGRESS NOTES
Chief Complaint   Patient presents with   • Follow-Up     ALISSA       HPI:  Maddy Hodge is a 81 y.o. year old female here today for follow-up on ALISSA.  Last seen 1/12/2022 by Dr. Mueller.  At last visit patient's compliance showed an elevated events per hour at 33.7 AHI per hour.  Pressure was subsequently changed to BiPAP ST 19/15 centimeters with backup rate of 14 breaths/min.  Patient also uses 2 L/min supplemental oxygen bleed-in to BiPAP.     Patient denies any difficulty with mask fit, but does endorse some leakage.  She is currently using an AirFit F 30 I and also endorses sores under her naris from the mask being tight.  Compliance report was reviewed and shows 97% use an average time of 8 hours a resultant AHI of 10.9.  There is also evidence of leakage on compliance report associated with elevated events per hour.    Overall she finds her sleep refreshing and denies any excessive daytime sleepiness.  Her family member does endorse that when she struggles with it at night she will not use therapy for that evening.  He does notice a difference in her energy levels when using the BiPAP and not using the BiPAP.  She denies any morning headaches, palpitations, concentration or memory problems.    SLEEP HISTORY   The PAP titration was initiated with CPAP 6 cm of water and the pressure which was slowly titrated up in an attempt to eliminate sleep disordered breathing and snoring. The CPAP was titrated up to 9 cm however majority of the events were crentral apneas therefore he was titrated on BiPAP with ST between 9/4-12/7 cm. The central apneas improved on BiPAP ST 12/7 cm with 12 bpm, the over AHI was 1.6/hr  Ans O2 charlene 90%. ASV was also tried with EPAP min 6 EPAP 10 cm PS 3/15 cm.The best tolerated pressure was BiPAP ST 12/7 cm with back up rate 12 bpm at this final pressure the patient was observed in the supine position but not REM sleep stage. The apnea hypopnea index improved to 1.6 per hour and O2  "charlene 90%. The average O2 stauration was 93%. She spent 14 % of sleep time below 89% O2 saturation     PSG from 6/2017 indicated an AHI of 5.5 and low oxygenation of 81%.  She completed a titration study for potential ASV therapy given elevated AHI and use of chronic pain medication.  She was then switched from CPAP to BiPAP given her central apneas resolved with BiPAP therapy.  Currently she is being treated with BIPAP ST @ 14/9cm with back up rate 12.    ROS: As per HPI and otherwise negative if not stated.    Past Medical History:   Diagnosis Date   • Allergy     seasonal   • Anesthesia     \"hard to wake up\"   • Anxiety     due to loss of . 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    • Arthritis     facet arthritis of lumbar region   • Asthma     Inhaler use daily.   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 Albrecht, attaches to plastic, prulent   • Delayed emergence from general anesthesia    • Depression     and anxiety   • Encounter for long-term (current) use of other medications    • Erosive gastritis 5/09    antral   • GERD (gastroesophageal reflux disease)    • [place] measles    • High cholesterol    • Hypertension    • Hypothyroidism    • Indwelling urinary catheter present 11/16/2020   • Influenza    • Lumbar vertebral fracture (HCC) 5/2011   • Mumps    • Muscle disorder     Arachnoiditis   • Neuropathy    • Neuropathy    • Obesity, Class I, BMI 30-34.9    • OSTEOPOROSIS    • Oxygen dependent     2 liters, [facility]   • Pain 07/20/2021    left arm, knees, chronic back, right leg, 6/10   • Pain management 5/20/2021   • Pulmonary hypertension (HCC)    • Recurrent UTI 6/28/2017   • Sleep apnea     on BiPap, follows with pulmonology   • Spinal stenosis, lumbar region, without neurogenic claudication    • " Suprapubic catheter (HCC)    • Tonsillitis        Past Surgical History:   Procedure Laterality Date   • PB TOTAL KNEE ARTHROPLASTY Right 2021    Procedure: ARTHROPLASTY, KNEE, TOTAL;  Surgeon: Kayden Perez M.D.;  Location: SURGERY [facility];  Service: Orthopedics   • PB REVISE MEDIAN N/CARPAL TUNNEL SURG Left 2021    Procedure: RELEASE, CARPAL TUNNEL;  Surgeon: Ian Delgado M.D.;  Location: SURGERY SAME DAY [facility];  Service: Orthopedics   • PB REVISE ULNAR NERVE AT ELBOW Left 2021    Procedure: RELEASE, CUBITAL TUNNEL.;  Surgeon: Ian Delgado M.D.;  Location: SURGERY SAME DAY [facility];  Service: Orthopedics   • PB TOTAL KNEE ARTHROPLASTY Left 2020    Procedure: ARTHROPLASTY, KNEE, TOTAL;  Surgeon: Kayden Perez M.D.;  Location: [facility];  Service: Orthopedics   • SPINAL CORD STIMULATOR N/A 7/3/2017    Procedure: SPINAL CORD STIMULATOR FOR LEAD REMOVAL;  Surgeon: Amandeep Gonzalez D.O.;  Location: [facility];  Service:    • GASTROSCOPY WITH BALLOON DILATATION N/A 2016    Procedure: GASTROSCOPY WITH DILATATION;  Surgeon: Tony Monae M.D.;  Location: [facility];  Service:    • SPINAL CORD STIMULATOR  2014    removed    • OTHER SURGICAL PROCEDURE Bilateral 2012    plantar fascitis surgery   • EGD WITH ASP/BX  09    erosive gastritis   • HYSTERECTOMY, TOTAL ABDOMINAL     • APPENDECTOMY     • CATARACT EXTRACTION WITH IOL Bilateral    • COLONOSCOPY  ,09    normal   • LUMBAR LAMINECTOMY DISKECTOMY     • TONSILLECTOMY         Family History   Problem Relation Age of Onset   • Genitourinary () Problems Brother    • Lung Disease Mother         COPD   • Heart Disease Mother    • Genetic Disorder Father         Parkinsons/ from complications   • Hypertension Father    • Cancer Maternal Aunt         Breast cancer   • Stroke Paternal Grandmother    • Cancer Maternal Aunt         Breast cancer        Allergies as of 02/23/2022 - Reviewed 02/23/2022   Allergen Reaction Noted   • Other drug  02/16/2015   • Penicillins Hives 06/21/2017   • Sulfa drugs Hives 06/21/2017   • Macrobid [nitrofurantoin] Rash 06/28/2017   • Tape Rash and Itching 06/21/2017        Vitals:  /62 (BP Location: Left arm, Patient Position: Sitting, BP Cuff Size: Large adult)   Pulse 85   Resp 16   Ht 1.524 m (5')   Wt 72.6 kg (160 lb)   SpO2 96%     Current medications as of today   Current Outpatient Medications   Medication Sig Dispense Refill   • imipramine (TOFRANIL) 10 MG Tab TAKE 2 TABLETS BY MOUTH IN THE EVENING 180 Tablet 2   • Omega-3 Fatty Acids (OMEGA-3 FISH OIL PO) Take 1,500 mg by mouth.     • D-MANNOSE PO Take 1,300 mg by mouth.     • escitalopram (LEXAPRO) 20 MG tablet Take 1 tablet by mouth once daily 90 Tablet 2   • potassium chloride (KLOR-CON) 8 MEQ tablet TAKE 2 TABLETS EVERY  Tablet 3   • omeprazole (PRILOSEC) 20 MG delayed-release capsule Take 1 Capsule by mouth every day. 90 Capsule 3   • albuterol 108 (90 Base) MCG/ACT Aero Soln inhalation aerosol Ventolin HFA 90 mcg/actuation aerosol inhaler     • spironolactone (ALDACTONE) 25 MG Tab Take 25 mg by mouth every day.     • estradiol (ESTRACE) 0.5 MG tablet Take 1 tablet by mouth once daily 90 Tablet 3   • ASPIRIN LOW DOSE 81 MG EC tablet TAKE 1 TABLET BY MOUTH TWICE A DAY 60 Tablet 2   • oxyCODONE-acetaminophen (PERCOCET) 5-325 MG Tab Take 1 Tablet by mouth 4 times a day.     • Multiple Vitamins-Minerals (VITEYES AREDS ADVANCED PO) Take 1 Capsule by mouth every day.     • Cholecalciferol (D3) 2000 UNIT Tab Take 2,000 Units by mouth every day.     • Cyanocobalamin (VITAMIN B-12 PO) Take 2,500 mcg by mouth every day.     • docusate sodium (COLACE) 250 MG capsule Take 500 mg by mouth every evening. Indications: Constipation     • atorvastatin (LIPITOR) 20 MG Tab TAKE 1 TABLET BY MOUTH EVERY DAY (Patient taking differently: Take 20 mg by mouth every  evening.) 90 tablet 3   • levothyroxine (SYNTHROID) 50 MCG Tab TAKE 1 TABLET BY MOUTH IN THE MORNING ON AN EMPTY STOMACH (Patient taking differently: Take 50 mcg by mouth every morning on an empty stomach.) 90 tablet 3   • bumetanide (BUMEX) 2 MG tablet Take 2 mg by mouth at bedtime. Indications: Edema, High Blood Pressure Disorder     • pregabalin (LYRICA) 200 MG capsule Take 200 mg by mouth in the morning, at noon, and at bedtime. Pt takes @ 0600, 1200, and 2100     • LETAIRIS 10 MG tablet Take 10 Tablets by mouth at bedtime. Indications: Pulmonary Arterial Hypertension     • AMITIZA 24 MCG capsule Take 48 mcg by mouth every morning with breakfast. Indications: Chronic Constipation of Unknown Cause     • XTAMPZA ER 18 MG Capsule Extended Release 12 hour Abuse-Deterrent Take 18 mg by mouth 2 times a day. Indications: Chronic Pain       No current facility-administered medications for this visit.         Physical Exam:   Gen:           Alert and oriented, No apparent distress. Mood and affect appropriate, normal interaction with examiner.  Eyes:          PERRL, EOM intact, sclere white, conjunctive moist.  Ears:          Not examined.   Hearing:     Grossly intact.  Nose:          Normal, no lesions or deformities.  Dentition:    Good dentition.  Oropharynx:   Tongue normal, posterior pharynx without erythema or exudate.  Neck:        Supple, trachea midline, no masses.  Respiratory Effort: No intercostal retractions or use of accessory muscles.   Lung Auscultation:      Clear to auscultation bilaterally; no rales, rhonchi or wheezing.  CV:            Regular rate and rhythm. No murmurs, rubs or gallops.  Abd:           Not examined.   Lymphadenopathy: Not examined.  Gait and Station: Normal.  Digits and Nails: No clubbing, cyanosis, petechiae, or nodes.   Cranial Nerves: II-XII grossly intact.  Skin:        No rashes, lesions or ulcers noted.               Ext:           No cyanosis or edema.      Assessment:  1.  ALISSA (obstructive sleep apnea)     2. Chronic respiratory failure with hypoxia (HCC)           Plan:  1.  Previous compliance showed an elevated events per hour at 33.7 AHI per hour.  Pressure was subsequently changed to BiPAP ST 19/15 centimeters with backup rate of 14 breaths/min.  Patient also uses 2 L/min supplemental oxygen bleed-in to BiPAP.  Since last visit, patient notes an improvement in her sleep quality.  Today's 30-day compliance shows AHI of 10.9, but there is also evidence of excessive leakage which is elevating the events per hour.  Advised patient to ensure that mask is fitting securely, and also advised to possibly seek out a CPAP barrier cushion to place over the areas of soreness.  Patient's family member will message me in 2 weeks and they will work on mask fit and ensuring that the mask is a little tighter.  We will check compliance in 2 weeks and if all events are controlled, we will follow-up in 6 months.  If there is an elevated events per hour, we will consider mask fit and sooner follow-up.    Please note that this dictation was created using voice recognition software. I have made every reasonable attempt to correct obvious errors, but it is possible there are errors of grammar and possibly content that I did not discover before finalizing the note.

## 2022-03-04 DIAGNOSIS — K21.00 GASTROESOPHAGEAL REFLUX DISEASE WITH ESOPHAGITIS WITHOUT HEMORRHAGE: ICD-10-CM

## 2022-03-04 DIAGNOSIS — N95.1 MENOPAUSAL SYMPTOMS: ICD-10-CM

## 2022-03-04 DIAGNOSIS — E78.5 DYSLIPIDEMIA, GOAL LDL BELOW 130: ICD-10-CM

## 2022-03-04 DIAGNOSIS — G56.22 CUBITAL TUNNEL SYNDROME ON LEFT: ICD-10-CM

## 2022-03-04 DIAGNOSIS — E03.4 IDIOPATHIC ATROPHIC HYPOTHYROIDISM: ICD-10-CM

## 2022-03-04 RX ORDER — OMEPRAZOLE 20 MG/1
20 CAPSULE, DELAYED RELEASE ORAL DAILY
Qty: 90 CAPSULE | Refills: 3 | Status: SHIPPED | OUTPATIENT
Start: 2022-03-04 | End: 2023-03-06

## 2022-03-04 RX ORDER — ATORVASTATIN CALCIUM 20 MG/1
20 TABLET, FILM COATED ORAL EVERY EVENING
Qty: 90 TABLET | Refills: 3 | Status: SHIPPED | OUTPATIENT
Start: 2022-03-04 | End: 2022-05-05

## 2022-03-04 RX ORDER — IMIPRAMINE HYDROCHLORIDE 10 MG/1
20 TABLET, FILM COATED ORAL EVERY EVENING
Qty: 180 TABLET | Refills: 2 | Status: SHIPPED | OUTPATIENT
Start: 2022-03-04 | End: 2022-11-16

## 2022-03-04 RX ORDER — ESTRADIOL 0.5 MG/1
0.5 TABLET ORAL DAILY
Qty: 90 TABLET | Refills: 3 | Status: SHIPPED | OUTPATIENT
Start: 2022-03-04 | End: 2023-01-30

## 2022-03-04 RX ORDER — LEVOTHYROXINE SODIUM 0.05 MG/1
50 TABLET ORAL
Qty: 90 TABLET | Refills: 3 | Status: SHIPPED | OUTPATIENT
Start: 2022-03-04 | End: 2022-12-23

## 2022-05-05 DIAGNOSIS — E78.5 DYSLIPIDEMIA, GOAL LDL BELOW 130: ICD-10-CM

## 2022-05-05 RX ORDER — ATORVASTATIN CALCIUM 20 MG/1
TABLET, FILM COATED ORAL
Qty: 90 TABLET | Refills: 3 | Status: SHIPPED | OUTPATIENT
Start: 2022-05-05 | End: 2023-04-03

## 2022-05-17 ENCOUNTER — HOSPITAL ENCOUNTER (OUTPATIENT)
Dept: RADIOLOGY | Facility: MEDICAL CENTER | Age: 82
End: 2022-05-17
Attending: PHYSICIAN ASSISTANT
Payer: MEDICARE

## 2022-05-17 DIAGNOSIS — R33.8 ACUTE RETENTION OF URINE: ICD-10-CM

## 2022-05-17 PROCEDURE — 74018 RADEX ABDOMEN 1 VIEW: CPT

## 2022-05-24 ENCOUNTER — OFFICE VISIT (OUTPATIENT)
Dept: MEDICAL GROUP | Facility: MEDICAL CENTER | Age: 82
End: 2022-05-24
Payer: MEDICARE

## 2022-05-24 VITALS
TEMPERATURE: 98.6 F | OXYGEN SATURATION: 98 % | BODY MASS INDEX: 32.98 KG/M2 | HEIGHT: 60 IN | DIASTOLIC BLOOD PRESSURE: 48 MMHG | RESPIRATION RATE: 14 BRPM | SYSTOLIC BLOOD PRESSURE: 118 MMHG | WEIGHT: 167.99 LBS | HEART RATE: 77 BPM

## 2022-05-24 DIAGNOSIS — Z96.653 STATUS POST TOTAL BILATERAL KNEE REPLACEMENT: ICD-10-CM

## 2022-05-24 DIAGNOSIS — N95.1 MENOPAUSAL SYMPTOMS: ICD-10-CM

## 2022-05-24 DIAGNOSIS — G03.9 ARACHNOIDITIS: ICD-10-CM

## 2022-05-24 DIAGNOSIS — I27.20 PULMONARY HYPERTENSION (HCC): Chronic | ICD-10-CM

## 2022-05-24 DIAGNOSIS — I10 ESSENTIAL HYPERTENSION: ICD-10-CM

## 2022-05-24 DIAGNOSIS — G25.0 ESSENTIAL TREMOR: ICD-10-CM

## 2022-05-24 DIAGNOSIS — J44.89 COPD WITH ASTHMA (HCC): ICD-10-CM

## 2022-05-24 DIAGNOSIS — N18.31 STAGE 3A CHRONIC KIDNEY DISEASE: ICD-10-CM

## 2022-05-24 DIAGNOSIS — J43.2 CENTRILOBULAR EMPHYSEMA (HCC): ICD-10-CM

## 2022-05-24 DIAGNOSIS — I70.0 AORTIC ATHEROSCLEROSIS (HCC): ICD-10-CM

## 2022-05-24 DIAGNOSIS — N31.9 NEUROGENIC BLADDER: ICD-10-CM

## 2022-05-24 DIAGNOSIS — E03.8 HYPOTHYROIDISM DUE TO HASHIMOTO'S THYROIDITIS: ICD-10-CM

## 2022-05-24 DIAGNOSIS — J43.9 MIXED RESTRICTIVE AND OBSTRUCTIVE LUNG DISEASE (HCC): ICD-10-CM

## 2022-05-24 DIAGNOSIS — E55.9 VITAMIN D DEFICIENCY DISEASE: ICD-10-CM

## 2022-05-24 DIAGNOSIS — Z00.00 MEDICARE ANNUAL WELLNESS VISIT, SUBSEQUENT: ICD-10-CM

## 2022-05-24 DIAGNOSIS — K21.00 GASTROESOPHAGEAL REFLUX DISEASE WITH ESOPHAGITIS WITHOUT HEMORRHAGE: ICD-10-CM

## 2022-05-24 DIAGNOSIS — M47.816 FACET ARTHRITIS OF LUMBAR REGION: ICD-10-CM

## 2022-05-24 DIAGNOSIS — G62.9 NEUROPATHY: ICD-10-CM

## 2022-05-24 DIAGNOSIS — E78.5 DYSLIPIDEMIA, GOAL LDL BELOW 130: ICD-10-CM

## 2022-05-24 DIAGNOSIS — H90.3 SENSORINEURAL HEARING LOSS (SNHL) OF BOTH EARS: ICD-10-CM

## 2022-05-24 DIAGNOSIS — F33.0 MAJOR DEPRESSIVE DISORDER, RECURRENT EPISODE, MILD (HCC): ICD-10-CM

## 2022-05-24 DIAGNOSIS — R52 PAIN MANAGEMENT: ICD-10-CM

## 2022-05-24 DIAGNOSIS — M81.0 POSTMENOPAUSAL OSTEOPOROSIS: ICD-10-CM

## 2022-05-24 DIAGNOSIS — E06.3 HYPOTHYROIDISM DUE TO HASHIMOTO'S THYROIDITIS: ICD-10-CM

## 2022-05-24 DIAGNOSIS — M48.062 SPINAL STENOSIS OF LUMBAR REGION WITH NEUROGENIC CLAUDICATION: ICD-10-CM

## 2022-05-24 DIAGNOSIS — Z93.59 PRESENCE OF SUPRAPUBIC CATHETER (HCC): ICD-10-CM

## 2022-05-24 DIAGNOSIS — F41.9 CHRONIC ANXIETY: ICD-10-CM

## 2022-05-24 DIAGNOSIS — J98.4 MIXED RESTRICTIVE AND OBSTRUCTIVE LUNG DISEASE (HCC): ICD-10-CM

## 2022-05-24 DIAGNOSIS — M96.1 POSTLAMINECTOMY SYNDROME: ICD-10-CM

## 2022-05-24 DIAGNOSIS — J96.11 CHRONIC RESPIRATORY FAILURE WITH HYPOXIA (HCC): ICD-10-CM

## 2022-05-24 DIAGNOSIS — G47.33 OSA (OBSTRUCTIVE SLEEP APNEA): Chronic | ICD-10-CM

## 2022-05-24 DIAGNOSIS — R13.19 ESOPHAGEAL DYSPHAGIA: ICD-10-CM

## 2022-05-24 PROBLEM — D64.9 ANEMIA: Status: RESOLVED | Noted: 2021-11-24 | Resolved: 2022-05-24

## 2022-05-24 PROBLEM — M17.11 OSTEOARTHRITIS OF RIGHT KNEE: Status: RESOLVED | Noted: 2021-07-16 | Resolved: 2022-05-24

## 2022-05-24 PROBLEM — G89.29 OTHER CHRONIC PAIN: Status: RESOLVED | Noted: 2019-02-21 | Resolved: 2022-05-24

## 2022-05-24 PROBLEM — G56.02 CARPAL TUNNEL SYNDROME ON LEFT: Status: RESOLVED | Noted: 2021-07-02 | Resolved: 2022-05-24

## 2022-05-24 PROBLEM — G56.22 CUBITAL TUNNEL SYNDROME ON LEFT: Status: RESOLVED | Noted: 2021-07-02 | Resolved: 2022-05-24

## 2022-05-24 PROBLEM — J43.1 PANLOBULAR EMPHYSEMA (HCC): Status: ACTIVE | Noted: 2022-05-24

## 2022-05-24 PROBLEM — R49.0 HOARSENESS, PERSISTENT: Status: RESOLVED | Noted: 2019-04-10 | Resolved: 2022-05-24

## 2022-05-24 PROBLEM — J43.1 PANLOBULAR EMPHYSEMA (HCC): Status: RESOLVED | Noted: 2022-05-24 | Resolved: 2022-05-24

## 2022-05-24 PROBLEM — T17.308A CHOKING: Status: RESOLVED | Noted: 2019-08-15 | Resolved: 2022-05-24

## 2022-05-24 PROBLEM — M17.12 PRIMARY OSTEOARTHRITIS OF LEFT KNEE: Status: RESOLVED | Noted: 2020-08-13 | Resolved: 2022-05-24

## 2022-05-24 PROCEDURE — G0439 PPPS, SUBSEQ VISIT: HCPCS | Performed by: FAMILY MEDICINE

## 2022-05-24 RX ORDER — ALBUTEROL SULFATE 90 UG/1
2 AEROSOL, METERED RESPIRATORY (INHALATION) EVERY 4 HOURS PRN
Qty: 3 EACH | Refills: 3 | Status: SHIPPED | OUTPATIENT
Start: 2022-05-24

## 2022-05-24 ASSESSMENT — ACTIVITIES OF DAILY LIVING (ADL): BATHING_REQUIRES_ASSISTANCE: 0

## 2022-05-24 ASSESSMENT — FIBROSIS 4 INDEX: FIB4 SCORE: 1.95

## 2022-05-24 ASSESSMENT — PATIENT HEALTH QUESTIONNAIRE - PHQ9: CLINICAL INTERPRETATION OF PHQ2 SCORE: 0

## 2022-05-24 ASSESSMENT — ENCOUNTER SYMPTOMS: GENERAL WELL-BEING: FAIR

## 2022-05-24 NOTE — PROGRESS NOTES
Chief Complaint   Patient presents with   • Annual Wellness Visit       HPI:  Maddy Hodge is a 81 y.o. here for Medicare Annual Wellness Visit     Patient Active Problem List    Diagnosis Date Noted   • Status post total bilateral knee replacement 05/24/2022   • Chronic respiratory failure with hypoxia ([facility]) 05/24/2022   • Spinal stenosis of lumbar region with neurogenic claudication 11/10/2021   • Postmenopausal osteoporosis 11/10/2021   • Hypothyroidism due to Hashimoto's thyroiditis 11/10/2021   • Hearing loss 11/10/2021   • Pain management 05/20/2021   • Neurogenic bladder 05/04/2021   • Presence of suprapubic catheter ([facility]) 11/16/2020   • Gastroesophageal reflux disease with esophagitis 11/16/2020   • Chronic anxiety 07/15/2019   • Vitamin D deficiency disease 04/10/2019   • Dyslipidemia, goal LDL below 130 08/15/2018   • Chronic obstructive pulmonary disease ([facility]) 06/22/2018   • Pulmonary hypertension ([facility]) 02/21/2018   • COPD with asthma ([facility]) 11/10/2017   • ALISSA (obstructive sleep apnea) 11/10/2017   • Postlaminectomy syndrome, unspecified region 07/03/2017   • Mixed restrictive and obstructive lung disease ([facility]) 06/22/2017   • Menopausal symptoms 09/13/2016   • Essential tremor 09/06/2016   • CKD (chronic kidney disease) stage 3, GFR 30-59 ml/min ([facility]) 05/23/2016   • Esophageal dysphagia 05/19/2016   • Major depressive disorder, recurrent episode, mild (CMS-HCC) 05/02/2016   • 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 3 Each 3   • atorvastatin (LIPITOR) 20 MG Tab TAKE 1 TABLET BY MOUTH EVERY DAY 90 Tablet 3   • estradiol (ESTRACE) 0.5 MG tablet  Take 1 Tablet by mouth every day. 90 Tablet 3   • omeprazole (PRILOSEC) 20 MG delayed-release capsule Take 1 Capsule by mouth every day. 90 Capsule 3   • levothyroxine (SYNTHROID) 50 MCG Tab Take 1 Tablet by mouth every morning on an empty stomach. 90 Tablet 3   • imipramine (TOFRANIL) 10 MG Tab Take 2 Tablets by mouth every evening. 180 Tablet 2   • Omega-3 Fatty Acids (OMEGA-3 FISH OIL PO) Take 1,500 mg by mouth.     • D-MANNOSE PO Take 1,300 mg by mouth.     • escitalopram (LEXAPRO) 20 MG tablet Take 1 tablet by mouth once daily 90 Tablet 2   • potassium chloride (KLOR-CON) 8 MEQ tablet TAKE 2 TABLETS EVERY  Tablet 3   • spironolactone (ALDACTONE) 25 MG Tab Take 25 mg by mouth every day.     • ASPIRIN LOW DOSE 81 MG EC tablet TAKE 1 TABLET BY MOUTH TWICE A DAY 60 Tablet 2   • oxyCODONE-acetaminophen (PERCOCET) 5-325 MG Tab Take 1 Tablet by mouth 4 times a day.     • Multiple Vitamins-Minerals (VITEYES AREDS ADVANCED PO) Take 1 Capsule by mouth every day.     • Cholecalciferol (D3) 2000 UNIT Tab Take 2,000 Units by mouth every day.     • Cyanocobalamin (VITAMIN B-12 PO) Take 2,500 mcg by mouth every day.     • docusate sodium (COLACE) 250 MG capsule Take 500 mg by mouth every evening. Indications: Constipation     • bumetanide (BUMEX) 2 MG tablet Take 2 mg by mouth at bedtime. Indications: Edema, High Blood Pressure Disorder     • pregabalin (LYRICA) 200 MG capsule Take 200 mg by mouth in the morning, at noon, and at bedtime. Pt takes @ 0600, 1200, and 2100     • LETAIRIS 10 MG tablet Take 10 Tablets by mouth at bedtime. Indications: Pulmonary Arterial Hypertension     • AMITIZA 24 MCG capsule Take 48 mcg by mouth every morning with breakfast. Indications: Chronic Constipation of Unknown Cause     • XTAMPZA ER 18 MG Capsule Extended Release 12 hour Abuse-Deterrent Take 18 mg by mouth 2 times a day. Indications: Chronic Pain       No current facility-administered medications for this visit.          Current  supplements as per medication list.     Allergies: Other drug, Penicillins, Sulfa drugs, Macrobid [nitrofurantoin], and Tape    Current social contact/activities: she has attentive family.  Son lives with her.     She  reports that she has never smoked. She has never used smokeless tobacco. She reports previous alcohol use. She reports previous drug use.  Counseling given: Yes      DPA/Advanced Directive:  Patient has Durable Power of  on file.     ROS:    Gait: Uses a cane  Ostomy: Yes  Other tubes: No  Amputations: No  Chronic oxygen use: Yes  Last eye exam: 03/01/2022  Wears hearing aids: Yes   : Denies any urinary leakage during the last 6 months    Screening:  Discussed, recommended the second booster    Depression Screening  Little interest or pleasure in doing things?  0 - not at all  Feeling down, depressed , or hopeless? 0 - not at all  Patient Health Questionnaire Score: 0     If depressive symptoms identified deferred to follow up visit unless specifically addressed in assessment and plan.    Interpretation of PHQ-9 Total Score   Score Severity   1-4 No Depression   5-9 Mild Depression   10-14 Moderate Depression   15-19 Moderately Severe Depression   20-27 Severe Depression    Screening for Cognitive Impairment  Three Minute Recall (daughter, heaven, mountain) 3/3    Elpidio clock face with all 12 numbers and set the hands to show 10 past 11.  Yes 5/5    Cognitive concerns identified deferred for follow up unless specifically addressed in assessment and plan.    Fall Risk Assessment  Has the patient had two or more falls in the last year or any fall with injury in the last year?  No    Safety Assessment  Throw rugs on floor.  No  Handrails on all stairs.  Yes  Good lighting in all hallways.  Yes  Difficulty hearing.  Yes  Patient counseled about all safety risks that were identified.    Functional Assessment ADLs  Are there any barriers preventing you from cooking for yourself or meeting  nutritional needs?  Yes. Son cooks meals for her.  Are there any barriers preventing you from driving safely or obtaining transportation?  Yes. Son drives pt around as she no longer drives.  Are there any barriers preventing you from using a telephone or calling for help?  No.    Are there any barriers preventing you from shopping?  Yes. Son does shopping.  Are there any barriers preventing you from taking care of your own finances?  Yes. Son manages finances.   Are there any barriers preventing you from managing your medications?  Yes. Son manages medications.  Are there any barriers preventing you from showering, bathing or dressing yourself?  No.    Are you currently engaging in any exercise or physical activity?  Yes.  Walks around yard x3d.  What is your perception of your health?  Fair.      Health Maintenance Summary          Annual Pulmonary Function Test / Spirometry (Yearly) Next due on 12/8/2022 12/08/2021  PULMONARY FUNCTION TESTS -Test requested: Complete Pulmonary Function Test    03/31/2021  PULMONARY FUNCTION TESTS -Test requested: Complete Pulmonary Function Test    02/14/2020  PULMONARY FUNCTION TESTS -Test requested: Complete Pulmonary Function Test    08/17/2018  AMB PULMONARY FUNCTION TEST/LAB    04/14/2017  AMB PULMONARY FUNCTION TEST/LAB          Annual Wellness Visit (Every 366 Days) Next due on 5/25/2023 05/24/2022  Visit Dx: Medicare annual wellness visit, subsequent    05/19/2021  Visit Dx: Medicare annual wellness visit, subsequent    02/21/2018  Visit Dx: Medicare annual wellness visit, subsequent    09/15/2017  Visit Dx: Medicare annual wellness visit, subsequent    08/26/2015  Visit Dx: Encounter for Medicare annual wellness exam          BONE DENSITY (Every 5 Years) Next due on 9/11/2023 09/11/2018  DS-BONE DENSITY STUDY (DEXA)    08/29/2013  DS-BONE DENSITY STUDY (DEXA)    10/19/2010  DS-BONE DENSITY STUDY (DEXA)    02/13/2007  DS-BONE DENSITY STUDY (DEXA)          IMM  DTaP/Tdap/Td Vaccine (2 - Td or Tdap) Next due on 9/21/2023 09/21/2013  Imm Admin: Tdap Vaccine          IMM PNEUMOCOCCAL VACCINE: 65+ Years (Series Information) Completed    08/17/2018  Imm Admin: Pneumococcal polysaccharide vaccine (PPSV-23)    10/06/2015  Imm Admin: Pneumococcal Conjugate Vaccine (Prevnar/PCV-13)    09/23/2010  Imm Admin: Pneumococcal Vaccine (UF) - HISTORICAL DATA          IMM ZOSTER VACCINES (Series Information) Completed    06/13/2019  Imm Admin: Zoster Vaccine Recombinant (RZV) (SHINGRIX)    04/02/2019  Imm Admin: Zoster Vaccine Recombinant (RZV) (SHINGRIX)    03/02/2010  Imm Admin: Zoster Vaccine Live (ZVL) (Zostavax) - HISTORICAL DATA          IMM INFLUENZA (Series Information) Completed    11/12/2021  Imm Admin: Influenza Vaccine Adult HD    10/01/2020  Imm Admin: Influenza Vaccine Adult HD    09/12/2019  Imm Admin: Influenza Vaccine Adult HD    10/01/2018  Imm Admin: Influenza Vaccine Adult HD    09/25/2017  Imm Admin: INFLUENZA TIV (IM)    Only the first 5 history entries have been loaded, but more history exists.          COVID-19 Vaccine (Series Information) Completed    11/12/2021  Imm Admin: Moderna SARS-CoV-2 Vaccine    02/25/2021  Imm Admin: Moderna SARS-CoV-2 Vaccine    01/28/2021  Imm Admin: Moderna SARS-CoV-2 Vaccine          IMM HEP B VACCINE (Series Information) Aged Out    No completion history exists for this topic.          IMM MENINGOCOCCAL VACCINE (MCV4) (Series Information) Aged Out    No completion history exists for this topic.          Discontinued - MAMMOGRAM  Discontinued    12/22/2015  [id number]          Discontinued - COLORECTAL CANCER SCREENING  Discontinued    05/27/2009  COLONOSCOPY (Previously completed)                Patient Care Team:  Remington Quinones M.D. as PCP - General  Domingo Michelle M.D. as Consulting Physician (Dermatology)  Amandeep Gonzalez D.O. as Consulting Physician (Phys Med and Rehab)  Corinne L Cooper, O.D. as Consulting  Physician (Optometry)  Kayden Perez M.D. as Consulting Physician (Orthopedic Surgery)  Sridhar Cook D.O. as Consulting Physician (Cardiovascular Disease (Cardiology))  Luisito  as Respiratory Therapist (DME Supplier)  Dafne Flores M.D. as Consulting Physician (Ophthalmology)  Angela Kam M.D. as Consulting Physician (Pulmonary Medicine)  [facility]  RASHEL Sylvester (Urology)  Tony Monae M.D. (Gastroenterology)        Social History     Tobacco Use   • Smoking status: Never Smoker   • Smokeless tobacco: Never Used   Vaping Use   • Vaping Use: Never used   Substance Use Topics   • Alcohol use: Not Currently     Alcohol/week: 0.0 oz   • Drug use: Not Currently     Family History   Problem Relation Age of Onset   • Genitourinary () Problems Brother    • Lung Disease Mother         COPD   • Heart Disease Mother    • Genetic Disorder Father         Parkinsons/ from complications   • Hypertension Father    • Cancer Maternal Aunt         Breast cancer   • Stroke Paternal Grandmother    • Cancer Maternal Aunt         Breast cancer     She  has a past medical history of Allergy, Anesthesia, Anxiety, Arachnoiditis, Arthritis, Asthma, Basal cell carcinoma, Bowel habit changes, Carpal tunnel syndrome on left (2021), CATARACT, Chickenpox, Chronic constipation, Coagulase-negative staphylococcal infection, Cubital tunnel syndrome on left (2021), Delayed emergence from general anesthesia, Depression, Encounter for long-term (current) use of other medications, Erosive gastritis (), Family history of polycystic kidney disease, GERD (gastroesophageal reflux disease), Maltese measles, High cholesterol, History of vertebral fracture (3/26/2012), Hypertension, Hypothyroidism, Indwelling urinary catheter present (2020), Influenza, Lumbar vertebral fracture (HCC) (2011), Mumps, Muscle disorder, Neuropathy, Neuropathy, Obesity, Class I, BMI 30-34.9, OSTEOPOROSIS, Oxygen  dependent, Pain (07/20/2021), Pain management (5/20/2021), Primary osteoarthritis of left knee (8/13/2020), Pulmonary hypertension (HCC), Recurrent UTI (6/28/2017), Sleep apnea, Spinal stenosis, lumbar region, without neurogenic claudication, Status post total bilateral knee replacement (5/24/2022), Suprapubic catheter (HCC), and Tonsillitis.   Past Surgical History:   Procedure Laterality Date   • PB TOTAL KNEE ARTHROPLASTY Right 9/20/2021    Procedure: ARTHROPLASTY, KNEE, TOTAL;  Surgeon: Kayden Perez M.D.;  Location: SURGERY [facility];  Service: Orthopedics   • PB REVISE MEDIAN N/CARPAL TUNNEL SURG Left 8/5/2021    Procedure: RELEASE, CARPAL TUNNEL;  Surgeon: Ian Delgado M.D.;  Location: SURGERY SAME DAY [facility];  Service: Orthopedics   • PB REVISE ULNAR NERVE AT ELBOW Left 8/5/2021    Procedure: RELEASE, CUBITAL TUNNEL.;  Surgeon: Ian Delgado M.D.;  Location: SURGERY SAME DAY [facility];  Service: Orthopedics   • PB TOTAL KNEE ARTHROPLASTY Left 8/12/2020    Procedure: ARTHROPLASTY, KNEE, TOTAL;  Surgeon: Kayden Perez M.D.;  Location: SURGERY [facility];  Service: Orthopedics   • SPINAL CORD STIMULATOR N/A 7/3/2017    Procedure: SPINAL CORD STIMULATOR FOR LEAD REMOVAL;  Surgeon: Amandeep Gonzalez D.O.;  Location: [facility];  Service:    • GASTROSCOPY WITH BALLOON DILATATION N/A 5/19/2016    Procedure: GASTROSCOPY WITH DILATATION;  Surgeon: Tony Monae M.D.;  Location: [facility];  Service:    • SPINAL CORD STIMULATOR  09/02/2014    removed 2017   • OTHER SURGICAL PROCEDURE Bilateral 2012    plantar fascitis surgery   • EGD WITH ASP/BX  5/27/09    erosive gastritis   • HYSTERECTOMY, TOTAL ABDOMINAL  1976   • APPENDECTOMY  1976   • CATARACT EXTRACTION WITH IOL Bilateral    • COLONOSCOPY  2000,5/27/09    normal   • LUMBAR LAMINECTOMY DISKECTOMY     • TONSILLECTOMY         Exam:   /48 (BP Location: Right arm, Patient Position: Sitting, BP Cuff Size:  Adult)   Pulse 77   Temp 37 °C (98.6 °F) (Temporal)   Resp 14   Ht 1.524 m (5')   Wt 76.2 kg (167 lb 15.9 oz)   SpO2 98%  on her oxygen, 2 L NP Body mass index is 32.81 kg/m².    Hearing poor.  wears hearing aids bilaterally   Dentition not examined, patient, physician and staff all wearing masks.  Alert, oriented in no acute distress.  Eye contact is good, speech goal directed, affect calm  HEENT:   EOMI, PERRLA.     Neck:       Full range of motion. No JVD or carotid bruits appreciated. No cervical adenopathy appreciated. No thyromegaly or neck masses appreciated.  No retractions appreciated.  Lungs:     Clear to auscultation A&P with good air movement.   Heart:      Regular rate and rhythm normal S1 and S2 without murmur appreciated.  Strong and symmetric radial and DP pulses.  Abd:        Soft, bowel sounds positive, nontender. No bloating noted. No hepatosplenomegaly or mass appreciated. No pulsatile mass appreciated.  Ext:         Extremities show symmetric and full range of motion with normal strength. No cyanosis or clubbing appreciated. No peripheral edema appreciated.  Neuro:    Gait is slow and broad-based.  She is using a cane.  Her son assists her. Patient is lucid, fluent and appropriate. No significant tremor appreciated.  Skin:       Exhibit no rashes, pigmented lesions or ulcerations.      Assessment and Plan. The following treatment and monitoring plan is recommended:      1. Medicare annual wellness visit, subsequent  Her medical problems are generally stable    2. Essential hypertension/Stage 3a chronic kidney disease (HCC)  HTN - Chronic condition stable. Currently taking all meds as directed.   She is taking baby aspirin daily.   She is monitoring BP at home.  Monitors only occasionally as pressure has been in excellent control with systolics from 110-120 over 50s/60s  Denies symptoms low BP: light-headed, tunnel-vision, unusual fatigue.   Denies symptoms high BP:pounding headache, visual  changes, palpitations, flushed face.   Denies medicine side effects: unusual fatigue, slow heartbeat, foot/leg swelling, cough.  Follow-up lab orders discussed and placed  - Lipid Profile; Future  - Comp Metabolic Panel; Future    3. Dyslipidemia, goal LDL below 130/aortic atherosclerosis (HCC)  Patient denies chest pain, chest pressure, palpitations or exertional shortness of breath. Patient denies muscle aches or muscle weakness from the atorvastatin medication. Patient is a never smoker. Patient takes 81 mg aspirin daily. Patient has no history of myocardial infarction or stroke.  Incidentally noted aortic plaque on past CT imaging.  The problem is clinically stable.  - Lipid Profile; Future  - Comp Metabolic Panel; Future    4. Hypothyroidism due to Hashimoto's thyroiditis  Patient reports good energy level on the medication. Patient denies insomnia, tremor or change in appetite.  Patient is taking the medication on an empty stomach in the morning and waiting at least 30 minutes before eating.  Last TSH in December last year was at target.  Stable problem.    5. Centrilobular emphysema (HCC)/Mixed restrictive and obstructive lung disease (HCC)/COPD with asthma (HCC)  Patient has moderate emphysema felt to be primarily centrilobular.  She has mixed restrictive and obstructive disease relatively mild.  She is currently well controlled on her inhaler regimen.  She does use bleeding oxygen at nighttime with her BiPAP and also during the daytime when she is short of breath.  She continues to follow with pulmonology.  Stable problem.    6. Pulmonary hypertension (HCC)/ALISSA (obstructive sleep apnea)/Chronic respiratory failure with hypoxia (HCC)  Patient has known pulmonary hypertension.  She follows with cardiology for this problem.  This is likely due to her obstructive sleep apnea.  She uses BiPAP nightly.  She does have chronic respiratory failure with hypoxia nocturnally and uses oxygen for bleeding.  During the  day she also uses supplemental oxygen if she will be walking or exerting.  She is wearing oxygen here today.  Stable problem.    7. Esophageal dysphagia/Gastroesophageal reflux disease with esophagitis without hemorrhage  Patient does have intermittent dysphagia.  She and her son avoid foods that cause problems such as bread or chicken breast.  She continues the omeprazole daily which has been helpful overall in controlling her symptoms.  She also takes vitamin D supplementation due to the long-term effects of the medication.  Denies hematemesis or abdominal pain.  Follow-up lab order discussed and placed.  Stable problem.  - CBC WITHOUT DIFFERENTIAL; Future    8. Vitamin D deficiency disease/Postmenopausal osteoporosis  She continues vitamin D supplementation.  Her last bone density testing in 2018 did show osteopenia.  There was progression of loss of bone but she had improved overall compared to the original osteoporosis diagnosis.  Plan repeat bone density in 2023.  She is not a good candidate for bisphosphonate therapy and there are issues concerning other osteoporosis therapy for her as well.  Follow-up lab order discussed and placed.  Currently clinically stable problem without fracture.  - VITAMIN D,25 HYDROXY; Future    9. Essential tremor  This problem continues to be moderate and well controlled overall with no specific medication at this time.  Stable problem.    10. Presence of suprapubic catheter (HCC)/Neurogenic bladder  Patient has neurogenic bladder which is felt to be due to her complex spinal problems.  She has a suprapubic catheter permanently.  She has been doing very well with this and continues to have regular changes of the catheters through home health nursing.  Stable problem.    11. Spinal stenosis of lumbar region with neurogenic claudication/Facet arthritis of lumbar region/Arachnoiditis/Postlaminectomy syndrome, unspecified region/Neuropathy (CMS-HCC)  Patient has significant spinal  stenosis of the lumbar spine with combined arthritic, degenerative and disc disease.  Patient has had several procedures and surgeries.  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  She has postlaminectomy syndrome.  She has a neuropathy which is primarily from pressure on the exiting nerves at the spinal cord.  She follows carefully with pain management.    12. Status post total bilateral knee replacement  Patient did have bilateral osteoarthritic knee pain but has successfully had bilateral total knee replacement with reduction of pain and improvement in mobility.  Stable problem.    13. Pain management  She follows carefully with pain management and is on a complex regimen including Xtampza ER and pregabalin.  She will continue to follow carefully with them.  Stable problem.    14. Major depressive disorder, recurrent episode, mild (CMS-HCC)/Chronic anxiety  Patient continues treatment of her depression and anxiety with excellent family support and use of Lexapro and low-dose imipramine.  Patient has done very well on this regimen.  No intrusive thoughts of self-harm.  Stable problem.    15. Sensorineural hearing loss (SNHL) of both ears  Patient does have bilateral sensorineural hearing loss and wears hearing aids.  She follows with audiology.  Stable problem.    16. Menopausal symptoms  Patient does continue to have menopausal symptoms especially if she tries to stop the estrogen supplementation.  Patient is status post hysterectomy and is able to take unopposed estrogen.  No history of breast cancer.  She has been stable on this current regimen.  Stable problem.      Services suggested: No services needed at this time  Health Care Screening: Age-appropriate preventive services recommended by USPTF and ACIP covered by Medicare were discussed today. Services ordered if indicated and agreed upon by the patient.  Referrals offered: Community-based lifestyle interventions to reduce health risks and promote  self-management and wellness, fall prevention, nutrition, physical activity, tobacco-use cessation, weight loss, and mental health services as per orders if indicated.    Discussion today about general wellness and lifestyle habits: discussed   · Prevent falls and reduce trip hazards; Cautioned about securing or removing rugs.  · Have a working fire alarm and carbon monoxide detector; has  · Engage in regular physical activity and social activities     Follow-up: Return in about 6 months (around 11/24/2022), or if symptoms worsen or fail to improve.

## 2022-06-23 ENCOUNTER — OFFICE VISIT (OUTPATIENT)
Dept: SLEEP MEDICINE | Facility: MEDICAL CENTER | Age: 82
End: 2022-06-23
Payer: MEDICARE

## 2022-06-23 VITALS
HEART RATE: 81 BPM | BODY MASS INDEX: 32.98 KG/M2 | OXYGEN SATURATION: 94 % | DIASTOLIC BLOOD PRESSURE: 78 MMHG | SYSTOLIC BLOOD PRESSURE: 122 MMHG | HEIGHT: 60 IN | WEIGHT: 168 LBS | RESPIRATION RATE: 16 BRPM

## 2022-06-23 DIAGNOSIS — J98.4 MIXED RESTRICTIVE AND OBSTRUCTIVE LUNG DISEASE (HCC): ICD-10-CM

## 2022-06-23 DIAGNOSIS — I27.20 PULMONARY HYPERTENSION (HCC): ICD-10-CM

## 2022-06-23 DIAGNOSIS — T78.40XS ALLERGY, SEQUELA: ICD-10-CM

## 2022-06-23 DIAGNOSIS — J43.9 MIXED RESTRICTIVE AND OBSTRUCTIVE LUNG DISEASE (HCC): ICD-10-CM

## 2022-06-23 DIAGNOSIS — J96.11 CHRONIC RESPIRATORY FAILURE WITH HYPOXIA (HCC): ICD-10-CM

## 2022-06-23 PROCEDURE — 99214 OFFICE O/P EST MOD 30 MIN: CPT | Performed by: INTERNAL MEDICINE

## 2022-06-23 RX ORDER — LEVALBUTEROL TARTRATE 45 UG/1
2 AEROSOL, METERED ORAL EVERY 4 HOURS PRN
Qty: 1 EACH | Refills: 5 | Status: SHIPPED | OUTPATIENT
Start: 2022-06-23 | End: 2022-11-17

## 2022-06-23 RX ORDER — MONTELUKAST SODIUM 10 MG/1
10 TABLET ORAL EVERY EVENING
Qty: 30 TABLET | Refills: 11 | Status: SHIPPED | OUTPATIENT
Start: 2022-06-23 | End: 2023-05-17

## 2022-06-23 RX ORDER — MONTELUKAST SODIUM 10 MG/1
10 TABLET ORAL EVERY EVENING
Qty: 30 TABLET | Refills: 11 | Status: SHIPPED | OUTPATIENT
Start: 2022-06-23 | End: 2022-06-23 | Stop reason: SDUPTHER

## 2022-06-23 RX ORDER — MOMETASONE FUROATE 50 UG/1
1 AEROSOL RESPIRATORY (INHALATION) 2 TIMES DAILY
Qty: 1 EACH | Refills: 0 | COMMUNITY
Start: 2022-06-23 | End: 2022-08-01 | Stop reason: SDUPTHER

## 2022-06-23 RX ORDER — BIMATOPROST 0.1 MG/ML
SOLUTION/ DROPS OPHTHALMIC
COMMUNITY
End: 2022-12-12

## 2022-06-23 RX ORDER — LEVALBUTEROL TARTRATE 45 UG/1
2 AEROSOL, METERED ORAL EVERY 4 HOURS PRN
Qty: 1 EACH | Refills: 5 | Status: SHIPPED | OUTPATIENT
Start: 2022-06-23 | End: 2022-06-23 | Stop reason: SDUPTHER

## 2022-06-23 ASSESSMENT — ENCOUNTER SYMPTOMS
NECK PAIN: 0
DOUBLE VISION: 0
VOMITING: 0
SPUTUM PRODUCTION: 1
EYE REDNESS: 0
SINUS PAIN: 0
BACK PAIN: 0
CLAUDICATION: 0
CHILLS: 0
FOCAL WEAKNESS: 0
COUGH: 1
DIARRHEA: 0
HEADACHES: 0
WEAKNESS: 0
CONSTIPATION: 0
HEARTBURN: 0
FEVER: 0
STRIDOR: 0
FALLS: 0
DIZZINESS: 0
WEIGHT LOSS: 0
NAUSEA: 0
PHOTOPHOBIA: 0
DIAPHORESIS: 0
MYALGIAS: 0
HEMOPTYSIS: 0
DEPRESSION: 0
EYE DISCHARGE: 0
SHORTNESS OF BREATH: 0
SORE THROAT: 0
PALPITATIONS: 0
PND: 0
ABDOMINAL PAIN: 0
WHEEZING: 1
BLURRED VISION: 0
EYE PAIN: 0
ORTHOPNEA: 0
SPEECH CHANGE: 0
TREMORS: 0

## 2022-06-23 ASSESSMENT — FIBROSIS 4 INDEX: FIB4 SCORE: 1.95

## 2022-06-23 NOTE — PROGRESS NOTES
Chief Complaint   Patient presents with   • Follow-Up     Last seen 12/22/21    • Apnea     Last seen 2/23/22    • Shortness of Breath     Last 3-4 mos, wheezing,          HPI: This patient is a 81 y.o. 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 The patient also carries a diagnosis of obstructive sleep apnea for which she is on BiPAP therapy and followed by sleep medicine.  The patient is followed by cardiology for diagnosis of mild pulmonary hypertension and on ambrisentan as well as spironolactone and Lasix.  She is a lifelong non-smoker.  She is on chronic opiate medication for pain and followed by pain medicine.  In the past she was treated with inhaled corticosteroid with long-acting beta agonist as well as anticholinergic with Incruse.  She has had issues with hoarseness of the voice and actually has been off all controller therapy for the last 18 months. She is on O2 at 2LPLM for the past year.   PFTS from 12/2021 were stable to slightly improved compared to March 2021.  Overall she is quite sedentary 2/2 pain.  She has not needed steroids or antibiotics since December 2020 but presents today with increased wheezing, cough and postnasal drip.  She is taking over-the-counter antihistamine and intranasal steroids with some improvement but feels symptoms of been worse over the past several months.  She has albuterol but does not like to take it due to tremor.  CT angiogram of the chest 2017 showed no parenchymal lung disease but did show bibasilar atelectasis consistent with hypoventilation.  Chest x-ray from October 2021 showed elevation of right hemidiaphragm with bibasilar atelectasis.  Last echo that I have is from January 2021 showed normal biventricular ejection fraction but does show RVSP of 40 mmHg, normal RV size and function.  She is followed by Dr. Cook at [facility].    Past Medical History:   Diagnosis Date   • Allergy     seasonal   • Anesthesia     \"hard to wake up\"   • Anxiety     due to loss of . 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    • Arthritis     facet arthritis of lumbar region   • Asthma     Inhaler use daily.   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 Albrecht, attaches to plastic, prulent   • Cubital tunnel syndrome on left 7/2/2021    Added automatically from request for surgery [id number]   • Delayed emergence from general anesthesia    • Depression     and anxiety   • Encounter for long-term (current) use of other medications    • Erosive gastritis 5/09    antral   • Esophageal dysphagia 5/19/2016   • Essential hypertension 7/6/2009   • Family history of polycystic kidney disease    • GERD (gastroesophageal reflux disease)    • [place] measles    • High cholesterol    • History of vertebral fracture 3/26/2012   • Hypertension    • Hypothyroidism    • Hypothyroidism due to Hashimoto's thyroiditis 11/10/2021    managed with medication Formatting of this note might be different from the original. Formatting of this note might be different from the original. managed with medication  Last Assessment & Plan:  Formatting of this note might be different from the original. Chronic condition managed with current medical regimen Stable per review  Continue with current meds Followed by Shirlene Quinones M.D..   • Indwelling urinary catheter present 11/16/2020   • Influenza    • Lumbar vertebral fracture (HCC) 5/2011   • Mumps    • Muscle disorder     Arachnoiditis   • Neuropathy    • Neuropathy    • Obesity, Class I, BMI 30-34.9    • ALISSA (obstructive sleep apnea) " 11/10/2017   • OSTEOPOROSIS    • Oxygen dependent     2 liters, [facility]   • Pain 07/20/2021    left arm, knees, chronic back, right leg, 6/10   • Pain management 5/20/2021   • Primary osteoarthritis of left knee 8/13/2020    Formatting of this note might be different from the original. Formatting of this note might be different from the original. Added automatically from request for surgery [id number]   • Pulmonary hypertension (HCC)    • Recurrent UTI 6/28/2017   • Sleep apnea     on BiPap, follows with pulmonology   • Spinal stenosis of lumbar region with neurogenic claudication 11/10/2021    Formatting of this note might be different from the original. Last Assessment & Plan:  Formatting of this note might be different from the original. Chronic condition managed with current medical regimen Stable per review  Continue with current meds Followed by specialty   • Spinal stenosis, lumbar region, without neurogenic claudication    • Status post total bilateral knee replacement 5/24/2022   • Suprapubic catheter (HCC)    • Tonsillitis        Social History     Socioeconomic History   • Marital status:      Spouse name: Not on file   • Number of children: Not on file   • Years of education: Not on file   • Highest education level: Not on file   Occupational History   • Not on file   Tobacco Use   • Smoking status: Never Smoker   • Smokeless tobacco: Never Used   Vaping Use   • Vaping Use: Never used   Substance and Sexual Activity   • Alcohol use: Not Currently     Alcohol/week: 0.0 oz   • Drug use: Not Currently   • Sexual activity: Not Currently     Partners: Male     Birth control/protection: Abstinence   Other Topics Concern   •  Service Not Asked   • Blood Transfusions Not Asked   • Caffeine Concern Not Asked   • Occupational Exposure Not Asked   • Hobby Hazards Not Asked   • Sleep Concern Not Asked   • Stress Concern No     Comment:  cancer   • Weight Concern Not Asked   • Special  Diet Not Asked   • Back Care Not Asked   • Exercise Not Asked   • Bike Helmet Not Asked   • Seat Belt Not Asked   • Self-Exams Not Asked   Social History Narrative   • Not on file     Social Determinants of Health     Financial Resource Strain: Not on file   Food Insecurity: Not on file   Transportation Needs: Not on file   Physical Activity: Not on file   Stress: Not on file   Social Connections: Not on file   Intimate Partner Violence: Not on file   Housing Stability: Not on file       Family History   Problem Relation Age of Onset   • Genitourinary () Problems Brother    • Lung Disease Mother         COPD   • Heart Disease Mother    • Genetic Disorder Father         Parkinsons/ from complications   • Hypertension Father    • Cancer Maternal Aunt         Breast cancer   • Stroke Paternal Grandmother    • Cancer Maternal Aunt         Breast cancer       Current Outpatient Medications on File Prior to Visit   Medication Sig Dispense Refill   • bimatoprost (LUMIGAN) 0.01 % Solution Lumigan 0.01 % eye drops   INSTILL 1 DROP INTO BOTH EYES AT BEDTIME     • albuterol 108 (90 Base) MCG/ACT Aero Soln inhalation aerosol Inhale 2 Puffs every four hours as needed for Shortness of Breath. 3 Each 3   • atorvastatin (LIPITOR) 20 MG Tab TAKE 1 TABLET BY MOUTH EVERY DAY 90 Tablet 3   • estradiol (ESTRACE) 0.5 MG tablet Take 1 Tablet by mouth every day. 90 Tablet 3   • omeprazole (PRILOSEC) 20 MG delayed-release capsule Take 1 Capsule by mouth every day. 90 Capsule 3   • levothyroxine (SYNTHROID) 50 MCG Tab Take 1 Tablet by mouth every morning on an empty stomach. 90 Tablet 3   • imipramine (TOFRANIL) 10 MG Tab Take 2 Tablets by mouth every evening. 180 Tablet 2   • Omega-3 Fatty Acids (OMEGA-3 FISH OIL PO) Take 1,500 mg by mouth.     • D-MANNOSE PO Take 1,300 mg by mouth.     • escitalopram (LEXAPRO) 20 MG tablet Take 1 tablet by mouth once daily 90 Tablet 2   • potassium chloride (KLOR-CON) 8 MEQ tablet TAKE 2 TABLETS  EVERY  Tablet 3   • spironolactone (ALDACTONE) 25 MG Tab Take 25 mg by mouth every day.     • ASPIRIN LOW DOSE 81 MG EC tablet TAKE 1 TABLET BY MOUTH TWICE A DAY 60 Tablet 2   • oxyCODONE-acetaminophen (PERCOCET) 5-325 MG Tab Take 1 Tablet by mouth 4 times a day.     • Multiple Vitamins-Minerals (VITEYES AREDS ADVANCED PO) Take 1 Capsule by mouth every day.     • Cholecalciferol (D3) 2000 UNIT Tab Take 2,000 Units by mouth every day.     • Cyanocobalamin (VITAMIN B-12 PO) Take 2,500 mcg by mouth every day.     • docusate sodium (COLACE) 250 MG capsule Take 500 mg by mouth every evening. Indications: Constipation     • bumetanide (BUMEX) 2 MG tablet Take 2 mg by mouth at bedtime. Indications: Edema, High Blood Pressure Disorder     • pregabalin (LYRICA) 200 MG capsule Take 200 mg by mouth in the morning, at noon, and at bedtime. Pt takes @ 0600, 1200, and 2100     • LETAIRIS 10 MG tablet Take 10 Tablets by mouth at bedtime. Indications: Pulmonary Arterial Hypertension     • AMITIZA 24 MCG capsule Take 48 mcg by mouth every morning with breakfast. Indications: Chronic Constipation of Unknown Cause     • XTAMPZA ER 18 MG Capsule Extended Release 12 hour Abuse-Deterrent Take 18 mg by mouth 2 times a day. Indications: Chronic Pain       No current facility-administered medications on file prior to visit.       Other drug, Penicillins, Sulfa drugs, Macrobid [nitrofurantoin], and Tape      ROS:   Review of Systems   Constitutional: Negative for chills, diaphoresis, fever, malaise/fatigue and weight loss.   HENT: Positive for congestion. Negative for ear discharge, ear pain, hearing loss, nosebleeds, sinus pain, sore throat and tinnitus.    Eyes: Negative for blurred vision, double vision, photophobia, pain, discharge and redness.   Respiratory: Positive for cough, sputum production and wheezing. Negative for hemoptysis, shortness of breath and stridor.    Cardiovascular: Negative for chest pain, palpitations,  orthopnea, claudication, leg swelling and PND.   Gastrointestinal: Negative for abdominal pain, constipation, diarrhea, heartburn, nausea and vomiting.   Genitourinary: Negative for dysuria and urgency.   Musculoskeletal: Negative for back pain, falls, joint pain, myalgias and neck pain.   Skin: Negative for itching and rash.   Neurological: Negative for dizziness, tremors, speech change, focal weakness, weakness and headaches.   Endo/Heme/Allergies: Negative for environmental allergies.   Psychiatric/Behavioral: Negative for depression.       /78 (BP Location: Left arm, Patient Position: Sitting, BP Cuff Size: Adult)   Pulse 81   Resp 16   Ht 1.524 m (5')   Wt 76.2 kg (168 lb)   SpO2 94%   Physical Exam  Vitals reviewed.   Constitutional:       General: She is not in acute distress.     Appearance: Normal appearance. She is well-developed and normal weight.   HENT:      Head: Normocephalic and atraumatic.      Right Ear: External ear normal.      Left Ear: External ear normal.      Nose: Nose normal. No congestion.      Mouth/Throat:      Mouth: Mucous membranes are moist.      Pharynx: Oropharynx is clear. No oropharyngeal exudate.   Eyes:      General: No scleral icterus.     Extraocular Movements: Extraocular movements intact.      Conjunctiva/sclera: Conjunctivae normal.      Pupils: Pupils are equal, round, and reactive to light.   Neck:      Vascular: No JVD.      Trachea: No tracheal deviation.   Cardiovascular:      Rate and Rhythm: Normal rate and regular rhythm.      Heart sounds: Normal heart sounds. No murmur heard.    No friction rub. No gallop.   Pulmonary:      Effort: Pulmonary effort is normal. No accessory muscle usage or respiratory distress.      Breath sounds: Normal breath sounds. No wheezing or rales.   Abdominal:      General: There is no distension.      Palpations: Abdomen is soft.      Tenderness: There is no abdominal tenderness.   Musculoskeletal:         General: No  tenderness or deformity. Normal range of motion.      Cervical back: Normal range of motion and neck supple.      Right lower leg: No edema.      Left lower leg: No edema.   Lymphadenopathy:      Cervical: No cervical adenopathy.   Skin:     General: Skin is warm and dry.      Findings: No rash.      Nails: There is no clubbing.   Neurological:      Mental Status: She is alert and oriented to person, place, and time.      Cranial Nerves: No cranial nerve deficit.      Gait: Gait normal.   Psychiatric:         Mood and Affect: Mood normal.         Behavior: Behavior normal.         PFTs as reviewed by me personally: As per HPI    Imaging as reviewed by me personally: As per HPI    Assessment:  1. Chronic respiratory failure with hypoxia (HCC)  Mometasone Furoate (ASMANEX HFA) 50 MCG/ACT Aerosol   2. Mixed restrictive and obstructive lung disease (HCC)  Mometasone Furoate (ASMANEX HFA) 50 MCG/ACT Aerosol   3. Pulmonary hypertension (HCC)     4. Allergy, sequela  levalbuterol (XOPENEX HFA) 45 MCG/ACT inhaler    montelukast (SINGULAIR) 10 MG Tab    Mometasone Furoate (ASMANEX HFA) 50 MCG/ACT Aerosol       Plan:  1.  This has been present mainly secondary to hypoventilation.  Oxygen needs are stable.  We will treat for reactive airways disease as per below.  She is compliant with and benefiting from supplemental oxygen at 2 L/min.  Obviously we want to minimize opiates and encourage activity.  2.  Presumably secondary to hypoventilation and clinical evidence of reactive airways disease.  Likely some atopic component.  We discussed a trial of low-dose inhaled steroids and montelukast given increased symptoms.  We did discuss that we can use these as needed seasonally based on her symptoms.  I also gave him Xopenex given an adverse response to albuterol.  3.  Presumed some element of primary hypertension given therapy but I suspect significant component of group 2 PAH.  She is followed by cardiology and appears well  compensated today.  4.  Symptoms do suggest allergy with associated asthma and some postnasal drip.  As per above, continue Flonase and add montelukast.  Also gave low-dose inhaled corticosteroid with Asmanex 50 that she can take 1 puff twice daily to minimize steroid dose to see if we can control some of her symptoms.  Patient was advised to rinse after use.  Return in about 4 months (around 10/23/2022) for asthma, respiratory failure.

## 2022-07-01 LAB
25(OH)D3+25(OH)D2 SERPL-MCNC: 44.7 NG/ML (ref 30–100)
ALBUMIN SERPL-MCNC: 4.4 G/DL (ref 3.6–4.6)
ALBUMIN/GLOB SERPL: 2.1 {RATIO} (ref 1.2–2.2)
ALP SERPL-CCNC: 95 IU/L (ref 44–121)
ALT SERPL-CCNC: 11 IU/L (ref 0–32)
AST SERPL-CCNC: 15 IU/L (ref 0–40)
BILIRUB SERPL-MCNC: 0.6 MG/DL (ref 0–1.2)
BUN SERPL-MCNC: 13 MG/DL (ref 8–27)
BUN/CREAT SERPL: 13 (ref 12–28)
CALCIUM SERPL-MCNC: 9.1 MG/DL (ref 8.7–10.3)
CHLORIDE SERPL-SCNC: 97 MMOL/L (ref 96–106)
CHOLEST SERPL-MCNC: 151 MG/DL (ref 100–199)
CO2 SERPL-SCNC: 25 MMOL/L (ref 20–29)
CREAT SERPL-MCNC: 1.04 MG/DL (ref 0.57–1)
EGFRCR SERPLBLD CKD-EPI 2021: 53 ML/MIN/1.73
ERYTHROCYTE [DISTWIDTH] IN BLOOD BY AUTOMATED COUNT: 14.9 % (ref 11.7–15.4)
GLOBULIN SER CALC-MCNC: 2.1 G/DL (ref 1.5–4.5)
GLUCOSE SERPL-MCNC: 91 MG/DL (ref 65–99)
HCT VFR BLD AUTO: 39.7 % (ref 34–46.6)
HDLC SERPL-MCNC: 57 MG/DL
HGB BLD-MCNC: 12.7 G/DL (ref 11.1–15.9)
LABORATORY COMMENT REPORT: NORMAL
LDLC SERPL CALC-MCNC: 75 MG/DL (ref 0–99)
MCH RBC QN AUTO: 29.1 PG (ref 26.6–33)
MCHC RBC AUTO-ENTMCNC: 32 G/DL (ref 31.5–35.7)
MCV RBC AUTO: 91 FL (ref 79–97)
NRBC BLD AUTO-RTO: NORMAL %
POTASSIUM SERPL-SCNC: 4.5 MMOL/L (ref 3.5–5.2)
PROT SERPL-MCNC: 6.5 G/DL (ref 6–8.5)
RBC # BLD AUTO: 4.37 X10E6/UL (ref 3.77–5.28)
SODIUM SERPL-SCNC: 138 MMOL/L (ref 134–144)
TRIGL SERPL-MCNC: 107 MG/DL (ref 0–149)
VLDLC SERPL CALC-MCNC: 19 MG/DL (ref 5–40)
WBC # BLD AUTO: 4.4 X10E3/UL (ref 3.4–10.8)

## 2022-07-28 NOTE — ED NOTES
1840  Bladder Scan  0 ml urine Garnett draining well dilute pale yellow urine  1900 Garnett irrigated easily with 500 cc NS  With 500 cc immediate return Well cesar by pt Pt pain free Chart up for re evaln   Topical Retinoid counseling:  Patient advised to apply a pea-sized amount only at bedtime and wait 30 minutes after washing their face before applying.  If too drying, patient may add a non-comedogenic moisturizer. The patient verbalized understanding of the proper use and possible adverse effects of retinoids.  All of the patient's questions and concerns were addressed.

## 2022-08-01 DIAGNOSIS — J98.4 MIXED RESTRICTIVE AND OBSTRUCTIVE LUNG DISEASE (HCC): ICD-10-CM

## 2022-08-01 DIAGNOSIS — J43.9 MIXED RESTRICTIVE AND OBSTRUCTIVE LUNG DISEASE (HCC): ICD-10-CM

## 2022-08-01 DIAGNOSIS — J96.11 CHRONIC RESPIRATORY FAILURE WITH HYPOXIA (HCC): ICD-10-CM

## 2022-08-01 DIAGNOSIS — T78.40XS ALLERGY, SEQUELA: ICD-10-CM

## 2022-08-04 DIAGNOSIS — J43.9 MIXED RESTRICTIVE AND OBSTRUCTIVE LUNG DISEASE (HCC): ICD-10-CM

## 2022-08-04 DIAGNOSIS — T78.40XS ALLERGY, SEQUELA: ICD-10-CM

## 2022-08-04 DIAGNOSIS — J98.4 MIXED RESTRICTIVE AND OBSTRUCTIVE LUNG DISEASE (HCC): ICD-10-CM

## 2022-08-04 DIAGNOSIS — J96.11 CHRONIC RESPIRATORY FAILURE WITH HYPOXIA (HCC): ICD-10-CM

## 2022-08-04 RX ORDER — MOMETASONE FUROATE 50 UG/1
1 AEROSOL RESPIRATORY (INHALATION) 2 TIMES DAILY
Qty: 3 EACH | Refills: 3 | Status: SHIPPED | OUTPATIENT
Start: 2022-08-04 | End: 2022-08-05

## 2022-08-04 NOTE — TELEPHONE ENCOUNTER
Have we ever prescribed this med? Yes.  If yes, what date? 08/01/2022    Last OV: 06/23/2022- Dr. Kam     Next OV: 08/24/2022- THERESA FOX     DX: Chronic respiratory failure with hypoxia (HCC) (J96.11); Mixed restrictive and obstructive lung disease (HCC) (J43.9 , J98.4); Allergy, sequela (T78.40XS)    Medications:   Requested Prescriptions     Pending Prescriptions Disp Refills   • ASMANEX HFA 50 MCG/ACT Aerosol [Pharmacy Med Name: ASMANEX HFA 50 MCG INHALER] 13 Each 5     Sig: INHALE 1 INHALATION 2 TIMES A DAY.

## 2022-08-05 NOTE — TELEPHONE ENCOUNTER
FLOVENT HFA 44 MCG/ACT Aerosol          Changed from: ASMANEX HFA 50 MCG/ACT Aerosol    All pharmacy suggested alternatives are listed below    Sig: N/A    Disp:  Not specified    Refills:  0    Start: 8/4/2022    Class: Normal    Non-formulary For: Chronic respiratory failure with hypoxia (HCC); Mixed restrictive and obstructive lung disease (HCC); Allergy, sequela    Last ordered: Yesterday by Angela Kam M.D. Last refill: 8/4/2022    Rx #: [id number]

## 2022-08-24 ENCOUNTER — OFFICE VISIT (OUTPATIENT)
Dept: SLEEP MEDICINE | Facility: MEDICAL CENTER | Age: 82
End: 2022-08-24
Payer: MEDICARE

## 2022-08-24 ENCOUNTER — TELEPHONE (OUTPATIENT)
Dept: SLEEP MEDICINE | Facility: MEDICAL CENTER | Age: 82
End: 2022-08-24

## 2022-08-24 VITALS
HEIGHT: 60 IN | DIASTOLIC BLOOD PRESSURE: 60 MMHG | OXYGEN SATURATION: 93 % | BODY MASS INDEX: 32.39 KG/M2 | RESPIRATION RATE: 16 BRPM | SYSTOLIC BLOOD PRESSURE: 112 MMHG | WEIGHT: 165 LBS | HEART RATE: 72 BPM

## 2022-08-24 DIAGNOSIS — G47.33 OSA (OBSTRUCTIVE SLEEP APNEA): ICD-10-CM

## 2022-08-24 DIAGNOSIS — J96.11 CHRONIC RESPIRATORY FAILURE WITH HYPOXIA (HCC): ICD-10-CM

## 2022-08-24 DIAGNOSIS — J44.89 COPD WITH ASTHMA (HCC): ICD-10-CM

## 2022-08-24 PROCEDURE — 99214 OFFICE O/P EST MOD 30 MIN: CPT | Performed by: NURSE PRACTITIONER

## 2022-08-24 RX ORDER — FOSINOPRIL SODIUM 10 MG/1
10 TABLET ORAL DAILY
COMMUNITY
End: 2022-12-12

## 2022-08-24 RX ORDER — MOMETASONE FUROATE 50 UG/1
1 AEROSOL RESPIRATORY (INHALATION) 2 TIMES DAILY
Qty: 2 EACH | Refills: 0 | COMMUNITY
Start: 2022-08-24 | End: 2023-05-17

## 2022-08-24 ASSESSMENT — FIBROSIS 4 INDEX: FIB4 SCORE: 1.72

## 2022-08-24 NOTE — PROGRESS NOTES
Chief Complaint   Patient presents with    Apnea       HPI:  Maddy Hodge is a 82 y.o. year old female here today for follow-up on ALISSA.  Last seen by me on 2/23/2022.  Currently on  BiPAP ST 19/15 centimeters with backup rate of 14 breaths/min.  Patient also uses 2 L/min supplemental oxygen bleed-in to BiPAP.  She is also followed by pulmonary clinic for COPD/asthma, mixed restrictive obstructive disorder, and chronic respiratory failure with hypoxia.  Other significant past medical history includes mild pulmonary hypertension.  Patient is a lifelong non-smoker.  She uses supplemental oxygen at 2 L/min continuously.    I saw her 6 months ago when she was having difficulty with leaks from her mask.  She is currently using a ResMed AirFit F 30 I.  She states that there is times where she struggles with proper mask fit.  She notices that air blows into her eyes from the top of the mask.  She has been attempting to ensure that mask is secure before going to sleep, but sometimes she gets frustrated and pulls the mask off during sleep.  She was previously having sores around her nostrils, but that has been getting better.  Patient states she struggles to get comfortable with the mask fit.  Overall, she notes good sleep quality.  She averages 9 hours of sleep nightly.  She has good nights and bad as far as sleep quality.  She sometimes takes the mask and oxygen off with the machine running.  He also suffers from back pain and has trouble sleeping due to this.    Compliance was reviewed and shows 97% use with an average time of 6 hours and 37 minutes.  AHI is 18.3, but associated with large leaks.  Median leaks per minute is 15.6 L/min and 95th percentile at 36.7 L/min.    SLEEP HISTORY   The PAP titration was initiated with CPAP 6 cm of water and the pressure which was slowly titrated up in an attempt to eliminate sleep disordered breathing and snoring. The CPAP was titrated up to 9 cm however majority of the  "events were crentral apneas therefore he was titrated on BiPAP with ST between 9/4-12/7 cm. The central apneas improved on BiPAP ST 12/7 cm with 12 bpm, the over AHI was 1.6/hr  Ans O2 charlene 90%. ASV was also tried with EPAP min 6 EPAP 10 cm PS 3/15 cm.The best tolerated pressure was BiPAP ST 12/7 cm with back up rate 12 bpm at this final pressure the patient was observed in the supine position but not REM sleep stage. The apnea hypopnea index improved to 1.6 per hour and O2 charlene 90%. The average O2 stauration was 93%. She spent 14 % of sleep time below 89% O2 saturation     PSG from 6/2017 indicated an AHI of 5.5 and low oxygenation of 81%.  She completed a titration study for potential ASV therapy given elevated AHI and use of chronic pain medication.  She was then switched from CPAP to BiPAP given her central apneas resolved with BiPAP therapy.  Currently she is being treated with BIPAP ST @ 14/9cm with back up rate 12.      ROS: As per HPI and otherwise negative if not stated.    Past Medical History:   Diagnosis Date    Allergy     seasonal    Anesthesia     \"hard to wake up\"    Anxiety     due to loss of . 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     Arthritis     facet arthritis of lumbar region    Asthma     Inhaler use daily.    Basal cell carcinoma     arm, neck, face    Bowel habit changes     constipation    Carpal tunnel syndrome on left 7/2/2021    Added automatically from request for surgery [id number]    CATARACT     removed bilat    Chickenpox     Chronic constipation     Coagulase-negative staphylococcal infection     Dr. Albrecht, attaches to plastic, prulent    Cubital tunnel syndrome on left 7/2/2021    Added automatically from request for surgery [id number]    Delayed emergence from general anesthesia     Depression     and anxiety    Encounter for long-term (current) use of other medications     Erosive gastritis 5/09    antral    Esophageal dysphagia 5/19/2016    Essential " hypertension 7/6/2009    Family history of polycystic kidney disease     GERD (gastroesophageal reflux disease)     Sinhala measles     High cholesterol     History of vertebral fracture 3/26/2012    Hypertension     Hypothyroidism     Hypothyroidism due to Hashimoto's thyroiditis 11/10/2021    managed with medication Formatting of this note might be different from the original. Formatting of this note might be different from the original. managed with medication  Last Assessment & Plan:  Formatting of this note might be different from the original. Chronic condition managed with current medical regimen Stable per review  Continue with current meds Followed by Shirlene Quinones M.D..    Indwelling urinary catheter present 11/16/2020    Influenza     Lumbar vertebral fracture (HCC) 5/2011    Mumps     Muscle disorder     Arachnoiditis    Neuropathy     Neuropathy     Obesity, Class I, BMI 30-34.9     ALISSA (obstructive sleep apnea) 11/10/2017    OSTEOPOROSIS     Oxygen dependent     2 liters, [facility]    Pain 07/20/2021    left arm, knees, chronic back, right leg, 6/10    Pain management 5/20/2021    Primary osteoarthritis of left knee 8/13/2020    Formatting of this note might be different from the original. Formatting of this note might be different from the original. Added automatically from request for surgery [id number]    Pulmonary hypertension (HCC)     Recurrent UTI 6/28/2017    Sleep apnea     on BiPap, follows with pulmonology    Spinal stenosis of lumbar region with neurogenic claudication 11/10/2021    Formatting of this note might be different from the original. Last Assessment & Plan:  Formatting of this note might be different from the original. Chronic condition managed with current medical regimen Stable per review  Continue with current meds Followed by specialty    Spinal stenosis, lumbar region, without neurogenic claudication     Status post total bilateral knee replacement 5/24/2022     Suprapubic catheter (HCC)     Tonsillitis        Past Surgical History:   Procedure Laterality Date    PB TOTAL KNEE ARTHROPLASTY Right 2021    Procedure: ARTHROPLASTY, KNEE, TOTAL;  Surgeon: Kayden Perez M.D.;  Location: SURGERY [facility];  Service: Orthopedics    PB REVISE MEDIAN N/CARPAL TUNNEL SURG Left 2021    Procedure: RELEASE, CARPAL TUNNEL;  Surgeon: Ian Delgado M.D.;  Location: SURGERY SAME DAY [facility];  Service: Orthopedics    PB REVISE ULNAR NERVE AT ELBOW Left 2021    Procedure: RELEASE, CUBITAL TUNNEL.;  Surgeon: Ian Delgado M.D.;  Location: SURGERY SAME DAY [facility];  Service: Orthopedics    PB TOTAL KNEE ARTHROPLASTY Left 2020    Procedure: ARTHROPLASTY, KNEE, TOTAL;  Surgeon: Kayden Perez M.D.;  Location: [facility];  Service: Orthopedics    SPINAL CORD STIMULATOR N/A 7/3/2017    Procedure: SPINAL CORD STIMULATOR FOR LEAD REMOVAL;  Surgeon: Amandeep Gonzalez D.O.;  Location: [facility];  Service:     GASTROSCOPY WITH BALLOON DILATATION N/A 2016    Procedure: GASTROSCOPY WITH DILATATION;  Surgeon: Tony Monae M.D.;  Location: [facility];  Service:     SPINAL CORD STIMULATOR  2014    removed     OTHER SURGICAL PROCEDURE Bilateral     plantar fascitis surgery    EGD WITH ASP/BX  09    erosive gastritis    HYSTERECTOMY, TOTAL ABDOMINAL  1976    APPENDECTOMY      CATARACT EXTRACTION WITH IOL Bilateral     COLONOSCOPY  ,09    normal    LUMBAR LAMINECTOMY DISKECTOMY      TONSILLECTOMY         Family History   Problem Relation Age of Onset    Genitourinary () Problems Brother     Lung Disease Mother         COPD    Heart Disease Mother     Genetic Disorder Father         Parkinsons/ from complications    Hypertension Father     Cancer Maternal Aunt         Breast cancer    Stroke Paternal Grandmother     Cancer Maternal Aunt         Breast cancer       Allergies as of  08/24/2022 - Reviewed 08/24/2022   Allergen Reaction Noted    Other drug  02/16/2015    Penicillins Hives 06/21/2017    Sulfa drugs Hives 06/21/2017    Macrobid [nitrofurantoin] Rash 06/28/2017    Tape Rash and Itching 06/21/2017        Vitals:  /60 (BP Location: Left arm, Patient Position: Sitting, BP Cuff Size: Large adult)   Pulse 72   Resp 16   Ht 1.524 m (5')   Wt 74.8 kg (165 lb)   SpO2 93%     Current medications as of today   Current Outpatient Medications   Medication Sig Dispense Refill    bimatoprost (LUMIGAN) 0.01 % Solution 1 Drop.      fosinopril (MONOPRIL) 10 MG Tab Take 10 mg by mouth every day.      mometasone (ASMANEX) 220 MCG/INH inhaler Inhale 2 Puffs.      Fluticasone Propionate, Inhal, (FLOVENT DISKUS) 100 MCG/BLIST AEROSOL POWDER, BREATH ACTIVATED Inhale 1 Puff 2 times a day. Rinse mouth after each nuse. 60 Each 6    escitalopram (LEXAPRO) 20 MG tablet TAKE 1 TABLET EVERY DAY 90 Tablet 3    bimatoprost (LUMIGAN) 0.01 % Solution Lumigan 0.01 % eye drops   INSTILL 1 DROP INTO BOTH EYES AT BEDTIME      montelukast (SINGULAIR) 10 MG Tab Take 1 Tablet by mouth every evening. 30 Tablet 11    levalbuterol (XOPENEX HFA) 45 MCG/ACT inhaler Inhale 2 Puffs every four hours as needed for Shortness of Breath. 1 Each 5    albuterol 108 (90 Base) MCG/ACT Aero Soln inhalation aerosol Inhale 2 Puffs every four hours as needed for Shortness of Breath. 3 Each 3    atorvastatin (LIPITOR) 20 MG Tab TAKE 1 TABLET BY MOUTH EVERY DAY 90 Tablet 3    estradiol (ESTRACE) 0.5 MG tablet Take 1 Tablet by mouth every day. 90 Tablet 3    omeprazole (PRILOSEC) 20 MG delayed-release capsule Take 1 Capsule by mouth every day. 90 Capsule 3    levothyroxine (SYNTHROID) 50 MCG Tab Take 1 Tablet by mouth every morning on an empty stomach. 90 Tablet 3    imipramine (TOFRANIL) 10 MG Tab Take 2 Tablets by mouth every evening. 180 Tablet 2    Omega-3 Fatty Acids (OMEGA-3 FISH OIL PO) Take 1,500 mg by mouth.      D-MANNOSE PO  Take 1,300 mg by mouth.      potassium chloride (KLOR-CON) 8 MEQ tablet TAKE 2 TABLETS EVERY  Tablet 3    spironolactone (ALDACTONE) 25 MG Tab Take 25 mg by mouth every day.      ASPIRIN LOW DOSE 81 MG EC tablet TAKE 1 TABLET BY MOUTH TWICE A DAY 60 Tablet 2    oxyCODONE-acetaminophen (PERCOCET) 5-325 MG Tab Take 1 Tablet by mouth 4 times a day.      Multiple Vitamins-Minerals (VITEYES AREDS ADVANCED PO) Take 1 Capsule by mouth every day.      Cholecalciferol (D3) 2000 UNIT Tab Take 2,000 Units by mouth every day.      Cyanocobalamin (VITAMIN B-12 PO) Take 2,500 mcg by mouth every day.      docusate sodium (COLACE) 250 MG capsule Take 500 mg by mouth every evening. Indications: Constipation      bumetanide (BUMEX) 2 MG tablet Take 2 mg by mouth at bedtime. Indications: Edema, High Blood Pressure Disorder      pregabalin (LYRICA) 200 MG capsule Take 200 mg by mouth in the morning, at noon, and at bedtime. Pt takes @ 0600, 1200, and 2100      LETAIRIS 10 MG tablet Take 10 Tablets by mouth at bedtime. Indications: Pulmonary Arterial Hypertension      AMITIZA 24 MCG capsule Take 48 mcg by mouth every morning with breakfast. Indications: Chronic Constipation of Unknown Cause      XTAMPZA ER 18 MG Capsule Extended Release 12 hour Abuse-Deterrent Take 18 mg by mouth 2 times a day. Indications: Chronic Pain       No current facility-administered medications for this visit.         Physical Exam:   Gen:           Alert and oriented, No apparent distress. Mood and affect appropriate, normal interaction with examiner.  Eyes:          PERRL, EOM intact, sclere white, conjunctive moist.  Ears:          Not examined.   Hearing:     Grossly intact.  Nose:          Normal, no lesions or deformities.  Dentition:    Good dentition.  Oropharynx:   Tongue normal, posterior pharynx without erythema or exudate.  Neck:        Supple, trachea midline, no masses.  Respiratory Effort: No intercostal retractions or use of accessory  muscles.   Lung Auscultation:      Clear to auscultation bilaterally; no rales, rhonchi or wheezing.  CV:            Regular rate and rhythm. No murmurs, rubs or gallops.  Abd:           Not examined.   Lymphadenopathy: Not examined.  Gait and Station: Normal.  Digits and Nails: No clubbing, cyanosis, petechiae, or nodes.   Cranial Nerves: II-XII grossly intact.  Skin:        No rashes, lesions or ulcers noted.               Ext:           No cyanosis or edema.      Assessment:  1. ALISSA (obstructive sleep apnea)        2. Chronic respiratory failure with hypoxia (HCC)              Plan:  Patient is using and benefiting from BiPAP ST.  Recommended ensuring mask fit is secure before turning BiPAP on.  Patient is switching from a ResMed AirFit F 30 I to a ResMed AirFit F 30.  They will work to ensure that it is fitting properly.  I strongly recommended mask fitting, but patient has difficulty leaving the house at times.  There are 2 similar mass that I would recommend.  Either a F&P Haylie or Brennan Janeth.  These may fit patient better than the current 1.  Patient will make adjustments and message me.  I will check compliance wirelessly in 2 weeks.  At this time patient would not like to proceed with mask fitting.  May be necessary in the future.  Continue using BiPAP ST with 2 L/min supplemental oxygen.  Continue titrating supplemental oxygen to keep SPO2 greater than 89%.  Patient is finding benefit from Asmanex, but it is out of network.  We are working for prior authorization of Flovent.  Sample given for Asmanex until patient is authorized for beginning use of event.  Follow-up with pulmonary clinic.    Please note that this dictation was created using voice recognition software. I have made every reasonable attempt to correct obvious errors, but it is possible there are errors of grammar and possibly content that I did not discover before finalizing the note.

## 2022-08-24 NOTE — TELEPHONE ENCOUNTER
[facility] for [facility] call back to let her know that she can now  her flovent for 11.00, but to finish the Asmanex first.

## 2022-09-27 ENCOUNTER — PATIENT MESSAGE (OUTPATIENT)
Dept: SLEEP MEDICINE | Facility: MEDICAL CENTER | Age: 82
End: 2022-09-27
Payer: MEDICARE

## 2022-09-28 DIAGNOSIS — E87.6 HYPOKALEMIA: ICD-10-CM

## 2022-09-29 NOTE — PATIENT COMMUNICATION
The patient is asking about her mask leakage.  Maurice had told her that he would look at it and let her know.  Current compliance is scanned into media.  Thank you.

## 2022-09-30 RX ORDER — POTASSIUM CHLORIDE 600 MG/1
TABLET, FILM COATED, EXTENDED RELEASE ORAL
Qty: 180 TABLET | Refills: 3 | Status: SHIPPED | OUTPATIENT
Start: 2022-09-30

## 2022-11-11 ENCOUNTER — PATIENT MESSAGE (OUTPATIENT)
Dept: HEALTH INFORMATION MANAGEMENT | Facility: OTHER | Age: 82
End: 2022-11-11

## 2022-11-16 DIAGNOSIS — G56.22 CUBITAL TUNNEL SYNDROME ON LEFT: ICD-10-CM

## 2022-11-16 RX ORDER — IMIPRAMINE HYDROCHLORIDE 10 MG/1
20 TABLET, FILM COATED ORAL EVERY EVENING
Qty: 180 TABLET | Refills: 2 | Status: SHIPPED | OUTPATIENT
Start: 2022-11-16 | End: 2023-08-31

## 2022-11-17 ENCOUNTER — OFFICE VISIT (OUTPATIENT)
Dept: SLEEP MEDICINE | Facility: MEDICAL CENTER | Age: 82
End: 2022-11-17
Payer: MEDICARE

## 2022-11-17 VITALS
BODY MASS INDEX: 34.36 KG/M2 | RESPIRATION RATE: 16 BRPM | WEIGHT: 175 LBS | HEART RATE: 75 BPM | OXYGEN SATURATION: 92 % | HEIGHT: 60 IN | DIASTOLIC BLOOD PRESSURE: 62 MMHG | SYSTOLIC BLOOD PRESSURE: 114 MMHG

## 2022-11-17 DIAGNOSIS — G47.33 OSA (OBSTRUCTIVE SLEEP APNEA): ICD-10-CM

## 2022-11-17 DIAGNOSIS — J96.11 CHRONIC RESPIRATORY FAILURE WITH HYPOXIA (HCC): ICD-10-CM

## 2022-11-17 DIAGNOSIS — I27.20 PULMONARY HYPERTENSION (HCC): ICD-10-CM

## 2022-11-17 DIAGNOSIS — J44.89 COPD WITH ASTHMA (HCC): ICD-10-CM

## 2022-11-17 PROCEDURE — 99214 OFFICE O/P EST MOD 30 MIN: CPT | Performed by: INTERNAL MEDICINE

## 2022-11-17 ASSESSMENT — ENCOUNTER SYMPTOMS
ABDOMINAL PAIN: 0
EYE PAIN: 0
FOCAL WEAKNESS: 0
CHILLS: 0
HEADACHES: 0
DIAPHORESIS: 0
WEIGHT LOSS: 0
VOMITING: 0
PHOTOPHOBIA: 0
SPUTUM PRODUCTION: 0
PALPITATIONS: 0
DEPRESSION: 0
WEAKNESS: 0
SPEECH CHANGE: 0
SORE THROAT: 0
HEARTBURN: 0
NAUSEA: 0
EYE DISCHARGE: 0
DIARRHEA: 0
CLAUDICATION: 0
COUGH: 0
EYE REDNESS: 0
FEVER: 0
SHORTNESS OF BREATH: 0
BACK PAIN: 0
MYALGIAS: 0
DIZZINESS: 0
TREMORS: 0
STRIDOR: 0
CONSTIPATION: 0
SINUS PAIN: 0
WHEEZING: 0
FALLS: 0
PND: 0
HEMOPTYSIS: 0
NECK PAIN: 0
ORTHOPNEA: 0
BLURRED VISION: 0
DOUBLE VISION: 0

## 2022-11-17 ASSESSMENT — FIBROSIS 4 INDEX: FIB4 SCORE: 1.72

## 2022-11-17 NOTE — PROGRESS NOTES
"Chief Complaint   Patient presents with    Follow-Up     Last seen 6/23/22          HPI: This patient is a 82 y.o. female whom is followed in our clinic for chronic hypoxic respiratory failure and RAD last seen by me on 6/23/22.  The patient also carries a diagnosis of obstructive sleep apnea for which she is on BiPAP therapy and followed by sleep medicine.  The patient is followed by cardiology with Dr Cook at [facility] for diagnosis of mild pulmonary hypertension and on ambrisentan as well as spironolactone and Lasix.  She is a lifelong non-smoker.  She is on chronic opiate medication for pain and followed by pain medicine. Pt has RAD and was off controller therapy at our last visit but has since gone back on low dose ICS due to increased wheezing over the summer. No exacerbations since 12/2020.  Minimal rescue inhaler use. She is on O2 at 2LPLM for the past year.   PFTS from 12/2021 were stable to slightly improved compared to March 2021.  Overall she is quite sedentary 2/2 pain 2/2 DJD and notes decreased exercise tolerance over the past year. Otherwise no acute complaints. She is up to date on vaccines.     Past Medical History:   Diagnosis Date    Allergy     seasonal    Anesthesia     \"hard to wake up\"    Anxiety     due to loss of . 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     Arthritis     facet arthritis of lumbar region    Asthma     Inhaler use daily.    Basal cell carcinoma     arm, neck, face    Bowel habit changes     constipation    Carpal tunnel syndrome on left 7/2/2021    Added automatically from request for surgery [id number]    CATARACT     removed bilat    Chickenpox     Chronic constipation     Coagulase-negative staphylococcal infection     Dr. Albrecht, attaches to plastic, prulent    Cubital tunnel syndrome on left 7/2/2021    Added automatically from request for surgery [id number]    Delayed emergence from general anesthesia     Depression     and anxiety    Encounter for " long-term (current) use of other medications     Erosive gastritis 5/09    antral    Esophageal dysphagia 5/19/2016    Essential hypertension 7/6/2009    Family history of polycystic kidney disease     GERD (gastroesophageal reflux disease)     Beninese measles     High cholesterol     History of vertebral fracture 3/26/2012    Hypertension     Hypothyroidism     Hypothyroidism due to Hashimoto's thyroiditis 11/10/2021    managed with medication Formatting of this note might be different from the original. Formatting of this note might be different from the original. managed with medication  Last Assessment & Plan:  Formatting of this note might be different from the original. Chronic condition managed with current medical regimen Stable per review  Continue with current meds Followed by Shirlene Quinones M.D..    Indwelling urinary catheter present 11/16/2020    Influenza     Lumbar vertebral fracture (HCC) 5/2011    Mumps     Muscle disorder     Arachnoiditis    Neuropathy     Neuropathy     Obesity, Class I, BMI 30-34.9     ALISSA (obstructive sleep apnea) 11/10/2017    OSTEOPOROSIS     Oxygen dependent     2 liters, [facility]    Pain 07/20/2021    left arm, knees, chronic back, right leg, 6/10    Pain management 5/20/2021    Primary osteoarthritis of left knee 8/13/2020    Formatting of this note might be different from the original. Formatting of this note might be different from the original. Added automatically from request for surgery [id number]    Pulmonary hypertension (HCC)     Recurrent UTI 6/28/2017    Sleep apnea     on BiPap, follows with pulmonology    Spinal stenosis of lumbar region with neurogenic claudication 11/10/2021    Formatting of this note might be different from the original. Last Assessment & Plan:  Formatting of this note might be different from the original. Chronic condition managed with current medical regimen Stable per review  Continue with current meds Followed by specialty     Spinal stenosis, lumbar region, without neurogenic claudication     Status post total bilateral knee replacement 2022    Suprapubic catheter (HCC)     Tonsillitis        Social History     Socioeconomic History    Marital status:      Spouse name: Not on file    Number of children: Not on file    Years of education: Not on file    Highest education level: Not on file   Occupational History    Not on file   Tobacco Use    Smoking status: Never     Passive exposure: Past    Smokeless tobacco: Never   Vaping Use    Vaping Use: Never used   Substance and Sexual Activity    Alcohol use: Not Currently     Alcohol/week: 0.0 oz    Drug use: Not Currently    Sexual activity: Not Currently     Partners: Male     Birth control/protection: Abstinence   Other Topics Concern     Service Not Asked    Blood Transfusions Not Asked    Caffeine Concern Not Asked    Occupational Exposure Not Asked    Hobby Hazards Not Asked    Sleep Concern Not Asked    Stress Concern No     Comment:  cancer    Weight Concern Not Asked    Special Diet Not Asked    Back Care Not Asked    Exercise Not Asked    Bike Helmet Not Asked    Seat Belt Not Asked    Self-Exams Not Asked   Social History Narrative    Not on file     Social Determinants of Health     Financial Resource Strain: Not on file   Food Insecurity: Not on file   Transportation Needs: Not on file   Physical Activity: Not on file   Stress: Not on file   Social Connections: Not on file   Intimate Partner Violence: Not on file   Housing Stability: Not on file       Family History   Problem Relation Age of Onset    Genitourinary () Problems Brother     Lung Disease Mother         COPD    Heart Disease Mother     Genetic Disorder Father         Parkinsons/ from complications    Hypertension Father     Cancer Maternal Aunt         Breast cancer    Stroke Paternal Grandmother     Cancer Maternal Aunt         Breast cancer       Current Outpatient Medications on  File Prior to Visit   Medication Sig Dispense Refill    imipramine (TOFRANIL) 10 MG Tab TAKE 2 TABLETS BY MOUTH EVERY EVENING. 180 Tablet 2    potassium chloride (KLOR-CON) 8 MEQ tablet TAKE 2 TABLETS EVERY  Tablet 3    bimatoprost (LUMIGAN) 0.01 % Solution 1 Drop.      escitalopram (LEXAPRO) 20 MG tablet TAKE 1 TABLET EVERY DAY 90 Tablet 3    montelukast (SINGULAIR) 10 MG Tab Take 1 Tablet by mouth every evening. 30 Tablet 11    albuterol 108 (90 Base) MCG/ACT Aero Soln inhalation aerosol Inhale 2 Puffs every four hours as needed for Shortness of Breath. 3 Each 3    atorvastatin (LIPITOR) 20 MG Tab TAKE 1 TABLET BY MOUTH EVERY DAY 90 Tablet 3    estradiol (ESTRACE) 0.5 MG tablet Take 1 Tablet by mouth every day. 90 Tablet 3    omeprazole (PRILOSEC) 20 MG delayed-release capsule Take 1 Capsule by mouth every day. 90 Capsule 3    levothyroxine (SYNTHROID) 50 MCG Tab Take 1 Tablet by mouth every morning on an empty stomach. 90 Tablet 3    Omega-3 Fatty Acids (OMEGA-3 FISH OIL PO) Take 1,500 mg by mouth.      D-MANNOSE PO Take 1,300 mg by mouth.      spironolactone (ALDACTONE) 25 MG Tab Take 1 Tablet by mouth every day.      ASPIRIN LOW DOSE 81 MG EC tablet TAKE 1 TABLET BY MOUTH TWICE A DAY 60 Tablet 2    oxyCODONE-acetaminophen (PERCOCET) 5-325 MG Tab Take 1 Tablet by mouth 4 times a day.      Multiple Vitamins-Minerals (VITEYES AREDS ADVANCED PO) Take 1 Capsule by mouth every day.      Cholecalciferol (D3) 2000 UNIT Tab Take 1 Tablet by mouth every day.      Cyanocobalamin (VITAMIN B-12 PO) Take 2,500 mcg by mouth every day.      docusate sodium (COLACE) 250 MG capsule Take 2 Capsules by mouth every evening. Indications: Constipation      bumetanide (BUMEX) 2 MG tablet Take 1 Tablet by mouth at bedtime. Indications: Edema, High Blood Pressure Disorder      pregabalin (LYRICA) 200 MG capsule Take 1 Capsule by mouth in the morning, at noon, and at bedtime. Pt takes @ 0600, 1200, and 2100      LETAIRIS 10 MG  tablet Take 10 Tablets by mouth at bedtime. Indications: Pulmonary Arterial Hypertension      AMITIZA 24 MCG capsule Take 2 Capsules by mouth every morning with breakfast. Indications: Chronic Constipation of Unknown Cause      XTAMPZA ER 18 MG Capsule Extended Release 12 hour Abuse-Deterrent Take 18 mg by mouth 2 times a day. Indications: Chronic Pain      fosinopril (MONOPRIL) 10 MG Tab Take 1 Tablet by mouth every day.      Mometasone Furoate (ASMANEX HFA) 50 MCG/ACT Aerosol Inhale 1 Inhalation 2 times a day. 2 Each 0    Fluticasone Propionate, Inhal, (FLOVENT DISKUS) 100 MCG/BLIST AEROSOL POWDER, BREATH ACTIVATED Inhale 1 Puff 2 times a day. Rinse mouth after each nuse. 60 Each 6    bimatoprost (LUMIGAN) 0.01 % Solution Lumigan 0.01 % eye drops   INSTILL 1 DROP INTO BOTH EYES AT BEDTIME (Patient not taking: Reported on 8/24/2022)       No current facility-administered medications on file prior to visit.       Other drug, Penicillins, Sulfa drugs, Macrobid [nitrofurantoin], and Tape      ROS:   Review of Systems   Constitutional:  Negative for chills, diaphoresis, fever, malaise/fatigue and weight loss.   HENT:  Negative for congestion, ear discharge, ear pain, hearing loss, nosebleeds, sinus pain, sore throat and tinnitus.    Eyes:  Negative for blurred vision, double vision, photophobia, pain, discharge and redness.   Respiratory:  Negative for cough, hemoptysis, sputum production, shortness of breath, wheezing and stridor.    Cardiovascular:  Negative for chest pain, palpitations, orthopnea, claudication, leg swelling and PND.   Gastrointestinal:  Negative for abdominal pain, constipation, diarrhea, heartburn, nausea and vomiting.   Genitourinary:  Negative for dysuria and urgency.   Musculoskeletal:  Negative for back pain, falls, joint pain, myalgias and neck pain.   Skin:  Negative for itching and rash.   Neurological:  Negative for dizziness, tremors, speech change, focal weakness, weakness and headaches.    Endo/Heme/Allergies:  Negative for environmental allergies.   Psychiatric/Behavioral:  Negative for depression.      /62 (BP Location: Right arm, Patient Position: Sitting, BP Cuff Size: Adult)   Pulse 75   Resp 16   Ht 1.524 m (5')   Wt 79.4 kg (175 lb)   SpO2 92%   Physical Exam  Constitutional:       General: She is not in acute distress.     Appearance: Normal appearance. She is well-developed and normal weight.   HENT:      Head: Normocephalic and atraumatic.      Right Ear: External ear normal.      Left Ear: External ear normal.      Nose: Nose normal. No congestion.      Mouth/Throat:      Mouth: Mucous membranes are moist.      Pharynx: Oropharynx is clear. No oropharyngeal exudate.   Eyes:      General: No scleral icterus.     Extraocular Movements: Extraocular movements intact.      Conjunctiva/sclera: Conjunctivae normal.      Pupils: Pupils are equal, round, and reactive to light.   Neck:      Vascular: No JVD.      Trachea: No tracheal deviation.   Cardiovascular:      Rate and Rhythm: Normal rate and regular rhythm.      Heart sounds: Normal heart sounds. No murmur heard.    No friction rub. No gallop.   Pulmonary:      Effort: Pulmonary effort is normal. No accessory muscle usage or respiratory distress.      Breath sounds: Normal breath sounds. No wheezing or rales.   Abdominal:      General: There is no distension.      Palpations: Abdomen is soft.      Tenderness: There is no abdominal tenderness.   Musculoskeletal:         General: No tenderness or deformity. Normal range of motion.      Cervical back: Normal range of motion and neck supple.      Right lower leg: No edema.      Left lower leg: No edema.   Lymphadenopathy:      Cervical: No cervical adenopathy.   Skin:     General: Skin is warm and dry.      Findings: No rash.      Nails: There is no clubbing.   Neurological:      Mental Status: She is alert and oriented to person, place, and time.      Cranial Nerves: No cranial  nerve deficit.      Gait: Gait normal.   Psychiatric:         Behavior: Behavior normal.       PFTs as reviewed by me personally: as per HPI    Imaging as reviewed by me personally:  as per hPI    Assessment:  1. Chronic respiratory failure with hypoxia (HCC)        2. COPD with asthma (HCC)        3. ALISSA (obstructive sleep apnea)        4. Pulmonary hypertension (HCC)            Plan:  Chronic and likely mainly hypoventilation. O2 needs are stable. Pt is compliant with and benefiting from O2 at 2LPM. Encouraged activity as tolerated.   Chronic, mild. Pt declined updating PFTs which would not likely  anyway. Continue ICS and prn CHIQUIS  On bipap therapy, followed by sleep medicine  Appears compensated today; followed by cardiology  Return in about 6 months (around 5/17/2023).

## 2022-11-29 NOTE — PROCEDURES
This is a 6 minute walking test.   Baseline SpO2 was 94% in room air. BP, 130/62, HR 77 bpm  Patient completed the test on room air. No exertional hypoxemia noted. The lowest SpO2 was 92% in minute 3. [place] heart rate reached was 102 BPM   Pt was able to walk 600 feet. Which is 49% of predicted   Pt recovered quickly to baseline after the test     Impression   No exertional hypoxemia.   Decreased exercise capacity   Clinical correlation is required      
4 = No assist / stand by assistance

## 2022-12-12 ENCOUNTER — OFFICE VISIT (OUTPATIENT)
Dept: MEDICAL GROUP | Facility: MEDICAL CENTER | Age: 82
End: 2022-12-12
Payer: MEDICARE

## 2022-12-12 VITALS
RESPIRATION RATE: 14 BRPM | SYSTOLIC BLOOD PRESSURE: 114 MMHG | HEART RATE: 71 BPM | HEIGHT: 60 IN | OXYGEN SATURATION: 97 % | BODY MASS INDEX: 34.11 KG/M2 | DIASTOLIC BLOOD PRESSURE: 62 MMHG | WEIGHT: 173.72 LBS | TEMPERATURE: 97.8 F

## 2022-12-12 DIAGNOSIS — N31.9 NEUROGENIC BLADDER: ICD-10-CM

## 2022-12-12 DIAGNOSIS — F33.0 MAJOR DEPRESSIVE DISORDER, RECURRENT EPISODE, MILD (HCC): ICD-10-CM

## 2022-12-12 DIAGNOSIS — J43.9 MIXED RESTRICTIVE AND OBSTRUCTIVE LUNG DISEASE (HCC): ICD-10-CM

## 2022-12-12 DIAGNOSIS — E66.9 OBESITY, CLASS I, BMI 30-34.9: ICD-10-CM

## 2022-12-12 DIAGNOSIS — I27.20 PULMONARY HYPERTENSION (HCC): Chronic | ICD-10-CM

## 2022-12-12 DIAGNOSIS — Z93.59 PRESENCE OF SUPRAPUBIC CATHETER (HCC): ICD-10-CM

## 2022-12-12 DIAGNOSIS — E06.3 HYPOTHYROIDISM DUE TO HASHIMOTO'S THYROIDITIS: ICD-10-CM

## 2022-12-12 DIAGNOSIS — J96.11 CHRONIC RESPIRATORY FAILURE WITH HYPOXIA (HCC): ICD-10-CM

## 2022-12-12 DIAGNOSIS — G62.9 NEUROPATHY: ICD-10-CM

## 2022-12-12 DIAGNOSIS — J43.2 CENTRILOBULAR EMPHYSEMA (HCC): ICD-10-CM

## 2022-12-12 DIAGNOSIS — J44.89 COPD WITH ASTHMA (HCC): ICD-10-CM

## 2022-12-12 DIAGNOSIS — N18.31 STAGE 3A CHRONIC KIDNEY DISEASE: ICD-10-CM

## 2022-12-12 DIAGNOSIS — I70.0 AORTIC ATHEROSCLEROSIS (HCC): ICD-10-CM

## 2022-12-12 DIAGNOSIS — J98.4 MIXED RESTRICTIVE AND OBSTRUCTIVE LUNG DISEASE (HCC): ICD-10-CM

## 2022-12-12 DIAGNOSIS — E03.8 HYPOTHYROIDISM DUE TO HASHIMOTO'S THYROIDITIS: ICD-10-CM

## 2022-12-12 PROCEDURE — 99214 OFFICE O/P EST MOD 30 MIN: CPT | Performed by: FAMILY MEDICINE

## 2022-12-12 ASSESSMENT — FIBROSIS 4 INDEX: FIB4 SCORE: 1.72

## 2022-12-12 NOTE — PROGRESS NOTES
Chief Complaint   Patient presents with    Hypothyroidism     6m fv    Chronic Kidney Disease    Aortic Atherosclerosis    Depression       Subjective:     HPI:   Maddy Hodge presents today with the followin. Hypothyroidism due to Hashimoto's thyroiditis  Patient reports good energy level on the medication. Patient denies insomnia, tremor or change in appetite.  Patient is taking the medication on an empty stomach in the morning and waiting at least 30 minutes before eating.  Last TSH in December last few was at target.  Follow-up lab order discussed and placed.    2. Stage 3a chronic kidney disease (HCC)  Her last creatinine was improved.  She is following these labs with cardiology.  She will be seeing him soon and further orders will be placed.    3. Aortic atherosclerosis (HCC)/  Patient continues to tolerate the 20 mg atorvastatin.  The aortic atherosclerosis was found incidentally without aneurysm.  The patient is a never smoker    4. Chronic respiratory failure with hypoxia (HCC)/Pulmonary hypertension (HCC)  On BiPAP with oxygen bleed in.  Follows with sleep specialist, doing well.  Mild pulmonary hypertension with pressure of 40 this year in May.  Requires oxygen 24/7    5.  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  She is a never smoker.  She is oxygen dependent because of his underlying problems.  She continues to follow with pulmonology and has been stable.    6.  Presence of suprapubic catheter (HCC)/Neurogenic bladder  Patient has a neurogenic bladder due to her multifactorial degenerative lumbar spine problems.  She continues to follow with urology.  She has tolerated the suprapubic catheter well with good symptom control.  No increased infections have been found.  Patient and family is meticulous about care.  Urology changes the catheter I believe.  She is also on d-mannose  which has been helpful.    7. Major depressive disorder, recurrent episode, mild (CMS-[facility])  Patient does have some anhedonia and some frustration with her medical problems but generally her depression is under good control on the 20 mg Lexapro.  She has excellent support from her son who lives with her.  Denies thoughts of self-harm or intrusive negative thoughts.    8. Obesity, Class I, BMI 30-34.9  Patient remains mildly obese.  She has been stable in this weight range now for over a decade.  She is limited in her physical activity.  She and her son try to make very good dietary choices.        Patient Active Problem List    Diagnosis Date Noted    Status post total bilateral knee replacement 05/24/2022    Chronic respiratory failure with hypoxia ([facility]) 05/24/2022    Aortic atherosclerosis ([facility]) 05/24/2022    Spinal stenosis of lumbar region with neurogenic claudication 11/10/2021    Postmenopausal osteoporosis 11/10/2021    Hypothyroidism due to Hashimoto's thyroiditis 11/10/2021    Hearing loss 11/10/2021    Pain management 05/20/2021    Neurogenic bladder 05/04/2021    Presence of suprapubic catheter ([facility]) 11/16/2020    Gastroesophageal reflux disease with esophagitis 11/16/2020    Chronic anxiety 07/15/2019    Vitamin D deficiency disease 04/10/2019    Dyslipidemia, goal LDL below 130 08/15/2018    Chronic obstructive pulmonary disease ([facility]) 06/22/2018    Pulmonary hypertension ([facility]) 02/21/2018    Obesity, Class I, BMI 30-34.9     COPD with asthma ([facility]) 11/10/2017    ALISSA (obstructive sleep apnea) 11/10/2017    Postlaminectomy syndrome, unspecified region 07/03/2017    Mixed restrictive and obstructive lung disease ([facility]) 06/22/2017    Menopausal symptoms 09/13/2016    Essential tremor 09/06/2016    CKD (chronic kidney disease) stage 3, GFR 30-59 ml/min (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 180 Tablet 2    potassium chloride (KLOR-CON) 8 MEQ tablet TAKE 2 TABLETS EVERY  Tablet 3    Mometasone Furoate (ASMANEX HFA) 50 MCG/ACT Aerosol Inhale 1 Inhalation 2 times a day. 2 Each 0    Fluticasone Propionate, Inhal, (FLOVENT DISKUS) 100 MCG/BLIST AEROSOL POWDER, BREATH ACTIVATED Inhale 1 Puff 2 times a day. Rinse mouth after each nuse. 60 Each 6    escitalopram (LEXAPRO) 20 MG tablet TAKE 1 TABLET EVERY DAY 90 Tablet 3    montelukast (SINGULAIR) 10 MG Tab Take 1 Tablet by mouth every evening. 30 Tablet 11    albuterol 108 (90 Base) MCG/ACT Aero Soln inhalation aerosol Inhale 2 Puffs every four hours as needed for Shortness of Breath. 3 Each 3    atorvastatin (LIPITOR) 20 MG Tab TAKE 1 TABLET BY MOUTH EVERY DAY 90 Tablet 3    estradiol (ESTRACE) 0.5 MG tablet Take 1 Tablet by mouth every day. 90 Tablet 3    omeprazole (PRILOSEC) 20 MG delayed-release capsule Take 1 Capsule by mouth every day. 90 Capsule 3    levothyroxine (SYNTHROID) 50 MCG Tab Take 1 Tablet by mouth every morning on an empty stomach. 90 Tablet 3    D-MANNOSE PO Take 1,300 mg by mouth.      spironolactone (ALDACTONE) 25 MG Tab Take 1 Tablet by mouth every day.      ASPIRIN LOW DOSE 81 MG EC tablet TAKE 1 TABLET BY MOUTH TWICE A DAY 60 Tablet 2    oxyCODONE-acetaminophen (PERCOCET) 5-325 MG Tab Take 1 Tablet by mouth 4 times a day.      Multiple Vitamins-Minerals (VITEYES AREDS ADVANCED PO) Take 1 Capsule by mouth every day.      Cholecalciferol (D3) 2000 UNIT Tab Take 1 Tablet by mouth every day.      Cyanocobalamin (VITAMIN B-12 PO) Take 2,500 mcg by mouth every day.      docusate  sodium (COLACE) 250 MG capsule Take 2 Capsules by mouth every evening. Indications: Constipation      bumetanide (BUMEX) 2 MG tablet Take 1 Tablet by mouth at bedtime. Indications: Edema, High Blood Pressure Disorder      pregabalin (LYRICA) 200 MG capsule Take 1 Capsule by mouth in the morning, at noon, and at bedtime. Pt takes @ 0600, 1200, and 2100      LETAIRIS 10 MG tablet Take 10 Tablets by mouth at bedtime. Indications: Pulmonary Arterial Hypertension      AMITIZA 24 MCG capsule Take 2 Capsules by mouth every morning with breakfast. Indications: Chronic Constipation of Unknown Cause      XTAMPZA ER 18 MG Capsule Extended Release 12 hour Abuse-Deterrent Take 18 mg by mouth 2 times a day. Indications: Chronic Pain       No current facility-administered medications for this visit.       Allergies   Allergen Reactions    Other Drug     Penicillins Hives     hives  hives    Sulfa Drugs Hives     hives  hives    Macrobid [Nitrofurantoin] Rash     rash    Tape Rash and Itching     Paper tape ok       ROS: As per HPI       Objective:     /62 (BP Location: Right arm, Patient Position: Sitting, BP Cuff Size: Small adult)   Pulse 71   Temp 36.6 °C (97.8 °F) (Temporal)   Resp 14   Ht 1.524 m (5')   Wt 78.8 kg (173 lb 11.6 oz)   SpO2 97%  Body mass index is 33.93 kg/m².    Physical Exam:  Constitutional: Well-developed and well-nourished. Not diaphoretic. No distress. Lucid and fluent.  Patient, son, physician and staff all wearing masks.  Skin: Skin is warm and dry. No rash noted.  Head: Atraumatic without lesions.  Eyes: Conjunctivae and extraocular motions are normal. Pupils are equal, round, and reactive to light. No scleral icterus.   Ears:  External ears unremarkable.   Neck: Supple, trachea midline. No thyromegaly present. No cervical or supraclavicular lymphadenopathy. No JVD or carotid bruits appreciated  Cardiovascular: Regular rate and rhythm.  Normal S1, S2 without murmur appreciated.  Chest:  Effort normal. Clear to auscultation throughout. No adventitious sounds.   Abdomen: Soft, non tender, and without distention. Active bowel sounds in all four quadrants. No rebound, guarding, masses or hepatosplenomegaly.  Extremities: No cyanosis, clubbing, erythema, nor edema.   Neurological: Alert and oriented x 3. No tremor appreciated.  Psychiatric:  Behavior, mood, and affect are appropriate.       Assessment and Plan:     82 y.o. female with the following issues:    1. Hypothyroidism due to Hashimoto's thyroiditis  TSH WITH REFLEX TO FT4      2. Stage 3a chronic kidney disease (HCC)        3. Aortic atherosclerosis (HCC)        4. Chronic respiratory failure with hypoxia (HCC)        5. Pulmonary hypertension (HCC)        6. Mixed restrictive and obstructive lung disease (HCC)        7. COPD with asthma (HCC)        8. Centrilobular emphysema (HCC)        9. Neuropathy (CMS-HCC)        10. Presence of suprapubic catheter (HCC)        11. Neurogenic bladder        12. Major depressive disorder, recurrent episode, mild (CMS-HCC)        13. Obesity, Class I, BMI 30-34.9  Patient identified as having weight management issue.  Appropriate orders and counseling given.            Followup: Return in about 6 months (around 6/12/2023), or if symptoms worsen or fail to improve.

## 2022-12-22 DIAGNOSIS — E03.4 IDIOPATHIC ATROPHIC HYPOTHYROIDISM: ICD-10-CM

## 2022-12-22 NOTE — TELEPHONE ENCOUNTER
Received request via: Patient    Was the patient seen in the last year in this department? Yes    Does the patient have an active prescription (recently filled or refills available) for medication(s) requested? No    Does the patient have [facility] and need 100 day supply (blood pressure, diabetes and cholesterol meds only)? Patient does not have SCP

## 2022-12-23 RX ORDER — LEVOTHYROXINE SODIUM 0.05 MG/1
50 TABLET ORAL
Qty: 90 TABLET | Refills: 3 | Status: SHIPPED | OUTPATIENT
Start: 2022-12-23 | End: 2023-12-15 | Stop reason: SDUPTHER

## 2023-01-16 ENCOUNTER — PATIENT MESSAGE (OUTPATIENT)
Dept: SLEEP MEDICINE | Facility: MEDICAL CENTER | Age: 83
End: 2023-01-16
Payer: MEDICARE

## 2023-01-16 RX ORDER — METHYLPREDNISOLONE 4 MG/1
TABLET ORAL
Qty: 21 TABLET | Refills: 0 | Status: SHIPPED | OUTPATIENT
Start: 2023-01-16 | End: 2023-09-26

## 2023-01-16 RX ORDER — AZITHROMYCIN 250 MG/1
TABLET, FILM COATED ORAL
Qty: 6 TABLET | Refills: 0 | Status: SHIPPED | OUTPATIENT
Start: 2023-01-16 | End: 2023-06-14

## 2023-01-28 DIAGNOSIS — N95.1 MENOPAUSAL SYMPTOMS: ICD-10-CM

## 2023-01-30 RX ORDER — ESTRADIOL 0.5 MG/1
TABLET ORAL
Qty: 90 TABLET | Refills: 3 | Status: SHIPPED | OUTPATIENT
Start: 2023-01-30 | End: 2023-12-15 | Stop reason: SDUPTHER

## 2023-02-22 ENCOUNTER — OFFICE VISIT (OUTPATIENT)
Dept: SLEEP MEDICINE | Facility: MEDICAL CENTER | Age: 83
End: 2023-02-22
Attending: NURSE PRACTITIONER
Payer: MEDICARE

## 2023-02-22 VITALS
WEIGHT: 165 LBS | RESPIRATION RATE: 16 BRPM | DIASTOLIC BLOOD PRESSURE: 60 MMHG | HEIGHT: 60 IN | HEART RATE: 76 BPM | SYSTOLIC BLOOD PRESSURE: 118 MMHG | OXYGEN SATURATION: 92 % | BODY MASS INDEX: 32.39 KG/M2

## 2023-02-22 DIAGNOSIS — G47.33 OSA (OBSTRUCTIVE SLEEP APNEA): ICD-10-CM

## 2023-02-22 LAB — TSH SERPL DL<=0.005 MIU/L-ACNC: 1.79 UIU/ML (ref 0.45–4.5)

## 2023-02-22 PROCEDURE — 99214 OFFICE O/P EST MOD 30 MIN: CPT | Performed by: NURSE PRACTITIONER

## 2023-02-22 PROCEDURE — 99212 OFFICE O/P EST SF 10 MIN: CPT | Performed by: NURSE PRACTITIONER

## 2023-02-22 RX ORDER — MONTELUKAST SODIUM 10 MG/1
10 TABLET ORAL EVERY EVENING
COMMUNITY
End: 2023-11-29 | Stop reason: SDUPTHER

## 2023-02-22 ASSESSMENT — FIBROSIS 4 INDEX: FIB4 SCORE: 1.72

## 2023-02-22 NOTE — PROGRESS NOTES
Chief Complaint   Patient presents with    Apnea       HPI:  Maddy Hodge is a 82 y.o. year old female here today for follow-up on today on BiPAP.  Last seen by me on 8/24/2022. Currently on  BiPAP ST 19/15 centimeters with backup rate of 14 breaths/min.  Patient also uses 2 L/min supplemental oxygen bleed-in to BiPAP.  She is also followed by pulmonary clinic for COPD/asthma, mixed restrictive obstructive disorder, and chronic respiratory failure with hypoxia.  Other significant past medical history includes mild pulmonary hypertension.  Patient is a lifelong non-smoker.  She uses supplemental oxygen at 2 L/min continuously increases to 3 L/min with ambulation.      Patient is currently using a ResMed AirFit F30 I and denies any difficulty with mask fit or pressures.  Overall she finds that sleep quality is improved.  She generally goes to bed between 9 PM and wakes up around 7:30 AM.  She does note waking up choking on occasion, but she is not waking up with these episodes frequently.    The day compliance reviewed with patient shows 97% use with an average time of 7 hours and 22 minutes and a resultant AHI of 15.6.  There is no evidence of persistent mask leak on current compliance.    SLEEP HISTORY   The PAP titration was initiated with CPAP 6 cm of water. The CPAP was titrated up to 9 cm however majority of the events were crentral apneas therefore he was titrated on BiPAP with ST between 9/4-12/7 cm. The central apneas improved on BiPAP ST 12/7 cm with 12 bpm, the over AHI was 1.6/hr  Ans O2 charlene 90%. ASV was also tried with EPAP min 6 EPAP 10 cm PS 3/15 cm.The best tolerated pressure was BiPAP ST 12/7 cm with back up rate 12 bpm at this final pressure the patient was observed in the supine position but not REM sleep stage. The apnea hypopnea index improved to 1.6 per hour and O2 charlene 90%. The average O2 stauration was 93%. She spent 14 % of sleep time below 89% O2 saturation.     PSG from 6/2017  "indicated an AHI of 5.5 and low oxygenation of 81%.  She completed a titration study for potential ASV therapy given elevated AHI and use of chronic pain medication.  She was then switched from CPAP to BiPAP given her central apneas resolved with BiPAP therapy.  Currently she is being treated with BIPAP ST @ 14/9cm with back up rate 12.      ROS: As per HPI and otherwise negative if not stated.    Past Medical History:   Diagnosis Date    Allergy     seasonal    Anesthesia     \"hard to wake up\"    Anxiety     due to loss of . 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     Arthritis     facet arthritis of lumbar region    Asthma     Inhaler use daily.    Basal cell carcinoma     arm, neck, face    Bowel habit changes     constipation    Carpal tunnel syndrome on left 7/2/2021    Added automatically from request for surgery [id number]    CATARACT     removed bilat    Chickenpox     Chronic constipation     Coagulase-negative staphylococcal infection     Dr. Albrecht, attaches to plastic, prulent    Cubital tunnel syndrome on left 7/2/2021    Added automatically from request for surgery [id number]    Delayed emergence from general anesthesia     Depression     and anxiety    Encounter for long-term (current) use of other medications     Erosive gastritis 5/09    antral    Esophageal dysphagia 5/19/2016    Essential hypertension 7/6/2009    Family history of polycystic kidney disease     GERD (gastroesophageal reflux disease)     [place] measles     High cholesterol     History of vertebral fracture 3/26/2012    Hypertension     Hypothyroidism     Hypothyroidism due to Hashimoto's thyroiditis 11/10/2021    managed with medication Formatting of this note might be different from the original. Formatting of this note might be different from the original. managed with medication  Last Assessment & Plan:  Formatting of this note might be different from the original. Chronic condition managed with current medical " regimen Stable per review  Continue with current meds Followed by Shirlene Quinones M.D..    Indwelling urinary catheter present 11/16/2020    Influenza     Lumbar vertebral fracture (HCC) 5/2011    Mumps     Muscle disorder     Arachnoiditis    Neuropathy     Neuropathy     Obesity, Class I, BMI 30-34.9     ALISSA (obstructive sleep apnea) 11/10/2017    OSTEOPOROSIS     Oxygen dependent     2 liters, [facility]    Pain 07/20/2021    left arm, knees, chronic back, right leg, 6/10    Pain management 5/20/2021    Primary osteoarthritis of left knee 8/13/2020    Formatting of this note might be different from the original. Formatting of this note might be different from the original. Added automatically from request for surgery [id number]    Pulmonary hypertension (HCC)     Recurrent UTI 6/28/2017    Sleep apnea     on BiPap, follows with pulmonology    Spinal stenosis of lumbar region with neurogenic claudication 11/10/2021    Formatting of this note might be different from the original. Last Assessment & Plan:  Formatting of this note might be different from the original. Chronic condition managed with current medical regimen Stable per review  Continue with current meds Followed by specialty    Spinal stenosis, lumbar region, without neurogenic claudication     Status post total bilateral knee replacement 5/24/2022    Suprapubic catheter (HCC)     Tonsillitis        Past Surgical History:   Procedure Laterality Date    PB TOTAL KNEE ARTHROPLASTY Right 9/20/2021    Procedure: ARTHROPLASTY, KNEE, TOTAL;  Surgeon: Kayden Perez M.D.;  Location: SURGERY [facility];  Service: Orthopedics    UT NEUROPLASTY & OR TRANSPOS MEDIAN NRV CARPAL ANDRES Left 8/5/2021    Procedure: RELEASE, CARPAL TUNNEL;  Surgeon: Ian Delgado M.D.;  Location: SURGERY SAME [facility];  Service: Orthopedics    UT NEUROPLASTY & OR TRANSPOS ULNAR NERVE ELBOW Left 8/5/2021    Procedure: RELEASE, CUBITAL TUNNEL.;  Surgeon: Ian Delgado M.D.;   Location: SURGERY SAME DAY [facility];  Service: Orthopedics    PB TOTAL KNEE ARTHROPLASTY Left 2020    Procedure: ARTHROPLASTY, KNEE, TOTAL;  Surgeon: Kayden Perez M.D.;  Location: SURGERY [facility];  Service: Orthopedics    SPINAL CORD STIMULATOR N/A 7/3/2017    Procedure: SPINAL CORD STIMULATOR FOR LEAD REMOVAL;  Surgeon: Amandeep Gonzalez D.O.;  Location: SURGERY [facility];  Service:     GASTROSCOPY WITH BALLOON DILATATION N/A 2016    Procedure: GASTROSCOPY WITH DILATATION;  Surgeon: Tony Monae M.D.;  Location: SURGERY [facility];  Service:     SPINAL CORD STIMULATOR  2014    removed     OTHER SURGICAL PROCEDURE Bilateral     plantar fascitis surgery    EGD WITH ASP/BX  09    erosive gastritis    HYSTERECTOMY, TOTAL ABDOMINAL      APPENDECTOMY      CATARACT EXTRACTION WITH IOL Bilateral     COLONOSCOPY  ,09    normal    LUMBAR LAMINECTOMY DISKECTOMY      TONSILLECTOMY         Family History   Problem Relation Age of Onset    Genitourinary () Problems Brother     Lung Disease Mother         COPD    Heart Disease Mother     Genetic Disorder Father         Parkinsons/ from complications    Hypertension Father     Cancer Maternal Aunt         Breast cancer    Stroke Paternal Grandmother     Cancer Maternal Aunt         Breast cancer       Allergies as of 2023 - Reviewed 2023   Allergen Reaction Noted    Other drug  2015    Penicillins Hives 2017    Sulfa drugs Hives 2017    Macrobid [nitrofurantoin] Rash 2017    Tape Rash and Itching 2017        Vitals:  /60 (BP Location: Left arm, Patient Position: Sitting, BP Cuff Size: Large adult)   Pulse 76   Resp 16   Ht 1.524 m (5')   Wt 74.8 kg (165 lb)   SpO2 92%     Current medications as of today   Current Outpatient Medications   Medication Sig Dispense Refill    montelukast (SINGULAIR) 10 MG Tab Take 10 mg by mouth.      estradiol  (ESTRACE) 0.5 MG tablet TAKE 1 TABLET EVERY DAY 90 Tablet 3    levothyroxine (SYNTHROID) 50 MCG Tab TAKE 1 TABLET BY MOUTH EVERY MORNING ON AN EMPTY STOMACH. 90 Tablet 3    imipramine (TOFRANIL) 10 MG Tab TAKE 2 TABLETS BY MOUTH EVERY EVENING. 180 Tablet 2    potassium chloride (KLOR-CON) 8 MEQ tablet TAKE 2 TABLETS EVERY  Tablet 3    Fluticasone Propionate, Inhal, (FLOVENT DISKUS) 100 MCG/BLIST AEROSOL POWDER, BREATH ACTIVATED Inhale 1 Puff 2 times a day. Rinse mouth after each nuse. 60 Each 6    montelukast (SINGULAIR) 10 MG Tab Take 1 Tablet by mouth every evening. 30 Tablet 11    albuterol 108 (90 Base) MCG/ACT Aero Soln inhalation aerosol Inhale 2 Puffs every four hours as needed for Shortness of Breath. 3 Each 3    atorvastatin (LIPITOR) 20 MG Tab TAKE 1 TABLET BY MOUTH EVERY DAY 90 Tablet 3    omeprazole (PRILOSEC) 20 MG delayed-release capsule Take 1 Capsule by mouth every day. 90 Capsule 3    D-MANNOSE PO Take 1,300 mg by mouth.      spironolactone (ALDACTONE) 25 MG Tab Take 1 Tablet by mouth every day.      ASPIRIN LOW DOSE 81 MG EC tablet TAKE 1 TABLET BY MOUTH TWICE A DAY 60 Tablet 2    oxyCODONE-acetaminophen (PERCOCET) 5-325 MG Tab Take 1 Tablet by mouth 4 times a day.      Multiple Vitamins-Minerals (VITEYES AREDS ADVANCED PO) Take 1 Capsule by mouth every day.      bumetanide (BUMEX) 2 MG tablet Take 1 Tablet by mouth at bedtime. Indications: Edema, High Blood Pressure Disorder      pregabalin (LYRICA) 200 MG capsule Take 1 Capsule by mouth in the morning, at noon, and at bedtime. Pt takes @ 0600, 1200, and 2100      LETAIRIS 10 MG tablet Take 10 Tablets by mouth at bedtime. Indications: Pulmonary Arterial Hypertension      AMITIZA 24 MCG capsule Take 2 Capsules by mouth every morning with breakfast. Indications: Chronic Constipation of Unknown Cause      XTAMPZA ER 18 MG Capsule Extended Release 12 hour Abuse-Deterrent Take 18 mg by mouth 2 times a day. Indications: Chronic Pain       azithromycin (ZITHROMAX) 250 MG Tab Take 2 tablets on day 1, then take 1 tablet a day for 4 days. (Patient not taking: Reported on 2/22/2023) 6 Tablet 0    methylPREDNISolone (MEDROL DOSEPAK) 4 MG Tablet Therapy Pack Take as directed. (Patient not taking: Reported on 2/22/2023) 21 Tablet 0    Mometasone Furoate (ASMANEX HFA) 50 MCG/ACT Aerosol Inhale 1 Inhalation 2 times a day. (Patient not taking: Reported on 2/22/2023) 2 Each 0    escitalopram (LEXAPRO) 20 MG tablet TAKE 1 TABLET EVERY DAY (Patient not taking: Reported on 2/22/2023) 90 Tablet 3    Cholecalciferol (D3) 2000 UNIT Tab Take 1 Tablet by mouth every day. (Patient not taking: Reported on 2/22/2023)      Cyanocobalamin (VITAMIN B-12 PO) Take 2,500 mcg by mouth every day. (Patient not taking: Reported on 2/22/2023)      docusate sodium (COLACE) 250 MG capsule Take 2 Capsules by mouth every evening. Indications: Constipation (Patient not taking: Reported on 2/22/2023)       No current facility-administered medications for this visit.         Physical Exam:   Gen:           Alert and oriented, No apparent distress. Mood and affect appropriate, normal interaction with examiner.  Eyes:          PERRL, EOM intact, sclere white, conjunctive moist.  Ears:          Not examined.   Hearing:     Grossly intact.  Nose:          Normal, no lesions or deformities.  Dentition:    Good dentition.  Oropharynx:   Tongue normal, posterior pharynx without erythema or exudate  Neck:        Supple, trachea midline, no masses.  Respiratory Effort: No intercostal retractions or use of accessory muscles.   Lung Auscultation:      Clear to auscultation bilaterally; no rales, rhonchi or wheezing.  CV:            Regular rate and rhythm. No murmurs, rubs or gallops.  Abd:           Not examined.   Lymphadenopathy: Not examined.  Gait and Station: Normal.  Digits and Nails: No clubbing, cyanosis, petechiae, or nodes.   Cranial Nerves: II-XII grossly intact.  Skin:        No rashes,  lesions or ulcers noted.               Ext:           No cyanosis or edema.      Assessment:  1. ALISSA (obstructive sleep apnea)  DME Other          Plan:  Reviewed compliance on BiPAP ST.  Patient has had persistent elevated AHI averaging around 15/h.  Patient does have a history of central sleep apnea and is currently on BiPAP ST at 19/15 with 14 bpm backup breathing rate.  I did increase pressures to 21/7 but kept respiratory rate the same at 14 bpm.  Initially I requested titration study, but patient and family member refused as they will not be able to complete.  It appears the AHI has been averaging around 15 for quite some time and patient has continuously been recommended to do a titration study but refused.  Patient and family member advised that we are inadequately treating sleep apnea due to elevated AHI.  Advised patient to use at new settings, but patient will follow-up in approximately 6 months with sleep MD for further recommendations.  Patient previously had difficulty with mask leak, but now mask leak is controlled, however AHI remains elevated at 15.6/h.Therefore, adjustments were made.  Patient's device was programmed to current settings of 21/17 cm/H2O with BPR of 14 bpm.    Please note that this dictation was created using voice recognition software. I have made every reasonable attempt to correct obvious errors, but it is possible there are errors of grammar and possibly content that I did not discover before finalizing the note.

## 2023-03-05 DIAGNOSIS — K21.00 GASTROESOPHAGEAL REFLUX DISEASE WITH ESOPHAGITIS WITHOUT HEMORRHAGE: ICD-10-CM

## 2023-03-06 RX ORDER — OMEPRAZOLE 20 MG/1
CAPSULE, DELAYED RELEASE ORAL
Qty: 90 CAPSULE | Refills: 3 | Status: SHIPPED | OUTPATIENT
Start: 2023-03-06 | End: 2024-02-29

## 2023-04-01 DIAGNOSIS — E78.5 DYSLIPIDEMIA, GOAL LDL BELOW 130: ICD-10-CM

## 2023-04-03 RX ORDER — ATORVASTATIN CALCIUM 20 MG/1
TABLET, FILM COATED ORAL
Qty: 90 TABLET | Refills: 3 | Status: SHIPPED | OUTPATIENT
Start: 2023-04-03

## 2023-04-12 DIAGNOSIS — J43.9 MIXED RESTRICTIVE AND OBSTRUCTIVE LUNG DISEASE (HCC): ICD-10-CM

## 2023-04-12 DIAGNOSIS — J98.4 MIXED RESTRICTIVE AND OBSTRUCTIVE LUNG DISEASE (HCC): ICD-10-CM

## 2023-04-12 RX ORDER — MONTELUKAST SODIUM 10 MG/1
TABLET ORAL
Qty: 90 TABLET | Refills: 3 | Status: SHIPPED | OUTPATIENT
Start: 2023-04-12 | End: 2023-05-17

## 2023-04-12 NOTE — TELEPHONE ENCOUNTER
Caller Name: Maddy Hodge                 Call Back Number: [phone] (home)         Patient approves a detailed voicemail message: N\A    Have we ever prescribed this med? Yes.  If yes, what date? 6/23/22     Last OV: 2/22/23 jocelyn Ozuna     Next OV: 5/17/23 Dr. Kam     DX: Asthma     Medications:  Current Outpatient Medications   Medication Sig Dispense Refill    atorvastatin (LIPITOR) 20 MG Tab TAKE 1 TABLET EVERY EVENING 90 Tablet 3    omeprazole (PRILOSEC) 20 MG delayed-release capsule TAKE 1 CAPSULE EVERY DAY 90 Capsule 3    montelukast (SINGULAIR) 10 MG Tab Take 10 mg by mouth.      estradiol (ESTRACE) 0.5 MG tablet TAKE 1 TABLET EVERY DAY 90 Tablet 3    azithromycin (ZITHROMAX) 250 MG Tab Take 2 tablets on day 1, then take 1 tablet a day for 4 days. (Patient not taking: Reported on 2/22/2023) 6 Tablet 0    methylPREDNISolone (MEDROL DOSEPAK) 4 MG Tablet Therapy Pack Take as directed. (Patient not taking: Reported on 2/22/2023) 21 Tablet 0    levothyroxine (SYNTHROID) 50 MCG Tab TAKE 1 TABLET BY MOUTH EVERY MORNING ON AN EMPTY STOMACH. 90 Tablet 3    imipramine (TOFRANIL) 10 MG Tab TAKE 2 TABLETS BY MOUTH EVERY EVENING. 180 Tablet 2    potassium chloride (KLOR-CON) 8 MEQ tablet TAKE 2 TABLETS EVERY  Tablet 3    Mometasone Furoate (ASMANEX HFA) 50 MCG/ACT Aerosol Inhale 1 Inhalation 2 times a day. (Patient not taking: Reported on 2/22/2023) 2 Each 0    Fluticasone Propionate, Inhal, (FLOVENT DISKUS) 100 MCG/BLIST AEROSOL POWDER, BREATH ACTIVATED Inhale 1 Puff 2 times a day. Rinse mouth after each nuse. 60 Each 6    escitalopram (LEXAPRO) 20 MG tablet TAKE 1 TABLET EVERY DAY (Patient not taking: Reported on 2/22/2023) 90 Tablet 3    montelukast (SINGULAIR) 10 MG Tab Take 1 Tablet by mouth every evening. 30 Tablet 11    albuterol 108 (90 Base) MCG/ACT Aero Soln inhalation aerosol Inhale 2 Puffs every four hours as needed for Shortness of Breath. 3 Each 3    D-MANNOSE PO Take 1,300 mg by  mouth.      spironolactone (ALDACTONE) 25 MG Tab Take 1 Tablet by mouth every day.      ASPIRIN LOW DOSE 81 MG EC tablet TAKE 1 TABLET BY MOUTH TWICE A DAY 60 Tablet 2    oxyCODONE-acetaminophen (PERCOCET) 5-325 MG Tab Take 1 Tablet by mouth 4 times a day.      Multiple Vitamins-Minerals (VITEYES AREDS ADVANCED PO) Take 1 Capsule by mouth every day.      Cholecalciferol (D3) 2000 UNIT Tab Take 1 Tablet by mouth every day. (Patient not taking: Reported on 2/22/2023)      Cyanocobalamin (VITAMIN B-12 PO) Take 2,500 mcg by mouth every day. (Patient not taking: Reported on 2/22/2023)      docusate sodium (COLACE) 250 MG capsule Take 2 Capsules by mouth every evening. Indications: Constipation (Patient not taking: Reported on 2/22/2023)      bumetanide (BUMEX) 2 MG tablet Take 1 Tablet by mouth at bedtime. Indications: Edema, High Blood Pressure Disorder      pregabalin (LYRICA) 200 MG capsule Take 1 Capsule by mouth in the morning, at noon, and at bedtime. Pt takes @ 0600, 1200, and 2100      LETAIRIS 10 MG tablet Take 10 Tablets by mouth at bedtime. Indications: Pulmonary Arterial Hypertension      AMITIZA 24 MCG capsule Take 2 Capsules by mouth every morning with breakfast. Indications: Chronic Constipation of Unknown Cause      XTAMPZA ER 18 MG Capsule Extended Release 12 hour Abuse-Deterrent Take 18 mg by mouth 2 times a day. Indications: Chronic Pain       No current facility-administered medications for this visit.

## 2023-05-17 ENCOUNTER — OFFICE VISIT (OUTPATIENT)
Dept: SLEEP MEDICINE | Facility: MEDICAL CENTER | Age: 83
End: 2023-05-17
Attending: INTERNAL MEDICINE
Payer: MEDICARE

## 2023-05-17 VITALS
HEIGHT: 60 IN | DIASTOLIC BLOOD PRESSURE: 60 MMHG | HEART RATE: 86 BPM | WEIGHT: 170 LBS | BODY MASS INDEX: 33.38 KG/M2 | OXYGEN SATURATION: 93 % | SYSTOLIC BLOOD PRESSURE: 112 MMHG | RESPIRATION RATE: 16 BRPM

## 2023-05-17 DIAGNOSIS — J98.4 MIXED RESTRICTIVE AND OBSTRUCTIVE LUNG DISEASE (HCC): ICD-10-CM

## 2023-05-17 DIAGNOSIS — J43.9 MIXED RESTRICTIVE AND OBSTRUCTIVE LUNG DISEASE (HCC): ICD-10-CM

## 2023-05-17 DIAGNOSIS — G47.33 OSA (OBSTRUCTIVE SLEEP APNEA): ICD-10-CM

## 2023-05-17 DIAGNOSIS — J96.11 CHRONIC RESPIRATORY FAILURE WITH HYPOXIA (HCC): ICD-10-CM

## 2023-05-17 DIAGNOSIS — I27.20 PULMONARY HYPERTENSION (HCC): ICD-10-CM

## 2023-05-17 PROCEDURE — 3074F SYST BP LT 130 MM HG: CPT | Performed by: INTERNAL MEDICINE

## 2023-05-17 PROCEDURE — 99214 OFFICE O/P EST MOD 30 MIN: CPT | Performed by: INTERNAL MEDICINE

## 2023-05-17 PROCEDURE — 99212 OFFICE O/P EST SF 10 MIN: CPT | Performed by: INTERNAL MEDICINE

## 2023-05-17 PROCEDURE — 3078F DIAST BP <80 MM HG: CPT | Performed by: INTERNAL MEDICINE

## 2023-05-17 ASSESSMENT — ENCOUNTER SYMPTOMS
MYALGIAS: 0
SORE THROAT: 0
HEMOPTYSIS: 0
FOCAL WEAKNESS: 0
CLAUDICATION: 0
EYE PAIN: 0
FEVER: 0
DOUBLE VISION: 0
VOMITING: 0
SPEECH CHANGE: 0
DIAPHORESIS: 0
SINUS PAIN: 0
HEARTBURN: 0
DIARRHEA: 0
BLURRED VISION: 0
PHOTOPHOBIA: 0
STRIDOR: 0
WEAKNESS: 0
NECK PAIN: 0
PND: 0
EYE REDNESS: 0
DIZZINESS: 0
PALPITATIONS: 0
CHILLS: 0
SHORTNESS OF BREATH: 0
WEIGHT LOSS: 0
NAUSEA: 0
WHEEZING: 0
EYE DISCHARGE: 0
DEPRESSION: 0
HEADACHES: 0
TREMORS: 0
COUGH: 0
ORTHOPNEA: 0
BACK PAIN: 0
FALLS: 0
SPUTUM PRODUCTION: 0
CONSTIPATION: 0
ABDOMINAL PAIN: 0

## 2023-05-17 ASSESSMENT — FIBROSIS 4 INDEX: FIB4 SCORE: 1.72

## 2023-05-17 NOTE — PROGRESS NOTES
"Chief Complaint   Patient presents with    Follow-Up     Last seen 11/17/22          HPI: This patient is a 82 y.o. female whom is followed in our clinic for reactive airway disease c/b chronic hypoxemic respiratory failure last seen by me on 11/17/22.  She is followed by sleep medicine for ALISSA On Bipap. She is also followed by Dr. Cook with cardiology for PH on Letairis and diuretics. She is a life long non-smoker. She is current on flovent 100, previously on asmanex, changed due to insurance coverage. sHe did use medrol dose yumiko and azithromycin in January for mild URI/exacerbation but sxs are otherwise well controlled on flovent and prn CHIQUIS which she uses <2x/month. She is fairly inactive but son who cares for her works to get her out. She is compliant with BIPAP and sees cardiology in July. Stable LE edema. O2 needs are stable at 2LPM at rest and up to 3lPM with activity. No acute concerns today. Last PFTs from 3/2021 showed ratio of 67, FEV1 of 1.07L or 62% pred, TLC of 76% pred and DLCO of 72% c/w mixed restrictive obstructive pattern. She has declined f/u given stable clinically. Last CXR from 10/13/21 was clear other than mild bibasilar atelectasis. Last TTE from 1/2021 showed RVSP of 40 mmHg and normal RV sz and fx.     Past Medical History:   Diagnosis Date    Allergy     seasonal    Anesthesia     \"hard to wake up\"    Anxiety     due to loss of . 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     Arthritis     facet arthritis of lumbar region    Asthma     Inhaler use daily.    Basal cell carcinoma     arm, neck, face    Bowel habit changes     constipation    Carpal tunnel syndrome on left 7/2/2021    Added automatically from request for surgery [id number]    CATARACT     removed bilat    Chickenpox     Chronic constipation     Coagulase-negative staphylococcal infection     Dr. Albrecht, attaches to plastic, prulent    Cubital tunnel syndrome on left 7/2/2021    Added automatically " from request for surgery [id number]    Delayed emergence from general anesthesia     Depression     and anxiety    Encounter for long-term (current) use of other medications     Erosive gastritis 5/09    antral    Esophageal dysphagia 5/19/2016    Essential hypertension 7/6/2009    Family history of polycystic kidney disease     GERD (gastroesophageal reflux disease)     Kinyarwanda measles     High cholesterol     History of vertebral fracture 3/26/2012    Hypertension     Hypothyroidism     Hypothyroidism due to Hashimoto's thyroiditis 11/10/2021    managed with medication Formatting of this note might be different from the original. Formatting of this note might be different from the original. managed with medication  Last Assessment & Plan:  Formatting of this note might be different from the original. Chronic condition managed with current medical regimen Stable per review  Continue with current meds Followed by Shirlene Quinones M.D..    Indwelling urinary catheter present 11/16/2020    Influenza     Lumbar vertebral fracture (HCC) 5/2011    Mumps     Muscle disorder     Arachnoiditis    Neuropathy     Neuropathy     Obesity, Class I, BMI 30-34.9     ALISSA (obstructive sleep apnea) 11/10/2017    OSTEOPOROSIS     Oxygen dependent     2 liters, [facility]    Pain 07/20/2021    left arm, knees, chronic back, right leg, 6/10    Pain management 5/20/2021    Primary osteoarthritis of left knee 8/13/2020    Formatting of this note might be different from the original. Formatting of this note might be different from the original. Added automatically from request for surgery [id number]    Pulmonary hypertension (HCC)     Recurrent UTI 6/28/2017    Sleep apnea     on BiPap, follows with pulmonology    Spinal stenosis of lumbar region with neurogenic claudication 11/10/2021    Formatting of this note might be different from the original. Last Assessment & Plan:  Formatting of this note might be different from the original.  Chronic condition managed with current medical regimen Stable per review  Continue with current meds Followed by specialty    Spinal stenosis, lumbar region, without neurogenic claudication     Status post total bilateral knee replacement 2022    Suprapubic catheter (HCC)     Tonsillitis        Social History     Socioeconomic History    Marital status:      Spouse name: Not on file    Number of children: Not on file    Years of education: Not on file    Highest education level: Not on file   Occupational History    Not on file   Tobacco Use    Smoking status: Never     Passive exposure: Past    Smokeless tobacco: Never   Vaping Use    Vaping Use: Never used   Substance and Sexual Activity    Alcohol use: Not Currently     Alcohol/week: 0.0 oz    Drug use: Not Currently    Sexual activity: Not Currently     Partners: Male     Birth control/protection: Abstinence   Other Topics Concern     Service Not Asked    Blood Transfusions Not Asked    Caffeine Concern Not Asked    Occupational Exposure Not Asked    Hobby Hazards Not Asked    Sleep Concern Not Asked    Stress Concern No     Comment:  cancer    Weight Concern Not Asked    Special Diet Not Asked    Back Care Not Asked    Exercise Not Asked    Bike Helmet Not Asked    Seat Belt Not Asked    Self-Exams Not Asked   Social History Narrative    Not on file     Social Determinants of Health     Financial Resource Strain: Not on file   Food Insecurity: Not on file   Transportation Needs: Not on file   Physical Activity: Not on file   Stress: Not on file   Social Connections: Not on file   Intimate Partner Violence: Not on file   Housing Stability: Not on file       Family History   Problem Relation Age of Onset    Genitourinary () Problems Brother     Lung Disease Mother         COPD    Heart Disease Mother     Genetic Disorder Father         Parkinsons/ from complications    Hypertension Father     Cancer Maternal Aunt          Breast cancer    Stroke Paternal Grandmother     Cancer Maternal Aunt         Breast cancer       Current Outpatient Medications on File Prior to Visit   Medication Sig Dispense Refill    atorvastatin (LIPITOR) 20 MG Tab TAKE 1 TABLET EVERY EVENING 90 Tablet 3    omeprazole (PRILOSEC) 20 MG delayed-release capsule TAKE 1 CAPSULE EVERY DAY 90 Capsule 3    montelukast (SINGULAIR) 10 MG Tab Take 10 mg by mouth.      estradiol (ESTRACE) 0.5 MG tablet TAKE 1 TABLET EVERY DAY 90 Tablet 3    levothyroxine (SYNTHROID) 50 MCG Tab TAKE 1 TABLET BY MOUTH EVERY MORNING ON AN EMPTY STOMACH. 90 Tablet 3    imipramine (TOFRANIL) 10 MG Tab TAKE 2 TABLETS BY MOUTH EVERY EVENING. 180 Tablet 2    potassium chloride (KLOR-CON) 8 MEQ tablet TAKE 2 TABLETS EVERY  Tablet 3    Fluticasone Propionate, Inhal, (FLOVENT DISKUS) 100 MCG/BLIST AEROSOL POWDER, BREATH ACTIVATED Inhale 1 Puff 2 times a day. Rinse mouth after each nuse. 60 Each 6    escitalopram (LEXAPRO) 20 MG tablet TAKE 1 TABLET EVERY DAY 90 Tablet 3    albuterol 108 (90 Base) MCG/ACT Aero Soln inhalation aerosol Inhale 2 Puffs every four hours as needed for Shortness of Breath. 3 Each 3    D-MANNOSE PO Take 1,300 mg by mouth.      spironolactone (ALDACTONE) 25 MG Tab Take 1 Tablet by mouth every day.      ASPIRIN LOW DOSE 81 MG EC tablet TAKE 1 TABLET BY MOUTH TWICE A DAY 60 Tablet 2    oxyCODONE-acetaminophen (PERCOCET) 5-325 MG Tab Take 1 Tablet by mouth 4 times a day.      Multiple Vitamins-Minerals (VITEYES AREDS ADVANCED PO) Take 1 Capsule by mouth every day.      Cholecalciferol (D3) 2000 UNIT Tab Take 2,000 Units by mouth every day.      Cyanocobalamin (VITAMIN B-12 PO) Take 2,500 mcg by mouth every day.      docusate sodium (COLACE) 250 MG capsule Take 500 mg by mouth every evening. Indications: Constipation      bumetanide (BUMEX) 2 MG tablet Take 1 Tablet by mouth at bedtime. Indications: Edema, High Blood Pressure Disorder      pregabalin (LYRICA) 200 MG  capsule Take 1 Capsule by mouth in the morning, at noon, and at bedtime. Pt takes @ 0600, 1200, and 2100      LETAIRIS 10 MG tablet Take 10 Tablets by mouth at bedtime. Indications: Pulmonary Arterial Hypertension      AMITIZA 24 MCG capsule Take 2 Capsules by mouth every morning with breakfast. Indications: Chronic Constipation of Unknown Cause      XTAMPZA ER 18 MG Capsule Extended Release 12 hour Abuse-Deterrent Take 18 mg by mouth 2 times a day. Indications: Chronic Pain      azithromycin (ZITHROMAX) 250 MG Tab Take 2 tablets on day 1, then take 1 tablet a day for 4 days. (Patient not taking: Reported on 2/22/2023) 6 Tablet 0    methylPREDNISolone (MEDROL DOSEPAK) 4 MG Tablet Therapy Pack Take as directed. (Patient not taking: Reported on 2/22/2023) 21 Tablet 0     No current facility-administered medications on file prior to visit.       Other drug, Penicillins, Sulfa drugs, Macrobid [nitrofurantoin], and Tape      ROS:   Review of Systems   Constitutional:  Negative for chills, diaphoresis, fever, malaise/fatigue and weight loss.   HENT:  Negative for congestion, ear discharge, ear pain, hearing loss, nosebleeds, sinus pain, sore throat and tinnitus.    Eyes:  Negative for blurred vision, double vision, photophobia, pain, discharge and redness.   Respiratory:  Negative for cough, hemoptysis, sputum production, shortness of breath, wheezing and stridor.    Cardiovascular:  Negative for chest pain, palpitations, orthopnea, claudication, leg swelling and PND.   Gastrointestinal:  Negative for abdominal pain, constipation, diarrhea, heartburn, nausea and vomiting.   Genitourinary:  Negative for dysuria and urgency.   Musculoskeletal:  Negative for back pain, falls, joint pain, myalgias and neck pain.   Skin:  Negative for itching and rash.   Neurological:  Negative for dizziness, tremors, speech change, focal weakness, weakness and headaches.   Endo/Heme/Allergies:  Negative for environmental allergies.    Psychiatric/Behavioral:  Negative for depression.        /60 (BP Location: Right arm, Patient Position: Sitting, BP Cuff Size: Adult)   Pulse 86   Resp 16   Ht 1.524 m (5')   Wt 77.1 kg (170 lb)   SpO2 93%   Physical Exam  Constitutional:       General: She is not in acute distress.     Appearance: Normal appearance. She is well-developed and normal weight.   HENT:      Head: Normocephalic and atraumatic.      Right Ear: External ear normal.      Left Ear: External ear normal.      Nose: Nose normal. No congestion.      Mouth/Throat:      Mouth: Mucous membranes are moist.      Pharynx: Oropharynx is clear. No oropharyngeal exudate.   Eyes:      General: No scleral icterus.     Extraocular Movements: Extraocular movements intact.      Conjunctiva/sclera: Conjunctivae normal.      Pupils: Pupils are equal, round, and reactive to light.   Neck:      Vascular: No JVD.      Trachea: No tracheal deviation.   Cardiovascular:      Rate and Rhythm: Normal rate and regular rhythm.      Heart sounds: Normal heart sounds. No murmur heard.     No friction rub. No gallop.   Pulmonary:      Effort: Pulmonary effort is normal. No accessory muscle usage or respiratory distress.      Breath sounds: Normal breath sounds. No wheezing or rales.   Abdominal:      General: There is no distension.      Palpations: Abdomen is soft.      Tenderness: There is no abdominal tenderness.   Musculoskeletal:         General: No tenderness or deformity. Normal range of motion.      Cervical back: Normal range of motion and neck supple.      Right lower leg: No edema.      Left lower leg: No edema.   Lymphadenopathy:      Cervical: No cervical adenopathy.   Skin:     General: Skin is warm and dry.      Findings: No rash.      Nails: There is no clubbing.   Neurological:      Mental Status: She is alert and oriented to person, place, and time.      Cranial Nerves: No cranial nerve deficit.      Gait: Gait normal.   Psychiatric:          Behavior: Behavior normal.         PFTs as reviewed by me personally: as per hPI    Imaging as reviewed by me personally:  as per hPI    Assessment:  1. Mixed restrictive and obstructive lung disease (HCC)        2. Chronic respiratory failure with hypoxia (HCC)        3. Pulmonary hypertension (HCC)        4. ALISSA (obstructive sleep apnea)            Plan:  Chronic. Stable. Likely rAD in the setting of mild restriction. Continue flovent and prn CHIQUIS. Opted to monitor clinically. Repeat PFT only if sxs changes  Chronic, stable. Pt is compliant with and benefiting from O2 at 2-3LPM.   Mild and well compensated. Presumed secondary? Followed by cardiology  Compliant with and benefiting from BIPAP; followed by sleep medicine  Return in about 6 months (around 11/17/2023) for respiratory .

## 2023-06-06 ENCOUNTER — TELEPHONE (OUTPATIENT)
Dept: SLEEP MEDICINE | Facility: MEDICAL CENTER | Age: 83
End: 2023-06-06
Payer: MEDICARE

## 2023-06-06 DIAGNOSIS — J43.9 MIXED RESTRICTIVE AND OBSTRUCTIVE LUNG DISEASE (HCC): ICD-10-CM

## 2023-06-06 DIAGNOSIS — J98.4 MIXED RESTRICTIVE AND OBSTRUCTIVE LUNG DISEASE (HCC): ICD-10-CM

## 2023-06-06 RX ORDER — FLUTICASONE PROPIONATE 100 UG/1
1 POWDER, METERED RESPIRATORY (INHALATION) 2 TIMES DAILY
Qty: 60 EACH | Refills: 11 | Status: SHIPPED | OUTPATIENT
Start: 2023-06-06 | End: 2023-06-06

## 2023-06-06 RX ORDER — FLUTICASONE PROPIONATE 100 UG/1
1 POWDER, METERED RESPIRATORY (INHALATION) 2 TIMES DAILY
Qty: 60 EACH | Refills: 11 | Status: SHIPPED | OUTPATIENT
Start: 2023-06-06

## 2023-06-06 NOTE — TELEPHONE ENCOUNTER
Caller Name: Maddy Hodge                 Call Back Number: [phone] (home)         Patient approves a detailed voicemail message: N\A    Have we ever prescribed this med? No.  If yes, what date?     Last OV: 5/17/23 with Dr. Kam     Next OV:     DX: 8/26/23 with Dr. Juarez     Medications: Flovent Diskus 100mcg/ Actuation Powder For Inhalation       If able please send this prescription to [facility].

## 2023-06-14 ENCOUNTER — OFFICE VISIT (OUTPATIENT)
Dept: MEDICAL GROUP | Facility: MEDICAL CENTER | Age: 83
End: 2023-06-14
Payer: MEDICARE

## 2023-06-14 VITALS
TEMPERATURE: 98 F | OXYGEN SATURATION: 93 % | HEART RATE: 87 BPM | RESPIRATION RATE: 14 BRPM | SYSTOLIC BLOOD PRESSURE: 122 MMHG | BODY MASS INDEX: 34.37 KG/M2 | HEIGHT: 60 IN | WEIGHT: 175.04 LBS | DIASTOLIC BLOOD PRESSURE: 60 MMHG

## 2023-06-14 DIAGNOSIS — E03.8 HYPOTHYROIDISM DUE TO HASHIMOTO'S THYROIDITIS: ICD-10-CM

## 2023-06-14 DIAGNOSIS — E78.5 DYSLIPIDEMIA, GOAL LDL BELOW 130: ICD-10-CM

## 2023-06-14 DIAGNOSIS — F33.0 MAJOR DEPRESSIVE DISORDER, RECURRENT EPISODE, MILD (HCC): ICD-10-CM

## 2023-06-14 DIAGNOSIS — Z91.81 AT RISK FOR FALLING: ICD-10-CM

## 2023-06-14 DIAGNOSIS — E06.3 HYPOTHYROIDISM DUE TO HASHIMOTO'S THYROIDITIS: ICD-10-CM

## 2023-06-14 DIAGNOSIS — N18.31 STAGE 3A CHRONIC KIDNEY DISEASE: ICD-10-CM

## 2023-06-14 DIAGNOSIS — E66.9 OBESITY, CLASS I, BMI 30-34.9: ICD-10-CM

## 2023-06-14 DIAGNOSIS — N31.9 NEUROGENIC BLADDER: ICD-10-CM

## 2023-06-14 DIAGNOSIS — Z93.59 PRESENCE OF SUPRAPUBIC CATHETER (HCC): ICD-10-CM

## 2023-06-14 DIAGNOSIS — I70.0 AORTIC ATHEROSCLEROSIS (HCC): ICD-10-CM

## 2023-06-14 PROCEDURE — 99214 OFFICE O/P EST MOD 30 MIN: CPT | Performed by: FAMILY MEDICINE

## 2023-06-14 PROCEDURE — 3074F SYST BP LT 130 MM HG: CPT | Performed by: FAMILY MEDICINE

## 2023-06-14 PROCEDURE — 3078F DIAST BP <80 MM HG: CPT | Performed by: FAMILY MEDICINE

## 2023-06-14 ASSESSMENT — PATIENT HEALTH QUESTIONNAIRE - PHQ9
9. THOUGHTS THAT YOU WOULD BE BETTER OFF DEAD, OR OF HURTING YOURSELF: NOT AT ALL
4. FEELING TIRED OR HAVING LITTLE ENERGY: NOT AT ALL
5. POOR APPETITE OR OVEREATING: NOT AT ALL
7. TROUBLE CONCENTRATING ON THINGS, SUCH AS READING THE NEWSPAPER OR WATCHING TELEVISION: NOT AT ALL
1. LITTLE INTEREST OR PLEASURE IN DOING THINGS: NOT AT ALL
SUM OF ALL RESPONSES TO PHQ9 QUESTIONS 1 AND 2: 0
6. FEELING BAD ABOUT YOURSELF - OR THAT YOU ARE A FAILURE OR HAVE LET YOURSELF OR YOUR FAMILY DOWN: NOT AL ALL
3. TROUBLE FALLING OR STAYING ASLEEP OR SLEEPING TOO MUCH: NOT AT ALL
8. MOVING OR SPEAKING SO SLOWLY THAT OTHER PEOPLE COULD HAVE NOTICED. OR THE OPPOSITE, BEING SO FIGETY OR RESTLESS THAT YOU HAVE BEEN MOVING AROUND A LOT MORE THAN USUAL: NOT AT ALL
2. FEELING DOWN, DEPRESSED, IRRITABLE, OR HOPELESS: NOT AT ALL
SUM OF ALL RESPONSES TO PHQ QUESTIONS 1-9: 0

## 2023-06-14 ASSESSMENT — FIBROSIS 4 INDEX: FIB4 SCORE: 1.72

## 2023-06-14 NOTE — PROGRESS NOTES
Chief Complaint   Patient presents with    Hypothyroidism     6m fv       Subjective:     HPI:   Maddy Hodge presents today with the followin. Hypothyroidism due to Hashimoto's thyroiditis  Patient reports good energy level on the medication. Patient denies insomnia, tremor or change in appetite.  Patient is taking the medication on an empty stomach in the morning and waiting at least 30 minutes before eating.  Last TSH in February of this year was at target.  Recheck TSH in the fall.  Lab order discussed and placed.    2. Aortic atherosclerosis ([facility])  Patient does have aortic atherosclerosis without aneurysm.    3. Major depressive disorder, recurrent episode, mild (CMS-HCC)  Patient has longstanding depression.  Has excellent support from her family, particularly her son.  She continues her regimen of low-dose imipramine and 20 mg Lexapro.  She has been very stable.    4. Presence of suprapubic catheter ([facility])/Neurogenic bladder  Patient has neurogenic bladder and has had a suprapubic catheter for a number of years now.  She is doing very well with this.    5. Stage 3a chronic kidney disease ([facility])  Patient's most recent calculated EGFR is 57.  This is minimally reduced.  She has had very stable mild to moderate chronic kidney disease.  Follow-up lab order discussed and placed.    6. Obesity, Class I, BMI 30-34.9  Weight has gone up a few pounds.  She has not been watching her diet as closely.  She will emphasize more fruits and vegetables.  - Patient identified as having weight management issue.  Appropriate orders and counseling given.    7. At risk for falling  She is definitely unsteady and at risk for falls.  She is using 4 wheeled walker at home, wheelchair for out of the house or holding someone's arm.  She is here in a wheelchair today pushed by her son.  - Patient identified as fall risk.  Appropriate orders and counseling given.    8. Dyslipidemia, goal LDL below 130  Patient denies chest  pain, chest pressure, palpitations or exertional shortness of breath. Patient denies muscle aches or muscle weakness from the atorvastatin medication. Patient is a never smoker. Patient takes 81 mg aspirin daily. Patient has no history of myocardial infarction, stroke or PVD.  .follow-up lab orders discussed and placed.  - Comp Metabolic Panel; Future  - TSH; Future  - Lipid Profile; Future        Patient Active Problem List    Diagnosis Date Noted    At risk for falling 06/14/2023    Status post total bilateral knee replacement 05/24/2022    Chronic respiratory failure with hypoxia ([facility]) 05/24/2022    Aortic atherosclerosis ([facility]) 05/24/2022    Spinal stenosis of lumbar region with neurogenic claudication 11/10/2021    Postmenopausal osteoporosis 11/10/2021    Hypothyroidism due to Hashimoto's thyroiditis 11/10/2021    Hearing loss 11/10/2021    Pain management 05/20/2021    Neurogenic bladder 05/04/2021    Presence of suprapubic catheter ([facility]) 11/16/2020    Gastroesophageal reflux disease with esophagitis 11/16/2020    Chronic anxiety 07/15/2019    Vitamin D deficiency disease 04/10/2019    Dyslipidemia, goal LDL below 130 08/15/2018    Chronic obstructive pulmonary disease ([facility]) 06/22/2018    Pulmonary hypertension ([facility]) 02/21/2018    Obesity, Class I, BMI 30-34.9     COPD with asthma ([facility]) 11/10/2017    ALISSA (obstructive sleep apnea) 11/10/2017    Postlaminectomy syndrome, unspecified region 07/03/2017    Mixed restrictive and obstructive lung disease ([facility]) 06/22/2017    Menopausal symptoms 09/13/2016    Essential tremor 09/06/2016    CKD (chronic kidney disease) stage 3, GFR 30-59 ml/min ([facility]) 05/23/2016    Esophageal dysphagia 05/19/2016    Major depressive disorder, recurrent episode, mild (CMS-HCC) 05/02/2016    Arachnoiditis 02/16/2015    Neuropathy (CMS-HCC) 10/17/2013    Facet arthritis of lumbar region 03/26/2012    GERD (gastroesophageal reflux disease) 09/20/2011    Essential hypertension 07/06/2009        Current medicines (including changes today)  Current Outpatient Medications   Medication Sig Dispense Refill    Fluticasone Propionate, Inhal, (FLOVENT DISKUS) 100 MCG/ACT AEROSOL POWDER, BREATH ACTIVATED Inhale 1 Puff 2 times a day. Rinse mouth after each use. 60 Each 11    atorvastatin (LIPITOR) 20 MG Tab TAKE 1 TABLET EVERY EVENING 90 Tablet 3    omeprazole (PRILOSEC) 20 MG delayed-release capsule TAKE 1 CAPSULE EVERY DAY 90 Capsule 3    montelukast (SINGULAIR) 10 MG Tab Take 10 mg by mouth.      estradiol (ESTRACE) 0.5 MG tablet TAKE 1 TABLET EVERY DAY 90 Tablet 3    methylPREDNISolone (MEDROL DOSEPAK) 4 MG Tablet Therapy Pack Take as directed. (Patient not taking: Reported on 2/22/2023) 21 Tablet 0    levothyroxine (SYNTHROID) 50 MCG Tab TAKE 1 TABLET BY MOUTH EVERY MORNING ON AN EMPTY STOMACH. 90 Tablet 3    imipramine (TOFRANIL) 10 MG Tab TAKE 2 TABLETS BY MOUTH EVERY EVENING. 180 Tablet 2    potassium chloride (KLOR-CON) 8 MEQ tablet TAKE 2 TABLETS EVERY  Tablet 3    escitalopram (LEXAPRO) 20 MG tablet TAKE 1 TABLET EVERY DAY 90 Tablet 3    albuterol 108 (90 Base) MCG/ACT Aero Soln inhalation aerosol Inhale 2 Puffs every four hours as needed for Shortness of Breath. 3 Each 3    D-MANNOSE PO Take 1,300 mg by mouth.      spironolactone (ALDACTONE) 25 MG Tab Take 1 Tablet by mouth every day.      ASPIRIN LOW DOSE 81 MG EC tablet TAKE 1 TABLET BY MOUTH TWICE A DAY 60 Tablet 2    oxyCODONE-acetaminophen (PERCOCET) 5-325 MG Tab Take 1 Tablet by mouth 4 times a day.      Multiple Vitamins-Minerals (VITEYES AREDS ADVANCED PO) Take 1 Capsule by mouth every day.      Cholecalciferol (D3) 2000 UNIT Tab Take 2,000 Units by mouth every day.      Cyanocobalamin (VITAMIN B-12 PO) Take 2,500 mcg by mouth every day.      docusate sodium (COLACE) 250 MG capsule Take 500 mg by mouth every evening. Indications: Constipation      bumetanide (BUMEX) 2 MG tablet Take 1 Tablet by mouth at bedtime. Indications: Edema,  High Blood Pressure Disorder      pregabalin (LYRICA) 200 MG capsule Take 1 Capsule by mouth in the morning, at noon, and at bedtime. Pt takes @ 0600, 1200, and 2100      LETAIRIS 10 MG tablet Take 10 Tablets by mouth at bedtime. Indications: Pulmonary Arterial Hypertension      AMITIZA 24 MCG capsule Take 2 Capsules by mouth every morning with breakfast. Indications: Chronic Constipation of Unknown Cause      XTAMPZA ER 18 MG Capsule Extended Release 12 hour Abuse-Deterrent Take 18 mg by mouth 2 times a day. Indications: Chronic Pain       No current facility-administered medications for this visit.       Allergies   Allergen Reactions    Other Drug     Penicillins Hives     hives  hives    Sulfa Drugs Hives     hives  hives    Macrobid [Nitrofurantoin] Rash     rash    Tape Rash and Itching     Paper tape ok       ROS: As per HPI       Objective:     /60 (BP Location: Right arm, Patient Position: Sitting, BP Cuff Size: Large adult)   Pulse 87   Temp 36.7 °C (98 °F) (Temporal)   Resp 14   Ht 1.524 m (5')   Wt 79.4 kg (175 lb 0.7 oz)   SpO2 93%  Body mass index is 34.19 kg/m².    Physical Exam:  Constitutional: Well-developed and well-nourished. Not diaphoretic. No distress. Lucid and fluent.  Skin: Skin is warm and dry. No rash noted.  Head: Atraumatic without lesions.  Eyes: Conjunctivae and extraocular motions are normal. Pupils are equal, round, and reactive to light. No scleral icterus.   Ears:  External ears unremarkable.   Neck: Supple, trachea midline. No thyromegaly present. No cervical or supraclavicular lymphadenopathy. No JVD or carotid bruits appreciated  Cardiovascular: Regular rate and rhythm.  Normal S1, S2 without murmur appreciated.  Chest: Effort normal. Clear to auscultation throughout. No adventitious sounds.   Abdomen: Soft, non tender, and without distention. Active bowel sounds in all four quadrants. No rebound, guarding, masses or hepatosplenomegaly.  Extremities: No cyanosis,  clubbing, erythema, nor edema.   Neurological: Alert and oriented x 3.  No tremor appreciated.  Gait unsteady but movements symmetric.  Psychiatric:  Behavior, mood, and affect are appropriate.       Assessment and Plan:     82 y.o. female with the following issues:    1. Hypothyroidism due to Hashimoto's thyroiditis  TSH      2. Aortic atherosclerosis (HCC)        3. Major depressive disorder, recurrent episode, mild (CMS-HCC)        4. Presence of suprapubic catheter (HCC)        5. Neurogenic bladder        6. Stage 3a chronic kidney disease (HCC)  Comp Metabolic Panel    CBC WITHOUT DIFFERENTIAL      7. Obesity, Class I, BMI 30-34.9  Patient identified as having weight management issue.  Appropriate orders and counseling given.      8. At risk for falling  Patient identified as fall risk.  Appropriate orders and counseling given.      9. Dyslipidemia, goal LDL below 130  Comp Metabolic Panel    TSH    Lipid Profile            Followup: Return in about 6 months (around 12/14/2023), or if symptoms worsen or fail to improve.

## 2023-07-13 DIAGNOSIS — F41.1 GENERALIZED ANXIETY DISORDER: ICD-10-CM

## 2023-07-14 RX ORDER — ESCITALOPRAM OXALATE 20 MG/1
TABLET ORAL
Qty: 90 TABLET | Refills: 3 | Status: SHIPPED | OUTPATIENT
Start: 2023-07-14

## 2023-07-29 LAB
ALBUMIN SERPL-MCNC: 4.6 G/DL (ref 3.7–4.7)
ALBUMIN/GLOB SERPL: 2.2 {RATIO} (ref 1.2–2.2)
ALP SERPL-CCNC: 90 IU/L (ref 44–121)
ALT SERPL-CCNC: 13 IU/L (ref 0–32)
AST SERPL-CCNC: 15 IU/L (ref 0–40)
BILIRUB SERPL-MCNC: 0.5 MG/DL (ref 0–1.2)
BUN SERPL-MCNC: 11 MG/DL (ref 8–27)
BUN/CREAT SERPL: 10 (ref 12–28)
CALCIUM SERPL-MCNC: 9.5 MG/DL (ref 8.7–10.3)
CHLORIDE SERPL-SCNC: 99 MMOL/L (ref 96–106)
CHOLEST SERPL-MCNC: 148 MG/DL (ref 100–199)
CO2 SERPL-SCNC: 25 MMOL/L (ref 20–29)
CREAT SERPL-MCNC: 1.14 MG/DL (ref 0.57–1)
EGFRCR SERPLBLD CKD-EPI 2021: 47 ML/MIN/1.73
ERYTHROCYTE [DISTWIDTH] IN BLOOD BY AUTOMATED COUNT: 14.4 % (ref 11.7–15.4)
GLOBULIN SER CALC-MCNC: 2.1 G/DL (ref 1.5–4.5)
GLUCOSE SERPL-MCNC: 77 MG/DL (ref 70–99)
HCT VFR BLD AUTO: 41.2 % (ref 34–46.6)
HDLC SERPL-MCNC: 61 MG/DL
HGB BLD-MCNC: 13.2 G/DL (ref 11.1–15.9)
LABORATORY COMMENT REPORT: NORMAL
LDLC SERPL CALC-MCNC: 70 MG/DL (ref 0–99)
MCH RBC QN AUTO: 28.2 PG (ref 26.6–33)
MCHC RBC AUTO-ENTMCNC: 32 G/DL (ref 31.5–35.7)
MCV RBC AUTO: 88 FL (ref 79–97)
NRBC BLD AUTO-RTO: NORMAL %
PLATELET # BLD AUTO: 205 X10E3/UL (ref 150–450)
POTASSIUM SERPL-SCNC: 4.3 MMOL/L (ref 3.5–5.2)
PROT SERPL-MCNC: 6.7 G/DL (ref 6–8.5)
RBC # BLD AUTO: 4.68 X10E6/UL (ref 3.77–5.28)
SODIUM SERPL-SCNC: 141 MMOL/L (ref 134–144)
TRIGL SERPL-MCNC: 92 MG/DL (ref 0–149)
TSH SERPL DL<=0.005 MIU/L-ACNC: 2.97 UIU/ML (ref 0.45–4.5)
VLDLC SERPL CALC-MCNC: 17 MG/DL (ref 5–40)
WBC # BLD AUTO: 4.1 X10E3/UL (ref 3.4–10.8)

## 2023-08-25 ENCOUNTER — APPOINTMENT (OUTPATIENT)
Dept: ADMISSIONS | Facility: MEDICAL CENTER | Age: 83
End: 2023-08-25
Attending: OPHTHALMOLOGY
Payer: MEDICARE

## 2023-08-31 DIAGNOSIS — G56.22 CUBITAL TUNNEL SYNDROME ON LEFT: ICD-10-CM

## 2023-08-31 RX ORDER — IMIPRAMINE HYDROCHLORIDE 10 MG/1
TABLET, FILM COATED ORAL
Qty: 180 TABLET | Refills: 2 | Status: SHIPPED | OUTPATIENT
Start: 2023-08-31 | End: 2023-12-15 | Stop reason: SDUPTHER

## 2023-09-14 ENCOUNTER — OFFICE VISIT (OUTPATIENT)
Dept: SLEEP MEDICINE | Facility: MEDICAL CENTER | Age: 83
End: 2023-09-14
Attending: STUDENT IN AN ORGANIZED HEALTH CARE EDUCATION/TRAINING PROGRAM
Payer: MEDICARE

## 2023-09-14 VITALS
HEART RATE: 79 BPM | BODY MASS INDEX: 32.39 KG/M2 | OXYGEN SATURATION: 99 % | HEIGHT: 60 IN | RESPIRATION RATE: 16 BRPM | WEIGHT: 165 LBS | SYSTOLIC BLOOD PRESSURE: 108 MMHG | DIASTOLIC BLOOD PRESSURE: 60 MMHG

## 2023-09-14 DIAGNOSIS — J96.11 CHRONIC RESPIRATORY FAILURE WITH HYPOXIA (HCC): ICD-10-CM

## 2023-09-14 DIAGNOSIS — G47.31 COMPLEX SLEEP APNEA SYNDROME: Primary | ICD-10-CM

## 2023-09-14 PROCEDURE — 99213 OFFICE O/P EST LOW 20 MIN: CPT | Performed by: STUDENT IN AN ORGANIZED HEALTH CARE EDUCATION/TRAINING PROGRAM

## 2023-09-14 PROCEDURE — 3078F DIAST BP <80 MM HG: CPT | Performed by: STUDENT IN AN ORGANIZED HEALTH CARE EDUCATION/TRAINING PROGRAM

## 2023-09-14 PROCEDURE — 99212 OFFICE O/P EST SF 10 MIN: CPT | Performed by: STUDENT IN AN ORGANIZED HEALTH CARE EDUCATION/TRAINING PROGRAM

## 2023-09-14 PROCEDURE — 3074F SYST BP LT 130 MM HG: CPT | Performed by: STUDENT IN AN ORGANIZED HEALTH CARE EDUCATION/TRAINING PROGRAM

## 2023-09-14 ASSESSMENT — FIBROSIS 4 INDEX: FIB4 SCORE: 1.724982317529675928

## 2023-09-14 NOTE — PROGRESS NOTES
[facility] Follow-up Visit    CC: Follow-up regarding management of complex sleep apnea      HPI:  Maddy Hodge is a 83 y.o.female  with chronic respiratory failure, dependence on supplemental oxygen, COPD, GERD, MDD, hypothyroidism, hypertension, and complex sleep apnea on BiPAP ST.  Presents to sleep clinic to follow-up regarding management of complex sleep apnea.    She is accompanied by her son at today's visit.    She continues to use her BiPAP ST machine with supplemental oxygen 2 L/min nightly.  She is on supplemental oxygen 24 hours a day 2 L a minute.    Overall she finds that her machine is working well.  She feels that her mask is comfortable.  Does not find the pressures to be too intrusive to her sleep.  She states there are nights where it is difficult for her to use the machine for the full night but most the time she can.    Her machine is over 5 years old.    DME provider: [employer] for PAP supplies   Device: Aircurve ST   Mask: hybrid fullface   Aerophagia: No   Snoring: No   Dry mouth: Yes  Leak: No   Skin irritation: No   Chin strap: No       Sleep History  Patient has been on PAP therapy for years.  Underwent a diagnostic study in 2017 which showed she qualified for mild obstructive sleep apnea.  She then underwent a Pap titration study in 2018 which showed complex sleep apnea.  She was tried on CPAP and BiPAP and BiPAP ST and briefly ASV therapy.  Is recommended she be on BiPAP ST.    Patient Active Problem List    Diagnosis Date Noted    At risk for falling 06/14/2023    Status post total bilateral knee replacement 05/24/2022    Chronic respiratory failure with hypoxia (HCC) 05/24/2022    Aortic atherosclerosis (HCC) 05/24/2022    Spinal stenosis of lumbar region with neurogenic claudication 11/10/2021    Postmenopausal osteoporosis 11/10/2021    Hypothyroidism due to Hashimoto's thyroiditis 11/10/2021    Hearing loss 11/10/2021    Pain management 05/20/2021    Neurogenic  "bladder 05/04/2021    Presence of suprapubic catheter (Formerly [facility]) 11/16/2020    Gastroesophageal reflux disease with esophagitis 11/16/2020    Chronic anxiety 07/15/2019    Vitamin D deficiency disease 04/10/2019    Dyslipidemia, goal LDL below 130 08/15/2018    Chronic obstructive pulmonary disease (Formerly [facility]) 06/22/2018    Pulmonary hypertension (Formerly [facility]) 02/21/2018    Obesity, Class I, BMI 30-34.9     COPD with asthma (Formerly [facility]) 11/10/2017    ALISSA (obstructive sleep apnea) 11/10/2017    Postlaminectomy syndrome, unspecified region 07/03/2017    Mixed restrictive and obstructive lung disease (Formerly [facility]) 06/22/2017    Menopausal symptoms 09/13/2016    Essential tremor 09/06/2016    CKD (chronic kidney disease) stage 3, GFR 30-59 ml/min (Formerly [facility]) 05/23/2016    Esophageal dysphagia 05/19/2016    Major depressive disorder, recurrent episode, mild (CMS-HCC) 05/02/2016    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     Arthritis     facet arthritis of lumbar region    Asthma     Inhaler use daily.    Basal cell carcinoma     arm, neck, face    Bowel habit changes     constipation    Carpal tunnel syndrome on left 7/2/2021    Added automatically from request for surgery [id number]    CATARACT     removed bilat    Chickenpox     Chronic constipation     Coagulase-negative staphylococcal infection     Dr. Albrecht, attaches to plastic, prulent    Cubital tunnel syndrome on left 7/2/2021    Added automatically from request for surgery [id number]    Delayed emergence from general anesthesia     Depression     and anxiety    Encounter for long-term (current) use of other medications     Erosive gastritis 5/09    " antral    Esophageal dysphagia 5/19/2016    Essential hypertension 7/6/2009    Family history of polycystic kidney disease     GERD (gastroesophageal reflux disease)     [place] measles     High cholesterol     History of vertebral fracture 3/26/2012    Hypertension     Hypothyroidism     Hypothyroidism due to Hashimoto's thyroiditis 11/10/2021    managed with medication Formatting of this note might be different from the original. Formatting of this note might be different from the original. managed with medication  Last Assessment & Plan:  Formatting of this note might be different from the original. Chronic condition managed with current medical regimen Stable per review  Continue with current meds Followed by Shirlene Quinones M.D..    Indwelling urinary catheter present 11/16/2020    Influenza     Lumbar vertebral fracture (HCC) 5/2011    Mumps     Muscle disorder     Arachnoiditis    Neuropathy     Neuropathy     Obesity, Class I, BMI 30-34.9     ALISSA (obstructive sleep apnea) 11/10/2017    OSTEOPOROSIS     Oxygen dependent     2 liters, [facility]    Pain 07/20/2021    left arm, knees, chronic back, right leg, 6/10    Pain management 5/20/2021    Primary osteoarthritis of left knee 8/13/2020    Formatting of this note might be different from the original. Formatting of this note might be different from the original. Added automatically from request for surgery [id number]    Pulmonary hypertension (HCC)     Recurrent UTI 6/28/2017    Sleep apnea     on BiPap, follows with pulmonology    Spinal stenosis of lumbar region with neurogenic claudication 11/10/2021    Formatting of this note might be different from the original. Last Assessment & Plan:  Formatting of this note might be different from the original. Chronic condition managed with current medical regimen Stable per review  Continue with current meds Followed by specialty    Spinal stenosis, lumbar region, without neurogenic claudication     Status  post total bilateral knee replacement 2022    Suprapubic catheter (HCC)     Tonsillitis         Past Surgical History:   Procedure Laterality Date    PB TOTAL KNEE ARTHROPLASTY Right 2021    Procedure: ARTHROPLASTY, KNEE, TOTAL;  Surgeon: Kayden Perez M.D.;  Location: SURGERY [facility];  Service: Orthopedics    MN NEUROPLASTY & OR TRANSPOS MEDIAN NRV CARPAL ANDRES Left 2021    Procedure: RELEASE, CARPAL TUNNEL;  Surgeon: Ian Delgado M.D.;  Location: SURGERY SAME DAY [facility];  Service: Orthopedics    MN NEUROPLASTY & OR TRANSPOS ULNAR NERVE ELBOW Left 2021    Procedure: RELEASE, CUBITAL TUNNEL.;  Surgeon: Ian Delgado M.D.;  Location: SURGERY SAME DAY [facility];  Service: Orthopedics    PB TOTAL KNEE ARTHROPLASTY Left 2020    Procedure: ARTHROPLASTY, KNEE, TOTAL;  Surgeon: Kayden Perez M.D.;  Location: [facility];  Service: Orthopedics    SPINAL CORD STIMULATOR N/A 7/3/2017    Procedure: SPINAL CORD STIMULATOR FOR LEAD REMOVAL;  Surgeon: Amandeep Gonzalez D.O.;  Location: [facility];  Service:     GASTROSCOPY WITH BALLOON DILATATION N/A 2016    Procedure: GASTROSCOPY WITH DILATATION;  Surgeon: Tony Monae M.D.;  Location: [facility];  Service:     SPINAL CORD STIMULATOR  2014    removed 2017    OTHER SURGICAL PROCEDURE Bilateral     plantar fascitis surgery    EGD WITH ASP/BX  09    erosive gastritis    HYSTERECTOMY, TOTAL ABDOMINAL  1976    APPENDECTOMY      CATARACT EXTRACTION WITH IOL Bilateral     COLONOSCOPY  ,09    normal    LUMBAR LAMINECTOMY DISKECTOMY      TONSILLECTOMY         Family History   Problem Relation Age of Onset    Genitourinary () Problems Brother     Lung Disease Mother         COPD    Heart Disease Mother     Genetic Disorder Father         Parkinsons/ from complications    Hypertension Father     Cancer Maternal Aunt         Breast cancer    Stroke Paternal  Grandmother     Cancer Maternal Aunt         Breast cancer       Social History     Socioeconomic History    Marital status:      Spouse name: Not on file    Number of children: Not on file    Years of education: Not on file    Highest education level: Not on file   Occupational History    Not on file   Tobacco Use    Smoking status: Never     Passive exposure: Past    Smokeless tobacco: Never   Vaping Use    Vaping Use: Never used   Substance and Sexual Activity    Alcohol use: Not Currently     Alcohol/week: 0.0 oz    Drug use: Not Currently    Sexual activity: Not Currently     Partners: Male     Birth control/protection: Abstinence   Other Topics Concern     Service Not Asked    Blood Transfusions Not Asked    Caffeine Concern Not Asked    Occupational Exposure Not Asked    Hobby Hazards Not Asked    Sleep Concern Not Asked    Stress Concern No     Comment:  cancer    Weight Concern Not Asked    Special Diet Not Asked    Back Care Not Asked    Exercise Not Asked    Bike Helmet Not Asked    Seat Belt Not Asked    Self-Exams Not Asked   Social History Narrative    Not on file     Social Determinants of Health     Financial Resource Strain: Not on file   Food Insecurity: Not on file   Transportation Needs: Not on file   Physical Activity: Not on file   Stress: Not on file   Social Connections: Not on file   Intimate Partner Violence: Not on file   Housing Stability: Not on file       Current Outpatient Medications   Medication Sig Dispense Refill    imipramine (TOFRANIL) 10 MG Tab TAKE 2 TABLETS EVERY EVENING 180 Tablet 2    escitalopram (LEXAPRO) 20 MG tablet TAKE 1 TABLET EVERY DAY 90 Tablet 3    Fluticasone Propionate, Inhal, (FLOVENT DISKUS) 100 MCG/ACT AEROSOL POWDER, BREATH ACTIVATED Inhale 1 Puff 2 times a day. Rinse mouth after each use. 60 Each 11    atorvastatin (LIPITOR) 20 MG Tab TAKE 1 TABLET EVERY EVENING 90 Tablet 3    omeprazole (PRILOSEC) 20 MG delayed-release capsule TAKE 1  CAPSULE EVERY DAY 90 Capsule 3    montelukast (SINGULAIR) 10 MG Tab Take 10 mg by mouth.      estradiol (ESTRACE) 0.5 MG tablet TAKE 1 TABLET EVERY DAY 90 Tablet 3    methylPREDNISolone (MEDROL DOSEPAK) 4 MG Tablet Therapy Pack Take as directed. 21 Tablet 0    levothyroxine (SYNTHROID) 50 MCG Tab TAKE 1 TABLET BY MOUTH EVERY MORNING ON AN EMPTY STOMACH. 90 Tablet 3    potassium chloride (KLOR-CON) 8 MEQ tablet TAKE 2 TABLETS EVERY  Tablet 3    albuterol 108 (90 Base) MCG/ACT Aero Soln inhalation aerosol Inhale 2 Puffs every four hours as needed for Shortness of Breath. 3 Each 3    D-MANNOSE PO Take 1,300 mg by mouth.      spironolactone (ALDACTONE) 25 MG Tab Take 1 Tablet by mouth every day.      ASPIRIN LOW DOSE 81 MG EC tablet TAKE 1 TABLET BY MOUTH TWICE A DAY 60 Tablet 2    oxyCODONE-acetaminophen (PERCOCET) 5-325 MG Tab Take 1 Tablet by mouth 4 times a day.      Multiple Vitamins-Minerals (VITEYES AREDS ADVANCED PO) Take 1 Capsule by mouth every day.      Cholecalciferol (D3) 2000 UNIT Tab Take 2,000 Units by mouth every day.      Cyanocobalamin (VITAMIN B-12 PO) Take 2,500 mcg by mouth every day.      docusate sodium (COLACE) 250 MG capsule Take 500 mg by mouth every evening. Indications: Constipation      bumetanide (BUMEX) 2 MG tablet Take 1 Tablet by mouth at bedtime. Indications: Edema, High Blood Pressure Disorder      pregabalin (LYRICA) 200 MG capsule Take 1 Capsule by mouth in the morning, at noon, and at bedtime. Pt takes @ 0600, 1200, and 2100      LETAIRIS 10 MG tablet Take 10 Tablets by mouth at bedtime. Indications: Pulmonary Arterial Hypertension      AMITIZA 24 MCG capsule Take 2 Capsules by mouth every morning with breakfast. Indications: Chronic Constipation of Unknown Cause      XTAMPZA ER 18 MG Capsule Extended Release 12 hour Abuse-Deterrent Take 18 mg by mouth 2 times a day. Indications: Chronic Pain       No current facility-administered medications for this visit.         ALLERGIES: Other drug, Penicillins, Sulfa drugs, Macrobid [nitrofurantoin], and Tape    ROS  Constitutional: Denies fevers, Denies weight changes  Ears/Nose/Throat/Mouth: Denies nasal congestion or sore throat   Cardiovascular: Denies chest pain  Respiratory: Denies shortness of breath, Denies cough  Gastrointestinal/Hepatic: Denies nausea, vomiting  Sleep: see HPI      PHYSICAL EXAM  /60 (BP Location: Left arm, Patient Position: Sitting, BP Cuff Size: Large adult)   Pulse 79   Resp 16   Ht 1.524 m (5')   Wt 74.8 kg (165 lb)   LMP  (LMP Unknown)   SpO2 99% Comment: 2L  BMI 32.22 kg/m²   Appearance: Well-nourished, well-developed, no acute distress  Eyes:  No scleral icterus , EOMI  Musculoskeletal:  Grossly normal; gait and station normal; digits and nails normal  Skin:  No rashes, petechiae, cyanosis  Neurologic: without focal signs; oriented to person, time, place, and purpose; judgement intact      Medical Decision Making   Assessment and Plan  Maddy Hodge is a 83 y.o.female  with chronic respiratory failure, dependence on supplemental oxygen, COPD, GERD, MDD, hypothyroidism, hypertension, and complex sleep apnea on BiPAP ST.  Presents to sleep clinic to follow-up regarding management of complex sleep apnea.    The medical record was reviewed.    Obstructive sleep apnea  Compliance data reviewed showing 80% usage > 4hours in last 30  days. Average AHI 14.5 events/hour. Pt continues to use and benefit from machine.      Current Settings BiPAP ST EPAP 17 BiPAP 21 BR 14 bpm    AHI is still slightly elevated on her BiPAP ST.  She is reporting a leak.  She has had better compliance data on past downloads but her pressures have been slowly increased since her last study.  Discussed potential benefit of undergoing a PSG titration study exploring BiPAP ST pressure as well as potentially ASV.  She is not interested in undergoing a new sleep study.  She like to continue on BiPAP ST as she feels  it is helpful.    Given the age of her machine she would benefit from getting a new machine as there can be wear-and-tear on the machine which makes it less accurate in terms of her breathing events an hour.    PLAN:   -Order placed for new BiPAP ST 19/15 backup breathing rate 14 bpm  -Advised to reach out via MyChart with questions     Has been advised to continue the current BiPAP ST with supplemental oxygen, clean equipment frequently, and get new mask and supplies as allowed by insurance and DME. Recommend an earlier appointment, if significant treatment barriers develop.    Patients with ALISSA are at increased risk of cardiovascular disease including coronary artery disease, systemic arterial hypertension, pulmonary arterial hypertension, cardiac arrythmias, and stroke. The patient was advised to avoid driving a motor vehicle when drowsy.    Positive airway pressure will favorably impact many of the adverse conditions and effects provoked by ALISSA.    Have advised the patient to follow up with the appropriate healthcare practitioners for all other medical problems and issues.    Return in about 4 months (around 1/14/2024) for 60-90 days after getting new BIPAP ST.      Please note portions of this record was created using voice recognition software. I have made every reasonable attempt to correct obvious errors, but I expect that there are errors of grammar and possibly content I did not discover before finalizing the note.

## 2023-09-20 DIAGNOSIS — U07.1 COVID-19 VIRUS INFECTION: ICD-10-CM

## 2023-09-25 ENCOUNTER — PRE-ADMISSION TESTING (OUTPATIENT)
Dept: ADMISSIONS | Facility: MEDICAL CENTER | Age: 83
End: 2023-09-25
Attending: OPHTHALMOLOGY
Payer: MEDICARE

## 2023-09-25 RX ORDER — BUMETANIDE 2 MG/1
1 TABLET ORAL
COMMUNITY
End: 2023-09-26

## 2023-09-25 RX ORDER — LATANOPROST 50 UG/ML
1 SOLUTION/ DROPS OPHTHALMIC NIGHTLY
COMMUNITY
Start: 2023-07-28

## 2023-09-25 RX ORDER — OXYCODONE 18 MG/1
18 CAPSULE, EXTENDED RELEASE ORAL 2 TIMES DAILY
COMMUNITY
Start: 2017-06-06

## 2023-09-25 NOTE — OR NURSING
Patient's son Johnathan to check with Francis Alonso's office on whether Maddy's surgery needs to get rescheduled since she is still recovering from Covid.  Johnathan to call us back once Maddy is feeling better to schedule an appointment for testing.

## 2023-09-25 NOTE — OR NURSING
"During tele PAT appointment, son Johnathan reported Maddy began feeling sick on 9/19.  Symptoms included runny nose, stuffiness, sore throat.  Cough started on 9/20.  Maddy tested positive on covid home test on 9/20.  Currently, she is still having a cough, runny nose, \"slightly short of breath,\" wheezing.  Started Paxloviv on 9/20,last dose was taken today.  Her son Bahman has been following up with her doctor on MyChart.  Johnathan was instructed to notify 's office.  Johnathan stated verbal understanding.  Johnathan also was instructed to call 911 or take Maddy to the ER for worsening shortness of breath or wheezing.Johnathan reported Maddy does not want to go to the hospital but he would definitely take her to the ER if he feels like she needs it.  Currently Maddy is trying to avoid going to the hospital as much as possible. This RN called Dr. Blanco's office to inform them of the aforementioned, spoke with surgery scheduler Faith.   "

## 2023-09-26 ENCOUNTER — HOSPITAL ENCOUNTER (EMERGENCY)
Facility: MEDICAL CENTER | Age: 83
End: 2023-09-26
Attending: EMERGENCY MEDICINE
Payer: MEDICARE

## 2023-09-26 ENCOUNTER — APPOINTMENT (OUTPATIENT)
Dept: RADIOLOGY | Facility: MEDICAL CENTER | Age: 83
End: 2023-09-26
Attending: EMERGENCY MEDICINE
Payer: MEDICARE

## 2023-09-26 VITALS
TEMPERATURE: 97.4 F | WEIGHT: 165 LBS | RESPIRATION RATE: 13 BRPM | OXYGEN SATURATION: 92 % | SYSTOLIC BLOOD PRESSURE: 137 MMHG | HEIGHT: 60 IN | BODY MASS INDEX: 32.39 KG/M2 | HEART RATE: 71 BPM | DIASTOLIC BLOOD PRESSURE: 55 MMHG

## 2023-09-26 DIAGNOSIS — U07.1 COVID: ICD-10-CM

## 2023-09-26 DIAGNOSIS — R05.8 POST-VIRAL COUGH SYNDROME: ICD-10-CM

## 2023-09-26 LAB
ALBUMIN SERPL BCP-MCNC: 4.4 G/DL (ref 3.2–4.9)
ALBUMIN/GLOB SERPL: 1.6 G/DL
ALP SERPL-CCNC: 85 U/L (ref 30–99)
ALT SERPL-CCNC: 13 U/L (ref 2–50)
ANION GAP SERPL CALC-SCNC: 11 MMOL/L (ref 7–16)
APPEARANCE UR: ABNORMAL
AST SERPL-CCNC: 18 U/L (ref 12–45)
BACTERIA #/AREA URNS HPF: ABNORMAL /HPF
BASOPHILS # BLD AUTO: 0.5 % (ref 0–1.8)
BASOPHILS # BLD: 0.02 K/UL (ref 0–0.12)
BILIRUB SERPL-MCNC: 0.4 MG/DL (ref 0.1–1.5)
BILIRUB UR QL STRIP.AUTO: NEGATIVE
BLOOD CULTURE HOLD CXBCH: NORMAL
BLOOD CULTURE HOLD CXBCH: NORMAL
BUN SERPL-MCNC: 12 MG/DL (ref 8–22)
CALCIUM ALBUM COR SERPL-MCNC: 8.9 MG/DL (ref 8.5–10.5)
CALCIUM SERPL-MCNC: 9.2 MG/DL (ref 8.4–10.2)
CHLORIDE SERPL-SCNC: 95 MMOL/L (ref 96–112)
CO2 SERPL-SCNC: 30 MMOL/L (ref 20–33)
COLOR UR: YELLOW
CREAT SERPL-MCNC: 0.9 MG/DL (ref 0.5–1.4)
EKG IMPRESSION: NORMAL
EOSINOPHIL # BLD AUTO: 0.31 K/UL (ref 0–0.51)
EOSINOPHIL NFR BLD: 7.2 % (ref 0–6.9)
EPI CELLS #/AREA URNS HPF: ABNORMAL /HPF
ERYTHROCYTE [DISTWIDTH] IN BLOOD BY AUTOMATED COUNT: 48.2 FL (ref 35.9–50)
GFR SERPLBLD CREATININE-BSD FMLA CKD-EPI: 63 ML/MIN/1.73 M 2
GLOBULIN SER CALC-MCNC: 2.8 G/DL (ref 1.9–3.5)
GLUCOSE SERPL-MCNC: 99 MG/DL (ref 65–99)
GLUCOSE UR STRIP.AUTO-MCNC: NEGATIVE MG/DL
HCT VFR BLD AUTO: 34.4 % (ref 37–47)
HGB BLD-MCNC: 11.8 G/DL (ref 12–16)
IMM GRANULOCYTES # BLD AUTO: 0.01 K/UL (ref 0–0.11)
IMM GRANULOCYTES NFR BLD AUTO: 0.2 % (ref 0–0.9)
KETONES UR STRIP.AUTO-MCNC: NEGATIVE MG/DL
LEUKOCYTE ESTERASE UR QL STRIP.AUTO: ABNORMAL
LYMPHOCYTES # BLD AUTO: 1.13 K/UL (ref 1–4.8)
LYMPHOCYTES NFR BLD: 26.3 % (ref 22–41)
MCH RBC QN AUTO: 29.2 PG (ref 27–33)
MCHC RBC AUTO-ENTMCNC: 34.3 G/DL (ref 32.2–35.5)
MCV RBC AUTO: 85.1 FL (ref 81.4–97.8)
MICRO URNS: ABNORMAL
MONOCYTES # BLD AUTO: 0.37 K/UL (ref 0–0.85)
MONOCYTES NFR BLD AUTO: 8.6 % (ref 0–13.4)
NEUTROPHILS # BLD AUTO: 2.46 K/UL (ref 1.82–7.42)
NEUTROPHILS NFR BLD: 57.2 % (ref 44–72)
NITRITE UR QL STRIP.AUTO: POSITIVE
NRBC # BLD AUTO: 0 K/UL
NRBC BLD-RTO: 0 /100 WBC (ref 0–0.2)
NT-PROBNP SERPL IA-MCNC: 225 PG/ML (ref 0–125)
PH UR STRIP.AUTO: 5.5 [PH] (ref 5–8)
PLATELET # BLD AUTO: 181 K/UL (ref 164–446)
PMV BLD AUTO: 10.4 FL (ref 9–12.9)
POTASSIUM SERPL-SCNC: 4.1 MMOL/L (ref 3.6–5.5)
PROT SERPL-MCNC: 7.2 G/DL (ref 6–8.2)
PROT UR QL STRIP: NEGATIVE MG/DL
RBC # BLD AUTO: 4.04 M/UL (ref 4.2–5.4)
RBC # URNS HPF: ABNORMAL /HPF
RBC UR QL AUTO: ABNORMAL
SODIUM SERPL-SCNC: 136 MMOL/L (ref 135–145)
SP GR UR STRIP.AUTO: 1.01
TROPONIN T SERPL-MCNC: 21 NG/L (ref 6–19)
WBC # BLD AUTO: 4.3 K/UL (ref 4.8–10.8)
WBC #/AREA URNS HPF: ABNORMAL /HPF

## 2023-09-26 PROCEDURE — 84484 ASSAY OF TROPONIN QUANT: CPT

## 2023-09-26 PROCEDURE — 99285 EMERGENCY DEPT VISIT HI MDM: CPT

## 2023-09-26 PROCEDURE — 85025 COMPLETE CBC W/AUTO DIFF WBC: CPT

## 2023-09-26 PROCEDURE — 93005 ELECTROCARDIOGRAM TRACING: CPT | Performed by: EMERGENCY MEDICINE

## 2023-09-26 PROCEDURE — 71045 X-RAY EXAM CHEST 1 VIEW: CPT

## 2023-09-26 PROCEDURE — 700111 HCHG RX REV CODE 636 W/ 250 OVERRIDE (IP): Performed by: EMERGENCY MEDICINE

## 2023-09-26 PROCEDURE — 36415 COLL VENOUS BLD VENIPUNCTURE: CPT

## 2023-09-26 PROCEDURE — 93005 ELECTROCARDIOGRAM TRACING: CPT

## 2023-09-26 PROCEDURE — 700117 HCHG RX CONTRAST REV CODE 255: Performed by: EMERGENCY MEDICINE

## 2023-09-26 PROCEDURE — 71275 CT ANGIOGRAPHY CHEST: CPT

## 2023-09-26 PROCEDURE — 81001 URINALYSIS AUTO W/SCOPE: CPT

## 2023-09-26 PROCEDURE — 83880 ASSAY OF NATRIURETIC PEPTIDE: CPT

## 2023-09-26 PROCEDURE — 96374 THER/PROPH/DIAG INJ IV PUSH: CPT | Mod: XU

## 2023-09-26 PROCEDURE — 80053 COMPREHEN METABOLIC PANEL: CPT

## 2023-09-26 RX ORDER — DOXYCYCLINE 100 MG/1
100 CAPSULE ORAL 2 TIMES DAILY
Qty: 10 CAPSULE | Refills: 0 | Status: ACTIVE | OUTPATIENT
Start: 2023-09-26 | End: 2023-10-01

## 2023-09-26 RX ORDER — DEXAMETHASONE SODIUM PHOSPHATE 4 MG/ML
6 INJECTION, SOLUTION INTRA-ARTICULAR; INTRALESIONAL; INTRAMUSCULAR; INTRAVENOUS; SOFT TISSUE ONCE
Status: COMPLETED | OUTPATIENT
Start: 2023-09-26 | End: 2023-09-26

## 2023-09-26 RX ORDER — BUTYROSPERMUM PARKII(SHEA BUTTER), SIMMONDSIA CHINENSIS (JOJOBA) SEED OIL, ALOE BARBADENSIS LEAF EXTRACT .01; 1; 3.5 G/100G; G/100G; G/100G
2000 LIQUID TOPICAL DAILY
COMMUNITY

## 2023-09-26 RX ADMIN — DEXAMETHASONE SODIUM PHOSPHATE 6 MG: 4 INJECTION INTRA-ARTICULAR; INTRALESIONAL; INTRAMUSCULAR; INTRAVENOUS; SOFT TISSUE at 17:32

## 2023-09-26 RX ADMIN — IOHEXOL 100 ML: 350 INJECTION, SOLUTION INTRAVENOUS at 18:18

## 2023-09-26 ASSESSMENT — FIBROSIS 4 INDEX: FIB4 SCORE: 1.724982317529675928

## 2023-09-26 NOTE — ED PROVIDER NOTES
ED Provider Note    CHIEF COMPLAINT  Chief Complaint   Patient presents with    Shortness of Breath     Pt was diagnosed with covid on 9/19 and started paxlovid. She had her last dose yesterday but is still not feeling well. She on 2 L NC baseline but is now experiencing worsening SOB and has some audible wheezing. She is now coughing up clear/green sputum.   Generalized weakness and forgetfulness that her son says is unusual. No recent fevers.   Chronic mendez.        EXTERNAL RECORDS REVIEWED  Outpatient Notes outpatient office visit on 9/20/2023 confirming COVID    HPI/ROS  LIMITATION TO HISTORY   Select: : None  OUTSIDE HISTORIAN(S):   Family at bedside    Maddy Hodge is a 83 y.o. female who presents to the emergency department with worsening dyspnea and wheezing.  Past medical history as document below.  Was diagnosed with COVID roughly 1 week ago.  Finished her course of Paxlovid and now continued to feel unwell.  Has had continued cough with clear to green sputum.  Not using any antitussives at home.  Has continued her regular medications.  Is chronically on 2 L of oxygen.  Family at bedside notes that when she awakes from naps or overnight sleep she seems somewhat confused briefly with her orientation but then this resolves.    Family also notes the patient does have chronic indwelling Mendez catheter which is followed by [facility] [place].  Currently 2 weeks into her 4-week course for exchange.    PAST MEDICAL HISTORY   has a past medical history of Allergy, Anesthesia, Anxiety, Arachnoiditis, Arthritis, Asthma, Basal cell carcinoma, Bowel habit changes, Breath shortness (Pulmonary Hypertension), Carpal tunnel syndrome on left (07/02/2021), CATARACT, Chickenpox, Chronic constipation, Coagulase-negative staphylococcal infection, COVID, Cubital tunnel syndrome on left (07/02/2021), Delayed emergence from general anesthesia, Depression, Encounter for long-term (current) use of other medications,  Erosive gastritis (05/2009), Esophageal dysphagia (05/19/2016), Essential hypertension (07/06/2009), Family history of adverse effect to anesthesia, Family history of polycystic kidney disease, Garnett catheter in place, GERD (gastroesophageal reflux disease), Divehi measles, Heart burn, High cholesterol, History of vertebral fracture (03/26/2012), Hypertension, Hypothyroidism, Hypothyroidism due to Hashimoto's thyroiditis (11/10/2021), Indwelling urinary catheter present (11/16/2020), Influenza, Lumbar vertebral fracture (HCC) (05/2011), Mumps, Muscle disorder, Neuropathy, Neuropathy, Obesity, Class I, BMI 30-34.9, ALISSA (obstructive sleep apnea) (11/10/2017), OSTEOPOROSIS, Oxygen dependent, Pain (07/20/2021), Pain management (05/20/2021), Primary osteoarthritis of left knee (08/13/2020), Pulmonary hypertension (HCC), Recurrent UTI (06/28/2017), Sleep apnea, Spinal stenosis of lumbar region with neurogenic claudication (11/10/2021), Spinal stenosis, lumbar region, without neurogenic claudication, Status post total bilateral knee replacement (05/24/2022), Suprapubic catheter (HCC), Tonsillitis, and Urinary bladder disorder (6/30/2016).    SURGICAL HISTORY   has a past surgical history that includes lumbar laminectomy diskectomy; hysterectomy, total abdominal (1976); egd with asp/bx (05/27/2009); spinal cord stimulator (09/02/2014); cataract extraction with iol (Bilateral); gastroscopy with balloon dilatation (N/A, 05/19/2016); tonsillectomy; spinal cord stimulator (N/A, 07/03/2017); other surgical procedure (Bilateral, 2012); total knee arthroplasty (Left, 08/12/2020); colonoscopy (2000,5/27/09); appendectomy (1976); neuroplasty & or transpos median nrv carpal maddi (Left, 08/05/2021); neuroplasty & or transpos ulnar nerve elbow (Left, 08/05/2021); total knee arthroplasty (Right, 09/20/2021); other orthopedic surgery (2000, 2020); and gyn surgery.    FAMILY HISTORY  Family History   Problem Relation Age of Onset     Genitourinary () Problems Brother     Lung Disease Mother         COPD    Heart Disease Mother     Genetic Disorder Father         Parkinsons/ from complications    Hypertension Father     Cancer Maternal Aunt         Breast cancer    Stroke Paternal Grandmother     Cancer Maternal Aunt         Breast cancer       SOCIAL HISTORY  Social History     Tobacco Use    Smoking status: Never     Passive exposure: Past    Smokeless tobacco: Never    Tobacco comments:     Both parents smoked, second hand exposure.   Vaping Use    Vaping Use: Never used   Substance and Sexual Activity    Alcohol use: Not Currently     Alcohol/week: 0.0 oz    Drug use: Never    Sexual activity: Not Currently     Partners: Male     Birth control/protection: Abstinence       CURRENT MEDICATIONS  Home Medications       Reviewed by Nela Estrada (Pharmacy Tech) on 23 at 1650  Med List Status: Complete     Medication Last Dose Status   albuterol 108 (90 Base) MCG/ACT Aero Soln inhalation aerosol PRN Active   AMITIZA 24 MCG capsule 2023 Active   ASPIRIN LOW DOSE 81 MG EC tablet 2023 Active   atorvastatin (LIPITOR) 20 MG Tab 2023 Active   bumetanide (BUMEX) 2 MG tablet 2023 Active   Cholecalciferol (D3 2000) 2000 UNIT Cap 2023 Active   Cyanocobalamin (VITAMIN B-12 PO) 2023 Active   D-MANNOSE PO 2023 Active   docusate sodium (COLACE) 250 MG capsule 2023 Active   escitalopram (LEXAPRO) 20 MG tablet 2023 Active   estradiol (ESTRACE) 0.5 MG tablet 2023 Active   Fluticasone Propionate, Inhal, (FLOVENT DISKUS) 100 MCG/ACT AEROSOL POWDER, BREATH ACTIVATED 2023 Active   imipramine (TOFRANIL) 10 MG Tab 2023 Active   latanoprost (XALATAN) 0.005 % Solution > 1 week Active   LETAIRIS 10 MG tablet 2023 Active   levothyroxine (SYNTHROID) 50 MCG Tab 2023 Active   montelukast (SINGULAIR) 10 MG Tab 2023 Active   Multiple Vitamins-Minerals (VITEYES AREDS ADVANCED PO)  9/26/2023 Active   Nirmatrelvir&Ritonavir 150/100 10 x 150 MG & 10 x 100MG Tablet Therapy Pack 9/25/2023 Active   omeprazole (PRILOSEC) 20 MG delayed-release capsule 9/26/2023 Active   oxyCODONE ER (XTAMPZA ER) 18 MG Capsule Extended Release 12 hour Abuse-Deterrent 9/26/2023 Active   oxyCODONE-acetaminophen (PERCOCET) 5-325 MG Tab 9/26/2023 Active   potassium chloride (KLOR-CON) 8 MEQ tablet 9/26/2023 Active   pregabalin (LYRICA) 200 MG capsule 9/26/2023 Active   spironolactone (ALDACTONE) 25 MG Tab 9/25/2023 Active                    ALLERGIES  Allergies   Allergen Reactions    Other Drug     Penicillins Hives     hives      Sulfa Drugs Hives     hives      Macrobid [Nitrofurantoin] Rash     rash    Tape Rash and Itching     Paper tape ok       PHYSICAL EXAM  VITAL SIGNS: /55   Pulse 71   Temp 36.3 °C (97.4 °F) (Oral)   Resp 13   Ht 1.524 m (5')   Wt 74.8 kg (165 lb)   LMP  (LMP Unknown)   SpO2 92%   BMI 32.22 kg/m²          Pulse ox interpretation: I interpret this pulse ox as normal.  Constitutional: Alert in no apparent distress. Appears fatigued.   HENT: No signs of trauma, Bilateral external ears normal, Nose normal.   Eyes: Pupils are equal and reactive   Neck: Normal range of motion, No tenderness, Supple  Cardiovascular: Regular rate and rhythm, no murmurs.   Thorax & Lungs: Normal breath sounds, No respiratory distress, No wheezing, No chest tenderness.  Chronic nasal cannula oxygen in place  Abdomen: Bowel sounds normal, Soft, No tenderness  Skin: Warm, Dry, No erythema, No rash.   Extremities: Intact distal pulses  Musculoskeletal: Good range of motion in all major joints. No tenderness to palpation or major deformities noted.   Neurologic: Alert , Normal motor function, Normal sensory function, No focal deficits noted.   Psychiatric: Affect normal, Judgment normal, Mood normal.         DIAGNOSTIC STUDIES / PROCEDURES      LABS  Results for orders placed or performed during the hospital  encounter of 09/26/23   TROPONIN   Result Value Ref Range    Troponin T 21 (H) 6 - 19 ng/L   proBrain Natriuretic Peptide, NT   Result Value Ref Range    NT-proBNP 225 (H) 0 - 125 pg/mL   URINALYSIS    Specimen: Urine, Garnett Cath   Result Value Ref Range    Color Yellow     Character Hazy (A)     Specific Gravity 1.015 <1.035    Ph 5.5 5.0 - 8.0    Glucose Negative Negative mg/dL    Ketones Negative Negative mg/dL    Protein Negative Negative mg/dL    Bilirubin Negative Negative    Nitrite Positive (A) Negative    Leukocyte Esterase Moderate (A) Negative    Occult Blood Trace (A) Negative    Micro Urine Req Microscopic    CBC WITH DIFFERENTIAL   Result Value Ref Range    WBC 4.3 (L) 4.8 - 10.8 K/uL    RBC 4.04 (L) 4.20 - 5.40 M/uL    Hemoglobin 11.8 (L) 12.0 - 16.0 g/dL    Hematocrit 34.4 (L) 37.0 - 47.0 %    MCV 85.1 81.4 - 97.8 fL    MCH 29.2 27.0 - 33.0 pg    MCHC 34.3 32.2 - 35.5 g/dL    RDW 48.2 35.9 - 50.0 fL    Platelet Count 181 164 - 446 K/uL    MPV 10.4 9.0 - 12.9 fL    Neutrophils-Polys 57.20 44.00 - 72.00 %    Lymphocytes 26.30 22.00 - 41.00 %    Monocytes 8.60 0.00 - 13.40 %    Eosinophils 7.20 (H) 0.00 - 6.90 %    Basophils 0.50 0.00 - 1.80 %    Immature Granulocytes 0.20 0.00 - 0.90 %    Nucleated RBC 0.00 0.00 - 0.20 /100 WBC    Neutrophils (Absolute) 2.46 1.82 - 7.42 K/uL    Lymphs (Absolute) 1.13 1.00 - 4.80 K/uL    Monos (Absolute) 0.37 0.00 - 0.85 K/uL    Eos (Absolute) 0.31 0.00 - 0.51 K/uL    Baso (Absolute) 0.02 0.00 - 0.12 K/uL    Immature Granulocytes (abs) 0.01 0.00 - 0.11 K/uL    NRBC (Absolute) 0.00 K/uL   Comp Metabolic Panel   Result Value Ref Range    Sodium 136 135 - 145 mmol/L    Potassium 4.1 3.6 - 5.5 mmol/L    Chloride 95 (L) 96 - 112 mmol/L    Co2 30 20 - 33 mmol/L    Anion Gap 11.0 7.0 - 16.0    Glucose 99 65 - 99 mg/dL    Bun 12 8 - 22 mg/dL    Creatinine 0.90 0.50 - 1.40 mg/dL    Calcium 9.2 8.4 - 10.2 mg/dL    Correct Calcium 8.9 8.5 - 10.5 mg/dL    AST(SGOT) 18 12 - 45 U/L     ALT(SGPT) 13 2 - 50 U/L    Alkaline Phosphatase 85 30 - 99 U/L    Total Bilirubin 0.4 0.1 - 1.5 mg/dL    Albumin 4.4 3.2 - 4.9 g/dL    Total Protein 7.2 6.0 - 8.2 g/dL    Globulin 2.8 1.9 - 3.5 g/dL    A-G Ratio 1.6 g/dL   ESTIMATED GFR   Result Value Ref Range    GFR (CKD-EPI) 63 >60 mL/min/1.73 m 2   URINE MICROSCOPIC (W/UA)   Result Value Ref Range    WBC 20-50 (A) /hpf    RBC 2-5 (A) /hpf    Bacteria Moderate (A) None /hpf    Epithelial Cells Few Few /hpf   Blood Culture,Hold   Result Value Ref Range    Blood Culture Hold Collected    Blood Culture,Hold   Result Value Ref Range    Blood Culture Hold Collected    EKG   Result Value Ref Range    Report       [facility] Emergency Dept.    Test Date:  2023  Pt Name:    ROBERTO ARMSTRONG              Department: [facility]  MRN:        [id number]                      Room:  Gender:     Female                       Technician: [id number]  :        [id number]                   Requested By:ER TRIAGE PROTOCOL  Order #:    [id number]                    Reading MD: Jose Chávez    Measurements  Intervals                                Axis  Rate:       68                           P:          0  AZ:         0                            QRS:        -12  QRSD:       103                          T:          74  QT:         416  QTc:        443    Interpretive Statements  Atrial fibrillation  Repol abnrm suggests ischemia, lateral leads  Compared to ECG 2021 10:10:01  Early repolarization now present  Possible ischemia now present  Sinus rhythm no longer present  First degree AV block no longer present  Myocardial infarct finding no longer present  Electronically Signed On  17:22:42 PDT by Jose Chávez     I interpreted EKG as above      RADIOLOGY  I have independently interpreted the diagnostic imaging associated with this visit and am waiting the final reading from the radiologist.   My preliminary interpretation is as follows: Chest  x-ray without large consolidative process  Radiologist interpretation:   CT-CTA CHEST PULMONARY ARTERY W/ RECONS   Final Result         1.  No evidence of pulmonary embolism.   2.  Chronic elevation of the right hemidiaphragm.   3.  Bibasilar atelectasis/scarring.   4.  No significant consolidation or pleural effusion.      Fleischner Society pulmonary nodule recommendations:   Not applicable      DX-CHEST-PORTABLE (1 VIEW)   Final Result      1.  Basilar interstitial densities consistent with atelectasis and/or fibrosis      2.  Elevated right hemidiaphragm suggesting diaphragmatic paresis            COURSE & MEDICAL DECISION MAKING    ED Observation Status? No; Patient does not meet criteria for ED Observation.     INITIAL ASSESSMENT, COURSE AND PLAN  Care Narrative: 83-year-old presented the emergency department with persistent symptoms post-COVID.  Did takes Paxil a bit.  Now with likely rebound inflammatory phase.  No increased respiratory distress or failure.  No worsening hypoxia from her baseline.    We will complete work-up to include imaging and hematology evaluation for broader differential  DISPOSITION AND DISCUSSIONS  I have discussed management of the patient with the following physicians and TELLO's: None     Discussion of management with other [facility] or appropriate source(s): Pharmacy for medication verification       Escalation of care considered, and ultimately not performed:acute inpatient care management, however at this time, the patient is most appropriate for outpatient management    Barriers to care at this time, including but not limited to:  None .     Decision tools and prescription drugs considered including, but not limited to: Antibiotics doxycycline will be empirically started .    83-year-old presented emerged part with above presentation.  I believe that this is likely a rebound viral syndrome especially after her Paxlovid.  Work-up is reassuring.  She was provided with a single dose of  steroids.  We will additionally provide antibiotic coverage for possible postviral bacterial pulmonary infection.    She does have chronic indwelling Garnett.  Urinalysis as above.  Prior culture reviews revealed Staph epidermidis with multiple resistance patterns.  At this point I do not believe that this is likely acute or causative of her current symptomatology.  We will continue with care plan as above and she is to follow-up closely with her PCP as well as urologist.    FINAL DIAGNOSIS  1. COVID    2. Post-viral cough syndrome           Electronically signed by: Jose Chávez M.D., 9/26/2023 4:45 PM

## 2023-09-26 NOTE — ED NOTES
Medication history reviewed with pts son. Med rec is complete.  Allergies reviewed, per pts son  Interviewed pt with son at bedside with permission from pt.  Pts son had a list of medications at bedside, went over list of medications and returned list back to pts son at bedside.    Pts son reports that pt has not used her ALBUTEROL inhaler in the last 30 days or longer.     Patient has had outpatient antibiotics in the last 30 days.  Pt started PAXLOVID on 9/20/2023 for 5 day course     Pt is not on any anticoagulants

## 2023-09-26 NOTE — ED TRIAGE NOTES
Chief Complaint   Patient presents with    Shortness of Breath     Pt was diagnosed with covid on 9/19 and started paxlovid. She had her last dose yesterday but is still not feeling well. She on 2 L NC baseline but is now experiencing worsening SOB and has some audible wheezing. She is now coughing up clear/green sputum.   Generalized weakness and forgetfulness that her son says is unusual. No recent fevers.   Chronic mendez.      Pt came in a wheelchair but usually walks normally. She using the wheelchair now for severe weakness.

## 2023-09-27 NOTE — ED NOTES
Pt discharged home with instructions to follow up with primary care.  Pt educated on new prescription medications and where to pick them up. The pt denies questions at this time.  Pt instructed to come back to the ER if symptoms worsen or they feel they are having a medical emergency.  Pt is alert and oriented, speaking in full sentences. Pt wheeled to waiting area by son without incident.

## 2023-09-27 NOTE — ED NOTES
Respiratory assessment done, wheezing heard on expiration. Pt and family at bedside reports pt always has a wheeze but today it is worse. Pt reports having an incentive spirometer at home and an inhaler they use for this.  Pt alert and oriented at this time, denies other complaints.

## 2023-10-04 NOTE — OR NURSING
Received a call from patient's son Adán to schedule pre op testing for surgery on 10/18/23 with Dr. Blanco. Pt was previously pre admitted on 9/25/23 but was feeling ill at the time with covid. Son reports that office is aware of recent covid infection and if patient is feeling better was told it is okay to proceed with surgery. Son informed this RN that patient had a recent trip to the ER the day after pre admitting and speaking with the office due to covid. After reviewing ER testing results EKG results were abnormal. Son reports patient does not have a known history of atrial fibrillation.    Called and spoke with Gill at Dr. Blanco's office. Informed her of above information. Pt is schedule to repeat all pre op testing on 10/12/23 per office request. Gill states she will have patient follow up with PCP regarding abnormal EKG.

## 2023-10-12 ENCOUNTER — PRE-ADMISSION TESTING (OUTPATIENT)
Dept: ADMISSIONS | Facility: MEDICAL CENTER | Age: 83
End: 2023-10-12
Attending: OPHTHALMOLOGY
Payer: MEDICARE

## 2023-10-12 DIAGNOSIS — Z01.812 PRE-OPERATIVE LABORATORY EXAMINATION: ICD-10-CM

## 2023-10-12 DIAGNOSIS — Z01.810 PRE-OPERATIVE CARDIOVASCULAR EXAMINATION: ICD-10-CM

## 2023-10-12 LAB
ANION GAP SERPL CALC-SCNC: 11 MMOL/L (ref 7–16)
BUN SERPL-MCNC: 19 MG/DL (ref 8–22)
CALCIUM SERPL-MCNC: 9.3 MG/DL (ref 8.5–10.5)
CHLORIDE SERPL-SCNC: 95 MMOL/L (ref 96–112)
CO2 SERPL-SCNC: 33 MMOL/L (ref 20–33)
CREAT SERPL-MCNC: 1.12 MG/DL (ref 0.5–1.4)
EKG IMPRESSION: NORMAL
ERYTHROCYTE [DISTWIDTH] IN BLOOD BY AUTOMATED COUNT: 46.7 FL (ref 35.9–50)
GFR SERPLBLD CREATININE-BSD FMLA CKD-EPI: 48 ML/MIN/1.73 M 2
GLUCOSE SERPL-MCNC: 103 MG/DL (ref 65–99)
HCT VFR BLD AUTO: 41.3 % (ref 37–47)
HGB BLD-MCNC: 13.8 G/DL (ref 12–16)
MCH RBC QN AUTO: 28.1 PG (ref 27–33)
MCHC RBC AUTO-ENTMCNC: 33.4 G/DL (ref 32.2–35.5)
MCV RBC AUTO: 84.1 FL (ref 81.4–97.8)
PLATELET # BLD AUTO: 251 K/UL (ref 164–446)
PMV BLD AUTO: 11.4 FL (ref 9–12.9)
POTASSIUM SERPL-SCNC: 3.8 MMOL/L (ref 3.6–5.5)
RBC # BLD AUTO: 4.91 M/UL (ref 4.2–5.4)
SODIUM SERPL-SCNC: 139 MMOL/L (ref 135–145)
WBC # BLD AUTO: 7.3 K/UL (ref 4.8–10.8)

## 2023-10-12 PROCEDURE — 93005 ELECTROCARDIOGRAM TRACING: CPT

## 2023-10-12 PROCEDURE — 85027 COMPLETE CBC AUTOMATED: CPT

## 2023-10-12 PROCEDURE — 36415 COLL VENOUS BLD VENIPUNCTURE: CPT

## 2023-10-12 PROCEDURE — 80048 BASIC METABOLIC PNL TOTAL CA: CPT

## 2023-10-12 PROCEDURE — 93010 ELECTROCARDIOGRAM REPORT: CPT | Performed by: INTERNAL MEDICINE

## 2023-10-18 ENCOUNTER — ANESTHESIA EVENT (OUTPATIENT)
Dept: SURGERY | Facility: MEDICAL CENTER | Age: 83
End: 2023-10-18
Payer: MEDICARE

## 2023-10-18 ENCOUNTER — ANESTHESIA (OUTPATIENT)
Dept: SURGERY | Facility: MEDICAL CENTER | Age: 83
End: 2023-10-18
Payer: MEDICARE

## 2023-10-18 ENCOUNTER — HOSPITAL ENCOUNTER (OUTPATIENT)
Facility: MEDICAL CENTER | Age: 83
End: 2023-10-18
Attending: OPHTHALMOLOGY | Admitting: OPHTHALMOLOGY
Payer: MEDICARE

## 2023-10-18 VITALS
WEIGHT: 153.44 LBS | OXYGEN SATURATION: 98 % | TEMPERATURE: 96.9 F | HEIGHT: 60 IN | DIASTOLIC BLOOD PRESSURE: 50 MMHG | SYSTOLIC BLOOD PRESSURE: 88 MMHG | HEART RATE: 67 BPM | BODY MASS INDEX: 30.12 KG/M2 | RESPIRATION RATE: 18 BRPM

## 2023-10-18 LAB
EKG IMPRESSION: NORMAL
GLUCOSE BLD STRIP.AUTO-MCNC: 82 MG/DL (ref 65–99)

## 2023-10-18 PROCEDURE — 160009 HCHG ANES TIME/MIN: Performed by: OPHTHALMOLOGY

## 2023-10-18 PROCEDURE — 160002 HCHG RECOVERY MINUTES (STAT): Performed by: OPHTHALMOLOGY

## 2023-10-18 PROCEDURE — 160035 HCHG PACU - 1ST 60 MINS PHASE I: Performed by: OPHTHALMOLOGY

## 2023-10-18 PROCEDURE — 700105 HCHG RX REV CODE 258: Performed by: OPHTHALMOLOGY

## 2023-10-18 PROCEDURE — 160041 HCHG SURGERY MINUTES - EA ADDL 1 MIN LEVEL 4: Performed by: OPHTHALMOLOGY

## 2023-10-18 PROCEDURE — 93005 ELECTROCARDIOGRAM TRACING: CPT | Performed by: ANESTHESIOLOGY

## 2023-10-18 PROCEDURE — 160046 HCHG PACU - 1ST 60 MINS PHASE II: Performed by: OPHTHALMOLOGY

## 2023-10-18 PROCEDURE — 160048 HCHG OR STATISTICAL LEVEL 1-5: Performed by: OPHTHALMOLOGY

## 2023-10-18 PROCEDURE — 160036 HCHG PACU - EA ADDL 30 MINS PHASE I: Performed by: OPHTHALMOLOGY

## 2023-10-18 PROCEDURE — 160029 HCHG SURGERY MINUTES - 1ST 30 MINS LEVEL 4: Performed by: OPHTHALMOLOGY

## 2023-10-18 PROCEDURE — 93010 ELECTROCARDIOGRAM REPORT: CPT | Performed by: INTERNAL MEDICINE

## 2023-10-18 PROCEDURE — 700101 HCHG RX REV CODE 250: Performed by: OPHTHALMOLOGY

## 2023-10-18 PROCEDURE — 700101 HCHG RX REV CODE 250: Performed by: ANESTHESIOLOGY

## 2023-10-18 PROCEDURE — 160025 RECOVERY II MINUTES (STATS): Performed by: OPHTHALMOLOGY

## 2023-10-18 PROCEDURE — 700111 HCHG RX REV CODE 636 W/ 250 OVERRIDE (IP): Performed by: ANESTHESIOLOGY

## 2023-10-18 PROCEDURE — 82962 GLUCOSE BLOOD TEST: CPT

## 2023-10-18 RX ORDER — OXYCODONE HCL 5 MG/5 ML
10 SOLUTION, ORAL ORAL
Status: DISCONTINUED | OUTPATIENT
Start: 2023-10-18 | End: 2023-10-18 | Stop reason: HOSPADM

## 2023-10-18 RX ORDER — PHENYLEPHRINE HCL IN 0.9% NACL 0.5 MG/5ML
SYRINGE (ML) INTRAVENOUS PRN
Status: DISCONTINUED | OUTPATIENT
Start: 2023-10-18 | End: 2023-10-18 | Stop reason: SURG

## 2023-10-18 RX ORDER — OXYCODONE HCL 5 MG/5 ML
5 SOLUTION, ORAL ORAL
Status: DISCONTINUED | OUTPATIENT
Start: 2023-10-18 | End: 2023-10-18 | Stop reason: HOSPADM

## 2023-10-18 RX ORDER — TETRACAINE HYDROCHLORIDE 5 MG/ML
SOLUTION OPHTHALMIC
Status: DISCONTINUED | OUTPATIENT
Start: 2023-10-18 | End: 2023-10-18 | Stop reason: HOSPADM

## 2023-10-18 RX ORDER — ESMOLOL HYDROCHLORIDE 10 MG/ML
INJECTION INTRAVENOUS PRN
Status: DISCONTINUED | OUTPATIENT
Start: 2023-10-18 | End: 2023-10-18 | Stop reason: SURG

## 2023-10-18 RX ORDER — ONDANSETRON 2 MG/ML
4 INJECTION INTRAMUSCULAR; INTRAVENOUS
Status: DISCONTINUED | OUTPATIENT
Start: 2023-10-18 | End: 2023-10-18 | Stop reason: HOSPADM

## 2023-10-18 RX ORDER — SODIUM CHLORIDE, SODIUM LACTATE, POTASSIUM CHLORIDE, CALCIUM CHLORIDE 600; 310; 30; 20 MG/100ML; MG/100ML; MG/100ML; MG/100ML
INJECTION, SOLUTION INTRAVENOUS CONTINUOUS
Status: DISCONTINUED | OUTPATIENT
Start: 2023-10-18 | End: 2023-10-18 | Stop reason: HOSPADM

## 2023-10-18 RX ORDER — LIDOCAINE HYDROCHLORIDE AND EPINEPHRINE BITARTRATE 20; .01 MG/ML; MG/ML
INJECTION, SOLUTION SUBCUTANEOUS
Status: DISCONTINUED | OUTPATIENT
Start: 2023-10-18 | End: 2023-10-18 | Stop reason: HOSPADM

## 2023-10-18 RX ORDER — EPHEDRINE SULFATE 50 MG/ML
10 INJECTION, SOLUTION INTRAVENOUS
Status: DISCONTINUED | OUTPATIENT
Start: 2023-10-18 | End: 2023-10-18 | Stop reason: HOSPADM

## 2023-10-18 RX ORDER — NEOMYCIN SULFATE, POLYMYXIN B SULFATE, AND DEXAMETHASONE 3.5; 10000; 1 MG/G; [USP'U]/G; MG/G
OINTMENT OPHTHALMIC
Status: DISCONTINUED | OUTPATIENT
Start: 2023-10-18 | End: 2023-10-18 | Stop reason: HOSPADM

## 2023-10-18 RX ORDER — DEXMEDETOMIDINE HYDROCHLORIDE 100 UG/ML
INJECTION, SOLUTION INTRAVENOUS PRN
Status: DISCONTINUED | OUTPATIENT
Start: 2023-10-18 | End: 2023-10-18 | Stop reason: SURG

## 2023-10-18 RX ORDER — METOPROLOL TARTRATE 1 MG/ML
INJECTION, SOLUTION INTRAVENOUS PRN
Status: DISCONTINUED | OUTPATIENT
Start: 2023-10-18 | End: 2023-10-18 | Stop reason: SURG

## 2023-10-18 RX ORDER — EPHEDRINE SULFATE 50 MG/ML
5 INJECTION, SOLUTION INTRAVENOUS
Status: COMPLETED | OUTPATIENT
Start: 2023-10-18 | End: 2023-10-18

## 2023-10-18 RX ORDER — CEFAZOLIN SODIUM 1 G/3ML
INJECTION, POWDER, FOR SOLUTION INTRAMUSCULAR; INTRAVENOUS PRN
Status: DISCONTINUED | OUTPATIENT
Start: 2023-10-18 | End: 2023-10-18 | Stop reason: SURG

## 2023-10-18 RX ORDER — HYDRALAZINE HYDROCHLORIDE 20 MG/ML
5 INJECTION INTRAMUSCULAR; INTRAVENOUS
Status: DISCONTINUED | OUTPATIENT
Start: 2023-10-18 | End: 2023-10-18 | Stop reason: HOSPADM

## 2023-10-18 RX ORDER — LIDOCAINE HYDROCHLORIDE 20 MG/ML
INJECTION, SOLUTION INFILTRATION; PERINEURAL
Status: DISCONTINUED
Start: 2023-10-18 | End: 2023-10-18 | Stop reason: HOSPADM

## 2023-10-18 RX ADMIN — Medication 100 MCG: at 14:03

## 2023-10-18 RX ADMIN — EPHEDRINE SULFATE 5 MG: 50 INJECTION INTRAVENOUS at 16:01

## 2023-10-18 RX ADMIN — METOPROLOL TARTRATE 5 MG: 5 INJECTION INTRAVENOUS at 14:39

## 2023-10-18 RX ADMIN — ESMOLOL HYDROCHLORIDE 30 MG: 100 INJECTION, SOLUTION INTRAVENOUS at 14:30

## 2023-10-18 RX ADMIN — EPHEDRINE SULFATE 10 MG: 50 INJECTION, SOLUTION INTRAVENOUS at 17:10

## 2023-10-18 RX ADMIN — DEXMEDETOMIDINE 40 MCG: 100 INJECTION, SOLUTION INTRAVENOUS at 13:40

## 2023-10-18 RX ADMIN — SODIUM CHLORIDE, POTASSIUM CHLORIDE, SODIUM LACTATE AND CALCIUM CHLORIDE: 600; 310; 30; 20 INJECTION, SOLUTION INTRAVENOUS at 13:40

## 2023-10-18 RX ADMIN — CEFAZOLIN 2 G: 1 INJECTION, POWDER, FOR SOLUTION INTRAMUSCULAR; INTRAVENOUS at 13:40

## 2023-10-18 RX ADMIN — EPHEDRINE SULFATE 5 MG: 50 INJECTION INTRAVENOUS at 15:56

## 2023-10-18 ASSESSMENT — PAIN DESCRIPTION - PAIN TYPE
TYPE: SURGICAL PAIN
TYPE: CHRONIC PAIN
TYPE: SURGICAL PAIN

## 2023-10-18 ASSESSMENT — FIBROSIS 4 INDEX: FIB4 SCORE: 1.69

## 2023-10-18 NOTE — ANESTHESIA POSTPROCEDURE EVALUATION
Patient: Maddy Hodge    Procedure Summary     Date: 10/18/23 Room / Location: [facility] ROOM 27 / SURGERY SAME DAY [facility]    Anesthesia Start: 1340 Anesthesia Stop: 1528    Procedure: LEFT LOWER LID ECTROPION REPAIR WITH LATERAL TARSAL STRIP, RIGHT LOWER LID ECTROPION REPAIR, TRANSCONJUNCTIVAL RETRACTORS, REINSERTION LID TIGHTENING WITH LATERAL CANTHOPLASTY AND BILATERAL MIDFACE ADVACEMENT FLAP (Bilateral: Eye) Diagnosis: (ECTROPION, EXTROPION EXPOSURE, PUNCTAL STENOSIS)    Surgeons: Case Blanco M.D. Responsible Provider: Sadiq Cooper M.D.    Anesthesia Type: MAC ASA Status: 3          Final Anesthesia Type: MAC  Last vitals  BP   Blood Pressure : 91/54    Temp   36.1 °C (96.9 °F)    Pulse   70   Resp   18    SpO2   94 %      Anesthesia Post Evaluation    No notable events documented.     Nurse Pain Score: 7 (NPRS)

## 2023-10-18 NOTE — ANESTHESIA TIME REPORT
Anesthesia Start and Stop Event Times     Date Time Event    10/18/2023 1106 Ready for Procedure     1340 Anesthesia Start     1528 Anesthesia Stop        Responsible Staff  10/18/23    Name Role Begin End    Sadiq Cooper M.D. Anesth 1340 1528        Overtime Reason:  no overtime (within assigned shift)    Comments:

## 2023-10-18 NOTE — ANESTHESIA PREPROCEDURE EVALUATION
Case: [id number] Date/Time: 10/18/23 1430    Procedures:       LEFT LOWER LID ECTROPION REPAIR WITH LATERAL TARSAL STRIP, RIGHT LOWER LID ECTROPION REPAIR, TRANSCONJUNCTIVAL RETRACTORS, REINSERTION LID TIGHTENING WITH LATERAL CANTHOPLASTY AND BILATERAL MIDFACE ADVACEMENT FLAP      FLAP GRAFT    Pre-op diagnosis: ECTROPION, EXTROPION EXPOSURE, PUNCTAL STENOSIS    Location: [facility] ROOM 27 / SURGERY SAME DAY [facility]    Surgeons: Case Blanco M.D.          Relevant Problems   ANESTHESIA   (positive) ALISSA (obstructive sleep apnea)      PULMONARY   (positive) COPD with asthma   (positive) Chronic obstructive pulmonary disease (HCC)      CARDIAC   (positive) Aortic atherosclerosis (HCC)   (positive) Essential hypertension   (positive) Pulmonary hypertension (HCC)      GI   (positive) GERD (gastroesophageal reflux disease)   (positive) Gastroesophageal reflux disease with esophagitis         (positive) CKD (chronic kidney disease) stage 3, GFR 30-59 ml/min (HCC)      ENDO   (positive) Hypothyroidism due to Hashimoto's thyroiditis      Other   (positive) Facet arthritis of lumbar region       Physical Exam    Airway   Mallampati: II  TM distance: >3 FB  Neck ROM: full       Cardiovascular - normal exam  Rhythm: regular  Rate: normal  (-) murmur     Dental - normal exam           Pulmonary - normal exam  Breath sounds clear to auscultation     Abdominal    Neurological - normal exam                 Anesthesia Plan    ASA 3   ASA physical status 3 criteria: COPD - poorly controlled    Plan - MAC               Induction: intravenous      Pertinent diagnostic labs and testing reviewed    Informed Consent:    Anesthetic plan and risks discussed with patient.    Use of blood products discussed with: patient whom consented to blood products.

## 2023-10-18 NOTE — OR NURSING
1525- Pt to PACU 2 from OR. Bedside report from anesthesiologist and RN.  Attached to monitoring, VSS, breathing is calm and unlabored, Patient is asleep currently. Pt has a dressing on bilateral cheeks and CDI. Remains on 4 L oxygen via mask.    1530-  Pt denies pain or nausea at this time.     1556- Patient SBP in the 70's, dose of PRN ephedrine given at this time.     1601- Pt continues to be hypotensive. Second dose of ephedrine given. Patient also unable to maintain sats above 90%. Patient currently on 12 L mask.     1617-Dr. Cooper notified of low BP and increase if oxygen demand. Orders received to check BP. Pt does not feel SOB or any dizziness at this time.     1620-has had some water, tolerating well.    1625- FSBS resulted at 82.     1635- Report given to Marilia MCCLELLAN.

## 2023-10-18 NOTE — OR NURSING
1635 Assumed care, patient back on baseline O2. Tolerating crackers and juice. Updated patient's son     1701 Updated Dr. Cooper that BP is 78/43, orders for 2 more doses of ephedrine if needed     1710 10 mg Ephedrine given, patient states feels no s/s HoTN    1728 Dr. Cooper at bedside, clears patient for discharge, spoke to son and son feels comfortable taking patient home.     1730 Slowly dressed patient, states she feels fine. IV removed, discharge instructions reviewed with patient's son.     1740 Escorted out in personal wheelchair with personal O2 tank

## 2023-10-18 NOTE — DISCHARGE INSTRUCTIONS
OCULOPLASTICS SURGERY POSTOP INSTRUCTIONS:  - No heavy lifting, bending, stooping, straining, or exercise for 2 weeks following surgery.   - Keep head elevated.  - Keep wound or dressing clean and dry.  Do not get wet.  Remove the dressings from the cheeks in 2 days.   - Apply cool compresses to operative eyelid(s) for 20 minutes on and 20 minutes off while awake for the first 48 hrs.  Then switch to warm compresses 4 times per day until postop appt.   - DO NOT rub or pull on the operative eyelid(s).  - Wear eye shield at night over the operative eye(s) while sleeping for 3 weeks after surgery. (RN: please provide Miller shield and tape for patient).   - Do not take any aspirin or NSAID (e.g., Motrin, ibuprofen, Advil, naproxen, etc) for 1 week after surgery.   - Please take only over-the-counter Tylenol (acetaminophen) as needed for pain.   - Please call Dr. Blanco's office with any questions or concerns.  [phone].     Postop Medications: Prescriptions have all been e-Rx'd to the patient's preferred pharmacy prior to surgery.     - Maxitrol ophthalmic ointment apply thin layer 3 times per day to incision lines.    - Keflex (cephalexin) 500 mg orally 2 times per day for one week.   - Over the counter Tylenol (acetaminophen) as needed for pain.     What to Expect Post Anesthesia    Rest and take it easy for the first 24 hours.  A responsible adult is recommended to remain with you during that time.  It is normal to feel sleepy.  We encourage you to not do anything that requires balance, judgment or coordination.    FOR 24 HOURS DO NOT:  Drive, operate machinery or run household appliances.  Drink beer or alcoholic beverages.  Make important decisions or sign legal documents.    To avoid nausea, slowly advance diet as tolerated, avoiding spicy or greasy foods for the first day.  Add more substantial food to your diet according to your provider's instructions.  Babies can be fed formula or breast milk as soon as they are  hungry.  INCREASE FLUIDS AND FIBER TO AVOID CONSTIPATION.    MILD FLU-LIKE SYMPTOMS ARE NORMAL.  YOU MAY EXPERIENCE GENERALIZED MUSCLE ACHES, THROAT IRRITATION, HEADACHE AND/OR SOME NAUSEA.    If any questions arise, call your provider.  If your provider is not available, please feel free to call the Surgical [facility] at [phone].    MEDICATIONS: Resume taking daily medication.  Take prescribed pain medication with food.  If no medication is prescribed, you may take non-aspirin pain medication if needed.

## 2023-10-19 NOTE — OP REPORT
DATE OF SERVICE:  10/18/2023     ATTENDING SURGEON:  Case Blanco MD     ANESTHESIOLOGIST:  Sadiq Cooper MD     ANESTHESIA:  Local MAC.     PREOPERATIVE DIAGNOSES:  1.  Ectropion, right lower eyelid with retraction.  2.  Ectropion with eyelid retraction, left lower eyelid with tarsal eversion   in the left lower eyelid.  3.  Horizontal lid laxity, bilateral lower eyelids.  4.  Punctal stenosis, bilateral.  5.  Prominent globes bilaterally.  6.  Negative vector of the bilateral lower eyelids.     POSTOPERATIVE DIAGNOSES:  1.  Ectropion, right lower eyelid with retraction.  2.  Ectropion with eyelid retraction, left lower eyelid with tarsal eversion   in the left lower eyelid.  3.  Horizontal lid laxity, bilateral lower eyelids.  4.  Punctal stenosis, bilateral.  5.  Prominent globes bilaterally.  6.  Negative vector of the bilateral lower eyelids.     PROCEDURES PERFORMED:  1.  Ectropion repair with lateral tarsal strip, right lower eyelid.  2.  Midface advancement flap, right side.  3.  Ectropion repair with transconjunctival retractors reinsertion and   rotational suture placement, left lower eyelid.  4.  Horizontal lid tightening of left lower eyelid with left lateral   canthoplasty.  5.  Punctal dilation, bilateral lower eyelids.     SPECIMENS:  None.     IMPLANTS:  None.     COMPLICATIONS:  None.     ESTIMATED BLOOD LOSS:  Less than 5 mL     INDICATIONS FOR PROCEDURE:  This is an 83-year-old female with history of   significant ectropion and retraction of the lower eyelids secondary to   involutional changes and her orbital anatomy and negative vector   configuration.  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  Furthermore, the patient has punctal stenosis further   compounding the tearing issue.  Because of these issues, it was recommended to   the patient to undergo the above-listed procedures.  Risks, benefits, and   alternatives of surgery were explained to the patient  in detail.  Informed   consent was signed.     PROCEDURE IN DETAIL:  The patient was brought to the operating room and laid   supine on the operating room table.  After adequate IV sedation, the right   lower eyelid was infiltrated with 2% lidocaine with epinephrine in the lateral   canthus and then along the right cheek and right inferolateral orbital rim.    The full face was prepped and draped in usual sterile fashion.  A lateral   canthotomy was performed on the right side down to the lateral orbital rim   periosteum.  The periosteum was cleared off using a [place] elevator.  A lateral   tarsal strip was created by splitting the anterior and posterior lamella for   several millimeters commensurate with amount of lid tightening that was   necessary.  The redundant lash-bearing skin and mucocutaneous junction at the   tarsal strip was excised sharply.     At this point, the midface advancement flap was commenced.  The lateral   canthal incision was extended laterally.  Please note, this is an additional   incision that was not normally necessary for a tarsal strip procedure and was   indicated to help provide a soft tissue support to the lower eyelid.  The   dissection was carried down to the preperiosteal plane along the lateral   aspect of the lateral orbital rim and then in the preperiosteal plane,   dissection was carried bluntly and sharply over the malar eminence laterally   over the proximal aspect of the zygomatic arch and over the prominence of the   zygoma and medially extending to the level of the neurovascular bundle of the   infraorbital nerve, hemostasis was obtained with gentle pressure.     The tarsal strip was then sutured to the lateral orbital rim periosteum using   a mattress suture of double armed 5-0 Mersilene.  Then, the lateral canthal   angle was reconstructed with a buried 7-0 Vicryl suture.  Then, the midface   advancement flap was advanced, taking bites of the suborbicularis oculi    fibrofatty tissue with 5-0 Mersilene, then each suture was then passed through   the lateral aspect of the lateral orbital rim periosteum and tied down.  This   nicely supported the cheek tissue superiorly advancing the tissue flaps.    Then, the canthal incision was closed with buried 5-0 Vicryl followed by   interrupted 7-0 Vicryl in the skin.     The bilateral lower lid puncta were dilated with a punctal dilator.     Then, attention was turned to the left lower eyelid. Transconjunctival and   transcutaneous infiltration of local was performed on the left lower eyelid as   was into the lateral canthus and down to the left cheek area.  Infiltration of local anesthetic in the left cheek area.  Using digital   retraction, the eyelid was retracted inferiorly.  The medial half of the lower   eyelid was addressed because it had tarsal ectropion.  A conjunctival   incision was made through the palpebral conjunctiva in the inferior tarsal   border using sharp Micaela scissors.  Sharp dissection was carried inferiorly   until the lower lid retractors could be identified.  Two double arm 5-0   chromic gut sutures were placed through the inferior edge of the conjunctiva   and brought through the lower lid retractors in a mattress fashion.  Each arm   of the suture was passed through the lower aspect of the tarsus and then   brought through the inferior fornix and brought out full-thickness through the   eyelid.  This was left untied for the moment.     Attention was now turned to the lateral canthoplasty to tighten the lower   eyelid.  This is a separate procedure as it was a separate incision and to   eliminate laxity that was contributing to the eyelid malposition and to   support the eyelid upwards.  A lateral canthotomy was performed using Rinaldi   tenotomy scissors.  Sharp dissection was carried down to the lateral orbital   rim periosteum.  A small full thickness wedge of the temporal left lower   eyelid was  excised sharply with the amount of lid tightening as a guide for   how much of the wedge was resected at this point, the midface advancement flap   was elevated lateral canthal incision was made more laterally down to the   preperiosteal plane.  Then, blunt dissection was carried over the malar   eminence with a Rinaldi tenotomy scissors.  Once the midface was adequately   freed, attention was turned to completing the canthoplasty.  The cut edge of   the tarsus was then connected to the lateral orbital rim periosteum with a   buried 5-0 Vicryl with a mattress suture of 5-0 Mersilene in the lateral   canthal angle was then reconstructed with a buried 7-0 Vicryl suture.     The midface advancement flap was then elevated and sutured into place by   placing sutures.  Two 5-0 Mersilene sutures through _____ and then bringing it   through the lateral aspect of the lateral orbital rim periosteum.  The   orbicularis was then closed with buried 5-0 Vicryls at the lateral canthotomy   and the skin was closed with interrupted 7-0 Vicryl sutures.  The rotational   sutures that were placed earlier were now tied.  This closed the conjunctival   incision and rotated the eyelid margin inwards and plicate the lower lid   retractors to the inferior tarsal border.  The eyes were rinsed out, dressed   with antibiotic ointment. Pressure dressings were placed with a folded 4 x 4   and taped into place with Mastisol and paper tape to the malar eminences   bilaterally.  The patient was brought to PACU in stable condition.  There were   no complications.        ______________________________  MD ARIEL Townsend/GORGE    DD:  10/18/2023 15:49  DT:  10/18/2023 17:04    Job#:  [id number]

## 2023-10-27 ENCOUNTER — PATIENT MESSAGE (OUTPATIENT)
Dept: SLEEP MEDICINE | Facility: MEDICAL CENTER | Age: 83
End: 2023-10-27
Payer: MEDICARE

## 2023-10-27 DIAGNOSIS — J43.9 MIXED RESTRICTIVE AND OBSTRUCTIVE LUNG DISEASE (HCC): ICD-10-CM

## 2023-10-27 DIAGNOSIS — J98.4 MIXED RESTRICTIVE AND OBSTRUCTIVE LUNG DISEASE (HCC): ICD-10-CM

## 2023-10-30 RX ORDER — PREDNISONE 10 MG/1
TABLET ORAL
Qty: 18 TABLET | Refills: 0 | Status: SHIPPED | OUTPATIENT
Start: 2023-10-30

## 2023-10-30 RX ORDER — AZITHROMYCIN 250 MG/1
TABLET, FILM COATED ORAL
Qty: 6 TABLET | Refills: 0 | Status: SHIPPED | OUTPATIENT
Start: 2023-10-30

## 2023-11-29 DIAGNOSIS — T78.40XS ALLERGY, SEQUELA: ICD-10-CM

## 2023-11-29 DIAGNOSIS — J44.89 COPD WITH ASTHMA (HCC): ICD-10-CM

## 2023-11-29 LAB
CHOLEST SERPL-MCNC: 152 MG/DL (ref 100–199)
HDLC SERPL-MCNC: 60 MG/DL
LABORATORY COMMENT REPORT: NORMAL
LDLC SERPL CALC-MCNC: 73 MG/DL (ref 0–99)
TRIGL SERPL-MCNC: 106 MG/DL (ref 0–149)
VLDLC SERPL CALC-MCNC: 19 MG/DL (ref 5–40)

## 2023-11-29 RX ORDER — MONTELUKAST SODIUM 10 MG/1
10 TABLET ORAL EVERY EVENING
Qty: 90 TABLET | Refills: 3 | Status: SHIPPED | OUTPATIENT
Start: 2023-11-29

## 2023-11-30 NOTE — TELEPHONE ENCOUNTER
Have we ever prescribed this med? Yes.     Last OV: 5/17/23    Next OV: 4/17/24    Medications: Montelukast

## 2023-12-15 ENCOUNTER — OFFICE VISIT (OUTPATIENT)
Dept: MEDICAL GROUP | Facility: MEDICAL CENTER | Age: 83
End: 2023-12-15
Payer: MEDICARE

## 2023-12-15 VITALS
BODY MASS INDEX: 30.99 KG/M2 | DIASTOLIC BLOOD PRESSURE: 64 MMHG | TEMPERATURE: 97 F | HEIGHT: 60 IN | RESPIRATION RATE: 20 BRPM | SYSTOLIC BLOOD PRESSURE: 118 MMHG | OXYGEN SATURATION: 93 % | HEART RATE: 81 BPM | WEIGHT: 157.85 LBS

## 2023-12-15 DIAGNOSIS — Z23 NEED FOR IMMUNIZATION AGAINST INFLUENZA: ICD-10-CM

## 2023-12-15 DIAGNOSIS — N95.1 MENOPAUSAL SYMPTOMS: ICD-10-CM

## 2023-12-15 DIAGNOSIS — E87.6 HYPOKALEMIA: ICD-10-CM

## 2023-12-15 DIAGNOSIS — G56.22 CUBITAL TUNNEL SYNDROME ON LEFT: ICD-10-CM

## 2023-12-15 DIAGNOSIS — E03.4 IDIOPATHIC ATROPHIC HYPOTHYROIDISM: ICD-10-CM

## 2023-12-15 PROCEDURE — 99214 OFFICE O/P EST MOD 30 MIN: CPT | Mod: 25 | Performed by: FAMILY MEDICINE

## 2023-12-15 PROCEDURE — G0008 ADMIN INFLUENZA VIRUS VAC: HCPCS | Performed by: FAMILY MEDICINE

## 2023-12-15 PROCEDURE — 3078F DIAST BP <80 MM HG: CPT | Performed by: FAMILY MEDICINE

## 2023-12-15 PROCEDURE — 90662 IIV NO PRSV INCREASED AG IM: CPT | Performed by: FAMILY MEDICINE

## 2023-12-15 PROCEDURE — 3074F SYST BP LT 130 MM HG: CPT | Performed by: FAMILY MEDICINE

## 2023-12-15 RX ORDER — LEVOTHYROXINE SODIUM 0.05 MG/1
50 TABLET ORAL
Qty: 90 TABLET | Refills: 3 | Status: SHIPPED | OUTPATIENT
Start: 2023-12-15

## 2023-12-15 RX ORDER — IMIPRAMINE HYDROCHLORIDE 10 MG/1
10 TABLET, FILM COATED ORAL 2 TIMES DAILY
Qty: 180 TABLET | Refills: 3 | Status: SHIPPED | OUTPATIENT
Start: 2023-12-15

## 2023-12-15 RX ORDER — ESTRADIOL 0.5 MG/1
0.5 TABLET ORAL EVERY EVENING
Qty: 90 TABLET | Refills: 3 | Status: SHIPPED | OUTPATIENT
Start: 2023-12-15

## 2023-12-15 ASSESSMENT — FIBROSIS 4 INDEX: FIB4 SCORE: 1.69

## 2023-12-15 NOTE — PROGRESS NOTES
Chief Complaint   Patient presents with    Follow-Up     4M     Hypothyroidism    Arm Pain    Flu Vaccine       Subjective:     HPI:   Maddy Hodge presents today with the followin. Idiopathic atrophic hypothyroidism  Patient reports good energy level on the medication. Patient denies insomnia, tremor or change in appetite.  Patient is taking the medication on an empty stomach in the morning and waiting at least 30 minutes before eating.  Last TSH in July was at target.  - levothyroxine (SYNTHROID) 50 MCG Tab; Take 1 Tablet by mouth every morning on an empty stomach.  Dispense: 90 Tablet; Refill: 3    2. Hypokalemia  Continues to take her supplementation.  Follows with cardiology.  Denies any increase in muscle cramping.    3. Cubital tunnel syndrome on left  The imipramine helps, has to split up bid.  She does fine on the smaller dose twice daily.  - imipramine (TOFRANIL) 10 MG Tab; Take 1 Tablet by mouth 2 times a day.  Dispense: 180 Tablet; Refill: 3    4. Menopausal symptoms  Estradiol continues to be helpful.  She no longer does mammography.  - estradiol (ESTRACE) 0.5 MG tablet; Take 1 Tablet by mouth every evening.  Dispense: 90 Tablet; Refill: 3    5. Need for immunization against influenza  Flu vaccine administered today  - Influenza Vaccine, High Dose (65+ Only)          Patient Active Problem List    Diagnosis Date Noted    At risk for falling 2023    Status post total bilateral knee replacement 2022    Chronic respiratory failure with hypoxia (HCC) 2022    Aortic atherosclerosis (HCC) 2022    Spinal stenosis of lumbar region with neurogenic claudication 11/10/2021    Postmenopausal osteoporosis 11/10/2021    Hypothyroidism due to Hashimoto's thyroiditis 11/10/2021    Hearing loss 11/10/2021    Pain management 2021    Neurogenic bladder 2021    Presence of suprapubic catheter (HCC) 2020    Gastroesophageal reflux disease with esophagitis 2020     Chronic anxiety 07/15/2019    Vitamin D deficiency disease 04/10/2019    Dyslipidemia, goal LDL below 130 08/15/2018    Chronic obstructive pulmonary disease ([facility]) 06/22/2018    Pulmonary hypertension ([facility]) 02/21/2018    Obesity, Class I, BMI 30-34.9     COPD with asthma 11/10/2017    ALISSA (obstructive sleep apnea) 11/10/2017    Postlaminectomy syndrome, unspecified region 07/03/2017    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    Menopausal symptoms 09/13/2016    Essential tremor 09/06/2016    CKD (chronic kidney disease) stage 3, GFR 30-59 ml/min ([facility]) 05/23/2016    Esophageal dysphagia 05/19/2016    Major depressive disorder, recurrent episode, mild (CMS-HCC) 05/02/2016    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 90 Tablet 3    imipramine (TOFRANIL) 10 MG Tab Take 1 Tablet by mouth 2 times a day. 180 Tablet 3    estradiol (ESTRACE) 0.5 MG tablet Take 1 Tablet by mouth every evening. 90 Tablet 3    montelukast (SINGULAIR) 10 MG Tab Take 1 Tablet by mouth every evening. 90 Tablet 3    azithromycin (ZITHROMAX) 250 MG Tab Take 2 tablets on day 1, then take 1 tablet a day for 4 days. 6 Tablet 0    predniSONE (DELTASONE) 10 MG Tab Take 30mg x 3 days, then take 20mg x 3 days, then take 10mg x 3 days, with food, then discontinue. 18 Tablet 0    Cholecalciferol (D3 2000) 2000 UNIT Cap Take 2,000 Units by mouth every day.      oxyCODONE ER (XTAMPZA ER) 18 MG Capsule Extended Release 12 hour Abuse-Deterrent Take 18 mg by mouth 2 times a day. Pt takes @ 0630 and 2100      latanoprost (XALATAN) 0.005 % Solution Administer 1 Drop into both  eyes every evening.      escitalopram (LEXAPRO) 20 MG tablet TAKE 1 TABLET EVERY DAY (Patient taking differently: Take 20 mg by mouth every day.) 90 Tablet 3    Fluticasone Propionate, Inhal, (FLOVENT DISKUS) 100 MCG/ACT AEROSOL POWDER, BREATH ACTIVATED Inhale 1 Puff 2 times a day. Rinse mouth after each use. 60 Each 11    atorvastatin (LIPITOR) 20 MG Tab TAKE 1 TABLET EVERY EVENING (Patient taking differently: Take 20 mg by mouth every evening.) 90 Tablet 3    omeprazole (PRILOSEC) 20 MG delayed-release capsule TAKE 1 CAPSULE EVERY DAY (Patient taking differently: Take 20 mg by mouth every day.) 90 Capsule 3    potassium chloride (KLOR-CON) 8 MEQ tablet TAKE 2 TABLETS EVERY DAY (Patient taking differently: Take 16 mEq by mouth every day.) 180 Tablet 3    albuterol 108 (90 Base) MCG/ACT Aero Soln inhalation aerosol Inhale 2 Puffs every four hours as needed for Shortness of Breath. 3 Each 3    D-MANNOSE PO Take 1,300 mg by mouth 2 times a day.      spironolactone (ALDACTONE) 25 MG Tab Take 25 mg by mouth every evening.      ASPIRIN LOW DOSE 81 MG EC tablet TAKE 1 TABLET BY MOUTH TWICE A DAY (Patient taking differently: Take 81 mg by mouth every day.) 60 Tablet 2    oxyCODONE-acetaminophen (PERCOCET) 5-325 MG Tab Take 1 Tablet by mouth 4 times a day. Pt takes @ 0600, 1200, 1700, and 2100      Multiple Vitamins-Minerals (VITEYES AREDS ADVANCED PO) Take 2 Capsules by mouth every day.      Cyanocobalamin (VITAMIN B-12 PO) Take 2,500 mcg by mouth every day.      docusate sodium (COLACE) 250 MG capsule Take 500 mg by mouth every evening. Indications: Constipation      bumetanide (BUMEX) 2 MG tablet Take 1 Tablet by mouth at bedtime. Indications: Edema, High Blood Pressure Disorder      pregabalin (LYRICA) 200 MG capsule Take 1 Capsule by mouth in the morning, at noon, and at bedtime. Pt takes @ 0600, 1200, and 2100      AMITIZA 24 MCG capsule Take 2 Capsules by mouth every morning with breakfast. Indications: Chronic  Constipation of Unknown Cause       No current facility-administered medications for this visit.       Allergies   Allergen Reactions    Other Drug     Penicillins Hives     hives      Sulfa Drugs Hives     hives      Macrobid [Nitrofurantoin] Rash     rash    Tape Rash and Itching     Paper tape ok       ROS: As per HPI   denies chest pain or shortness of breath       Objective:     /64 (BP Location: Right arm, Patient Position: Sitting, BP Cuff Size: Small adult)   Pulse 81   Temp 36.1 °C (97 °F) (Temporal)   Resp 20   Ht 1.524 m (5')   Wt 71.6 kg (157 lb 13.6 oz)   SpO2 93%  on O2  2 liters nasal prongs  Body mass index is 30.83 kg/m².    Physical Exam:  Constitutional: Well-developed and well-nourished. Not diaphoretic. No distress. Lucid and fluent.  Skin: Skin is warm and dry. No rash noted.  Head: Atraumatic without lesions.  Eyes: Conjunctivae and extraocular motions are normal. Pupils are equal, round, and reactive to light. No scleral icterus.   Ears:  External ears unremarkable.   Mouth/Throat: Tongue normal. Oropharynx is clear and moist. Posterior pharynx without erythema or exudates.  Neck: Supple, trachea midline. No thyromegaly present. No cervical or supraclavicular lymphadenopathy. No JVD or carotid bruits appreciated  Cardiovascular: Regular rate and rhythm.  Normal S1, S2 without murmur appreciated.  Chest: Effort normal. Clear to auscultation throughout. No adventitious sounds.   Abdomen: Soft, non tender, and without distention. Active bowel sounds in all four quadrants. No rebound, guarding, masses or hepatosplenomegaly.  Extremities: No cyanosis, clubbing, erythema, nor edema.   Neurological: Alert and oriented x 3. Gait is unsteady.  Uses a wheelchair for appointments.  Psychiatric:  Behavior, mood, and affect are appropriate.       Assessment and Plan:     83 y.o. female with the following issues:    1. Idiopathic atrophic hypothyroidism  levothyroxine (SYNTHROID) 50 MCG Tab       2. Hypokalemia        3. Cubital tunnel syndrome on left  imipramine (TOFRANIL) 10 MG Tab      4. Menopausal symptoms  estradiol (ESTRACE) 0.5 MG tablet      5. Need for immunization against influenza  Influenza Vaccine, High Dose (65+ Only)            Followup: Return in about 6 months (around 6/15/2024), or if symptoms worsen or fail to improve.

## 2024-01-09 ENCOUNTER — APPOINTMENT (RX ONLY)
Dept: URBAN - METROPOLITAN AREA CLINIC 15 | Facility: CLINIC | Age: 84
Setting detail: DERMATOLOGY
End: 2024-01-09

## 2024-01-09 DIAGNOSIS — L57.0 ACTINIC KERATOSIS: ICD-10-CM

## 2024-01-09 DIAGNOSIS — L72.8 OTHER FOLLICULAR CYSTS OF THE SKIN AND SUBCUTANEOUS TISSUE: ICD-10-CM

## 2024-01-09 DIAGNOSIS — L82.1 OTHER SEBORRHEIC KERATOSIS: ICD-10-CM

## 2024-01-09 DIAGNOSIS — D22 MELANOCYTIC NEVI: ICD-10-CM

## 2024-01-09 DIAGNOSIS — L90.5 SCAR CONDITIONS AND FIBROSIS OF SKIN: ICD-10-CM

## 2024-01-09 DIAGNOSIS — L81.4 OTHER MELANIN HYPERPIGMENTATION: ICD-10-CM

## 2024-01-09 PROBLEM — D22.5 MELANOCYTIC NEVI OF TRUNK: Status: ACTIVE | Noted: 2024-01-09

## 2024-01-09 PROCEDURE — ? LIQUID NITROGEN

## 2024-01-09 PROCEDURE — 99203 OFFICE O/P NEW LOW 30 MIN: CPT | Mod: 25

## 2024-01-09 PROCEDURE — ? ADDITIONAL NOTES

## 2024-01-09 PROCEDURE — ? DIAGNOSIS COMMENT

## 2024-01-09 PROCEDURE — 17004 DESTROY PREMAL LESIONS 15/>: CPT

## 2024-01-09 PROCEDURE — ? COUNSELING

## 2024-01-09 ASSESSMENT — LOCATION DETAILED DESCRIPTION DERM
LOCATION DETAILED: RIGHT PROXIMAL DORSAL FOREARM
LOCATION DETAILED: LEFT DORSAL WRIST
LOCATION DETAILED: RIGHT SUPERIOR LATERAL NECK
LOCATION DETAILED: LEFT SUPERIOR LATERAL MALAR CHEEK
LOCATION DETAILED: RIGHT SUPERIOR MEDIAL UPPER BACK
LOCATION DETAILED: LEFT MEDIAL ZYGOMA
LOCATION DETAILED: RIGHT INFERIOR LATERAL NECK
LOCATION DETAILED: LEFT RADIAL DORSAL HAND
LOCATION DETAILED: SUPERIOR THORACIC SPINE
LOCATION DETAILED: RIGHT DISTAL POSTERIOR UPPER ARM
LOCATION DETAILED: LEFT PROXIMAL DORSAL FOREARM
LOCATION DETAILED: RIGHT DISTAL DORSAL FOREARM
LOCATION DETAILED: LEFT DISTAL DORSAL FOREARM
LOCATION DETAILED: RIGHT SUPERIOR UPPER BACK
LOCATION DETAILED: RIGHT RADIAL DORSAL HAND
LOCATION DETAILED: RIGHT DORSAL WRIST
LOCATION DETAILED: LEFT BUTTOCK

## 2024-01-09 ASSESSMENT — LOCATION ZONE DERM
LOCATION ZONE: ARM
LOCATION ZONE: NECK
LOCATION ZONE: HAND
LOCATION ZONE: TRUNK
LOCATION ZONE: FACE

## 2024-01-09 ASSESSMENT — LOCATION SIMPLE DESCRIPTION DERM
LOCATION SIMPLE: LEFT ZYGOMA
LOCATION SIMPLE: LEFT CHEEK
LOCATION SIMPLE: LEFT WRIST
LOCATION SIMPLE: RIGHT UPPER BACK
LOCATION SIMPLE: RIGHT HAND
LOCATION SIMPLE: RIGHT WRIST
LOCATION SIMPLE: RIGHT POSTERIOR UPPER ARM
LOCATION SIMPLE: RIGHT FOREARM
LOCATION SIMPLE: RIGHT ANTERIOR NECK
LOCATION SIMPLE: LEFT BUTTOCK
LOCATION SIMPLE: LEFT HAND
LOCATION SIMPLE: LEFT FOREARM
LOCATION SIMPLE: UPPER BACK
LOCATION SIMPLE: NECK

## 2024-01-09 NOTE — PROCEDURE: ADDITIONAL NOTES
Detail Level: Detailed
Additional Notes: advised if lesion below the right ear does not resolve with LN2, then to rtc and we will take a bx to r/o SCC.  Not anxious to bx patient given her age and health status.   DDX: includes an iSK, SCC.
Render Risk Assessment In Note?: no

## 2024-01-09 NOTE — PROCEDURE: LIQUID NITROGEN
Render Post-Care Instructions In Note?: yes
Detail Level: Detailed
Aperture Size (Optional): A
Consent: The patient's consent was obtained including but not limited to risks of crusting, scabbing, blistering, scarring, darker or lighter pigmentary change, recurrence, incomplete removal and infection.
Post-Care Instructions: I reviewed with the patient in detail post-care instructions. Patient is to wear sunprotection, and avoid picking at any of the treated lesions. Pt may apply Vaseline to crusted or scabbing areas.
Render Note In Bullet Format When Appropriate: No
Duration Of Freeze Thaw-Cycle (Seconds): 3
Number Of Freeze-Thaw Cycles: 1 freeze-thaw cycle

## 2024-02-29 DIAGNOSIS — K21.00 GASTROESOPHAGEAL REFLUX DISEASE WITH ESOPHAGITIS WITHOUT HEMORRHAGE: ICD-10-CM

## 2024-02-29 RX ORDER — OMEPRAZOLE 20 MG/1
CAPSULE, DELAYED RELEASE ORAL
Qty: 90 CAPSULE | Refills: 3 | Status: SHIPPED | OUTPATIENT
Start: 2024-02-29

## 2024-04-03 DIAGNOSIS — E78.5 DYSLIPIDEMIA, GOAL LDL BELOW 130: ICD-10-CM

## 2024-04-03 RX ORDER — ATORVASTATIN CALCIUM 20 MG/1
20 TABLET, FILM COATED ORAL EVERY EVENING
Qty: 90 TABLET | Refills: 3 | Status: SHIPPED | OUTPATIENT
Start: 2024-04-03

## 2024-04-03 NOTE — TELEPHONE ENCOUNTER
Received request via: Patient    Was the patient seen in the last year in this department? Yes    Does the patient have an active prescription (recently filled or refills available) for medication(s) requested? No    Pharmacy Name: [facility] [address]     Does the patient have assisted Plus and need 100 day supply (blood pressure, diabetes and cholesterol meds only)? Patient does not have SCP

## 2024-04-17 ENCOUNTER — OFFICE VISIT (OUTPATIENT)
Dept: SLEEP MEDICINE | Facility: MEDICAL CENTER | Age: 84
End: 2024-04-17
Attending: INTERNAL MEDICINE
Payer: MEDICARE

## 2024-04-17 VITALS
HEIGHT: 60 IN | SYSTOLIC BLOOD PRESSURE: 106 MMHG | BODY MASS INDEX: 32.39 KG/M2 | HEART RATE: 69 BPM | DIASTOLIC BLOOD PRESSURE: 60 MMHG | OXYGEN SATURATION: 93 % | WEIGHT: 165 LBS

## 2024-04-17 DIAGNOSIS — J98.4 MIXED RESTRICTIVE AND OBSTRUCTIVE LUNG DISEASE (HCC): ICD-10-CM

## 2024-04-17 DIAGNOSIS — J96.11 CHRONIC RESPIRATORY FAILURE WITH HYPOXIA (HCC): ICD-10-CM

## 2024-04-17 DIAGNOSIS — I27.20 PULMONARY HYPERTENSION (HCC): Chronic | ICD-10-CM

## 2024-04-17 DIAGNOSIS — G47.33 OSA (OBSTRUCTIVE SLEEP APNEA): Chronic | ICD-10-CM

## 2024-04-17 DIAGNOSIS — J43.9 MIXED RESTRICTIVE AND OBSTRUCTIVE LUNG DISEASE (HCC): ICD-10-CM

## 2024-04-17 PROCEDURE — 3074F SYST BP LT 130 MM HG: CPT | Performed by: INTERNAL MEDICINE

## 2024-04-17 PROCEDURE — 3078F DIAST BP <80 MM HG: CPT | Performed by: INTERNAL MEDICINE

## 2024-04-17 PROCEDURE — 99214 OFFICE O/P EST MOD 30 MIN: CPT | Performed by: INTERNAL MEDICINE

## 2024-04-17 PROCEDURE — 99212 OFFICE O/P EST SF 10 MIN: CPT | Performed by: INTERNAL MEDICINE

## 2024-04-17 RX ORDER — ALBUTEROL SULFATE 90 UG/1
2 AEROSOL, METERED RESPIRATORY (INHALATION) EVERY 4 HOURS PRN
Qty: 3 EACH | Refills: 3 | Status: SHIPPED | OUTPATIENT
Start: 2024-04-17

## 2024-04-17 RX ORDER — FLUTICASONE PROPIONATE 100 UG/1
1 POWDER, METERED RESPIRATORY (INHALATION) 2 TIMES DAILY
Qty: 3 EACH | Refills: 3 | Status: SHIPPED | OUTPATIENT
Start: 2024-04-17

## 2024-04-17 ASSESSMENT — ENCOUNTER SYMPTOMS
BACK PAIN: 0
NAUSEA: 0
EYE DISCHARGE: 0
FEVER: 0
SHORTNESS OF BREATH: 0
HEARTBURN: 0
MYALGIAS: 0
ABDOMINAL PAIN: 0
PALPITATIONS: 0
PND: 0
EYE PAIN: 0
CONSTIPATION: 0
HEADACHES: 0
STRIDOR: 0
DOUBLE VISION: 0
HEMOPTYSIS: 0
NECK PAIN: 0
WEAKNESS: 0
COUGH: 0
DEPRESSION: 0
PHOTOPHOBIA: 0
BLURRED VISION: 0
FOCAL WEAKNESS: 0
SPEECH CHANGE: 0
ORTHOPNEA: 0
DIZZINESS: 0
SPUTUM PRODUCTION: 0
DIARRHEA: 0
VOMITING: 0
TREMORS: 0
SORE THROAT: 0
DIAPHORESIS: 0
FALLS: 0
SINUS PAIN: 0
WHEEZING: 0
WEIGHT LOSS: 0
CLAUDICATION: 0
EYE REDNESS: 0
CHILLS: 0

## 2024-04-17 ASSESSMENT — PATIENT HEALTH QUESTIONNAIRE - PHQ9: CLINICAL INTERPRETATION OF PHQ2 SCORE: 0

## 2024-04-17 ASSESSMENT — FIBROSIS 4 INDEX: FIB4 SCORE: 1.69

## 2024-04-17 NOTE — PROGRESS NOTES
"Chief Complaint   Patient presents with    Follow-Up     LAST SEEN 5/17/23    Results     CTA CHEST 9/26/23  CXR 9/26/23         HPI: This patient is a 83 y.o. female whom is followed in our clinic for chronic hypoxemic respiratory failure with restrictive/obstructive lung disease last seen by me on 5/17/23.  She has a hx of ALISSA previously on BIPAP but has stopped this due to poor quality of sleep since our last visit. She is also followed by Dr. Cook with cardiology for PH on Letairis and diuretics. She is a life long non-smoker. She is current on flovent 100 and prn CHIQUIS for mild RAD.  She is fairly inactive but son who cares for her works to get her out. She has stable O2 needs at 2LPM at rest and up to 3lPM with activity. PFTs from 3/2021 showed ratio of 67, FEV1 of 1.07L or 62% pred, TLC of 76% pred and DLCO of 72% c/w mixed restrictive obstructive pattern. She has declined f/u given stable clinically. She had covid in September, was tx with paxlovid and seen in ED 9/26/23. O2 needs were stable and CT chest unremarkable, no PE so she was sent home and has since recovered. She feels back to baseline. She does have chronically elevated R hemidiaphragm. Last TTE from 4/2023 showed normal RV sz and function with RVSP estimated at 48 mmHg.    Past Medical History:   Diagnosis Date    Allergy     seasonal    Anesthesia     \"hard to wake up\"    Anxiety     due to loss of . 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     Arthritis     facet arthritis of lumbar region    Asthma     Inhaler use daily.    Basal cell carcinoma     arm, neck, face    Bowel habit changes     constipation    Breath shortness Pulmonary Hypertension    Uses oxygen continously    Carpal tunnel syndrome on left 07/02/2021    Added automatically from request for surgery [id number]    CATARACT     removed bilat    Chickenpox     Chronic constipation     Coagulase-negative staphylococcal infection     Dr. Albrecht, attaches to " plastic, prulent    COVID     Began feeling sick on 9/19.  Symptoms included runny nose, stuffiness, sore throat, next day got cough.  Tested positive on home kit for covid on 9/20.  Currently still having cough, runny nose, slightly short of breath, wheezing.  Started Paxloviv on 9/20,last dose was taken today.  Son Bahman following up with her DrMariusz on MyChart.    Cubital tunnel syndrome on left 07/02/2021    Added automatically from request for surgery [id number]    Delayed emergence from general anesthesia     Depression     and anxiety    Encounter for long-term (current) use of other medications     Erosive gastritis 05/2009    antral    Esophageal dysphagia 05/19/2016    Essential hypertension 07/06/2009    Family history of adverse effect to anesthesia     Son Johnathan is hard to wake up    Family history of polycystic kidney disease     Garnett catheter in place     GERD (gastroesophageal reflux disease)     Swiss measles     Heart burn     High cholesterol     History of vertebral fracture 03/26/2012    Hypertension     Hypothyroidism     Hypothyroidism due to Hashimoto's thyroiditis 11/10/2021    managed with medication Formatting of this note might be different from the original. Formatting of this note might be different from the original. managed with medication  Last Assessment & Plan:  Formatting of this note might be different from the original. Chronic condition managed with current medical regimen Stable per review  Continue with current meds Followed by Shirlene Quinones M.D..    Indwelling urinary catheter present 11/16/2020    Influenza     Lumbar vertebral fracture (HCC) 05/2011    Mumps     Muscle disorder     Arachnoiditis    Neuropathy     Neuropathy     Obesity, Class I, BMI 30-34.9     ALISSA (obstructive sleep apnea) 11/10/2017    OSTEOPOROSIS     Oxygen dependent     2 liters, [facility]    Pain 07/20/2021    left arm, knees, chronic back, right leg, 6/10    Pain management 05/20/2021    Primary  osteoarthritis of left knee 08/13/2020    Formatting of this note might be different from the original. Formatting of this note might be different from the original. Added automatically from request for surgery [id number]    Pulmonary hypertension (HCC)     Recurrent UTI 06/28/2017    Sleep apnea     on BiPap, follows with pulmonology    Spinal stenosis of lumbar region with neurogenic claudication 11/10/2021    Formatting of this note might be different from the original. Last Assessment & Plan:  Formatting of this note might be different from the original. Chronic condition managed with current medical regimen Stable per review  Continue with current meds Followed by specialty    Spinal stenosis, lumbar region, without neurogenic claudication     Status post total bilateral knee replacement 05/24/2022    Suprapubic catheter (HCC)     Tonsillitis     Urinary bladder disorder 6/30/2016       Social History     Socioeconomic History    Marital status:      Spouse name: Not on file    Number of children: Not on file    Years of education: Not on file    Highest education level: Not on file   Occupational History    Not on file   Tobacco Use    Smoking status: Never     Passive exposure: Past    Smokeless tobacco: Never    Tobacco comments:     Both parents smoked, second hand exposure.   Vaping Use    Vaping Use: Never used   Substance and Sexual Activity    Alcohol use: Not Currently     Alcohol/week: 0.0 oz    Drug use: Never    Sexual activity: Not Currently     Partners: Male     Birth control/protection: Abstinence   Other Topics Concern     Service Not Asked    Blood Transfusions Not Asked    Caffeine Concern Not Asked    Occupational Exposure Not Asked    Hobby Hazards Not Asked    Sleep Concern Not Asked    Stress Concern No     Comment:  cancer    Weight Concern Not Asked    Special Diet Not Asked    Back Care Not Asked    Exercise Not Asked    Bike Helmet Not Asked    Seat Belt Not Asked     Self-Exams Not Asked   Social History Narrative    Not on file     Social Determinants of Health     Financial Resource Strain: Not on file   Food Insecurity: Not on file   Transportation Needs: Not on file   Physical Activity: Not on file   Stress: Not on file   Social Connections: Not on file   Intimate Partner Violence: Not on file   Housing Stability: Not on file       Family History   Problem Relation Age of Onset    Genitourinary () Problems Brother     Lung Disease Mother         COPD    Heart Disease Mother     Genetic Disorder Father         Parkinsons/ from complications    Hypertension Father     Cancer Maternal Aunt         Breast cancer    Stroke Paternal Grandmother     Cancer Maternal Aunt         Breast cancer       Current Outpatient Medications on File Prior to Visit   Medication Sig Dispense Refill    montelukast (SINGULAIR) 10 MG Tab Take 1 Tablet by mouth every evening. 90 Tablet 3    predniSONE (DELTASONE) 10 MG Tab Take 30mg x 3 days, then take 20mg x 3 days, then take 10mg x 3 days, with food, then discontinue. 18 Tablet 0    atorvastatin (LIPITOR) 20 MG Tab Take 1 Tablet by mouth every evening. 90 Tablet 3    omeprazole (PRILOSEC) 20 MG delayed-release capsule TAKE 1 CAPSULE EVERY DAY 90 Capsule 3    levothyroxine (SYNTHROID) 50 MCG Tab Take 1 Tablet by mouth every morning on an empty stomach. 90 Tablet 3    imipramine (TOFRANIL) 10 MG Tab Take 1 Tablet by mouth 2 times a day. 180 Tablet 3    estradiol (ESTRACE) 0.5 MG tablet Take 1 Tablet by mouth every evening. 90 Tablet 3    azithromycin (ZITHROMAX) 250 MG Tab Take 2 tablets on day 1, then take 1 tablet a day for 4 days. 6 Tablet 0    Cholecalciferol (D3 2000) 2000 UNIT Cap Take 2,000 Units by mouth every day.      oxyCODONE ER (XTAMPZA ER) 18 MG Capsule Extended Release 12 hour Abuse-Deterrent Take 18 mg by mouth 2 times a day. Pt takes @ 0630 and 2100      latanoprost (XALATAN) 0.005 % Solution Administer 1 Drop into both eyes  every evening.      escitalopram (LEXAPRO) 20 MG tablet TAKE 1 TABLET EVERY DAY (Patient taking differently: Take 20 mg by mouth every day.) 90 Tablet 3    potassium chloride (KLOR-CON) 8 MEQ tablet TAKE 2 TABLETS EVERY DAY (Patient taking differently: Take 16 mEq by mouth every day.) 180 Tablet 3    D-MANNOSE PO Take 1,300 mg by mouth 2 times a day.      spironolactone (ALDACTONE) 25 MG Tab Take 25 mg by mouth every evening.      ASPIRIN LOW DOSE 81 MG EC tablet TAKE 1 TABLET BY MOUTH TWICE A DAY (Patient taking differently: Take 81 mg by mouth every day.) 60 Tablet 2    oxyCODONE-acetaminophen (PERCOCET) 5-325 MG Tab Take 1 Tablet by mouth 4 times a day. Pt takes @ 0600, 1200, 1700, and 2100      Multiple Vitamins-Minerals (VITEYES AREDS ADVANCED PO) Take 2 Capsules by mouth every day.      Cyanocobalamin (VITAMIN B-12 PO) Take 2,500 mcg by mouth every day.      docusate sodium (COLACE) 250 MG capsule Take 500 mg by mouth every evening. Indications: Constipation      bumetanide (BUMEX) 2 MG tablet Take 1 Tablet by mouth at bedtime. Indications: Edema, High Blood Pressure Disorder      pregabalin (LYRICA) 200 MG capsule Take 1 Capsule by mouth in the morning, at noon, and at bedtime. Pt takes @ 0600, 1200, and 2100      AMITIZA 24 MCG capsule Take 2 Capsules by mouth every morning with breakfast. Indications: Chronic Constipation of Unknown Cause       No current facility-administered medications on file prior to visit.       Other drug, Penicillins, Sulfa drugs, Macrobid [nitrofurantoin], and Tape      ROS:   Review of Systems   Constitutional:  Negative for chills, diaphoresis, fever, malaise/fatigue and weight loss.   HENT:  Negative for congestion, ear discharge, ear pain, hearing loss, nosebleeds, sinus pain, sore throat and tinnitus.    Eyes:  Negative for blurred vision, double vision, photophobia, pain, discharge and redness.   Respiratory:  Negative for cough, hemoptysis, sputum production, shortness of  breath, wheezing and stridor.    Cardiovascular:  Negative for chest pain, palpitations, orthopnea, claudication, leg swelling and PND.   Gastrointestinal:  Negative for abdominal pain, constipation, diarrhea, heartburn, nausea and vomiting.   Genitourinary:  Negative for dysuria and urgency.   Musculoskeletal:  Negative for back pain, falls, joint pain, myalgias and neck pain.   Skin:  Negative for itching and rash.   Neurological:  Negative for dizziness, tremors, speech change, focal weakness, weakness and headaches.   Endo/Heme/Allergies:  Negative for environmental allergies.   Psychiatric/Behavioral:  Negative for depression.        /60 (BP Location: Right arm, Patient Position: Sitting, BP Cuff Size: Adult)   Pulse 69   Ht 1.524 m (5')   Wt 74.8 kg (165 lb)   SpO2 93%   Physical Exam  Constitutional:       General: She is not in acute distress.     Appearance: Normal appearance. She is well-developed and normal weight.   HENT:      Head: Normocephalic and atraumatic.      Right Ear: External ear normal.      Left Ear: External ear normal.      Nose: Nose normal. No congestion.      Mouth/Throat:      Mouth: Mucous membranes are moist.      Pharynx: Oropharynx is clear. No oropharyngeal exudate.   Eyes:      General: No scleral icterus.     Extraocular Movements: Extraocular movements intact.      Conjunctiva/sclera: Conjunctivae normal.      Pupils: Pupils are equal, round, and reactive to light.   Neck:      Vascular: No JVD.      Trachea: No tracheal deviation.   Cardiovascular:      Rate and Rhythm: Normal rate and regular rhythm.      Heart sounds: Normal heart sounds. No murmur heard.     No friction rub. No gallop.   Pulmonary:      Effort: Pulmonary effort is normal. No accessory muscle usage or respiratory distress.      Breath sounds: Normal breath sounds. No wheezing or rales.   Abdominal:      General: There is no distension.      Palpations: Abdomen is soft.      Tenderness: There is no  abdominal tenderness.   Musculoskeletal:         General: No tenderness or deformity. Normal range of motion.      Cervical back: Normal range of motion and neck supple.      Right lower leg: No edema.      Left lower leg: No edema.   Lymphadenopathy:      Cervical: No cervical adenopathy.   Skin:     General: Skin is warm and dry.      Findings: No rash.      Nails: There is no clubbing.   Neurological:      Mental Status: She is alert and oriented to person, place, and time.      Cranial Nerves: No cranial nerve deficit.      Gait: Gait normal.   Psychiatric:         Behavior: Behavior normal.         PFTs as reviewed by me personally: as per HPI    Imaging as reviewed by me personally:  as per HPI    Assessment:  1. Mixed restrictive and obstructive lung disease (HCC)  Fluticasone Propionate Diskus (FLOVENT DISKUS) 100 MCG/ACT AEROSOL POWDER, BREATH ACTIVATED      2. Pulmonary hypertension (HCC)  albuterol 108 (90 Base) MCG/ACT Aero Soln inhalation aerosol      3. ALISSA (obstructive sleep apnea)  albuterol 108 (90 Base) MCG/ACT Aero Soln inhalation aerosol      4. Chronic respiratory failure with hypoxia (HCC)  albuterol 108 (90 Base) MCG/ACT Aero Soln inhalation aerosol          Plan:  Chronic. Has mild asthma and restriction ? Due to hemidiaphragm paralysis. I did not order testing as would not . She is stable on low dose ICS with prn Gregory  Stable TTE; no edema on exam today. Followed by Dr. Cook  Has opted to use only O2 at night.   Stable O2 needs at 2-3LPM. Pt is compliant with and benefiting from O2 at 2-3LPM  Return in about 6 months (around 10/17/2024) for respiratory failure.

## 2024-05-29 LAB
ALBUMIN SERPL-MCNC: 4.5 G/DL (ref 3.7–4.7)
ALBUMIN/GLOB SERPL: 2.1 {RATIO} (ref 1.2–2.2)
ALP SERPL-CCNC: 84 IU/L (ref 44–121)
ALT SERPL-CCNC: 14 IU/L (ref 0–32)
AST SERPL-CCNC: 16 IU/L (ref 0–40)
BILIRUB SERPL-MCNC: 0.4 MG/DL (ref 0–1.2)
BUN SERPL-MCNC: 13 MG/DL (ref 8–27)
BUN/CREAT SERPL: 13 (ref 12–28)
CALCIUM SERPL-MCNC: 9.1 MG/DL (ref 8.7–10.3)
CHLORIDE SERPL-SCNC: 98 MMOL/L (ref 96–106)
CHOLEST SERPL-MCNC: 141 MG/DL (ref 100–199)
CO2 SERPL-SCNC: 28 MMOL/L (ref 20–29)
CREAT SERPL-MCNC: 0.97 MG/DL (ref 0.57–1)
EGFRCR SERPLBLD CKD-EPI 2021: 57 ML/MIN/1.73
GLOBULIN SER CALC-MCNC: 2.1 G/DL (ref 1.5–4.5)
GLUCOSE SERPL-MCNC: 91 MG/DL (ref 70–99)
HDLC SERPL-MCNC: 59 MG/DL
LABORATORY COMMENT REPORT: NORMAL
LDLC SERPL CALC-MCNC: 66 MG/DL (ref 0–99)
POTASSIUM SERPL-SCNC: 4 MMOL/L (ref 3.5–5.2)
PROT SERPL-MCNC: 6.6 G/DL (ref 6–8.5)
SODIUM SERPL-SCNC: 141 MMOL/L (ref 134–144)
TRIGL SERPL-MCNC: 83 MG/DL (ref 0–149)
TSH SERPL DL<=0.005 MIU/L-ACNC: 1.82 UIU/ML (ref 0.45–4.5)
VLDLC SERPL CALC-MCNC: 16 MG/DL (ref 5–40)

## 2024-06-06 ENCOUNTER — TELEPHONE (OUTPATIENT)
Dept: SLEEP MEDICINE | Facility: MEDICAL CENTER | Age: 84
End: 2024-06-06
Payer: MEDICARE

## 2024-06-06 DIAGNOSIS — J43.9 MIXED RESTRICTIVE AND OBSTRUCTIVE LUNG DISEASE (HCC): ICD-10-CM

## 2024-06-06 DIAGNOSIS — J98.4 MIXED RESTRICTIVE AND OBSTRUCTIVE LUNG DISEASE (HCC): ICD-10-CM

## 2024-06-06 RX ORDER — ALBUTEROL SULFATE 90 UG/1
2 AEROSOL, METERED RESPIRATORY (INHALATION) EVERY 4 HOURS PRN
Qty: 3 EACH | Refills: 3 | Status: SHIPPED | OUTPATIENT
Start: 2024-06-06

## 2024-06-06 NOTE — TELEPHONE ENCOUNTER
Per patient's insurance, Ventolin HFA would be a more affordable option for patient instead of generic Albuterol.    Pharmacy: [facility]

## 2024-06-12 ENCOUNTER — APPOINTMENT (OUTPATIENT)
Dept: MEDICAL GROUP | Facility: MEDICAL CENTER | Age: 84
End: 2024-06-12
Payer: MEDICARE

## 2024-06-12 VITALS
SYSTOLIC BLOOD PRESSURE: 97 MMHG | WEIGHT: 152.34 LBS | BODY MASS INDEX: 29.91 KG/M2 | HEIGHT: 60 IN | RESPIRATION RATE: 18 BRPM | HEART RATE: 74 BPM | OXYGEN SATURATION: 95 % | DIASTOLIC BLOOD PRESSURE: 54 MMHG | TEMPERATURE: 98 F

## 2024-06-12 DIAGNOSIS — J96.11 CHRONIC RESPIRATORY FAILURE WITH HYPOXIA (HCC): ICD-10-CM

## 2024-06-12 DIAGNOSIS — F41.1 GENERALIZED ANXIETY DISORDER: ICD-10-CM

## 2024-06-12 DIAGNOSIS — N18.31 STAGE 3A CHRONIC KIDNEY DISEASE: ICD-10-CM

## 2024-06-12 PROCEDURE — 3074F SYST BP LT 130 MM HG: CPT | Performed by: FAMILY MEDICINE

## 2024-06-12 PROCEDURE — 99214 OFFICE O/P EST MOD 30 MIN: CPT | Performed by: FAMILY MEDICINE

## 2024-06-12 PROCEDURE — 3078F DIAST BP <80 MM HG: CPT | Performed by: FAMILY MEDICINE

## 2024-06-12 RX ORDER — ESCITALOPRAM OXALATE 20 MG/1
20 TABLET ORAL DAILY
Qty: 90 TABLET | Refills: 3 | Status: SHIPPED | OUTPATIENT
Start: 2024-06-12

## 2024-06-12 RX ORDER — AMBRISENTAN 10 MG/1
10 TABLET, FILM COATED ORAL DAILY
COMMUNITY
Start: 2024-06-06

## 2024-06-12 ASSESSMENT — FIBROSIS 4 INDEX: FIB4 SCORE: 1.45

## 2024-06-12 NOTE — PROGRESS NOTES
Chief Complaint   Patient presents with    Follow-Up     Q6M      Chronic Kidney Disease    Depression       Subjective:     HPI:   Maddy Hodge presents today with the followin. Stage 3a chronic kidney disease  Patient continues to be very stable with mild chronic kidney disease.  She is making an effort to remain well-hydrated.  She avoids nonsteroidal anti-inflammatory drugs and other nephrotic medication.    2. Generalized anxiety disorder  Patient feels that her anxiety is controlled rather well by the Lexapro.  She also has substantial family support particularly from her son.  The Lexapro is renewed.    3. Chronic respiratory failure with hypoxia (HCC)  She is on oxygen  2 liters.  BiPAP was too frustrating, was unable to adapt.  She requires the oxygen supplementation.        Patient Active Problem List    Diagnosis Date Noted    At risk for falling 2023    Status post total bilateral knee replacement 2022    Chronic respiratory failure with hypoxia (HCC) 2022    Aortic atherosclerosis ([facility]) 2022    Spinal stenosis of lumbar region with neurogenic claudication 11/10/2021    Postmenopausal osteoporosis 11/10/2021    Hypothyroidism due to Hashimoto's thyroiditis 11/10/2021    Generalized anxiety disorder 11/10/2021    Hearing loss 11/10/2021    Pain management 2021    Neurogenic bladder 2021    Presence of suprapubic catheter ([facility]) 2020    Gastroesophageal reflux disease with esophagitis 2020    Chronic anxiety 07/15/2019    Vitamin D deficiency disease 04/10/2019    Dyslipidemia, goal LDL below 130 08/15/2018    Chronic obstructive pulmonary disease (HCC) 2018    Pulmonary hypertension ([facility]) 2018    Obesity, Class I, BMI 30-34.9     COPD with asthma ([facility]) 11/10/2017    ALISSA (obstructive sleep apnea) 11/10/2017    Postlaminectomy syndrome, unspecified region 2017    Mixed restrictive and obstructive lung disease ([facility])  06/22/2017    Menopausal symptoms 09/13/2016    Essential tremor 09/06/2016    CKD (chronic kidney disease) stage 3, GFR 30-59 ml/min 05/23/2016    Esophageal dysphagia 05/19/2016    Major depressive disorder, recurrent episode, mild (CMS-HCC) 05/02/2016    Arachnoiditis 02/16/2015    Neuropathy (CMS-HCC) 10/17/2013    Facet arthritis of lumbar region 03/26/2012    GERD (gastroesophageal reflux disease) 09/20/2011    Essential hypertension 07/06/2009       Current medicines (including changes today)  Current Outpatient Medications   Medication Sig Dispense Refill    ambrisentan (LETAIRIS) 10 MG tablet Take 10 mg by mouth every day.      escitalopram (LEXAPRO) 20 MG tablet Take 1 Tablet by mouth every day. 90 Tablet 3    albuterol (VENTOLIN HFA) 108 (90 Base) MCG/ACT Aero Soln inhalation aerosol Inhale 2 Puffs every four hours as needed for Shortness of Breath (Wheezing). 3 Each 3    Fluticasone Propionate Diskus (FLOVENT DISKUS) 100 MCG/ACT AEROSOL POWDER, BREATH ACTIVATED Inhale 1 Puff 2 times a day. Rinse mouth after each use. 3 Each 3    atorvastatin (LIPITOR) 20 MG Tab Take 1 Tablet by mouth every evening. 90 Tablet 3    omeprazole (PRILOSEC) 20 MG delayed-release capsule TAKE 1 CAPSULE EVERY DAY 90 Capsule 3    levothyroxine (SYNTHROID) 50 MCG Tab Take 1 Tablet by mouth every morning on an empty stomach. 90 Tablet 3    imipramine (TOFRANIL) 10 MG Tab Take 1 Tablet by mouth 2 times a day. 180 Tablet 3    estradiol (ESTRACE) 0.5 MG tablet Take 1 Tablet by mouth every evening. 90 Tablet 3    montelukast (SINGULAIR) 10 MG Tab Take 1 Tablet by mouth every evening. 90 Tablet 3    azithromycin (ZITHROMAX) 250 MG Tab Take 2 tablets on day 1, then take 1 tablet a day for 4 days. 6 Tablet 0    predniSONE (DELTASONE) 10 MG Tab Take 30mg x 3 days, then take 20mg x 3 days, then take 10mg x 3 days, with food, then discontinue. 18 Tablet 0    Cholecalciferol (D3 2000) 2000 UNIT Cap Take 2,000 Units by mouth every day.       oxyCODONE ER (XTAMPZA ER) 18 MG Capsule Extended Release 12 hour Abuse-Deterrent Take 18 mg by mouth 2 times a day. Pt takes @ 0630 and 2100      latanoprost (XALATAN) 0.005 % Solution Administer 1 Drop into both eyes every evening.      potassium chloride (KLOR-CON) 8 MEQ tablet TAKE 2 TABLETS EVERY DAY (Patient taking differently: Take 16 mEq by mouth every day.) 180 Tablet 3    D-MANNOSE PO Take 1,300 mg by mouth 2 times a day.      spironolactone (ALDACTONE) 25 MG Tab Take 25 mg by mouth every evening.      ASPIRIN LOW DOSE 81 MG EC tablet TAKE 1 TABLET BY MOUTH TWICE A DAY (Patient taking differently: Take 81 mg by mouth every day.) 60 Tablet 2    oxyCODONE-acetaminophen (PERCOCET) 5-325 MG Tab Take 1 Tablet by mouth 4 times a day. Pt takes @ 0600, 1200, 1700, and 2100      Multiple Vitamins-Minerals (VITEYES AREDS ADVANCED PO) Take 2 Capsules by mouth every day.      Cyanocobalamin (VITAMIN B-12 PO) Take 2,500 mcg by mouth every day.      docusate sodium (COLACE) 250 MG capsule Take 500 mg by mouth every evening. Indications: Constipation      bumetanide (BUMEX) 2 MG tablet Take 1 Tablet by mouth at bedtime. Indications: Edema, High Blood Pressure Disorder      pregabalin (LYRICA) 200 MG capsule Take 1 Capsule by mouth in the morning, at noon, and at bedtime. Pt takes @ 0600, 1200, and 2100      AMITIZA 24 MCG capsule Take 2 Capsules by mouth every morning with breakfast. Indications: Chronic Constipation of Unknown Cause       No current facility-administered medications for this visit.       Allergies   Allergen Reactions    Other Drug     Penicillins Hives     hives      Sulfa Drugs Hives     hives      Macrobid [Nitrofurantoin] Rash     rash    Tape Rash and Itching     Paper tape ok       ROS: As per HPI       Objective:     BP 97/54   Pulse 74   Temp 36.7 °C (98 °F)   Resp 18   Ht 1.524 m (5')   Wt 69.1 kg (152 lb 5.4 oz)   SpO2 95% on 2 L oxygen NP at rest Body mass index is 29.75  kg/m².    Physical Exam:  Constitutional: Well-developed and well-nourished. Not diaphoretic. No distress. Lucid and fluent.  Speaks in short sentences, mild SOB>  Skin: Skin is warm and dry. No rash noted.  Head: Atraumatic without lesions.  Eyes: Conjunctivae and extraocular motions are normal. Pupils are equal, round, and reactive to light. No scleral icterus.   Ears:  External ears unremarkable.   Neck: Supple, trachea midline. No thyromegaly present. No cervical or supraclavicular lymphadenopathy. No JVD or carotid bruits appreciated  Cardiovascular: Regular rate and rhythm.  Normal S1, S2 without murmur appreciated.  Chest: Effort normal. Clear to auscultation throughout. No adventitious sounds. Distant breath sounds.  Abdomen: Soft, non tender, and without distention.   Extremities: No cyanosis, clubbing, erythema, nor edema.   Neurological: Alert and oriented x 3. Gait unstable and halting, using transport chair.  Psychiatric:  Behavior, mood, and affect are appropriate.    Immunizations discussed.  5/27/24 lab results reviewed and discussed.     Assessment and Plan:     83 y.o. female with the following issues:    1. Stage 3a chronic kidney disease        2. Generalized anxiety disorder  escitalopram (LEXAPRO) 20 MG tablet      3. Chronic respiratory failure with hypoxia (HCC)              Followup: Return in about 6 months (around 12/12/2024), or if symptoms worsen or fail to improve.

## 2024-09-17 DIAGNOSIS — J98.4 MIXED RESTRICTIVE AND OBSTRUCTIVE LUNG DISEASE (HCC): ICD-10-CM

## 2024-09-17 DIAGNOSIS — J43.9 MIXED RESTRICTIVE AND OBSTRUCTIVE LUNG DISEASE (HCC): ICD-10-CM

## 2024-09-17 RX ORDER — PREDNISONE 10 MG/1
TABLET ORAL
Qty: 18 TABLET | Refills: 0 | Status: SHIPPED | OUTPATIENT
Start: 2024-09-17

## 2024-09-17 RX ORDER — AZITHROMYCIN 250 MG/1
TABLET, FILM COATED ORAL
Qty: 6 TABLET | Refills: 0 | Status: SHIPPED | OUTPATIENT
Start: 2024-09-17

## 2024-09-18 DIAGNOSIS — J44.89 COPD WITH ASTHMA (HCC): ICD-10-CM

## 2024-09-18 DIAGNOSIS — T78.40XS ALLERGY, SEQUELA: ICD-10-CM

## 2024-09-18 RX ORDER — MONTELUKAST SODIUM 10 MG/1
10 TABLET ORAL EVERY EVENING
Qty: 90 TABLET | Refills: 3 | Status: SHIPPED | OUTPATIENT
Start: 2024-09-18

## 2024-09-25 DIAGNOSIS — E03.4 IDIOPATHIC ATROPHIC HYPOTHYROIDISM: ICD-10-CM

## 2024-09-25 RX ORDER — LEVOTHYROXINE SODIUM 50 UG/1
50 TABLET ORAL
Qty: 90 TABLET | Refills: 3 | Status: SHIPPED | OUTPATIENT
Start: 2024-09-25

## 2024-09-25 NOTE — TELEPHONE ENCOUNTER
Received request via: Pharmacy    Was the patient seen in the last year in this department? Yes    Does the patient have an active prescription (recently filled or refills available) for medication(s) requested? No    Pharmacy Name:   To be filled at: [facility] - [facility] [address]              Does the patient have [facility] Plus and need 100-day supply? (This applies to ALL medications) Patient does not have SCP

## 2024-10-30 ENCOUNTER — OFFICE VISIT (OUTPATIENT)
Dept: SLEEP MEDICINE | Facility: MEDICAL CENTER | Age: 84
End: 2024-10-30
Attending: INTERNAL MEDICINE
Payer: MEDICARE

## 2024-10-30 VITALS
WEIGHT: 152 LBS | BODY MASS INDEX: 29.84 KG/M2 | DIASTOLIC BLOOD PRESSURE: 58 MMHG | HEART RATE: 115 BPM | SYSTOLIC BLOOD PRESSURE: 102 MMHG | OXYGEN SATURATION: 94 % | HEIGHT: 60 IN

## 2024-10-30 DIAGNOSIS — I27.20 PULMONARY HYPERTENSION (HCC): ICD-10-CM

## 2024-10-30 DIAGNOSIS — J43.9 MIXED RESTRICTIVE AND OBSTRUCTIVE LUNG DISEASE (HCC): ICD-10-CM

## 2024-10-30 DIAGNOSIS — J98.4 MIXED RESTRICTIVE AND OBSTRUCTIVE LUNG DISEASE (HCC): ICD-10-CM

## 2024-10-30 DIAGNOSIS — J96.11 CHRONIC RESPIRATORY FAILURE WITH HYPOXIA (HCC): ICD-10-CM

## 2024-10-30 DIAGNOSIS — G47.33 OSA (OBSTRUCTIVE SLEEP APNEA): ICD-10-CM

## 2024-10-30 DIAGNOSIS — J44.89 COPD WITH ASTHMA (HCC): ICD-10-CM

## 2024-10-30 PROCEDURE — 99213 OFFICE O/P EST LOW 20 MIN: CPT | Performed by: INTERNAL MEDICINE

## 2024-10-30 RX ORDER — FLUTICASONE PROPIONATE AND SALMETEROL 113; 14 UG/1; UG/1
1 POWDER, METERED RESPIRATORY (INHALATION)
Qty: 3 EACH | Refills: 3 | Status: SHIPPED | OUTPATIENT
Start: 2024-10-30 | End: 2024-10-30

## 2024-10-30 ASSESSMENT — ENCOUNTER SYMPTOMS
SPUTUM PRODUCTION: 0
STRIDOR: 0
CLAUDICATION: 0
MYALGIAS: 0
EYE REDNESS: 0
CHILLS: 0
EYE DISCHARGE: 0
PHOTOPHOBIA: 0
SORE THROAT: 0
WEIGHT LOSS: 0
DIZZINESS: 0
SPEECH CHANGE: 0
FEVER: 0
BACK PAIN: 0
DIAPHORESIS: 0
PND: 0
ABDOMINAL PAIN: 0
HEADACHES: 0
COUGH: 0
SINUS PAIN: 0
WEAKNESS: 0
SHORTNESS OF BREATH: 1
VOMITING: 0
EYE PAIN: 0
DIARRHEA: 0
HEMOPTYSIS: 0
CONSTIPATION: 0
ORTHOPNEA: 0
DOUBLE VISION: 0
NECK PAIN: 0
BLURRED VISION: 0
FOCAL WEAKNESS: 0
NAUSEA: 0
TREMORS: 0
PALPITATIONS: 0
HEARTBURN: 0
DEPRESSION: 0
WHEEZING: 0
FALLS: 0

## 2024-10-30 ASSESSMENT — FIBROSIS 4 INDEX: FIB4 SCORE: 1.47

## 2024-10-31 DIAGNOSIS — N95.1 MENOPAUSAL SYMPTOMS: ICD-10-CM

## 2024-10-31 DIAGNOSIS — G56.22 CUBITAL TUNNEL SYNDROME ON LEFT: ICD-10-CM

## 2024-10-31 NOTE — TELEPHONE ENCOUNTER
Received request via: Pharmacy    Was the patient seen in the last year in this department? Yes    Does the patient have an active prescription (recently filled or refills available) for medication(s) requested? No    Pharmacy Name: [facility] - [facility] [id number]     Does the patient have [facility] Plus and need 100-day supply? (This applies to ALL medications) Patient does not have SCP

## 2024-11-01 RX ORDER — IMIPRAMINE HYDROCHLORIDE 10 MG/1
10 TABLET, FILM COATED ORAL 2 TIMES DAILY
Qty: 180 TABLET | Refills: 3 | Status: SHIPPED | OUTPATIENT
Start: 2024-11-01

## 2024-11-01 RX ORDER — ESTRADIOL 0.5 MG/1
0.5 TABLET ORAL EVERY EVENING
Qty: 90 TABLET | Refills: 3 | Status: SHIPPED | OUTPATIENT
Start: 2024-11-01

## 2024-11-05 ENCOUNTER — PATIENT MESSAGE (OUTPATIENT)
Dept: SLEEP MEDICINE | Facility: MEDICAL CENTER | Age: 84
End: 2024-11-05
Payer: MEDICARE

## 2024-11-05 DIAGNOSIS — J98.4 MIXED RESTRICTIVE AND OBSTRUCTIVE LUNG DISEASE (HCC): ICD-10-CM

## 2024-11-05 DIAGNOSIS — J44.89 COPD WITH ASTHMA (HCC): ICD-10-CM

## 2024-11-05 DIAGNOSIS — J96.11 CHRONIC RESPIRATORY FAILURE WITH HYPOXIA (HCC): ICD-10-CM

## 2024-11-05 DIAGNOSIS — J43.9 MIXED RESTRICTIVE AND OBSTRUCTIVE LUNG DISEASE (HCC): ICD-10-CM

## 2024-11-11 ENCOUNTER — NON-PROVIDER VISIT (OUTPATIENT)
Dept: SLEEP MEDICINE | Facility: MEDICAL CENTER | Age: 84
End: 2024-11-11
Attending: INTERNAL MEDICINE
Payer: MEDICARE

## 2024-11-11 VITALS — WEIGHT: 152 LBS | BODY MASS INDEX: 29.69 KG/M2

## 2024-11-11 DIAGNOSIS — J44.89 COPD WITH ASTHMA (HCC): ICD-10-CM

## 2024-11-11 PROCEDURE — 94726 PLETHYSMOGRAPHY LUNG VOLUMES: CPT | Mod: 26 | Performed by: INTERNAL MEDICINE

## 2024-11-11 PROCEDURE — 94060 EVALUATION OF WHEEZING: CPT | Performed by: INTERNAL MEDICINE

## 2024-11-11 PROCEDURE — 94060 EVALUATION OF WHEEZING: CPT | Mod: 26 | Performed by: INTERNAL MEDICINE

## 2024-11-11 PROCEDURE — 94729 DIFFUSING CAPACITY: CPT | Mod: 26 | Performed by: INTERNAL MEDICINE

## 2024-11-11 PROCEDURE — 94726 PLETHYSMOGRAPHY LUNG VOLUMES: CPT | Performed by: INTERNAL MEDICINE

## 2024-11-11 PROCEDURE — 94729 DIFFUSING CAPACITY: CPT | Performed by: INTERNAL MEDICINE

## 2024-11-11 ASSESSMENT — FIBROSIS 4 INDEX: FIB4 SCORE: 1.47

## 2024-11-11 NOTE — PROCEDURES
Technician: NARINDER Aguillon    Technician Comment:  Good patient effort & cooperation.  The results of this test meet the ATS/ERS standards for acceptability & reproducibility.  Test was performed on the [employer] Body Plethysmograph-Elite DX system.  Predicted values were GLI-2012 for spirometry, GLI-2020 for DLCO, ITS for Lung Volumes.  The DLCO was uncorrected for Hgb.  A bronchodilator of Albuterol HFA -2puffs via spacer administered.  DLCO performed during dilation period.    Interpretation:  Baseline spirometry shows borderline obstruction with an FEV1/FVC ratio of 70 and an FEV1 of 0.85 L or 52% predicted.  FVC is also reduced at 1.22 L or 56% predicted.  There is trend toward bronchodilator response but does not meet ATS criteria.  Postbronchodilator FEV1 is 0.99 L or 60% predicted.  Total lung capacity is reduced at 3.45 L or 73% predicted.  Diffusion capacity is reduced at 74% predicted.  Pulmonary function testing shows a mixed restrictive obstructive defect.  Correlate clinically.

## 2024-11-12 ENCOUNTER — PATIENT MESSAGE (OUTPATIENT)
Dept: SLEEP MEDICINE | Facility: MEDICAL CENTER | Age: 84
End: 2024-11-12
Payer: MEDICARE

## 2024-11-12 DIAGNOSIS — J44.89 COPD WITH ASTHMA (HCC): ICD-10-CM

## 2024-11-20 RX ORDER — FLUTICASONE PROPIONATE AND SALMETEROL 100; 50 UG/1; UG/1
1 POWDER RESPIRATORY (INHALATION) 2 TIMES DAILY
Qty: 1 EACH | Refills: 11 | Status: SHIPPED | OUTPATIENT
Start: 2024-11-20

## 2024-11-30 DIAGNOSIS — J43.9 MIXED RESTRICTIVE AND OBSTRUCTIVE LUNG DISEASE (HCC): ICD-10-CM

## 2024-11-30 DIAGNOSIS — J98.4 MIXED RESTRICTIVE AND OBSTRUCTIVE LUNG DISEASE (HCC): ICD-10-CM

## 2024-12-02 RX ORDER — ALBUTEROL SULFATE 90 UG/1
2 INHALANT RESPIRATORY (INHALATION) EVERY 4 HOURS PRN
Qty: 3 EACH | Refills: 3 | Status: SHIPPED | OUTPATIENT
Start: 2024-12-02

## 2024-12-07 DIAGNOSIS — K21.00 GASTROESOPHAGEAL REFLUX DISEASE WITH ESOPHAGITIS WITHOUT HEMORRHAGE: ICD-10-CM

## 2024-12-13 ENCOUNTER — OFFICE VISIT (OUTPATIENT)
Dept: MEDICAL GROUP | Facility: MEDICAL CENTER | Age: 84
End: 2024-12-13
Payer: MEDICARE

## 2024-12-13 VITALS
BODY MASS INDEX: 29.17 KG/M2 | WEIGHT: 148.6 LBS | DIASTOLIC BLOOD PRESSURE: 58 MMHG | HEIGHT: 60 IN | RESPIRATION RATE: 27 BRPM | HEART RATE: 78 BPM | SYSTOLIC BLOOD PRESSURE: 112 MMHG | OXYGEN SATURATION: 94 % | TEMPERATURE: 95.5 F

## 2024-12-13 DIAGNOSIS — Z98.83 STATUS POST GLAUCOMA SURGERY: ICD-10-CM

## 2024-12-13 DIAGNOSIS — I27.20 PULMONARY HYPERTENSION (HCC): Chronic | ICD-10-CM

## 2024-12-13 DIAGNOSIS — Z93.59 PRESENCE OF SUPRAPUBIC CATHETER (HCC): ICD-10-CM

## 2024-12-13 DIAGNOSIS — J43.2 CENTRILOBULAR EMPHYSEMA (HCC): ICD-10-CM

## 2024-12-13 DIAGNOSIS — J96.11 CHRONIC RESPIRATORY FAILURE WITH HYPOXIA (HCC): ICD-10-CM

## 2024-12-13 PROCEDURE — 3078F DIAST BP <80 MM HG: CPT | Performed by: FAMILY MEDICINE

## 2024-12-13 PROCEDURE — 3074F SYST BP LT 130 MM HG: CPT | Performed by: FAMILY MEDICINE

## 2024-12-13 PROCEDURE — 99214 OFFICE O/P EST MOD 30 MIN: CPT | Performed by: FAMILY MEDICINE

## 2024-12-13 ASSESSMENT — FIBROSIS 4 INDEX: FIB4 SCORE: 1.47

## 2024-12-13 NOTE — PROGRESS NOTES
Chief Complaint   Patient presents with    Follow-Up       Subjective:     HPI:   Maddy Hodge presents today with the following: Her son who is her primary caregiver is present.    1. Status post glaucoma surgery  Patient is status post successful glaucoma surgery.  She continues close follow-up with ophthalmology.    2. Pulmonary hypertension (HCC)  This has been quite stable, she saw cardiology recently.  She continues to have well treated sleep apnea.    3. Presence of suprapubic catheter (HCC)  This has been successful for her.  She has monthly catheter changes at urology.  No recent infections.    4. Centrilobular emphysema (HCC)/Chronic respiratory failure with hypoxia (HCC)  Patient has centrilobular emphysema with sleep apnea and consequently is hypoxic particularly on exertion.  She requires oxygen supplementation 24/7.  Follows with pulmonology.  Recently saw Dr. Kam again.        Patient Active Problem List    Diagnosis Date Noted    Status post glaucoma surgery 12/13/2024    At risk for falling 06/14/2023    Status post total bilateral knee replacement 05/24/2022    Chronic respiratory failure with hypoxia (HCC) 05/24/2022    Aortic atherosclerosis (HCC) 05/24/2022    Spinal stenosis of lumbar region with neurogenic claudication 11/10/2021    Postmenopausal osteoporosis 11/10/2021    Hypothyroidism due to Hashimoto's thyroiditis 11/10/2021    Generalized anxiety disorder 11/10/2021    Hearing loss 11/10/2021    Pain management 05/20/2021    Neurogenic bladder 05/04/2021    Presence of suprapubic catheter (HCC) 11/16/2020    Gastroesophageal reflux disease with esophagitis 11/16/2020    Chronic anxiety 07/15/2019    Vitamin D deficiency disease 04/10/2019    Dyslipidemia, goal LDL below 130 08/15/2018    Chronic obstructive pulmonary disease (HCC) 06/22/2018    Pulmonary hypertension (HCC) 02/21/2018    Obesity, Class I, BMI 30-34.9     COPD with asthma (HCC) 11/10/2017    ALISSA (obstructive  sleep apnea) 11/10/2017    Postlaminectomy syndrome, unspecified region 07/03/2017    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    Menopausal symptoms 09/13/2016    Essential tremor 09/06/2016    CKD (chronic kidney disease) stage 3, GFR 30-59 ml/min 05/23/2016    Esophageal dysphagia 05/19/2016    Major depressive disorder, recurrent episode, mild (CMS-HCC) 05/02/2016    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 3 Each 3    fluticasone-salmeterol (ADVAIR DISKUS) 100-50 MCG/ACT AEROSOL POWDER, BREATH ACTIVATED Inhale 1 Puff 2 times a day. Rinse mouth after each use. 1 Each 11    imipramine (TOFRANIL) 10 MG Tab TAKE 1 TABLET TWICE DAILY 180 Tablet 3    estradiol (ESTRACE) 0.5 MG tablet TAKE 1 TABLET EVERY EVENING 90 Tablet 3    Spacer/Aero-Holding Chambers Device 1 Device 2 times a day. 1 Each 0    levothyroxine (SYNTHROID) 50 MCG Tab TAKE 1 TABLET EVERY MORNING ON AN EMPTY STOMACH 90 Tablet 3    montelukast (SINGULAIR) 10 MG Tab TAKE 1 TABLET EVERY EVENING 90 Tablet 3    ambrisentan (LETAIRIS) 10 MG tablet Take 10 mg by mouth every day.      escitalopram (LEXAPRO) 20 MG tablet Take 1 Tablet by mouth every day. 90 Tablet 3    atorvastatin (LIPITOR) 20 MG Tab Take 1 Tablet by mouth every evening. 90 Tablet 3    Cholecalciferol (D3 2000) 2000 UNIT Cap Take 2,000 Units by mouth every day.      oxyCODONE ER (XTAMPZA ER) 18 MG Capsule Extended Release 12 hour Abuse-Deterrent Take 18 mg by mouth 2 times a  day. Pt takes @ 0630 and 2100      potassium chloride (KLOR-CON) 8 MEQ tablet TAKE 2 TABLETS EVERY DAY (Patient taking differently: Take 16 mEq by mouth every day.) 180 Tablet 3    D-MANNOSE PO Take 1,300 mg by mouth 2 times a day.      spironolactone (ALDACTONE) 25 MG Tab Take 25 mg by mouth every evening.      ASPIRIN LOW DOSE 81 MG EC tablet TAKE 1 TABLET BY MOUTH TWICE A DAY (Patient taking differently: Take 81 mg by mouth every day.) 60 Tablet 2    oxyCODONE-acetaminophen (PERCOCET) 5-325 MG Tab Take 1 Tablet by mouth 4 times a day. Pt takes @ 0600, 1200, 1700, and 2100      Multiple Vitamins-Minerals (VITEYES AREDS ADVANCED PO) Take 2 Capsules by mouth every day.      Cyanocobalamin (VITAMIN B-12 PO) Take 2,500 mcg by mouth every day.      docusate sodium (COLACE) 250 MG capsule Take 500 mg by mouth every evening. Indications: Constipation      bumetanide (BUMEX) 2 MG tablet Take 1 Tablet by mouth at bedtime. Indications: Edema, High Blood Pressure Disorder      pregabalin (LYRICA) 200 MG capsule Take 1 Capsule by mouth in the morning, at noon, and at bedtime. Pt takes @ 0600, 1200, and 2100      AMITIZA 24 MCG capsule Take 2 Capsules by mouth every morning with breakfast. Indications: Chronic Constipation of Unknown Cause       No current facility-administered medications for this visit.       Allergies   Allergen Reactions    Other Drug     Penicillins Hives     hives      Sulfa Drugs Hives     hives      Macrobid [Nitrofurantoin] Rash     rash    Tape Rash and Itching     Paper tape ok       ROS: As per HPI       Objective:     /58   Pulse 78   Temp (!) 35.3 °C (95.5 °F) (Temporal)   Resp (!) 27   Ht 1.524 m (5')   Wt 67.4 kg (148 lb 9.6 oz)   SpO2 94% on 2 L nasal prongs oxygen at rest Body mass index is 29.02 kg/m².    Physical Exam:  Constitutional: Well-developed and well-nourished. Not diaphoretic. No distress. Lucid and fluent.  Skin: Skin is warm and dry. No rash noted.  Head: Atraumatic  without lesions.  Eyes: Conjunctivae and extraocular motions are normal. Pupils are equal, round, and reactive to light. No scleral icterus.   Ears:  External ears unremarkable.   Neck: Supple, trachea midline. No thyromegaly present. No cervical or supraclavicular lymphadenopathy. No JVD or carotid bruits appreciated  Cardiovascular: Regular rate and rhythm.  Normal S1, S2 without murmur appreciated.  Chest: Effort normal. Clear to auscultation throughout. No adventitious sounds.   Abdomen: Soft, non tender, and without distention. Active bowel sounds in all four quadrants. No rebound, guarding, masses or hepatosplenomegaly.  Extremities: No cyanosis, clubbing, erythema, nor pitting edema.   Neurological: Alert and oriented x 3.  Movements are symmetric.  No significant tremor appreciated.  Gait is slow and somewhat unsteady, using wheelchair pushed by her son.  Psychiatric:  Behavior, mood, and affect are appropriate.       Assessment and Plan:     84 y.o. female with the following issues:    1. Status post glaucoma surgery        2. Pulmonary hypertension (HCC)        3. Presence of suprapubic catheter (HCC)        4. Centrilobular emphysema (HCC)        5. Chronic respiratory failure with hypoxia (HCC)              Followup: Return in about 6 months (around 6/13/2025), or if symptoms worsen or fail to improve.

## 2024-12-19 ENCOUNTER — TELEPHONE (OUTPATIENT)
Dept: MEDICAL GROUP | Facility: MEDICAL CENTER | Age: 84
End: 2024-12-19
Payer: MEDICARE

## 2025-01-09 ENCOUNTER — PATIENT MESSAGE (OUTPATIENT)
Dept: SLEEP MEDICINE | Facility: MEDICAL CENTER | Age: 85
End: 2025-01-09
Payer: MEDICARE

## 2025-01-09 DIAGNOSIS — J44.89 COPD WITH ASTHMA (HCC): ICD-10-CM

## 2025-01-16 RX ORDER — ALBUTEROL SULFATE 0.83 MG/ML
2.5 SOLUTION RESPIRATORY (INHALATION) EVERY 4 HOURS PRN
Qty: 120 ML | Refills: 11 | Status: SHIPPED | OUTPATIENT
Start: 2025-01-16

## 2025-01-23 RX ORDER — ALBUTEROL SULFATE 0.83 MG/ML
2.5 SOLUTION RESPIRATORY (INHALATION) EVERY 4 HOURS PRN
Qty: 540 ML | Refills: 11 | Status: SHIPPED | OUTPATIENT
Start: 2025-01-23

## 2025-02-08 ENCOUNTER — PATIENT MESSAGE (OUTPATIENT)
Dept: SLEEP MEDICINE | Facility: MEDICAL CENTER | Age: 85
End: 2025-02-08
Payer: MEDICARE

## 2025-02-08 DIAGNOSIS — J44.89 COPD WITH ASTHMA (HCC): ICD-10-CM

## 2025-02-22 RX ORDER — AZITHROMYCIN 250 MG/1
TABLET, FILM COATED ORAL
Qty: 6 TABLET | Refills: 0 | Status: SHIPPED | OUTPATIENT
Start: 2025-02-22

## 2025-02-22 RX ORDER — PREDNISONE 10 MG/1
TABLET ORAL
Qty: 18 TABLET | Refills: 0 | Status: SHIPPED | OUTPATIENT
Start: 2025-02-22

## 2025-02-22 RX ORDER — PREDNISONE 10 MG/1
TABLET ORAL
Qty: 18 TABLET | Refills: 0 | Status: SHIPPED | OUTPATIENT
Start: 2025-02-22 | End: 2025-02-22

## 2025-02-22 RX ORDER — AZITHROMYCIN 250 MG/1
TABLET, FILM COATED ORAL
Qty: 6 TABLET | Refills: 0 | Status: SHIPPED | OUTPATIENT
Start: 2025-02-22 | End: 2025-02-22

## 2025-03-05 DIAGNOSIS — E78.5 DYSLIPIDEMIA, GOAL LDL BELOW 130: ICD-10-CM

## 2025-03-05 RX ORDER — ATORVASTATIN CALCIUM 20 MG/1
20 TABLET, FILM COATED ORAL EVERY EVENING
Qty: 90 TABLET | Refills: 3 | Status: SHIPPED | OUTPATIENT
Start: 2025-03-05

## 2025-03-05 NOTE — TELEPHONE ENCOUNTER
Received request via: Pharmacy    Was the patient seen in the last year in this department? Yes    Does the patient have an active prescription (recently filled or refills available) for medication(s) requested? No    Pharmacy Name: [facility] - [facility] [address]     Does the patient have prison Plus and need 100-day supply? (This applies to ALL medications) Patient does not have SCP

## 2025-03-16 DIAGNOSIS — J44.89 COPD WITH ASTHMA (HCC): ICD-10-CM

## 2025-03-16 RX ORDER — BUDESONIDE AND FORMOTEROL FUMARATE DIHYDRATE 80; 4.5 UG/1; UG/1
2 AEROSOL RESPIRATORY (INHALATION) 2 TIMES DAILY
Qty: 40.8 G | Refills: 3 | Status: SHIPPED | OUTPATIENT
Start: 2025-03-16

## 2025-05-02 DIAGNOSIS — G56.22 CUBITAL TUNNEL SYNDROME ON LEFT: ICD-10-CM

## 2025-05-02 DIAGNOSIS — T78.40XS ALLERGY, SEQUELA: ICD-10-CM

## 2025-05-02 DIAGNOSIS — J44.89 COPD WITH ASTHMA (HCC): ICD-10-CM

## 2025-05-02 DIAGNOSIS — F41.1 GENERALIZED ANXIETY DISORDER: ICD-10-CM

## 2025-05-02 DIAGNOSIS — N95.1 MENOPAUSAL SYMPTOMS: ICD-10-CM

## 2025-05-02 RX ORDER — IMIPRAMINE HYDROCHLORIDE 10 MG/1
10 TABLET, FILM COATED ORAL 2 TIMES DAILY
Qty: 180 TABLET | Refills: 3 | Status: SHIPPED | OUTPATIENT
Start: 2025-05-02

## 2025-05-02 RX ORDER — MONTELUKAST SODIUM 10 MG/1
10 TABLET ORAL EVERY EVENING
Qty: 90 TABLET | Refills: 3 | Status: SHIPPED | OUTPATIENT
Start: 2025-05-02

## 2025-05-02 RX ORDER — ESCITALOPRAM OXALATE 20 MG/1
20 TABLET ORAL DAILY
Qty: 90 TABLET | Refills: 3 | Status: SHIPPED | OUTPATIENT
Start: 2025-05-02 | End: 2025-05-12 | Stop reason: SDUPTHER

## 2025-05-02 RX ORDER — ESTRADIOL 0.5 MG/1
0.5 TABLET ORAL EVERY EVENING
Qty: 90 TABLET | Refills: 3 | Status: SHIPPED | OUTPATIENT
Start: 2025-05-02

## 2025-05-02 NOTE — TELEPHONE ENCOUNTER
Received request via: Pharmacy    Was the patient seen in the last year in this department? Yes    Does the patient have an active prescription (recently filled or refills available) for medication(s) requested? No    Pharmacy Name: : [facility] - [facility] [address]     Does the patient have long term Plus and need 100-day supply? (This applies to ALL medications) Patient does not have SCP

## 2025-05-12 DIAGNOSIS — F41.1 GENERALIZED ANXIETY DISORDER: ICD-10-CM

## 2025-05-12 RX ORDER — ESCITALOPRAM OXALATE 20 MG/1
20 TABLET ORAL DAILY
Qty: 90 TABLET | Refills: 3 | Status: SHIPPED | OUTPATIENT
Start: 2025-05-12

## 2025-06-13 ENCOUNTER — OFFICE VISIT (OUTPATIENT)
Dept: MEDICAL GROUP | Facility: MEDICAL CENTER | Age: 85
End: 2025-06-13
Payer: MEDICARE

## 2025-06-13 VITALS
OXYGEN SATURATION: 92 % | SYSTOLIC BLOOD PRESSURE: 112 MMHG | HEIGHT: 60 IN | TEMPERATURE: 98 F | RESPIRATION RATE: 18 BRPM | BODY MASS INDEX: 29.57 KG/M2 | DIASTOLIC BLOOD PRESSURE: 60 MMHG | WEIGHT: 150.6 LBS | HEART RATE: 94 BPM

## 2025-06-13 DIAGNOSIS — N18.31 STAGE 3A CHRONIC KIDNEY DISEASE: ICD-10-CM

## 2025-06-13 DIAGNOSIS — E06.3 HYPOTHYROIDISM DUE TO HASHIMOTO'S THYROIDITIS: ICD-10-CM

## 2025-06-13 DIAGNOSIS — K21.00 GASTROESOPHAGEAL REFLUX DISEASE WITH ESOPHAGITIS WITHOUT HEMORRHAGE: ICD-10-CM

## 2025-06-13 PROCEDURE — 3078F DIAST BP <80 MM HG: CPT | Performed by: FAMILY MEDICINE

## 2025-06-13 PROCEDURE — 3074F SYST BP LT 130 MM HG: CPT | Performed by: FAMILY MEDICINE

## 2025-06-13 PROCEDURE — 99213 OFFICE O/P EST LOW 20 MIN: CPT | Performed by: FAMILY MEDICINE

## 2025-06-13 ASSESSMENT — PATIENT HEALTH QUESTIONNAIRE - PHQ9
6. FEELING BAD ABOUT YOURSELF - OR THAT YOU ARE A FAILURE OR HAVE LET YOURSELF OR YOUR FAMILY DOWN: NOT AL ALL
8. MOVING OR SPEAKING SO SLOWLY THAT OTHER PEOPLE COULD HAVE NOTICED. OR THE OPPOSITE, BEING SO FIGETY OR RESTLESS THAT YOU HAVE BEEN MOVING AROUND A LOT MORE THAN USUAL: NOT AT ALL
7. TROUBLE CONCENTRATING ON THINGS, SUCH AS READING THE NEWSPAPER OR WATCHING TELEVISION: NOT AT ALL
2. FEELING DOWN, DEPRESSED, IRRITABLE, OR HOPELESS: NOT AT ALL
5. POOR APPETITE OR OVEREATING: NOT AT ALL
3. TROUBLE FALLING OR STAYING ASLEEP OR SLEEPING TOO MUCH: NOT AT ALL
SUM OF ALL RESPONSES TO PHQ9 QUESTIONS 1 AND 2: 0
9. THOUGHTS THAT YOU WOULD BE BETTER OFF DEAD, OR OF HURTING YOURSELF: NOT AT ALL
4. FEELING TIRED OR HAVING LITTLE ENERGY: NOT AT ALL
1. LITTLE INTEREST OR PLEASURE IN DOING THINGS: NOT AT ALL
SUM OF ALL RESPONSES TO PHQ QUESTIONS 1-9: 0

## 2025-06-13 ASSESSMENT — FIBROSIS 4 INDEX: FIB4 SCORE: 1.47

## 2025-06-13 NOTE — PROGRESS NOTES
Chief Complaint   Patient presents with    Follow-Up    Hypothyroidism    Chronic Kidney Disease       Subjective:     HPI:   Maddy Hodge presents today with the followin. Hypothyroidism due to Hashimoto's thyroiditis  Patient reports good energy level on the medication. Patient denies insomnia, tremor or change in appetite.  Patient is taking the medication on an empty stomach in the morning and waiting at least 30 minutes before eating.  Last TSH in May of last year was at [facility].  Follow-up lab orders discussed and placed.  Had recent labs but that was primarily a CMP.    2. Gastroesophageal reflux disease with esophagitis without hemorrhage  The patient feels the current medication regimen of omeprazole is controlling the gastroesophageal reflux symptoms well. Denies dysphagia, reflux symptoms, acidity, abdominal pain or visible blood or mucus in the stool. Denies vomiting or hematemesis. Denies burping or abdominal bloating. Patient avoids nonsteroidal anti-inflammatory drugs.  She is on chronic prednisone but tolerates with the help of the omeprazole.  Avoids heavy meals or eating within 2 hours of bedtime.  Follow-up lab order discussed and placed.    3. Stage 3a chronic kidney disease  Most recent CMP was improved.  She will continue to follow with nephrology.        Patient Active Problem List    Diagnosis Date Noted    Status post glaucoma surgery 2024    At risk for falling 2023    Status post total bilateral knee replacement 2022    Chronic respiratory failure with hypoxia (HCC) 2022    Aortic atherosclerosis (HCC) 2022    Spinal stenosis of lumbar region with neurogenic claudication 11/10/2021    Postmenopausal osteoporosis 11/10/2021    Hypothyroidism due to Hashimoto's thyroiditis 11/10/2021    Generalized anxiety disorder 11/10/2021    Hearing loss 11/10/2021    Pain management 2021    Neurogenic bladder 2021    Presence of suprapubic  catheter ([facility]) 11/16/2020    Gastroesophageal reflux disease with esophagitis 11/16/2020    Chronic anxiety 07/15/2019    Vitamin D deficiency disease 04/10/2019    Dyslipidemia, goal LDL below 130 08/15/2018    Chronic obstructive pulmonary disease ([facility]) 06/22/2018    Pulmonary hypertension ([facility]) 02/21/2018    Obesity, Class I, BMI 30-34.9     COPD with asthma ([facility]) 11/10/2017    ALISSA (obstructive sleep apnea) 11/10/2017    Postlaminectomy syndrome, unspecified region 07/03/2017    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    Menopausal symptoms 09/13/2016    Essential tremor 09/06/2016    CKD (chronic kidney disease) stage 3, GFR 30-59 ml/min 05/23/2016    Esophageal dysphagia 05/19/2016    Major depressive disorder, recurrent episode, mild (CMS-HCC) 05/02/2016    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  Denies abdominal pain.       Objective:     /60   Pulse 94   Temp 36.7 °C (98 °F) (Temporal)   Resp 18   Ht 1.524 m (5')   Wt 68.3 kg (150 lb 9.6 oz)   SpO2 92%  Body mass index is 29.41 kg/m².    Physical Exam:  Constitutional: Well-developed and well-nourished. Not diaphoretic. No distress. Lucid and fluent.  Skin: Skin is warm and dry. No rash noted.  Head: Atraumatic without lesions.  Eyes: Conjunctivae and extraocular motions are normal. Pupils are equal, round, and reactive to light. No scleral icterus.   Ears:  External ears unremarkable.   Neck: Supple, trachea midline. No thyromegaly present. No cervical or supraclavicular lymphadenopathy. No JVD or carotid bruits appreciated  Cardiovascular: Regular rate and rhythm.  Normal S1, S2 without murmur appreciated.  Chest:  Effort normal. Clear to auscultation throughout. No adventitious sounds.  No CVAT.  Extremities: No cyanosis, clubbing, erythema, nor edema.   Neurological: Alert and oriented x 3.  Less torso strength.  Using wheelchair.  Uses walker at home.  Psychiatric:  Behavior, mood, and affect are appropriate.       Assessment and Plan:     84 y.o. female with the following issues:    1. Hypothyroidism due to Hashimoto's thyroiditis  TSH WITH REFLEX TO FT4      2. Gastroesophageal reflux disease with esophagitis without hemorrhage  CBC WITHOUT DIFFERENTIAL    Basic Metabolic Panel      3. Stage 3a chronic kidney disease  Basic Metabolic Panel            Followup: Return in about 6 months (around 12/13/2025), or if symptoms worsen or fail to improve.         [1]   Current Outpatient Medications   Medication Sig Dispense Refill    escitalopram (LEXAPRO) 20 MG tablet Take 1 Tablet by mouth every day. 90 Tablet 3    imipramine (TOFRANIL) 10 MG Tab Take 1 Tablet by mouth 2 times a day. 180 Tablet 3    estradiol (ESTRACE) 0.5 MG tablet Take 1 Tablet by mouth every evening. 90 Tablet 3    montelukast (SINGULAIR) 10 MG Tab Take 1 Tablet by mouth every evening. 90 Tablet 3    budesonide-formoterol (SYMBICORT) 80-4.5 MCG/ACT Aerosol Inhale 2 Puffs 2 times a day. Use with spacer.  Rinse mouth after each use. 40.8 g 3    atorvastatin (LIPITOR) 20 MG Tab TAKE 1 TABLET EVERY EVENING 90 Tablet 3    azithromycin (ZITHROMAX) 250 MG Tab Take 2 tablets on day 1, then take 1 tablet a day for 4 days. 6 Tablet 0    predniSONE (DELTASONE) 10 MG Tab Take 30mg x 3 days, then take 20mg x 3 days, then take 10mg x 3 days, with food, then discontinue. 18 Tablet 0    albuterol (PROVENTIL) 2.5mg/3ml Nebu Soln solution for nebulization Take 3 mL by nebulization every four hours as needed for Shortness of Breath. 540 mL 11    albuterol (PROVENTIL) 2.5mg/3ml Nebu Soln solution for nebulization Take 3 mL by nebulization every four hours as needed for  Shortness of Breath. 120 mL 11    omeprazole (PRILOSEC) 20 MG delayed-release capsule TAKE 1 CAPSULE EVERY DAY 90 Capsule 3    albuterol 108 (90 Base) MCG/ACT Aero Soln inhalation aerosol INHALE 2 PUFFS EVERY FOUR HOURS AS NEEDED FOR SHORTNESS OF BREATH (WHEEZING). 3 Each 3    Spacer/Aero-Holding Chambers Device 1 Device 2 times a day. 1 Each 0    levothyroxine (SYNTHROID) 50 MCG Tab TAKE 1 TABLET EVERY MORNING ON AN EMPTY STOMACH 90 Tablet 3    ambrisentan (LETAIRIS) 10 MG tablet Take 10 mg by mouth every day.      Cholecalciferol (D3 2000) 2000 UNIT Cap Take 2,000 Units by mouth every day.      oxyCODONE ER (XTAMPZA ER) 18 MG Capsule Extended Release 12 hour Abuse-Deterrent Take 18 mg by mouth 2 times a day. Pt takes @ 0630 and 2100      potassium chloride (KLOR-CON) 8 MEQ tablet TAKE 2 TABLETS EVERY DAY (Patient taking differently: Take 16 mEq by mouth every day.) 180 Tablet 3    D-MANNOSE PO Take 1,300 mg by mouth 2 times a day.      spironolactone (ALDACTONE) 25 MG Tab Take 25 mg by mouth every evening.      ASPIRIN LOW DOSE 81 MG EC tablet TAKE 1 TABLET BY MOUTH TWICE A DAY (Patient taking differently: Take 81 mg by mouth every day.) 60 Tablet 2    oxyCODONE-acetaminophen (PERCOCET) 5-325 MG Tab Take 1 Tablet by mouth 4 times a day. Pt takes @ 0600, 1200, 1700, and 2100      Multiple Vitamins-Minerals (VITEYES AREDS ADVANCED PO) Take 2 Capsules by mouth every day.      Cyanocobalamin (VITAMIN B-12 PO) Take 2,500 mcg by mouth every day.      docusate sodium (COLACE) 250 MG capsule Take 500 mg by mouth every evening. Indications: Constipation      bumetanide (BUMEX) 2 MG tablet Take 1 Tablet by mouth at bedtime. Indications: Edema, High Blood Pressure Disorder      pregabalin (LYRICA) 200 MG capsule Take 1 Capsule by mouth in the morning, at noon, and at bedtime. Pt takes @ 0600, 1200, and 2100      AMITIZA 24 MCG capsule Take 2 Capsules by mouth every morning with breakfast. Indications: Chronic Constipation of  Unknown Cause       No current facility-administered medications for this visit.   [2]   Allergies  Allergen Reactions    Other Drug     Penicillins Hives     hives      Sulfa Drugs Hives     hives      Macrobid [Nitrofurantoin] Rash     rash    Tape Rash and Itching     Paper tape ok

## 2025-06-13 NOTE — LETTER
[facility]  Remington Quinones M.D.  [address]  [place] [address]  Fax: [phone]   Authorization for Release/Disclosure of   Protected Health Information   Name: MADDY ARMSTRONG : 1940 SSN: [id number]   Address: [facility] [place] [address] Phone:    [phone] (home)    I authorize the entity listed below to release/disclose the PHI below to:   [facility]/Remington Quinones M.D. and Remington Quinones M.D.   Provider or Entity Name:  [facility]      Address   City, State, Zip   Phone:      Fax:     Reason for request: continuity of care   Information to be released:    [  ] LAST COLONOSCOPY,  including any PATH REPORT and follow-up  [  ] LAST FIT/COLOGUARD RESULT [  ] LAST DEXA  [  ] LAST MAMMOGRAM  [  ] LAST PAP  [ x ] LAST LABS from May 21 2025 [  ] RETINA EXAM REPORT  [  ] IMMUNIZATION RECORDS  [  ] Release all info      [  ] Check here and initial the line next to each item to release ALL health information INCLUDING  _____ Care and treatment for drug and / or alcohol abuse  _____ HIV testing, infection status, or AIDS  _____ Genetic Testing    DATES OF SERVICE OR TIME PERIOD TO BE DISCLOSED: _____________  I understand and acknowledge that:  * This Authorization may be revoked at any time by you in writing, except if your health information has already been used or disclosed.  * Your health information that will be used or disclosed as a result of you signing this authorization could be re-disclosed by the recipient. If this occurs, your re-disclosed health information may no longer be protected by State or Federal laws.  * You may refuse to sign this Authorization. Your refusal will not affect your ability to obtain treatment.  * This Authorization becomes effective upon signing and will  on (date) __________.      If no date is indicated, this Authorization will  one (1) year from the signature date.    Name: Maddy Armstrong  Signature: Date:   2025      PLEASE FAX REQUESTED RECORDS BACK TO: [phone]

## 2025-06-26 LAB
BUN SERPL-MCNC: 10 MG/DL (ref 8–27)
BUN/CREAT SERPL: 12 (ref 12–28)
CALCIUM SERPL-MCNC: 9.1 MG/DL (ref 8.7–10.3)
CHLORIDE SERPL-SCNC: 97 MMOL/L (ref 96–106)
CO2 SERPL-SCNC: 24 MMOL/L (ref 20–29)
CREAT SERPL-MCNC: 0.83 MG/DL (ref 0.57–1)
EGFRCR SERPLBLD CKD-EPI 2021: 69 ML/MIN/1.73
ERYTHROCYTE [DISTWIDTH] IN BLOOD BY AUTOMATED COUNT: 14.9 % (ref 11.7–15.4)
GLUCOSE SERPL-MCNC: 89 MG/DL (ref 70–99)
HCT VFR BLD AUTO: 39.8 % (ref 34–46.6)
HGB BLD-MCNC: 13 G/DL (ref 11.1–15.9)
MCH RBC QN AUTO: 29.3 PG (ref 26.6–33)
MCHC RBC AUTO-ENTMCNC: 32.7 G/DL (ref 31.5–35.7)
MCV RBC AUTO: 90 FL (ref 79–97)
NRBC BLD AUTO-RTO: NORMAL %
PLATELET # BLD AUTO: 224 X10E3/UL (ref 150–450)
POTASSIUM SERPL-SCNC: 4.3 MMOL/L (ref 3.5–5.2)
RBC # BLD AUTO: 4.43 X10E6/UL (ref 3.77–5.28)
SODIUM SERPL-SCNC: 137 MMOL/L (ref 134–144)
TSH SERPL DL<=0.005 MIU/L-ACNC: 1.78 UIU/ML (ref 0.45–4.5)
WBC # BLD AUTO: 5.3 X10E3/UL (ref 3.4–10.8)

## 2025-06-27 ENCOUNTER — RESULTS FOLLOW-UP (OUTPATIENT)
Dept: MEDICAL GROUP | Facility: MEDICAL CENTER | Age: 85
End: 2025-06-27

## 2025-07-16 DIAGNOSIS — E03.4 IDIOPATHIC ATROPHIC HYPOTHYROIDISM: ICD-10-CM

## 2025-07-16 RX ORDER — LEVOTHYROXINE SODIUM 50 UG/1
50 TABLET ORAL
Qty: 90 TABLET | Refills: 3 | Status: SHIPPED | OUTPATIENT
Start: 2025-07-16

## (undated) DEVICE — LENS/HOOD FOR SPACESUIT - (32/PK) PEEL AWAY FACE

## (undated) DEVICE — SYRINGE 30 ML LL (56/BX)

## (undated) DEVICE — GLOVE LATEX POWDER-FREE BIOGEL SZ 7 (50PR/BX 4BX/CA)

## (undated) DEVICE — NEPTUNE 4 PORT MANIFOLD - (20/PK)

## (undated) DEVICE — GOWN WARMING STANDARD FLEX - (30/CA)

## (undated) DEVICE — GLOVE BIOGEL SZ 8 SURGICAL PF LTX - (50PR/BX 4BX/CA)

## (undated) DEVICE — CANISTER SUCTION RIGID RED 1500CC (40EA/CA)

## (undated) DEVICE — KIT ROOM DECONTAMINATION

## (undated) DEVICE — GLOVE BIOGEL PI INDICATOR SZ 6.5 SURGICAL PF LF - (50/BX 4BX/CA)

## (undated) DEVICE — CLOSURE SKIN STRIP 1/2 X 4 IN - (STERI STRIP) (50/BX 4BX/CA)

## (undated) DEVICE — SET LEADWIRE 5 LEAD BEDSIDE DISPOSABLE ECG (1SET OF 5/EA)

## (undated) DEVICE — SODIUM CHL IRRIGATION 0.9% 1000ML (12EA/CA)

## (undated) DEVICE — MASK OXYGEN VNYL ADLT MED CONC WITH 7 FOOT TUBING  - (50EA/CA)

## (undated) DEVICE — BALL COTTON STERILE 5/PK - (5/PK 25PK/CA)

## (undated) DEVICE — BANDAGE ELASTIC STERILE VELCRO 6 X 5 YDS (25EA/CA)

## (undated) DEVICE — BLADE SAGITTAL 34MM

## (undated) DEVICE — Device

## (undated) DEVICE — CANISTER SUCTION 3000ML MECHANICAL FILTER AUTO SHUTOFF MEDI-VAC NONSTERILE LF DISP  (40EA/CA)

## (undated) DEVICE — KIT ANESTHESIA W/CIRCUIT & 3/LT BAG W/FILTER (20EA/CA)

## (undated) DEVICE — LEAD SET 6 DISP. EKG NIHON KOHDEN

## (undated) DEVICE — SUTURE 2-0 VICRYL PLUS CT-1 - 8 X 18 INCH(12/BX)

## (undated) DEVICE — SODIUM CHL. IRRIGATION 0.9% 3000ML (4EA/CA 65CA/PF)

## (undated) DEVICE — SUTURE 3-0 MONOCRYL PLUS PS-1 - 27 INCH (36/BX)

## (undated) DEVICE — GLOVE BIOGEL PI ULTRATOUCH SZ 7.5 SURGICAL PF LF -(50/BX 4BX/CA)

## (undated) DEVICE — TOWEL STOP TIMEOUT SAFETY FLAG (40EA/CA)

## (undated) DEVICE — DRESSING XEROFORM 1X8 - (50/BX 4BX/CA)

## (undated) DEVICE — WATER IRRIGATION STERILE 1000ML (12EA/CA)

## (undated) DEVICE — BAG SPONGE COUNT 10.25 X 32 - BLUE (250/CA)

## (undated) DEVICE — ELECTRODE 850 FOAM ADHESIVE - HYDROGEL RADIOTRNSPRNT (50/PK)

## (undated) DEVICE — TIP INTPLS HFLO ML ORFC BTRY - (12/CS)  FOR SURGILAV

## (undated) DEVICE — PAD UNIVERSAL MULTI USE (1/EA)

## (undated) DEVICE — GLOVE BIOGEL SZ 7.5 SURGICAL PF LTX - (50PR/BX 4BX/CA)

## (undated) DEVICE — SUCTION INSTRUMENT YANKAUER BULBOUS TIP W/O VENT (50EA/CA)

## (undated) DEVICE — DRAPE LARGE 3 QUARTER - (20/CA)

## (undated) DEVICE — ELECTRODE DUAL RETURN W/ CORD - (50/PK)

## (undated) DEVICE — TUBE CONNECTING SUCTION - CLEAR PLASTIC STERILE 72 IN (50EA/CA)

## (undated) DEVICE — DRESSING 3X3 ADAPTIC GAUZE - (50EA/CT)

## (undated) DEVICE — PROTECTOR ULNA NERVE - (36PR/CA)

## (undated) DEVICE — STAPLER SKIN DISP - (6/BX 10BX/CA) VISISTAT

## (undated) DEVICE — STOCKINETTE IMPERVIOUS 12X48 - STERILELF (10/CA)"

## (undated) DEVICE — SET EXTENSION WITH 2 PORTS (48EA/CA) ***PART #2C8610 IS A SUBSTITUTE*****

## (undated) DEVICE — TOURNIQUET, STERILE 18 (RED)

## (undated) DEVICE — PACK TOTAL KNEE  (1/CA)

## (undated) DEVICE — SPONGE GAUZESTER 4 X 4 4PLY - (128PK/CA)

## (undated) DEVICE — PADDING CAST 4 IN X 4 YDS - SOF-ROLL (12RL/BG 6BG/CT)

## (undated) DEVICE — LACTATED RINGERS INJ 1000 ML - (14EA/CA 60CA/PF)

## (undated) DEVICE — CUFF 30 X 4 TOURNIQUET 2 PORT DISPOSABLE STERILE (10EA/BX)

## (undated) DEVICE — CORDS BIPOLAR COAGULATION - 12FT STERILE DISP. (10EA/BX)

## (undated) DEVICE — ARMREST CRADLE FOAM - (2PR/PK 12PR/CA)

## (undated) DEVICE — PACK UPPER EXTREMITY (2EA/CA)

## (undated) DEVICE — CHLORAPREP 26 ML APPLICATOR - ORANGE TINT(25/CA)

## (undated) DEVICE — BLADE SAGITTAL SYSTEM 18MM

## (undated) DEVICE — NEEDLE W/FACET TIP DULL VERSION W/STIMULATION CABLE SONOPLEX 21G X 4 (10/EA)"

## (undated) DEVICE — SUTURE GENERAL

## (undated) DEVICE — KNIFE MINI CARPAL TUNNEL  DISPOSABLE (5EA/BX)

## (undated) DEVICE — SUTURE 4-0 ETHILON FS-2 18 (36PK/BX)"

## (undated) DEVICE — SUTURE 1 VICRYL PLUS CTX - 8 X 18 INCH (12/BX)

## (undated) DEVICE — SUTURE

## (undated) DEVICE — DRESSING TRANSPARENT FILM TEGADERM 4 X 4.75" (50EA/BX)"

## (undated) DEVICE — GLOVE, LITE (PAIR)

## (undated) DEVICE — TUBING CLEARLINK DUO-VENT - C-FLO (48EA/CA)

## (undated) DEVICE — SENSOR SPO2 NEO LNCS ADHESIVE (20/BX) SEE USER NOTES

## (undated) DEVICE — MASK ANESTHESIA ADULT  - (100/CA)

## (undated) DEVICE — HEAD HOLDER JUNIOR/ADULT

## (undated) DEVICE — SHEET TRANSVERSE LAP - (12EA/CA)

## (undated) DEVICE — BANDAGE ELASTIC LATEX STERILE VELCRO 4 X 5 YDS (25EA/CA)

## (undated) DEVICE — MIXER BONE CEMENT REVOLUTION - W/FEMORAL PRESSURIZER (6/CA)

## (undated) DEVICE — GLOVE BIOGEL INDICATOR SZ 6.5 SURGICAL PF LTX - (50PR/BX 4BX/CA)

## (undated) DEVICE — HUMID-VENT HEAT AND MOISTURE EXCHANGE- (50/BX)

## (undated) DEVICE — GLOVE BIOGEL PI INDICATOR SZ 7.0 SURGICAL PF LF - (50/BX 4BX/CA)

## (undated) DEVICE — PACK KO - (3/CA)

## (undated) DEVICE — SUTURE 7-0 VICRYL TG140-8 (12PK/BX)

## (undated) DEVICE — GLOVE BIOGEL ECLIPSE  PF LATEX SIZE 6.5 (50PR/BX)

## (undated) DEVICE — MEDICINE CUP STERILE 2 OZ - (100/CA)

## (undated) DEVICE — GLOVE BIOGEL PI INDICATOR SZ 7.5 SURGICAL PF LF -(50/BX 4BX/CA)

## (undated) DEVICE — SUTURE FAST ABSORBANT 6-0 PLAIN GUT PC-1 (12PK/BX)

## (undated) DEVICE — GOWN SURGEONS X-LARGE - DISP. (30/CA)

## (undated) DEVICE — HANDPIECE 10FT INTPLS SCT PLS IRRIGATION HAND CONTROL SET (6/PK)

## (undated) DEVICE — PIN TROCAR GEN 1/8X3 (4EA/BX)

## (undated) DEVICE — SENSOR OXIMETER ADULT SPO2 RD SET (20EA/BX)

## (undated) DEVICE — DISPOSABLE WOUND VAC PICO 10 X 30 CM - WOUND CARE (3/CA)

## (undated) DEVICE — PACK MINOR BASIN - (2EA/CA)

## (undated) DEVICE — PEN SKIN MARKER W/RULER - (50EA/BX)

## (undated) DEVICE — GLOVES, #7 1/2 BIOGEL M

## (undated) DEVICE — NEEDLE SPINAL NON-SAFETY 18 GA X 3 IN (25EA/BX)

## (undated) DEVICE — GLOVE SURGICAL PROTEXIS 8 1/2 - (50PR/BX)

## (undated) DEVICE — CANNULA O2 COMFORT SOFT EAR ADULT 7 FT TUBING (50/CA)

## (undated) DEVICE — SLEEVE, VASO, THIGH, MED

## (undated) DEVICE — SUTURE 5-0 MERSIL S-14 (12PK/BX)

## (undated) DEVICE — KIT  I.V. START (100EA/CA)

## (undated) DEVICE — SUTURE 4-0 VICRYL PLUS RB-1 - 27 INCH (36/BX)

## (undated) DEVICE — GLOVE SZ 6.5 BIOGEL PI MICRO - PF LF (50PR/BX)

## (undated) DEVICE — ADHESIVE MASTISOL - (48/BX)

## (undated) DEVICE — SUTURE 5-0 VICRYL D/A S-14 18 (12PK/BX)"

## (undated) DEVICE — GLOVE BIOGEL SZ 6.5 SURGICAL PF LTX (50PR/BX 4BX/CA)

## (undated) DEVICE — PAD EYE GAUZE COVERED OVAL 1 5/8 X 2 5/8" STERILE"

## (undated) DEVICE — SLEEVE VASO CALF MED - (10PR/CA)